# Patient Record
Sex: FEMALE | Race: BLACK OR AFRICAN AMERICAN | Employment: OTHER | URBAN - METROPOLITAN AREA
[De-identification: names, ages, dates, MRNs, and addresses within clinical notes are randomized per-mention and may not be internally consistent; named-entity substitution may affect disease eponyms.]

---

## 2017-01-01 ENCOUNTER — HOSPITAL ENCOUNTER (OUTPATIENT)
Dept: OTHER | Age: 67
Discharge: HOME | End: 2017-01-31
Attending: INTERNAL MEDICINE | Admitting: INTERNAL MEDICINE

## 2017-01-04 ENCOUNTER — TELEPHONE (OUTPATIENT)
Dept: CARDIOLOGY CLINIC | Age: 67
End: 2017-01-04

## 2017-01-04 ENCOUNTER — ANTI-COAG VISIT (OUTPATIENT)
Dept: PHARMACY | Age: 67
End: 2017-01-04

## 2017-01-04 DIAGNOSIS — I48.91 ATRIAL FIBRILLATION, UNSPECIFIED TYPE (HCC): ICD-10-CM

## 2017-01-04 DIAGNOSIS — Z79.01 LONG TERM CURRENT USE OF ANTICOAGULANT THERAPY: ICD-10-CM

## 2017-01-04 PROBLEM — I63.9 CVA (CEREBRAL VASCULAR ACCIDENT) (HCC): Status: ACTIVE | Noted: 2017-01-04

## 2017-01-04 LAB — INR BLD: 1.2

## 2017-01-11 ENCOUNTER — ANTI-COAG VISIT (OUTPATIENT)
Dept: PHARMACY | Age: 67
End: 2017-01-11

## 2017-01-11 DIAGNOSIS — Z79.01 LONG TERM CURRENT USE OF ANTICOAGULANT THERAPY: ICD-10-CM

## 2017-01-11 DIAGNOSIS — I48.91 ATRIAL FIBRILLATION, UNSPECIFIED TYPE (HCC): ICD-10-CM

## 2017-01-11 LAB
INR BLD: 1.5
PROTIME: 18 SECONDS

## 2017-01-19 ENCOUNTER — ANTI-COAG VISIT (OUTPATIENT)
Dept: PHARMACY | Age: 67
End: 2017-01-19

## 2017-01-19 DIAGNOSIS — Z79.01 LONG TERM CURRENT USE OF ANTICOAGULANT THERAPY: ICD-10-CM

## 2017-01-19 DIAGNOSIS — I48.91 ATRIAL FIBRILLATION, UNSPECIFIED TYPE (HCC): ICD-10-CM

## 2017-01-19 LAB
INR BLD: 2.3
PROTIME: 27.3 SECONDS

## 2017-02-02 ENCOUNTER — ANTI-COAG VISIT (OUTPATIENT)
Dept: PHARMACY | Age: 67
End: 2017-02-02

## 2017-02-02 DIAGNOSIS — Z79.01 LONG TERM CURRENT USE OF ANTICOAGULANT THERAPY: ICD-10-CM

## 2017-02-02 LAB — INR BLD: 2.1

## 2017-02-16 ENCOUNTER — ANTI-COAG VISIT (OUTPATIENT)
Dept: PHARMACY | Age: 67
End: 2017-02-16

## 2017-02-16 DIAGNOSIS — Z79.01 LONG TERM CURRENT USE OF ANTICOAGULANT THERAPY: ICD-10-CM

## 2017-02-16 DIAGNOSIS — I48.91 ATRIAL FIBRILLATION, UNSPECIFIED TYPE (HCC): ICD-10-CM

## 2017-02-16 LAB
INR BLD: 2
PROTIME: 24.1 SECONDS

## 2017-03-13 ENCOUNTER — ANTI-COAG VISIT (OUTPATIENT)
Dept: PHARMACY | Age: 67
End: 2017-03-13

## 2017-03-13 DIAGNOSIS — I48.91 ATRIAL FIBRILLATION, UNSPECIFIED TYPE (HCC): ICD-10-CM

## 2017-03-13 DIAGNOSIS — Z79.01 LONG TERM CURRENT USE OF ANTICOAGULANT THERAPY: ICD-10-CM

## 2017-03-13 LAB — INR BLD: 1.2

## 2017-03-23 ENCOUNTER — ANTI-COAG VISIT (OUTPATIENT)
Dept: PHARMACY | Age: 67
End: 2017-03-23

## 2017-03-23 DIAGNOSIS — Z79.01 LONG TERM CURRENT USE OF ANTICOAGULANT THERAPY: ICD-10-CM

## 2017-03-23 DIAGNOSIS — I48.91 ATRIAL FIBRILLATION, UNSPECIFIED TYPE (HCC): ICD-10-CM

## 2017-03-23 LAB
INR BLD: 1.5
PROTIME: 17.8 SECONDS

## 2017-04-06 ENCOUNTER — ANTI-COAG VISIT (OUTPATIENT)
Dept: PHARMACY | Age: 67
End: 2017-04-06

## 2017-04-06 DIAGNOSIS — I48.91 ATRIAL FIBRILLATION, UNSPECIFIED TYPE (HCC): ICD-10-CM

## 2017-04-06 DIAGNOSIS — Z79.01 LONG TERM CURRENT USE OF ANTICOAGULANT THERAPY: ICD-10-CM

## 2017-04-06 LAB
INR BLD: 1.7
PROTIME: 20.9 SECONDS

## 2017-04-20 ENCOUNTER — ANTI-COAG VISIT (OUTPATIENT)
Dept: PHARMACY | Age: 67
End: 2017-04-20

## 2017-04-20 DIAGNOSIS — Z79.01 LONG TERM CURRENT USE OF ANTICOAGULANT THERAPY: ICD-10-CM

## 2017-04-20 DIAGNOSIS — I48.91 ATRIAL FIBRILLATION, UNSPECIFIED TYPE (HCC): ICD-10-CM

## 2017-04-20 LAB
INR BLD: 1.5
PROTIME: 17.6 SECONDS

## 2017-05-04 ENCOUNTER — ANTI-COAG VISIT (OUTPATIENT)
Dept: PHARMACY | Age: 67
End: 2017-05-04

## 2017-05-04 DIAGNOSIS — Z79.01 LONG TERM CURRENT USE OF ANTICOAGULANT THERAPY: ICD-10-CM

## 2017-05-04 DIAGNOSIS — I48.91 ATRIAL FIBRILLATION, UNSPECIFIED TYPE (HCC): ICD-10-CM

## 2017-05-04 LAB
INR BLD: 2.6
PROTIME: 31.5 SECONDS

## 2017-05-16 ENCOUNTER — OFFICE VISIT (OUTPATIENT)
Dept: CARDIOLOGY CLINIC | Age: 67
End: 2017-05-16

## 2017-05-16 VITALS
HEART RATE: 64 BPM | DIASTOLIC BLOOD PRESSURE: 108 MMHG | SYSTOLIC BLOOD PRESSURE: 170 MMHG | WEIGHT: 182 LBS | HEIGHT: 67 IN | BODY MASS INDEX: 28.56 KG/M2

## 2017-05-16 DIAGNOSIS — I10 UNCONTROLLED HYPERTENSION: Chronic | ICD-10-CM

## 2017-05-16 DIAGNOSIS — I48.20 CHRONIC ATRIAL FIBRILLATION (HCC): Primary | Chronic | ICD-10-CM

## 2017-05-16 DIAGNOSIS — I25.10 CORONARY ARTERY DISEASE INVOLVING NATIVE CORONARY ARTERY OF NATIVE HEART WITHOUT ANGINA PECTORIS: Chronic | ICD-10-CM

## 2017-05-16 PROCEDURE — 99214 OFFICE O/P EST MOD 30 MIN: CPT | Performed by: INTERNAL MEDICINE

## 2017-05-16 RX ORDER — MINOXIDIL 10 MG/1
10 TABLET ORAL 2 TIMES DAILY
Qty: 180 TABLET | Refills: 3 | Status: ON HOLD | OUTPATIENT
Start: 2017-05-16 | End: 2020-02-01 | Stop reason: HOSPADM

## 2017-05-16 RX ORDER — AMLODIPINE BESYLATE 5 MG/1
5 TABLET ORAL 2 TIMES DAILY
Qty: 180 TABLET | Refills: 3 | Status: SHIPPED | OUTPATIENT
Start: 2017-05-16 | End: 2019-02-14 | Stop reason: SDUPTHER

## 2017-05-18 ENCOUNTER — ANTI-COAG VISIT (OUTPATIENT)
Dept: PHARMACY | Age: 67
End: 2017-05-18

## 2017-05-18 DIAGNOSIS — I48.20 CHRONIC ATRIAL FIBRILLATION (HCC): ICD-10-CM

## 2017-05-18 DIAGNOSIS — Z79.01 LONG TERM CURRENT USE OF ANTICOAGULANT THERAPY: ICD-10-CM

## 2017-05-18 LAB
INR BLD: 2.6
PROTIME: 30.8 SECONDS

## 2017-05-30 ENCOUNTER — HOSPITAL ENCOUNTER (OUTPATIENT)
Dept: ULTRASOUND IMAGING | Age: 67
Discharge: HOME | End: 2017-05-30
Attending: INTERNAL MEDICINE | Admitting: INTERNAL MEDICINE

## 2017-05-30 DIAGNOSIS — I10 ESSENTIAL (PRIMARY) HYPERTENSION: ICD-10-CM

## 2017-06-09 ENCOUNTER — ANTI-COAG VISIT (OUTPATIENT)
Dept: PHARMACY | Age: 67
End: 2017-06-09

## 2017-06-09 DIAGNOSIS — Z79.01 LONG TERM CURRENT USE OF ANTICOAGULANT THERAPY: ICD-10-CM

## 2017-06-09 DIAGNOSIS — I48.20 CHRONIC ATRIAL FIBRILLATION (HCC): ICD-10-CM

## 2017-06-09 LAB
INR BLD: 2.1
PROTIME: 25.3 SECONDS

## 2017-06-15 ENCOUNTER — OFFICE VISIT (OUTPATIENT)
Dept: CARDIOLOGY CLINIC | Age: 67
End: 2017-06-15

## 2017-06-15 ENCOUNTER — HOSPITAL ENCOUNTER (OUTPATIENT)
Dept: NON INVASIVE DIAGNOSTICS | Age: 67
Discharge: HOME | End: 2017-06-15
Admitting: INTERNAL MEDICINE

## 2017-06-15 VITALS
HEIGHT: 67 IN | SYSTOLIC BLOOD PRESSURE: 122 MMHG | WEIGHT: 183 LBS | BODY MASS INDEX: 28.72 KG/M2 | HEART RATE: 62 BPM | DIASTOLIC BLOOD PRESSURE: 92 MMHG

## 2017-06-15 DIAGNOSIS — I48.20 CHRONIC ATRIAL FIBRILLATION (HCC): ICD-10-CM

## 2017-06-15 DIAGNOSIS — I16.0 HYPERTENSIVE URGENCY: Primary | ICD-10-CM

## 2017-06-15 DIAGNOSIS — I10 ESSENTIAL (PRIMARY) HYPERTENSION: ICD-10-CM

## 2017-06-15 DIAGNOSIS — I10 UNCONTROLLED HYPERTENSION: Primary | Chronic | ICD-10-CM

## 2017-06-15 DIAGNOSIS — I10 HTN (HYPERTENSION), BENIGN: ICD-10-CM

## 2017-06-15 DIAGNOSIS — I25.10 CORONARY ARTERY DISEASE INVOLVING NATIVE CORONARY ARTERY OF NATIVE HEART WITHOUT ANGINA PECTORIS: Chronic | ICD-10-CM

## 2017-06-15 LAB
LEFT VENTRICULAR EJECTION FRACTION HIGH VALUE: 70 %
LEFT VENTRICULAR EJECTION FRACTION MODE: NORMAL
LV EF: 65 %
LV EF: 68 %
LVEF MODALITY: NORMAL

## 2017-06-15 PROCEDURE — 99214 OFFICE O/P EST MOD 30 MIN: CPT | Performed by: INTERNAL MEDICINE

## 2017-06-15 RX ORDER — DOXAZOSIN 2 MG/1
2 TABLET ORAL NIGHTLY
Qty: 90 TABLET | Refills: 3 | Status: ON HOLD | OUTPATIENT
Start: 2017-06-15 | End: 2020-02-01 | Stop reason: HOSPADM

## 2017-07-06 ENCOUNTER — ANTI-COAG VISIT (OUTPATIENT)
Dept: PHARMACY | Age: 67
End: 2017-07-06

## 2017-07-06 DIAGNOSIS — I48.20 CHRONIC ATRIAL FIBRILLATION (HCC): ICD-10-CM

## 2017-07-06 DIAGNOSIS — Z79.01 LONG TERM CURRENT USE OF ANTICOAGULANT THERAPY: ICD-10-CM

## 2017-07-06 LAB
INR BLD: 2.3
PROTIME: 27.2 SECONDS

## 2017-08-10 ENCOUNTER — TELEPHONE (OUTPATIENT)
Dept: PHARMACY | Age: 67
End: 2017-08-10

## 2017-08-11 ENCOUNTER — ANTI-COAG VISIT (OUTPATIENT)
Dept: PHARMACY | Age: 67
End: 2017-08-11

## 2017-08-11 DIAGNOSIS — Z79.01 LONG TERM CURRENT USE OF ANTICOAGULANT THERAPY: ICD-10-CM

## 2017-08-11 DIAGNOSIS — I48.20 CHRONIC ATRIAL FIBRILLATION (HCC): ICD-10-CM

## 2017-08-11 LAB — INR BLD: 6.4

## 2017-08-15 ENCOUNTER — ANTI-COAG VISIT (OUTPATIENT)
Dept: PHARMACY | Age: 67
End: 2017-08-15

## 2017-08-15 ENCOUNTER — OFFICE VISIT (OUTPATIENT)
Dept: CARDIOLOGY CLINIC | Age: 67
End: 2017-08-15

## 2017-08-15 VITALS
HEART RATE: 66 BPM | SYSTOLIC BLOOD PRESSURE: 134 MMHG | WEIGHT: 181 LBS | DIASTOLIC BLOOD PRESSURE: 86 MMHG | BODY MASS INDEX: 28.41 KG/M2 | HEIGHT: 67 IN

## 2017-08-15 DIAGNOSIS — I48.20 CHRONIC ATRIAL FIBRILLATION (HCC): ICD-10-CM

## 2017-08-15 DIAGNOSIS — I25.10 CORONARY ARTERY DISEASE INVOLVING NATIVE CORONARY ARTERY OF NATIVE HEART WITHOUT ANGINA PECTORIS: Chronic | ICD-10-CM

## 2017-08-15 DIAGNOSIS — I10 HTN (HYPERTENSION), BENIGN: Primary | ICD-10-CM

## 2017-08-15 DIAGNOSIS — Z79.01 LONG TERM CURRENT USE OF ANTICOAGULANT THERAPY: ICD-10-CM

## 2017-08-15 LAB
INR BLD: 1.3
PROTIME: 15.5 SECONDS

## 2017-08-15 PROCEDURE — 99214 OFFICE O/P EST MOD 30 MIN: CPT | Performed by: INTERNAL MEDICINE

## 2017-08-28 ENCOUNTER — TELEPHONE (OUTPATIENT)
Dept: PHARMACY | Age: 67
End: 2017-08-28

## 2017-08-28 ENCOUNTER — ANTI-COAG VISIT (OUTPATIENT)
Dept: PHARMACY | Age: 67
End: 2017-08-28

## 2017-08-28 DIAGNOSIS — I48.20 CHRONIC ATRIAL FIBRILLATION (HCC): ICD-10-CM

## 2017-08-28 DIAGNOSIS — Z79.01 LONG TERM CURRENT USE OF ANTICOAGULANT THERAPY: ICD-10-CM

## 2017-08-28 LAB
INR BLD: 2
PROTIME: 24.3 SECONDS

## 2017-09-21 ENCOUNTER — ANTI-COAG VISIT (OUTPATIENT)
Dept: PHARMACY | Age: 67
End: 2017-09-21

## 2017-09-21 DIAGNOSIS — Z79.01 LONG TERM CURRENT USE OF ANTICOAGULANT THERAPY: ICD-10-CM

## 2017-09-21 DIAGNOSIS — I48.20 CHRONIC ATRIAL FIBRILLATION (HCC): ICD-10-CM

## 2017-09-21 LAB
INR BLD: 4.4
PROTIME: 52.8 SECONDS

## 2017-10-05 ENCOUNTER — ANTI-COAG VISIT (OUTPATIENT)
Dept: PHARMACY | Age: 67
End: 2017-10-05

## 2017-10-05 DIAGNOSIS — I48.20 CHRONIC ATRIAL FIBRILLATION (HCC): ICD-10-CM

## 2017-10-05 DIAGNOSIS — Z79.01 LONG TERM CURRENT USE OF ANTICOAGULANT THERAPY: ICD-10-CM

## 2017-10-05 LAB
INR BLD: 2.1
PROTIME: 24.9 SECONDS

## 2017-10-05 NOTE — MR AVS SNAPSHOT
cancers. BMI is not perfect. It may overestimate body fat in athletes and people who are more muscular. If your body fat is high you can improve your BMI by decreasing your calorie intake and becoming more physically active. Learn more at: [employer]             Medications and Orders      Your Current Medications Are              famotidine (PEPCID) 20 MG tablet Take 1 tablet by mouth 2 times daily    ondansetron (ZOFRAN ODT) 4 MG disintegrating tablet Take 1 tablet by mouth every 8 hours as needed for Nausea    dicyclomine (BENTYL) 10 MG capsule Take 1 capsule by mouth every 6 hours as needed (cramps)    doxazosin (CARDURA) 2 MG tablet Take 1 tablet by mouth nightly    minoxidil (LONITEN) 10 MG tablet Take 1 tablet by mouth 2 times daily    amLODIPine (NORVASC) 5 MG tablet Take 1 tablet by mouth 2 times daily    HYDROcodone-acetaminophen (NORCO) 5-325 MG per tablet Take 0.5-1 tablets by mouth every 6 hours as needed for Pain . buprenorphine (BUTRANS) 5 MCG/HR PTWK Place 1 patch onto the skin once a week    atenolol (TENORMIN) 50 MG tablet Take 0.5 tablets by mouth daily    spironolactone (ALDACTONE) 50 MG tablet Take 1 tablet by mouth 2 times daily    atorvastatin (LIPITOR) 80 MG tablet Take 1 tablet by mouth daily    furosemide (LASIX) 20 MG tablet Take 1 tablet by mouth 2 times daily    potassium chloride SA (K-DUR;KLOR-CON M) 20 MEQ tablet Take 1 tab daily. acetaminophen (TYLENOL) 500 MG tablet Take 2 tablets by mouth every 6 hours as needed    Calcium Carb-Cholecalciferol (CALCIUM + D3) 600-200 MG-UNIT TABS tablet Take 1 tablet by mouth 2 times daily    Magnesium 400 MG TABS Take 1 tablet by mouth daily      Allergies              Clonidine Derivatives Hives    Codeine Hives, Itching    Lisinopril Hives, Itching      We Ordered/Performed the following           Protime-INR     Comments: This external order was created through the results console. Result Summary for Protime-INR      Result Information     Status          Final result (Resulted: 10/5/2017  8:45 AM)           10/5/2017  8:45 AM      Component Results     Component Value Ref Range & Units Status    INR 2.1  Final    Protime 24.9 seconds Final               Additional Information        Basic Information     Date Of Birth Sex Race Ethnicity Preferred Language    1950 Female Black Non-/Non  English      Problem List as of 10/5/2017  Date Reviewed: 8/15/2017                Bleeding on Coumadin    Nontraumatic subcortical hemorrhage of cerebral hemisphere [facility])    Hypertensive emergency    TIA involving right internal carotid artery    Left leg weakness    Chronic atrial fibrillation ([facility]    Remote history of stroke    HTN (hypertension), benign    CVA (cerebral vascular accident) ([facility]    LVH (left ventricular hypertrophy)    Constipation    Coronary artery disease involving native coronary artery of native heart without angina pectoris (Chronic)    Chest pain    Uncontrolled hypertension (Chronic)    Long term current use of anticoagulant therapy    Atrial fibrillation (HCC) (Chronic)      Preventive Care        Date Due    Hepatitis C screening is recommended for all adults regardless of risk factors born between [facility] at least once (lifetime) who have never been tested. 1950    HIV screening is recommended for all people regardless of risk factors  aged 15-65 years at least once (lifetime) who have never been HIV tested. 12/27/1966    Tetanus Combination Vaccine (1 - Tdap) 12/27/1970    Pap Smear 12/27/1972    Diabetes Screening 12/27/1991    Mammograms are recommended every 2 years for low/average risk patients aged 48 - 69, and every year for high risk patients per updated national guidelines. However these guidelines can be individualized by your provider.  12/27/2001    Colonoscopy 12/27/2001    Zoster Vaccine 12/27/2011

## 2017-10-20 ENCOUNTER — TELEPHONE (OUTPATIENT)
Dept: PHARMACY | Age: 67
End: 2017-10-20

## 2017-10-23 ENCOUNTER — ANTI-COAG VISIT (OUTPATIENT)
Dept: PHARMACY | Age: 67
End: 2017-10-23

## 2017-10-23 DIAGNOSIS — Z79.01 LONG TERM CURRENT USE OF ANTICOAGULANT THERAPY: ICD-10-CM

## 2017-10-23 DIAGNOSIS — I48.20 CHRONIC ATRIAL FIBRILLATION (HCC): ICD-10-CM

## 2017-10-23 LAB
INR BLD: 2
PROTIME: 24.5 SECONDS

## 2017-11-01 ENCOUNTER — HOSPITAL ENCOUNTER (OUTPATIENT)
Dept: OTHER | Age: 67
Discharge: HOME | End: 2017-11-30
Attending: INTERNAL MEDICINE | Admitting: INTERNAL MEDICINE

## 2017-12-01 ENCOUNTER — ANTI-COAG VISIT (OUTPATIENT)
Dept: PHARMACY | Age: 67
End: 2017-12-01

## 2017-12-01 ENCOUNTER — HOSPITAL ENCOUNTER (OUTPATIENT)
Dept: OTHER | Age: 67
Discharge: HOME | End: 2017-12-31
Attending: INTERNAL MEDICINE | Admitting: INTERNAL MEDICINE

## 2017-12-01 DIAGNOSIS — I48.20 CHRONIC ATRIAL FIBRILLATION (HCC): ICD-10-CM

## 2017-12-01 DIAGNOSIS — Z79.01 LONG TERM CURRENT USE OF ANTICOAGULANT THERAPY: ICD-10-CM

## 2017-12-01 LAB
INR BLD: 1.1
PROTIME: 13.3 SECONDS

## 2017-12-01 NOTE — PROGRESS NOTES
Ms. Lesley Rascon is a 73 y.o. y/o female with history of A fib   She presents today for anticoagulation monitoring and adjustment. 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)  Patient Reported Findings:  Yes     No  [x]   []       Patient verifies current dosing regimen as listed  []   [x]       S/S bleeding/bruising/swelling/SOB  []   [x]       Blood in urine or stool  []   [x]       Procedures scheduled in the future at this time  [x]   []       Missed Dose on 11/25 only  []   [x]       Extra Dose  []   [x]       Change in medications  []   [x]       Change in health/diet/appetite- Patient alternates spinach, annmarie greens and broccoli on different weeks and fill in with the other lower vitamin K vegetables. However she had collards this past Thu(Thanksgiving), Sun & Tue which was unusually   []   [x]       Change in alcohol use  []   [x]       Change in activity  []   [x]       Hospital admission  []   [x]       Emergency department visit   [x]   []       Other complaints She did not follow the dose instructions from last visit, she has been following an earlier schedule of 2.5mg on Mon & Fri and 5mg all other days. Clinical Outcomes:  Yes     No  []   [x]       Major bleeding event  []   [x]       Thromboembolic event  Duration of warfarin Therapy: indefinitely  INR Range:  1.8-2.5  Patient states that she has been taking 2.5mg on Mon & Fri only and 5mg all other days. This was a previous dose regimen. She has been therapeutic with this dose. INR 1.1 today d/t missed dose and extra greens  Take 7.5mg today and tomorrow only then continue weekly dose to 2.5 mg on Mon & Fri and 5 mg all other days  Encouraged to maintain a consistency of vegetables/salads.   Recheck INR in 2 weeks     Referring cardiologist is Dr Gerardo Wheat  INR (no units)   Date Value   12/01/2017 1.1   10/23/2017 2   10/05/2017 2.1   09/21/2017 4.4

## 2017-12-14 ENCOUNTER — ANTI-COAG VISIT (OUTPATIENT)
Dept: PHARMACY | Age: 67
End: 2017-12-14

## 2017-12-14 DIAGNOSIS — I48.20 CHRONIC ATRIAL FIBRILLATION (HCC): ICD-10-CM

## 2017-12-14 DIAGNOSIS — Z79.01 LONG TERM CURRENT USE OF ANTICOAGULANT THERAPY: ICD-10-CM

## 2017-12-14 LAB
INR BLD: 2.5
PROTIME: 29.7 SECONDS

## 2017-12-14 NOTE — PROGRESS NOTES
Ms. Becky Britt is a 73 y.o. y/o female with history of A fib   She presents today for anticoagulation monitoring and adjustment. 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)  Patient Reported Findings:  Yes     No  [x]   []       Patient verifies current dosing regimen as listed patient states that she has been taking 2.5 mg on Mon and 5 mg all other days of the week  []   [x]       S/S bleeding/bruising/swelling/SOB  []   [x]       Blood in urine or stool  []   [x]       Procedures scheduled in the future at this time  []   [x]       Missed Dose  []   [x]       Extra Dose  []   [x]       Change in medications  [x]   []       Change in health/diet/appetite- Patient alternates spinach, annmarie greens and broccoli on different weeks and fill in with the other lower vitamin K vegetables. Has been sick with the bug, had diarrhea and vomiting this whole week. Has therefore not had an appetite. Likely affecting INR  []   [x]       Change in alcohol use  []   [x]       Change in activity  []   [x]       Hospital admission  []   [x]       Emergency department visit   []   [x]       Other complaints  Clinical Outcomes:  Yes     No  []   [x]       Major bleeding event  []   [x]       Thromboembolic event  Duration of warfarin Therapy: indefinitely  INR Range:  1.8-2.5    INR 2.5 today  Since therapeutic today, continue weekly dose as patient as been taking, 2.5 mg on Mon and 5 mg all other days of the week. Encouraged to maintain a consistency of vegetables/salads.   Recheck INR in 2 weeks, 12/28     Referring cardiologist is Dr Rebeca Ceja  INR (no units)   Date Value   12/14/2017 2.5   12/01/2017 1.1   10/23/2017 2   10/05/2017 2.1

## 2018-01-01 ENCOUNTER — HOSPITAL ENCOUNTER (OUTPATIENT)
Dept: OTHER | Age: 68
Discharge: HOME | End: 2018-01-31
Attending: INTERNAL MEDICINE | Admitting: INTERNAL MEDICINE

## 2018-01-02 ENCOUNTER — TELEPHONE (OUTPATIENT)
Dept: PHARMACY | Age: 68
End: 2018-01-02

## 2018-01-02 ENCOUNTER — ANTI-COAG VISIT (OUTPATIENT)
Dept: PHARMACY | Age: 68
End: 2018-01-02

## 2018-01-02 DIAGNOSIS — Z79.01 LONG TERM CURRENT USE OF ANTICOAGULANT THERAPY: ICD-10-CM

## 2018-01-02 DIAGNOSIS — I48.20 CHRONIC ATRIAL FIBRILLATION (HCC): ICD-10-CM

## 2018-01-02 LAB
INR BLD: 2.2
PROTIME: 26.8 SECONDS

## 2018-01-02 NOTE — PROGRESS NOTES
Ms. Elva High is a 78 y.o. y/o female with history of A fib   She presents today for anticoagulation monitoring and adjustment. 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)  Patient Reported Findings:  Yes     No  [x]   []       Patient verifies current dosing regimen as listed patient states that she has been taking 2.5 mg on Mon and 5 mg all other days of the week  []   [x]       S/S bleeding/bruising/swelling/SOB  []   [x]       Blood in urine or stool  []   [x]       Procedures scheduled in the future at this time  []   [x]       Missed Dose  []   [x]       Extra Dose  []   [x]       Change in medications  [x]   []       Change in health/diet/appetite- Patient continues to alternate spinach, annmarie greens and broccoli on different weeks and fill in with the other lower vitamin K vegetables. []   [x]       Change in alcohol use  []   [x]       Change in activity  []   [x]       Hospital admission  []   [x]       Emergency department visit   []   [x]       Other complaints  Clinical Outcomes:  Yes     No  []   [x]       Major bleeding event  []   [x]       Thromboembolic event  Duration of warfarin Therapy: indefinitely  INR Range:  1.8-2.5    INR 2.2 today  Continue same weekly dose as patient as been taking, 2.5 mg on Mon and 5 mg all other days of the week. Encouraged to maintain a consistency of vegetables/salads.   Recheck INR in 4 weeks     Referring cardiologist is Dr Eddie Willett  INR (no units)   Date Value   01/02/2018 2.2   12/14/2017 2.5   12/01/2017 1.1   10/23/2017 2

## 2018-01-30 ENCOUNTER — OFFICE VISIT (OUTPATIENT)
Dept: CARDIOLOGY CLINIC | Age: 68
End: 2018-01-30

## 2018-01-30 VITALS
BODY MASS INDEX: 28.72 KG/M2 | HEIGHT: 67 IN | WEIGHT: 183 LBS | DIASTOLIC BLOOD PRESSURE: 110 MMHG | SYSTOLIC BLOOD PRESSURE: 166 MMHG | HEART RATE: 66 BPM

## 2018-01-30 DIAGNOSIS — I48.20 CHRONIC ATRIAL FIBRILLATION (HCC): Primary | ICD-10-CM

## 2018-01-30 DIAGNOSIS — I10 HTN (HYPERTENSION), BENIGN: ICD-10-CM

## 2018-01-30 DIAGNOSIS — I25.10 CORONARY ARTERY DISEASE INVOLVING NATIVE CORONARY ARTERY OF NATIVE HEART WITHOUT ANGINA PECTORIS: Chronic | ICD-10-CM

## 2018-01-30 PROCEDURE — G8419 CALC BMI OUT NRM PARAM NOF/U: HCPCS | Performed by: INTERNAL MEDICINE

## 2018-01-30 PROCEDURE — 93000 ELECTROCARDIOGRAM COMPLETE: CPT | Performed by: INTERNAL MEDICINE

## 2018-01-30 PROCEDURE — 4040F PNEUMOC VAC/ADMIN/RCVD: CPT | Performed by: INTERNAL MEDICINE

## 2018-01-30 PROCEDURE — G8427 DOCREV CUR MEDS BY ELIG CLIN: HCPCS | Performed by: INTERNAL MEDICINE

## 2018-01-30 PROCEDURE — G8400 PT W/DXA NO RESULTS DOC: HCPCS | Performed by: INTERNAL MEDICINE

## 2018-01-30 PROCEDURE — 3017F COLORECTAL CA SCREEN DOC REV: CPT | Performed by: INTERNAL MEDICINE

## 2018-01-30 PROCEDURE — 99214 OFFICE O/P EST MOD 30 MIN: CPT | Performed by: INTERNAL MEDICINE

## 2018-01-30 PROCEDURE — G8599 NO ASA/ANTIPLAT THER USE RNG: HCPCS | Performed by: INTERNAL MEDICINE

## 2018-01-30 PROCEDURE — 1123F ACP DISCUSS/DSCN MKR DOCD: CPT | Performed by: INTERNAL MEDICINE

## 2018-01-30 PROCEDURE — G8484 FLU IMMUNIZE NO ADMIN: HCPCS | Performed by: INTERNAL MEDICINE

## 2018-01-30 PROCEDURE — 1090F PRES/ABSN URINE INCON ASSESS: CPT | Performed by: INTERNAL MEDICINE

## 2018-01-30 PROCEDURE — 1036F TOBACCO NON-USER: CPT | Performed by: INTERNAL MEDICINE

## 2018-01-30 PROCEDURE — 3014F SCREEN MAMMO DOC REV: CPT | Performed by: INTERNAL MEDICINE

## 2018-01-30 RX ORDER — ATENOLOL 50 MG/1
50 TABLET ORAL DAILY
Qty: 90 TABLET | Refills: 3 | Status: SHIPPED | OUTPATIENT
Start: 2018-01-30 | End: 2018-05-15 | Stop reason: SDUPTHER

## 2018-01-30 NOTE — PROGRESS NOTES
[address]   Cardiac Evaluation      Patient: Lex Escobar  YOB: 1950  Date: 1/30/18       Chief Complaint   Patient presents with    Atrial Fibrillation    Coronary Artery Disease    Hypertension        Referring provider: Seun Hall NP    History of Present Illness:   Ms Lina Baker is seen today in follow up. She was admitted to [facility] with headache and uncontrolled hypertension 9/21/15. She underwent an echo, MRI of the head, and GXT. GXT was abnormal so cath performed. This revealed severe LVH with EF 70%, enlarged AO root consistent with htn and patent stent in LAD. She has atrial fibrillation and takes coumadin. Emilee's blood pressure has been difficult to control per her account. She is intolerant to Lisinopril and Clonidine. Today, Ms Lina Baker tells me she has not been feeling well. She is fatigued and has palpitations/heart racing. She denies any chest pain, GRAHAM, dizziness, or edema. She continues to monitor her blood pressure at home. Saranya Horner was in the ER recently with headache but diagnosed with a  UTI. Past Medical History:   has a past medical history of Atrial fibrillation ([address] Bell palsy; CAD (coronary artery disease); Cerebral artery occlusion with cerebral infarction ([address] Hypertension; and Intracranial hemorrhage ([address] Surgical History:   has a past surgical history that includes Cardiac surgery; Tubal ligation (Right, Torn Rotator Cuff); Coronary angioplasty with stent; and Colonoscopy.      Current Outpatient Prescriptions   Medication Sig Dispense Refill    famotidine (PEPCID) 20 MG tablet Take 1 tablet by mouth 2 times daily 60 tablet 0    ondansetron (ZOFRAN ODT) 4 MG disintegrating tablet Take 1 tablet by mouth every 8 hours as needed for Nausea 20 tablet 0    dicyclomine (BENTYL) 10 MG capsule Take 1 capsule by mouth every 6 hours as needed (cramps) 20 capsule 0    doxazosin (CARDURA) 2 MG tablet Take 1 tablet by mouth nightly 90 sounds  Neurological/Psychiatric:  · Alert and oriented x3  · Moves all extremities well  · Exhibits normal gait balance and coordination      Assessment/Plan  1. Uncontrolled hypertension -stable, no renal artery stenosis on US   2. Hyperlipidemia -on statin, stable on labs 1/17   3. Chronic atrial fibrillation (HCC) -chronic, but rates are higher today. Coumadin was temporarily d/c'd after an intracranial hemorrhage. It has since been restarted and she is tolerating well. I will increase the atenolol today to 50 mg.    4. Coronary artery disease involving native coronary artery of native heart without angina pectoris -stable. Continue BB and statin. Not on ASA due to being on anticoagulation therapy. Echo 6/15/17: EF 65-70%, No regional wall motion abnormalities are noted. Mild-moderate hypertrophy. Moderately dilated left atrium. Right atrial size is severely dilated . Moderate mitral regurgitation is present. moderate tricuspid regurgitation with RVSP estimated at 42 mmHg. Mild pulmonic regurgitation present. cath 9/23/15: LVEDP - 12. LVgram - severe LVH with EF 70%, enlarged AO root consistent with htn  Cors: LM - nl, LAD - 20% prox, 30%mid, patent stent, distal irreg, LCX - 20% mid, RCA - dominant and normal  GXT 9/22/15: small sized anterior completely reversible defect consistent with ischemia in territory of mid and distal LAD. Normal LV function  Echo 9/22/15: EF 55-60%. No regional wall motion abnormalities are seen. Mild to moderate concentric left ventricular hypertrophy is present. Aortic valve appears sclerotic but opens adequately. There is mild mitral and moderate tricuspid regurgitation with RVSP 35 mmHg. Bilateral atria enlargement. 24hr holter monitor. Double Atenolol to 50mg daily. She will see Katherine Brice in 2 weeks. Thank you for allowing to me to participate in the care of Anders Cook.

## 2018-02-01 ENCOUNTER — HOSPITAL ENCOUNTER (OUTPATIENT)
Dept: OTHER | Age: 68
Discharge: HOME | End: 2018-02-28
Attending: INTERNAL MEDICINE | Admitting: INTERNAL MEDICINE

## 2018-02-01 PROCEDURE — 93224 XTRNL ECG REC UP TO 48 HRS: CPT | Performed by: INTERNAL MEDICINE

## 2018-02-05 ENCOUNTER — ANTI-COAG VISIT (OUTPATIENT)
Dept: PHARMACY | Age: 68
End: 2018-02-05

## 2018-02-05 DIAGNOSIS — Z79.01 LONG TERM CURRENT USE OF ANTICOAGULANT THERAPY: ICD-10-CM

## 2018-02-05 DIAGNOSIS — I48.20 CHRONIC ATRIAL FIBRILLATION (HCC): ICD-10-CM

## 2018-02-05 LAB
INR BLD: 1.6
PROTIME: 19.2 SECONDS

## 2018-02-06 ENCOUNTER — TELEPHONE (OUTPATIENT)
Dept: CARDIOLOGY CLINIC | Age: 68
End: 2018-02-06

## 2018-02-07 PROBLEM — G45.3 AF (AMAUROSIS FUGAX): Status: ACTIVE | Noted: 2018-02-07

## 2018-02-08 ENCOUNTER — TELEPHONE (OUTPATIENT)
Dept: ENT CLINIC | Age: 68
End: 2018-02-08

## 2018-02-08 PROBLEM — H54.7 VISION LOSS: Status: ACTIVE | Noted: 2018-02-07

## 2018-02-08 PROBLEM — G51.0 RIGHT-SIDED BELL'S PALSY: Status: ACTIVE | Noted: 2018-02-08

## 2018-02-08 PROBLEM — E04.1 THYROID NODULE: Status: ACTIVE | Noted: 2018-02-08

## 2018-02-08 NOTE — TELEPHONE ENCOUNTER
Kristie Giron called from [facility] with consult for Dr. Johnathon Centeno for thyroid nodule. She is in Room 359. Fernandez Ward is the nurse and the phone number is [phone].

## 2018-02-12 NOTE — TELEPHONE ENCOUNTER
I spoke with pt and relayed message per CRNP. Pt had a hospital . She stated that she had 2 strokes. She states that she is having some weakness in her shoulder, but otherwise ok.

## 2018-02-13 ENCOUNTER — OFFICE VISIT (OUTPATIENT)
Dept: CARDIOLOGY CLINIC | Age: 68
End: 2018-02-13

## 2018-02-13 VITALS
HEIGHT: 67 IN | HEART RATE: 58 BPM | DIASTOLIC BLOOD PRESSURE: 92 MMHG | BODY MASS INDEX: 29.66 KG/M2 | WEIGHT: 189 LBS | SYSTOLIC BLOOD PRESSURE: 140 MMHG

## 2018-02-13 DIAGNOSIS — E87.6 HYPOKALEMIA: Primary | ICD-10-CM

## 2018-02-13 DIAGNOSIS — I48.20 CHRONIC ATRIAL FIBRILLATION (HCC): Chronic | ICD-10-CM

## 2018-02-13 DIAGNOSIS — I10 UNCONTROLLED HYPERTENSION: Chronic | ICD-10-CM

## 2018-02-13 DIAGNOSIS — I25.10 CORONARY ARTERY DISEASE INVOLVING NATIVE CORONARY ARTERY OF NATIVE HEART WITHOUT ANGINA PECTORIS: Chronic | ICD-10-CM

## 2018-02-13 PROCEDURE — 1111F DSCHRG MED/CURRENT MED MERGE: CPT | Performed by: NURSE PRACTITIONER

## 2018-02-13 PROCEDURE — G8598 ASA/ANTIPLAT THER USED: HCPCS | Performed by: NURSE PRACTITIONER

## 2018-02-13 PROCEDURE — G8484 FLU IMMUNIZE NO ADMIN: HCPCS | Performed by: NURSE PRACTITIONER

## 2018-02-13 PROCEDURE — G8419 CALC BMI OUT NRM PARAM NOF/U: HCPCS | Performed by: NURSE PRACTITIONER

## 2018-02-13 PROCEDURE — 99214 OFFICE O/P EST MOD 30 MIN: CPT | Performed by: NURSE PRACTITIONER

## 2018-02-13 PROCEDURE — 1123F ACP DISCUSS/DSCN MKR DOCD: CPT | Performed by: NURSE PRACTITIONER

## 2018-02-13 PROCEDURE — G8427 DOCREV CUR MEDS BY ELIG CLIN: HCPCS | Performed by: NURSE PRACTITIONER

## 2018-02-13 PROCEDURE — 3014F SCREEN MAMMO DOC REV: CPT | Performed by: NURSE PRACTITIONER

## 2018-02-13 PROCEDURE — 1036F TOBACCO NON-USER: CPT | Performed by: NURSE PRACTITIONER

## 2018-02-13 PROCEDURE — 3017F COLORECTAL CA SCREEN DOC REV: CPT | Performed by: NURSE PRACTITIONER

## 2018-02-13 PROCEDURE — G8400 PT W/DXA NO RESULTS DOC: HCPCS | Performed by: NURSE PRACTITIONER

## 2018-02-13 PROCEDURE — 1090F PRES/ABSN URINE INCON ASSESS: CPT | Performed by: NURSE PRACTITIONER

## 2018-02-13 PROCEDURE — 4040F PNEUMOC VAC/ADMIN/RCVD: CPT | Performed by: NURSE PRACTITIONER

## 2018-02-13 RX ORDER — POTASSIUM CHLORIDE 20 MEQ/1
TABLET, EXTENDED RELEASE ORAL
Qty: 30 TABLET | Refills: 3 | Status: ON HOLD | OUTPATIENT
Start: 2018-02-13 | End: 2018-02-27 | Stop reason: HOSPADM

## 2018-02-13 NOTE — LETTER
has a past surgical history that includes Cardiac surgery; Tubal ligation (Right, Torn Rotator Cuff); Coronary angioplasty with stent; and Colonoscopy. Current Outpatient Prescriptions   Medication Sig Dispense Refill    potassium chloride (KLOR-CON M) 20 MEQ extended release tablet Take 1 tab daily. 30 tablet 3    aspirin 81 MG EC tablet Take 1 tablet by mouth daily 30 tablet 3    warfarin (COUMADIN) 5 MG tablet Take 5 mg by mouth See Admin Instructions 2.5mg Monday 5mg all other days      atenolol (TENORMIN) 50 MG tablet Take 1 tablet by mouth daily (Patient taking differently: Take 50 mg by mouth 2 times daily ) 90 tablet 3    doxazosin (CARDURA) 2 MG tablet Take 1 tablet by mouth nightly 90 tablet 3    minoxidil (LONITEN) 10 MG tablet Take 1 tablet by mouth 2 times daily 180 tablet 3    amLODIPine (NORVASC) 5 MG tablet Take 1 tablet by mouth 2 times daily 180 tablet 3    atorvastatin (LIPITOR) 80 MG tablet Take 1 tablet by mouth daily 90 tablet 3    furosemide (LASIX) 20 MG tablet Take 1 tablet by mouth 2 times daily 180 tablet 3    acetaminophen (TYLENOL) 500 MG tablet Take 2 tablets by mouth every 6 hours as needed 120 tablet 3    Magnesium 400 MG TABS Take 1 tablet by mouth daily 30 tablet 3    famotidine (PEPCID) 20 MG tablet Take 1 tablet by mouth 2 times daily 60 tablet 0    Calcium Carb-Cholecalciferol (CALCIUM + D3) 600-200 MG-UNIT TABS tablet Take 1 tablet by mouth 2 times daily 120 tablet 3     No current facility-administered medications for this visit. Social History:  Social History     Social History    Marital status:      Spouse name: Dmitry Núñez Number of children: 3    Years of education: 15     Occupational History    Not on file.      Social History Main Topics    Smoking status: Never Smoker    Smokeless tobacco: Never Used    Alcohol use No    Drug use: No    Sexual activity: Yes     Partners: Male     Other Topics Concern    Not on file · Heart is irregular rate and rhythm with normal S1, S2  · The carotid upstroke is normal, no bruit noted   · JVP is not elevated  · Peripheral pulses are symmetrical  · There is no clubbing, cyanosis of the extremities  · No edema  · Femoral Arteries: 2+ and equal  · Pedal Pulses: 2+ and equal   Abdomen:  · No masses or tenderness  · Normal bowel sounds  Neurological/Psychiatric:  · Alert and oriented x3  · Moves all extremities well  · Exhibits normal gait balance and coordination      Assessment/Plan  1. Uncontrolled hypertension -stable, no renal artery stenosis on US   2. Hyperlipidemia -on statin, stable on labs 1/17   3. Chronic atrial fibrillation (HCC) -chronic, but rates are higher today. Coumadin was temporarily d/c'd after an intracranial hemorrhage. It has since been restarted and she is tolerating well. continue atenolol  50 mg.    4. Coronary artery disease involving native coronary artery of native heart without angina pectoris -stable. Continue BB and statin. Not on ASA due to being on anticoagulation therapy. Echo 6/15/17: EF 65-70%, No regional wall motion abnormalities are noted. Mild-moderate hypertrophy. Moderately dilated left atrium. Right atrial size is severely dilated . Moderate mitral regurgitation is present. moderate tricuspid regurgitation with RVSP estimated at 42 mmHg. Mild pulmonic regurgitation present. cath 9/23/15: LVEDP - 12. LVgram - severe LVH with EF 70%, enlarged AO root consistent with htn  Cors: LM - nl, LAD - 20% prox, 30%mid, patent stent, distal irreg, LCX - 20% mid, RCA - dominant and normal  GXT 9/22/15: small sized anterior completely reversible defect consistent with ischemia in territory of mid and distal LAD. Normal LV function  Echo 9/22/15: EF 55-60%. No regional wall motion abnormalities are seen. Mild to moderate concentric left ventricular hypertrophy is present. Aortic valve appears sclerotic but opens adequately.

## 2018-02-13 NOTE — PROGRESS NOTES
(COUMADIN) 5 MG tablet Take 5 mg by mouth See Admin Instructions 2.5mg Monday 5mg all other days      atenolol (TENORMIN) 50 MG tablet Take 1 tablet by mouth daily (Patient taking differently: Take 50 mg by mouth 2 times daily ) 90 tablet 3    doxazosin (CARDURA) 2 MG tablet Take 1 tablet by mouth nightly 90 tablet 3    minoxidil (LONITEN) 10 MG tablet Take 1 tablet by mouth 2 times daily 180 tablet 3    amLODIPine (NORVASC) 5 MG tablet Take 1 tablet by mouth 2 times daily 180 tablet 3    atorvastatin (LIPITOR) 80 MG tablet Take 1 tablet by mouth daily 90 tablet 3    furosemide (LASIX) 20 MG tablet Take 1 tablet by mouth 2 times daily 180 tablet 3    acetaminophen (TYLENOL) 500 MG tablet Take 2 tablets by mouth every 6 hours as needed 120 tablet 3    Magnesium 400 MG TABS Take 1 tablet by mouth daily 30 tablet 3    famotidine (PEPCID) 20 MG tablet Take 1 tablet by mouth 2 times daily 60 tablet 0    Calcium Carb-Cholecalciferol (CALCIUM + D3) 600-200 MG-UNIT TABS tablet Take 1 tablet by mouth 2 times daily 120 tablet 3     No current facility-administered medications for this visit. Social History:  Social History     Social History    Marital status:      Spouse name: Jatin Cervantes Number of children: 3    Years of education: 15     Occupational History    Not on file. Social History Main Topics    Smoking status: Never Smoker    Smokeless tobacco: Never Used    Alcohol use No    Drug use: No    Sexual activity: Yes     Partners: Male     Other Topics Concern    Not on file     Social History Narrative    No narrative on file       Family History:  family history is not on file. Allergies:  Clonidine derivatives; Codeine; and Lisinopril     Review of Systems:   · Constitutional: there has been no unanticipated weight loss. No change in energy or activity level   · Eyes: No visual changes   · ENT: No Headaches, hearing loss or vertigo.  No mouth sores or sore x3  · Moves all extremities well  · Exhibits normal gait balance and coordination      Assessment/Plan  1. Uncontrolled hypertension -stable, no renal artery stenosis on US   2. Hyperlipidemia -on statin, stable on labs 1/17   3. Chronic atrial fibrillation (HCC) -chronic, but rates are higher today. Coumadin was temporarily d/c'd after an intracranial hemorrhage. It has since been restarted and she is tolerating well. continue atenolol  50 mg.    4. Coronary artery disease involving native coronary artery of native heart without angina pectoris -stable. Continue BB and statin. Not on ASA due to being on anticoagulation therapy. Echo 6/15/17: EF 65-70%, No regional wall motion abnormalities are noted. Mild-moderate hypertrophy. Moderately dilated left atrium. Right atrial size is severely dilated . Moderate mitral regurgitation is present. moderate tricuspid regurgitation with RVSP estimated at 42 mmHg. Mild pulmonic regurgitation present. cath 9/23/15: LVEDP - 12. LVgram - severe LVH with EF 70%, enlarged AO root consistent with htn  Cors: LM - nl, LAD - 20% prox, 30%mid, patent stent, distal irreg, LCX - 20% mid, RCA - dominant and normal  GXT 9/22/15: small sized anterior completely reversible defect consistent with ischemia in territory of mid and distal LAD. Normal LV function  Echo 9/22/15: EF 55-60%. No regional wall motion abnormalities are seen. Mild to moderate concentric left ventricular hypertrophy is present. Aortic valve appears sclerotic but opens adequately. There is mild mitral and moderate tricuspid regurgitation with RVSP 35 mmHg. Bilateral atria enlargement. Drew BALDERAS reviewed with DR. Strange-no changes  NO change in meds  OV 3 months  Thank you for allowing to me to participate in the care of Idris [place].

## 2018-02-14 NOTE — COMMUNICATION BODY
(COUMADIN) 5 MG tablet Take 5 mg by mouth See Admin Instructions 2.5mg Monday 5mg all other days      atenolol (TENORMIN) 50 MG tablet Take 1 tablet by mouth daily (Patient taking differently: Take 50 mg by mouth 2 times daily ) 90 tablet 3    doxazosin (CARDURA) 2 MG tablet Take 1 tablet by mouth nightly 90 tablet 3    minoxidil (LONITEN) 10 MG tablet Take 1 tablet by mouth 2 times daily 180 tablet 3    amLODIPine (NORVASC) 5 MG tablet Take 1 tablet by mouth 2 times daily 180 tablet 3    atorvastatin (LIPITOR) 80 MG tablet Take 1 tablet by mouth daily 90 tablet 3    furosemide (LASIX) 20 MG tablet Take 1 tablet by mouth 2 times daily 180 tablet 3    acetaminophen (TYLENOL) 500 MG tablet Take 2 tablets by mouth every 6 hours as needed 120 tablet 3    Magnesium 400 MG TABS Take 1 tablet by mouth daily 30 tablet 3    famotidine (PEPCID) 20 MG tablet Take 1 tablet by mouth 2 times daily 60 tablet 0    Calcium Carb-Cholecalciferol (CALCIUM + D3) 600-200 MG-UNIT TABS tablet Take 1 tablet by mouth 2 times daily 120 tablet 3     No current facility-administered medications for this visit. Social History:  Social History     Social History    Marital status:      Spouse name: Delfino Noriega Number of children: 3    Years of education: 15     Occupational History    Not on file. Social History Main Topics    Smoking status: Never Smoker    Smokeless tobacco: Never Used    Alcohol use No    Drug use: No    Sexual activity: Yes     Partners: Male     Other Topics Concern    Not on file     Social History Narrative    No narrative on file       Family History:  family history is not on file. Allergies:  Clonidine derivatives; Codeine; and Lisinopril     Review of Systems:   · Constitutional: there has been no unanticipated weight loss. No change in energy or activity level   · Eyes: No visual changes   · ENT: No Headaches, hearing loss or vertigo.  No mouth sores or sore x3  · Moves all extremities well  · Exhibits normal gait balance and coordination      Assessment/Plan  1. Uncontrolled hypertension -stable, no renal artery stenosis on US   2. Hyperlipidemia -on statin, stable on labs 1/17   3. Chronic atrial fibrillation (HCC) -chronic, but rates are higher today. Coumadin was temporarily d/c'd after an intracranial hemorrhage. It has since been restarted and she is tolerating well. continue atenolol  50 mg.    4. Coronary artery disease involving native coronary artery of native heart without angina pectoris -stable. Continue BB and statin. Not on ASA due to being on anticoagulation therapy. Echo 6/15/17: EF 65-70%, No regional wall motion abnormalities are noted. Mild-moderate hypertrophy. Moderately dilated left atrium. Right atrial size is severely dilated . Moderate mitral regurgitation is present. moderate tricuspid regurgitation with RVSP estimated at 42 mmHg. Mild pulmonic regurgitation present. cath 9/23/15: LVEDP - 12. LVgram - severe LVH with EF 70%, enlarged AO root consistent with htn  Cors: LM - nl, LAD - 20% prox, 30%mid, patent stent, distal irreg, LCX - 20% mid, RCA - dominant and normal  GXT 9/22/15: small sized anterior completely reversible defect consistent with ischemia in territory of mid and distal LAD. Normal LV function  Echo 9/22/15: EF 55-60%. No regional wall motion abnormalities are seen. Mild to moderate concentric left ventricular hypertrophy is present. Aortic valve appears sclerotic but opens adequately. There is mild mitral and moderate tricuspid regurgitation with RVSP 35 mmHg. Bilateral atria enlargement. [facility]  NO change in meds  OV 3 months  Thank you for allowing to me to participate in the care of Lucho Arriaza.

## 2018-02-20 ENCOUNTER — HOSPITAL ENCOUNTER (OUTPATIENT)
Dept: OTHER | Age: 68
Discharge: HOME | End: 2018-02-20
Attending: NURSE PRACTITIONER | Admitting: NURSE PRACTITIONER

## 2018-02-20 DIAGNOSIS — I10 HTN (HYPERTENSION), BENIGN: ICD-10-CM

## 2018-02-20 DIAGNOSIS — E87.6 HYPOKALEMIA: ICD-10-CM

## 2018-02-20 LAB
ANION GAP SERPL CALCULATED.3IONS-SCNC: 12 MMOL/L (ref 3–16)
BUN BLDV-MCNC: 6 MG/DL (ref 7–20)
CALCIUM SERPL-MCNC: 9.4 MG/DL (ref 8.3–10.6)
CHLORIDE BLD-SCNC: 102 MMOL/L (ref 99–110)
CO2: 26 MMOL/L (ref 21–32)
CREAT SERPL-MCNC: <0.5 MG/DL (ref 0.6–1.2)
GFR AFRICAN AMERICAN: >60
GFR NON-AFRICAN AMERICAN: >60
GLUCOSE BLD-MCNC: 98 MG/DL (ref 70–99)
POTASSIUM SERPL-SCNC: 3.6 MMOL/L (ref 3.5–5.1)
SODIUM BLD-SCNC: 140 MMOL/L (ref 136–145)

## 2018-02-25 PROBLEM — R29.90 STROKE-LIKE SYMPTOMS: Status: ACTIVE | Noted: 2018-02-25

## 2018-02-27 PROBLEM — H53.8 BLURRED VISION, LEFT EYE: Status: ACTIVE | Noted: 2018-02-27

## 2018-03-01 ENCOUNTER — HOSPITAL ENCOUNTER (OUTPATIENT)
Dept: OTHER | Age: 68
Discharge: HOME | End: 2018-03-31
Attending: INTERNAL MEDICINE | Admitting: INTERNAL MEDICINE

## 2018-03-06 ENCOUNTER — TELEPHONE (OUTPATIENT)
Dept: PHARMACY | Age: 68
End: 2018-03-06

## 2018-03-08 ENCOUNTER — ANTI-COAG VISIT (OUTPATIENT)
Dept: PHARMACY | Age: 68
End: 2018-03-08

## 2018-03-08 DIAGNOSIS — Z79.01 LONG TERM CURRENT USE OF ANTICOAGULANT THERAPY: ICD-10-CM

## 2018-03-08 DIAGNOSIS — I48.20 CHRONIC ATRIAL FIBRILLATION (HCC): ICD-10-CM

## 2018-03-08 LAB
INR BLD: 5.7
PROTIME: 68.3 SECONDS

## 2018-03-20 RX ORDER — POTASSIUM CHLORIDE 20 MEQ/1
20 TABLET, EXTENDED RELEASE ORAL DAILY
Qty: 90 TABLET | Refills: 1 | Status: SHIPPED | OUTPATIENT
Start: 2018-03-20 | End: 2020-02-03 | Stop reason: SDUPTHER

## 2018-03-20 RX ORDER — FUROSEMIDE 20 MG/1
20 TABLET ORAL 2 TIMES DAILY
Qty: 180 TABLET | Refills: 1 | Status: SHIPPED | OUTPATIENT
Start: 2018-03-20 | End: 2020-02-03 | Stop reason: SDUPTHER

## 2018-03-20 RX ORDER — ATORVASTATIN CALCIUM 80 MG/1
80 TABLET, FILM COATED ORAL DAILY
Qty: 90 TABLET | Refills: 1 | Status: SHIPPED | OUTPATIENT
Start: 2018-03-20 | End: 2020-02-03 | Stop reason: SDUPTHER

## 2018-03-20 RX ORDER — SPIRONOLACTONE 50 MG/1
50 TABLET, FILM COATED ORAL DAILY
Qty: 90 TABLET | Refills: 1 | Status: SHIPPED | OUTPATIENT
Start: 2018-03-20 | End: 2020-02-03 | Stop reason: SDUPTHER

## 2018-04-01 ENCOUNTER — HOSPITAL ENCOUNTER (OUTPATIENT)
Dept: OTHER | Age: 68
Discharge: HOME | End: 2018-04-30
Attending: INTERNAL MEDICINE | Admitting: INTERNAL MEDICINE

## 2018-04-03 ENCOUNTER — TELEPHONE (OUTPATIENT)
Dept: PHARMACY | Age: 68
End: 2018-04-03

## 2018-04-05 ENCOUNTER — ANTI-COAG VISIT (OUTPATIENT)
Dept: PHARMACY | Age: 68
End: 2018-04-05

## 2018-04-05 DIAGNOSIS — Z79.01 LONG TERM CURRENT USE OF ANTICOAGULANT THERAPY: ICD-10-CM

## 2018-04-05 DIAGNOSIS — I48.20 CHRONIC ATRIAL FIBRILLATION (HCC): ICD-10-CM

## 2018-04-05 LAB
INR BLD: 1.4
PROTIME: 16.3 SECONDS

## 2018-04-19 ENCOUNTER — ANTI-COAG VISIT (OUTPATIENT)
Dept: PHARMACY | Age: 68
End: 2018-04-19

## 2018-04-19 DIAGNOSIS — I48.20 CHRONIC ATRIAL FIBRILLATION (HCC): ICD-10-CM

## 2018-04-19 DIAGNOSIS — Z79.01 LONG TERM CURRENT USE OF ANTICOAGULANT THERAPY: ICD-10-CM

## 2018-04-19 LAB
INR BLD: 1.6
PROTIME: 19 SECONDS

## 2018-04-24 ENCOUNTER — OFFICE VISIT (OUTPATIENT)
Dept: ORTHOPEDIC SURGERY | Age: 68
End: 2018-04-24

## 2018-04-24 VITALS
SYSTOLIC BLOOD PRESSURE: 195 MMHG | DIASTOLIC BLOOD PRESSURE: 128 MMHG | WEIGHT: 185 LBS | HEIGHT: 67 IN | BODY MASS INDEX: 29.03 KG/M2 | RESPIRATION RATE: 16 BRPM | HEART RATE: 97 BPM

## 2018-04-24 DIAGNOSIS — I16.0 HYPERTENSIVE URGENCY, MALIGNANT: ICD-10-CM

## 2018-04-24 DIAGNOSIS — M17.11 PRIMARY OSTEOARTHRITIS OF RIGHT KNEE: Primary | ICD-10-CM

## 2018-04-24 PROCEDURE — 99204 OFFICE O/P NEW MOD 45 MIN: CPT | Performed by: ORTHOPAEDIC SURGERY

## 2018-04-24 PROCEDURE — 4040F PNEUMOC VAC/ADMIN/RCVD: CPT | Performed by: ORTHOPAEDIC SURGERY

## 2018-04-24 PROCEDURE — G8400 PT W/DXA NO RESULTS DOC: HCPCS | Performed by: ORTHOPAEDIC SURGERY

## 2018-04-24 PROCEDURE — G8598 ASA/ANTIPLAT THER USED: HCPCS | Performed by: ORTHOPAEDIC SURGERY

## 2018-04-24 PROCEDURE — G8428 CUR MEDS NOT DOCUMENT: HCPCS | Performed by: ORTHOPAEDIC SURGERY

## 2018-04-24 PROCEDURE — 1090F PRES/ABSN URINE INCON ASSESS: CPT | Performed by: ORTHOPAEDIC SURGERY

## 2018-04-24 PROCEDURE — 1036F TOBACCO NON-USER: CPT | Performed by: ORTHOPAEDIC SURGERY

## 2018-04-24 PROCEDURE — 1123F ACP DISCUSS/DSCN MKR DOCD: CPT | Performed by: ORTHOPAEDIC SURGERY

## 2018-04-24 PROCEDURE — G8417 CALC BMI ABV UP PARAM F/U: HCPCS | Performed by: ORTHOPAEDIC SURGERY

## 2018-04-24 PROCEDURE — 3017F COLORECTAL CA SCREEN DOC REV: CPT | Performed by: ORTHOPAEDIC SURGERY

## 2018-05-01 ENCOUNTER — HOSPITAL ENCOUNTER (OUTPATIENT)
Dept: OTHER | Age: 68
Discharge: HOME | End: 2018-05-31
Attending: INTERNAL MEDICINE | Admitting: INTERNAL MEDICINE

## 2018-05-02 ENCOUNTER — ANTI-COAG VISIT (OUTPATIENT)
Dept: PHARMACY | Age: 68
End: 2018-05-02

## 2018-05-02 DIAGNOSIS — I48.20 CHRONIC ATRIAL FIBRILLATION (HCC): ICD-10-CM

## 2018-05-02 DIAGNOSIS — Z79.01 LONG TERM CURRENT USE OF ANTICOAGULANT THERAPY: ICD-10-CM

## 2018-05-02 LAB
INR BLD: 1.5
INR BLD: 1.5
PROTIME: 18.2 SECONDS

## 2018-05-15 ENCOUNTER — HOSPITAL ENCOUNTER (OUTPATIENT)
Dept: ONCOLOGY | Age: 68
Discharge: HOME | End: 2018-05-15
Attending: NURSE PRACTITIONER | Admitting: NURSE PRACTITIONER

## 2018-05-15 ENCOUNTER — TELEPHONE (OUTPATIENT)
Dept: CARDIOLOGY CLINIC | Age: 68
End: 2018-05-15

## 2018-05-15 ENCOUNTER — ANTI-COAG VISIT (OUTPATIENT)
Dept: PHARMACY | Age: 68
End: 2018-05-15

## 2018-05-15 ENCOUNTER — OFFICE VISIT (OUTPATIENT)
Dept: CARDIOLOGY CLINIC | Age: 68
End: 2018-05-15

## 2018-05-15 ENCOUNTER — HOSPITAL ENCOUNTER (OUTPATIENT)
Dept: ONCOLOGY | Age: 68
Discharge: HOME | End: 2018-05-31
Admitting: NURSE PRACTITIONER

## 2018-05-15 ENCOUNTER — TELEPHONE (OUTPATIENT)
Dept: PHARMACY | Age: 68
End: 2018-05-15

## 2018-05-15 VITALS
SYSTOLIC BLOOD PRESSURE: 122 MMHG | HEART RATE: 85 BPM | RESPIRATION RATE: 16 BRPM | DIASTOLIC BLOOD PRESSURE: 81 MMHG | TEMPERATURE: 98.5 F

## 2018-05-15 VITALS
WEIGHT: 185.9 LBS | DIASTOLIC BLOOD PRESSURE: 70 MMHG | HEART RATE: 66 BPM | HEIGHT: 67 IN | SYSTOLIC BLOOD PRESSURE: 114 MMHG | BODY MASS INDEX: 29.18 KG/M2

## 2018-05-15 DIAGNOSIS — Z79.01 LONG TERM CURRENT USE OF ANTICOAGULANT THERAPY: ICD-10-CM

## 2018-05-15 DIAGNOSIS — I10 UNCONTROLLED HYPERTENSION: Primary | Chronic | ICD-10-CM

## 2018-05-15 DIAGNOSIS — I48.20 CHRONIC ATRIAL FIBRILLATION (HCC): ICD-10-CM

## 2018-05-15 DIAGNOSIS — Z79.01 ANTICOAGULATED ON COUMADIN: ICD-10-CM

## 2018-05-15 LAB
INR BLD: 11.71 (ref 0.85–1.15)
PROTHROMBIN TIME: 132.3 SEC (ref 9.6–13)

## 2018-05-15 PROCEDURE — G8598 ASA/ANTIPLAT THER USED: HCPCS | Performed by: NURSE PRACTITIONER

## 2018-05-15 PROCEDURE — 1036F TOBACCO NON-USER: CPT | Performed by: NURSE PRACTITIONER

## 2018-05-15 PROCEDURE — G8417 CALC BMI ABV UP PARAM F/U: HCPCS | Performed by: NURSE PRACTITIONER

## 2018-05-15 PROCEDURE — 3017F COLORECTAL CA SCREEN DOC REV: CPT | Performed by: NURSE PRACTITIONER

## 2018-05-15 PROCEDURE — G8400 PT W/DXA NO RESULTS DOC: HCPCS | Performed by: NURSE PRACTITIONER

## 2018-05-15 PROCEDURE — 99214 OFFICE O/P EST MOD 30 MIN: CPT | Performed by: NURSE PRACTITIONER

## 2018-05-15 PROCEDURE — 1090F PRES/ABSN URINE INCON ASSESS: CPT | Performed by: NURSE PRACTITIONER

## 2018-05-15 PROCEDURE — G8427 DOCREV CUR MEDS BY ELIG CLIN: HCPCS | Performed by: NURSE PRACTITIONER

## 2018-05-15 PROCEDURE — 1123F ACP DISCUSS/DSCN MKR DOCD: CPT | Performed by: NURSE PRACTITIONER

## 2018-05-15 PROCEDURE — 4040F PNEUMOC VAC/ADMIN/RCVD: CPT | Performed by: NURSE PRACTITIONER

## 2018-05-15 RX ORDER — PHYTONADIONE 10 MG/ML
5 INJECTION, EMULSION INTRAMUSCULAR; INTRAVENOUS; SUBCUTANEOUS ONCE
Qty: 0.5 ML | Refills: 0 | Status: ON HOLD
Start: 2018-05-15 | End: 2020-02-01 | Stop reason: HOSPADM

## 2018-05-15 RX ORDER — PHYTONADIONE 1 MG/.5ML
5 INJECTION, EMULSION INTRAMUSCULAR; INTRAVENOUS; SUBCUTANEOUS ONCE
Status: DISCONTINUED | OUTPATIENT
Start: 2018-05-15 | End: 2018-05-15

## 2018-05-15 RX ORDER — ATENOLOL 50 MG/1
25 TABLET ORAL 2 TIMES DAILY
Qty: 90 TABLET | Refills: 3 | Status: SHIPPED | OUTPATIENT
Start: 2018-05-15 | End: 2019-02-14 | Stop reason: SDUPTHER

## 2018-05-15 RX ORDER — PHYTONADIONE 10 MG/ML
5 INJECTION, EMULSION INTRAMUSCULAR; INTRAVENOUS; SUBCUTANEOUS ONCE
Status: COMPLETED | OUTPATIENT
Start: 2018-05-15 | End: 2018-05-15

## 2018-05-15 RX ADMIN — PHYTONADIONE 5 MG: 10 INJECTION, EMULSION INTRAMUSCULAR; INTRAVENOUS; SUBCUTANEOUS at 12:27

## 2018-05-16 ENCOUNTER — TELEPHONE (OUTPATIENT)
Dept: CARDIOLOGY CLINIC | Age: 68
End: 2018-05-16

## 2018-05-16 ENCOUNTER — ANTI-COAG VISIT (OUTPATIENT)
Dept: PHARMACY | Age: 68
End: 2018-05-16

## 2018-05-16 DIAGNOSIS — Z79.01 LONG TERM CURRENT USE OF ANTICOAGULANT THERAPY: ICD-10-CM

## 2018-05-16 DIAGNOSIS — I48.20 CHRONIC ATRIAL FIBRILLATION (HCC): ICD-10-CM

## 2018-05-16 LAB
INR BLD: 4.2
PROTIME: 50.9 SECONDS

## 2018-05-21 ENCOUNTER — ANTI-COAG VISIT (OUTPATIENT)
Dept: PHARMACY | Age: 68
End: 2018-05-21

## 2018-05-21 DIAGNOSIS — I48.20 CHRONIC ATRIAL FIBRILLATION (HCC): ICD-10-CM

## 2018-05-21 DIAGNOSIS — Z79.01 LONG TERM CURRENT USE OF ANTICOAGULANT THERAPY: ICD-10-CM

## 2018-05-21 LAB
INR BLD: 1.2
PROTIME: 14.1 SECONDS

## 2018-06-01 ENCOUNTER — TELEPHONE (OUTPATIENT)
Dept: PHARMACY | Age: 68
End: 2018-06-01

## 2018-06-01 ENCOUNTER — HOSPITAL ENCOUNTER (OUTPATIENT)
Dept: OTHER | Age: 68
Discharge: HOME | End: 2018-06-30
Attending: INTERNAL MEDICINE | Admitting: INTERNAL MEDICINE

## 2018-06-01 ENCOUNTER — HOSPITAL ENCOUNTER (OUTPATIENT)
Dept: ONCOLOGY | Age: 68
Discharge: HOME | End: 2018-06-30
Attending: NURSE PRACTITIONER | Admitting: NURSE PRACTITIONER

## 2018-06-05 ENCOUNTER — ANTI-COAG VISIT (OUTPATIENT)
Dept: PHARMACY | Age: 68
End: 2018-06-05

## 2018-06-05 DIAGNOSIS — Z79.01 LONG TERM CURRENT USE OF ANTICOAGULANT THERAPY: ICD-10-CM

## 2018-06-05 DIAGNOSIS — I48.20 CHRONIC ATRIAL FIBRILLATION (HCC): ICD-10-CM

## 2018-06-05 LAB
INR BLD: 1.3
PROTIME: 16.1 SECONDS

## 2018-06-19 ENCOUNTER — ANTI-COAG VISIT (OUTPATIENT)
Dept: PHARMACY | Age: 68
End: 2018-06-19

## 2018-06-19 DIAGNOSIS — Z79.01 LONG TERM CURRENT USE OF ANTICOAGULANT THERAPY: ICD-10-CM

## 2018-06-19 DIAGNOSIS — I48.20 CHRONIC ATRIAL FIBRILLATION (HCC): ICD-10-CM

## 2018-06-19 LAB
INR BLD: 1.8
PROTIME: 21.2 SECONDS

## 2018-07-01 ENCOUNTER — HOSPITAL ENCOUNTER (OUTPATIENT)
Dept: OTHER | Age: 68
Discharge: HOME | End: 2018-07-31
Attending: INTERNAL MEDICINE | Admitting: INTERNAL MEDICINE

## 2018-07-05 ENCOUNTER — ANTI-COAG VISIT (OUTPATIENT)
Dept: PHARMACY | Age: 68
End: 2018-07-05

## 2018-07-05 DIAGNOSIS — Z79.01 LONG TERM CURRENT USE OF ANTICOAGULANT THERAPY: ICD-10-CM

## 2018-07-05 DIAGNOSIS — I48.20 CHRONIC ATRIAL FIBRILLATION (HCC): ICD-10-CM

## 2018-07-05 LAB
INR BLD: 1.9
PROTIME: 22.7 SECONDS

## 2018-07-05 NOTE — PROGRESS NOTES
Ms. Ila Hall is a 78 y.o. y/o female with history of A fib   She presents today for anticoagulation monitoring and adjustment. 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)  Patient Reported Findings:  Yes     No  []   [x]       Patient verifies current dosing regimen as listed    []   [x]       S/S bleeding/bruising/swelling/SOB   []   [x]       Blood in urine or stool  []   [x]       Procedures scheduled in the future at this time   []   [x]       Missed Dose   []   [x]       Extra Dose  [x]   []       Change in medications---she had a SC injection of Vitamin K 10mg on Tue 5/15 dt supra therapeutic INR. She continues with Tylenol 1000mg twice a day to manage back and knee pain, but this is no different from the past. --> states that she has not had any in the past few days but likely will return   []   [x]       Change in health/diet/appetite--  She continues with high vitamin K diet of canned spinach, collards or broccoli, 3 times a week which she feels will be the norm. From previous discussions and again reiterated today, she understands the importance of consistency with her vegetables. []   [x]       Change in alcohol use  []   [x]       Change in activity  []   [x]       Hospital admission   []   [x]       Emergency department visit     []   [x]       Other complaints--  Clinical Outcomes:  Yes     No  []   [x]       Major bleeding event  []   [x]       Thromboembolic event  Duration of warfarin Therapy: indefinitely  INR Range:  1.8-2.5    She may be slower to reach steady state with warfarin dosing so consider checking INR about 10 days after dose adjustment. INR 1.9 today   Takes warfarin in morning, denies any missed doses/changes in meds/diet   Take 7.5mg tomorrow then continue weekly dose of 2.5mg on Mon and 5mg all other days  She agrees to maintain a consistency of vegetables/salads.   Will need to continue to monitor closely  Recheck INR in 2 weeks     Referring cardiologist is

## 2018-07-19 ENCOUNTER — ANTI-COAG VISIT (OUTPATIENT)
Dept: PHARMACY | Age: 68
End: 2018-07-19

## 2018-07-19 DIAGNOSIS — I48.20 CHRONIC ATRIAL FIBRILLATION (HCC): ICD-10-CM

## 2018-07-19 DIAGNOSIS — Z79.01 LONG TERM CURRENT USE OF ANTICOAGULANT THERAPY: ICD-10-CM

## 2018-07-19 LAB
INR BLD: 1.5
PROTIME: 18.4 SECONDS

## 2018-08-01 ENCOUNTER — HOSPITAL ENCOUNTER (OUTPATIENT)
Dept: OTHER | Age: 68
Discharge: HOME | End: 2018-08-31
Attending: INTERNAL MEDICINE | Admitting: INTERNAL MEDICINE

## 2018-08-03 ENCOUNTER — TELEPHONE (OUTPATIENT)
Dept: PHARMACY | Age: 68
End: 2018-08-03

## 2018-08-07 ENCOUNTER — ANTI-COAG VISIT (OUTPATIENT)
Dept: PHARMACY | Age: 68
End: 2018-08-07

## 2018-08-07 DIAGNOSIS — I48.20 CHRONIC ATRIAL FIBRILLATION (HCC): ICD-10-CM

## 2018-08-07 DIAGNOSIS — Z79.01 LONG TERM CURRENT USE OF ANTICOAGULANT THERAPY: ICD-10-CM

## 2018-08-07 LAB
INR BLD: 3
PROTIME: 36.1 SECONDS

## 2018-08-23 ENCOUNTER — TELEPHONE (OUTPATIENT)
Dept: PHARMACY | Age: 68
End: 2018-08-23

## 2018-08-24 ENCOUNTER — ANTI-COAG VISIT (OUTPATIENT)
Dept: PHARMACY | Age: 68
End: 2018-08-24

## 2018-08-24 DIAGNOSIS — Z79.01 LONG TERM CURRENT USE OF ANTICOAGULANT THERAPY: ICD-10-CM

## 2018-08-24 DIAGNOSIS — I48.20 CHRONIC ATRIAL FIBRILLATION (HCC): ICD-10-CM

## 2018-08-24 LAB
INR BLD: 3.3
PROTIME: 40.1 SECONDS

## 2018-08-24 NOTE — TELEPHONE ENCOUNTER
Patient called to schedule. Obtained an updated phone number for her and she scheduled and was seen in clinic today.

## 2018-08-24 NOTE — PROGRESS NOTES
Ms. Jessica Paulino is a 78 y.o. y/o female with history of A fib   She presents today for anticoagulation monitoring and adjustment. 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)  Patient Reported Findings:  Yes     No  []   [x]       Patient verifies current dosing regimen as listed    []   [x]       S/S bleeding/bruising/swelling/SOB   []   [x]       Blood in urine or stool  []   [x]       Procedures scheduled in the future at this time   []   [x]       Missed Dose   []   [x]       Extra Dose  []   [x]       Change in medications  She continues with Tylenol 1000mg twice a day to manage back and knee pain, but this is no different from the past.   []   [x]       Change in health/diet/appetite--  She continues with high vitamin K diet of canned spinach, collards or broccoli, 2 times a week and a lower vitamin K vegetable such as green beans one other day of the week. []   [x]       Change in alcohol use  []   [x]       Change in activity  []   [x]       Hospital admission   []   [x]       Emergency department visit     []   [x]       Other complaints-      Clinical Outcomes:  Yes     No  []   [x]       Major bleeding event  []   [x]       Thromboembolic event  Duration of warfarin Therapy: indefinitely  INR Range:  1.8-2.5    She may be slower to reach steady state with warfarin dosing so consider checking INR about 10 days after dose adjustment. INR 3.3 today   Take 2.5mg tomorrow then decrease weekly dose to 2.5mg on Mon & Fri and 5mg all other days  She agrees to maintain a consistency of vegetables/salads.   Will need to continue to monitor closely  Recheck INR in 3 weeks on 9/13     Referring cardiologist is Dr Ros Woodard  INR (no units)   Date Value   08/24/2018 3.3   08/07/2018 3   07/19/2018 1.5   07/05/2018 1.9

## 2018-09-01 ENCOUNTER — HOSPITAL ENCOUNTER (OUTPATIENT)
Dept: OTHER | Age: 68
Discharge: HOME | End: 2018-09-01
Attending: INTERNAL MEDICINE | Admitting: INTERNAL MEDICINE

## 2018-09-14 ENCOUNTER — ANTI-COAG VISIT (OUTPATIENT)
Dept: PHARMACY | Age: 68
End: 2018-09-14

## 2018-09-14 DIAGNOSIS — Z79.01 LONG TERM CURRENT USE OF ANTICOAGULANT THERAPY: ICD-10-CM

## 2018-09-14 DIAGNOSIS — I48.20 CHRONIC ATRIAL FIBRILLATION (HCC): ICD-10-CM

## 2018-09-14 LAB
INR BLD: 1.4
PROTIME: 16.3 SECONDS

## 2018-09-14 NOTE — PROGRESS NOTES
Ms. Leon Staff is a 78 y.o. y/o female with history of A fib   She presents today for anticoagulation monitoring and adjustment. 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 Has been taking 2.5 mg on Mon and 5 mg all other days of the week   []   [x]       S/S bleeding/bruising/swelling/SOB   []   [x]       Blood in urine or stool  []   [x]       Procedures scheduled in the future at this time   []   [x]       Missed Dose   []   [x]       Extra Dose  [x]   []       Change in medications  She continues with Tylenol 1000mg twice a day to manage back and knee pain,   States that she is not taking every day   [x]   []       Change in health/diet/appetite--  She continues with high vitamin K diet of canned spinach, collards or broccoli, 2 times a week and a lower vitamin K vegetable such as green beans one other day of the week. States that she had a salad yesterday but other than that she has not had any vit k, large change from documented history   []   [x]       Change in alcohol use  []   [x]       Change in activity  []   [x]       Hospital admission   []   [x]       Emergency department visit     [x]   []       Other complaints- states that she is using the pillbox, and she likes it  Concerned for patient's ability to correctly remember recent history. Clinical Outcomes:  Yes     No  []   [x]       Major bleeding event  []   [x]       Thromboembolic event  Duration of warfarin Therapy: indefinitely  INR Range:  1.8-2.5    She may be slower to reach steady state with warfarin dosing so consider checking INR about 10 days after dose adjustment.     INR 1.4 today despite denying missing doses, having less vit k and not lowering dose as instructed    Since INR has been fluctuating greatly despite stating that no changes, continue weekly dose to 2.5mg on Mon & Fri and 5mg all other days  She agrees to maintain a consistency of vegetables/salads.   Will need to continue to monitor closely  Recheck INR in 2 weeks on 9/28     Referring cardiologist is Dr Licha Friend  INR (no units)   Date Value   09/14/2018 1.4   08/24/2018 3.3   08/07/2018 3   07/19/2018 1.5

## 2018-10-02 ENCOUNTER — ANTI-COAG VISIT (OUTPATIENT)
Dept: PHARMACY | Age: 68
End: 2018-10-02
Payer: MEDICARE

## 2018-10-02 DIAGNOSIS — Z79.01 LONG TERM CURRENT USE OF ANTICOAGULANT THERAPY: ICD-10-CM

## 2018-10-02 DIAGNOSIS — I48.20 CHRONIC ATRIAL FIBRILLATION (HCC): ICD-10-CM

## 2018-10-02 LAB — INTERNATIONAL NORMALIZATION RATIO, POC: 3.5

## 2018-10-02 PROCEDURE — 99211 OFF/OP EST MAY X REQ PHY/QHP: CPT

## 2018-10-02 PROCEDURE — 85610 PROTHROMBIN TIME: CPT

## 2018-10-18 ENCOUNTER — TELEPHONE (OUTPATIENT)
Dept: PHARMACY | Age: 68
End: 2018-10-18

## 2018-10-22 ENCOUNTER — ANTI-COAG VISIT (OUTPATIENT)
Dept: PHARMACY | Age: 68
End: 2018-10-22
Payer: MEDICARE

## 2018-10-22 DIAGNOSIS — Z79.01 LONG TERM CURRENT USE OF ANTICOAGULANT THERAPY: ICD-10-CM

## 2018-10-22 DIAGNOSIS — I48.20 CHRONIC ATRIAL FIBRILLATION (HCC): ICD-10-CM

## 2018-10-22 LAB — INTERNATIONAL NORMALIZATION RATIO, POC: 1.7

## 2018-10-22 PROCEDURE — 99211 OFF/OP EST MAY X REQ PHY/QHP: CPT

## 2018-10-22 PROCEDURE — 85610 PROTHROMBIN TIME: CPT

## 2018-11-05 ENCOUNTER — ANTI-COAG VISIT (OUTPATIENT)
Dept: PHARMACY | Age: 68
End: 2018-11-05
Payer: MEDICARE

## 2018-11-05 DIAGNOSIS — Z79.01 LONG TERM CURRENT USE OF ANTICOAGULANT THERAPY: ICD-10-CM

## 2018-11-05 DIAGNOSIS — I48.20 CHRONIC ATRIAL FIBRILLATION (HCC): ICD-10-CM

## 2018-11-05 LAB — INTERNATIONAL NORMALIZATION RATIO, POC: 5.3

## 2018-11-05 PROCEDURE — 99212 OFFICE O/P EST SF 10 MIN: CPT

## 2018-11-05 PROCEDURE — 85610 PROTHROMBIN TIME: CPT

## 2018-11-20 ENCOUNTER — TELEPHONE (OUTPATIENT)
Dept: PHARMACY | Age: 68
End: 2018-11-20

## 2018-11-28 ENCOUNTER — ANTI-COAG VISIT (OUTPATIENT)
Dept: PHARMACY | Age: 68
End: 2018-11-28
Payer: MEDICARE

## 2018-11-28 DIAGNOSIS — Z79.01 LONG TERM CURRENT USE OF ANTICOAGULANT THERAPY: ICD-10-CM

## 2018-11-28 DIAGNOSIS — I48.20 CHRONIC ATRIAL FIBRILLATION (HCC): ICD-10-CM

## 2018-11-28 LAB — INTERNATIONAL NORMALIZATION RATIO, POC: 1.9

## 2018-11-28 PROCEDURE — 85610 PROTHROMBIN TIME: CPT

## 2018-11-28 PROCEDURE — 99211 OFF/OP EST MAY X REQ PHY/QHP: CPT

## 2018-12-02 ENCOUNTER — APPOINTMENT (OUTPATIENT)
Dept: GENERAL RADIOLOGY | Age: 68
End: 2018-12-02
Payer: MEDICARE

## 2018-12-02 ENCOUNTER — HOSPITAL ENCOUNTER (EMERGENCY)
Age: 68
Discharge: LEFT BEFORE DISCHARGE | End: 2018-12-02
Payer: MEDICARE

## 2018-12-02 VITALS
TEMPERATURE: 97 F | SYSTOLIC BLOOD PRESSURE: 189 MMHG | WEIGHT: 160 LBS | BODY MASS INDEX: 25.06 KG/M2 | HEART RATE: 94 BPM | DIASTOLIC BLOOD PRESSURE: 138 MMHG | RESPIRATION RATE: 18 BRPM | OXYGEN SATURATION: 99 %

## 2018-12-02 LAB
ANION GAP SERPL CALCULATED.3IONS-SCNC: 13 MMOL/L (ref 3–16)
BASOPHILS ABSOLUTE: 0.1 K/UL (ref 0–0.2)
BASOPHILS RELATIVE PERCENT: 1.2 %
BILIRUBIN URINE: NEGATIVE
BLOOD, URINE: NEGATIVE
BUN BLDV-MCNC: 8 MG/DL (ref 7–20)
CALCIUM SERPL-MCNC: 9.7 MG/DL (ref 8.3–10.6)
CHLORIDE BLD-SCNC: 102 MMOL/L (ref 99–110)
CLARITY: CLEAR
CO2: 27 MMOL/L (ref 21–32)
COLOR: YELLOW
CREAT SERPL-MCNC: 0.5 MG/DL (ref 0.6–1.2)
EOSINOPHILS ABSOLUTE: 0.1 K/UL (ref 0–0.6)
EOSINOPHILS RELATIVE PERCENT: 1.3 %
EPITHELIAL CELLS, UA: 0 /HPF (ref 0–5)
GFR AFRICAN AMERICAN: >60
GFR NON-AFRICAN AMERICAN: >60
GLUCOSE BLD-MCNC: 95 MG/DL (ref 70–99)
GLUCOSE URINE: NEGATIVE MG/DL
HCT VFR BLD CALC: 39.8 % (ref 36–48)
HEMOGLOBIN: 13.4 G/DL (ref 12–16)
HYALINE CASTS: 0 /LPF (ref 0–8)
INR BLD: 1.65 (ref 0.86–1.14)
KETONES, URINE: NEGATIVE MG/DL
LEUKOCYTE ESTERASE, URINE: NEGATIVE
LYMPHOCYTES ABSOLUTE: 1.8 K/UL (ref 1–5.1)
LYMPHOCYTES RELATIVE PERCENT: 37.3 %
MCH RBC QN AUTO: 30.2 PG (ref 26–34)
MCHC RBC AUTO-ENTMCNC: 33.7 G/DL (ref 31–36)
MCV RBC AUTO: 89.6 FL (ref 80–100)
MICROSCOPIC EXAMINATION: YES
MONOCYTES ABSOLUTE: 0.3 K/UL (ref 0–1.3)
MONOCYTES RELATIVE PERCENT: 6.7 %
NEUTROPHILS ABSOLUTE: 2.6 K/UL (ref 1.7–7.7)
NEUTROPHILS RELATIVE PERCENT: 53.5 %
NITRITE, URINE: NEGATIVE
PDW BLD-RTO: 13.3 % (ref 12.4–15.4)
PH UA: 7.5
PLATELET # BLD: 153 K/UL (ref 135–450)
PMV BLD AUTO: 10.6 FL (ref 5–10.5)
POTASSIUM SERPL-SCNC: 3.4 MMOL/L (ref 3.5–5.1)
PRO-BNP: 592 PG/ML (ref 0–124)
PROTEIN UA: 100 MG/DL
PROTHROMBIN TIME: 18.8 SEC (ref 9.8–13)
RBC # BLD: 4.44 M/UL (ref 4–5.2)
RBC UA: NORMAL /HPF (ref 0–2)
SODIUM BLD-SCNC: 142 MMOL/L (ref 136–145)
SPECIFIC GRAVITY UA: 1.01
TROPONIN: <0.01 NG/ML
URINE REFLEX TO CULTURE: ABNORMAL
URINE TYPE: ABNORMAL
UROBILINOGEN, URINE: 1 E.U./DL
WBC # BLD: 4.9 K/UL (ref 4–11)
WBC UA: 1 /HPF (ref 0–5)

## 2018-12-02 PROCEDURE — 99285 EMERGENCY DEPT VISIT HI MDM: CPT

## 2018-12-02 PROCEDURE — 93005 ELECTROCARDIOGRAM TRACING: CPT | Performed by: EMERGENCY MEDICINE

## 2018-12-02 PROCEDURE — 83880 ASSAY OF NATRIURETIC PEPTIDE: CPT

## 2018-12-02 PROCEDURE — 85025 COMPLETE CBC W/AUTO DIFF WBC: CPT

## 2018-12-02 PROCEDURE — 81001 URINALYSIS AUTO W/SCOPE: CPT

## 2018-12-02 PROCEDURE — 80048 BASIC METABOLIC PNL TOTAL CA: CPT

## 2018-12-02 PROCEDURE — 71046 X-RAY EXAM CHEST 2 VIEWS: CPT

## 2018-12-02 PROCEDURE — 85610 PROTHROMBIN TIME: CPT

## 2018-12-02 PROCEDURE — 84484 ASSAY OF TROPONIN QUANT: CPT

## 2018-12-03 LAB
EKG ATRIAL RATE: 100 BPM
EKG DIAGNOSIS: NORMAL
EKG Q-T INTERVAL: 372 MS
EKG QRS DURATION: 94 MS
EKG QTC CALCULATION (BAZETT): 482 MS
EKG R AXIS: 17 DEGREES
EKG T AXIS: 41 DEGREES
EKG VENTRICULAR RATE: 101 BPM

## 2018-12-03 PROCEDURE — 93010 ELECTROCARDIOGRAM REPORT: CPT | Performed by: INTERNAL MEDICINE

## 2018-12-18 ENCOUNTER — HOSPITAL ENCOUNTER (OUTPATIENT)
Age: 68
Discharge: HOME | End: 2018-12-18
Payer: MEDICARE

## 2018-12-18 ENCOUNTER — ANTI-COAG VISIT (OUTPATIENT)
Dept: PHARMACY | Age: 68
End: 2018-12-18
Payer: MEDICARE

## 2018-12-18 ENCOUNTER — HOSPITAL ENCOUNTER (OUTPATIENT)
Dept: GENERAL RADIOLOGY | Age: 68
Discharge: HOME | End: 2018-12-18
Payer: MEDICARE

## 2018-12-18 ENCOUNTER — OFFICE VISIT (OUTPATIENT)
Dept: CARDIOLOGY CLINIC | Age: 68
End: 2018-12-18
Payer: MEDICARE

## 2018-12-18 ENCOUNTER — TELEPHONE (OUTPATIENT)
Dept: CARDIOLOGY CLINIC | Age: 68
End: 2018-12-18

## 2018-12-18 VITALS
SYSTOLIC BLOOD PRESSURE: 168 MMHG | HEIGHT: 67 IN | DIASTOLIC BLOOD PRESSURE: 92 MMHG | HEART RATE: 121 BPM | RESPIRATION RATE: 16 BRPM | BODY MASS INDEX: 29.19 KG/M2 | WEIGHT: 186 LBS | OXYGEN SATURATION: 96 %

## 2018-12-18 DIAGNOSIS — R06.02 SOB (SHORTNESS OF BREATH): Primary | ICD-10-CM

## 2018-12-18 DIAGNOSIS — R06.02 SOB (SHORTNESS OF BREATH): ICD-10-CM

## 2018-12-18 DIAGNOSIS — Z79.01 ANTICOAGULATED ON COUMADIN: ICD-10-CM

## 2018-12-18 DIAGNOSIS — I48.20 CHRONIC ATRIAL FIBRILLATION (HCC): ICD-10-CM

## 2018-12-18 DIAGNOSIS — Z79.01 LONG TERM CURRENT USE OF ANTICOAGULANT THERAPY: ICD-10-CM

## 2018-12-18 DIAGNOSIS — I10 UNCONTROLLED HYPERTENSION: Chronic | ICD-10-CM

## 2018-12-18 LAB — INTERNATIONAL NORMALIZATION RATIO, POC: 1.8

## 2018-12-18 PROCEDURE — 4040F PNEUMOC VAC/ADMIN/RCVD: CPT | Performed by: NURSE PRACTITIONER

## 2018-12-18 PROCEDURE — 1090F PRES/ABSN URINE INCON ASSESS: CPT | Performed by: NURSE PRACTITIONER

## 2018-12-18 PROCEDURE — G8417 CALC BMI ABV UP PARAM F/U: HCPCS | Performed by: NURSE PRACTITIONER

## 2018-12-18 PROCEDURE — 71046 X-RAY EXAM CHEST 2 VIEWS: CPT

## 2018-12-18 PROCEDURE — 3017F COLORECTAL CA SCREEN DOC REV: CPT | Performed by: NURSE PRACTITIONER

## 2018-12-18 PROCEDURE — 85610 PROTHROMBIN TIME: CPT

## 2018-12-18 PROCEDURE — 1036F TOBACCO NON-USER: CPT | Performed by: NURSE PRACTITIONER

## 2018-12-18 PROCEDURE — G8400 PT W/DXA NO RESULTS DOC: HCPCS | Performed by: NURSE PRACTITIONER

## 2018-12-18 PROCEDURE — 99211 OFF/OP EST MAY X REQ PHY/QHP: CPT

## 2018-12-18 PROCEDURE — G8598 ASA/ANTIPLAT THER USED: HCPCS | Performed by: NURSE PRACTITIONER

## 2018-12-18 PROCEDURE — 1123F ACP DISCUSS/DSCN MKR DOCD: CPT | Performed by: NURSE PRACTITIONER

## 2018-12-18 PROCEDURE — G8484 FLU IMMUNIZE NO ADMIN: HCPCS | Performed by: NURSE PRACTITIONER

## 2018-12-18 PROCEDURE — 99214 OFFICE O/P EST MOD 30 MIN: CPT | Performed by: NURSE PRACTITIONER

## 2018-12-18 PROCEDURE — G8427 DOCREV CUR MEDS BY ELIG CLIN: HCPCS | Performed by: NURSE PRACTITIONER

## 2018-12-18 PROCEDURE — 1101F PT FALLS ASSESS-DOCD LE1/YR: CPT | Performed by: NURSE PRACTITIONER

## 2018-12-18 NOTE — TELEPHONE ENCOUNTER
XR CHEST STANDARD (2 VW)   Order: [id number]   Status:  Final result   Visible to patient:  No (Not Released)   Dx:  SOB (shortness of breath)   Notes recorded by ADAN Nelson CNP on 12/18/2018 at 2:12 PM EST  Tell her she does not have pneumonia but could go see urgent care if continues to cough

## 2018-12-18 NOTE — PROGRESS NOTES
Alie  INR (no units)   Date Value   12/18/2018 1.8   12/02/2018 1.65 (H)   11/28/2018 1.9   11/05/2018 5.3   10/22/2018 1.7   09/14/2018 1.4   08/24/2018 3.3   08/07/2018 3

## 2018-12-18 NOTE — TELEPHONE ENCOUNTER
Notified patient of NP's message below. Patient verbalized understanding and has no further questions or concerns at this time.

## 2019-01-08 ENCOUNTER — ANTI-COAG VISIT (OUTPATIENT)
Dept: PHARMACY | Age: 69
End: 2019-01-08
Payer: MEDICARE

## 2019-01-08 DIAGNOSIS — Z79.01 LONG TERM CURRENT USE OF ANTICOAGULANT THERAPY: ICD-10-CM

## 2019-01-08 DIAGNOSIS — I48.20 CHRONIC ATRIAL FIBRILLATION (HCC): ICD-10-CM

## 2019-01-08 LAB — INTERNATIONAL NORMALIZATION RATIO, POC: 1.6

## 2019-01-08 PROCEDURE — 99211 OFF/OP EST MAY X REQ PHY/QHP: CPT

## 2019-01-08 PROCEDURE — 85610 PROTHROMBIN TIME: CPT

## 2019-01-29 ENCOUNTER — ANTI-COAG VISIT (OUTPATIENT)
Dept: PHARMACY | Age: 69
End: 2019-01-29
Payer: MEDICARE

## 2019-01-29 DIAGNOSIS — Z79.01 LONG TERM CURRENT USE OF ANTICOAGULANT THERAPY: ICD-10-CM

## 2019-01-29 DIAGNOSIS — I48.20 CHRONIC ATRIAL FIBRILLATION (HCC): ICD-10-CM

## 2019-01-29 LAB — INTERNATIONAL NORMALIZATION RATIO, POC: 3.1

## 2019-01-29 PROCEDURE — 85610 PROTHROMBIN TIME: CPT

## 2019-01-29 PROCEDURE — 99211 OFF/OP EST MAY X REQ PHY/QHP: CPT

## 2019-02-14 ENCOUNTER — ANTI-COAG VISIT (OUTPATIENT)
Dept: PHARMACY | Age: 69
End: 2019-02-14
Payer: MEDICARE

## 2019-02-14 ENCOUNTER — OFFICE VISIT (OUTPATIENT)
Dept: CARDIOLOGY CLINIC | Age: 69
End: 2019-02-14
Payer: MEDICARE

## 2019-02-14 VITALS
DIASTOLIC BLOOD PRESSURE: 138 MMHG | BODY MASS INDEX: 27.91 KG/M2 | HEART RATE: 84 BPM | HEIGHT: 67 IN | SYSTOLIC BLOOD PRESSURE: 192 MMHG | WEIGHT: 177.8 LBS

## 2019-02-14 DIAGNOSIS — I25.10 CORONARY ARTERY DISEASE INVOLVING NATIVE CORONARY ARTERY OF NATIVE HEART WITHOUT ANGINA PECTORIS: Chronic | ICD-10-CM

## 2019-02-14 DIAGNOSIS — I61.9 NONTRAUMATIC INTRACEREBRAL HEMORRHAGE, UNSPECIFIED CEREBRAL LOCATION, UNSPECIFIED LATERALITY (HCC): ICD-10-CM

## 2019-02-14 DIAGNOSIS — I10 BENIGN ESSENTIAL HTN: ICD-10-CM

## 2019-02-14 DIAGNOSIS — I48.20 CHRONIC ATRIAL FIBRILLATION (HCC): Primary | ICD-10-CM

## 2019-02-14 DIAGNOSIS — Z79.01 LONG TERM CURRENT USE OF ANTICOAGULANT THERAPY: ICD-10-CM

## 2019-02-14 DIAGNOSIS — I48.20 CHRONIC ATRIAL FIBRILLATION (HCC): ICD-10-CM

## 2019-02-14 DIAGNOSIS — G45.1 TIA INVOLVING RIGHT INTERNAL CAROTID ARTERY: ICD-10-CM

## 2019-02-14 LAB — INTERNATIONAL NORMALIZATION RATIO, POC: 3

## 2019-02-14 PROCEDURE — G8598 ASA/ANTIPLAT THER USED: HCPCS | Performed by: INTERNAL MEDICINE

## 2019-02-14 PROCEDURE — 99211 OFF/OP EST MAY X REQ PHY/QHP: CPT

## 2019-02-14 PROCEDURE — 3017F COLORECTAL CA SCREEN DOC REV: CPT | Performed by: INTERNAL MEDICINE

## 2019-02-14 PROCEDURE — 1090F PRES/ABSN URINE INCON ASSESS: CPT | Performed by: INTERNAL MEDICINE

## 2019-02-14 PROCEDURE — G8484 FLU IMMUNIZE NO ADMIN: HCPCS | Performed by: INTERNAL MEDICINE

## 2019-02-14 PROCEDURE — 1101F PT FALLS ASSESS-DOCD LE1/YR: CPT | Performed by: INTERNAL MEDICINE

## 2019-02-14 PROCEDURE — G8417 CALC BMI ABV UP PARAM F/U: HCPCS | Performed by: INTERNAL MEDICINE

## 2019-02-14 PROCEDURE — G8427 DOCREV CUR MEDS BY ELIG CLIN: HCPCS | Performed by: INTERNAL MEDICINE

## 2019-02-14 PROCEDURE — 1036F TOBACCO NON-USER: CPT | Performed by: INTERNAL MEDICINE

## 2019-02-14 PROCEDURE — 4040F PNEUMOC VAC/ADMIN/RCVD: CPT | Performed by: INTERNAL MEDICINE

## 2019-02-14 PROCEDURE — 85610 PROTHROMBIN TIME: CPT

## 2019-02-14 PROCEDURE — 1123F ACP DISCUSS/DSCN MKR DOCD: CPT | Performed by: INTERNAL MEDICINE

## 2019-02-14 PROCEDURE — 99204 OFFICE O/P NEW MOD 45 MIN: CPT | Performed by: INTERNAL MEDICINE

## 2019-02-14 PROCEDURE — G8400 PT W/DXA NO RESULTS DOC: HCPCS | Performed by: INTERNAL MEDICINE

## 2019-02-14 PROCEDURE — 93000 ELECTROCARDIOGRAM COMPLETE: CPT | Performed by: INTERNAL MEDICINE

## 2019-02-14 RX ORDER — AMLODIPINE BESYLATE 10 MG/1
10 TABLET ORAL 2 TIMES DAILY
Qty: 60 TABLET | Refills: 5 | Status: ON HOLD | OUTPATIENT
Start: 2019-02-14 | End: 2020-02-01 | Stop reason: HOSPADM

## 2019-02-14 RX ORDER — ATENOLOL 50 MG/1
50 TABLET ORAL 2 TIMES DAILY
Qty: 60 TABLET | Refills: 3 | Status: SHIPPED | OUTPATIENT
Start: 2019-02-14 | End: 2019-02-26 | Stop reason: ALTCHOICE

## 2019-02-20 ENCOUNTER — TELEPHONE (OUTPATIENT)
Dept: CARDIOLOGY CLINIC | Age: 69
End: 2019-02-20

## 2019-02-26 ENCOUNTER — ANTI-COAG VISIT (OUTPATIENT)
Dept: PHARMACY | Age: 69
End: 2019-02-26
Payer: MEDICARE

## 2019-02-26 ENCOUNTER — OFFICE VISIT (OUTPATIENT)
Dept: CARDIOLOGY CLINIC | Age: 69
End: 2019-02-26
Payer: MEDICARE

## 2019-02-26 VITALS
BODY MASS INDEX: 28.41 KG/M2 | WEIGHT: 181 LBS | HEIGHT: 67 IN | HEART RATE: 72 BPM | DIASTOLIC BLOOD PRESSURE: 100 MMHG | SYSTOLIC BLOOD PRESSURE: 142 MMHG | RESPIRATION RATE: 16 BRPM

## 2019-02-26 DIAGNOSIS — I10 RESISTANT HYPERTENSION: ICD-10-CM

## 2019-02-26 DIAGNOSIS — I48.20 CHRONIC ATRIAL FIBRILLATION (HCC): Primary | Chronic | ICD-10-CM

## 2019-02-26 DIAGNOSIS — I48.20 CHRONIC ATRIAL FIBRILLATION (HCC): ICD-10-CM

## 2019-02-26 DIAGNOSIS — Z79.01 LONG TERM CURRENT USE OF ANTICOAGULANT THERAPY: ICD-10-CM

## 2019-02-26 DIAGNOSIS — I25.10 CORONARY ARTERY DISEASE INVOLVING NATIVE CORONARY ARTERY OF NATIVE HEART WITHOUT ANGINA PECTORIS: Chronic | ICD-10-CM

## 2019-02-26 LAB — INTERNATIONAL NORMALIZATION RATIO, POC: 4

## 2019-02-26 PROCEDURE — 1123F ACP DISCUSS/DSCN MKR DOCD: CPT | Performed by: INTERNAL MEDICINE

## 2019-02-26 PROCEDURE — 4040F PNEUMOC VAC/ADMIN/RCVD: CPT | Performed by: INTERNAL MEDICINE

## 2019-02-26 PROCEDURE — G8427 DOCREV CUR MEDS BY ELIG CLIN: HCPCS | Performed by: INTERNAL MEDICINE

## 2019-02-26 PROCEDURE — 85610 PROTHROMBIN TIME: CPT

## 2019-02-26 PROCEDURE — 1036F TOBACCO NON-USER: CPT | Performed by: INTERNAL MEDICINE

## 2019-02-26 PROCEDURE — 93000 ELECTROCARDIOGRAM COMPLETE: CPT | Performed by: INTERNAL MEDICINE

## 2019-02-26 PROCEDURE — 1090F PRES/ABSN URINE INCON ASSESS: CPT | Performed by: INTERNAL MEDICINE

## 2019-02-26 PROCEDURE — G8484 FLU IMMUNIZE NO ADMIN: HCPCS | Performed by: INTERNAL MEDICINE

## 2019-02-26 PROCEDURE — G8400 PT W/DXA NO RESULTS DOC: HCPCS | Performed by: INTERNAL MEDICINE

## 2019-02-26 PROCEDURE — 99215 OFFICE O/P EST HI 40 MIN: CPT | Performed by: INTERNAL MEDICINE

## 2019-02-26 PROCEDURE — 1101F PT FALLS ASSESS-DOCD LE1/YR: CPT | Performed by: INTERNAL MEDICINE

## 2019-02-26 PROCEDURE — G8417 CALC BMI ABV UP PARAM F/U: HCPCS | Performed by: INTERNAL MEDICINE

## 2019-02-26 PROCEDURE — 3017F COLORECTAL CA SCREEN DOC REV: CPT | Performed by: INTERNAL MEDICINE

## 2019-02-26 PROCEDURE — G8598 ASA/ANTIPLAT THER USED: HCPCS | Performed by: INTERNAL MEDICINE

## 2019-02-26 PROCEDURE — 99212 OFFICE O/P EST SF 10 MIN: CPT

## 2019-02-26 RX ORDER — METOPROLOL SUCCINATE 100 MG/1
100 TABLET, EXTENDED RELEASE ORAL DAILY
Qty: 30 TABLET | Refills: 3 | Status: SHIPPED | OUTPATIENT
Start: 2019-02-26 | End: 2020-02-03 | Stop reason: SDUPTHER

## 2019-03-11 ENCOUNTER — TELEPHONE (OUTPATIENT)
Dept: CARDIOLOGY CLINIC | Age: 69
End: 2019-03-11

## 2019-03-11 ENCOUNTER — HOSPITAL ENCOUNTER (OUTPATIENT)
Dept: CARDIAC CATH/INVASIVE PROCEDURES | Age: 69
Discharge: HOME | End: 2019-03-11
Attending: INTERNAL MEDICINE | Admitting: INTERNAL MEDICINE
Payer: MEDICARE

## 2019-03-11 VITALS — WEIGHT: 181 LBS | BODY MASS INDEX: 28.41 KG/M2 | HEIGHT: 67 IN

## 2019-03-11 DIAGNOSIS — Z79.01 LONG TERM CURRENT USE OF ANTICOAGULANT THERAPY: Primary | ICD-10-CM

## 2019-03-11 DIAGNOSIS — I48.20 CHRONIC ATRIAL FIBRILLATION (HCC): ICD-10-CM

## 2019-03-11 LAB
LV EF: 60 %
LVEF MODALITY: NORMAL

## 2019-03-11 PROCEDURE — 93325 DOPPLER ECHO COLOR FLOW MAPG: CPT

## 2019-03-11 PROCEDURE — 7100000010 HC PHASE II RECOVERY - FIRST 15 MIN

## 2019-03-11 PROCEDURE — 85610 PROTHROMBIN TIME: CPT

## 2019-03-11 PROCEDURE — 93320 DOPPLER ECHO COMPLETE: CPT

## 2019-03-11 PROCEDURE — 99152 MOD SED SAME PHYS/QHP 5/>YRS: CPT

## 2019-03-11 PROCEDURE — 93312 ECHO TRANSESOPHAGEAL: CPT

## 2019-03-12 ENCOUNTER — APPOINTMENT (OUTPATIENT)
Dept: PHARMACY | Age: 69
End: 2019-03-12
Payer: MEDICARE

## 2019-03-13 ENCOUNTER — APPOINTMENT (OUTPATIENT)
Dept: GENERAL RADIOLOGY | Age: 69
End: 2019-03-13
Payer: MEDICARE

## 2019-03-13 ENCOUNTER — APPOINTMENT (OUTPATIENT)
Dept: CT IMAGING | Age: 69
End: 2019-03-13
Payer: MEDICARE

## 2019-03-13 ENCOUNTER — HOSPITAL ENCOUNTER (EMERGENCY)
Age: 69
Discharge: HOME | End: 2019-03-13
Attending: EMERGENCY MEDICINE
Payer: MEDICARE

## 2019-03-13 VITALS
HEIGHT: 67 IN | WEIGHT: 183 LBS | TEMPERATURE: 97.8 F | BODY MASS INDEX: 28.72 KG/M2 | DIASTOLIC BLOOD PRESSURE: 79 MMHG | SYSTOLIC BLOOD PRESSURE: 134 MMHG | HEART RATE: 76 BPM | OXYGEN SATURATION: 97 % | RESPIRATION RATE: 22 BRPM

## 2019-03-13 DIAGNOSIS — E07.9 THYROID LESION: ICD-10-CM

## 2019-03-13 DIAGNOSIS — D73.5 SPLENIC INFARCT: Primary | ICD-10-CM

## 2019-03-13 DIAGNOSIS — N28.89 RIGHT RENAL MASS: ICD-10-CM

## 2019-03-13 LAB
A/G RATIO: 1.1 (ref 1.1–2.2)
ALBUMIN SERPL-MCNC: 4.6 G/DL (ref 3.4–5)
ALP BLD-CCNC: 77 U/L (ref 40–129)
ALT SERPL-CCNC: 11 U/L (ref 10–40)
ANION GAP SERPL CALCULATED.3IONS-SCNC: 13 MMOL/L (ref 3–16)
APTT: 39.3 SEC (ref 26–36)
AST SERPL-CCNC: 20 U/L (ref 15–37)
BASOPHILS ABSOLUTE: 0 K/UL (ref 0–0.2)
BASOPHILS RELATIVE PERCENT: 0.6 %
BILIRUB SERPL-MCNC: 0.8 MG/DL (ref 0–1)
BUN BLDV-MCNC: 6 MG/DL (ref 7–20)
CALCIUM SERPL-MCNC: 9.5 MG/DL (ref 8.3–10.6)
CHLORIDE BLD-SCNC: 100 MMOL/L (ref 99–110)
CO2: 27 MMOL/L (ref 21–32)
CREAT SERPL-MCNC: <0.5 MG/DL (ref 0.6–1.2)
EKG ATRIAL RATE: 77 BPM
EKG DIAGNOSIS: NORMAL
EKG Q-T INTERVAL: 378 MS
EKG QRS DURATION: 88 MS
EKG QTC CALCULATION (BAZETT): 441 MS
EKG R AXIS: 28 DEGREES
EKG T AXIS: -88 DEGREES
EKG VENTRICULAR RATE: 82 BPM
EOSINOPHILS ABSOLUTE: 0.1 K/UL (ref 0–0.6)
EOSINOPHILS RELATIVE PERCENT: 1.3 %
GFR AFRICAN AMERICAN: >60
GFR NON-AFRICAN AMERICAN: >60
GLOBULIN: 4.3 G/DL
GLUCOSE BLD-MCNC: 137 MG/DL (ref 70–99)
HCT VFR BLD CALC: 41.6 % (ref 36–48)
HEMOGLOBIN: 13.7 G/DL (ref 12–16)
INR BLD: 1.7 (ref 0.85–1.15)
INR BLD: 2.3 (ref 0.86–1.14)
LIPASE: 25 U/L (ref 13–60)
LYMPHOCYTES ABSOLUTE: 1.4 K/UL (ref 1–5.1)
LYMPHOCYTES RELATIVE PERCENT: 23 %
MCH RBC QN AUTO: 29 PG (ref 26–34)
MCHC RBC AUTO-ENTMCNC: 32.9 G/DL (ref 31–36)
MCV RBC AUTO: 88.1 FL (ref 80–100)
MONOCYTES ABSOLUTE: 0.4 K/UL (ref 0–1.3)
MONOCYTES RELATIVE PERCENT: 7.1 %
NEUTROPHILS ABSOLUTE: 4.3 K/UL (ref 1.7–7.7)
NEUTROPHILS RELATIVE PERCENT: 68 %
PDW BLD-RTO: 13.7 % (ref 12.4–15.4)
PLATELET # BLD: 129 K/UL (ref 135–450)
PMV BLD AUTO: 10.3 FL (ref 5–10.5)
POTASSIUM SERPL-SCNC: 3.8 MMOL/L (ref 3.5–5.1)
PRO-BNP: 718 PG/ML (ref 0–124)
PROTHROMBIN TIME: 26.2 SEC (ref 9.8–13)
RBC # BLD: 4.72 M/UL (ref 4–5.2)
SODIUM BLD-SCNC: 140 MMOL/L (ref 136–145)
TOTAL PROTEIN: 8.9 G/DL (ref 6.4–8.2)
TROPONIN: <0.01 NG/ML
WBC # BLD: 6.3 K/UL (ref 4–11)

## 2019-03-13 PROCEDURE — 96374 THER/PROPH/DIAG INJ IV PUSH: CPT

## 2019-03-13 PROCEDURE — 6360000004 HC RX CONTRAST MEDICATION: Performed by: PHYSICIAN ASSISTANT

## 2019-03-13 PROCEDURE — 80053 COMPREHEN METABOLIC PANEL: CPT

## 2019-03-13 PROCEDURE — 93005 ELECTROCARDIOGRAM TRACING: CPT | Performed by: EMERGENCY MEDICINE

## 2019-03-13 PROCEDURE — 71045 X-RAY EXAM CHEST 1 VIEW: CPT

## 2019-03-13 PROCEDURE — 6360000002 HC RX W HCPCS: Performed by: PHYSICIAN ASSISTANT

## 2019-03-13 PROCEDURE — 96376 TX/PRO/DX INJ SAME DRUG ADON: CPT

## 2019-03-13 PROCEDURE — 93010 ELECTROCARDIOGRAM REPORT: CPT | Performed by: INTERNAL MEDICINE

## 2019-03-13 PROCEDURE — 99285 EMERGENCY DEPT VISIT HI MDM: CPT

## 2019-03-13 PROCEDURE — 74177 CT ABD & PELVIS W/CONTRAST: CPT

## 2019-03-13 PROCEDURE — 85730 THROMBOPLASTIN TIME PARTIAL: CPT

## 2019-03-13 PROCEDURE — 36415 COLL VENOUS BLD VENIPUNCTURE: CPT

## 2019-03-13 PROCEDURE — 83690 ASSAY OF LIPASE: CPT

## 2019-03-13 PROCEDURE — 84484 ASSAY OF TROPONIN QUANT: CPT

## 2019-03-13 PROCEDURE — 6370000000 HC RX 637 (ALT 250 FOR IP): Performed by: EMERGENCY MEDICINE

## 2019-03-13 PROCEDURE — 71260 CT THORAX DX C+: CPT

## 2019-03-13 PROCEDURE — 85610 PROTHROMBIN TIME: CPT

## 2019-03-13 PROCEDURE — 85025 COMPLETE CBC W/AUTO DIFF WBC: CPT

## 2019-03-13 PROCEDURE — 83880 ASSAY OF NATRIURETIC PEPTIDE: CPT

## 2019-03-13 RX ORDER — MORPHINE SULFATE 4 MG/ML
4 INJECTION, SOLUTION INTRAMUSCULAR; INTRAVENOUS EVERY 30 MIN PRN
Status: DISCONTINUED | OUTPATIENT
Start: 2019-03-13 | End: 2019-03-13 | Stop reason: HOSPADM

## 2019-03-13 RX ORDER — OXYCODONE HYDROCHLORIDE AND ACETAMINOPHEN 5; 325 MG/1; MG/1
2 TABLET ORAL ONCE
Status: COMPLETED | OUTPATIENT
Start: 2019-03-13 | End: 2019-03-13

## 2019-03-13 RX ORDER — OXYCODONE HYDROCHLORIDE AND ACETAMINOPHEN 5; 325 MG/1; MG/1
1 TABLET ORAL EVERY 6 HOURS PRN
Qty: 12 TABLET | Refills: 0 | Status: SHIPPED | OUTPATIENT
Start: 2019-03-13 | End: 2019-03-16

## 2019-03-13 RX ADMIN — IOPAMIDOL 75 ML: 755 INJECTION, SOLUTION INTRAVENOUS at 16:29

## 2019-03-13 RX ADMIN — OXYCODONE AND ACETAMINOPHEN 2 TABLET: 5; 325 TABLET ORAL at 20:00

## 2019-03-13 RX ADMIN — MORPHINE SULFATE 4 MG: 4 INJECTION INTRAVENOUS at 17:01

## 2019-03-13 RX ADMIN — MORPHINE SULFATE 4 MG: 4 INJECTION INTRAVENOUS at 18:26

## 2019-03-13 ASSESSMENT — PAIN DESCRIPTION - LOCATION: LOCATION: CHEST

## 2019-03-13 ASSESSMENT — PAIN SCALES - GENERAL
PAINLEVEL_OUTOF10: 9
PAINLEVEL_OUTOF10: 8
PAINLEVEL_OUTOF10: 9
PAINLEVEL_OUTOF10: 8

## 2019-03-13 ASSESSMENT — ENCOUNTER SYMPTOMS
COUGH: 0
BLOOD IN STOOL: 0
VOMITING: 0
NAUSEA: 0
ABDOMINAL PAIN: 1
DIARRHEA: 0

## 2019-03-14 ENCOUNTER — TELEPHONE (OUTPATIENT)
Dept: PHARMACY | Age: 69
End: 2019-03-14

## 2019-03-22 ENCOUNTER — ANTI-COAG VISIT (OUTPATIENT)
Dept: PHARMACY | Age: 69
End: 2019-03-22
Payer: MEDICARE

## 2019-03-22 DIAGNOSIS — Z79.01 LONG TERM CURRENT USE OF ANTICOAGULANT THERAPY: ICD-10-CM

## 2019-03-22 DIAGNOSIS — I48.20 CHRONIC ATRIAL FIBRILLATION (HCC): ICD-10-CM

## 2019-03-22 LAB — INTERNATIONAL NORMALIZATION RATIO, POC: 1.4

## 2019-03-22 PROCEDURE — 99211 OFF/OP EST MAY X REQ PHY/QHP: CPT

## 2019-03-22 PROCEDURE — 85610 PROTHROMBIN TIME: CPT

## 2019-04-16 ENCOUNTER — TELEPHONE (OUTPATIENT)
Dept: PHARMACY | Age: 69
End: 2019-04-16

## 2019-04-17 ENCOUNTER — ANTI-COAG VISIT (OUTPATIENT)
Dept: PHARMACY | Age: 69
End: 2019-04-17
Payer: MEDICARE

## 2019-04-17 DIAGNOSIS — Z79.01 LONG TERM CURRENT USE OF ANTICOAGULANT THERAPY: ICD-10-CM

## 2019-04-17 DIAGNOSIS — I48.20 CHRONIC ATRIAL FIBRILLATION (HCC): ICD-10-CM

## 2019-04-17 LAB — INTERNATIONAL NORMALIZATION RATIO, POC: 1.5

## 2019-04-17 PROCEDURE — 85610 PROTHROMBIN TIME: CPT

## 2019-04-17 PROCEDURE — 99211 OFF/OP EST MAY X REQ PHY/QHP: CPT

## 2019-04-17 NOTE — PROGRESS NOTES
Ms. Herman Tam is a 80 y.o. y/o female with history of A fib   She presents today for anticoagulation monitoring and adjustment. 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)  Patient Reported Findings:  Yes     No  [x]   []       Patient verifies current dosing regimen as listed    []   [x]       S/S bleeding/bruising/swelling/SOB   []   [x]       Blood in urine or stool   []   [x]       Procedures scheduled in the future at this time   []   [x]       Missed Dose   []   [x]       Extra Dose  [x]   []       Change in medications  She continues with Tylenol 1000mg but only as needed --> states she took some yesterday for headache   Inc amlodipine and atenolol   []   [x]       Change in health/diet/appetite-- normally has high vitamin K diet of canned spinach weekly and collards or broccoli about every other week. Will have some lower vitamin K veggies about 2 times a week such as green beans. ---> she had a large helping of collards Sunday and monday  []   [x]       Change in alcohol use denies  []   [x]       Change in activity  []   [x]       Hospital admission   []   [x]       Emergency department visit     []   [x]       Other complaints- states that she is using the pillbox, and she likes it  Concerned for patient's ability to correctly remember recent history. Clinical Outcomes:  Yes     No  []   [x]       Major bleeding event  []   [x]       Thromboembolic event  Duration of warfarin Therapy: indefinitely  INR Range:  1.8-2.5    She may be slower to reach steady state with warfarin dosing so consider checking INR about 10 days after dose adjustment. INR 1.5 today   Continue same weekly dose of 2.5mg on Mon and 5mg all other days. If subtherapeutic at next appt, inc weekly dose. INR has fluctuated greatly with same dose  Encouraged to maintain a consistency of vegetables/salads.   Will need to continue to monitor closely  Recheck INR in 3 weeks on 5/7     Referring cardiologist is Dr Sarmad Sweeney Alie  INR (no units)   Date Value   04/17/2019 1.5   03/22/2019 1.4   03/13/2019 2.30 (H)   03/11/2019 1.70 (H)   02/26/2019 4.0   02/14/2019 3   12/02/2018 1.65 (H)   09/14/2018 1.4

## 2019-05-07 ENCOUNTER — ANTI-COAG VISIT (OUTPATIENT)
Dept: PHARMACY | Age: 69
End: 2019-05-07
Payer: MEDICARE

## 2019-05-07 ENCOUNTER — OFFICE VISIT (OUTPATIENT)
Dept: CARDIOLOGY CLINIC | Age: 69
End: 2019-05-07
Payer: MEDICARE

## 2019-05-07 VITALS
HEIGHT: 67 IN | WEIGHT: 182 LBS | HEART RATE: 86 BPM | BODY MASS INDEX: 28.56 KG/M2 | DIASTOLIC BLOOD PRESSURE: 91 MMHG | SYSTOLIC BLOOD PRESSURE: 131 MMHG | RESPIRATION RATE: 16 BRPM

## 2019-05-07 DIAGNOSIS — I48.19 PERSISTENT ATRIAL FIBRILLATION (HCC): ICD-10-CM

## 2019-05-07 DIAGNOSIS — I10 ESSENTIAL HYPERTENSION: ICD-10-CM

## 2019-05-07 DIAGNOSIS — M25.562 PAIN IN BOTH KNEES, UNSPECIFIED CHRONICITY: ICD-10-CM

## 2019-05-07 DIAGNOSIS — Z79.01 LONG TERM CURRENT USE OF ANTICOAGULANT THERAPY: ICD-10-CM

## 2019-05-07 DIAGNOSIS — M25.561 PAIN IN BOTH KNEES, UNSPECIFIED CHRONICITY: ICD-10-CM

## 2019-05-07 DIAGNOSIS — I48.19 PERSISTENT ATRIAL FIBRILLATION (HCC): Primary | ICD-10-CM

## 2019-05-07 DIAGNOSIS — I25.10 CORONARY ARTERY DISEASE INVOLVING NATIVE CORONARY ARTERY OF NATIVE HEART WITHOUT ANGINA PECTORIS: ICD-10-CM

## 2019-05-07 DIAGNOSIS — I63.9 CEREBROVASCULAR ACCIDENT (CVA), UNSPECIFIED MECHANISM (HCC): ICD-10-CM

## 2019-05-07 LAB — INTERNATIONAL NORMALIZATION RATIO, POC: 1.6

## 2019-05-07 PROCEDURE — 4040F PNEUMOC VAC/ADMIN/RCVD: CPT | Performed by: INTERNAL MEDICINE

## 2019-05-07 PROCEDURE — G8400 PT W/DXA NO RESULTS DOC: HCPCS | Performed by: INTERNAL MEDICINE

## 2019-05-07 PROCEDURE — 85610 PROTHROMBIN TIME: CPT

## 2019-05-07 PROCEDURE — 93000 ELECTROCARDIOGRAM COMPLETE: CPT | Performed by: INTERNAL MEDICINE

## 2019-05-07 PROCEDURE — 1090F PRES/ABSN URINE INCON ASSESS: CPT | Performed by: INTERNAL MEDICINE

## 2019-05-07 PROCEDURE — 99211 OFF/OP EST MAY X REQ PHY/QHP: CPT

## 2019-05-07 PROCEDURE — 1036F TOBACCO NON-USER: CPT | Performed by: INTERNAL MEDICINE

## 2019-05-07 PROCEDURE — 3017F COLORECTAL CA SCREEN DOC REV: CPT | Performed by: INTERNAL MEDICINE

## 2019-05-07 PROCEDURE — 1123F ACP DISCUSS/DSCN MKR DOCD: CPT | Performed by: INTERNAL MEDICINE

## 2019-05-07 PROCEDURE — G8417 CALC BMI ABV UP PARAM F/U: HCPCS | Performed by: INTERNAL MEDICINE

## 2019-05-07 PROCEDURE — G8598 ASA/ANTIPLAT THER USED: HCPCS | Performed by: INTERNAL MEDICINE

## 2019-05-07 PROCEDURE — 99214 OFFICE O/P EST MOD 30 MIN: CPT | Performed by: INTERNAL MEDICINE

## 2019-05-07 PROCEDURE — G8427 DOCREV CUR MEDS BY ELIG CLIN: HCPCS | Performed by: INTERNAL MEDICINE

## 2019-05-07 NOTE — PROGRESS NOTES
Ms. Wendi Sales is a 80 y.o. y/o female with history of A fib   She presents today for anticoagulation monitoring and adjustment. 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)  Patient Reported Findings:  Yes     No  [x]   []       Patient verifies current dosing regimen as listed    []   [x]       S/S bleeding/bruising/swelling/SOB   []   [x]       Blood in urine or stool   []   [x]       Procedures scheduled in the future at this time   []   [x]       Missed Dose   []   [x]       Extra Dose  []   [x]       Change in medications  She continues with Tylenol 1000mg but only as needed   Inc amlodipine and atenolol   []   [x]       Change in health/diet/appetite-- normally has high vitamin K diet of canned spinach weekly and collards or broccoli about every other week. Will have some lower vitamin K veggies about 2 times a week such as green beans. --->she has not had any collards of spinach recently. []   [x]       Change in alcohol use denies  []   [x]       Change in activity  []   [x]       Hospital admission   []   [x]       Emergency department visit     []   [x]       Other complaints- states that she is using the pillbox, and she likes it  Concerned for patient's ability to correctly remember recent history. Clinical Outcomes:  Yes     No  []   [x]       Major bleeding event  []   [x]       Thromboembolic event  Duration of warfarin Therapy: indefinitely  INR Range:  1.8-2.5    She may be slower to reach steady state with warfarin dosing so consider checking INR about 10 days after dose adjustment. INR 1.6 today   Continue same weekly dose of 2.5mg on Mon and 5mg all other days since she has had INR's in the range at this dose and even high INR's as well. Will recheck next time later in the week on Friday to see after having 3 days of 5mg. Encouraged to maintain a consistency of vegetables/salads.   Will need to continue to monitor closely  Recheck INR in 2 1/2 weeks on 5/24     Referring cardiologist is Dr Yola Rodriguez  INR (no units)   Date Value   04/17/2019 1.5   03/22/2019 1.4   03/13/2019 2.30 (H)   03/11/2019 1.70 (H)   02/26/2019 4.0   02/14/2019 3   12/02/2018 1.65 (H)   09/14/2018 1.4

## 2019-05-07 NOTE — PROGRESS NOTES
she does not drink alcohol or use drugs. Family History:  Reviewed. Denies family history of sudden cardiac death, arrhythmia, premature CAD    Review of System:  All other systems reviewed except for that noted above. Pertinent negatives and positives are:      General: negative for fever, chills   Ophthalmic ROS: negative for - eye pain or loss of vision  ENT ROS: negative for - headaches, sore throat   Respiratory: negative for - cough, sputum  Cardiovascular: Reviewed in HPI  Gastrointestinal: negative for - abdominal pain, diarrhea, N/V  Hematology: negative for - bleeding, blood clots, bruising or jaundice  Genito-Urinary:  negative for - Dysuria or incontinence  Musculoskeletal: negative for - Joint swelling, muscle pain  Neurological: negative for - confusion, dizziness, headaches   Psychiatric: No anxiety, no depression. Dermatological: negative for - rash    Physical Examination:  Vitals:    05/07/19 1315   BP: (!) 131/91   Pulse: 86   Resp: 16      Wt Readings from Last 3 Encounters:   05/07/19 182 lb (82.6 kg)   03/13/19 183 lb (83 kg)   03/11/19 181 lb (82.1 kg)       · Constitutional: Oriented. No distress. · Head: Normocephalic and atraumatic. · Mouth/Throat: Oropharynx is clear and moist.   · Eyes: Conjunctivae normal. EOM are normal.   · Neck: Neck supple. No rigidity. No JVD present. · Cardiovascular: Normal rate, Irregular rhythm, S1&S2. Systolic murmur   · Pulmonary/Chest: Bilateral respiratory sounds. No wheezes, No rhonchi. · Abdominal: Soft. Bowel sounds present. No distension, No tenderness. · Musculoskeletal: No tenderness. Trace right sided edema, Right knee swelling    · Lymphadenopathy: Has no cervical adenopathy. · Neurological: Alert and oriented. Cranial nerve appears intact, No Gross deficit   · Skin: Skin is warm and dry. No rash noted. · Psychiatric: Has a normal behavior       Labs, diagnostic and imaging results reviewed. Reviewed.    Lab Results   Component Value Date    TSHREFLEX 3.84 2018    CREATININE <0.5 2019    CREATININE 0.5 2018    AST 20 2019    ALT 11 2019       EC19  Atrial Fibrillation  QTc 432    Echo: 18   Summary   Normal left ventricle size and systolic function with an estimated ejection   fraction of 65-70%.   No regional wall motion abnormalities are noted.   There is mild to moderate concentric left ventricular hypertrophy.   Diastolic filling parameters suggests grade III diastolic dysfunction .   Moderate mitral regurgitation is present.  William Fret is moderate tricuspid regurgitation with RVSP estimated at 43 mmHg.   At least moderate biatrial dilatation.      In comparison to a study dated 6/15/17, there is no significant change.     Cath: [facility] 2015  LVEDP - 12  LVgram - severe LVH with EF 70%, enlarged AO root consistent with htn  Cors:  LM - nl  LAD - 20% prox, 30%mid, patent stent, distal irreg  LCX - 20% mid  RCA - dominant and normal       Echo: Normal left ventricle size and systolic function with an estimated ejection   fraction of 60%. No regional wall motion abnormalities are noted.   There is moderate concentric left ventricular hypertrophy.      The left atrium is dilated. There is no evidence of mass or thrombus in the   left atrium or appendage.      There is no evidence of mass or thrombus in the left atrium or appendage.      The right atrium is dilated.     Medication:  Current Outpatient Medications   Medication Sig Dispense Refill    metoprolol succinate (TOPROL XL) 100 MG extended release tablet Take 1 tablet by mouth daily 30 tablet 3    amLODIPine (NORVASC) 10 MG tablet Take 1 tablet by mouth 2 times daily 60 tablet 5    phytonadione, ADULT, (VITAMIN K) 10 MG/ML injection Inject 0.5 mLs into the skin once for 1 dose 0.5 mL 0    potassium chloride (KLOR-CON M) 20 MEQ extended release tablet Take 1 tablet by mouth daily 90 tablet 1    furosemide (LASIX) 20 MG tablet Take 1 tablet by mouth 2 times daily 180 tablet 1    aspirin 81 MG EC tablet Take 1 tablet by mouth daily 30 tablet 3    warfarin (COUMADIN) 5 MG tablet Take 5 mg by mouth See Admin Instructions 2.5mg Monday 5mg all other days      famotidine (PEPCID) 20 MG tablet Take 1 tablet by mouth 2 times daily 60 tablet 0    minoxidil (LONITEN) 10 MG tablet Take 1 tablet by mouth 2 times daily 180 tablet 3    acetaminophen (TYLENOL) 500 MG tablet Take 2 tablets by mouth every 6 hours as needed 120 tablet 3    spironolactone (ALDACTONE) 50 MG tablet Take 1 tablet by mouth daily 1 tab qd 90 tablet 1    atorvastatin (LIPITOR) 80 MG tablet Take 1 tablet by mouth daily 90 tablet 1    doxazosin (CARDURA) 2 MG tablet Take 1 tablet by mouth nightly 90 tablet 3    Calcium Carb-Cholecalciferol (CALCIUM + D3) 600-200 MG-UNIT TABS tablet Take 1 tablet by mouth 2 times daily 120 tablet 3     No current facility-administered medications for this visit. Assessment and plan:       - Persistent atrial fibrillation    Patient has high [id number] score 5 (Age, female, CVA, CAD)and requires anticoagulation to prevent thromboembolic events. HASBLED score of 4. Patient has had  hemorrhagic stroke and not a good candidate for long term anticoagulation therapy. I have discussed this in detail with patient and family members. She also has a labile INR.     I have discussed left atrial colsure with watchman device. Alternatives including surgical ligation of ODELL also discussed. I explained to the patient that most ischemic stroke in patient with atrial fibrillation are due to a thrombus/clot in the left atrial appendage. However some patients do have a stroke with different reasons including hemorrhage. Watchman device only protects against the stroke associated with left atrial appendage related clots/thrombus. The procedure has been discussed in detail with patient and family members along with the risk and benefits.  Success rate and complications associated with such a procedure have been discussed. Continued treatment with anti-coagulation agents will also be a reasonable strategy and can be pursued. It was also emphasized that the watchman procedure can only be pursued if there is no evidence of thrombus in the left atrial appendage. It was also explained the need for short term coumadin therapy followed by antiplatelet therapy. Patient will need a preoperative MEGHAN. MEGHAN was done. She would like to proceed with it. Will forward to [facility] coordinator for further workup.       - Right knee swelling:    ? Etiology   Will refer to Ortho and needs evaluation prior to proceeding with watchman.               CAD              On BB and statin. Stable. [facility] 9/2015              LVEDP - 12              LVgram - severe LVH with EF 70%, enlarged AO root consistent with htn              Cors:              LM - nl              LAD - 20% prox, 30%mid, patent stent, distal irreg              LCX - 20% mid              RCA - dominant and normal          HTN    Better controlled. Thank you for allowing me to participate in the care of Marivel Madrid. Further evaluation will be based upon the patient's clinical course and testing results. All questions and concerns were addressed to the patient/family. Alternatives to my treatment were discussed. I have discussed the above stated plan and the patient verbalized understanding and agreed with the plan. NOTE: This report was transcribed using voice recognition software. Every effort was made to ensure accuracy, however, inadvertent computerized transcription errors may be present.        Andrea Duverney, MD, MPH  [facility]   Office: [phone]

## 2019-05-29 ENCOUNTER — HOSPITAL ENCOUNTER (EMERGENCY)
Age: 69
Discharge: HOME | End: 2019-05-30
Attending: EMERGENCY MEDICINE
Payer: MEDICARE

## 2019-05-29 ENCOUNTER — APPOINTMENT (OUTPATIENT)
Dept: CT IMAGING | Age: 69
End: 2019-05-29
Payer: MEDICARE

## 2019-05-29 ENCOUNTER — APPOINTMENT (OUTPATIENT)
Dept: GENERAL RADIOLOGY | Age: 69
End: 2019-05-29
Payer: MEDICARE

## 2019-05-29 DIAGNOSIS — G44.209 TENSION HEADACHE: Primary | ICD-10-CM

## 2019-05-29 DIAGNOSIS — S16.1XXA STRAIN OF NECK MUSCLE, INITIAL ENCOUNTER: ICD-10-CM

## 2019-05-29 LAB
A/G RATIO: 1.2 (ref 1.1–2.2)
ALBUMIN SERPL-MCNC: 4.6 G/DL (ref 3.4–5)
ALP BLD-CCNC: 76 U/L (ref 40–129)
ALT SERPL-CCNC: 11 U/L (ref 10–40)
ANION GAP SERPL CALCULATED.3IONS-SCNC: 13 MMOL/L (ref 3–16)
AST SERPL-CCNC: 18 U/L (ref 15–37)
BASOPHILS ABSOLUTE: 0 K/UL (ref 0–0.2)
BASOPHILS RELATIVE PERCENT: 0.7 %
BILIRUB SERPL-MCNC: 0.5 MG/DL (ref 0–1)
BUN BLDV-MCNC: 8 MG/DL (ref 7–20)
CALCIUM SERPL-MCNC: 9.9 MG/DL (ref 8.3–10.6)
CHLORIDE BLD-SCNC: 100 MMOL/L (ref 99–110)
CO2: 25 MMOL/L (ref 21–32)
CREAT SERPL-MCNC: 0.6 MG/DL (ref 0.6–1.2)
EOSINOPHILS ABSOLUTE: 0.1 K/UL (ref 0–0.6)
EOSINOPHILS RELATIVE PERCENT: 1.8 %
GFR AFRICAN AMERICAN: >60
GFR NON-AFRICAN AMERICAN: >60
GLOBULIN: 3.8 G/DL
GLUCOSE BLD-MCNC: 109 MG/DL (ref 70–99)
HCT VFR BLD CALC: 40.8 % (ref 36–48)
HEMOGLOBIN: 13.7 G/DL (ref 12–16)
LYMPHOCYTES ABSOLUTE: 2.1 K/UL (ref 1–5.1)
LYMPHOCYTES RELATIVE PERCENT: 35.1 %
MCH RBC QN AUTO: 30 PG (ref 26–34)
MCHC RBC AUTO-ENTMCNC: 33.5 G/DL (ref 31–36)
MCV RBC AUTO: 89.6 FL (ref 80–100)
MONOCYTES ABSOLUTE: 0.4 K/UL (ref 0–1.3)
MONOCYTES RELATIVE PERCENT: 7.1 %
NEUTROPHILS ABSOLUTE: 3.3 K/UL (ref 1.7–7.7)
NEUTROPHILS RELATIVE PERCENT: 55.3 %
PDW BLD-RTO: 13.4 % (ref 12.4–15.4)
PLATELET # BLD: 146 K/UL (ref 135–450)
PMV BLD AUTO: 10.3 FL (ref 5–10.5)
POTASSIUM SERPL-SCNC: 3.2 MMOL/L (ref 3.5–5.1)
RBC # BLD: 4.56 M/UL (ref 4–5.2)
SODIUM BLD-SCNC: 138 MMOL/L (ref 136–145)
TOTAL PROTEIN: 8.4 G/DL (ref 6.4–8.2)
TROPONIN: <0.01 NG/ML
WBC # BLD: 6 K/UL (ref 4–11)

## 2019-05-29 PROCEDURE — 84484 ASSAY OF TROPONIN QUANT: CPT

## 2019-05-29 PROCEDURE — 71046 X-RAY EXAM CHEST 2 VIEWS: CPT

## 2019-05-29 PROCEDURE — 6360000002 HC RX W HCPCS: Performed by: EMERGENCY MEDICINE

## 2019-05-29 PROCEDURE — 70450 CT HEAD/BRAIN W/O DYE: CPT

## 2019-05-29 PROCEDURE — 96375 TX/PRO/DX INJ NEW DRUG ADDON: CPT

## 2019-05-29 PROCEDURE — 36415 COLL VENOUS BLD VENIPUNCTURE: CPT

## 2019-05-29 PROCEDURE — 2580000003 HC RX 258: Performed by: EMERGENCY MEDICINE

## 2019-05-29 PROCEDURE — 80053 COMPREHEN METABOLIC PANEL: CPT

## 2019-05-29 PROCEDURE — 99284 EMERGENCY DEPT VISIT MOD MDM: CPT

## 2019-05-29 PROCEDURE — 85025 COMPLETE CBC W/AUTO DIFF WBC: CPT

## 2019-05-29 PROCEDURE — 96374 THER/PROPH/DIAG INJ IV PUSH: CPT

## 2019-05-29 RX ORDER — ORPHENADRINE CITRATE 30 MG/ML
60 INJECTION INTRAMUSCULAR; INTRAVENOUS ONCE
Status: COMPLETED | OUTPATIENT
Start: 2019-05-29 | End: 2019-05-29

## 2019-05-29 RX ORDER — KETOROLAC TROMETHAMINE 30 MG/ML
15 INJECTION, SOLUTION INTRAMUSCULAR; INTRAVENOUS ONCE
Status: COMPLETED | OUTPATIENT
Start: 2019-05-29 | End: 2019-05-29

## 2019-05-29 RX ORDER — DIPHENHYDRAMINE HYDROCHLORIDE 50 MG/ML
25 INJECTION INTRAMUSCULAR; INTRAVENOUS ONCE
Status: COMPLETED | OUTPATIENT
Start: 2019-05-29 | End: 2019-05-29

## 2019-05-29 RX ORDER — 0.9 % SODIUM CHLORIDE 0.9 %
1000 INTRAVENOUS SOLUTION INTRAVENOUS ONCE
Status: COMPLETED | OUTPATIENT
Start: 2019-05-29 | End: 2019-05-30

## 2019-05-29 RX ADMIN — KETOROLAC TROMETHAMINE 15 MG: 30 INJECTION, SOLUTION INTRAMUSCULAR; INTRAVENOUS at 23:34

## 2019-05-29 RX ADMIN — ORPHENADRINE CITRATE 60 MG: 30 INJECTION INTRAMUSCULAR; INTRAVENOUS at 23:34

## 2019-05-29 RX ADMIN — DIPHENHYDRAMINE HYDROCHLORIDE 25 MG: 50 INJECTION, SOLUTION INTRAMUSCULAR; INTRAVENOUS at 23:33

## 2019-05-29 RX ADMIN — SODIUM CHLORIDE 1000 ML: 9 INJECTION, SOLUTION INTRAVENOUS at 23:33

## 2019-05-29 ASSESSMENT — PAIN SCALES - GENERAL: PAINLEVEL_OUTOF10: 9

## 2019-05-30 ENCOUNTER — TELEPHONE (OUTPATIENT)
Dept: PHARMACY | Age: 69
End: 2019-05-30

## 2019-05-30 VITALS
HEIGHT: 66 IN | DIASTOLIC BLOOD PRESSURE: 95 MMHG | TEMPERATURE: 97.9 F | RESPIRATION RATE: 18 BRPM | SYSTOLIC BLOOD PRESSURE: 140 MMHG | OXYGEN SATURATION: 97 % | BODY MASS INDEX: 29.57 KG/M2 | HEART RATE: 80 BPM | WEIGHT: 184 LBS

## 2019-05-30 RX ORDER — NAPROXEN 500 MG/1
500 TABLET ORAL 2 TIMES DAILY PRN
Qty: 10 TABLET | Refills: 0 | Status: SHIPPED | OUTPATIENT
Start: 2019-05-30 | End: 2020-02-03 | Stop reason: SDUPTHER

## 2019-05-30 RX ORDER — CYCLOBENZAPRINE HCL 10 MG
10 TABLET ORAL 3 TIMES DAILY PRN
Qty: 15 TABLET | Refills: 0 | Status: SHIPPED | OUTPATIENT
Start: 2019-05-30 | End: 2019-06-04

## 2019-05-30 NOTE — ED PROVIDER NOTES
[address] [facility]  EMERGENCY DEPARTMENTENCOUNTER      Pt Name: Simona Velasco  MRN: [id number]  [place] 1950  Date ofevaluation: 5/29/2019  Provider: Noé Donnelly, [address] [place] [address]       Chief Complaint   Patient presents with    Headache     pt in from home c/o headache on right side down to neck and jaw. pt states sx started about 2 weeks ago          HISTORY OF PRESENT ILLNESS   (Location/Symptom, Timing/Onset,Context/Setting, Quality, Duration, Modifying Factors, Severity)  Note limiting factors. HPI    Simona Velasco is a 80 y.o. female who presents to the emergency department with a chief complaint right-sided headache that has been present for last 2 weeks time, constantly present. she reports the pain to the top of the the right side of the head radiating down to the right side of her neck and her right jaw currently 8 out of 10 in intensity. not associated weakness or numbness. no nausea or vomiting. she does report some light intolerance. Nursing Notes were reviewed. REVIEW OF SYSTEMS    (2-9 systems for level 4, 10 or more for level 5)     Review of Systems  General:Denies fever  ENT: no runny nose, sore throat   Respiratory:  no cough, chest congestion or shortness of breath  Cardiovascular: no chest pain or palpitations  GI: no abdominal pain,  nausea, vomiting, constipation or diarrhea  : no dysuria, urgency, frequency  Musculoskelatal: no Back pain   Neurological:  No weakness, numbness no speech or memory problems. Remainder of systems reviewed and negative. Nursing notes reviewed and I agree, except as otherwise noted. vitals signs reviewed. Allergies, Past Medical and Surgical History reviewed. Social andFamily History reviewed. and I agree, except as otherwise noted       Except as noted above the remainder of the review of systems was reviewed and negative.        PAST MEDICAL HISTORY     Past Medical History:   Diagnosis Date  Atrial fibrillation (HCC)     Bell palsy     diagnosed 15 years ago    CAD (coronary artery disease)     [facility]erebral artery occlusion [facility] [address] [facility]     Hypertension     Intracranial hemorrhage ([facility] [address] 4/2016    Nonintractable headache     Thyroid nodule 2/8/2018         SURGICAL HISTORY       Past Surgical History:   Procedure Laterality Date    CARDIAC SURGERY      COLONOSCOPY      CORONARY ANGIOPLASTY WITH STENT PLACEMENT      TUBAL LIGATION Right Torn Rotator Cuff    2013 @ [facility]         CURRENT MEDICATIONS       Previous Medications    ACETAMINOPHEN (TYLENOL) 500 MG TABLET    Take 2 tablets by mouth every 6 hours as needed    AMLODIPINE (NORVASC) 10 MG TABLET    Take 1 tablet by mouth 2 times daily    ASPIRIN 81 MG EC TABLET    Take 1 tablet by mouth daily    ATORVASTATIN (LIPITOR) 80 MG TABLET    Take 1 tablet by mouth daily    CALCIUM CARB-CHOLECALCIFEROL (CALCIUM + D3) 600-200 MG-UNIT TABS TABLET    Take 1 tablet by mouth 2 times daily    DOXAZOSIN (CARDURA) 2 MG TABLET    Take 1 tablet by mouth nightly    FAMOTIDINE (PEPCID) 20 MG TABLET    Take 1 tablet by mouth 2 times daily    FUROSEMIDE (LASIX) 20 MG TABLET    Take 1 tablet by mouth 2 times daily    METOPROLOL SUCCINATE (TOPROL XL) 100 MG EXTENDED RELEASE TABLET    Take 1 tablet by mouth daily    MINOXIDIL (LONITEN) 10 MG TABLET    Take 1 tablet by mouth 2 times daily    PHYTONADIONE, ADULT, (VITAMIN K) 10 MG/ML INJECTION    Inject 0.5 mLs into the skin once for 1 dose    POTASSIUM CHLORIDE (KLOR-CON M) 20 MEQ EXTENDED RELEASE TABLET    Take 1 tablet by mouth daily    SPIRONOLACTONE (ALDACTONE) 50 MG TABLET    Take 1 tablet by mouth daily 1 tab qd    WARFARIN (COUMADIN) 5 MG TABLET    Take 5 mg by mouth See Admin Instructions 2.5mg Monday 5mg all other days       ALLERGIES     Clonidine; Codeine; Clonidine derivatives; Clonidine hcl; Lisinopril; and Tramadol    FAMILY HISTORY     History reviewed.  No pertinent family history. SOCIAL HISTORY       Social History     Socioeconomic History    Marital status:      Spouse name: Grace Taylor Number of children: 4    Years of education: 15    Highest education level: None   Occupational History    None   Social Needs    Financial resource strain: None    Food insecurity:     Worry: None     Inability: None    Transportation needs:     Medical: None     Non-medical: None   Tobacco Use    Smoking status: Never Smoker    Smokeless tobacco: Never Used   Substance and Sexual Activity    Alcohol use: No    Drug use: No    Sexual activity: Yes     Partners: Male   Lifestyle    Physical activity:     Days per week: None     Minutes per session: None    Stress: None   Relationships    Social connections:     Talks on phone: None     Gets together: None     Attends Gnosticist service: None     Active member of club or organization: None     Attends meetings of clubs or organizations: None     Relationship status: None    Intimate partner violence:     Fear of current or ex partner: None     Emotionally abused: None     Physically abused: None     Forced sexual activity: None   Other Topics Concern    None   Social History Narrative    None       SCREENINGS             PHYSICAL EXAM    (up to 7 for level 4, 8 or more for level 5)     ED Triage Vitals [05/29/19 2207]   BP Temp Temp src Pulse Resp SpO2 Height Weight   (!) 160/102 97.9 °F (36.6 °C) -- 93 18 97 % 5' 6\" (1.676 m) 184 lb (83.5 kg)       Physical Exam  GENERAL: Patient appeared well-developed, well-nourished, vital signs reviewed. MENTAL STATUS: Patient is awake and alert oriented ×3  HEAD AND FACE :Head is normal cephalic. Face normal appearance  EYES: eyes lids and conjunctiva appear normal. Pupils reactive. Extraocular muscles are intact  ENT :ears and nose appear normal externally. TMs intact without erythema. Nose has normal nasal mucosa. Throat has normal pharyngeal mucosa.    NECK: Neck without any masses. Trachea is midline. Increased muscle tissue texture change and tenderness to palpation of the right cervical paravertebral muscles exacerbating her pain. Has tenderness to palpation in the right trapezius  LYMPHATIC: No cervical Lymphadenopathy  CV: Heart regular rate and rhythm without murmur,  LUNGS: Lungs are clear to auscultation  CHEST WALL: normal appearance. normal motion  ABDOMEN: Abdomen is soft to palpation. No tenderness to palpation. Bowel sounds are present in all 4 quadrants No masses palpable. No CV tenderness present. No Bruits present. EXTREMITIES: Extremities moves all 4 Extremities without any weakness  There is no calf tenderness or lower extremity edema  SKIN: Skin shows no  rashes   PSYCHIATRIC:mood and affect normal    NEUROLOGIC: patient is awake, alert, and oriented x 3. Cranial nerves two through 12 are grossly intact. There is no muscle weakness noted. No ulnar drift. Good finger to nose. Good rapid alternating movements good heel-to-shin. No sensory deficits noted       This is a computerized dictation. I have made every effort to proofread this chart, however, transcription errors may exist.     DIAGNOSTIC RESULTS         Interpretation per the Radiologist below, if available at the time of this note:    XR CHEST STANDARD (2 VW)   Preliminary Result   Stable radiographic appearance of the chest without evidence of acute process.          CT HEAD WO CONTRAST    (Results Pending)           LABS:  Labs Reviewed   COMPREHENSIVE METABOLIC PANEL - Abnormal; Notable for the following components:       Result Value    Potassium 3.2 (*)     Glucose 109 (*)     Total Protein 8.4 (*)     All other components within normal limits    Narrative:     Performed at:  [facility] [address], [place], [facility][note 3] [address]   Phone [phone]   CBC WITH AUTO DIFFERENTIAL    Narrative:     Performed at:  [facility]  [place] [address]   [address],  [place] [employer]   Phone [phone]   TROPONIN    Narrative:     Performed at:  [facility]  [address],  [address]   Phone [phone]       All other labs were within normal range or not returned as of this dictation. EMERGENCY DEPARTMENT COURSE and DIFFERENTIAL DIAGNOSIS/MDM:   Vitals:    Vitals:    05/29/19 2207   BP: (!) 160/102   Pulse: 93   Resp: 18   Temp: 97.9 °F (36.6 °C)   SpO2: 97%   Weight: 184 lb (83.5 kg)   Height: 5' 6\" (1.676 m)           MDM    Clinical findings are suspicious for a muscle tension headache. She has improvement with ER treatment. We gave her range of motion exercizes to perform 4 times a day along with warm moist compresses the back of her neck. The patient presents with a benign-appearing headache. The neurologic examination is normal.  My suspicion for serious pathology is low  I see nothing to suggest subarachnoid hemorrhage, meningitis, encephalitis, mass lesion, bleeding or thrombosis. I feel the patient can be safely discharged to home with outpatient follow up. Instructions have been given for the patient to return if there is any significant worsening of the headache or the development of new symptoms    REASSESSMENT        CONSULTS:  None    Procedures:  Unless otherwise noted below, none     Procedures    FINAL IMPRESSION    Cervical strain, muscle tension headache    No diagnosis found. DISPOSITION/PLAN   DISPOSITION        PATIENT REFERRED TO:  No follow-up provider specified.     DISCHARGE MEDICATIONS:  New Prescriptions    No medications on file          (Please note that portions of this note were completed with a voice recognition program.  Efforts were made to edit thedictations but occasionally words are mis-transcribed.)    Ruby Medina DO (electronically signed)Emergency Physician       Ruby Medina DO  05/29/19 7809

## 2019-05-31 ENCOUNTER — ANTI-COAG VISIT (OUTPATIENT)
Dept: PHARMACY | Age: 69
End: 2019-05-31
Payer: MEDICARE

## 2019-05-31 DIAGNOSIS — Z79.01 LONG TERM CURRENT USE OF ANTICOAGULANT THERAPY: ICD-10-CM

## 2019-05-31 DIAGNOSIS — I48.19 PERSISTENT ATRIAL FIBRILLATION (HCC): ICD-10-CM

## 2019-05-31 LAB — INTERNATIONAL NORMALIZATION RATIO, POC: 1.5

## 2019-05-31 PROCEDURE — 85610 PROTHROMBIN TIME: CPT

## 2019-05-31 PROCEDURE — 99211 OFF/OP EST MAY X REQ PHY/QHP: CPT

## 2019-05-31 NOTE — PROGRESS NOTES
Ms. Ya Felix is a 80 y.o. y/o female with history of A fib   She presents today for anticoagulation monitoring and adjustment. 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)  Patient Reported Findings:  Yes     No  [x]   []       Patient verifies current dosing regimen as listed    []   [x]       S/S bleeding/bruising/swelling/SOB   []   [x]       Blood in urine or stool   []   [x]       Procedures scheduled in the future at this time   []   [x]       Missed Dose   []   [x]       Extra Dose  []   [x]       Change in medications Was given Rx for cyclobenzaprine and naproxen for the muscle pain in the neck but has not filled it. She continues with Tylenol 1000mg but only as needed   []   [x]       Change in health/diet/appetite-- normally has high vitamin K diet of canned spinach weekly and collards or broccoli about every other week. Will have some lower vitamin K veggies about 2 times a week such as green beans. []   [x]       Change in alcohol use denies  []   [x]       Change in activity  []   [x]       Hospital admission   [x]   []       Emergency department visit  5/29 For neck pain and headache.   []   [x]       Other complaints- states that she is using the pillbox, and she likes it. Concerned for patient's ability to correctly remember recent history. Clinical Outcomes:  Yes     No  []   [x]       Major bleeding event  []   [x]       Thromboembolic event  Duration of warfarin Therapy: indefinitely  INR Range:  1.8-2.5    She may be slower to reach steady state with warfarin dosing so consider checking INR about 10 days after dose adjustment. INR 1.5 today   Increase weekly dose to 5mg daily (7.7% increase)  Emphasized and encouraged to maintain a consistency of vegetables/salads.   Will need to continue to monitor closely  Recheck INR in 2 1/2 weeks on 6/17     Referring cardiologist is Dr Nelly Perdomo  INR (no units)   Date Value   05/31/2019 1.5   05/07/2019 1.6   04/17/2019 1.5

## 2019-05-31 NOTE — TELEPHONE ENCOUNTER
Called patient this AM to reminder her about her 10am appt here in the [address] [place] [address].

## 2019-06-17 ENCOUNTER — ANTI-COAG VISIT (OUTPATIENT)
Dept: PHARMACY | Age: 69
End: 2019-06-17
Payer: MEDICARE

## 2019-06-17 DIAGNOSIS — I48.91 ATRIAL FIBRILLATION, UNSPECIFIED TYPE (HCC): ICD-10-CM

## 2019-06-17 DIAGNOSIS — Z79.01 LONG TERM CURRENT USE OF ANTICOAGULANT THERAPY: ICD-10-CM

## 2019-06-17 LAB — INTERNATIONAL NORMALIZATION RATIO, POC: 1.8

## 2019-06-17 PROCEDURE — 85610 PROTHROMBIN TIME: CPT

## 2019-06-17 PROCEDURE — 99211 OFF/OP EST MAY X REQ PHY/QHP: CPT

## 2019-06-17 NOTE — PROGRESS NOTES
Ms. Jabari England is a 80 y.o. y/o female with history of A fib   She presents today for anticoagulation monitoring and adjustment. 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)  Patient Reported Findings:  Yes     No  [x]   []       Patient verifies current dosing regimen as listed    []   [x]       S/S bleeding/bruising/swelling/SOB   []   [x]       Blood in urine or stool   []   [x]       Procedures scheduled in the future at this time   []   [x]       Missed Dose   []   [x]       Extra Dose  []   [x]       Change in medications Was given Rx for cyclobenzaprine and naproxen for the muscle pain in the neck but has not filled it. She continues with Tylenol 1000mg but only as needed   []   [x]       Change in health/diet/appetite-- normally has high vitamin K diet of canned spinach weekly and collards or broccoli about every other week. Will have some lower vitamin K veggies about 2 times a week such as green beans. []   [x]       Change in alcohol use denies  []   [x]       Change in activity  []   [x]       Hospital admission   [x]   []       Emergency department visit  5/29 For neck pain and headache.   []   [x]       Other complaints- states that she is using the pillbox, and she likes it. Concerned for patient's ability to correctly remember recent history. Clinical Outcomes:  Yes     No  []   [x]       Major bleeding event  []   [x]       Thromboembolic event  Duration of warfarin Therapy: indefinitely  INR Range:  1.8-2.5    She may be slower to reach steady state with warfarin dosing so consider checking INR about 10 days after dose adjustment. INR 1.8 today   Goal is actually 1.8-2.5 so will continue dose. Continue weekly dose of 5mg daily. Emphasized and encouraged to maintain a consistency of vegetables/salads.   Will need to continue to monitor closely  Recheck INR in 2 weeks 7/1     Referring cardiologist is Dr Niraj Jimenez  INR (no units)   Date Value   06/17/2019 1.8 05/31/2019 1.5   05/07/2019 1.6   04/17/2019 1.5   03/13/2019 2.30 (H)   03/11/2019 1.70 (H)   12/02/2018 1.65 (H)   09/14/2018 1.4

## 2019-06-27 ENCOUNTER — TELEPHONE (OUTPATIENT)
Dept: PHARMACY | Facility: CLINIC | Age: 69
End: 2019-06-27

## 2019-07-01 ENCOUNTER — TELEPHONE (OUTPATIENT)
Dept: PHARMACY | Facility: CLINIC | Age: 69
End: 2019-07-01

## 2019-07-08 ENCOUNTER — TELEPHONE (OUTPATIENT)
Dept: PHARMACY | Age: 69
End: 2019-07-08

## 2019-07-18 NOTE — TELEPHONE ENCOUNTER
Tried contacting pt to r/s appt , someone is answering the phone but not saying anything. Left v/m on pts cousins cell phone ( [employer]) asking is she could have pt call CC.

## 2019-07-25 ENCOUNTER — ANTI-COAG VISIT (OUTPATIENT)
Dept: PHARMACY | Age: 69
End: 2019-07-25
Payer: MEDICARE

## 2019-07-25 DIAGNOSIS — I48.91 ATRIAL FIBRILLATION, UNSPECIFIED TYPE (HCC): ICD-10-CM

## 2019-07-25 DIAGNOSIS — Z79.01 LONG TERM CURRENT USE OF ANTICOAGULANT THERAPY: ICD-10-CM

## 2019-07-25 LAB — INTERNATIONAL NORMALIZATION RATIO, POC: 2.3

## 2019-07-25 PROCEDURE — 99211 OFF/OP EST MAY X REQ PHY/QHP: CPT

## 2019-07-25 PROCEDURE — 85610 PROTHROMBIN TIME: CPT

## 2019-07-25 NOTE — PROGRESS NOTES
Ms. Lyndsey Couch is a 80 y.o. y/o female with history of A fib   She presents today for anticoagulation monitoring and adjustment. 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)  Patient Reported Findings:  Yes     No  [x]   []       Patient verifies current dosing regimen as listed    []   [x]       S/S bleeding/bruising/swelling/SOB   []   [x]       Blood in urine or stool   []   [x]       Procedures scheduled in the future at this time   []   [x]       Missed Dose   []   [x]       Extra Dose  []   [x]       Change in medications She continues with Tylenol 1000mg but only as needed   []   [x]       Change in health/diet/appetite-- normally has high vitamin K diet of canned spinach weekly and collards or broccoli about every other week. Will have some lower vitamin K veggies about 2 times a week such as green beans. []   [x]       Change in alcohol use denies  []   [x]       Change in activity  []   [x]       Hospital admission   []   [x]       Emergency department visit   []   [x]       Other complaints- states that she is using the pillbox, and she likes it. Concerned for patient's ability to correctly remember recent history. She states that she is getting concerned for her memory      Clinical Outcomes:  Yes     No  []   [x]       Major bleeding event  []   [x]       Thromboembolic event  Duration of warfarin Therapy: indefinitely  INR Range:  1.8-2.5    She may be slower to reach steady state with warfarin dosing so consider checking INR about 10 days after dose adjustment. Pt has not been in clinic since middle of June 2018. INR 2.3 today   Continue weekly dose of 5mg daily. Emphasized and encouraged to maintain a consistency of vegetables/salads.   Will need to continue to monitor closely  Recheck INR in 3 weeks 8/15     Referring cardiologist is Dr Emily Ibarra  INR (no units)   Date Value   07/25/2019 2.3   06/17/2019 1.8   05/31/2019 1.5   05/07/2019 1.6   03/13/2019 2.30 (H)

## 2019-08-15 ENCOUNTER — ANTI-COAG VISIT (OUTPATIENT)
Dept: PHARMACY | Age: 69
End: 2019-08-15
Payer: MEDICARE

## 2019-08-15 DIAGNOSIS — I48.91 ATRIAL FIBRILLATION, UNSPECIFIED TYPE (HCC): ICD-10-CM

## 2019-08-15 DIAGNOSIS — Z79.01 LONG TERM CURRENT USE OF ANTICOAGULANT THERAPY: ICD-10-CM

## 2019-08-15 LAB — INTERNATIONAL NORMALIZATION RATIO, POC: 1.4

## 2019-08-15 PROCEDURE — 85610 PROTHROMBIN TIME: CPT

## 2019-08-15 PROCEDURE — 99211 OFF/OP EST MAY X REQ PHY/QHP: CPT

## 2019-08-29 ENCOUNTER — ANTI-COAG VISIT (OUTPATIENT)
Dept: PHARMACY | Age: 69
End: 2019-08-29
Payer: MEDICARE

## 2019-08-29 DIAGNOSIS — Z79.01 LONG TERM CURRENT USE OF ANTICOAGULANT THERAPY: ICD-10-CM

## 2019-08-29 DIAGNOSIS — I48.91 ATRIAL FIBRILLATION, UNSPECIFIED TYPE (HCC): ICD-10-CM

## 2019-08-29 LAB — INTERNATIONAL NORMALIZATION RATIO, POC: 2.7

## 2019-08-29 PROCEDURE — 85610 PROTHROMBIN TIME: CPT

## 2019-08-29 PROCEDURE — 99211 OFF/OP EST MAY X REQ PHY/QHP: CPT

## 2019-09-19 ENCOUNTER — ANTI-COAG VISIT (OUTPATIENT)
Dept: PHARMACY | Age: 69
End: 2019-09-19
Payer: MEDICARE

## 2019-09-19 DIAGNOSIS — I48.91 ATRIAL FIBRILLATION, UNSPECIFIED TYPE (HCC): ICD-10-CM

## 2019-09-19 DIAGNOSIS — Z79.01 LONG TERM CURRENT USE OF ANTICOAGULANT THERAPY: ICD-10-CM

## 2019-09-19 LAB — INTERNATIONAL NORMALIZATION RATIO, POC: 5.7

## 2019-09-19 PROCEDURE — 85610 PROTHROMBIN TIME: CPT

## 2019-09-19 PROCEDURE — 99212 OFFICE O/P EST SF 10 MIN: CPT

## 2019-09-30 ENCOUNTER — ANTI-COAG VISIT (OUTPATIENT)
Dept: PHARMACY | Age: 69
End: 2019-09-30
Payer: MEDICARE

## 2019-09-30 DIAGNOSIS — I48.91 ATRIAL FIBRILLATION, UNSPECIFIED TYPE (HCC): ICD-10-CM

## 2019-09-30 DIAGNOSIS — Z79.01 LONG TERM CURRENT USE OF ANTICOAGULANT THERAPY: ICD-10-CM

## 2019-09-30 LAB — INTERNATIONAL NORMALIZATION RATIO, POC: 3.3

## 2019-09-30 PROCEDURE — 99212 OFFICE O/P EST SF 10 MIN: CPT

## 2019-09-30 PROCEDURE — 85610 PROTHROMBIN TIME: CPT

## 2019-09-30 NOTE — PROGRESS NOTES
INR about 10 days after dose adjustment. INR 3.3 today  Since INR elevated still today and actual goal is 1.8-2.5 we will decrease dose. Patient states her  will remain consistent with what she did in the past week. Decrease dose to 2.5mg on Mon and 5mg all other days (7.1%)  Emphasized and encouraged to maintain a consistency of vegetables/salads. Will need to continue to monitor closely  Patient agrees to go to the ER to get her headache checked out/head.---> she did not go to the ER.    Recheck INR in 10 days, 10/10     Referring cardiologist is Dr Emerita Headley  INR (no units)   Date Value   2019 3.3   2019 5.7   2019 2.7   08/15/2019 1.4   2019 2.30 (H)   2019 1.70 (H)   2018 1.65 (H)   2018 1.4

## 2019-10-10 ENCOUNTER — ANTI-COAG VISIT (OUTPATIENT)
Dept: PHARMACY | Age: 69
End: 2019-10-10
Payer: MEDICARE

## 2019-10-10 DIAGNOSIS — Z79.01 LONG TERM CURRENT USE OF ANTICOAGULANT THERAPY: ICD-10-CM

## 2019-10-10 DIAGNOSIS — I48.91 ATRIAL FIBRILLATION, UNSPECIFIED TYPE (HCC): ICD-10-CM

## 2019-10-10 LAB — INTERNATIONAL NORMALIZATION RATIO, POC: 1.6

## 2019-10-10 PROCEDURE — 85610 PROTHROMBIN TIME: CPT

## 2019-10-10 PROCEDURE — 99211 OFF/OP EST MAY X REQ PHY/QHP: CPT

## 2019-10-24 ENCOUNTER — ANTI-COAG VISIT (OUTPATIENT)
Dept: PHARMACY | Age: 69
End: 2019-10-24
Payer: MEDICARE

## 2019-10-24 DIAGNOSIS — I48.91 ATRIAL FIBRILLATION, UNSPECIFIED TYPE (HCC): ICD-10-CM

## 2019-10-24 DIAGNOSIS — Z79.01 LONG TERM CURRENT USE OF ANTICOAGULANT THERAPY: ICD-10-CM

## 2019-10-24 LAB — INTERNATIONAL NORMALIZATION RATIO, POC: 2.6

## 2019-10-24 PROCEDURE — 99211 OFF/OP EST MAY X REQ PHY/QHP: CPT

## 2019-10-24 PROCEDURE — 85610 PROTHROMBIN TIME: CPT

## 2019-11-11 ENCOUNTER — ANTI-COAG VISIT (OUTPATIENT)
Dept: PHARMACY | Age: 69
End: 2019-11-11
Payer: MEDICARE

## 2019-11-11 DIAGNOSIS — I48.91 ATRIAL FIBRILLATION, UNSPECIFIED TYPE (HCC): ICD-10-CM

## 2019-11-11 DIAGNOSIS — Z79.01 LONG TERM CURRENT USE OF ANTICOAGULANT THERAPY: ICD-10-CM

## 2019-11-11 LAB — INTERNATIONAL NORMALIZATION RATIO, POC: 4

## 2019-11-11 PROCEDURE — 99212 OFFICE O/P EST SF 10 MIN: CPT

## 2019-11-11 PROCEDURE — 85610 PROTHROMBIN TIME: CPT

## 2019-11-21 ENCOUNTER — ANTI-COAG VISIT (OUTPATIENT)
Dept: PHARMACY | Age: 69
End: 2019-11-21
Payer: MEDICARE

## 2019-11-21 DIAGNOSIS — Z79.01 LONG TERM CURRENT USE OF ANTICOAGULANT THERAPY: ICD-10-CM

## 2019-11-21 DIAGNOSIS — I48.91 ATRIAL FIBRILLATION, UNSPECIFIED TYPE (HCC): ICD-10-CM

## 2019-11-21 LAB — INTERNATIONAL NORMALIZATION RATIO, POC: 2.5

## 2019-11-21 PROCEDURE — 85610 PROTHROMBIN TIME: CPT

## 2019-11-21 PROCEDURE — 99211 OFF/OP EST MAY X REQ PHY/QHP: CPT

## 2019-12-05 ENCOUNTER — ANTI-COAG VISIT (OUTPATIENT)
Dept: PHARMACY | Age: 69
End: 2019-12-05
Payer: MEDICARE

## 2019-12-05 DIAGNOSIS — Z79.01 LONG TERM CURRENT USE OF ANTICOAGULANT THERAPY: ICD-10-CM

## 2019-12-05 DIAGNOSIS — I48.91 ATRIAL FIBRILLATION, UNSPECIFIED TYPE (HCC): ICD-10-CM

## 2019-12-05 LAB — INTERNATIONAL NORMALIZATION RATIO, POC: 1.9

## 2019-12-05 PROCEDURE — 85610 PROTHROMBIN TIME: CPT

## 2019-12-05 PROCEDURE — 99211 OFF/OP EST MAY X REQ PHY/QHP: CPT

## 2019-12-26 ENCOUNTER — ANTI-COAG VISIT (OUTPATIENT)
Dept: PHARMACY | Age: 69
End: 2019-12-26
Payer: MEDICARE

## 2019-12-26 DIAGNOSIS — I48.91 ATRIAL FIBRILLATION, UNSPECIFIED TYPE (HCC): ICD-10-CM

## 2019-12-26 DIAGNOSIS — Z79.01 LONG TERM CURRENT USE OF ANTICOAGULANT THERAPY: ICD-10-CM

## 2019-12-26 LAB — INTERNATIONAL NORMALIZATION RATIO, POC: 1.8

## 2019-12-26 PROCEDURE — 99211 OFF/OP EST MAY X REQ PHY/QHP: CPT

## 2019-12-26 PROCEDURE — 85610 PROTHROMBIN TIME: CPT

## 2020-01-24 ENCOUNTER — TELEPHONE (OUTPATIENT)
Dept: PHARMACY | Age: 70
End: 2020-01-24

## 2020-01-27 ENCOUNTER — ANTI-COAG VISIT (OUTPATIENT)
Dept: PHARMACY | Age: 70
DRG: 566 | End: 2020-01-27
Payer: MEDICARE

## 2020-01-27 LAB — INTERNATIONAL NORMALIZATION RATIO, POC: 1.7

## 2020-01-27 PROCEDURE — 99211 OFF/OP EST MAY X REQ PHY/QHP: CPT

## 2020-01-27 PROCEDURE — 85610 PROTHROMBIN TIME: CPT

## 2020-01-28 ENCOUNTER — HOSPITAL ENCOUNTER (INPATIENT)
Age: 70
LOS: 4 days | Discharge: HOME | DRG: 566 | End: 2020-02-01
Attending: EMERGENCY MEDICINE | Admitting: INTERNAL MEDICINE
Payer: MEDICARE

## 2020-01-28 ENCOUNTER — APPOINTMENT (OUTPATIENT)
Dept: CT IMAGING | Age: 70
DRG: 566 | End: 2020-01-28
Payer: MEDICARE

## 2020-01-28 ENCOUNTER — APPOINTMENT (OUTPATIENT)
Dept: GENERAL RADIOLOGY | Age: 70
DRG: 566 | End: 2020-01-28
Payer: MEDICARE

## 2020-01-28 PROBLEM — I63.9 ACUTE CEREBROVASCULAR ACCIDENT (CVA) (HCC): Status: ACTIVE | Noted: 2020-01-28

## 2020-01-28 LAB
A/G RATIO: 1 (ref 1.1–2.2)
ALBUMIN SERPL-MCNC: 4.4 G/DL (ref 3.4–5)
ALP BLD-CCNC: 68 U/L (ref 40–129)
ALT SERPL-CCNC: 11 U/L (ref 10–40)
ANION GAP SERPL CALCULATED.3IONS-SCNC: 12 MMOL/L (ref 3–16)
APTT: 36.3 SEC (ref 24.2–36.2)
AST SERPL-CCNC: 21 U/L (ref 15–37)
BASOPHILS ABSOLUTE: 0 K/UL (ref 0–0.2)
BASOPHILS RELATIVE PERCENT: 0.6 %
BILIRUB SERPL-MCNC: 0.6 MG/DL (ref 0–1)
BUN BLDV-MCNC: 7 MG/DL (ref 7–20)
CALCIUM SERPL-MCNC: 9.3 MG/DL (ref 8.3–10.6)
CHLORIDE BLD-SCNC: 100 MMOL/L (ref 99–110)
CO2: 27 MMOL/L (ref 21–32)
CREAT SERPL-MCNC: <0.5 MG/DL (ref 0.6–1.2)
EOSINOPHILS ABSOLUTE: 0.1 K/UL (ref 0–0.6)
EOSINOPHILS RELATIVE PERCENT: 1 %
GFR AFRICAN AMERICAN: >60
GFR NON-AFRICAN AMERICAN: >60
GLOBULIN: 4.2 G/DL
GLUCOSE BLD-MCNC: 90 MG/DL (ref 70–99)
HCT VFR BLD CALC: 43.3 % (ref 36–48)
HEMOGLOBIN: 14.3 G/DL (ref 12–16)
INR BLD: 1.9 (ref 0.86–1.14)
LYMPHOCYTES ABSOLUTE: 1.8 K/UL (ref 1–5.1)
LYMPHOCYTES RELATIVE PERCENT: 27.4 %
MCH RBC QN AUTO: 29.5 PG (ref 26–34)
MCHC RBC AUTO-ENTMCNC: 33 G/DL (ref 31–36)
MCV RBC AUTO: 89.4 FL (ref 80–100)
MONOCYTES ABSOLUTE: 0.4 K/UL (ref 0–1.3)
MONOCYTES RELATIVE PERCENT: 6.5 %
NEUTROPHILS ABSOLUTE: 4.3 K/UL (ref 1.7–7.7)
NEUTROPHILS RELATIVE PERCENT: 64.5 %
PDW BLD-RTO: 13.6 % (ref 12.4–15.4)
PLATELET # BLD: 139 K/UL (ref 135–450)
PMV BLD AUTO: 10.7 FL (ref 5–10.5)
POTASSIUM SERPL-SCNC: 3.9 MMOL/L (ref 3.5–5.1)
PRO-BNP: 995 PG/ML (ref 0–124)
PROTHROMBIN TIME: 22.2 SEC (ref 10–13.2)
RBC # BLD: 4.85 M/UL (ref 4–5.2)
SODIUM BLD-SCNC: 139 MMOL/L (ref 136–145)
TOTAL PROTEIN: 8.6 G/DL (ref 6.4–8.2)
TROPONIN: <0.01 NG/ML
TROPONIN: <0.01 NG/ML
WBC # BLD: 6.7 K/UL (ref 4–11)

## 2020-01-28 PROCEDURE — 71046 X-RAY EXAM CHEST 2 VIEWS: CPT

## 2020-01-28 PROCEDURE — 96374 THER/PROPH/DIAG INJ IV PUSH: CPT

## 2020-01-28 PROCEDURE — 6360000002 HC RX W HCPCS: Performed by: PHYSICIAN ASSISTANT

## 2020-01-28 PROCEDURE — 84484 ASSAY OF TROPONIN QUANT: CPT

## 2020-01-28 PROCEDURE — 83880 ASSAY OF NATRIURETIC PEPTIDE: CPT

## 2020-01-28 PROCEDURE — 85610 PROTHROMBIN TIME: CPT

## 2020-01-28 PROCEDURE — 6370000000 HC RX 637 (ALT 250 FOR IP): Performed by: INTERNAL MEDICINE

## 2020-01-28 PROCEDURE — 6370000000 HC RX 637 (ALT 250 FOR IP): Performed by: PHYSICIAN ASSISTANT

## 2020-01-28 PROCEDURE — 36415 COLL VENOUS BLD VENIPUNCTURE: CPT

## 2020-01-28 PROCEDURE — 2580000003 HC RX 258: Performed by: INTERNAL MEDICINE

## 2020-01-28 PROCEDURE — 70450 CT HEAD/BRAIN W/O DYE: CPT

## 2020-01-28 PROCEDURE — 2060000000 HC ICU INTERMEDIATE R&B

## 2020-01-28 PROCEDURE — 96375 TX/PRO/DX INJ NEW DRUG ADDON: CPT

## 2020-01-28 PROCEDURE — 80053 COMPREHEN METABOLIC PANEL: CPT

## 2020-01-28 PROCEDURE — 85730 THROMBOPLASTIN TIME PARTIAL: CPT

## 2020-01-28 PROCEDURE — 99285 EMERGENCY DEPT VISIT HI MDM: CPT

## 2020-01-28 PROCEDURE — 73560 X-RAY EXAM OF KNEE 1 OR 2: CPT

## 2020-01-28 PROCEDURE — 93005 ELECTROCARDIOGRAM TRACING: CPT | Performed by: PHYSICIAN ASSISTANT

## 2020-01-28 PROCEDURE — 85025 COMPLETE CBC W/AUTO DIFF WBC: CPT

## 2020-01-28 RX ORDER — ASPIRIN 81 MG/1
81 TABLET ORAL DAILY
Status: DISCONTINUED | OUTPATIENT
Start: 2020-01-29 | End: 2020-02-01 | Stop reason: HOSPADM

## 2020-01-28 RX ORDER — ACETAMINOPHEN 325 MG/1
650 TABLET ORAL EVERY 6 HOURS PRN
Status: DISCONTINUED | OUTPATIENT
Start: 2020-01-28 | End: 2020-01-29

## 2020-01-28 RX ORDER — FUROSEMIDE 20 MG/1
20 TABLET ORAL 2 TIMES DAILY
Status: DISCONTINUED | OUTPATIENT
Start: 2020-01-28 | End: 2020-02-01 | Stop reason: HOSPADM

## 2020-01-28 RX ORDER — SODIUM CHLORIDE 0.9 % (FLUSH) 0.9 %
10 SYRINGE (ML) INJECTION PRN
Status: DISCONTINUED | OUTPATIENT
Start: 2020-01-28 | End: 2020-02-01 | Stop reason: HOSPADM

## 2020-01-28 RX ORDER — METOPROLOL SUCCINATE 50 MG/1
100 TABLET, EXTENDED RELEASE ORAL DAILY
Status: DISCONTINUED | OUTPATIENT
Start: 2020-01-29 | End: 2020-02-01 | Stop reason: HOSPADM

## 2020-01-28 RX ORDER — LABETALOL HYDROCHLORIDE 5 MG/ML
10 INJECTION, SOLUTION INTRAVENOUS EVERY 10 MIN PRN
Status: DISCONTINUED | OUTPATIENT
Start: 2020-01-28 | End: 2020-02-01 | Stop reason: HOSPADM

## 2020-01-28 RX ORDER — WARFARIN SODIUM 5 MG/1
7.5 TABLET ORAL
Status: ACTIVE | OUTPATIENT
Start: 2020-01-28 | End: 2020-01-29

## 2020-01-28 RX ORDER — WARFARIN SODIUM 5 MG/1
5 TABLET ORAL
Status: DISCONTINUED | OUTPATIENT
Start: 2020-01-30 | End: 2020-01-29

## 2020-01-28 RX ORDER — ASPIRIN 81 MG/1
81 TABLET ORAL DAILY
Status: DISCONTINUED | OUTPATIENT
Start: 2020-01-29 | End: 2020-01-28

## 2020-01-28 RX ORDER — SENNA PLUS 8.6 MG/1
1 TABLET ORAL DAILY PRN
Status: DISCONTINUED | OUTPATIENT
Start: 2020-01-28 | End: 2020-02-01 | Stop reason: HOSPADM

## 2020-01-28 RX ORDER — WARFARIN SODIUM 5 MG/1
2.5 TABLET ORAL
Status: DISCONTINUED | OUTPATIENT
Start: 2020-01-29 | End: 2020-01-29

## 2020-01-28 RX ORDER — SODIUM CHLORIDE 0.9 % (FLUSH) 0.9 %
10 SYRINGE (ML) INJECTION EVERY 12 HOURS SCHEDULED
Status: DISCONTINUED | OUTPATIENT
Start: 2020-01-28 | End: 2020-02-01 | Stop reason: HOSPADM

## 2020-01-28 RX ORDER — ASPIRIN 300 MG/1
300 SUPPOSITORY RECTAL DAILY
Status: DISCONTINUED | OUTPATIENT
Start: 2020-01-29 | End: 2020-02-01 | Stop reason: HOSPADM

## 2020-01-28 RX ORDER — OXYCODONE HYDROCHLORIDE AND ACETAMINOPHEN 5; 325 MG/1; MG/1
2 TABLET ORAL ONCE
Status: COMPLETED | OUTPATIENT
Start: 2020-01-28 | End: 2020-01-28

## 2020-01-28 RX ORDER — ATORVASTATIN CALCIUM 80 MG/1
80 TABLET, FILM COATED ORAL NIGHTLY
Status: DISCONTINUED | OUTPATIENT
Start: 2020-01-28 | End: 2020-02-01 | Stop reason: HOSPADM

## 2020-01-28 RX ORDER — DIPHENHYDRAMINE HYDROCHLORIDE 50 MG/ML
12.5 INJECTION INTRAMUSCULAR; INTRAVENOUS ONCE
Status: COMPLETED | OUTPATIENT
Start: 2020-01-28 | End: 2020-01-28

## 2020-01-28 RX ORDER — HYDRALAZINE HYDROCHLORIDE 20 MG/ML
5 INJECTION INTRAMUSCULAR; INTRAVENOUS ONCE
Status: COMPLETED | OUTPATIENT
Start: 2020-01-28 | End: 2020-01-28

## 2020-01-28 RX ORDER — ONDANSETRON 2 MG/ML
4 INJECTION INTRAMUSCULAR; INTRAVENOUS EVERY 6 HOURS PRN
Status: DISCONTINUED | OUTPATIENT
Start: 2020-01-28 | End: 2020-02-01 | Stop reason: HOSPADM

## 2020-01-28 RX ORDER — ONDANSETRON 4 MG/1
4 TABLET, ORALLY DISINTEGRATING ORAL ONCE
Status: COMPLETED | OUTPATIENT
Start: 2020-01-28 | End: 2020-01-28

## 2020-01-28 RX ADMIN — HYDRALAZINE HYDROCHLORIDE 5 MG: 20 INJECTION INTRAMUSCULAR; INTRAVENOUS at 19:06

## 2020-01-28 RX ADMIN — OXYCODONE AND ACETAMINOPHEN 2 TABLET: 5; 325 TABLET ORAL at 16:51

## 2020-01-28 RX ADMIN — DIPHENHYDRAMINE HYDROCHLORIDE 12.5 MG: 50 INJECTION, SOLUTION INTRAMUSCULAR; INTRAVENOUS at 19:05

## 2020-01-28 RX ADMIN — Medication 10 ML: at 22:35

## 2020-01-28 RX ADMIN — ONDANSETRON 4 MG: 4 TABLET, ORALLY DISINTEGRATING ORAL at 16:51

## 2020-01-28 RX ADMIN — ATORVASTATIN CALCIUM 80 MG: 80 TABLET, FILM COATED ORAL at 22:45

## 2020-01-28 ASSESSMENT — PAIN DESCRIPTION - PAIN TYPE
TYPE: ACUTE PAIN
TYPE: ACUTE PAIN

## 2020-01-28 ASSESSMENT — PAIN DESCRIPTION - LOCATION
LOCATION: KNEE
LOCATION: KNEE

## 2020-01-28 ASSESSMENT — ENCOUNTER SYMPTOMS
VOMITING: 0
DIARRHEA: 0
SHORTNESS OF BREATH: 0
RHINORRHEA: 0
NAUSEA: 0
COUGH: 0
ABDOMINAL PAIN: 0

## 2020-01-28 ASSESSMENT — PAIN SCALES - GENERAL
PAINLEVEL_OUTOF10: 9

## 2020-01-28 ASSESSMENT — PAIN DESCRIPTION - ORIENTATION: ORIENTATION: RIGHT

## 2020-01-28 NOTE — ED PROVIDER NOTES
I independently evaluated and obtained a history and physical on Luann Nguyen. All diagnostic, treatment, and disposition assistants were made to myself in conjunction the advanced practice provider. For further details of this patient's emergency department encounter, please see the advanced practice provider's documentation. History: Patient comes to the emergency department for evaluation of right knee pain. History of known arthritis. Has been having worsening pain for the past 3 days. No known new trauma. Rates the pain at 9 out of 10. During initial evaluation, patient was noted to have left-sided facial droop involving her mouth, cheek, and slightly involving her forehead. Patient does have a history of Bell's palsy. Was unable to state whether her current symptoms were similar to her Bell's palsy. Also reports history of hemorrhagic stroke. She does complain of headache of moderate intensity. She does take Coumadin for A. fib. Noted be hypertensive. Reports compliance with her medications. Denies any chest pain. No visual changes. No weakness in her extremities. Physician Exam: Physical Exam  Vitals signs and nursing note reviewed. Constitutional:       Appearance: She is well-developed. HENT:      Head: Normocephalic and atraumatic. Eyes:      Conjunctiva/sclera: Conjunctivae normal.      Pupils: Pupils are equal, round, and reactive to light. Neck:      Musculoskeletal: Normal range of motion and neck supple. Cardiovascular:      Rate and Rhythm: Normal rate. Rhythm irregular. Pulses: Normal pulses. Heart sounds: Normal heart sounds. No murmur. No friction rub. No gallop. Pulmonary:      Effort: Pulmonary effort is normal. No respiratory distress. Abdominal:      General: There is no distension. Palpations: Abdomen is soft. Musculoskeletal: Normal range of motion. General: No deformity. Skin:     General: Skin is warm and dry. Neurological:      Mental Status: She is alert and oriented to person, place, and time. Cranial Nerves: Facial asymmetry present. Motor: Motor function is intact. No weakness, tremor, abnormal muscle tone or seizure activity. Comments: Left-sided facial droop, partial forehead involvement. Psychiatric:         Behavior: Behavior normal.         Thought Content: Thought content normal.         Judgment: Judgment normal.       EKG by my interpretation demonstrates atrial fibrillation at a ventricular rate of 97 bpm.  Normal axis, normal intervals. No acute ST elevations or T wave inversions. RSR' pattern in V1 consistent with right ventricular conduction delay. No acute ST elevations or T wave inversions. Compared with prior EKG from March 13, 2019, inferior T wave abnormalities are no longer present. Diagnostics, Emergency Department course, and medical decision-making were discussed with the advanced practice provider, I have reviewed their documentation, and I agree.      Raegan Farfan MD  01/28/20 [id number]

## 2020-01-28 NOTE — ED NOTES
Pt stated to this nurse that she had no c/o other than her knee hurting. Pt stated that she had no injury that she was aware of but that she does have arthritis.      Farzad Ennis RN  01/28/20 [id number]

## 2020-01-29 ENCOUNTER — APPOINTMENT (OUTPATIENT)
Dept: MRI IMAGING | Age: 70
DRG: 566 | End: 2020-01-29
Payer: MEDICARE

## 2020-01-29 LAB
CHOLESTEROL, TOTAL: 162 MG/DL (ref 0–199)
EKG ATRIAL RATE: 89 BPM
EKG DIAGNOSIS: NORMAL
EKG Q-T INTERVAL: 376 MS
EKG QRS DURATION: 90 MS
EKG QTC CALCULATION (BAZETT): 477 MS
EKG R AXIS: -9 DEGREES
EKG T AXIS: -20 DEGREES
EKG VENTRICULAR RATE: 97 BPM
ESTIMATED AVERAGE GLUCOSE: 111.2 MG/DL
HBA1C MFR BLD: 5.5 %
HCT VFR BLD CALC: 38.3 % (ref 36–48)
HDLC SERPL-MCNC: 50 MG/DL (ref 40–60)
HEMOGLOBIN: 12.8 G/DL (ref 12–16)
LDL CHOLESTEROL CALCULATED: 95 MG/DL
MCH RBC QN AUTO: 29.7 PG (ref 26–34)
MCHC RBC AUTO-ENTMCNC: 33.4 G/DL (ref 31–36)
MCV RBC AUTO: 89.1 FL (ref 80–100)
PDW BLD-RTO: 13.8 % (ref 12.4–15.4)
PLATELET # BLD: 111 K/UL (ref 135–450)
PMV BLD AUTO: 9.8 FL (ref 5–10.5)
RBC # BLD: 4.3 M/UL (ref 4–5.2)
TRIGL SERPL-MCNC: 83 MG/DL (ref 0–150)
TROPONIN: <0.01 NG/ML
VLDLC SERPL CALC-MCNC: 17 MG/DL
WBC # BLD: 5.1 K/UL (ref 4–11)

## 2020-01-29 PROCEDURE — 84484 ASSAY OF TROPONIN QUANT: CPT

## 2020-01-29 PROCEDURE — 97530 THERAPEUTIC ACTIVITIES: CPT

## 2020-01-29 PROCEDURE — 99222 1ST HOSP IP/OBS MODERATE 55: CPT | Performed by: PSYCHIATRY & NEUROLOGY

## 2020-01-29 PROCEDURE — 20610 DRAIN/INJ JOINT/BURSA W/O US: CPT | Performed by: NURSE PRACTITIONER

## 2020-01-29 PROCEDURE — 36415 COLL VENOUS BLD VENIPUNCTURE: CPT

## 2020-01-29 PROCEDURE — 3E0U33Z INTRODUCTION OF ANTI-INFLAMMATORY INTO JOINTS, PERCUTANEOUS APPROACH: ICD-10-PCS | Performed by: ORTHOPAEDIC SURGERY

## 2020-01-29 PROCEDURE — 2500000003 HC RX 250 WO HCPCS: Performed by: NURSE PRACTITIONER

## 2020-01-29 PROCEDURE — 99231 SBSQ HOSP IP/OBS SF/LOW 25: CPT | Performed by: NURSE PRACTITIONER

## 2020-01-29 PROCEDURE — 92610 EVALUATE SWALLOWING FUNCTION: CPT

## 2020-01-29 PROCEDURE — 3E0U3BZ INTRODUCTION OF ANESTHETIC AGENT INTO JOINTS, PERCUTANEOUS APPROACH: ICD-10-PCS | Performed by: ORTHOPAEDIC SURGERY

## 2020-01-29 PROCEDURE — 97165 OT EVAL LOW COMPLEX 30 MIN: CPT

## 2020-01-29 PROCEDURE — 2060000000 HC ICU INTERMEDIATE R&B

## 2020-01-29 PROCEDURE — 6370000000 HC RX 637 (ALT 250 FOR IP): Performed by: INTERNAL MEDICINE

## 2020-01-29 PROCEDURE — 2580000003 HC RX 258: Performed by: INTERNAL MEDICINE

## 2020-01-29 PROCEDURE — 92526 ORAL FUNCTION THERAPY: CPT

## 2020-01-29 PROCEDURE — 83036 HEMOGLOBIN GLYCOSYLATED A1C: CPT

## 2020-01-29 PROCEDURE — 85027 COMPLETE CBC AUTOMATED: CPT

## 2020-01-29 PROCEDURE — 80061 LIPID PANEL: CPT

## 2020-01-29 PROCEDURE — 97116 GAIT TRAINING THERAPY: CPT

## 2020-01-29 PROCEDURE — 6360000002 HC RX W HCPCS: Performed by: NURSE PRACTITIONER

## 2020-01-29 PROCEDURE — 93010 ELECTROCARDIOGRAM REPORT: CPT | Performed by: INTERNAL MEDICINE

## 2020-01-29 PROCEDURE — 93970 EXTREMITY STUDY: CPT

## 2020-01-29 PROCEDURE — 97161 PT EVAL LOW COMPLEX 20 MIN: CPT

## 2020-01-29 PROCEDURE — 70551 MRI BRAIN STEM W/O DYE: CPT

## 2020-01-29 RX ORDER — LIDOCAINE HYDROCHLORIDE AND EPINEPHRINE BITARTRATE 20; .01 MG/ML; MG/ML
5 INJECTION, SOLUTION SUBCUTANEOUS ONCE
Status: COMPLETED | OUTPATIENT
Start: 2020-01-29 | End: 2020-01-29

## 2020-01-29 RX ORDER — LIDOCAINE HYDROCHLORIDE 10 MG/ML
INJECTION, SOLUTION EPIDURAL; INFILTRATION; INTRACAUDAL; PERINEURAL
Status: DISCONTINUED
Start: 2020-01-29 | End: 2020-01-29 | Stop reason: WASHOUT

## 2020-01-29 RX ORDER — TRIAMCINOLONE ACETONIDE 40 MG/ML
40 INJECTION, SUSPENSION INTRA-ARTICULAR; INTRAMUSCULAR ONCE
Status: COMPLETED | OUTPATIENT
Start: 2020-01-29 | End: 2020-01-29

## 2020-01-29 RX ORDER — WARFARIN SODIUM 7.5 MG/1
7.5 TABLET ORAL
Status: COMPLETED | OUTPATIENT
Start: 2020-01-29 | End: 2020-01-29

## 2020-01-29 RX ORDER — WARFARIN SODIUM 5 MG/1
2.5 TABLET ORAL
Status: DISCONTINUED | OUTPATIENT
Start: 2020-02-05 | End: 2020-01-30

## 2020-01-29 RX ORDER — ACETAMINOPHEN 325 MG/1
650 TABLET ORAL EVERY 4 HOURS PRN
Status: DISCONTINUED | OUTPATIENT
Start: 2020-01-29 | End: 2020-02-01 | Stop reason: HOSPADM

## 2020-01-29 RX ORDER — WARFARIN SODIUM 5 MG/1
5 TABLET ORAL
Status: DISCONTINUED | OUTPATIENT
Start: 2020-01-30 | End: 2020-01-30

## 2020-01-29 RX ADMIN — WARFARIN SODIUM 7.5 MG: 7.5 TABLET ORAL at 18:05

## 2020-01-29 RX ADMIN — LIDOCAINE HYDROCHLORIDE AND EPINEPHRINE 5 ML: 20; 10 INJECTION, SOLUTION INFILTRATION; PERINEURAL at 13:15

## 2020-01-29 RX ADMIN — ATORVASTATIN CALCIUM 80 MG: 80 TABLET, FILM COATED ORAL at 21:23

## 2020-01-29 RX ADMIN — FUROSEMIDE 20 MG: 20 TABLET ORAL at 18:05

## 2020-01-29 RX ADMIN — TRIAMCINOLONE ACETONIDE 40 MG: 40 INJECTION, SUSPENSION INTRA-ARTICULAR; INTRAMUSCULAR at 13:15

## 2020-01-29 RX ADMIN — ACETAMINOPHEN 650 MG: 325 TABLET, FILM COATED ORAL at 11:30

## 2020-01-29 RX ADMIN — ASPIRIN 81 MG: 81 TABLET, COATED ORAL at 10:10

## 2020-01-29 RX ADMIN — Medication 10 ML: at 10:10

## 2020-01-29 RX ADMIN — ACETAMINOPHEN 650 MG: 325 TABLET, FILM COATED ORAL at 06:16

## 2020-01-29 RX ADMIN — FUROSEMIDE 20 MG: 20 TABLET ORAL at 10:10

## 2020-01-29 RX ADMIN — Medication 10 ML: at 21:23

## 2020-01-29 RX ADMIN — METOPROLOL SUCCINATE 100 MG: 50 TABLET, EXTENDED RELEASE ORAL at 10:10

## 2020-01-29 ASSESSMENT — PAIN SCALES - GENERAL
PAINLEVEL_OUTOF10: 0
PAINLEVEL_OUTOF10: 7
PAINLEVEL_OUTOF10: 6
PAINLEVEL_OUTOF10: 0
PAINLEVEL_OUTOF10: 5
PAINLEVEL_OUTOF10: 6

## 2020-01-29 ASSESSMENT — PAIN DESCRIPTION - PAIN TYPE
TYPE: ACUTE PAIN

## 2020-01-29 ASSESSMENT — PAIN DESCRIPTION - LOCATION
LOCATION: HEAD;KNEE
LOCATION: LEG
LOCATION: HEAD;KNEE
LOCATION: HEAD

## 2020-01-29 ASSESSMENT — PAIN DESCRIPTION - ORIENTATION
ORIENTATION: RIGHT

## 2020-01-29 NOTE — ED PROVIDER NOTES
still able to ambulate. The patient has no chest pain or shortness of breath. She is on Coumadin due to history of A. fib, she states that she has had brain bleeds in the past.  Patient otherwise has no complaints at this time    Nursing Notes were all reviewed and agreed with or any disagreements were addressed in the HPI. REVIEW OF SYSTEMS    (2-9 systems for level 4, 10 or more for level 5)     Review of Systems   Constitutional: Negative for activity change, appetite change, chills and fever. HENT: Negative for congestion and rhinorrhea. Eyes: Negative for visual disturbance. Respiratory: Negative for cough and shortness of breath. Cardiovascular: Negative for chest pain. Gastrointestinal: Negative for abdominal pain, diarrhea, nausea and vomiting. Genitourinary: Negative for difficulty urinating, dysuria and hematuria. Musculoskeletal: Positive for arthralgias. Neurological: Positive for headaches. Negative for weakness and numbness. Positives and Pertinent negatives as per HPI. Except as noted above in the ROS, all other systems were reviewed and negative.        PAST MEDICAL HISTORY     Past Medical History:   Diagnosis Date    Atrial fibrillation ([address]     Bell palsy     diagnosed 15 years ago   [place] CAD (coronary artery disease)     Cerebral artery occlusion with cerebral infarction ([address]     Hypertension     Intracranial hemorrhage ([address] 4/2016    Nonintractable headache     Thyroid nodule 2/8/2018         SURGICAL HISTORY     Past Surgical History:   Procedure Laterality Date    CARDIAC SURGERY      COLONOSCOPY      CORONARY ANGIOPLASTY WITH STENT PLACEMENT      TUBAL LIGATION Right Torn Rotator Cuff    2013 @ [facility]         CURRENTMEDICATIONS       Previous Medications    ACETAMINOPHEN (TYLENOL) 500 MG TABLET    Take 2 tablets by mouth every 6 hours as needed    AMLODIPINE (NORVASC) 10 MG TABLET    Take 1 tablet by mouth 2 times daily    ASPIRIN 81 MG EC TABLET Take 1 tablet by mouth daily    ATORVASTATIN (LIPITOR) 80 MG TABLET    Take 1 tablet by mouth daily    CALCIUM CARB-CHOLECALCIFEROL (CALCIUM + D3) 600-200 MG-UNIT TABS TABLET    Take 1 tablet by mouth 2 times daily    DOXAZOSIN (CARDURA) 2 MG TABLET    Take 1 tablet by mouth nightly    FAMOTIDINE (PEPCID) 20 MG TABLET    Take 1 tablet by mouth 2 times daily    FUROSEMIDE (LASIX) 20 MG TABLET    Take 1 tablet by mouth 2 times daily    METOPROLOL SUCCINATE (TOPROL XL) 100 MG EXTENDED RELEASE TABLET    Take 1 tablet by mouth daily    MINOXIDIL (LONITEN) 10 MG TABLET    Take 1 tablet by mouth 2 times daily    NAPROXEN (NAPROSYN) 500 MG TABLET    Take 1 tablet by mouth 2 times daily as needed for Pain    PHYTONADIONE, ADULT, (VITAMIN K) 10 MG/ML INJECTION    Inject 0.5 mLs into the skin once for 1 dose    POTASSIUM CHLORIDE (KLOR-CON M) 20 MEQ EXTENDED RELEASE TABLET    Take 1 tablet by mouth daily    SPIRONOLACTONE (ALDACTONE) 50 MG TABLET    Take 1 tablet by mouth daily 1 tab qd    WARFARIN (COUMADIN) 5 MG TABLET    Take 5 mg by mouth See Admin Instructions 2.5mg Monday 5mg all other days         ALLERGIES     Clonidine; Codeine; Clonidine derivatives; Clonidine hcl; Lisinopril; and Tramadol    FAMILYHISTORY     History reviewed. No pertinent family history. SOCIAL HISTORY       Social History     Tobacco Use    Smoking status: Never Smoker    Smokeless tobacco: Never Used   Substance Use Topics    Alcohol use: No    Drug use: No       SCREENINGS             PHYSICAL EXAM    (up to 7 for level 4, 8 or more for level 5)     ED Triage Vitals [01/28/20 1527]   BP Temp Temp Source Pulse Resp SpO2 Height Weight   (!) 216/148 98.3 °F (36.8 °C) Oral 96 20 99 % 5' 7\" (1.702 m) 183 lb (83 kg)       Physical Exam  Vitals signs and nursing note reviewed. Constitutional:       Appearance: She is well-developed. She is not diaphoretic. HENT:      Head: Normocephalic and atraumatic.       Right Ear: External ear normal. not returned as of this dictation. EKG: All EKG's are interpreted by the Emergency Department Physician in the absence of a cardiologist.  Please see their note for interpretation of EKG. RADIOLOGY:   Non-plain film images such as CT, Ultrasound and MRI are read by the radiologist. Plain radiographic images are visualized and preliminarily interpreted by the  ED Provider with the below findings:        Interpretation per the Radiologist below, if available at the time of this note:    XR CHEST STANDARD (2 VW)   Final Result   No acute cardiopulmonary abnormality identified. Enlarged mediastinal   silhouette is redemonstrated. CT HEAD WO CONTRAST   Final Result   No acute intracranial abnormality. XR KNEE RIGHT (1-2 VIEWS)   Final Result   Joint effusion without acute osseous abnormality. Advanced osteoarthritis. Xr Chest Standard (2 Vw)    Result Date: 1/28/2020  EXAMINATION: TWO XRAY VIEWS OF THE CHEST 1/28/2020 5:04 pm COMPARISON: 05/29/2019 HISTORY: ORDERING SYSTEM PROVIDED HISTORY: Chest Discomfort TECHNOLOGIST PROVIDED HISTORY: Reason for exam:->Chest Discomfort Reason for Exam: BILATERAL LOWER EXTREMITY SWELLING. NON SMOKER. HISTORY OF INTRACRANIAL HEMORRHAGE, HTN, CAD, CVA AND A-FIB. Acuity: Acute Type of Exam: Initial FINDINGS: Mediastinal silhouette is enlarged, similar to prior study. No focal intrapulmonary consolidation or overt heart failure. No pleural effusion on lateral view. No acute cardiopulmonary abnormality identified. Enlarged mediastinal silhouette is redemonstrated. Xr Knee Right (1-2 Views)    Result Date: 1/28/2020  EXAMINATION: 2 XRAY VIEWS OF THE RIGHT KNEE 1/28/2020 3:50 pm COMPARISON: April 24, 2018 HISTORY: ORDERING SYSTEM PROVIDED HISTORY: pain and swelling TECHNOLOGIST PROVIDED HISTORY: Reason for exam:->pain and swelling Reason for Exam: Joint Swelling (right knee pain and swelling x3 days.  denies injury, hs hx of arthritis) Acuity:

## 2020-01-29 NOTE — CARE COORDINATION
The Plan for Transition of Care is related to the following treatment goals: Return home with home PT/OT    The Patient and/or patient representative  was provided with a choice of provider and agrees   with the discharge plan. [x] Yes [] No    Freedom of choice list was provided with basic dialogue that supports the patient's individualized plan of care/goals, treatment preferences and shares the quality data associated with the providers.  [x] Yes [] No   Referral to [facility] and [facility] per pt request.    Kingsley Son MSW, LSW  [phone]

## 2020-01-29 NOTE — PROGRESS NOTES
[facility] PROGRESS NOTE    1/29/2020 12:53 PM        Name: Kadeem Marinelli . Admitted: 1/28/2020  Primary Care Provider: No primary care provider on file. (Tel: None)        Subjective:pain in the  Right knee mild headache no speech impairment no other complaint    Reviewed interval ancillary notes    Current Medications  lidocaine PF 1 % injection,   warfarin (COUMADIN) tablet 7.5 mg, Once  [START ON 2/5/2020] warfarin (COUMADIN) tablet 2.5 mg, Once per day on Wed    And  [START ON 1/30/2020] warfarin (COUMADIN) tablet 5 mg, Once per day on Sun Mon Tue Thu Fri Sat  acetaminophen (TYLENOL) tablet 650 mg, Q4H PRN  triamcinolone acetonide (KENALOG-40) injection 40 mg, Once  lidocaine-EPINEPHrine 2 percent-1:[id number] injection 5 mL, Once  atorvastatin (LIPITOR) tablet 80 mg, Nightly  furosemide (LASIX) tablet 20 mg, BID  metoprolol succinate (TOPROL XL) extended release tablet 100 mg, Daily  sodium chloride flush 0.9 % injection 10 mL, 2 times per day  sodium chloride flush 0.9 % injection 10 mL, PRN  ondansetron (ZOFRAN) injection 4 mg, Q6H PRN  aspirin EC tablet 81 mg, Daily    Or  aspirin suppository 300 mg, Daily  senna (SENOKOT) tablet 8.6 mg, Daily PRN  labetalol (NORMODYNE;TRANDATE) injection 10 mg, Q10 Min PRN        Objective:  BP (!) 136/93   Pulse 76   Temp 98.1 °F (36.7 °C) (Oral)   Resp 16   Ht 5' 7\" (1.702 m)   Wt 185 lb 12.8 oz (84.3 kg)   SpO2 97%   BMI 29.10 kg/m²   No intake or output data in the 24 hours ending 01/29/20 1253   Wt Readings from Last 3 Encounters:   01/29/20 185 lb 12.8 oz (84.3 kg)   05/29/19 184 lb (83.5 kg)   05/07/19 182 lb (82.6 kg)       General appearance:  Appears comfortable  Eyes: Sclera clear. Pupils equal.  ENT: Moist oral mucosa. Trachea midline, no adenopathy. Cardiovascular: Regular rhythm, normal S1, S2. No murmur.  No edema in lower extremities  Respiratory: Not using accessory muscles. Good inspiratory effort. Clear to auscultation bilaterally, no wheeze or crackles. GI: Abdomen soft, no tenderness, not distended, normal bowel sounds  Musculoskeletal: No cyanosis in digits, neck supple  Neurology: CN 2-12 grossly intact. No speech or motor deficits  Psych: Normal affect. Alert and oriented in time, place and person  Skin: Warm, dry, normal turgor  Extremity exam shows brisk capillary refill. Peripheral pulses are palpable in lower extremities     Labs and Tests:  CBC:   Recent Labs     01/28/20  1708 01/29/20  0241   WBC 6.7 5.1   HGB 14.3 12.8    111*     BMP:    Recent Labs     01/28/20  1708      K 3.9      CO2 27   BUN 7   CREATININE <0.5*   GLUCOSE 90     Hepatic:   Recent Labs     01/28/20  1708   AST 21   ALT 11   BILITOT 0.6   ALKPHOS 68     XR CHEST STANDARD (2 VW)   Final Result   No acute cardiopulmonary abnormality identified. Enlarged mediastinal   silhouette is redemonstrated. CT HEAD WO CONTRAST   Final Result   No acute intracranial abnormality. XR KNEE RIGHT (1-2 VIEWS)   Final Result   Joint effusion without acute osseous abnormality. Advanced osteoarthritis. MRI brain with and without contrast    (Results Pending)   VL Extremity Venous Bilateral    (Results Pending)         Problem List  Active Problems:    Acute cerebrovascular accident (CVA) ([address]  Resolved Problems:    * No resolved hospital problems.  *       Assessment & Plan:     CVA versus bells palsy: able to raise eyebrows equally on my exam  - Will get MRI brain w /wo contrast, ct head neg for infarct and bleed  - continue asa warfarin and statin  - permissive HTN for now BP goal less then < 180/105 given hx of ICH     R knee swelling: will get Vas US r/o dvt  - ortho consult  - not warm or inflammed doubt gout or infecious etiology with no wbc elevation/fever/chills     HTN: hold home meds prns for now until cva r/o     PAF: home toprol

## 2020-01-29 NOTE — PROGRESS NOTES
protrusion. Mild reduced bolus control, mastication and A-P propulsion noted with all textures. Clinical symptoms of premature bolus loss to pharynx, mild delayed swallow initiation, mild/mod reduced laryngeal excursion and suspected reduced pharyngeal clearing noted with all textures. Intermittent clinical symptoms of penetration/aspiration with delayed throat clear noted with thin liquids via straw and with thin liquids used as a rinse to clear solid texture oral and pharyngeal residue. Education regarding aspiration risk and precautions explained to the Pt who verbalized understanding and agreement with recommendations. Dietary Recommendations: Dysphagia III Soft and Bite-Sized with thin liquids/no straws/meds with puree    Strategies: 90 degree positioning with all p.o. intake; small bites/sips; alternate textures through meal; reduce rate of intake; No straws    Treatment/Goals: Speech therapy for dysphagia tx 3-5 times per week. ST.) Pt will tolerate recommended diet without s/s of aspiration   2.) If clinical symptoms of penetration/aspiration continue to be noted,Pt will tolerate MBS to r/o aspiration and determine appropriate diet/liquid level. 3.) Pt will tolerate diet advance to least restricted diet, as clinically indicated, with no signs of aspiration   4.) Pt will improve oral motor function via bolus control exercises 5/5    Oral motor Exam:  Dentition: adequate  Labial/Facial:Mod R facial weakness  Lingual:Symmetrical; mild reduced ROM  Voice:weak; ?  Nasal resonance    Oral Phase:   Reduced mastication  Reduced A-P propulsion  Apparent premature bolus loss to pharynx    Pharyngeal Phase:  Apparent pharyngeal pooling  Delayed swallow initiation  Decreased laryngeal elevation via palpation   Apparent decreased pharyngeal clearing  Change in vocal quality with thins intermittently  Throat clearing (delayed) with thins via straw and with mixed consistencies    Patient/Family Education:Education, results and recommendations given to the Pt and nurse, who verbalized understanding    Timed Code Treatment: 0 min    Total Treatment Time:30 min    Discharge Recommendations: Speech Therapy for Speech/Dysphagia treatment at discharge.     Nallely [id number]#[id number]

## 2020-01-29 NOTE — PROGRESS NOTES
Occupational Therapy   Occupational Therapy Initial Assessment  Date: 2020   Patient Name: Santino Wheeler  MRN: [id number]     : 1950    Date of Service: 2020    Discharge Recommendations:    Santino Wheeler scored a 18/24 on the AM-PAC ADL Inpatient form. Current research shows that an AM-PAC score of 18 or greater is typically associated with a discharge to the patient's home setting. Based on the patients AM-PAC score and their current ADL deficits, it is recommended that the patient have 2-3 sessions per week of Occupational Therapy at d/c to increase the patients independence. HOME HEALTH CARE: LEVEL 1 STANDARD    - Initial home health evaluation to occur within 24-48 hours, in patient home   - Therapy to evaluate with goal of regaining prior level of functioning   - Therapy to evaluate if patient has [address] needs for personal care         OT Equipment Recommendations  Equipment Needed: No  Other: Continue to further assess     Assessment   Performance deficits / Impairments: Decreased functional mobility ; Decreased balance;Decreased coordination;Decreased ADL status; Decreased vision/visual deficit; Decreased endurance;Decreased high-level IADLs;Decreased strength  Assessment: Pt not at baseline due to above deficits. CGA for ADL transfers and functional mobility. Pt would continue to benefit from skilled OT services in order to return to OF. Treatment Diagnosis: Acute CVA  Prognosis: Good  Decision Making: Low Complexity  History: HTN, A fib, CAD, CVA, [place] Palsy  OT Education: OT Role;Plan of Care;Transfer Training  Patient Education: OT role, POC, transfers, d/c, eval.   REQUIRES OT FOLLOW UP: Yes  Activity Tolerance  Activity Tolerance: Patient Tolerated treatment well;Patient limited by pain  Activity Tolerance: Pain in R leg when ambulating   Safety Devices  Safety Devices in place: Yes  Type of devices:  All fall risk precautions in place;Gait belt;Call light within reach; Left in bed;Nurse notified  Restraints  Initially in place: No           Patient Diagnosis(es): The primary encounter diagnosis was Malignant hypertension. A diagnosis of Right knee pain, unspecified chronicity was also pertinent to this visit. has a past medical history of Atrial fibrillation ([address] Bell palsy, CAD (coronary artery disease), Cerebral artery occlusion with cerebral infarction ([address] Hypertension, Intracranial hemorrhage ([address] Nonintractable headache, and Thyroid nodule. has a past surgical history that includes Cardiac surgery; Tubal ligation (Right, Torn Rotator Cuff); Coronary angioplasty with stent; and Colonoscopy. Treatment Diagnosis: Acute CVA      Restrictions  Restrictions/Precautions  Restrictions/Precautions: (Medium per Tasha Gonzalez)  Required Braces or Orthoses?: No    Subjective   General  Chart Reviewed: Yes  Patient assessed for rehabilitation services?: Yes  Additional Pertinent Hx: HTN, A fib, CVA, [place] Palsy  Family / Caregiver Present: No  Diagnosis: Acute CVA  Subjective  Subjective: Pt supine in bed upon therapist arrival, pleasant and agreeable to eval.   Patient Currently in Pain: Yes  Pain Assessment  Pain Assessment:  (reporting pain but did not rate pain in R knee, ortho consult)  Pain Type: Acute pain  Pain Location: Knee  Pain Orientation: Right  Pre Treatment Pain Screening  Intervention List: Patient able to continue with treatment  Comments / Details: Pt was given ice to put on R knee to alleviate some swelling and pain.    Patient Currently in Pain: Yes  Oxygen Therapy  SpO2: 97 %  Pulse Oximeter Device Mode: Intermittent  Pulse Oximeter Device Location: Right;Finger  O2 Device: None (Room air)  Social/Functional History  Social/Functional History  Lives With: Spouse(and great grandson)  Type of Home: House  Home Layout: One level  Home Access: Stairs to enter without rails  Entrance Stairs - Number of Steps: 1 ADA  Bathroom Shower/Tub: Tub/Shower shoulder     Plan   Plan  Times per week: 3-5  Times per day: Daily  Current Treatment Recommendations: Strengthening, Endurance Training, Equipment Evaluation, Education, & procurement, Self-Care / ADL, Balance Training, Functional Mobility Training      AM-PAC Score        AM-PAC Inpatient Daily Activity Raw Score: 18 (01/29/20 0938)  AM-PAC Inpatient ADL T-Scale Score : 38.66 (01/29/20 9885)  ADL Inpatient CMS 0-100% Score: 46.65 (01/29/20 0938)  ADL Inpatient CMS G-Code Modifier : CK (01/29/20 3518)    Goals  Short term goals  Time Frame for Short term goals: d/c  Short term goal 1: Mod I for functional mobility to complete ADL/IADL tasks  Short term goal 2: Mod I for functional ADL transfers  Short term goal 3: Mod I for UB ADL's  Short term goal 4: Mod I for LB ADL's   Long term goals  Time Frame for Long term goals : STG = LTG  Patient Goals   Patient goals : Pt wants to return home.         Therapy Time   Individual Concurrent Group Co-treatment   Time In 9676         Time Out 0920         Minutes 24              Timed Code Treatment Minutes:   9 minutes    Total Treatment Minutes:  24 minutes    Jenna Burris, [address] [facility], OT

## 2020-01-29 NOTE — CONSULTS
NEUROLOGY CONSULTATION     Patient's Name :   Cece Cavazos        YOB: 1950                    Date of Consultation : 1/29/2020 4:08 PM    Referring Physician :  Bianca Enciso MD    Reason for Consultation :  Facial weakness    HISTORY OF PRESENT ILLNESS      Cece Cavazos is a 77 y.o. female   History was obtained from patient and from dictations in the chart. Patient was admitted with difficulty walking and bilateral knee swelling. She has been seen by orthopedic surgery. Patient also felt that her facial droop may be worse. Patient has had a previous history of Bell's palsy on the left side. She also several years ago had an intracerebral hemorrhage with no major residual deficits. Patient has known atrial fibrillation and is on Coumadin. Denies any focal weakness vertigo or diplopia. Her knee pain has been chronic but recently it has been worse. The facial droop is been present ever since her Bell's palsy from before but there was some concern whether it is slightly worse now than before. REVIEW OF SYSTEMS     Denies any chest pain palpitation breathlessness fever or weight loss. Rest of the 10 system review was reviewed and was negative except for the symptoms noted above    Past Medical History:   Diagnosis Date    Atrial fibrillation (HCC)     Bell palsy     diagnosed 15 years ago    CAD (coronary artery disease)     Cerebral artery occlusion with cerebral infarction ([address]     Hypertension     Intracranial hemorrhage ([address] 4/2016    Nonintractable headache     Thyroid nodule 2/8/2018     History reviewed. No pertinent family history.   Social History     Socioeconomic History    Marital status:      Spouse name: Casey Leone Number of children: 3    Years of education: 15    Highest education level: None   Occupational History    None   Social Needs    Financial resource strain: None 1010    sodium chloride flush 0.9 % injection 10 mL  10 mL Intravenous PRN Sari Fisher MD        ondansetron (ZOFRAN) injection 4 mg  4 mg Intravenous Q6H PRN Sari Fisher MD        aspirin EC tablet 81 mg  81 mg Oral Daily Sari Fisher MD   81 mg at 01/29/20 1010    Or    aspirin suppository 300 mg  300 mg Rectal Daily Evertoninder Luis Roca MD        senna (SENOKOT) tablet 8.6 mg  1 tablet Oral Daily PRN Sari Fisher MD        labetalol (NORMODYNE;TRANDATE) injection 10 mg  10 mg Intravenous Q10 Min PRN Sari Fisher MD         Allergies   Allergen Reactions    Clonidine Rash, Shortness Of Breath and Hives    Codeine Hives, Itching, Nausea Only and Rash     Other reaction(s): Other (See Comments)  Pruritis    Clonidine Derivatives Hives    Clonidine Hcl Hives    Lisinopril Hives and Itching    Tramadol     Percocet [Oxycodone-Acetaminophen] Itching     Patient takes percocet with benadryl to control the itching       PHYSICAL EXAMINATION:  /88   Pulse 64   Temp 97.7 °F (36.5 °C) (Oral)   Resp 16   Ht 5' 7\" (1.702 m)   Wt 185 lb 12.8 oz (84.3 kg)   SpO2 98%   BMI 29.10 kg/m²   Appearance: Well appearing, well nourished and in no distress  Mental Status Exam: Patient is alert, oriented to person, place and time. Recent and remote memory is normal  Fund of Knowledge is normal  Attention/concentration is normal.   Speech : No dysarthria  Language : No aphasia  Cranial Nerves:   II: Visual fields:  Full to confrontation  III: Pupils:  equal, round, reactive to light  III,IV,VI: Extra Ocular Movements are intact.  No nystagmus  V: Facial sensation is intact to pin prick and light touch  VII: Patient has a lower motor neuron type of left-sided facial weakness  VIII: Hearing:  Intact to finger rub bilaterally  IX: Palate  elevation is symmetric  XI: Shoulder shrug is intact  XII: Tongue movements are normal  Motor:  Muscle tone and bulk are normal.   Strength is symmetrical Remote history of stroke    HTN (hypertension), benign    Vision loss    Nonintractable headache    Anticoagulated on Coumadin    Left-sided weakness    Thyroid nodule    Right-sided Bell's palsy    Variants of migraine    Stroke-like symptoms    Sudden visual loss of left eye    Blurred vision, left eye    Acute cerebrovascular accident ([facility]A) [address]     RECOMMENDATIONS ;  Discussed with patient  Explained MRI brain results  No need for any further neurological work-up at this time. I will sign off at this time. Please call if needed. Please note a portion of this chart was generated using dragon dictation software. Although every effort was made to ensure the accuracy of this automated transcription, some errors in transcription may have occurred.      Facial weakness

## 2020-01-29 NOTE — PLAN OF CARE
Problem: Pain:  Goal: Pain level will decrease  Description  Pain level will decrease  Outcome: Ongoing  Note:   Patient with c/o headache and right knee pain 6/10. Too soon to administer PRN Tylenol. Message sent to MD requesting alternative PRN. Awaiting response. Will continue to monitor. Problem: Falls - Risk of:  Goal: Will remain free from falls  Description  Will remain free from falls  Outcome: Ongoing  Note:   Patient remains absent from falls at this time. Remains alert and oriented, in bed with call light and belongings in reach. Non-slip footwear on and 2/4 siderails raised. Bed remains in lowest/locked position at all times. Fall precautions in place. Patient encouraged to use call light to request assistance, v/u.  Will continue to monitor. Problem: HEMODYNAMIC STATUS  Goal: Patient has stable vital signs and fluid balance  Outcome: Ongoing  Note:   Patient BP slightly elevated, other VSS at this time on room air. NIHSS 0. MRI checklist completed and faxed. Will continue to monitor.

## 2020-01-29 NOTE — H&P
HOSPITALISTS HISTORY AND PHYSICAL    1/28/2020 7:36 PM    Patient Information:  Eliu Verde is a 77 y.o. female [id number]  PCP:  No primary care provider on file. (Tel: None )    Chief complaint:    Chief Complaint   Patient presents with    Joint Swelling     right knee pain and swelling x3 days. denies injury, hs hx of arthritis        History of Present Illness:  Agata Lizama is a 77 y.o. female who presented with  A history of increased bilateral knee swelling with right greater than the left. She is also been having calf tenderness on the right side and calf swelling. Patient denies any trauma to the region no redness or skin changes in the area. Denies any recent travel. No previous history of blood clots. Patient is on Coumadin but INR level currently is 1.9 has been non therapeutic in the past.  Denies any fevers or chills. During the visit the ED physician noticed that the patient was having right facial droop and per the family it was new. Per the ED physician patient had mild deficit and raising her right eyebrow compared to the left. Thought secondary to be Bell's palsy which the patient has had on the right previously. Family states she has no focal deficits and no slurred speech. Also found to be hypertensive with a blood pressure of 16/148 patient states she is compliant with all her medications at home    Per chart review patient with hx of ICH in past, and CVA  REVIEW OF SYSTEMS:   Constitutional: Negative for fever,chills or night sweats  ENT: Negative for rhinorrhea, epistaxis, hoarseness, sore throat.   Respiratory: Negative for shortness of breath,wheezing  Cardiovascular: Negative for chest pain, palpitations   Gastrointestinal: Negative for nausea, vomiting, diarrhea  Genitourinary: Negative for polyuria, dysuria   Hematologic/Lymphatic: Negative for bleeding tendency, easy bruising  Musculoskeletal: Negative for myalgias and arthralgias   Neurologic: Negative for confusion,dysarthria. Skin: Negative for itching,rash  Psychiatric: Negative for depression,anxiety, agitation. Endocrine: Negative for polydipsia,polyuria,heat /cold intolerance. Past Medical History:   has a past medical history of Atrial fibrillation ([facility] [address] Bell palsy, CAD (coronary artery disease), Cerebral artery occlusion with cerebral infarction ([facility] [address] Hypertension, Intracranial hemorrhage ([facility] [address] Nonintractable headache, and Thyroid nodule. Past Surgical History:   has a past surgical history that includes Cardiac surgery; Tubal ligation (Right, Torn Rotator Cuff); Coronary angioplasty with stent; and Colonoscopy. Medications:  No current facility-administered medications on file prior to encounter.       Current Outpatient Medications on File Prior to Encounter   Medication Sig Dispense Refill    naproxen (NAPROSYN) 500 MG tablet Take 1 tablet by mouth 2 times daily as needed for Pain 10 tablet 0    metoprolol succinate (TOPROL XL) 100 MG extended release tablet Take 1 tablet by mouth daily 30 tablet 3    amLODIPine (NORVASC) 10 MG tablet Take 1 tablet by mouth 2 times daily 60 tablet 5    phytonadione, ADULT, (VITAMIN K) 10 MG/ML injection Inject 0.5 mLs into the skin once for 1 dose 0.5 mL 0    potassium chloride (KLOR-CON M) 20 MEQ extended release tablet Take 1 tablet by mouth daily 90 tablet 1    furosemide (LASIX) 20 MG tablet Take 1 tablet by mouth 2 times daily 180 tablet 1    spironolactone (ALDACTONE) 50 MG tablet Take 1 tablet by mouth daily 1 tab qd 90 tablet 1    atorvastatin (LIPITOR) 80 MG tablet Take 1 tablet by mouth daily 90 tablet 1    aspirin 81 MG EC tablet Take 1 tablet by mouth daily 30 tablet 3    warfarin (COUMADIN) 5 MG tablet Take 5 mg by mouth See Admin Instructions 2.5mg Monday 5mg all other days      famotidine (PEPCID) 20 MG tablet Take 1 tablet by mouth 2 times daily 60 tablet 0    doxazosin (CARDURA) 2 MG tablet Take 1 tablet by mouth nightly 90 tablet 3    minoxidil (LONITEN) 10 MG tablet Take 1 tablet by mouth 2 times daily 180 tablet 3    acetaminophen (TYLENOL) 500 MG tablet Take 2 tablets by mouth every 6 hours as needed 120 tablet 3    Calcium Carb-Cholecalciferol (CALCIUM + D3) 600-200 MG-UNIT TABS tablet Take 1 tablet by mouth 2 times daily 120 tablet 3       Allergies: Allergies   Allergen Reactions    Clonidine Rash, Shortness Of Breath and Hives    Codeine Hives, Itching, Nausea Only and Rash     Other reaction(s): Other (See Comments)  Pruritis    Clonidine Derivatives Hives    Clonidine Hcl Hives    Lisinopril Hives and Itching    Tramadol         Social History:  Patient Lives at home with family   reports that she has never smoked. She has never used smokeless tobacco. She reports that she does not drink alcohol or use drugs. Family History:  Denies any family hx  Physical Exam:  BP (!) 163/117   Pulse 69   Temp 98.3 °F (36.8 °C) (Oral)   Resp 20   Ht 5' 7\" (1.702 m)   Wt 183 lb (83 kg)   SpO2 99%   BMI 28.66 kg/m²     General appearance:  Appears comfortable. AAOx3  HEENT: atraumatic, Pupils equal, muscous membranes moist, no masses appreciated  Cardiovascular: Regular rate and rhythm no murmurs appreciated  Respiratory: CTAB no wheezing  Gastrointestinal: Abdomen soft, non-tender, BS+  EXT: no edema, Right calf tenderness to palpation R knee swelling>L knee tender to palpation no erythema  Neurology: right facial droop able to raise both eyebrows equally mild slurred speech with R's no dysmetria strenght equal bilateraly  Psychiatry: Appropriate affect.  Not agitated   Skin: Warm, dry, no rashes appreciated    Labs:  CBC:   Lab Results   Component Value Date    WBC 6.7 01/28/2020    RBC 4.85 01/28/2020    HGB 14.3 01/28/2020    HCT 43.3 01/28/2020    MCV 89.4 01/28/2020    MCH 29.5 01/28/2020    MCHC 33.0 01/28/2020 clarification, please do not hesitate to contact me through [facility], [facility].        Shital Escamilla MD    1/28/2020 7:36 PM

## 2020-01-29 NOTE — PROGRESS NOTES
Physical Therapy    Facility/Department: [address]  Initial Assessment    NAME: Wilver Tenorio  : 1950  MRN: [id number]    Date of Service: 2020    Discharge Recommendations:  Wilver Tenorio scored a 15/24 on the AM-PAC short mobility form. Current research shows that an AM-PAC score of 18 or greater is typically associated with a discharge to the patient's home setting. Based on the patients AM-PAC score and their current functional mobility deficits, it is recommended that the patient have 2-3 sessions per week of Physical Therapy at d/c to increase the patients independence. HOME HEALTH CARE: LEVEL 1 STANDARD    - Initial home health evaluation to occur within 24-48 hours, in patient home   - Therapy to evaluate with goal of regaining prior level of functioning   - Therapy to evaluate if patient has [address] needs for personal care          PT Equipment Recommendations  Equipment Needed: Yes  Mobility Devices: Jared Libman: Rolling    Assessment   Body structures, Functions, Activity limitations: Decreased functional mobility ; Decreased ROM; Decreased strength;Decreased balance; Increased pain  Assessment: Pt limited by above deficits and functioning below baseline. Pt would benefit from continued skilled PT to improve functional independence  Treatment Diagnosis: Decreased functional independence  Prognosis: Good  Decision Making: Low Complexity  Clinical Presentation: Evolving  PT Education: Goals;PT Role;Plan of Care;Gait Training  Patient Education: Positioning to lessen wt bearing through R knee, use of ice for pain management  Barriers to Learning: None  REQUIRES PT FOLLOW UP: Yes  Activity Tolerance  Activity Tolerance: Patient Tolerated treatment well;Patient limited by pain       Patient Diagnosis(es): The primary encounter diagnosis was Malignant hypertension. A diagnosis of Right knee pain, unspecified chronicity was also pertinent to this visit.      has a past medical history of Atrial fibrillation ([facility] [address] Bell palsy, CAD (coronary artery disease), Cerebral artery occlusion with cerebral infarction ([facility] [address] Hypertension, Intracranial hemorrhage ([facility] [address] Nonintractable headache, and Thyroid nodule. has a past surgical history that includes Cardiac surgery; Tubal ligation (Right, Torn Rotator Cuff); Coronary angioplasty with stent; and Colonoscopy. Restrictions  Restrictions/Precautions  Restrictions/Precautions: (Medium per Minna Bell)  Required Braces or Orthoses?: No  Vision/Hearing  Vision: Impaired(Blurred vision on R; noticed this yesterday)  Vision Exceptions: Wears glasses for reading  Hearing: Within functional limits     Subjective  General  Chart Reviewed: Yes  Patient assessed for rehabilitation services?: Yes  Family / Caregiver Present: No  Diagnosis: R knee effusion/ rule out stroke  General Comment  Comments: Pt in bed upon arrival  Subjective  Subjective: R knee pain; declined medication. Agreeable to PT/OT.  Pt does not feel any sx from stroke or [place] Palsy (has had both in past)  Pain Screening  Patient Currently in Pain: Yes     Pre Treatment Pain Screening  Intervention List: Patient able to continue with treatment  Comments / Details: ice provided at end of session    Orientation  Orientation  Overall Orientation Status: Within Normal Limits  Social/Functional History  Social/Functional History  Lives With: Spouse(and great grandson)  Type of Home: House  Home Layout: One level  Home Access: Stairs to enter without rails  Entrance Stairs - Number of Steps: 1 ADA  Bathroom Shower/Tub: Tub/Shower unit  Bathroom Toilet: Standard  ADL Assistance: Independent  Homemaking Assistance: Independent  Homemaking Responsibilities: Yes  Active : Yes  Occupation: Retired  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  [address]: Go to [employer]  Additional Comments: PLOF: independent ambulation without AD, No falls in last 3 months  Cognition Goals  Short term goals  Time Frame for Short term goals: Discharge  Short term goal 1: Pt will be independent with bed mobility  Short term goal 2: Pt will transfer bed to chair with mod I  Short term goal 3: Pt will ambulate 150' with mod I  Short term goal 4: Pt will ascend/descend curb step with mod I  Patient Goals   Patient goals :  To get home       Therapy Time   Individual Concurrent Group Co-treatment   Time In       0856   Time Out       0920   Minutes       24        Timed code treatment minutes: 10     Total treatment minutes: [address] PT 0059,  C/NDT  Rupa Walton, PT

## 2020-01-29 NOTE — CARE COORDINATION
Discharge Planning Assessment  Discharge Planning Assessment  SW met with pt to discuss reason for admission, current living situation, and potential needs at the time of discharge    Demographics/Insurance verified Yes/yes    Current type of dwelling: private home, 1 story    Patient from ECF/SW confirmed with: no    Living arrangements: lives with spouse and great grand child in private home    Level of function/Support: independent     PCP: no PCP    Last Visit to PCP:\" cannot remember\" follows up with cardiology     DME: none    Active with any community resources/agencies/skilled home care: no    Medication compliance issues: no    Financial issues that could impact healthcare: no        Tentative discharge plan:  Discussed and provided facilities of choice if transition to a skilled nursing facility is required at the time of discharge      Discussed with patient and/or family that on the day of discharge home tentative time of discharge will be between 10 AM and noon. Transportation at the time of discharge: yes    HHC and rolling walker recommenced, pt agreeable. Also, needs PCP Davidtis Pill with case management to schedule follow up).     Eliud Portillo MSW, [address]

## 2020-01-30 LAB
INR BLD: 1.37 (ref 0.86–1.14)
PROTHROMBIN TIME: 15.9 SEC (ref 10–13.2)

## 2020-01-30 PROCEDURE — 6370000000 HC RX 637 (ALT 250 FOR IP): Performed by: INTERNAL MEDICINE

## 2020-01-30 PROCEDURE — 2000000000 HC ICU R&B

## 2020-01-30 PROCEDURE — 2580000003 HC RX 258: Performed by: INTERNAL MEDICINE

## 2020-01-30 PROCEDURE — 99232 SBSQ HOSP IP/OBS MODERATE 35: CPT | Performed by: NURSE PRACTITIONER

## 2020-01-30 PROCEDURE — 2500000003 HC RX 250 WO HCPCS: Performed by: INTERNAL MEDICINE

## 2020-01-30 PROCEDURE — 85610 PROTHROMBIN TIME: CPT

## 2020-01-30 PROCEDURE — 99223 1ST HOSP IP/OBS HIGH 75: CPT | Performed by: INTERNAL MEDICINE

## 2020-01-30 RX ORDER — FAMOTIDINE 20 MG/1
20 TABLET, FILM COATED ORAL 2 TIMES DAILY
Status: DISCONTINUED | OUTPATIENT
Start: 2020-01-30 | End: 2020-02-01 | Stop reason: HOSPADM

## 2020-01-30 RX ORDER — WARFARIN SODIUM 5 MG/1
2.5 TABLET ORAL
Status: DISCONTINUED | OUTPATIENT
Start: 2020-02-05 | End: 2020-01-31

## 2020-01-30 RX ORDER — DOCUSATE SODIUM 100 MG/1
100 CAPSULE, LIQUID FILLED ORAL ONCE
Status: COMPLETED | OUTPATIENT
Start: 2020-01-30 | End: 2020-01-30

## 2020-01-30 RX ORDER — DOXAZOSIN MESYLATE 1 MG/1
2 TABLET ORAL NIGHTLY
Status: DISCONTINUED | OUTPATIENT
Start: 2020-01-30 | End: 2020-02-01 | Stop reason: HOSPADM

## 2020-01-30 RX ORDER — MINOXIDIL 2.5 MG/1
10 TABLET ORAL 2 TIMES DAILY
Status: DISCONTINUED | OUTPATIENT
Start: 2020-01-30 | End: 2020-02-01 | Stop reason: HOSPADM

## 2020-01-30 RX ORDER — WARFARIN SODIUM 5 MG/1
5 TABLET ORAL
Status: DISCONTINUED | OUTPATIENT
Start: 2020-01-31 | End: 2020-01-31

## 2020-01-30 RX ORDER — POTASSIUM CHLORIDE 20 MEQ/1
20 TABLET, EXTENDED RELEASE ORAL DAILY
Status: DISCONTINUED | OUTPATIENT
Start: 2020-01-30 | End: 2020-02-01 | Stop reason: HOSPADM

## 2020-01-30 RX ORDER — AMLODIPINE BESYLATE 5 MG/1
10 TABLET ORAL DAILY
Status: DISCONTINUED | OUTPATIENT
Start: 2020-01-30 | End: 2020-01-31

## 2020-01-30 RX ORDER — LABETALOL HYDROCHLORIDE 5 MG/ML
20 INJECTION, SOLUTION INTRAVENOUS ONCE
Status: COMPLETED | OUTPATIENT
Start: 2020-01-30 | End: 2020-01-30

## 2020-01-30 RX ORDER — WARFARIN SODIUM 7.5 MG/1
7.5 TABLET ORAL
Status: COMPLETED | OUTPATIENT
Start: 2020-01-30 | End: 2020-01-30

## 2020-01-30 RX ORDER — MINOXIDIL 2.5 MG/1
10 TABLET ORAL 2 TIMES DAILY
Status: DISCONTINUED | OUTPATIENT
Start: 2020-01-30 | End: 2020-01-30 | Stop reason: SDUPTHER

## 2020-01-30 RX ORDER — SPIRONOLACTONE 25 MG/1
50 TABLET ORAL DAILY
Status: DISCONTINUED | OUTPATIENT
Start: 2020-01-30 | End: 2020-02-01 | Stop reason: HOSPADM

## 2020-01-30 RX ADMIN — ACETAMINOPHEN 650 MG: 325 TABLET, FILM COATED ORAL at 18:19

## 2020-01-30 RX ADMIN — POTASSIUM CHLORIDE 20 MEQ: 1500 TABLET, EXTENDED RELEASE ORAL at 12:11

## 2020-01-30 RX ADMIN — FAMOTIDINE 20 MG: 20 TABLET, FILM COATED ORAL at 20:04

## 2020-01-30 RX ADMIN — ACETAMINOPHEN 650 MG: 325 TABLET, FILM COATED ORAL at 08:41

## 2020-01-30 RX ADMIN — DEXTROSE MONOHYDRATE 5 MG/HR: 50 INJECTION, SOLUTION INTRAVENOUS at 13:18

## 2020-01-30 RX ADMIN — ATORVASTATIN CALCIUM 80 MG: 80 TABLET, FILM COATED ORAL at 20:03

## 2020-01-30 RX ADMIN — AMLODIPINE BESYLATE 10 MG: 5 TABLET ORAL at 08:40

## 2020-01-30 RX ADMIN — Medication 10 ML: at 08:41

## 2020-01-30 RX ADMIN — MINOXIDIL 10 MG: 2.5 TABLET ORAL at 12:11

## 2020-01-30 RX ADMIN — MINOXIDIL 10 MG: 2.5 TABLET ORAL at 20:04

## 2020-01-30 RX ADMIN — Medication 10 ML: at 20:03

## 2020-01-30 RX ADMIN — METOPROLOL SUCCINATE 100 MG: 50 TABLET, EXTENDED RELEASE ORAL at 08:41

## 2020-01-30 RX ADMIN — ACETAMINOPHEN 650 MG: 325 TABLET, FILM COATED ORAL at 12:11

## 2020-01-30 RX ADMIN — Medication 10 ML: at 09:21

## 2020-01-30 RX ADMIN — WARFARIN SODIUM 7.5 MG: 7.5 TABLET ORAL at 18:19

## 2020-01-30 RX ADMIN — FUROSEMIDE 20 MG: 20 TABLET ORAL at 08:41

## 2020-01-30 RX ADMIN — FUROSEMIDE 20 MG: 20 TABLET ORAL at 18:19

## 2020-01-30 RX ADMIN — DOCUSATE SODIUM 100 MG: 100 CAPSULE ORAL at 09:19

## 2020-01-30 RX ADMIN — ASPIRIN 81 MG: 81 TABLET, COATED ORAL at 08:41

## 2020-01-30 RX ADMIN — DOXAZOSIN 2 MG: 1 TABLET ORAL at 20:04

## 2020-01-30 RX ADMIN — FAMOTIDINE 20 MG: 20 TABLET, FILM COATED ORAL at 12:11

## 2020-01-30 RX ADMIN — SPIRONOLACTONE 50 MG: 25 TABLET ORAL at 12:11

## 2020-01-30 RX ADMIN — LABETALOL HYDROCHLORIDE 20 MG: 5 INJECTION INTRAVENOUS at 09:19

## 2020-01-30 ASSESSMENT — PAIN SCALES - GENERAL
PAINLEVEL_OUTOF10: 0
PAINLEVEL_OUTOF10: 5
PAINLEVEL_OUTOF10: 0
PAINLEVEL_OUTOF10: 8
PAINLEVEL_OUTOF10: 0
PAINLEVEL_OUTOF10: 6
PAINLEVEL_OUTOF10: 4
PAINLEVEL_OUTOF10: 5

## 2020-01-30 ASSESSMENT — PAIN DESCRIPTION - LOCATION
LOCATION: HEAD

## 2020-01-30 ASSESSMENT — PAIN DESCRIPTION - PAIN TYPE
TYPE: ACUTE PAIN

## 2020-01-30 ASSESSMENT — PAIN DESCRIPTION - DESCRIPTORS
DESCRIPTORS: HEADACHE
DESCRIPTORS: HEADACHE

## 2020-01-30 ASSESSMENT — PAIN DESCRIPTION - ORIENTATION: ORIENTATION: ANTERIOR;LEFT

## 2020-01-30 NOTE — PROGRESS NOTES
Pt transferred to CVU bed 7 from floor for uncontrolled HTN and need for cardene gtt. Vital signs obtained. BP (!) 151/112   Pulse 98   Temp 98.2 °F (36.8 °C) (Temporal)   Resp 21   Ht 5' 7\" (1.702 m)   Wt 188 lb 4.4 oz (85.4 kg)   SpO2 99%   BMI 29.49 kg/m² . Pt oriented to room and call light. Pt in A-fib rate controled on monitor. Dr. Dio Gordillo notified of arrival to CVU for cardene gtt orders. Will continue to monitor.

## 2020-01-30 NOTE — CONSULTS
P Pulmonary and Critical Care   Consult Note      Reason for Consult: Hypertension  Requesting Physician: Dr Mikki Witt    Subjective:   [facility] / HPI:                The patient is a 77 y.o. female with significant past medical history of:      Diagnosis Date    Atrial fibrillation ([address]     Bell palsy     diagnosed 15 years ago   Angel Diaz CAD (coronary artery disease)     Cerebral artery occlusion with cerebral infarction ([address]     Hypertension     Intracranial hemorrhage ([facility] [address] 4/2016    Nonintractable headache     Thyroid nodule 2/8/2018     The patient was originally admitted with concern for possible CVA due to right facial droop. This was ultimately determined to be a lower motor nerve issue, i.e. Bell's palsy. Neurology has been following her. She also had some orthopedic issues during her hospitalization. She was anticipating discharge home today but started having difficulty with hypertension last night. She did receive a dose of IV beta-blocker on the floor with only minimal impact on blood pressure. She had been moved to ICU with the intention of starting Cardene infusion. The patient does tell me she has a history of hypertension requiring multidrug therapy. Normally, she follows with cardiology. She also has a history of atrial fibrillation and is anticoagulated for that.       Past Surgical History:        Procedure Laterality Date    CARDIAC SURGERY      COLONOSCOPY      CORONARY ANGIOPLASTY WITH STENT PLACEMENT      TUBAL LIGATION Right Torn Rotator Cuff    2013 @ [facility]     Current Medications:    Current Facility-Administered Medications: amLODIPine (NORVASC) tablet 10 mg, 10 mg, Oral, Daily  niCARdipine (CARDENE) 25 mg in dextrose 5 % 250 mL infusion, 5 mg/hr, Intravenous, Continuous  warfarin (COUMADIN) tablet 7.5 mg, 7.5 mg, Oral, Once  [START ON 2/5/2020] warfarin (COUMADIN) tablet 2.5 mg, 2.5 mg, Oral, Once per day on Wed **AND** [START ON 1/31/2020] warfarin (COUMADIN) tablet 5 mg, 5 mg, Oral, Once per day on Sun Mon Tue Thu Fri Sat  doxazosin (CARDURA) tablet 2 mg, 2 mg, Oral, Nightly  famotidine (PEPCID) tablet 20 mg, 20 mg, Oral, BID  potassium chloride (KLOR-CON M) extended release tablet 20 mEq, 20 mEq, Oral, Daily  spironolactone (ALDACTONE) tablet 50 mg, 50 mg, Oral, Daily  minoxidil (LONITEN) tablet 10 mg, 10 mg, Oral, BID  acetaminophen (TYLENOL) tablet 650 mg, 650 mg, Oral, Q4H PRN  atorvastatin (LIPITOR) tablet 80 mg, 80 mg, Oral, Nightly  furosemide (LASIX) tablet 20 mg, 20 mg, Oral, BID  metoprolol succinate (TOPROL XL) extended release tablet 100 mg, 100 mg, Oral, Daily  sodium chloride flush 0.9 % injection 10 mL, 10 mL, Intravenous, 2 times per day  sodium chloride flush 0.9 % injection 10 mL, 10 mL, Intravenous, PRN  ondansetron (ZOFRAN) injection 4 mg, 4 mg, Intravenous, Q6H PRN  aspirin EC tablet 81 mg, 81 mg, Oral, Daily **OR** aspirin suppository 300 mg, 300 mg, Rectal, Daily  senna (SENOKOT) tablet 8.6 mg, 1 tablet, Oral, Daily PRN  labetalol (NORMODYNE;TRANDATE) injection 10 mg, 10 mg, Intravenous, Q10 Min PRN    Allergies   Allergen Reactions    Clonidine Rash, Shortness Of Breath and Hives    Codeine Hives, Itching, Nausea Only and Rash     Other reaction(s): Other (See Comments)  Pruritis    Clonidine Derivatives Hives    Clonidine Hcl Hives    Lisinopril Hives and Itching    Tramadol     Percocet [Oxycodone-Acetaminophen] Itching     Patient takes percocet with benadryl to control the itching       Social History:    TOBACCO:   reports that she has never smoked. She has never used smokeless tobacco.  ETOH:   reports no history of alcohol use. Patient currently lives independently  Environmental/chemical exposure: None known    Family History:   History reviewed. No pertinent family history.   REVIEW OF SYSTEMS:    CONSTITUTIONAL:  negative for fevers, chills, diaphoresis, activity change, appetite change, fatigue, night sweats and unexpected weight change. EYES:  negative for blurred vision, eye discharge, visual disturbance and icterus  HEENT:  negative for hearing loss, tinnitus, ear drainage, sinus pressure, nasal congestion, epistaxis and snoring  RESPIRATORY:  See HPI  CARDIOVASCULAR:  negative for chest pain, palpitations, exertional chest pressure/discomfort, edema, syncope  GASTROINTESTINAL:  negative for nausea, vomiting, diarrhea, constipation, blood in stool and abdominal pain  GENITOURINARY:  negative for frequency, dysuria, urinary incontinence, decreased urine volume, and hematuria  HEMATOLOGIC/LYMPHATIC:  negative for easy bruising, bleeding and lymphadenopathy  ALLERGIC/IMMUNOLOGIC:  negative for recurrent infections, angioedema, anaphylaxis and drug reactions  ENDOCRINE:  negative for weight changes and diabetic symptoms including polyuria, polydipsia and polyphagia  MUSCULOSKELETAL:  negative for  pain, joint swelling, decreased range of motion and muscle weakness  NEUROLOGICAL:  negative for headaches, slurred speech, unilateral weakness  PSYCHIATRIC/BEHAVIORAL: negative for hallucinations, behavioral problems, confusion and agitation. Objective:   PHYSICAL EXAM:      VITALS:  BP (!) 171/100   Pulse 95   Temp 98.2 °F (36.8 °C) (Temporal)   Resp 21   Ht 5' 7\" (1.702 m)   Wt 188 lb 4.4 oz (85.4 kg)   SpO2 97%   BMI 29.49 kg/m²      24HR INTAKE/OUTPUT:      Intake/Output Summary (Last 24 hours) at 1/30/2020 1253  Last data filed at 1/30/2020 0841  Gross per 24 hour   Intake 10 ml   Output --   Net 10 ml     CONSTITUTIONAL:  awake, alert, cooperative, no apparent distress, and appears stated age  NECK:  Supple, symmetrical, trachea midline, no adenopathy, thyroid symmetric, not enlarged and no tenderness, skin normal  LUNGS:  no increased work of breathing and clear to auscultation.  No accessory muscle use  CARDIOVASCULAR: S1 and S2, no edema and no JVD  ABDOMEN:  normal bowel sounds, non-distended and no masses palpated, and

## 2020-01-30 NOTE — PROGRESS NOTES
Output --   Net 10 ml      Wt Readings from Last 3 Encounters:   01/30/20 188 lb 4.4 oz (85.4 kg)   05/29/19 184 lb (83.5 kg)   05/07/19 182 lb (82.6 kg)       General appearance:  Appears comfortable  Eyes: Sclera clear. Pupils equal.  ENT: Moist oral mucosa. Trachea midline, no adenopathy. Cardiovascular: Regular rhythm, normal S1, S2. No murmur. No edema in lower extremities  Respiratory: Not using accessory muscles. Good inspiratory effort. Clear to auscultation bilaterally, no wheeze or crackles. GI: Abdomen soft, no tenderness, not distended, normal bowel sounds  Musculoskeletal: No cyanosis in digits, neck supple  Neurology: CN 2-12 grossly intact. No speech or motor deficits  Psych: Normal affect. Alert and oriented in time, place and person  Skin: Warm, dry, normal turgor  Extremity exam shows brisk capillary refill. Peripheral pulses are palpable in lower extremities     Labs and Tests:  CBC:   Recent Labs     01/28/20  1708 01/29/20  0241   WBC 6.7 5.1   HGB 14.3 12.8    111*     BMP:    Recent Labs     01/28/20  1708      K 3.9      CO2 27   BUN 7   CREATININE <0.5*   GLUCOSE 90     Hepatic:   Recent Labs     01/28/20  1708   AST 21   ALT 11   BILITOT 0.6   ALKPHOS 68     VL Extremity Venous Bilateral         MRI BRAIN WO CONTRAST   Final Result   1. No evidence of an acute infarct. 2. Chronic infarcts in the cerebellar hemispheres, thalami, and left   paramidline of the kosta. 3. Moderate chronic microvascular white matter ischemic disease. XR CHEST STANDARD (2 VW)   Final Result   No acute cardiopulmonary abnormality identified. Enlarged mediastinal   silhouette is redemonstrated. CT HEAD WO CONTRAST   Final Result   No acute intracranial abnormality. XR KNEE RIGHT (1-2 VIEWS)   Final Result   Joint effusion without acute osseous abnormality. Advanced osteoarthritis.                Problem List  Active Problems:    Acute cerebrovascular accident (CVA) ([facility] [address]  Resolved Problems:    * No resolved hospital problems. *       Assessment & Plan:      Lower motor neuron type of left facial weakness probably due to old Bell's palsy and previous intracerebral hemorrhage. MRI brain images were reviewed. There is no evidence of acute stroke. There is some old thalamic and cerebellar infarcts Aspirin Lipitor    R knee swelling: will get Vas US r/o dvt  - ortho consult  -Intraarticular  steroids given symptomsHTN: hold home meds prns for now until cva r/o     PAF: home toprol pharmacy to dose coumadin     CAD s/p stents 10 years ago  - bb, asa statin    Neurology Consult appreciated     HyperTensive urgency. IV labetalol resume home BP medications transferred to ICU Cardene drip    Diet: DIET DYSPHAGIA SOFT AND BITE-SIZED;  No Drinking Straw  Code:[id number]-CCA  DVT PPX Coumadin   Disposition pending       Precious Rodriguez MD   1/30/2020 11:45 AM

## 2020-01-31 LAB
A/G RATIO: 1.1 (ref 1.1–2.2)
ALBUMIN SERPL-MCNC: 4.1 G/DL (ref 3.4–5)
ALP BLD-CCNC: 62 U/L (ref 40–129)
ALT SERPL-CCNC: 14 U/L (ref 10–40)
ANION GAP SERPL CALCULATED.3IONS-SCNC: 12 MMOL/L (ref 3–16)
AST SERPL-CCNC: 22 U/L (ref 15–37)
BILIRUB SERPL-MCNC: 0.5 MG/DL (ref 0–1)
BUN BLDV-MCNC: 18 MG/DL (ref 7–20)
CALCIUM SERPL-MCNC: 9.2 MG/DL (ref 8.3–10.6)
CHLORIDE BLD-SCNC: 100 MMOL/L (ref 99–110)
CO2: 27 MMOL/L (ref 21–32)
CREAT SERPL-MCNC: 0.6 MG/DL (ref 0.6–1.2)
GFR AFRICAN AMERICAN: >60
GFR NON-AFRICAN AMERICAN: >60
GLOBULIN: 3.7 G/DL
GLUCOSE BLD-MCNC: 126 MG/DL (ref 70–99)
HCT VFR BLD CALC: 40.4 % (ref 36–48)
HEMOGLOBIN: 13.5 G/DL (ref 12–16)
INR BLD: 2.11 (ref 0.86–1.14)
MCH RBC QN AUTO: 29.4 PG (ref 26–34)
MCHC RBC AUTO-ENTMCNC: 33.4 G/DL (ref 31–36)
MCV RBC AUTO: 88.2 FL (ref 80–100)
PDW BLD-RTO: 13.6 % (ref 12.4–15.4)
PLATELET # BLD: 131 K/UL (ref 135–450)
PMV BLD AUTO: 10.7 FL (ref 5–10.5)
POTASSIUM SERPL-SCNC: 4 MMOL/L (ref 3.5–5.1)
PROTHROMBIN TIME: 24.7 SEC (ref 10–13.2)
RBC # BLD: 4.58 M/UL (ref 4–5.2)
SODIUM BLD-SCNC: 139 MMOL/L (ref 136–145)
TOTAL PROTEIN: 7.8 G/DL (ref 6.4–8.2)
WBC # BLD: 11.3 K/UL (ref 4–11)

## 2020-01-31 PROCEDURE — 6370000000 HC RX 637 (ALT 250 FOR IP): Performed by: INTERNAL MEDICINE

## 2020-01-31 PROCEDURE — 97530 THERAPEUTIC ACTIVITIES: CPT

## 2020-01-31 PROCEDURE — 85610 PROTHROMBIN TIME: CPT

## 2020-01-31 PROCEDURE — 2580000003 HC RX 258: Performed by: INTERNAL MEDICINE

## 2020-01-31 PROCEDURE — 85027 COMPLETE CBC AUTOMATED: CPT

## 2020-01-31 PROCEDURE — 97116 GAIT TRAINING THERAPY: CPT

## 2020-01-31 PROCEDURE — 80053 COMPREHEN METABOLIC PANEL: CPT

## 2020-01-31 PROCEDURE — 2000000000 HC ICU R&B

## 2020-01-31 RX ORDER — NIFEDIPINE 30 MG/1
60 TABLET, EXTENDED RELEASE ORAL DAILY
Status: DISCONTINUED | OUTPATIENT
Start: 2020-01-31 | End: 2020-02-01 | Stop reason: HOSPADM

## 2020-01-31 RX ORDER — WARFARIN SODIUM 2.5 MG/1
2.5 TABLET ORAL
Status: DISCONTINUED | OUTPATIENT
Start: 2020-02-03 | End: 2020-02-01

## 2020-01-31 RX ORDER — WARFARIN SODIUM 5 MG/1
5 TABLET ORAL
Status: DISCONTINUED | OUTPATIENT
Start: 2020-02-01 | End: 2020-02-01

## 2020-01-31 RX ORDER — WARFARIN SODIUM 2.5 MG/1
2.5 TABLET ORAL
Status: COMPLETED | OUTPATIENT
Start: 2020-01-31 | End: 2020-01-31

## 2020-01-31 RX ORDER — NIFEDIPINE 30 MG/1
60 TABLET, EXTENDED RELEASE ORAL DAILY
Status: DISCONTINUED | OUTPATIENT
Start: 2020-01-31 | End: 2020-01-31

## 2020-01-31 RX ADMIN — POTASSIUM CHLORIDE 20 MEQ: 1500 TABLET, EXTENDED RELEASE ORAL at 08:20

## 2020-01-31 RX ADMIN — FAMOTIDINE 20 MG: 20 TABLET, FILM COATED ORAL at 20:41

## 2020-01-31 RX ADMIN — SPIRONOLACTONE 50 MG: 25 TABLET ORAL at 08:21

## 2020-01-31 RX ADMIN — FUROSEMIDE 20 MG: 20 TABLET ORAL at 08:20

## 2020-01-31 RX ADMIN — Medication 10 ML: at 20:41

## 2020-01-31 RX ADMIN — Medication 10 ML: at 08:22

## 2020-01-31 RX ADMIN — FAMOTIDINE 20 MG: 20 TABLET, FILM COATED ORAL at 08:20

## 2020-01-31 RX ADMIN — WARFARIN SODIUM 2.5 MG: 2.5 TABLET ORAL at 17:56

## 2020-01-31 RX ADMIN — METOPROLOL SUCCINATE 100 MG: 50 TABLET, EXTENDED RELEASE ORAL at 08:20

## 2020-01-31 RX ADMIN — ATORVASTATIN CALCIUM 80 MG: 80 TABLET, FILM COATED ORAL at 20:41

## 2020-01-31 RX ADMIN — MINOXIDIL 10 MG: 2.5 TABLET ORAL at 09:06

## 2020-01-31 RX ADMIN — FUROSEMIDE 20 MG: 20 TABLET ORAL at 17:56

## 2020-01-31 RX ADMIN — ASPIRIN 81 MG: 81 TABLET, COATED ORAL at 08:21

## 2020-01-31 ASSESSMENT — PAIN SCALES - GENERAL
PAINLEVEL_OUTOF10: 0

## 2020-01-31 NOTE — PROGRESS NOTES
[facility] home care referral. Spoke with pt  re: home care plan of care/services. Agreeable, request RN assist with BP monitoring and medication management. Demographic's verified. PCP: new: Dr Elena Kumari, appt 2. 3.20 on KRIS, per Cliff Connell CM dept. Discharge planner notified. Will follow for home care.

## 2020-01-31 NOTE — PLAN OF CARE
Problem: Falls - Risk of:  Goal: Absence of physical injury  Description  Absence of physical injury  Outcome: Ongoing     Problem: HEMODYNAMIC STATUS  Goal: Patient has stable vital signs and fluid balance  Outcome: Ongoing     Problem: Daily Care:  Goal: Daily care needs are met  Description  Daily care needs are met  Outcome: Ongoing     Problem: Discharge Planning:  Goal: Patients continuum of care needs are met  Description  Patients continuum of care needs are met  Outcome: Ongoing

## 2020-01-31 NOTE — PROGRESS NOTES
Physical Therapy  Facility/Department: [facility]  Daily Treatment Note/Discharge Summary  NAME: Robin Arce  : 1950  MRN: [id number]    Date of Service: 2020    Discharge Recommendations: Robin Arce scored a 24/24 on the AM-PAC short mobility form. At this time, no further PT is recommended as patient is functionally independent following right knee injection which has completely alleviated patient's pain per patient's subjective reports. I would advise walker for home use for OA flare ups, however. Recommend patient returns to prior setting with prior services. PT Equipment Recommendations  Equipment Needed: Yes  Mobility Devices: [place]: Rolling    Assessment   Assessment: Patient demonstrates no functional deficits following right knee injection. She is functionally independent and safe for d/c home. However, I would recommend patient still get rolling walker for home for longer distance and ambulation and in case of OA future OA flare ups. Prognosis: Good  Decision Making: Low Complexity  Clinical Presentation: Stable. Improved. PT Education: Gait Training  Patient Education: Patient educated on going up stairs w/ good leg, down w/ bad, compression/ice for edema control, benefits of mobility. Barriers to Learning: None  REQUIRES PT FOLLOW UP: No  Activity Tolerance  Activity Tolerance: Patient Tolerated treatment well     Patient Diagnosis(es): The primary encounter diagnosis was Malignant hypertension. A diagnosis of Right knee pain, unspecified chronicity was also pertinent to this visit. has a past medical history of Atrial fibrillation ([address] Bell palsy, CAD (coronary artery disease), Cerebral artery occlusion with cerebral infarction ([address] Hypertension, Intracranial hemorrhage ([address] Nonintractable headache, and Thyroid nodule. has a past surgical history that includes Cardiac surgery; Tubal ligation (Right, Torn Rotator Cuff);  Coronary angioplasty with stent; and Colonoscopy. Restrictions  Restrictions/Precautions  Restrictions/Precautions: Fall Risk(medium fall risk)  Required Braces or Orthoses?: No  Position Activity Restriction  Other position/activity restrictions: Patient admitted w/ right knee pain, found to have significant HTN and admitted. Subjective   General  Chart Reviewed: Yes  Response To Previous Treatment: Patient with no complaints from previous session. Family / Caregiver Present: No  Subjective  Subjective: Patient reports after the right knee injection she received she is feeling much better. General Comment  Comments: Patient sitting in chair upon arrival.  Pain Screening  Patient Currently in Pain: Denies  Vital Signs  Patient Currently in Pain: Denies       Orientation  Orientation  Overall Orientation Status: Within Normal Limits     Objective   Transfers  Sit to Stand: Independent  Stand to sit: Independent  Bed to Chair: Independent  Stand Pivot Transfers: Independent  Ambulation  Ambulation?: Yes  WB Status: WBAT  Ambulation 1  Surface: level tile  Device: No Device  Assistance: Independent  Quality of Gait: Good. No gait antalgia. Gait Deviations: None  Distance: 150'  Stairs/Curb  Stairs?: Yes  Stairs  # Steps : 1  Stairs Height: 6\"  Rails: Left ascending  Device: No Device  Assistance: Modified independent      Balance  Posture: Good  Sitting - Static: Good  Sitting - Dynamic: Good  Standing - Static: Good  Standing - Dynamic: Good      PROM RLE (degrees)  RLE PROM: WNL  AROM RLE (degrees)  RLE AROM: WNL  PROM LLE (degrees)  LLE PROM: WNL  AROM LLE (degrees)  LLE AROM : WNL  Strength RLE  Strength RLE: WFL  Comment: 5/5 per MMT. No difficulty w/ sit-stand. Strength LLE  Strength LLE: WFL  Comment: 5/5 per MMT. No difficulty w/ sit-stand.      AM-PAC Score  AM-PAC Inpatient Mobility Raw Score : 24 (01/31/20 1327)  AM-PAC Inpatient T-Scale Score : 61.14 (01/31/20 1327)  Mobility Inpatient CMS 0-100% Score: 0 (01/31/20

## 2020-01-31 NOTE — PLAN OF CARE
Problem: Pain:  Goal: Pain level will decrease  Description  Pain level will decrease  Outcome: Ongoing   Patient able to verbalize pain on a 0-10 scale, discussed pain management options, both pharmacologic and non-pharmacologic. Patient verbalized understanding. Will continue to monitor. Problem: Falls - Risk of:  Goal: Will remain free from falls  Description  Will remain free from falls  Outcome: Ongoing   Pt remains free from falls. Call light is within reach. Bed is in lowest position with brake on. Pt wearing non-skid footwear while ambulating. Toileting offered Q2H and as needed. Walkways in room remains free from clutter. Belongings within reach. Fall risk  Wristband on, orange blanket on bed, SAFE sign on outside of door. Pt verbalized understanding  to use call light before getting up. Problem: HEMODYNAMIC STATUS  Goal: Patient has stable vital signs and fluid balance  Outcome: Ongoing   Heart rate and rhythm, peripheral pulses, and cap refill assessed with assessment. General color and body temperature monitored throughout shift and with vitals. Assess for edema with head to toe assessment. Administer treatments and medications as ordered. Monitor patient's weight.

## 2020-02-01 VITALS
HEIGHT: 67 IN | OXYGEN SATURATION: 100 % | BODY MASS INDEX: 29.17 KG/M2 | RESPIRATION RATE: 18 BRPM | HEART RATE: 98 BPM | WEIGHT: 185.85 LBS | TEMPERATURE: 98.2 F | SYSTOLIC BLOOD PRESSURE: 132 MMHG | DIASTOLIC BLOOD PRESSURE: 100 MMHG

## 2020-02-01 LAB
BASOPHILS ABSOLUTE: 0 K/UL (ref 0–0.2)
BASOPHILS RELATIVE PERCENT: 0.3 %
EOSINOPHILS ABSOLUTE: 0 K/UL (ref 0–0.6)
EOSINOPHILS RELATIVE PERCENT: 0 %
HCT VFR BLD CALC: 37.4 % (ref 36–48)
HEMOGLOBIN: 12.7 G/DL (ref 12–16)
INR BLD: 3.21 (ref 0.86–1.14)
LYMPHOCYTES ABSOLUTE: 1.7 K/UL (ref 1–5.1)
LYMPHOCYTES RELATIVE PERCENT: 19.4 %
MCH RBC QN AUTO: 30 PG (ref 26–34)
MCHC RBC AUTO-ENTMCNC: 33.9 G/DL (ref 31–36)
MCV RBC AUTO: 88.6 FL (ref 80–100)
MONOCYTES ABSOLUTE: 0.4 K/UL (ref 0–1.3)
MONOCYTES RELATIVE PERCENT: 5.2 %
NEUTROPHILS ABSOLUTE: 6.5 K/UL (ref 1.7–7.7)
NEUTROPHILS RELATIVE PERCENT: 75.1 %
PDW BLD-RTO: 13.6 % (ref 12.4–15.4)
PLATELET # BLD: 137 K/UL (ref 135–450)
PMV BLD AUTO: 10.4 FL (ref 5–10.5)
PROTHROMBIN TIME: 37.7 SEC (ref 10–13.2)
RBC # BLD: 4.22 M/UL (ref 4–5.2)
WBC # BLD: 8.7 K/UL (ref 4–11)

## 2020-02-01 PROCEDURE — 85610 PROTHROMBIN TIME: CPT

## 2020-02-01 PROCEDURE — 6370000000 HC RX 637 (ALT 250 FOR IP): Performed by: INTERNAL MEDICINE

## 2020-02-01 PROCEDURE — 85025 COMPLETE CBC W/AUTO DIFF WBC: CPT

## 2020-02-01 RX ORDER — WARFARIN SODIUM 2.5 MG/1
2.5 TABLET ORAL
Status: DISCONTINUED | OUTPATIENT
Start: 2020-02-03 | End: 2020-02-01 | Stop reason: HOSPADM

## 2020-02-01 RX ORDER — WARFARIN SODIUM 5 MG/1
5 TABLET ORAL
Status: DISCONTINUED | OUTPATIENT
Start: 2020-02-02 | End: 2020-02-01 | Stop reason: HOSPADM

## 2020-02-01 RX ORDER — NIFEDIPINE 60 MG/1
60 TABLET, FILM COATED, EXTENDED RELEASE ORAL DAILY
Qty: 30 TABLET | Refills: 3 | Status: SHIPPED | OUTPATIENT
Start: 2020-02-02 | End: 2020-02-03 | Stop reason: SDUPTHER

## 2020-02-01 RX ADMIN — FAMOTIDINE 20 MG: 20 TABLET, FILM COATED ORAL at 08:22

## 2020-02-01 RX ADMIN — MINOXIDIL 10 MG: 2.5 TABLET ORAL at 08:23

## 2020-02-01 RX ADMIN — METOPROLOL SUCCINATE 100 MG: 50 TABLET, EXTENDED RELEASE ORAL at 08:23

## 2020-02-01 RX ADMIN — SPIRONOLACTONE 50 MG: 25 TABLET ORAL at 08:22

## 2020-02-01 RX ADMIN — ACETAMINOPHEN 650 MG: 325 TABLET, FILM COATED ORAL at 07:54

## 2020-02-01 RX ADMIN — NIFEDIPINE 60 MG: 30 TABLET, EXTENDED RELEASE ORAL at 08:22

## 2020-02-01 RX ADMIN — POTASSIUM CHLORIDE 20 MEQ: 1500 TABLET, EXTENDED RELEASE ORAL at 08:22

## 2020-02-01 RX ADMIN — ASPIRIN 81 MG: 81 TABLET, COATED ORAL at 08:22

## 2020-02-01 RX ADMIN — FUROSEMIDE 20 MG: 20 TABLET ORAL at 08:22

## 2020-02-01 ASSESSMENT — PAIN DESCRIPTION - PAIN TYPE: TYPE: ACUTE PAIN

## 2020-02-01 ASSESSMENT — PAIN SCALES - GENERAL
PAINLEVEL_OUTOF10: 0
PAINLEVEL_OUTOF10: 6
PAINLEVEL_OUTOF10: 0

## 2020-02-01 ASSESSMENT — PAIN DESCRIPTION - FREQUENCY: FREQUENCY: INTERMITTENT

## 2020-02-01 ASSESSMENT — PAIN DESCRIPTION - PROGRESSION
CLINICAL_PROGRESSION: GRADUALLY WORSENING
CLINICAL_PROGRESSION: GRADUALLY WORSENING

## 2020-02-01 ASSESSMENT — PAIN DESCRIPTION - DESCRIPTORS: DESCRIPTORS: HEADACHE

## 2020-02-01 ASSESSMENT — PAIN DESCRIPTION - ONSET: ONSET: GRADUAL

## 2020-02-01 ASSESSMENT — PAIN DESCRIPTION - ORIENTATION: ORIENTATION: ANTERIOR

## 2020-02-01 ASSESSMENT — PAIN DESCRIPTION - LOCATION: LOCATION: HEAD

## 2020-02-01 NOTE — DISCHARGE SUMMARY
Hospital Medicine Discharge Summary    Patient: Charline Escamilla     Gender: female  : 1950   Age: 77 y.o. MRN: [id number]    Admitting Physician: Pedro Pablo Ramírez MD  Discharge Physician: Pedro Pablo Ramírez MD     Code Status: DNR-CCA     Admit Date: 2020   Discharge Date:   19    Disposition:  Home  Time spent arranging discharge: 35 minutes    Discharge Diagnoses: Active Hospital Problems    Diagnosis Date Noted    Acute cerebrovascular accident (CVA) ([facility] [address] [facility] [I63.9] 2020   Hypertensive Urgency  [place] palsy  Osteoarthritis    Condition at Discharge:  Stable    Hospital Course:   Patient was admitted to the hospital with right knee swelling unable to put weight on the leg. Patient had ultrasound performed which is negative for DVT patient was seen by Ortho and had steroid injections and knee swelling greatly improved. Patient did have creased right sided facial droop. Patient does have a history of Bell's palsy so family thought it might be secondary to that. However patient was able to raise both eyebrows equally. Patient had MRI of the brain performed which was negative for infarct. Did develop hypertensive urgency which required a Cardene drip initially. Patient's home meds were titrated up and Norvasc was changed to nicardipine and patient's blood pressure stabilized. Discharge Exam:    BP (!) 132/100   Pulse 98   Temp 98.2 °F (36.8 °C) (Temporal)   Resp 18   Ht 5' 7\" (1.702 m)   Wt 185 lb 13.6 oz (84.3 kg)   SpO2 100%   BMI 29.11 kg/m²   General appearance:  Appears comfortable. AAOx3  HEENT: atraumatic, Pupils equal, muscous membranes moist, no masses appreciated  Cardiovascular: Regular rate and rhythm no murmurs appreciated  Respiratory: CTAB no wheezing  Gastrointestinal: Abdomen soft, non-tender, BS+  EXT: no edema,  Neurology:mild right facial droop , no other deficits  Psychiatry: Appropriate affect.  Not agitated   Skin: Warm, dry, no rashes appreciated       Discharge Medications:   Current Discharge Medication List      START taking these medications    Details   NIFEdipine (ADALAT CC) 60 MG extended release tablet Take 1 tablet by mouth daily  Qty: 30 tablet, Refills: 3           Current Discharge Medication List        Current Discharge Medication List      CONTINUE these medications which have NOT CHANGED    Details   metoprolol succinate (TOPROL XL) 100 MG extended release tablet Take 1 tablet by mouth daily  Qty: 30 tablet, Refills: 3      furosemide (LASIX) 20 MG tablet Take 1 tablet by mouth 2 times daily  Qty: 180 tablet, Refills: 1      spironolactone (ALDACTONE) 50 MG tablet Take 1 tablet by mouth daily 1 tab qd  Qty: 90 tablet, Refills: 1      atorvastatin (LIPITOR) 80 MG tablet Take 1 tablet by mouth daily  Qty: 90 tablet, Refills: 1      aspirin 81 MG EC tablet Take 1 tablet by mouth daily  Qty: 30 tablet, Refills: 3      warfarin (COUMADIN) 5 MG tablet Take 5 mg by mouth See Admin Instructions 2.5mg Monday 5mg all other days      acetaminophen (TYLENOL) 500 MG tablet Take 2 tablets by mouth every 6 hours as needed  Qty: 120 tablet, Refills: 3      naproxen (NAPROSYN) 500 MG tablet Take 1 tablet by mouth 2 times daily as needed for Pain  Qty: 10 tablet, Refills: 0      potassium chloride (KLOR-CON M) 20 MEQ extended release tablet Take 1 tablet by mouth daily  Qty: 90 tablet, Refills: 1      famotidine (PEPCID) 20 MG tablet Take 1 tablet by mouth 2 times daily  Qty: 60 tablet, Refills: 0      Calcium Carb-Cholecalciferol (CALCIUM + D3) 600-200 MG-UNIT TABS tablet Take 1 tablet by mouth 2 times daily  Qty: 120 tablet, Refills: 3           Current Discharge Medication List      STOP taking these medications       amLODIPine (NORVASC) 10 MG tablet Comments:   Reason for Stopping:         phytonadione, ADULT, (VITAMIN K) 10 MG/ML injection Comments:   Reason for Stopping:         doxazosin (CARDURA) 2 MG tablet Comments:   Reason for FINDINGS: A suprapatellar joint effusion is present. Advanced osteoarthritic changes are present within the medial compartment and the patellofemoral joint. No evidence of fracture. Sclerotic change in the distal femoral shaft again noted most consistent with enchondroma or bone infarct. Joint effusion without acute osseous abnormality. Advanced osteoarthritis. Ct Head Wo Contrast    Result Date: 1/28/2020  EXAMINATION: CT OF THE HEAD WITHOUT CONTRAST  1/28/2020 4:51 pm TECHNIQUE: CT of the head was performed without the administration of int 05/29/2019 ravenous contrast. Dose modulation, iterative reconstruction, and/or weight based adjustment of the mA/kV was utilized to reduce the radiation dose to as low as reasonably achievable. COMPARISON: None. HISTORY: ORDERING SYSTEM PROVIDED HISTORY: elina on coumadin TECHNOLOGIST PROVIDED HISTORY: Reason for exam:->elina on coumadin Has a \"code stroke\" or \"stroke alert\" been called? ->No Reason for Exam: elina on coumadin Acuity: Unknown Type of Exam: Unknown FINDINGS: BRAIN/VENTRICLES: There is no acute intracranial hemorrhage, mass effect or midline shift. No abnormal extra-axial fluid collection. The gray-white differentiation is maintained without evidence of an acute infarct. There is no evidence of hydrocephalus. Mild to moderate chronic white matter disease is similar to the prior study. ORBITS: The visualized portion of the orbits demonstrate no acute abnormality. SINUSES: The visualized paranasal sinuses and mastoid air cells demonstrate no acute abnormality. SOFT TISSUES/SKULL:  No acute abnormality of the visualized skull or soft tissues. No acute intracranial abnormality.      Vl Extremity Venous Bilateral    Result Date: 1/30/2020  Lower Extremities DVT Study  Demographics   Patient Name       Walker Heal   Date of Study      01/29/2020         Gender              Female   Patient Number     [id number]         Date of Birth 1950   Visit Number       [id number]          Age                 77 year(s)   Accession Number   [id number]          Room Number         2052   Corporate ID       [id number]            Sonographer         [address],                                                            [address] [place] [address]   Ordering Physician Daylin Grant, Interpreting        [facility]                     MD                 Physician           Anastasiya Hernandez MD,                                                            [facility]  Procedure Type of Study:   Veins:Lower Extremities DVT Study, VASC EXTREMITY VENOUS DUPLEX BILATERAL. Vascular Sonographer Report  Additional Indications:R/O DVT Impressions Right Impression No evidence of deep vein or superficial vein thrombosis involving the right lower extremity. Left Impression No evidence of deep vein or superficial vein thrombosis involving the left lower extremity. Conclusions   Summary   No evidence of deep vein or superficial vein thrombosis involving the  bilateral lower extremities   Signature   ------------------------------------------------------------------  Electronically signed by Anastasiya Hernandez MD, [facility] (Interpreting  physician) on 01/30/2020 at 05:24 PM  ------------------------------------------------------------------  Patient Status:Routine. [address] [place] [address] Vascular Lab. Technical Quality:Adequate visualization. Velocities are measured in cm/s ; Diameters are measured in mm Right Lower Extremities DVT Study Measurements Right 2D Measurements +------------------------+----------+---------------+----------+ ! Location                ! Visualized! Compressibility! Thrombosis! +------------------------+----------+---------------+----------+ ! Sapheno Femoral Junction! Yes       ! Yes            ! None      ! +------------------------+----------+---------------+----------+ ! GSV Thigh               ! Yes       ! Yes            ! None      ! !Sapheno Femoral Junction! Phasic!      !            ! +------------------------+------+------+------------+ ! Common Femoral          !Phasic!      !            ! +------------------------+------+------+------------+ ! Prox Femoral            !Phasic!      !            ! +------------------------+------+------+------------+ ! Deep Femoral            !Phasic!      !            ! +------------------------+------+------+------------+ ! Popliteal               !Phasic!      !            ! +------------------------+------+------+------------+ Left Lower Extremities DVT Study Measurements Left 2D Measurements +------------------------+----------+---------------+----------+ ! Location                ! Visualized! Compressibility! Thrombosis! +------------------------+----------+---------------+----------+ ! Sapheno Femoral Junction! Yes       ! Yes            ! None      ! +------------------------+----------+---------------+----------+ ! GSV Thigh               ! Yes       ! Yes            ! None      ! +------------------------+----------+---------------+----------+ ! Common Femoral          !Yes       ! Yes            ! None      ! +------------------------+----------+---------------+----------+ ! Prox Femoral            !Yes       ! Yes            ! None      ! +------------------------+----------+---------------+----------+ ! Mid Femoral             !Yes       ! Yes            ! None      ! +------------------------+----------+---------------+----------+ ! Dist Femoral            !Yes       ! Yes            ! None      ! +------------------------+----------+---------------+----------+ ! Deep Femoral            !Yes       ! Yes            ! None      ! +------------------------+----------+---------------+----------+ ! Popliteal               !Yes       ! Yes            ! None      ! +------------------------+----------+---------------+----------+ ! GSV Below Knee          ! Yes       ! Yes            ! None      ! +------------------------+----------+---------------+----------+ ! Gastroc                 ! Yes       ! Yes            ! None      ! +------------------------+----------+---------------+----------+ ! Soleal                  !Yes       ! Yes            ! None      ! +------------------------+----------+---------------+----------+ ! PTV                     ! Yes       ! Yes            ! None      ! +------------------------+----------+---------------+----------+ ! Peroneal                !Yes       ! Yes            ! None      ! +------------------------+----------+---------------+----------+ ! GSV Calf                ! Yes       ! Yes            ! None      ! +------------------------+----------+---------------+----------+ ! SSV                     ! Yes       ! Yes            ! None      ! +------------------------+----------+---------------+----------+ Left Doppler Measurements +------------------------+------+------+------------+ ! Location                ! Signal!Reflux! Reflux (sec)! +------------------------+------+------+------------+ ! Sapheno Femoral Junction! Phasic!      !            ! +------------------------+------+------+------------+ ! Common Femoral          !Phasic!      !            ! +------------------------+------+------+------------+ ! Prox Femoral            !Phasic!      !            ! +------------------------+------+------+------------+ ! Deep Femoral            !Phasic!      !            ! +------------------------+------+------+------------+ ! Popliteal               !Phasic!      !            ! +------------------------+------+------+------------+    Mri Brain Wo Contrast    Result Date: 1/29/2020  EXAMINATION: MRI OF THE BRAIN WITHOUT CONTRAST  1/29/2020 3:26 pm TECHNIQUE: Multiplanar multisequence MRI of the brain was performed without the administration of intravenous contrast. COMPARISON: 02/26/2018.  HISTORY: ORDERING SYSTEM PROVIDED HISTORY: ?CVA TECHNOLOGIST PROVIDED HISTORY: Reason for exam:->?CVA Reason for Exam: HX OF POSSIBLE CVA Acuity: Acute Type of Exam: Initial Additional signs and symptoms: HX OF POSSIBLE CVA Relevant Medical/Surgical History: HX OF POSSIBLE CVA FINDINGS: INTRACRANIAL STRUCTURES/VENTRICLES: There are no areas of restricted diffusion to suggest an acute infarct. The cerebral and cerebellar parenchyma demonstrate volume loss. Scattered and confluent areas of increased T2 signal are noted supratentorially, compatible with moderate chronic microvascular white matter ischemic disease. Chronic infarcts are noted in the left cerebellar hemisphere. Chronic lacunar infarct is noted along the left paramidline of the kosta anteriorly. Old hemorrhagic infarcts are noted in the thalami, right cerebellar hemisphere, and left occipital lobe. No abnormal extra-axial fluid collections. The ventricles are normal in size. Normal major intracranial flow voids are noted. ORBITS: The visualized portion of the orbits demonstrate no acute abnormality. SINUSES: Moderate mucosal thickening is noted in the left maxillary sinus. The rest of the visualized paranasal sinuses and mastoid air cells are clear. BONES/SOFT TISSUES: The bone marrow signal intensity appears normal. The soft tissues demonstrate no acute abnormality. 1. No evidence of an acute infarct. 2. Chronic infarcts in the cerebellar hemispheres, thalami, and left paramidline of the kosta. 3. Moderate chronic microvascular white matter ischemic disease.          Signed:    Fawn Haywood MD   2/1/2020

## 2020-02-01 NOTE — PROGRESS NOTES
Discharge instructions reviewed with patient and family member. Patient and family verbalized understanding. All home medications have been reviewed, questions answered and patient voiced understanding. All medication side effects reviewed and patient and family verbalized understanding. Patient given prescriptions, discharge instructions, and appointment times. Follow up appointment(s) reviewed with patient and all attempts made to schedule within 7 days of discharge.  brought wheelchair to patient room. Patient waiting for family to show up in order to go home. [facility]  Depression Screen    1. During the past month, have you often been bothered by feeling down, depressed, or hopeless?   no    2. During the past month, have you often been bothered by little interest or pleasure in       doing things?    no

## 2020-02-01 NOTE — PLAN OF CARE
Problem: Pain:  Goal: Pain level will decrease  Description  Pain level will decrease  Outcome: Ongoing   Patient able to verbalize pain on a 0-10 scale, discussed pain management options, both pharmacologic and non-pharmacologic. Patient verbalized understanding. Will continue to monitor. Problem: Falls - Risk of:  Goal: Will remain free from falls  Description  Will remain free from falls  Outcome: Ongoing   Pt remains free from falls. Call light is within reach. Bed is in lowest position with brake on. Pt wearing non-skid footwear while ambulating. Toileting offered Q2H and as needed. Walkways in room remains free from clutter. Belongings within reach. Fall risk  Wristband on, orange blanket on bed, SAFE sign on outside of door. Pt verbalized understanding  to use call light before getting up. Problem: HEMODYNAMIC STATUS  Goal: Patient has stable vital signs and fluid balance  1/31/2020 2055 by Yasmeen Sherman RN  Outcome: Ongoing   Heart rate and rhythm, peripheral pulses, and cap refill assessed with assessment. General color and body temperature monitored throughout shift and with vitals. Assess for edema with head to toe assessment. Administer treatments and medications as ordered. Monitor patient's weight.

## 2020-02-01 NOTE — CONSULTS
Clinical Pharmacy Note: Reji Camacho is a 77 y.o. female  is receiving warfarin for AFib. Hold warfarin today for INR: 3.21    Daily INR is ordered. Will continue to monitor. Per pharmacy consult.   Callum Siddiqui PharmD 2/1/2020

## 2020-02-03 ENCOUNTER — ANTI-COAG VISIT (OUTPATIENT)
Dept: PHARMACY | Age: 70
End: 2020-02-03
Payer: MEDICARE

## 2020-02-03 ENCOUNTER — CARE COORDINATION (OUTPATIENT)
Dept: CASE MANAGEMENT | Age: 70
End: 2020-02-03

## 2020-02-03 ENCOUNTER — OFFICE VISIT (OUTPATIENT)
Dept: INTERNAL MEDICINE CLINIC | Age: 70
End: 2020-02-03
Payer: MEDICARE

## 2020-02-03 VITALS
SYSTOLIC BLOOD PRESSURE: 181 MMHG | WEIGHT: 193.2 LBS | RESPIRATION RATE: 18 BRPM | BODY MASS INDEX: 30.26 KG/M2 | TEMPERATURE: 98 F | HEART RATE: 107 BPM | DIASTOLIC BLOOD PRESSURE: 121 MMHG | OXYGEN SATURATION: 94 %

## 2020-02-03 PROBLEM — I63.9 ACUTE CEREBROVASCULAR ACCIDENT (CVA) (HCC): Status: RESOLVED | Noted: 2020-01-28 | Resolved: 2020-02-03

## 2020-02-03 PROBLEM — I50.32 CHRONIC HEART FAILURE WITH PRESERVED EJECTION FRACTION (HCC): Status: ACTIVE | Noted: 2020-02-03

## 2020-02-03 PROBLEM — H53.8 BLURRED VISION, LEFT EYE: Status: RESOLVED | Noted: 2018-02-27 | Resolved: 2020-02-03

## 2020-02-03 PROBLEM — I63.9 CVA (CEREBRAL VASCULAR ACCIDENT) (HCC): Status: RESOLVED | Noted: 2017-01-04 | Resolved: 2020-02-03

## 2020-02-03 PROBLEM — G51.0 RIGHT-SIDED BELL'S PALSY: Status: RESOLVED | Noted: 2018-02-08 | Resolved: 2020-02-03

## 2020-02-03 LAB — INTERNATIONAL NORMALIZATION RATIO, POC: 1.6

## 2020-02-03 PROCEDURE — 99211 OFF/OP EST MAY X REQ PHY/QHP: CPT

## 2020-02-03 PROCEDURE — 85610 PROTHROMBIN TIME: CPT

## 2020-02-03 PROCEDURE — 99213 OFFICE O/P EST LOW 20 MIN: CPT | Performed by: STUDENT IN AN ORGANIZED HEALTH CARE EDUCATION/TRAINING PROGRAM

## 2020-02-03 RX ORDER — FAMOTIDINE 20 MG/1
20 TABLET, FILM COATED ORAL 2 TIMES DAILY
Qty: 60 TABLET | Refills: 0 | Status: SHIPPED | OUTPATIENT
Start: 2020-02-03 | End: 2020-04-22

## 2020-02-03 RX ORDER — SPIRONOLACTONE 50 MG/1
50 TABLET, FILM COATED ORAL DAILY
Qty: 90 TABLET | Refills: 1 | Status: SHIPPED | OUTPATIENT
Start: 2020-02-03 | End: 2020-07-30

## 2020-02-03 RX ORDER — METOPROLOL SUCCINATE 100 MG/1
100 TABLET, EXTENDED RELEASE ORAL DAILY
Qty: 30 TABLET | Refills: 3 | Status: ON HOLD | OUTPATIENT
Start: 2020-02-03 | End: 2020-05-16 | Stop reason: HOSPADM

## 2020-02-03 RX ORDER — NIFEDIPINE 60 MG/1
60 TABLET, FILM COATED, EXTENDED RELEASE ORAL DAILY
Qty: 30 TABLET | Refills: 3 | Status: SHIPPED | OUTPATIENT
Start: 2020-02-03 | End: 2020-10-15 | Stop reason: SDUPTHER

## 2020-02-03 RX ORDER — POTASSIUM CHLORIDE 20 MEQ/1
20 TABLET, EXTENDED RELEASE ORAL DAILY
Qty: 90 TABLET | Refills: 1 | Status: SHIPPED | OUTPATIENT
Start: 2020-02-03 | End: 2020-09-08 | Stop reason: ALTCHOICE

## 2020-02-03 RX ORDER — ACETAMINOPHEN 500 MG
1000 TABLET ORAL EVERY 6 HOURS PRN
Qty: 120 TABLET | Refills: 3 | Status: SHIPPED | OUTPATIENT
Start: 2020-02-03 | End: 2020-10-15 | Stop reason: SDUPTHER

## 2020-02-03 RX ORDER — FUROSEMIDE 20 MG/1
20 TABLET ORAL 2 TIMES DAILY
Qty: 180 TABLET | Refills: 1 | Status: SHIPPED | OUTPATIENT
Start: 2020-02-03 | End: 2020-07-30

## 2020-02-03 RX ORDER — ASPIRIN 81 MG/1
81 TABLET ORAL DAILY
Qty: 30 TABLET | Refills: 3 | Status: SHIPPED | OUTPATIENT
Start: 2020-02-03 | End: 2020-10-15 | Stop reason: SDUPTHER

## 2020-02-03 RX ORDER — ATORVASTATIN CALCIUM 80 MG/1
80 TABLET, FILM COATED ORAL DAILY
Qty: 90 TABLET | Refills: 1 | Status: SHIPPED | OUTPATIENT
Start: 2020-02-03 | End: 2020-07-20 | Stop reason: SDUPTHER

## 2020-02-03 RX ORDER — NAPROXEN 500 MG/1
500 TABLET ORAL 2 TIMES DAILY PRN
Qty: 10 TABLET | Refills: 0 | Status: SHIPPED | OUTPATIENT
Start: 2020-02-03 | End: 2020-02-21 | Stop reason: SINTOL

## 2020-02-03 ASSESSMENT — ENCOUNTER SYMPTOMS
DIARRHEA: 0
WHEEZING: 0
ABDOMINAL DISTENTION: 0
VOMITING: 0
NAUSEA: 0
ABDOMINAL PAIN: 0
SHORTNESS OF BREATH: 0
COUGH: 0

## 2020-02-03 ASSESSMENT — PATIENT HEALTH QUESTIONNAIRE - PHQ9
SUM OF ALL RESPONSES TO PHQ9 QUESTIONS 1 & 2: 0
1. LITTLE INTEREST OR PLEASURE IN DOING THINGS: 0
SUM OF ALL RESPONSES TO PHQ QUESTIONS 1-9: 0
2. FEELING DOWN, DEPRESSED OR HOPELESS: 0
SUM OF ALL RESPONSES TO PHQ QUESTIONS 1-9: 0

## 2020-02-03 NOTE — PROGRESS NOTES
Ms. Orestes Ballesteros is a 77 y.o. y/o female with history of A fib   She presents today for anticoagulation monitoring and adjustment. 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)    Patient Reported Findings:  Yes     No   [x]   []       Patient verifies current dosing regimen as listed  -admitted 1/28 (INR 1.9)- missed dose that night, 1/29 (no INR)- got 7.5mg, 1/30 (INR 1.37)- got 7.5mg, 1/31 (INR 2.11)- got 2.5mg, 2/1 (INR 3.21)- Held, 2/2- held at home.   []   [x]       S/S bleeding/bruising/swelling/SOB   []   [x]       Blood in urine or stool   []   [x]       Procedures scheduled in the future at this time   []   [x]       Missed Dose- held Sat and [address] elevated INR in hospital    []   [x]       Extra Dose  []   [x]       Change in medications She continues with Tylenol 1000mg but only as needed ---> titrated BP meds, norvasc changed to nicardipine    []   [x]       Change in health/diet/appetite-- normally has high vitamin K diet of canned spinach weekly and collards or broccoli about every other week. Will have some lower vitamin K veggies about 2 times a week such as green beans. --> states no changes  []   [x]       Change in alcohol use denies  []   [x]       Change in activity  []   [x]       Hospital admission- admitted 1/28-2/1 for leg pain- gave cortisone shot, neg for DVT, had a facial droop- thought CVA but it was hx Bell's palsy and MRI confirmed no infarct, had hypertensive urgency so she got cardene drip IP and then home meds titrated up and norvasc changed to nicardipine    []   [x]       Emergency department visit   []   [x]       Other complaints- states that she is using the pillbox, and she likes it. -> has not been using recently, but wants to restart ---> states she tried to use pillbox      Concerned for patient's ability to correctly remember recent history.  She states that she is getting concerned for her memory      Clinical Outcomes:  Yes     No  []   [x]       Major bleeding event  []   [x]       Thromboembolic event  Duration of warfarin Therapy: indefinitely  INR Range:  1.8-2.5 -> lower INR goal dt h/o subcortical hemorrhage (2016)    She may be slower to reach steady state with warfarin dosing so consider checking INR about 10 days after dose adjustment. INR 1.6 today dt missed dose Sat and Sun while in hospital.   Take 7.5mg tonight then continue on 2.5mg on Wed only and 5mg all other days.   Emphasized and encouraged to maintain a consistency of vegetables/salads  Recheck INR in 1 week, 2/11     Referring cardiologist is Dr. Sylvester Ramírez  INR (no units)   Date Value   02/03/2020 1.6   02/01/2020 3.21 (H)   01/31/2020 2.11 (H)   01/30/2020 1.37 (H)   01/28/2020 1.90 (H)

## 2020-02-03 NOTE — PROGRESS NOTES
[facility] unable to accept patient for home carte due to staffing shortage in patient's zipcode. Patient set up with [facility].

## 2020-02-03 NOTE — CARE COORDINATION
Zay 45 Transitions Initial Follow Up Call    Call within 2 business days of discharge: Yes    Patient: Kait Cotton Patient : 1950   MRN: [id number]  Reason for Admission: CVA, right knee swelling  Discharge Date: 20 RARS: Readmission Risk Score: 8      Last Discharge [facility]       Complaint Diagnosis Description Type Department Provider    20 Joint Swelling Malignant hypertension . .. ED to Hosp-Admission (Discharged) (ADMITTED) Ryann Tejeda MD; Rinku Marquez. .. Facility: [facility]    Non-face-to-face services provided:  Communication with home health agencies or other community services the patient is currently using-[id number] Transitions 24 Hour Call    Care Transitions Interventions                                 Follow Up: First attempt at 24 hour discharge call, no answer, unable to leave VM, mailbox has not been set up. Second contact number has been disconnected. CTN contacted [facility] and they confirmed that orders had been received, start of care tomorrow 2020. CTN will continue with outreach call attempts.       Future Appointments   Date Time Provider Kayla Jones   2/3/2020  2:15 PM Sabino Fernandez MD [facility] BEHAVIORAL HEALTH SERVICES [facility]   2020  8:45 AM [facility] ANTICOAGULATION CLINIC [facility] Orin Brown RN

## 2020-02-03 NOTE — PROGRESS NOTES
2/3/2020    Aj Boyle (:  1950) is a 77 y.o. female, here for evaluation of the following medical concerns:    HPI  Patient is here for establishing new care. Patient states she has been out of medications for 2 weeks. Patient reports starting 2 months ago she began to have increased shortness of breath with exertion, PND, and lower extremity edema. She states the sob has been worsening to the point she can only walk a few steps. She denies any chest pain, syncope, or other symptoms. In the office, patient's BP was elevated to 180s, HR was 100. She had mild light-headedness though no other symptoms. Patient was recently seen at the hospital for left sided facial droop. Stroke work up was negative and was diagnosed with Bell's palsy which she has a hx of. Facial droop still present though patient states is improving. Review of Systems   Constitutional: Negative for chills, fatigue and fever. HENT: Negative for congestion. Respiratory: Negative for cough, shortness of breath and wheezing. Cardiovascular: Positive for leg swelling. Negative for chest pain and palpitations. Gastrointestinal: Negative for abdominal distention, abdominal pain, diarrhea, nausea and vomiting. Genitourinary: Negative for dysuria. Neurological: Positive for dizziness and light-headedness. Negative for syncope, weakness and numbness. Prior to Visit Medications    Medication Sig Taking?  Authorizing Provider   NIFEdipine (ADALAT CC) 60 MG extended release tablet Take 1 tablet by mouth daily Yes Elisabeth Vazquez MD   metoprolol succinate (TOPROL XL) 100 MG extended release tablet Take 1 tablet by mouth daily Yes Danita Chicas MD   potassium chloride (KLOR-CON M) 20 MEQ extended release tablet Take 1 tablet by mouth daily Yes ADAN Sims - CNP   furosemide (LASIX) 20 MG tablet Take 1 tablet by mouth 2 times daily Yes ADAN Sims - CNP   spironolactone (ALDACTONE) 50 MG tablet Take 1 tablet by mouth daily 1 tab qd Yes ADAN Villanueva CNP   atorvastatin (LIPITOR) 80 MG tablet Take 1 tablet by mouth daily Yes ADAN Villanueva CNP   aspirin 81 MG EC tablet Take 1 tablet by mouth daily Yes Steph Strong MD   warfarin (COUMADIN) 5 MG tablet Take 5 mg by mouth See Admin Instructions 2.5mg Monday 5mg all other days Yes Historical Provider, MD   famotidine (PEPCID) 20 MG tablet Take 1 tablet by mouth 2 times daily Yes KIM Cervantes   acetaminophen (TYLENOL) 500 MG tablet Take 2 tablets by mouth every 6 hours as needed Yes Olman Kim MD   Calcium Carb-Cholecalciferol (CALCIUM + D3) 600-200 MG-UNIT TABS tablet Take 1 tablet by mouth 2 times daily Yes Olman Kim MD   naproxen (NAPROSYN) 500 MG tablet Take 1 tablet by mouth 2 times daily as needed for Pain  Patient not taking: Reported on 2/3/2020  Hitesh Nicole DO        Allergies   Allergen Reactions    Clonidine Rash, Shortness Of Breath and Hives    Codeine Hives, Itching, Nausea Only and Rash     Other reaction(s):  Other (See Comments)  Pruritis    Clonidine Derivatives Hives    Clonidine Hcl Hives    Lisinopril Hives and Itching    Tramadol     Percocet [Oxycodone-Acetaminophen] Itching     Patient takes percocet with benadryl to control the itching       Past Medical History:   Diagnosis Date    Acute cerebrovascular accident (CVA) ([address] 1/28/2020    Atrial fibrillation ([address]     Bell palsy     diagnosed 15 years ago   Myrtle Kunz CAD (coronary artery disease)     Cerebral artery occlusion with cerebral infarction ([address]     CVA (cerebral vascular accident) ([address] 1/4/2017    Hypertension     Intracranial hemorrhage ([address] 4/2016    Nonintractable headache     Nontraumatic subcortical hemorrhage of cerebral hemisphere ([address] 4/30/2016    Sudden visual loss of left eye     Thyroid nodule 2/8/2018    TIA involving right internal carotid artery        Past Surgical History:   Procedure

## 2020-02-04 ENCOUNTER — CARE COORDINATION (OUTPATIENT)
Dept: CASE MANAGEMENT | Age: 70
End: 2020-02-04

## 2020-02-04 NOTE — CARE COORDINATION
Zay 45 Transitions Initial Follow Up Call    Call within 2 business days of discharge: Yes    Patient: Agata Lizama Patient : 1950   MRN: [id number]  Reason for Admission: CVA, right leg swelling  Discharge Date: 20 RARS: Readmission Risk Score: 8      Last Discharge [facility]       Complaint Diagnosis Description Type Department Provider    20 Joint Swelling Malignant hypertension . .. ED to Hosp-Admission (Discharged) (ADMITTED) Luann Pena MD; Kirstin People. .. Facility: [facility]        Care Transitions 24 Hour Call    Care Transitions Interventions                                 Follow Up: Second attempt at 24 hour discharge call, no answer, CTN left VM with contact information and request for return call. CTN will continue with outreach call attempts.       Future Appointments   Date Time Provider Kayla Jones   2020  8:15 AM [facility] ANTICOAGULATION CLINIC [facility] GAIL PAEZ   2020  2:15 PM Namrata Galindo MD [id number] Jesse Thompson RN

## 2020-02-05 ENCOUNTER — CARE COORDINATION (OUTPATIENT)
Dept: CASE MANAGEMENT | Age: 70
End: 2020-02-05

## 2020-02-05 NOTE — CARE COORDINATION
Zay 45 Transitions Initial Follow Up Call    Call within 2 business days of discharge: Yes    Patient: Agata Lizama Patient : 1950   MRN: [id number]  Reason for Admission:   Discharge Date: 20 RARS: Readmission Risk Score: 8      Last Discharge [address] [id number]       Complaint Diagnosis Description Type Department Provider    20 Joint Swelling Malignant hypertension . .. ED to Hosp-Admission (Discharged) (ADMITTED) Luann Pena MD; [facility]. .. Facility: [facility]    Care Transitions 24 Hour Call    Care Transitions Interventions                                 Follow Up: Third and final attempt for 24 hour discharge call, no answer, CTN left VM with contact information and request for return call. CTN will continue with outreach call attempts.       Future Appointments   Date Time Provider Kayla Jones   2020  8:15 AM [facility] ANTICOAGULATION CLINIC [facility] GAIL PAEZ   2020  2:15 PM Namrata Galindo MD [facility] ABIOLA Thompson RN

## 2020-02-11 ENCOUNTER — ANTI-COAG VISIT (OUTPATIENT)
Dept: PHARMACY | Age: 70
End: 2020-02-11
Payer: MEDICARE

## 2020-02-11 LAB — INTERNATIONAL NORMALIZATION RATIO, POC: 1.2

## 2020-02-11 PROCEDURE — 85610 PROTHROMBIN TIME: CPT

## 2020-02-11 PROCEDURE — 99212 OFFICE O/P EST SF 10 MIN: CPT

## 2020-02-20 ENCOUNTER — APPOINTMENT (OUTPATIENT)
Dept: PHARMACY | Age: 70
End: 2020-02-20
Payer: MEDICARE

## 2020-02-20 ENCOUNTER — ANTI-COAG VISIT (OUTPATIENT)
Dept: PHARMACY | Age: 70
End: 2020-02-20
Payer: MEDICARE

## 2020-02-20 LAB — INTERNATIONAL NORMALIZATION RATIO, POC: 4.3

## 2020-02-20 PROCEDURE — 99211 OFF/OP EST MAY X REQ PHY/QHP: CPT

## 2020-02-20 PROCEDURE — 85610 PROTHROMBIN TIME: CPT

## 2020-02-20 NOTE — PROGRESS NOTES
Ms. Orestes Ballesteros is a 77 y.o. y/o female with history of A fib   She presents today for anticoagulation monitoring and adjustment. 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)    Patient Reported Findings:  Yes     No   [x]   []       Patient verifies current dosing regimen as listed  Presents with paperwork today to verify that took medications every day as instructed   []   [x]       S/S bleeding/bruising/swelling/SOB -dizzy and SOB, talked about s/sx and risks of INR being subtherapeutic---> she is going to the doctor today to discuss this --> states that she is feeling a lot better today    []   [x]       Blood in urine or stool   []   [x]       Procedures scheduled in the future at this time   []   [x]       Missed Dose-    []   [x]       Extra Dose  []   [x]       Change in medications She continues with Tylenol 1000mg but only as needed ---> no changes     []   [x]       Change in health/diet/appetite-- normally has high vitamin K diet of canned spinach weekly and collards or broccoli about every other week. Will have some lower vitamin K veggies about 2 times a week such as green beans. --> states no changes, no NVD  []   [x]       Change in alcohol use denies  []   [x]       Change in activity  []   [x]       Hospital admission-  []   [x]       Emergency department visit   []   [x]       Other complaints- states that she is using the pillbox, and she likes it. -> has not been using recently, but wants to restart ---> states she tried to use pillbox, asked to use pillbox and AVS and to cross off each day on AVS to prevent her from missing doses. Concerned for patient's ability to correctly remember recent history. She states that she is getting concerned for her memory.       Clinical Outcomes:  Yes     No  []   [x]       Major bleeding event  []   [x]       Thromboembolic event  Duration of warfarin Therapy: indefinitely  INR Range:  1.8-2.5 -> lower INR goal dt h/o subcortical hemorrhage

## 2020-02-21 ENCOUNTER — OFFICE VISIT (OUTPATIENT)
Dept: INTERNAL MEDICINE CLINIC | Age: 70
End: 2020-02-21
Payer: MEDICARE

## 2020-02-21 VITALS
HEART RATE: 64 BPM | DIASTOLIC BLOOD PRESSURE: 92 MMHG | RESPIRATION RATE: 20 BRPM | OXYGEN SATURATION: 99 % | SYSTOLIC BLOOD PRESSURE: 125 MMHG | TEMPERATURE: 97.8 F | BODY MASS INDEX: 29.05 KG/M2 | WEIGHT: 185.1 LBS | HEIGHT: 67 IN

## 2020-02-21 PROCEDURE — 99213 OFFICE O/P EST LOW 20 MIN: CPT | Performed by: STUDENT IN AN ORGANIZED HEALTH CARE EDUCATION/TRAINING PROGRAM

## 2020-02-21 ASSESSMENT — ENCOUNTER SYMPTOMS
SHORTNESS OF BREATH: 0
WHEEZING: 0
ABDOMINAL PAIN: 0
NAUSEA: 0
ABDOMINAL DISTENTION: 0
VOMITING: 0
COUGH: 0
DIARRHEA: 0

## 2020-02-21 NOTE — PROGRESS NOTES
2020     Benedict Soliz (:  1950) is a 77 y.o. female, here for evaluation of the following medical concerns:    HPI  Patient is here for wellness visit and because of dizziness. Patient feels well. She has some dizziness since having stroke earlier in year. She denies any falls, unsteady on feet, visual changes. Review of Systems   Constitutional: Negative for chills, fatigue and fever. HENT: Negative for congestion. Respiratory: Negative for cough, shortness of breath and wheezing. Cardiovascular: Negative for chest pain and palpitations. Gastrointestinal: Negative for abdominal distention, abdominal pain, diarrhea, nausea and vomiting. Genitourinary: Negative for dysuria. Neurological: Positive for dizziness. Negative for syncope, weakness, light-headedness and numbness. Prior to Visit Medications    Medication Sig Taking?  Authorizing Provider   rivaroxaban (XARELTO) 20 MG TABS tablet Take 1 tablet by mouth daily (with breakfast) Yes Cassy Ly MD   aspirin 81 MG EC tablet Take 1 tablet by mouth daily Yes Cassy Ly MD   atorvastatin (LIPITOR) 80 MG tablet Take 1 tablet by mouth daily Yes Cassy Ly MD   Calcium Carb-Cholecalciferol (CALCIUM + D3) 600-200 MG-UNIT TABS tablet Take 1 tablet by mouth 2 times daily Yes Cassy Ly MD   famotidine (PEPCID) 20 MG tablet Take 1 tablet by mouth 2 times daily Yes Cassy Ly MD   furosemide (LASIX) 20 MG tablet Take 1 tablet by mouth 2 times daily Yes Cassy Ly MD   metoprolol succinate (TOPROL XL) 100 MG extended release tablet Take 1 tablet by mouth daily Yes Cassy Ly MD   NIFEdipine (ADALAT CC) 60 MG extended release tablet Take 1 tablet by mouth daily Yes Cassy Ly MD   potassium chloride (KLOR-CON M) 20 MEQ extended release tablet Take 1 tablet by mouth daily Yes Cassy Ly MD   spironolactone (ALDACTONE) 50 MG tablet Take 1 tablet by mouth daily 1 tab qd Yes Lakeshia Bejarano Georgie Yip MD   acetaminophen (TYLENOL) 500 MG tablet Take 2 tablets by mouth every 6 hours as needed for Pain Yes Jefry Graves MD        Social History     Tobacco Use    Smoking status: Never Smoker    Smokeless tobacco: Never Used   Substance Use Topics    Alcohol use: No        Vitals:    02/21/20 1344 02/21/20 1350   BP: 123/87 (!) 125/92   Site: Left Upper Arm Left Upper Arm   Position: Sitting Standing   Cuff Size: Large Adult Large Adult   Pulse: 58 64   Resp: 20    Temp: 97.8 °F (36.6 °C)    TempSrc: Oral    SpO2: 99%    Weight: 185 lb 1.6 oz (84 kg)    Height: 5' 7\" (1.702 m)      Estimated body mass index is 28.99 kg/m² as calculated from the following:    Height as of this encounter: 5' 7\" (1.702 m). Weight as of this encounter: 185 lb 1.6 oz (84 kg). Physical Exam  Constitutional:       General: She is not in acute distress. Appearance: She is well-developed. She is not diaphoretic. HENT:      Head: Normocephalic and atraumatic. Cardiovascular:      Rate and Rhythm: Normal rate. Rhythm irregularly irregular. Heart sounds: Normal heart sounds. No murmur. Pulmonary:      Effort: Pulmonary effort is normal. No respiratory distress. Breath sounds: Normal breath sounds. No wheezing. Abdominal:      General: Bowel sounds are normal. There is no distension. Palpations: Abdomen is soft. Tenderness: There is no abdominal tenderness. Skin:     General: Skin is warm and dry. Capillary Refill: Capillary refill takes less than 2 seconds. Findings: No erythema. Neurological:      Mental Status: She is alert and oriented to person, place, and time. Psychiatric:         Behavior: Behavior normal.         ASSESSMENT/PLAN:  1. Healthcare maintenance  - Pneumococcal polysaccharide vaccine 23-valent greater than or equal to 1yo subcutaneous/IM  - MARLENE - Dusty Christensen MD, Gastroenterology, [place]  - DEXA BONE DENSITY AXIAL SKELETON; Future    2. Encounter for screening mammogram for malignant neoplasm of breast   - RUDY DIGITAL SCREENING AUGMENTED BILATERAL; Future    3. Disorder of bone, unspecified   - DEXA BONE DENSITY AXIAL SKELETON; Future    4. Atrial fibrillation, unspecified type ([facility]  - Controlled, continue Toprol  - Stop taking warfarin, start taking Xarelto  - Please see Cardiology in [address] [place] [address], [facility]  - rivaroxaban (XARELTO) 20 MG TABS tablet; Take 1 tablet by mouth daily (with breakfast)  Dispense: 90 tablet; Refill: 1    5. Uncontrolled hypertension  - Controlled, continue current treatment  - Please see Cardiology in [address] [place] [address], [facility]    6. Coronary artery disease involving native coronary artery of native heart without angina pectoris  - Controlled, continue current treatment  - Please see Cardiology in [address] [place] [address], [facility]    7. Chronic heart failure with preserved ejection fraction ([facility]  - Controlled, continue current treatment  - Please see Cardiology in [address] [place] [address], [facility]      Return in about 4 months (around 6/21/2020). An electronic signature was used to authenticate this note. --Masood Reilly MD on 2/21/2020 at 2:47 PM     Addendum to Resident H& P/Progress note:  I have personally seen,examined and evaluated the patient.  I have reviewed the current history, physical findings, labs and assessment and plan and agree with note as documented by resident MD ( Wayne Leiva)      Nanda Burris MD, [address]

## 2020-02-25 ENCOUNTER — TELEPHONE (OUTPATIENT)
Dept: INTERNAL MEDICINE CLINIC | Age: 70
End: 2020-02-25

## 2020-02-25 NOTE — TELEPHONE ENCOUNTER
Returned patient call. She can not afford Xarelto. Instructed patient to stay on Coumadin for now. she will keep next scheduled appointment at [address] [facility] in [place].  Will forward note to [facility]

## 2020-02-25 NOTE — TELEPHONE ENCOUNTER
Patient would like someone to call her back regarding rivaroxaban (XARELTO) 20 MG TABS tablet   last fill- 02/21/20  Last appt- 02/21/20 w/ Dr. Ubaldo Birch  Next appt- 06/23/20 w/ Dr. Jd Russell

## 2020-02-28 ENCOUNTER — ANTI-COAG VISIT (OUTPATIENT)
Dept: PHARMACY | Age: 70
End: 2020-02-28
Payer: MEDICARE

## 2020-02-28 LAB — INTERNATIONAL NORMALIZATION RATIO, POC: 2

## 2020-02-28 PROCEDURE — 85610 PROTHROMBIN TIME: CPT

## 2020-02-28 PROCEDURE — 99211 OFF/OP EST MAY X REQ PHY/QHP: CPT

## 2020-02-28 NOTE — PROGRESS NOTES
Ms. Robin Arce is a 77 y.o. y/o female with history of A fib   She presents today for anticoagulation monitoring and adjustment. 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)    Patient Reported Findings:  Yes     No   [x]   []       Patient verifies current dosing regimen as listed  Confirms correct dose, held as instructed after supratherapeutic INR last Thursday  []   [x]       S/S bleeding/bruising/swelling/SOB -dizzy and SOB, talked about s/sx and risks of INR being subtherapeutic---> she is going to the doctor today to discuss this --> states that she is feeling a lot better today    []   [x]       Blood in urine or stool   []   [x]       Procedures scheduled in the future at this time   []   [x]       Missed Dose-    []   [x]       Extra Dose  []   [x]       Change in medications She continues with Tylenol 1000mg but only as needed ---> no changes     []   [x]       Change in health/diet/appetite-- normally has high vitamin K diet of canned spinach weekly and collards or broccoli about every other week. Will have some lower vitamin K veggies about 2 times a week such as green beans. --> states no changes, no NVD -> consistent  []   [x]       Change in alcohol use denies  []   [x]       Change in activity  []   [x]       Hospital admission-  []   [x]       Emergency department visit   []   [x]       Other complaints- states that she is using the pillbox, and she likes it. -> has not been using recently, but wants to restart ---> states she tried to use pillbox, asked to use pillbox and AVS and to cross off each day on AVS to prevent her from missing doses. Concerned for patient's ability to correctly remember recent history. She states that she is getting concerned for her memory.       Clinical Outcomes:  Yes     No  []   [x]       Major bleeding event  []   [x]       Thromboembolic event  Duration of warfarin Therapy: indefinitely  INR Range:  1.8-2.5 -> lower INR goal dt h/o subcortical

## 2020-03-12 ENCOUNTER — TELEPHONE (OUTPATIENT)
Dept: CARDIOLOGY CLINIC | Age: 70
End: 2020-03-12

## 2020-03-12 ENCOUNTER — TELEPHONE (OUTPATIENT)
Dept: PHARMACY | Age: 70
End: 2020-03-12

## 2020-03-12 ENCOUNTER — ANTI-COAG VISIT (OUTPATIENT)
Dept: PHARMACY | Age: 70
End: 2020-03-12
Payer: MEDICARE

## 2020-03-12 ENCOUNTER — HOSPITAL ENCOUNTER (EMERGENCY)
Age: 70
Discharge: HOME | End: 2020-03-12
Payer: MEDICARE

## 2020-03-12 VITALS
DIASTOLIC BLOOD PRESSURE: 69 MMHG | WEIGHT: 183 LBS | HEART RATE: 88 BPM | HEIGHT: 67 IN | OXYGEN SATURATION: 98 % | BODY MASS INDEX: 28.72 KG/M2 | RESPIRATION RATE: 17 BRPM | TEMPERATURE: 97.9 F | SYSTOLIC BLOOD PRESSURE: 160 MMHG

## 2020-03-12 LAB
ANION GAP SERPL CALCULATED.3IONS-SCNC: 14 MMOL/L (ref 3–16)
BASOPHILS ABSOLUTE: 0 K/UL (ref 0–0.2)
BASOPHILS RELATIVE PERCENT: 0.6 %
BUN BLDV-MCNC: 7 MG/DL (ref 7–20)
CALCIUM SERPL-MCNC: 9.8 MG/DL (ref 8.3–10.6)
CHLORIDE BLD-SCNC: 101 MMOL/L (ref 99–110)
CO2: 25 MMOL/L (ref 21–32)
CREAT SERPL-MCNC: 0.5 MG/DL (ref 0.6–1.2)
EOSINOPHILS ABSOLUTE: 0.1 K/UL (ref 0–0.6)
EOSINOPHILS RELATIVE PERCENT: 1.8 %
GFR AFRICAN AMERICAN: >60
GFR NON-AFRICAN AMERICAN: >60
GLUCOSE BLD-MCNC: 104 MG/DL (ref 70–99)
HCT VFR BLD CALC: 43.8 % (ref 36–48)
HEMOGLOBIN: 14.4 G/DL (ref 12–16)
INR BLD: 10.94 (ref 0.86–1.14)
INR BLD: 9.41 (ref 0.86–1.14)
LYMPHOCYTES ABSOLUTE: 1.7 K/UL (ref 1–5.1)
LYMPHOCYTES RELATIVE PERCENT: 36.1 %
MCH RBC QN AUTO: 29.4 PG (ref 26–34)
MCHC RBC AUTO-ENTMCNC: 33 G/DL (ref 31–36)
MCV RBC AUTO: 89.2 FL (ref 80–100)
MONOCYTES ABSOLUTE: 0.3 K/UL (ref 0–1.3)
MONOCYTES RELATIVE PERCENT: 6.8 %
NEUTROPHILS ABSOLUTE: 2.5 K/UL (ref 1.7–7.7)
NEUTROPHILS RELATIVE PERCENT: 54.7 %
PDW BLD-RTO: 14 % (ref 12.4–15.4)
PLATELET # BLD: 131 K/UL (ref 135–450)
PMV BLD AUTO: 10.5 FL (ref 5–10.5)
POTASSIUM REFLEX MAGNESIUM: 3.7 MMOL/L (ref 3.5–5.1)
PROTHROMBIN TIME: 111.7 SEC (ref 10–13.2)
PROTHROMBIN TIME: 130 SEC (ref 10–13.2)
RBC # BLD: 4.91 M/UL (ref 4–5.2)
SODIUM BLD-SCNC: 140 MMOL/L (ref 136–145)
WBC # BLD: 4.6 K/UL (ref 4–11)

## 2020-03-12 PROCEDURE — 80048 BASIC METABOLIC PNL TOTAL CA: CPT

## 2020-03-12 PROCEDURE — 99212 OFFICE O/P EST SF 10 MIN: CPT

## 2020-03-12 PROCEDURE — 99281 EMR DPT VST MAYX REQ PHY/QHP: CPT

## 2020-03-12 PROCEDURE — 85610 PROTHROMBIN TIME: CPT

## 2020-03-12 PROCEDURE — 36415 COLL VENOUS BLD VENIPUNCTURE: CPT

## 2020-03-12 PROCEDURE — 85025 COMPLETE CBC W/AUTO DIFF WBC: CPT

## 2020-03-12 RX ORDER — ONDANSETRON 2 MG/ML
INJECTION INTRAMUSCULAR; INTRAVENOUS
Status: DISCONTINUED
Start: 2020-03-12 | End: 2020-03-12 | Stop reason: HOSPADM

## 2020-03-12 RX ORDER — WARFARIN SODIUM 5 MG/1
5 TABLET ORAL
COMMUNITY
End: 2020-10-15 | Stop reason: SDUPTHER

## 2020-03-12 ASSESSMENT — ENCOUNTER SYMPTOMS
CONSTIPATION: 0
SORE THROAT: 0
SHORTNESS OF BREATH: 0
NAUSEA: 0
RHINORRHEA: 0
DIARRHEA: 0
BLOOD IN STOOL: 0
VOMITING: 0
ABDOMINAL PAIN: 0

## 2020-03-12 NOTE — TELEPHONE ENCOUNTER
Please ask her why she did not follow through with Dr. Jorge Luis Britt and the [facility] procedure.      Janan Frankel, APRN-CNP

## 2020-03-12 NOTE — TELEPHONE ENCOUNTER
Coumadin clinic states pt's INR's are fluctuating extremely high or low  Last one was1.2 and the time before that 4.3 with out any adjustments. Pt has a blood draw today. Please call to discuss.

## 2020-03-12 NOTE — ED PROVIDER NOTES
[facility]        Pt Name: Santino Wheeler  MRN: [id number]  [place] 1950  Date of evaluation: 3/12/2020  Provider: ADAN Alvarez CNP  PCP: No primary care provider on file. This patient was not seen and evaluated by the attending physician No att. providers found. CHIEF COMPLAINT       Chief Complaint   Patient presents with    Abnormal Lab     Pt was just seen at coumadin clinic and INR 10.94. denies symptoms       HISTORY OF PRESENT ILLNESS   (Location/Symptom, Timing/Onset,Context/Setting, Quality, Duration, Modifying Factors, Severity)  Note limiting factors. Santino Wheeler is a 77 y.o. female who presents the ER with concern for elevated INR. Patient is anticoagulant on Coumadin for A. fib. Today at the Coumadin clinic her INR was greater than 10. This prompted them to send her to the ER. She denies any bleeding or black tarry stools. She also denies fever, rash, headaches, dizziness, chest pain, shortness of breath, cough, congestion, abdominal pain, nausea, vomiting, diarrhea, constipation, or painful urination. Family at bedside. Nursing Notes triage note reviewed and agreed with or any disagreements were addressed  in the HPI. REVIEW OF SYSTEMS    (2-9 systems for level 4, 10 or more for level 5)     Review of Systems   Constitutional: Negative for chills and fever. HENT: Negative for postnasal drip, rhinorrhea and sore throat. Eyes: Negative for visual disturbance. Respiratory: Negative for shortness of breath. Cardiovascular: Negative for chest pain. Gastrointestinal: Negative for abdominal pain, blood in stool, constipation, diarrhea, nausea and vomiting. Genitourinary: Negative for dysuria, flank pain and hematuria. Skin: Negative for rash. Neurological: Negative for weakness and headaches. All other systems reviewed and are negative.       Positives and Pertinent negatives as per HPI. Except as noted above in the ROS, all other systems were reviewed and negative.        PAST MEDICAL HISTORY     Past Medical History:   Diagnosis Date    Acute cerebrovascular accident (CVA) ([facility] 1/28/2020    Atrial fibrillation ([facility]     Bell palsy     diagnosed 15 years ago   Mel Zuniga CAD (coronary artery disease)     Cerebral artery occlusion with cerebral infarction ([facility]     CVA (cerebral vascular accident) ([facility] 1/4/2017    Hypertension     Intracranial hemorrhage ([facility] 4/2016    Nonintractable headache     Nontraumatic subcortical hemorrhage of cerebral hemisphere ([facility] 4/30/2016    Sudden visual loss of left eye     Thyroid nodule 2/8/2018    TIA involving right internal carotid artery          SURGICAL HISTORY       Past Surgical History:   Procedure Laterality Date    CARDIAC SURGERY      COLONOSCOPY      CORONARY ANGIOPLASTY WITH STENT PLACEMENT      TUBAL LIGATION Right Torn Rotator Cuff    2013 @ [facility]         CURRENT MEDICATIONS       Previous Medications    ACETAMINOPHEN (TYLENOL) 500 MG TABLET    Take 2 tablets by mouth every 6 hours as needed for Pain    ASPIRIN 81 MG EC TABLET    Take 1 tablet by mouth daily    ATORVASTATIN (LIPITOR) 80 MG TABLET    Take 1 tablet by mouth daily    CALCIUM CARB-CHOLECALCIFEROL (CALCIUM + D3) 600-200 MG-UNIT TABS TABLET    Take 1 tablet by mouth 2 times daily    FAMOTIDINE (PEPCID) 20 MG TABLET    Take 1 tablet by mouth 2 times daily    FUROSEMIDE (LASIX) 20 MG TABLET    Take 1 tablet by mouth 2 times daily    METOPROLOL SUCCINATE (TOPROL XL) 100 MG EXTENDED RELEASE TABLET    Take 1 tablet by mouth daily    NIFEDIPINE (ADALAT CC) 60 MG EXTENDED RELEASE TABLET    Take 1 tablet by mouth daily    POTASSIUM CHLORIDE (KLOR-CON M) 20 MEQ EXTENDED RELEASE TABLET    Take 1 tablet by mouth daily    RIVAROXABAN (XARELTO) 20 MG TABS TABLET    Take 1 tablet by mouth daily (with breakfast)    SPIRONOLACTONE (ALDACTONE) 50 MG TABLET Head: Normocephalic and atraumatic. Eyes:      General: No scleral icterus. Right eye: No discharge. Left eye: No discharge. Neck:      Musculoskeletal: Normal range of motion and neck supple. Cardiovascular:      Rate and Rhythm: Normal rate and regular rhythm. Heart sounds: Normal heart sounds. No murmur. No friction rub. No gallop. Pulmonary:      Effort: Pulmonary effort is normal. No respiratory distress. Breath sounds: Normal breath sounds. No stridor. No wheezing or rales. Chest:      Chest wall: No tenderness. Abdominal:      General: Bowel sounds are normal. There is no distension. Palpations: Abdomen is soft. There is no mass. Tenderness: There is no abdominal tenderness. There is no guarding or rebound. Musculoskeletal: Normal range of motion. General: No tenderness. Skin:     General: Skin is warm and dry. Coloration: Skin is not pale. Neurological:      Mental Status: She is alert and oriented to person, place, and time.       Coordination: Coordination normal.   Psychiatric:         Behavior: Behavior normal.         DIAGNOSTIC RESULTS   LABS:    Labs Reviewed   CBC WITH AUTO DIFFERENTIAL - Abnormal; Notable for the following components:       Result Value    Platelets 375 (*)     All other components within normal limits    Narrative:     Performed at:  [facility] [address], [place], [facility] [employer]   Phone [phone]   PROTIME-INR - Abnormal; Notable for the following components:    Protime 111.7 (*)     INR 9.41 (*)     All other components within normal limits    Narrative:     Stevesam Cordial  [facility] tel. [phone],  Coag results called to and read back by RNSylvester, 03/12/2020 12:57,  by [facility]  Performed at:  [facility] [address], [place], [facility] [employer]   Phone [phone]   BASIC METABOLIC PANEL W/ REFLEX TO MG FOR LOW K - Abnormal; Notable for the following components:    Glucose 104 (*)     CREATININE 0.5 (*)     All other components within normal limits    Narrative:     Performed at:  [facility] [address] [facility], [address]   Phone [phone]       All other labs werewithin normal range or not returned as of this dictation. EKG: All EKG's are interpreted by the Emergency Department Physician who either signs or Co-signs this chart in the absence of acardiologist.  Please see their note for interpretation of EKG. RADIOLOGY:   Interpretation per the Radiologist below, if available at the time of this note:    No orders to display     No results found. PROCEDURES   Unless otherwise noted below, none     Procedures    CRITICAL CARE TIME     n/a    CONSULTS:  None      EMERGENCY DEPARTMENT COURSE and DIFFERENTIAL DIAGNOSIS/MDM:   Vitals:    Vitals:    03/12/20 1010 03/12/20 1240   BP: (!) 153/105 (!) 160/69   Pulse: 87 88   Resp: 16 17   Temp: 97.9 °F (36.6 °C)    TempSrc: Infrared    SpO2: (!) 88% 98%   Weight: 183 lb (83 kg)    Height: 5' 7\" (1.702 m)        Mark Keyes was given the following medications:  No meds    Mark Keyes was evaluated in the emergency department with concern for supratherapeutic INR. She is anticoagulated on Coumadin for A. fib. Hemoglobin is 14.4 g. Not requiring transfusion at this time. I spoke with the pharmacist at the Coumadin clinic. At this time they recommend holding the Coumadin and following up in the clinic on Monday. The pharmacist reports that she is going to contact the patient's cardiologist as she would like to change her to a different medication since the INR levels have been very unpredictable. I feel this is reasonable. At this time the patient has no active hemorrhage. She is had no injuries. I do not feel inpatient management is warranted. Mark Keyes is stable in the ER and safe to follow as an outpatient.   The patient is

## 2020-03-12 NOTE — PROGRESS NOTES
Ms. Kadeem Marinelli is a 77 y.o. y/o female with history of A fib   She presents today for anticoagulation monitoring and adjustment. 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)    Patient Reported Findings:  Yes     No   [x]   []       Patient verifies current dosing regimen as listed  Confirms correct dose, held as instructed after supratherapeutic INR last Thursday---> thinks she followed AVS  []   [x]       S/S bleeding/bruising/swelling/SOB -dizzy and SOB, talked about s/sx and risks of INR being subtherapeutic---> she is going to the doctor today to discuss this --> states that she is feeling a lot better today ---> denies    []   [x]       Blood in urine or stool - denies   []   [x]       Procedures scheduled in the future at this time   []   [x]       Missed Dose-    []   [x]       Extra Dose- unsure   []   [x]       Change in medications She continues with Tylenol 1000mg but only as needed ---> no changes--->was sick so took OTC medications- nyquil    []   [x]       Change in health/diet/appetite-- normally has high vitamin K diet of canned spinach weekly and collards or broccoli about every other week. Will have some lower vitamin K veggies about 2 times a week such as green beans. --> states no changes, no NVD -> consistent  []   [x]       Change in alcohol use denies  []   [x]       Change in activity  []   [x]       Hospital admission-  []   [x]       Emergency department visit   []   [x]       Other complaints- states that she is using the pillbox, and she likes it. -> has not been using recently, but wants to restart ---> states she tried to use pillbox, asked to use pillbox and AVS and to cross off each day on AVS to prevent her from missing doses. Concerned for patient's ability to correctly remember recent history. She states that she is getting concerned for her memory.       Clinical Outcomes:  Yes     No  []   [x]       Major bleeding event  []   [x]       Thromboembolic

## 2020-03-13 ENCOUNTER — TELEPHONE (OUTPATIENT)
Dept: PHARMACY | Age: 70
End: 2020-03-13

## 2020-03-13 NOTE — TELEPHONE ENCOUNTER
Called patient about recent ER visit dt INR >10. They did not reverse her with vit k, no bleeding, no hemorrhage. Discharged her yesterday and told her to hold warfarin until Monday. Hold warfarin until Monday, RTC Monday, 3/16 to get INR checked. Explained if patient has any bleeding/bruising or blood in urine or stool to go to the ER. She stated she spoke with cardiology about the watchman procedure and will think about it.     Donis Graham, PharmD, [facility]

## 2020-03-16 ENCOUNTER — TELEPHONE (OUTPATIENT)
Dept: PHARMACY | Age: 70
End: 2020-03-16

## 2020-03-16 ENCOUNTER — ANTI-COAG VISIT (OUTPATIENT)
Dept: PHARMACY | Age: 70
End: 2020-03-16
Payer: MEDICARE

## 2020-03-16 LAB — INTERNATIONAL NORMALIZATION RATIO, POC: 1.6

## 2020-03-16 PROCEDURE — 85610 PROTHROMBIN TIME: CPT

## 2020-03-16 PROCEDURE — 99212 OFFICE O/P EST SF 10 MIN: CPT

## 2020-03-17 NOTE — TELEPHONE ENCOUNTER
Called patient about procedure and apt and completed health screening. Procedure for Wed, 3/18 is still going to happen. Continue to hold today for procedure 3/18. Resume warfarin 3/18 and take 2.5mg daily.  RS patient to RTC on Monday, 3/23.     Krista Santa, NahomyD, [facility]

## 2020-03-23 ENCOUNTER — ANTI-COAG VISIT (OUTPATIENT)
Dept: PHARMACY | Age: 70
End: 2020-03-23
Payer: MEDICARE

## 2020-03-23 LAB — INTERNATIONAL NORMALIZATION RATIO, POC: 1.1

## 2020-03-23 PROCEDURE — 85610 PROTHROMBIN TIME: CPT

## 2020-03-23 PROCEDURE — 99212 OFFICE O/P EST SF 10 MIN: CPT

## 2020-03-23 NOTE — PROGRESS NOTES
recent history. She states that she is getting concerned for her memory. Clinical Outcomes:  Yes     No  []   [x]       Major bleeding event  []   [x]       Thromboembolic event  Duration of warfarin Therapy: indefinitely  INR Range:  1.8-2.5 -> lower INR goal dt h/o subcortical hemorrhage (2016)    She may be slower to reach steady state with warfarin dosing so consider checking INR about 10 days after dose adjustment.     INR was 1.1 today after holding for procedure last week   Take 5 mg Mon and Fri and 2.5 mg all other days of the week   Recheck INR in 1 week, 3/30    Referring cardiologist is Dr. Feliz Cali  INR (no units)   Date Value   03/23/2020 1.1   03/16/2020 1.6   03/12/2020 9.41 (HH)   03/12/2020 10.94 ([facility])   02/28/2020 2.0   02/20/2020 4.3   02/01/2020 3.21 (H)   01/31/2020 2.11 (H)

## 2020-03-26 ENCOUNTER — TELEPHONE (OUTPATIENT)
Dept: PHARMACY | Age: 70
End: 2020-03-26

## 2020-03-30 ENCOUNTER — SCHEDULED TELEPHONE ENCOUNTER (OUTPATIENT)
Dept: PHARMACY | Age: 70
End: 2020-03-30
Payer: MEDICARE

## 2020-03-30 DIAGNOSIS — I48.91 ATRIAL FIBRILLATION, UNSPECIFIED TYPE (HCC): ICD-10-CM

## 2020-03-30 LAB
INR BLD: 1.3 (ref 0.86–1.14)
PROTHROMBIN TIME: 15.1 SEC (ref 10–13.2)

## 2020-03-30 PROCEDURE — 99211 OFF/OP EST MAY X REQ PHY/QHP: CPT

## 2020-03-30 NOTE — TELEPHONE ENCOUNTER
Ms. Inna Barajas is a 77 y.o. y/o female with history of A fib   She presents today for anticoagulation monitoring and adjustment. 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)    Patient Reported Findings:  Yes     No   [x]   []       Patient verifies current dosing regimen as listed     []   [x]       S/S bleeding/bruising/swelling/SOB -dizzy and SOB, talked about s/sx and risks of INR being subtherapeutic---> she is going to the doctor today to discuss this --> states that she is feeling a lot better today ---> denies, some SOB, no fever     []   [x]       Blood in urine or stool - denies   [x]   []       Procedures scheduled in the future at this time -thinks she has a colonoscopy on Wed this week, 3/18- no instructions for holding since they thought she was on Xarelto - called and has to hold 5 days, explained situation from Thursday on VM --> had colonoscopy on wed 3/18. States that she restarted warfarin 2.5 mg daily after procedure   []   [x]       Missed Dose-denies   []   [x]       Extra Dose   []   [x]       Change in medications She continues with Tylenol 1000mg but only as needed ---> no changes     []   [x]       Change in health/diet/appetite-- normally has high vitamin K diet of canned spinach weekly and collards or broccoli about every other week. Will have some lower vitamin K veggies about 2 times a week such as green beans. --> states no changes, no NVD -> states that she has little appetite. No NVD---> continues with no appetite, no NVD  []   [x]       Change in alcohol use denies  []   [x]       Change in activity  []   [x]       Hospital admission-  []   [x]       Emergency department visit   []   [x]       Other complaints- states that she is using the pillbox, and she likes it. -> has not been using recently, but wants to restart ---> states she tried to use pillbox, asked to use pillbox and AVS and to cross off each day on AVS to prevent her from missing doses.        Concerned for patient's ability to correctly remember recent history. She states that she is getting concerned for her memory. Clinical Outcomes:  Yes     No  []   [x]       Major bleeding event  []   [x]       Thromboembolic event  Duration of warfarin Therapy: indefinitely  INR Range:  1.8-2.5 -> lower INR goal dt h/o subcortical hemorrhage (2016)    She may be slower to reach steady state with warfarin dosing so consider checking INR about 10 days after dose adjustment. INR was 1.3 today via lab draw at [facility]. We have slowly been increasing dose (previously on 2.5mg Wed 5mg AOD) dt INR of >10. Increase dose to 2.5mg on Sun, Tues and Thurs and 5mg all other days (22.2% inc).   Recheck INR in 1 week, 4/6 by going back to [facility] for another lab draw     Referring cardiologist is Dr. Cielo Maguire  INR (no units)   Date Value   03/30/2020 1.30 (H)   03/23/2020 1.1   03/16/2020 1.6   03/12/2020 9.41 (HH)   03/12/2020 10.94 (HH)   02/28/2020 2.0   02/20/2020 4.3   02/01/2020 3.21 (H)

## 2020-04-07 ENCOUNTER — ANTI-COAG VISIT (OUTPATIENT)
Dept: PHARMACY | Age: 70
End: 2020-04-07
Payer: MEDICARE

## 2020-04-07 DIAGNOSIS — I48.91 ATRIAL FIBRILLATION, UNSPECIFIED TYPE (HCC): ICD-10-CM

## 2020-04-07 LAB
INR BLD: 2.43 (ref 0.86–1.14)
PROTHROMBIN TIME: 28.5 SEC (ref 10–13.2)

## 2020-04-07 PROCEDURE — 99211 OFF/OP EST MAY X REQ PHY/QHP: CPT

## 2020-04-07 NOTE — PROGRESS NOTES
Ms. Melody Chen is a 77 y.o. y/o female with history of A fib   She presents today for anticoagulation monitoring and adjustment. 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)    Patient Reported Findings:  Yes     No   [x]   []       Patient verifies current dosing regimen as listed     []   [x]       S/S bleeding/bruising/swelling/SOB -dizzy and SOB, talked about s/sx and risks of INR being subtherapeutic---> she is going to the doctor today to discuss this --> states that she is feeling a lot better today ---> denies, some SOB, no fever     []   [x]       Blood in urine or stool - denies   [x]   []       Procedures scheduled in the future at this time -thinks she has a colonoscopy on Wed this week, 3/18- no instructions for holding since they thought she was on Xarelto - called and has to hold 5 days, explained situation from Thursday on VM --> had colonoscopy on wed 3/18. States that she restarted warfarin 2.5 mg daily after procedure   []   [x]       Missed Dose-denies   []   [x]       Extra Dose   []   [x]       Change in medications She continues with Tylenol 1000mg but only as needed ---> no changes     [x]   []       Change in health/diet/appetite-- normally has high vitamin K diet of canned spinach weekly and collards or broccoli about every other week. Will have some lower vitamin K veggies about 2 times a week such as green beans. --> states no changes, no NVD -> states that she has little appetite.  No NVD---> continues with no appetite, has had a small amount of diarrhea   []   [x]       Change in alcohol use denies  []   [x]       Change in activity  []   [x]       Hospital admission-  []   [x]       Emergency department visit   []   [x]       Other complaints- states that she is using the pillbox, and she likes it. -> has not been using recently, but wants to restart ---> states she tried to use pillbox, asked to use pillbox and AVS and to cross off each day on AVS to prevent her from

## 2020-04-11 ENCOUNTER — HOSPITAL ENCOUNTER (EMERGENCY)
Age: 70
Discharge: HOME | End: 2020-04-11
Attending: EMERGENCY MEDICINE
Payer: MEDICARE

## 2020-04-11 ENCOUNTER — APPOINTMENT (OUTPATIENT)
Dept: GENERAL RADIOLOGY | Age: 70
End: 2020-04-11
Payer: MEDICARE

## 2020-04-11 ENCOUNTER — APPOINTMENT (OUTPATIENT)
Dept: CT IMAGING | Age: 70
End: 2020-04-11
Payer: MEDICARE

## 2020-04-11 VITALS
RESPIRATION RATE: 16 BRPM | SYSTOLIC BLOOD PRESSURE: 110 MMHG | TEMPERATURE: 97.4 F | HEART RATE: 64 BPM | DIASTOLIC BLOOD PRESSURE: 75 MMHG | OXYGEN SATURATION: 99 %

## 2020-04-11 LAB
A/G RATIO: 1.1 (ref 1.1–2.2)
ALBUMIN SERPL-MCNC: 4.4 G/DL (ref 3.4–5)
ALP BLD-CCNC: 74 U/L (ref 40–129)
ALT SERPL-CCNC: 23 U/L (ref 10–40)
ANION GAP SERPL CALCULATED.3IONS-SCNC: 13 MMOL/L (ref 3–16)
APTT: 32.5 SEC (ref 24.2–36.2)
AST SERPL-CCNC: 44 U/L (ref 15–37)
BASOPHILS ABSOLUTE: 0 K/UL (ref 0–0.2)
BASOPHILS RELATIVE PERCENT: 0.5 %
BILIRUB SERPL-MCNC: 0.8 MG/DL (ref 0–1)
BILIRUBIN URINE: NEGATIVE
BLOOD, URINE: NEGATIVE
BUN BLDV-MCNC: 9 MG/DL (ref 7–20)
CALCIUM SERPL-MCNC: 9.6 MG/DL (ref 8.3–10.6)
CHLORIDE BLD-SCNC: 100 MMOL/L (ref 99–110)
CLARITY: ABNORMAL
CO2: 23 MMOL/L (ref 21–32)
COLOR: YELLOW
CREAT SERPL-MCNC: 0.8 MG/DL (ref 0.6–1.2)
EOSINOPHILS ABSOLUTE: 0 K/UL (ref 0–0.6)
EOSINOPHILS RELATIVE PERCENT: 0.7 %
EPITHELIAL CELLS, UA: 2 /HPF (ref 0–5)
FINE CASTS, UA: ABNORMAL /LPF (ref 0–2)
GFR AFRICAN AMERICAN: >60
GFR NON-AFRICAN AMERICAN: >60
GLOBULIN: 3.9 G/DL
GLUCOSE BLD-MCNC: 125 MG/DL (ref 70–99)
GLUCOSE URINE: NEGATIVE MG/DL
HCT VFR BLD CALC: 42.5 % (ref 36–48)
HEMOGLOBIN: 14.1 G/DL (ref 12–16)
HYALINE CASTS: ABNORMAL /LPF (ref 0–2)
INR BLD: 3.02 (ref 0.86–1.14)
KETONES, URINE: NEGATIVE MG/DL
LEUKOCYTE ESTERASE, URINE: NEGATIVE
LIPASE: 32 U/L (ref 13–60)
LYMPHOCYTES ABSOLUTE: 1.8 K/UL (ref 1–5.1)
LYMPHOCYTES RELATIVE PERCENT: 27 %
MCH RBC QN AUTO: 29.7 PG (ref 26–34)
MCHC RBC AUTO-ENTMCNC: 33.1 G/DL (ref 31–36)
MCV RBC AUTO: 89.7 FL (ref 80–100)
MICROSCOPIC EXAMINATION: YES
MONOCYTES ABSOLUTE: 0.4 K/UL (ref 0–1.3)
MONOCYTES RELATIVE PERCENT: 6.2 %
NEUTROPHILS ABSOLUTE: 4.5 K/UL (ref 1.7–7.7)
NEUTROPHILS RELATIVE PERCENT: 65.6 %
NITRITE, URINE: NEGATIVE
PDW BLD-RTO: 14.1 % (ref 12.4–15.4)
PH UA: 5 (ref 5–8)
PLATELET # BLD: 152 K/UL (ref 135–450)
PMV BLD AUTO: 10.7 FL (ref 5–10.5)
POTASSIUM SERPL-SCNC: 3.6 MMOL/L (ref 3.5–5.1)
PRO-BNP: 1069 PG/ML (ref 0–124)
PROTEIN UA: ABNORMAL MG/DL
PROTHROMBIN TIME: 35.4 SEC (ref 10–13.2)
RBC # BLD: 4.74 M/UL (ref 4–5.2)
RBC UA: 4 /HPF (ref 0–4)
REASON FOR REJECTION: NORMAL
REJECTED TEST: NORMAL
SODIUM BLD-SCNC: 136 MMOL/L (ref 136–145)
SPECIFIC GRAVITY UA: 1.01 (ref 1–1.03)
TOTAL PROTEIN: 8.3 G/DL (ref 6.4–8.2)
TROPONIN: <0.01 NG/ML
URINE REFLEX TO CULTURE: ABNORMAL
URINE TYPE: ABNORMAL
UROBILINOGEN, URINE: 1 E.U./DL
WBC # BLD: 6.8 K/UL (ref 4–11)
WBC UA: 5 /HPF (ref 0–5)

## 2020-04-11 PROCEDURE — 81001 URINALYSIS AUTO W/SCOPE: CPT

## 2020-04-11 PROCEDURE — 84484 ASSAY OF TROPONIN QUANT: CPT

## 2020-04-11 PROCEDURE — 70450 CT HEAD/BRAIN W/O DYE: CPT

## 2020-04-11 PROCEDURE — 83880 ASSAY OF NATRIURETIC PEPTIDE: CPT

## 2020-04-11 PROCEDURE — 71045 X-RAY EXAM CHEST 1 VIEW: CPT

## 2020-04-11 PROCEDURE — 85610 PROTHROMBIN TIME: CPT

## 2020-04-11 PROCEDURE — 99285 EMERGENCY DEPT VISIT HI MDM: CPT

## 2020-04-11 PROCEDURE — 80053 COMPREHEN METABOLIC PANEL: CPT

## 2020-04-11 PROCEDURE — 36415 COLL VENOUS BLD VENIPUNCTURE: CPT

## 2020-04-11 PROCEDURE — 83690 ASSAY OF LIPASE: CPT

## 2020-04-11 PROCEDURE — 85025 COMPLETE CBC W/AUTO DIFF WBC: CPT

## 2020-04-11 PROCEDURE — 93005 ELECTROCARDIOGRAM TRACING: CPT | Performed by: EMERGENCY MEDICINE

## 2020-04-11 PROCEDURE — 85730 THROMBOPLASTIN TIME PARTIAL: CPT

## 2020-04-11 RX ORDER — MECLIZINE HCL 12.5 MG/1
12.5 TABLET ORAL 3 TIMES DAILY PRN
Qty: 15 TABLET | Refills: 0 | Status: SHIPPED | OUTPATIENT
Start: 2020-04-11 | End: 2020-04-21

## 2020-04-11 ASSESSMENT — ENCOUNTER SYMPTOMS
DIARRHEA: 0
SHORTNESS OF BREATH: 0
NAUSEA: 0
RHINORRHEA: 0
VOMITING: 0
COUGH: 0
ABDOMINAL PAIN: 0

## 2020-04-11 ASSESSMENT — PAIN DESCRIPTION - LOCATION
LOCATION_2: HEAD
LOCATION: ABDOMEN
LOCATION: HIP

## 2020-04-11 ASSESSMENT — PAIN DESCRIPTION - ORIENTATION
ORIENTATION: ANTERIOR;LOWER
ORIENTATION: RIGHT;LEFT
ORIENTATION_2: POSTERIOR

## 2020-04-11 ASSESSMENT — PAIN DESCRIPTION - PAIN TYPE: TYPE: ACUTE PAIN

## 2020-04-11 ASSESSMENT — PAIN SCALES - GENERAL
PAINLEVEL_OUTOF10: 7
PAINLEVEL_OUTOF10: 7

## 2020-04-11 ASSESSMENT — PAIN DESCRIPTION - INTENSITY: RATING_2: 4

## 2020-04-11 NOTE — ED PROVIDER NOTES
As physician-in-triage, I performed a medical screening history and physical exam on Erickson Nuñez. I also cared for and evaluated the patient in conjunction with the ED Advanced Practice Provider. All diagnostic, treatment, and disposition decisions were made by myself in conjunction with the advanced practice provider. For all further details of the patient's emergency department visit, please see the advanced practice provider's documentation. Presents the ER for evaluation positive mild dizziness, she has a component of vertigo, no true diplopia. No speech deficit. No dysmetria no truncal ataxia. She is on antiplatelets and and oral factor X a inhibitor. No active chest pain. No acute shortness of breath. No headache. Afebrile. No ataxia. No gait disorder. No sensory or motor deficit. No appreciable nystagmus. No evidence of stroke, supratherapeutic INR hold coagulation x1 day. Meclizine as needed for vertigo. Return if worse or new symptoms. Impression: Dizziness, vertigo.      Rodolfo Kat MD  23/88/55 2017       Rodolfo Kat MD  54/11/48 [facility]
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
## 2020-04-12 LAB
EKG ATRIAL RATE: 35 BPM
EKG DIAGNOSIS: NORMAL
EKG Q-T INTERVAL: 432 MS
EKG QRS DURATION: 92 MS
EKG QTC CALCULATION (BAZETT): 416 MS
EKG R AXIS: -16 DEGREES
EKG T AXIS: -37 DEGREES
EKG VENTRICULAR RATE: 56 BPM

## 2020-04-12 PROCEDURE — 93010 ELECTROCARDIOGRAM REPORT: CPT | Performed by: INTERNAL MEDICINE

## 2020-04-13 ENCOUNTER — CARE COORDINATION (OUTPATIENT)
Dept: CARE COORDINATION | Age: 70
End: 2020-04-13

## 2020-04-14 ENCOUNTER — ANTI-COAG VISIT (OUTPATIENT)
Dept: PHARMACY | Age: 70
End: 2020-04-14
Payer: MEDICARE

## 2020-04-14 DIAGNOSIS — I48.91 ATRIAL FIBRILLATION, UNSPECIFIED TYPE (HCC): ICD-10-CM

## 2020-04-14 LAB
INR BLD: 1.55 (ref 0.86–1.14)
PROTHROMBIN TIME: 18.1 SEC (ref 10–13.2)

## 2020-04-14 PROCEDURE — 99211 OFF/OP EST MAY X REQ PHY/QHP: CPT

## 2020-04-21 ENCOUNTER — ANTI-COAG VISIT (OUTPATIENT)
Dept: PHARMACY | Age: 70
End: 2020-04-21
Payer: MEDICARE

## 2020-04-21 DIAGNOSIS — I48.91 ATRIAL FIBRILLATION, UNSPECIFIED TYPE (HCC): ICD-10-CM

## 2020-04-21 LAB
INR BLD: 1.41 (ref 0.86–1.14)
PROTHROMBIN TIME: 16.4 SEC (ref 10–13.2)

## 2020-04-21 PROCEDURE — 99211 OFF/OP EST MAY X REQ PHY/QHP: CPT

## 2020-04-21 NOTE — PROGRESS NOTES
states that she is using the pillbox, and she likes it. -> has not been using recently, but wants to restart ---> states she tried to use pillbox, asked to use pillbox and AVS and to cross off each day on AVS to prevent her from missing doses. Concerned for patient's ability to correctly remember recent history. She states that she is getting concerned for her memory. Clinical Outcomes:  Yes     No  []   [x]       Major bleeding event  []   [x]       Thromboembolic event  Duration of warfarin Therapy: indefinitely  INR Range:  1.8-2.5 -> lower INR goal dt h/o subcortical hemorrhage (2016)    She may be slower to reach steady state with warfarin dosing so consider checking INR about 10 days after dose adjustment. INR was 1.41 today via lab draw at [address] has been eating liver about twice a week for the past few weeks, is going to decrease to once a week which will raise INR  We have slowly been increasing dose (previously on 2.5mg Wed 5mg AOD) dt INR of >10.    Increase dose to 5mg Mon Wed Fri, and Sat and 2.5 mg all other days (10% inc from what patient took in last week)  Might need to extend appt a few weeks if slow metabolizer   Recheck INR in 2 weeks, 5/5 by going back to [facility] for another lab draw     Referring cardiologist is Dr. Duvall Signs  INR (no units)   Date Value   04/21/2020 1.41 (H)   04/14/2020 1.55 (H)   04/11/2020 3.02 (H)   04/07/2020 2.43 (H)

## 2020-04-22 RX ORDER — FAMOTIDINE 20 MG/1
TABLET, FILM COATED ORAL
Qty: 60 TABLET | Refills: 0 | Status: SHIPPED | OUTPATIENT
Start: 2020-04-22 | End: 2020-06-15 | Stop reason: SDUPTHER

## 2020-04-22 RX ORDER — FAMOTIDINE 20 MG/1
TABLET, FILM COATED ORAL
Qty: 180 TABLET | OUTPATIENT
Start: 2020-04-22

## 2020-05-04 RX ORDER — NAPROXEN 500 MG/1
TABLET ORAL
Qty: 10 TABLET | OUTPATIENT
Start: 2020-05-04

## 2020-05-04 NOTE — TELEPHONE ENCOUNTER
Last visit 02-21-20 Dr Radha Boss   Next visit 06-23-20 Dr Radha Boss   patient is asking for a medication that is not on the current medication list.

## 2020-05-05 ENCOUNTER — ANTI-COAG VISIT (OUTPATIENT)
Dept: PHARMACY | Age: 70
End: 2020-05-05
Payer: MEDICARE

## 2020-05-05 DIAGNOSIS — I48.91 ATRIAL FIBRILLATION, UNSPECIFIED TYPE (HCC): ICD-10-CM

## 2020-05-05 LAB
INR BLD: 1.43 (ref 0.86–1.14)
PROTHROMBIN TIME: 16.7 SEC (ref 10–13.2)

## 2020-05-05 PROCEDURE — 99212 OFFICE O/P EST SF 10 MIN: CPT

## 2020-05-05 NOTE — PROGRESS NOTES
use denies  []   [x]       Change in activity  []   [x]       Hospital admission-  []   [x]       Emergency department visit   []   [x]       Other complaints- states that she is using the pillbox, and she likes it. -> has not been using recently, but wants to restart ---> states she tried to use pillbox, asked to use pillbox and AVS and to cross off each day on AVS to prevent her from missing doses. Concerned for patient's ability to correctly remember recent history. She states that she is getting concerned for her memory. Clinical Outcomes:  Yes     No  []   [x]       Major bleeding event  []   [x]       Thromboembolic event  Duration of warfarin Therapy: indefinitely  INR Range:  1.8-2.5 -> lower INR goal dt h/o subcortical hemorrhage (2016)    She may be slower to reach steady state with warfarin dosing so consider checking INR about 10 days after dose adjustment. INR was 1.43 today via lab draw at [facility] after missing multiple doses last week, taking lower dose than instructed, and eating no liver/ less appetite   We have slowly been increasing dose (previously on 2.5mg Wed 5mg AOD) dt INR of >10. Take 5 mg tonight then continue dose of 5mg Mon Wed Fri and 2.5 mg all other days. Concerned that more subtherapeutic d/t missing doses and would cause large swing in INR if increased dose again.  Encouraged pt to pay attention to dosing in order to not miss doses   Might need to extend appt a few weeks if slow metabolizer   Recheck INR in 1 week, 5/12 by going back to [facility] for another lab draw     Referring cardiologist is Dr. Azalia Ramirez  INR (no units)   Date Value   05/05/2020 1.43 (H)   04/21/2020 1.41 (H)   04/14/2020 1.55 (H)   04/11/2020 3.02 (H)     CLINICAL PHARMACY CONSULT: MED RECONCILIATION/REVIEW ADDENDUM    For Pharmacy Admin Tracking Only    PHSO: No  Total # of Interventions Recommended: 1  - Increased Dose #: 1  - Maintenance Safety Lab Monitoring #: 1  Total Interventions Accepted:

## 2020-05-12 ENCOUNTER — ANTI-COAG VISIT (OUTPATIENT)
Dept: PHARMACY | Age: 70
End: 2020-05-12

## 2020-05-12 PROCEDURE — 99212 OFFICE O/P EST SF 10 MIN: CPT

## 2020-05-12 NOTE — PROGRESS NOTES
Ms. Denia Zambrano is a 77 y.o. y/o female with history of A fib   She presents today for anticoagulation monitoring and adjustment. 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)    Patient Reported Findings:  Yes     No   [x]   []       Patient verifies current dosing regimen as listed      []   [x]       S/S bleeding/bruising/swelling/SOB -dizzy and SOB, talked about s/sx and risks of INR being subtherapeutic---> she is going to the doctor today to discuss this --> states that she is feeling a lot better today ---> denies, some SOB, no fever     []   [x]       Blood in urine or stool - denies   [x]   []       Procedures scheduled in the future at this time -thinks she has a colonoscopy on Wed this week, 3/18- no instructions for holding since they thought she was on Xarelto - called and has to hold 5 days, explained situation from Thursday on VM --> had colonoscopy on wed 3/18. States that she restarted warfarin 2.5 mg daily after procedure   []   [x]       Missed Dose-   []   [x]       Extra Dose    []   [x]       Change in medications She continues with Tylenol 1000mg but only as needed ---> no changes     []   [x]       Change in health/diet/appetite-- normally has high vitamin K diet of canned spinach weekly and collards or broccoli about every other week. Will have some lower vitamin K veggies about 2 times a week such as green beans. --> states no changes, no NVD -> states that she has little appetite. No NVD---> continues with no appetite, has had a small amount of diarrhea --> states that she has been eating liver twice a week, likely why INR has dropped. Asked patient to decrease to once a week --> states that she has not been eating as much.  Stopped eating liver   []   [x]       Change in alcohol use denies  []   [x]       Change in activity  []   [x]       Hospital admission-  []   [x]       Emergency department visit   []   [x]       Other complaints- states that she is using the pillbox, and

## 2020-05-13 ENCOUNTER — APPOINTMENT (OUTPATIENT)
Dept: CT IMAGING | Age: 70
End: 2020-05-13
Payer: MEDICARE

## 2020-05-13 ENCOUNTER — HOSPITAL ENCOUNTER (EMERGENCY)
Age: 70
Discharge: HOME | End: 2020-05-14
Payer: MEDICARE

## 2020-05-13 PROCEDURE — 72125 CT NECK SPINE W/O DYE: CPT

## 2020-05-13 PROCEDURE — 99283 EMERGENCY DEPT VISIT LOW MDM: CPT

## 2020-05-13 PROCEDURE — 6370000000 HC RX 637 (ALT 250 FOR IP): Performed by: NURSE PRACTITIONER

## 2020-05-13 RX ORDER — CYCLOBENZAPRINE HCL 10 MG
10 TABLET ORAL ONCE
Status: COMPLETED | OUTPATIENT
Start: 2020-05-13 | End: 2020-05-13

## 2020-05-13 RX ORDER — LIDOCAINE 4 G/G
1 PATCH TOPICAL ONCE
Status: DISCONTINUED | OUTPATIENT
Start: 2020-05-13 | End: 2020-05-14 | Stop reason: HOSPADM

## 2020-05-13 RX ORDER — DIPHENHYDRAMINE HCL 25 MG
25 TABLET ORAL ONCE
Status: COMPLETED | OUTPATIENT
Start: 2020-05-13 | End: 2020-05-13

## 2020-05-13 RX ORDER — HYDROCODONE BITARTRATE AND ACETAMINOPHEN 5; 325 MG/1; MG/1
1 TABLET ORAL ONCE
Status: COMPLETED | OUTPATIENT
Start: 2020-05-13 | End: 2020-05-13

## 2020-05-13 RX ADMIN — CYCLOBENZAPRINE HYDROCHLORIDE 10 MG: 10 TABLET, FILM COATED ORAL at 23:25

## 2020-05-13 RX ADMIN — HYDROCODONE BITARTRATE AND ACETAMINOPHEN 1 TABLET: 5; 325 TABLET ORAL at 23:25

## 2020-05-13 RX ADMIN — DIPHENHYDRAMINE HCL 25 MG: 25 TABLET ORAL at 23:25

## 2020-05-13 ASSESSMENT — ENCOUNTER SYMPTOMS
CHEST TIGHTNESS: 0
VOMITING: 0
ABDOMINAL PAIN: 0
SHORTNESS OF BREATH: 0
DIARRHEA: 0
NAUSEA: 0

## 2020-05-13 ASSESSMENT — PAIN SCALES - GENERAL
PAINLEVEL_OUTOF10: 10
PAINLEVEL_OUTOF10: 9

## 2020-05-14 VITALS
HEIGHT: 67 IN | HEART RATE: 78 BPM | DIASTOLIC BLOOD PRESSURE: 113 MMHG | RESPIRATION RATE: 16 BRPM | BODY MASS INDEX: 28.88 KG/M2 | OXYGEN SATURATION: 98 % | TEMPERATURE: 97.7 F | SYSTOLIC BLOOD PRESSURE: 168 MMHG | WEIGHT: 184 LBS

## 2020-05-14 PROCEDURE — 6360000002 HC RX W HCPCS: Performed by: NURSE PRACTITIONER

## 2020-05-14 PROCEDURE — 96372 THER/PROPH/DIAG INJ SC/IM: CPT

## 2020-05-14 RX ORDER — CYCLOBENZAPRINE HCL 10 MG
10 TABLET ORAL 3 TIMES DAILY PRN
Qty: 21 TABLET | Refills: 0 | Status: SHIPPED | OUTPATIENT
Start: 2020-05-14 | End: 2020-05-24

## 2020-05-14 RX ORDER — MORPHINE SULFATE 10 MG/ML
6 INJECTION, SOLUTION INTRAMUSCULAR; INTRAVENOUS ONCE
Status: COMPLETED | OUTPATIENT
Start: 2020-05-14 | End: 2020-05-14

## 2020-05-14 RX ORDER — LIDOCAINE 4 G/G
1 PATCH TOPICAL DAILY
Qty: 30 PATCH | Refills: 0 | Status: SHIPPED | OUTPATIENT
Start: 2020-05-14 | End: 2020-06-13

## 2020-05-14 RX ORDER — OXYCODONE HYDROCHLORIDE AND ACETAMINOPHEN 5; 325 MG/1; MG/1
1 TABLET ORAL EVERY 6 HOURS PRN
Qty: 10 TABLET | Refills: 0 | Status: SHIPPED | OUTPATIENT
Start: 2020-05-14 | End: 2020-05-17

## 2020-05-14 RX ORDER — METHYLPREDNISOLONE 4 MG/1
TABLET ORAL
Qty: 1 KIT | Refills: 0 | Status: SHIPPED | OUTPATIENT
Start: 2020-05-14 | End: 2020-05-20

## 2020-05-14 RX ADMIN — MORPHINE SULFATE 6 MG: 10 INJECTION INTRAVENOUS at 00:22

## 2020-05-14 ASSESSMENT — PAIN SCALES - GENERAL
PAINLEVEL_OUTOF10: 7
PAINLEVEL_OUTOF10: 9

## 2020-05-14 NOTE — ED NOTES
Pt states she hasn't taken her bp medications and will when she gets home.       Jeane Olsen RN  05/14/20 5759

## 2020-05-14 NOTE — ED PROVIDER NOTES
[facility]        Pt Name: Logan Gallo  MRN: [id number]  [place] 1950  Date of evaluation: 5/13/2020  Provider: ADAN Alexis CNP  PCP: No primary care provider on file. Evaluation by MARIANN. My supervising physician was available for consultation. CHIEF COMPLAINT       Chief Complaint   Patient presents with    Neck Pain     pt,. thought she slept on her neck wrong. c/o right sided head pain, right neck pain, right shoulder pain. pt. rubbing neck during triage. HISTORY OF PRESENT ILLNESS   (Location, Timing/Onset, Context/Setting, Quality, Duration, Modifying Factors, Severity, Associated Signs and Symptoms)  Note limiting factors. Logan Gallo is a 77 y.o. female presents to the emergency department with complaint of severe right-sided neck pain. The patient does have reproducible pain to the right side of her neck that extends to the right shoulder and into the right trapezius. Reports that movement does make the pain worse. Nothing seems to make the pain better. Onset of pain this morning, thought maybe she slept on her neck wrong. She is vigorously rubbing at her neck trying to relieve pain. She has no focal weakness or paresthesia present. Describes the pain as aching/stabbing, rates as 10/10. Denies any headache, fever, lightheadedness, dizziness, visual disturbances. No chest pain or pressure. No shortness of breath, cough, or congestion. No abdominal pain, nausea, vomiting, diarrhea, constipation, or dysuria. No rash. Nursing Notes were all reviewed and agreed with or any disagreements were addressed in the HPI. REVIEW OF SYSTEMS    (2-9 systems for level 4, 10 or more for level 5)     Review of Systems   Constitutional: Negative for activity change, chills and fever. Respiratory: Negative for chest tightness and shortness of breath.     Cardiovascular: Negative for chest pain. Gastrointestinal: Negative for abdominal pain, diarrhea, nausea and vomiting. Genitourinary: Negative for dysuria. Musculoskeletal: Positive for neck pain. All other systems reviewed and are negative. Positives and Pertinent negatives as per HPI. Except as noted above in the ROS, all other systems were reviewed and negative. PAST MEDICAL HISTORY     Past Medical History:   Diagnosis Date    Acute cerebrovascular accident (CVA) ([facility] [address] 1/28/2020    Atrial fibrillation (HCC)     Bell palsy     diagnosed 15 years ago   [place] CAD (coronary artery disease)     Cerebral artery occlusion with cerebral infarction ([address]     CVA (cerebral vascular accident) ([address] 1/4/2017    Hypertension     Intracranial hemorrhage ([address] 4/2016    Nonintractable headache     Nontraumatic subcortical hemorrhage of cerebral hemisphere ([facility] [address] 4/30/2016    Sudden visual loss of left eye     Thyroid nodule 2/8/2018    TIA involving right internal carotid artery          SURGICAL HISTORY     Past Surgical History:   Procedure Laterality Date    CARDIAC SURGERY      COLONOSCOPY      CORONARY ANGIOPLASTY WITH STENT PLACEMENT      TUBAL LIGATION Right Torn Rotator Cuff    2013 @ [address]       Discharge Medication List as of 5/14/2020  1:22 AM      CONTINUE these medications which have NOT CHANGED    Details   famotidine (PEPCID) 20 MG tablet TAKE 1 TABLET BY MOUTH TWICE DAILY, Disp-60 tablet, R-0Normal      warfarin (COUMADIN) 5 MG tablet Take 5 mg by mouth Every day. Wednesday 2. 5. Historical Med      rivaroxaban (XARELTO) 20 MG TABS tablet Take 1 tablet by mouth daily (with breakfast), Disp-90 tablet, R-1Normal      aspirin 81 MG EC tablet Take 1 tablet by mouth daily, Disp-30 tablet, R-3Normal      atorvastatin (LIPITOR) 80 MG tablet Take 1 tablet by mouth daily, Disp-90 tablet, R-1Normal      Calcium Carb-Cholecalciferol (CALCIUM + D3) 600-200 MG-UNIT TABS tablet Take 1 tablet by [facility] - CNP  05/14/20 0236

## 2020-05-15 ENCOUNTER — HOSPITAL ENCOUNTER (OUTPATIENT)
Age: 70
Setting detail: OBSERVATION
Discharge: HOME | End: 2020-05-16
Attending: EMERGENCY MEDICINE | Admitting: INTERNAL MEDICINE
Payer: MEDICARE

## 2020-05-15 ENCOUNTER — APPOINTMENT (OUTPATIENT)
Dept: GENERAL RADIOLOGY | Age: 70
End: 2020-05-15
Payer: MEDICARE

## 2020-05-15 ENCOUNTER — CARE COORDINATION (OUTPATIENT)
Dept: CARE COORDINATION | Age: 70
End: 2020-05-15

## 2020-05-15 PROBLEM — R00.1 BRADYCARDIA: Status: ACTIVE | Noted: 2020-05-15

## 2020-05-15 LAB
A/G RATIO: 1.3 (ref 1.1–2.2)
ALBUMIN SERPL-MCNC: 4.2 G/DL (ref 3.4–5)
ALP BLD-CCNC: 71 U/L (ref 40–129)
ALT SERPL-CCNC: 27 U/L (ref 10–40)
ANION GAP SERPL CALCULATED.3IONS-SCNC: 13 MMOL/L (ref 3–16)
AST SERPL-CCNC: 49 U/L (ref 15–37)
BASOPHILS ABSOLUTE: 0 K/UL (ref 0–0.2)
BASOPHILS RELATIVE PERCENT: 0.5 %
BILIRUB SERPL-MCNC: 0.9 MG/DL (ref 0–1)
BILIRUBIN URINE: NEGATIVE
BLOOD, URINE: NEGATIVE
BUN BLDV-MCNC: 16 MG/DL (ref 7–20)
CALCIUM SERPL-MCNC: 9 MG/DL (ref 8.3–10.6)
CHLORIDE BLD-SCNC: 101 MMOL/L (ref 99–110)
CLARITY: CLEAR
CO2: 23 MMOL/L (ref 21–32)
COLOR: YELLOW
CREAT SERPL-MCNC: 0.9 MG/DL (ref 0.6–1.2)
EKG ATRIAL RATE: 48 BPM
EKG DIAGNOSIS: NORMAL
EKG Q-T INTERVAL: 450 MS
EKG QRS DURATION: 86 MS
EKG QTC CALCULATION (BAZETT): 398 MS
EKG R AXIS: -1 DEGREES
EKG T AXIS: -49 DEGREES
EKG VENTRICULAR RATE: 47 BPM
EOSINOPHILS ABSOLUTE: 0 K/UL (ref 0–0.6)
EOSINOPHILS RELATIVE PERCENT: 0.1 %
EPITHELIAL CELLS, UA: 0 /HPF (ref 0–5)
GFR AFRICAN AMERICAN: >60
GFR NON-AFRICAN AMERICAN: >60
GLOBULIN: 3.2 G/DL
GLUCOSE BLD-MCNC: 153 MG/DL (ref 70–99)
GLUCOSE URINE: NEGATIVE MG/DL
HCT VFR BLD CALC: 40.4 % (ref 36–48)
HEMOGLOBIN: 13.2 G/DL (ref 12–16)
HYALINE CASTS: 1 /LPF (ref 0–8)
INR BLD: 2.21 (ref 0.86–1.14)
KETONES, URINE: NEGATIVE MG/DL
LACTIC ACID, SEPSIS: 1.2 MMOL/L (ref 0.4–1.9)
LACTIC ACID, SEPSIS: 2.5 MMOL/L (ref 0.4–1.9)
LEUKOCYTE ESTERASE, URINE: ABNORMAL
LIPASE: 20 U/L (ref 13–60)
LYMPHOCYTES ABSOLUTE: 1.7 K/UL (ref 1–5.1)
LYMPHOCYTES RELATIVE PERCENT: 21 %
MCH RBC QN AUTO: 30.5 PG (ref 26–34)
MCHC RBC AUTO-ENTMCNC: 32.7 G/DL (ref 31–36)
MCV RBC AUTO: 93.2 FL (ref 80–100)
MICROSCOPIC EXAMINATION: YES
MONOCYTES ABSOLUTE: 0.3 K/UL (ref 0–1.3)
MONOCYTES RELATIVE PERCENT: 3.5 %
NEUTROPHILS ABSOLUTE: 6.1 K/UL (ref 1.7–7.7)
NEUTROPHILS RELATIVE PERCENT: 74.9 %
NITRITE, URINE: NEGATIVE
PDW BLD-RTO: 13.9 % (ref 12.4–15.4)
PH UA: 6.5 (ref 5–8)
PLATELET # BLD: 172 K/UL (ref 135–450)
PMV BLD AUTO: 10.4 FL (ref 5–10.5)
POTASSIUM REFLEX MAGNESIUM: 3.9 MMOL/L (ref 3.5–5.1)
PRO-BNP: 2450 PG/ML (ref 0–124)
PROTEIN UA: NEGATIVE MG/DL
PROTHROMBIN TIME: 25.8 SEC (ref 10–13.2)
RBC # BLD: 4.33 M/UL (ref 4–5.2)
RBC UA: 0 /HPF (ref 0–4)
SODIUM BLD-SCNC: 137 MMOL/L (ref 136–145)
SPECIFIC GRAVITY UA: 1.01 (ref 1–1.03)
TOTAL PROTEIN: 7.4 G/DL (ref 6.4–8.2)
TROPONIN: <0.01 NG/ML
URINE REFLEX TO CULTURE: ABNORMAL
URINE TYPE: ABNORMAL
UROBILINOGEN, URINE: 1 E.U./DL
WBC # BLD: 8.2 K/UL (ref 4–11)
WBC UA: 1 /HPF (ref 0–5)

## 2020-05-15 PROCEDURE — 83880 ASSAY OF NATRIURETIC PEPTIDE: CPT

## 2020-05-15 PROCEDURE — G0378 HOSPITAL OBSERVATION PER HR: HCPCS

## 2020-05-15 PROCEDURE — 83690 ASSAY OF LIPASE: CPT

## 2020-05-15 PROCEDURE — 99285 EMERGENCY DEPT VISIT HI MDM: CPT

## 2020-05-15 PROCEDURE — 36415 COLL VENOUS BLD VENIPUNCTURE: CPT

## 2020-05-15 PROCEDURE — 83605 ASSAY OF LACTIC ACID: CPT

## 2020-05-15 PROCEDURE — 85610 PROTHROMBIN TIME: CPT

## 2020-05-15 PROCEDURE — 2580000003 HC RX 258: Performed by: INTERNAL MEDICINE

## 2020-05-15 PROCEDURE — 80053 COMPREHEN METABOLIC PANEL: CPT

## 2020-05-15 PROCEDURE — 93010 ELECTROCARDIOGRAM REPORT: CPT | Performed by: INTERNAL MEDICINE

## 2020-05-15 PROCEDURE — 73502 X-RAY EXAM HIP UNI 2-3 VIEWS: CPT

## 2020-05-15 PROCEDURE — 81001 URINALYSIS AUTO W/SCOPE: CPT

## 2020-05-15 PROCEDURE — 71045 X-RAY EXAM CHEST 1 VIEW: CPT

## 2020-05-15 PROCEDURE — 6370000000 HC RX 637 (ALT 250 FOR IP): Performed by: INTERNAL MEDICINE

## 2020-05-15 PROCEDURE — 85025 COMPLETE CBC W/AUTO DIFF WBC: CPT

## 2020-05-15 PROCEDURE — 84484 ASSAY OF TROPONIN QUANT: CPT

## 2020-05-15 PROCEDURE — 93005 ELECTROCARDIOGRAM TRACING: CPT | Performed by: EMERGENCY MEDICINE

## 2020-05-15 PROCEDURE — 87040 BLOOD CULTURE FOR BACTERIA: CPT

## 2020-05-15 PROCEDURE — 2580000003 HC RX 258: Performed by: PHYSICIAN ASSISTANT

## 2020-05-15 RX ORDER — ASPIRIN 81 MG/1
81 TABLET ORAL DAILY
Status: DISCONTINUED | OUTPATIENT
Start: 2020-05-15 | End: 2020-05-16 | Stop reason: HOSPADM

## 2020-05-15 RX ORDER — ACETAMINOPHEN 500 MG
1000 TABLET ORAL EVERY 6 HOURS PRN
Status: DISCONTINUED | OUTPATIENT
Start: 2020-05-15 | End: 2020-05-15 | Stop reason: SDUPTHER

## 2020-05-15 RX ORDER — ACETAMINOPHEN 650 MG/1
650 SUPPOSITORY RECTAL EVERY 6 HOURS PRN
Status: DISCONTINUED | OUTPATIENT
Start: 2020-05-15 | End: 2020-05-16 | Stop reason: HOSPADM

## 2020-05-15 RX ORDER — ONDANSETRON 2 MG/ML
4 INJECTION INTRAMUSCULAR; INTRAVENOUS EVERY 6 HOURS PRN
Status: DISCONTINUED | OUTPATIENT
Start: 2020-05-15 | End: 2020-05-16 | Stop reason: HOSPADM

## 2020-05-15 RX ORDER — OXYCODONE HYDROCHLORIDE AND ACETAMINOPHEN 5; 325 MG/1; MG/1
1 TABLET ORAL EVERY 6 HOURS PRN
Status: DISCONTINUED | OUTPATIENT
Start: 2020-05-15 | End: 2020-05-16 | Stop reason: HOSPADM

## 2020-05-15 RX ORDER — ACETAMINOPHEN 325 MG/1
650 TABLET ORAL EVERY 6 HOURS PRN
Status: DISCONTINUED | OUTPATIENT
Start: 2020-05-15 | End: 2020-05-16 | Stop reason: HOSPADM

## 2020-05-15 RX ORDER — FAMOTIDINE 20 MG/1
20 TABLET, FILM COATED ORAL 2 TIMES DAILY
Status: DISCONTINUED | OUTPATIENT
Start: 2020-05-15 | End: 2020-05-16 | Stop reason: HOSPADM

## 2020-05-15 RX ORDER — WARFARIN SODIUM 5 MG/1
5 TABLET ORAL
Status: DISCONTINUED | OUTPATIENT
Start: 2020-05-15 | End: 2020-05-16 | Stop reason: HOSPADM

## 2020-05-15 RX ORDER — PROMETHAZINE HYDROCHLORIDE 25 MG/1
12.5 TABLET ORAL EVERY 6 HOURS PRN
Status: DISCONTINUED | OUTPATIENT
Start: 2020-05-15 | End: 2020-05-16 | Stop reason: HOSPADM

## 2020-05-15 RX ORDER — FUROSEMIDE 20 MG/1
20 TABLET ORAL 2 TIMES DAILY
Status: DISCONTINUED | OUTPATIENT
Start: 2020-05-15 | End: 2020-05-16 | Stop reason: HOSPADM

## 2020-05-15 RX ORDER — ATORVASTATIN CALCIUM 80 MG/1
80 TABLET, FILM COATED ORAL NIGHTLY
Status: DISCONTINUED | OUTPATIENT
Start: 2020-05-15 | End: 2020-05-16 | Stop reason: HOSPADM

## 2020-05-15 RX ORDER — SODIUM CHLORIDE 0.9 % (FLUSH) 0.9 %
10 SYRINGE (ML) INJECTION PRN
Status: DISCONTINUED | OUTPATIENT
Start: 2020-05-15 | End: 2020-05-16 | Stop reason: HOSPADM

## 2020-05-15 RX ORDER — NIFEDIPINE 30 MG/1
60 TABLET, EXTENDED RELEASE ORAL DAILY
Status: DISCONTINUED | OUTPATIENT
Start: 2020-05-15 | End: 2020-05-16 | Stop reason: HOSPADM

## 2020-05-15 RX ORDER — WARFARIN SODIUM 2.5 MG/1
2.5 TABLET ORAL
Status: DISCONTINUED | OUTPATIENT
Start: 2020-05-17 | End: 2020-05-16 | Stop reason: HOSPADM

## 2020-05-15 RX ORDER — OYSTER SHELL CALCIUM WITH VITAMIN D 500; 200 MG/1; [IU]/1
1 TABLET, FILM COATED ORAL 2 TIMES DAILY
Status: DISCONTINUED | OUTPATIENT
Start: 2020-05-15 | End: 2020-05-16 | Stop reason: HOSPADM

## 2020-05-15 RX ORDER — POLYETHYLENE GLYCOL 3350 17 G/17G
17 POWDER, FOR SOLUTION ORAL DAILY PRN
Status: DISCONTINUED | OUTPATIENT
Start: 2020-05-15 | End: 2020-05-16 | Stop reason: HOSPADM

## 2020-05-15 RX ORDER — SODIUM CHLORIDE 0.9 % (FLUSH) 0.9 %
10 SYRINGE (ML) INJECTION EVERY 12 HOURS SCHEDULED
Status: DISCONTINUED | OUTPATIENT
Start: 2020-05-15 | End: 2020-05-16 | Stop reason: HOSPADM

## 2020-05-15 RX ORDER — 0.9 % SODIUM CHLORIDE 0.9 %
500 INTRAVENOUS SOLUTION INTRAVENOUS ONCE
Status: COMPLETED | OUTPATIENT
Start: 2020-05-15 | End: 2020-05-15

## 2020-05-15 RX ADMIN — FAMOTIDINE 20 MG: 20 TABLET ORAL at 20:42

## 2020-05-15 RX ADMIN — Medication 10 ML: at 20:44

## 2020-05-15 RX ADMIN — WARFARIN SODIUM 5 MG: 5 TABLET ORAL at 18:33

## 2020-05-15 RX ADMIN — ACETAMINOPHEN 650 MG: 325 TABLET, FILM COATED ORAL at 20:42

## 2020-05-15 RX ADMIN — ASPIRIN 81 MG: 81 TABLET, COATED ORAL at 18:33

## 2020-05-15 RX ADMIN — SODIUM CHLORIDE 500 ML: 9 INJECTION, SOLUTION INTRAVENOUS at 13:56

## 2020-05-15 RX ADMIN — CALCIUM CARBONATE-VITAMIN D TAB 500 MG-200 UNIT 1 TABLET: 500-200 TAB at 20:42

## 2020-05-15 RX ADMIN — ATORVASTATIN CALCIUM 80 MG: 80 TABLET, FILM COATED ORAL at 20:42

## 2020-05-15 ASSESSMENT — PAIN DESCRIPTION - ORIENTATION
ORIENTATION: RIGHT
ORIENTATION: MID

## 2020-05-15 ASSESSMENT — PAIN SCALES - GENERAL
PAINLEVEL_OUTOF10: 6
PAINLEVEL_OUTOF10: 0
PAINLEVEL_OUTOF10: 7
PAINLEVEL_OUTOF10: 0

## 2020-05-15 ASSESSMENT — ENCOUNTER SYMPTOMS
ABDOMINAL PAIN: 0
NAUSEA: 0
CHEST TIGHTNESS: 0
SHORTNESS OF BREATH: 1
DIARRHEA: 0
COUGH: 0
VOMITING: 0

## 2020-05-15 ASSESSMENT — PAIN DESCRIPTION - LOCATION
LOCATION: HIP
LOCATION: HEAD

## 2020-05-15 ASSESSMENT — PAIN DESCRIPTION - PAIN TYPE
TYPE: ACUTE PAIN
TYPE: ACUTE PAIN

## 2020-05-15 ASSESSMENT — PAIN DESCRIPTION - FREQUENCY: FREQUENCY: INTERMITTENT

## 2020-05-15 ASSESSMENT — PAIN DESCRIPTION - DESCRIPTORS: DESCRIPTORS: HEADACHE

## 2020-05-15 NOTE — PROGRESS NOTES
Advanced Care Planning Note.     Purpose of Encounter: Advanced care planning in light of  hospitalization  Parties In Attendance: Patient,    Decisional Capacity: Yes  Subjective: Patient understand that this conversation is to address long term care goal  Objective: lactic 2.5  Goals of Care Determination: Patient does not want CPR, intubation, peg tube, or dialysis  Code Status: DNR CCA  Time spent on Advanced care Plannin minutes  Advanced Care Planning Documents: Completed advanced directives on chart, daughter Angel Arnett is decision maker    Jillian Joaquin MD  5/15/2020 5:24 PM

## 2020-05-15 NOTE — ED PROVIDER NOTES
[facility]        Pt Name: Raine Salazar  MRN: [id number]  [place] 1950  Date of evaluation: 5/15/2020  Provider: Marlys Gray PA-C  PCP: No primary care provider on file. I have seen and evaluated this patient with my supervising physician Shaneka Norris MD.    [facility]       Chief Complaint   Patient presents with    Shortness of Breath     Pt states she is SOB and states her tongue feels like its getting numb. She states that the lidocaine caused the SOB. been going on for 2 hours. HISTORY OF PRESENT ILLNESS   (Location, Timing/Onset, Context/Setting, Quality, Duration, Modifying Factors, Severity, Associated Signs and Symptoms)  Note limiting factors. Raine Salazar is a 77 y.o. female presents to the emergency department with reports that she was laying in bed earlier today because she was having pain and difficulty as a pertains to her neck when she noted she was having shortness of breath and feeling somewhat light in the head. Patient states that she had just applied a Lidoderm patch to the right side of her neck and thought that it temporarily made her tongue go numb. She states she had no other areas of numbness and she thought that this caused her shortness of breath therefore she took the lidocaine patch off. Patient goes on to report she has had shortness of breath at rest.  She states is not dyspnea on exertion. She does not have associated symptoms of substernal chest pain with this. She states that she does fear like she is going to pass out stating that it is lightheadedness and near syncopal not vertiginous in nature. She denies headache pain. She has mild nausea but denies vomiting or diarrhea. Patient reports that she is anticoagulated with warfarin has a history of atrial fibrillation and has not had significant difficulties with this as of late.   She has not had any recent  Nontraumatic subcortical hemorrhage of cerebral hemisphere ([facility] [address] 4/30/2016    Sudden visual loss of left eye     Thyroid nodule 2/8/2018    TIA involving right internal carotid artery          SURGICAL HISTORY     Past Surgical History:   Procedure Laterality Date    CARDIAC SURGERY      COLONOSCOPY      CORONARY ANGIOPLASTY WITH STENT PLACEMENT      TUBAL LIGATION Right Torn Rotator Cuff    2013 @ [facility]         CURRENTMEDICATIONS       Previous Medications    ACETAMINOPHEN (TYLENOL) 500 MG TABLET    Take 2 tablets by mouth every 6 hours as needed for Pain    ASPIRIN 81 MG EC TABLET    Take 1 tablet by mouth daily    ATORVASTATIN (LIPITOR) 80 MG TABLET    Take 1 tablet by mouth daily    CALCIUM CARB-CHOLECALCIFEROL (CALCIUM + D3) 600-200 MG-UNIT TABS TABLET    Take 1 tablet by mouth 2 times daily    CYCLOBENZAPRINE (FLEXERIL) 10 MG TABLET    Take 1 tablet by mouth 3 times daily as needed for Muscle spasms    FAMOTIDINE (PEPCID) 20 MG TABLET    TAKE 1 TABLET BY MOUTH TWICE DAILY    FUROSEMIDE (LASIX) 20 MG TABLET    Take 1 tablet by mouth 2 times daily    LIDOCAINE 4 % EXTERNAL PATCH    Place 1 patch onto the skin daily    METHYLPREDNISOLONE (MEDROL, MAYURI,) 4 MG TABLET    Take by mouth. METOPROLOL SUCCINATE (TOPROL XL) 100 MG EXTENDED RELEASE TABLET    Take 1 tablet by mouth daily    NIFEDIPINE (ADALAT CC) 60 MG EXTENDED RELEASE TABLET    Take 1 tablet by mouth daily    OXYCODONE-ACETAMINOPHEN (PERCOCET) 5-325 MG PER TABLET    Take 1 tablet by mouth every 6 hours as needed for Pain for up to 3 days. WARNING:  May cause drowsiness. May impair ability to operate vehicles or machinery. Do not use in combination with alcohol.     POTASSIUM CHLORIDE (KLOR-CON M) 20 MEQ EXTENDED RELEASE TABLET    Take 1 tablet by mouth daily    RIVAROXABAN (XARELTO) 20 MG TABS TABLET    Take 1 tablet by mouth daily (with breakfast)    SPIRONOLACTONE (ALDACTONE) 50 MG TABLET    Take 1 tablet by mouth daily 1 tab qd WARFARIN (COUMADIN) 5 MG TABLET    Take 5 mg by mouth Every day. Wednesday 2.5. ALLERGIES     Clonidine; Codeine; Clonidine derivatives; Clonidine hcl; Lisinopril; Tramadol; and Percocet [oxycodone-acetaminophen]    FAMILYHISTORY     History reviewed. No pertinent family history. SOCIAL HISTORY       Social History     Tobacco Use    Smoking status: Never Smoker    Smokeless tobacco: Never Used   Substance Use Topics    Alcohol use: No    Drug use: No       SCREENINGS             PHYSICAL EXAM    (up to 7 for level 4, 8 or more for level 5)     ED Triage Vitals [05/15/20 1306]   BP Temp Temp Source Pulse Resp SpO2 Height Weight   (!) 82/54 97.5 °F (36.4 °C) Infrared 59 20 94 % 5' 7\" (1.702 m) 183 lb (83 kg)       Physical Exam  Vitals signs and nursing note reviewed. Constitutional:       General: She is awake. She is not in acute distress. Appearance: Normal appearance. She is well-developed. She is ill-appearing. She is not diaphoretic. HENT:      Head: Normocephalic and atraumatic. No raccoon eyes or Velasquez's sign. Right Ear: External ear normal.      Left Ear: External ear normal.   Eyes:      General: No scleral icterus. Right eye: No discharge. Left eye: No discharge. Conjunctiva/sclera: Conjunctivae normal.   Neck:      Musculoskeletal: Normal range of motion. Vascular: No JVD. Cardiovascular:      Rate and Rhythm: Regular rhythm. Bradycardia present. Heart sounds: No murmur. No friction rub. No gallop. Pulmonary:      Effort: Pulmonary effort is normal. No accessory muscle usage or respiratory distress. Breath sounds: Normal breath sounds. No wheezing, rhonchi or rales. Abdominal:      General: There is no distension. Palpations: Abdomen is soft. Abdomen is not rigid. There is no mass. Tenderness: There is no abdominal tenderness. There is no guarding or rebound. Skin:     General: Skin is warm and dry.    Neurological: Mental Status: She is alert and oriented to person, place, and time. GCS: GCS eye subscore is 4. GCS verbal subscore is 5. GCS motor subscore is 6. Cranial Nerves: No cranial nerve deficit. Sensory: No sensory deficit. Coordination: Coordination normal.   Psychiatric:         Behavior: Behavior normal. Behavior is cooperative.          DIAGNOSTIC RESULTS   LABS:    Labs Reviewed   COMPREHENSIVE METABOLIC PANEL W/ REFLEX TO MG FOR LOW K - Abnormal; Notable for the following components:       Result Value    Glucose 153 (*)     AST 49 (*)     All other components within normal limits    Narrative:     Performed at:  [facility] [address], [facility], [address]   Phone [phone]   BRAIN NATRIURETIC PEPTIDE - Abnormal; Notable for the following components:    Pro-BNP 2,450 (*)     All other components within normal limits    Narrative:     Performed at:  [facility] [address], [facility], [address]   Phone [phone]   PROTIME-INR - Abnormal; Notable for the following components:    Protime 25.8 (*)     INR 2.21 (*)     All other components within normal limits    Narrative:     Performed at:  [facility] [address], [facility], [address]   Phone [phone]   LACTATE, SEPSIS - Abnormal; Notable for the following components:    Lactic Acid, Sepsis 2.5 (*)     All other components within normal limits    Narrative:     Performed at:  [facility] [address], [facility], [address]   Phone [phone]   CULTURE, BLOOD 1   CULTURE, BLOOD 2   CBC WITH AUTO DIFFERENTIAL    Narrative:     Performed at:  [facility] [address], [facility] [address]   Phone [phone]   TROPONIN    Narrative:     Performed at:  [facility] [address], [facility] [address]   Phone [phone]   LIPASE    Narrative:     Performed at:  [facility]  [address], [address]   Phone [phone]   URINE RT REFLEX TO CULTURE   LACTATE, SEPSIS       All other labs were within normal range or not returned as of this dictation. EKG: All EKG's are interpreted by the Emergency Department Physician in the absence of a cardiologist.  Please see their note for interpretation of EKG. RADIOLOGY:   Non-plain film images such as CT, Ultrasound and MRI are read by the radiologist. Plain radiographic images are visualized and preliminarily interpreted by the ED Provider with the below findings:        Interpretation per the Radiologist below, if available at the time of this note:    XR HIP 2-3 VW W PELVIS RIGHT   Final Result   Mild degenerative changes. No fracture or dislocation. XR CHEST PORTABLE   Final Result   No acute cardiopulmonary disease. Stable cardiomegaly. Ct Cervical Spine Wo Contrast    Result Date: 5/13/2020  EXAMINATION: CT OF THE CERVICAL SPINE WITHOUT CONTRAST 5/13/2020 10:24 pm TECHNIQUE: CT of the cervical spine was performed without the administration of intravenous contrast. Multiplanar reformatted images are provided for review. Dose modulation, iterative reconstruction, and/or weight based adjustment of the mA/kV was utilized to reduce the radiation dose to as low as reasonably achievable. COMPARISON: None. HISTORY: ORDERING SYSTEM PROVIDED HISTORY: neck pain TECHNOLOGIST PROVIDED HISTORY: Reason for exam:->neck pain Reason for Exam: Neck Pain (pt,. thought she slept on her neck wrong. c/o right sided head pain, right neck pain, right shoulder pain. pt. rubbing neck during triage. ) Acuity: Acute Type of Exam: Initial FINDINGS: BONES/ALIGNMENT: There is no acute fracture or traumatic malalignment. Diffuse osteopenia limits evaluation for nondisplaced fractures.  DEGENERATIVE CHANGES: Moderate multilevel findings are as above. IV access was obtained. Laboratory testing and work-up was initiated. Patient had initial blood pressure reading that was in the range of 82/54. She was feeling lightheaded upon my examination and did have multiple bouts in the examination room of bradycardia into the 30s and 40s. Initial EKG completed demonstrates atrial fibrillation with a slow ventricular response with a rate of 47. She has an RSR prime in V1. No evidence of acute ST elevation. Otherwise normal axis and variable intervals. Please see attending physician details for further EKG interpretation. When this was noted I did asked nursing staff to place pacing pads on this patient as she was having symptoms related to symptomatic bradycardia. Ultima times after arrival as well as in the emergency department the patient would have rates that went into the range of the 30s and 40s but would quickly returned to normal.  After administration of 500 cc of normal saline the patient had no additional bouts of hypotension in the emergency department setting. CBC demonstrates no evidence of leukocytosis anemia and/or thrombocytopenia. BUN 16 creatinine 1.9 nonfasting glucose 153 electrolytes and LFTs without significant aberration. Troponin is less than 0.01.  proBNP is 2450. For anticoagulation today documented at 2.21. Lipase 20. Lactic acid is elevated at 2.5. There is no potential source for infection and this does not qualify for sepsis. Chest x-ray demonstrates no evidence of acute cardiopulmonary process and stable cardiomegaly. Hip radiographs of the right demonstrate mild degenerative changes. Lengthy discussion took place with the patient regards the above-mentioned. The metoprolol could be causative to some of her transient symptoms. Unfortunate because she is symptomatic this will require ongoing care management on an inpatient basis with cardiology consult.   Case was discussed directly with the

## 2020-05-15 NOTE — CARE COORDINATION
Patient admitted as Observation with an anticipated short hospitalization length of stay. Chart reviewed and it appears that patient has minimal needs for discharge at this time. Discussed with patients nurse and requested that case management be notified if discharge needs are identified. *Case management will continue to follow progress and update discharge plan as needed.     Electronically signed by DAVIDSON Campos, MIRTHA on 5/15/2020 at 5:17 PM

## 2020-05-16 VITALS
DIASTOLIC BLOOD PRESSURE: 100 MMHG | RESPIRATION RATE: 14 BRPM | TEMPERATURE: 97.6 F | HEIGHT: 67 IN | HEART RATE: 85 BPM | SYSTOLIC BLOOD PRESSURE: 145 MMHG | OXYGEN SATURATION: 100 % | WEIGHT: 186.95 LBS | BODY MASS INDEX: 29.34 KG/M2

## 2020-05-16 PROBLEM — I50.32 CHRONIC DIASTOLIC CHF (CONGESTIVE HEART FAILURE) (HCC): Status: ACTIVE | Noted: 2020-05-16

## 2020-05-16 LAB
ANION GAP SERPL CALCULATED.3IONS-SCNC: 9 MMOL/L (ref 3–16)
BASOPHILS ABSOLUTE: 0 K/UL (ref 0–0.2)
BASOPHILS RELATIVE PERCENT: 0.5 %
BUN BLDV-MCNC: 10 MG/DL (ref 7–20)
CALCIUM SERPL-MCNC: 8.9 MG/DL (ref 8.3–10.6)
CHLORIDE BLD-SCNC: 103 MMOL/L (ref 99–110)
CO2: 26 MMOL/L (ref 21–32)
CREAT SERPL-MCNC: 0.5 MG/DL (ref 0.6–1.2)
EOSINOPHILS ABSOLUTE: 0 K/UL (ref 0–0.6)
EOSINOPHILS RELATIVE PERCENT: 0.4 %
GFR AFRICAN AMERICAN: >60
GFR NON-AFRICAN AMERICAN: >60
GLUCOSE BLD-MCNC: 101 MG/DL (ref 70–99)
HCT VFR BLD CALC: 38.6 % (ref 36–48)
HEMOGLOBIN: 12.9 G/DL (ref 12–16)
INR BLD: 2.05 (ref 0.86–1.14)
LACTIC ACID: 1.2 MMOL/L (ref 0.4–2)
LYMPHOCYTES ABSOLUTE: 2.5 K/UL (ref 1–5.1)
LYMPHOCYTES RELATIVE PERCENT: 29.5 %
MAGNESIUM: 1.7 MG/DL (ref 1.8–2.4)
MCH RBC QN AUTO: 30.4 PG (ref 26–34)
MCHC RBC AUTO-ENTMCNC: 33.4 G/DL (ref 31–36)
MCV RBC AUTO: 90.9 FL (ref 80–100)
MONOCYTES ABSOLUTE: 0.4 K/UL (ref 0–1.3)
MONOCYTES RELATIVE PERCENT: 4.9 %
NEUTROPHILS ABSOLUTE: 5.5 K/UL (ref 1.7–7.7)
NEUTROPHILS RELATIVE PERCENT: 64.7 %
PDW BLD-RTO: 14.2 % (ref 12.4–15.4)
PLATELET # BLD: 144 K/UL (ref 135–450)
PMV BLD AUTO: 9.8 FL (ref 5–10.5)
POTASSIUM REFLEX MAGNESIUM: 3.4 MMOL/L (ref 3.5–5.1)
PROTHROMBIN TIME: 24 SEC (ref 10–13.2)
RBC # BLD: 4.24 M/UL (ref 4–5.2)
SODIUM BLD-SCNC: 138 MMOL/L (ref 136–145)
WBC # BLD: 8.6 K/UL (ref 4–11)

## 2020-05-16 PROCEDURE — 85610 PROTHROMBIN TIME: CPT

## 2020-05-16 PROCEDURE — 2580000003 HC RX 258: Performed by: INTERNAL MEDICINE

## 2020-05-16 PROCEDURE — 6370000000 HC RX 637 (ALT 250 FOR IP): Performed by: INTERNAL MEDICINE

## 2020-05-16 PROCEDURE — 85025 COMPLETE CBC W/AUTO DIFF WBC: CPT

## 2020-05-16 PROCEDURE — 83735 ASSAY OF MAGNESIUM: CPT

## 2020-05-16 PROCEDURE — 83605 ASSAY OF LACTIC ACID: CPT

## 2020-05-16 PROCEDURE — G0378 HOSPITAL OBSERVATION PER HR: HCPCS

## 2020-05-16 PROCEDURE — 6360000002 HC RX W HCPCS: Performed by: INTERNAL MEDICINE

## 2020-05-16 PROCEDURE — 96365 THER/PROPH/DIAG IV INF INIT: CPT

## 2020-05-16 PROCEDURE — 80048 BASIC METABOLIC PNL TOTAL CA: CPT

## 2020-05-16 PROCEDURE — 2580000003 HC RX 258

## 2020-05-16 PROCEDURE — 96366 THER/PROPH/DIAG IV INF ADDON: CPT

## 2020-05-16 PROCEDURE — 99215 OFFICE O/P EST HI 40 MIN: CPT | Performed by: INTERNAL MEDICINE

## 2020-05-16 RX ORDER — POTASSIUM CHLORIDE 20 MEQ/1
40 TABLET, EXTENDED RELEASE ORAL PRN
Status: DISCONTINUED | OUTPATIENT
Start: 2020-05-16 | End: 2020-05-16 | Stop reason: HOSPADM

## 2020-05-16 RX ORDER — POTASSIUM CHLORIDE 7.45 MG/ML
10 INJECTION INTRAVENOUS PRN
Status: DISCONTINUED | OUTPATIENT
Start: 2020-05-16 | End: 2020-05-16 | Stop reason: HOSPADM

## 2020-05-16 RX ORDER — SODIUM CHLORIDE 9 MG/ML
INJECTION, SOLUTION INTRAVENOUS
Status: COMPLETED
Start: 2020-05-16 | End: 2020-05-16

## 2020-05-16 RX ORDER — MAGNESIUM SULFATE 1 G/100ML
1 INJECTION INTRAVENOUS PRN
Status: DISCONTINUED | OUTPATIENT
Start: 2020-05-16 | End: 2020-05-16 | Stop reason: HOSPADM

## 2020-05-16 RX ORDER — SPIRONOLACTONE 25 MG/1
25 TABLET ORAL DAILY
Status: DISCONTINUED | OUTPATIENT
Start: 2020-05-16 | End: 2020-05-16 | Stop reason: HOSPADM

## 2020-05-16 RX ORDER — MAGNESIUM SULFATE IN WATER 40 MG/ML
2 INJECTION, SOLUTION INTRAVENOUS ONCE
Status: COMPLETED | OUTPATIENT
Start: 2020-05-16 | End: 2020-05-16

## 2020-05-16 RX ADMIN — SODIUM CHLORIDE 250 ML: 9 INJECTION, SOLUTION INTRAVENOUS at 06:30

## 2020-05-16 RX ADMIN — SPIRONOLACTONE 25 MG: 25 TABLET ORAL at 09:13

## 2020-05-16 RX ADMIN — FUROSEMIDE 20 MG: 20 TABLET ORAL at 09:13

## 2020-05-16 RX ADMIN — NIFEDIPINE 60 MG: 30 TABLET, EXTENDED RELEASE ORAL at 09:13

## 2020-05-16 RX ADMIN — ASPIRIN 81 MG: 81 TABLET, COATED ORAL at 09:13

## 2020-05-16 RX ADMIN — POTASSIUM BICARBONATE 40 MEQ: 782 TABLET, EFFERVESCENT ORAL at 06:58

## 2020-05-16 RX ADMIN — MAGNESIUM SULFATE HEPTAHYDRATE 2 G: 40 INJECTION, SOLUTION INTRAVENOUS at 06:59

## 2020-05-16 RX ADMIN — CALCIUM CARBONATE-VITAMIN D TAB 500 MG-200 UNIT 1 TABLET: 500-200 TAB at 09:12

## 2020-05-16 RX ADMIN — FAMOTIDINE 20 MG: 20 TABLET ORAL at 09:12

## 2020-05-16 RX ADMIN — Medication 10 ML: at 09:13

## 2020-05-16 ASSESSMENT — PAIN SCALES - GENERAL
PAINLEVEL_OUTOF10: 0

## 2020-05-16 NOTE — PROGRESS NOTES
Advanced Care Planning Note. Purpose of Encounter: Advanced care planning in light of CAD  Parties In Attendance: Patient  Decisional Capacity: Yes  Subjective: Patient is coughing  Objective: Cr 0.5  Goals of Care Determination: Patient wants limited support (NO CPR,  No vent, ok for surgery, no HD, no trach, no PEG)  Plan:  Cardiology consult  Code Status: DNR CCA/DNI   Time spent on Advanced care Plannin minutes  Advanced Care Planning Documents: Completed advanced directives on chart,  is the POA.     Deisi Gracia MD  2020 11:23 AM

## 2020-05-16 NOTE — PLAN OF CARE
Problem: Falls - Risk of:  Goal: Will remain free from falls  Description: Will remain free from falls  Outcome: Ongoing  Note: Pt remains free of falls. Fall risk protocol in place. Bed locked in lowest position. Call light in reach. Non skid socks in place, bed alarm engaged. Tolerates ambulation well. Pt instructed to call for assistance, verbalizes understanding. Will continue to monitor. Problem: HEMODYNAMIC STATUS  Goal: Patient has stable vital signs and fluid balance  Outcome: Ongoing  Note: Pulse rate and rhythm, peripheral pulses, and capillary refill assessed every shift with assessment. General color and body temperature monitored throughout shift and with vitals. Assess for edema with head to toe assessment. Administer treatments and medications as ordered. Monitor patient's weight.

## 2020-05-16 NOTE — CONSULTS
[facility]   Cardiology Consultation   Date: 5/16/2020  Reason for Consultation: Bradycardia  Consult Requesting Physician: Rupal Dye MD     Chief Complaint   Patient presents with    Shortness of Breath     Pt states she is SOB and states her tongue feels like its getting numb. She states that the lidocaine caused the SOB. been going on for 2 hours. HPI: Carlin Albert is a 77 y.o. past medical history significant for chronic permanent atrial fibrillation, coronary artery disease, hypertension, CVA with history of ICH, on coumadin who was presented to hospital with shortness of breath, lightheadedness and dizziness. She denies having any syncope. She denies having any chest pain. On admission her heart rate was 47 and cardiology has been consulted. She also had low magnesium which is being replaced. She was taking 100 mg Toprol-XL as outpatient. Past Medical History:   Diagnosis Date    Acute cerebrovascular accident (CVA) ([address] 1/28/2020    Atrial fibrillation (HCC)     Bell palsy     diagnosed 15 years ago   [facility] CAD (coronary artery disease)     Cerebral artery occlusion with cerebral infarction ([address]     CVA (cerebral vascular accident) ([address] 1/4/2017    Hypertension     Intracranial hemorrhage ([address] 4/2016    Nonintractable headache     Nontraumatic subcortical hemorrhage of cerebral hemisphere ([address] 4/30/2016    Sudden visual loss of left eye     Thyroid nodule 2/8/2018    TIA involving right internal carotid artery         Past Surgical History:   Procedure Laterality Date    CARDIAC SURGERY      COLONOSCOPY      CORONARY ANGIOPLASTY WITH STENT PLACEMENT      TUBAL LIGATION Right Torn Rotator Cuff    2013 @ [facility]       Allergies   Allergen Reactions    Clonidine Rash, Shortness Of Breath and Hives    Codeine Hives, Itching, Nausea Only and Rash     Other reaction(s):  Other (See Comments)  Pruritis    Clonidine Derivatives Hives    Clonidine Hcl Hives   

## 2020-05-18 ENCOUNTER — TELEPHONE (OUTPATIENT)
Dept: INTERNAL MEDICINE CLINIC | Age: 70
End: 2020-05-18

## 2020-05-18 ENCOUNTER — CARE COORDINATION (OUTPATIENT)
Dept: CARE COORDINATION | Age: 70
End: 2020-05-18

## 2020-05-18 NOTE — CARE COORDINATION
Wanda Liang 1626 IP Discharge Follow up Call    Date of discharge: 5.16.20  Facility: [facility]  Non-face-to-face services provided:  Obtained and reviewed discharge summary and/or continuity of care documents  Education of patient/family/caregiver/guardian to support self-management-HF  Assistance in accessing community resources-offered by nurse, however pt declines, denies need for assistance stating she has knowledge of available community-based support but does not require at this time    Reason for Hospital Visit:  bradycardia  Discharged with Home Health?:  No    Date of first visit after discharge:  N/A  Status:     improved    Did you receive a discharge summary with list of medication from the hospital? Yes  Review of Instructions:     Understands what to report/when to return?:  Yes   Understands dscharge instructions?:  Yes   Following discharge instructions?:  Yes   If not why? Is there any lingering symptoms? No  Are you eating and drinking OK? Yes  Any other problems i.e. Constipation, other symptoms? No  Are there any new complaints of pain? No  If you have a wound is the dressing clean, dry, and intact? N/A  Understands wound care regimen? N/A  Are there any other complaints/concerns that you wish to tell your provider? No  Denies need/want for nurse assistance in contacting provider to schedule hosp f/u. States preference to self-contact provider today to review provider recommendation for scheduling hosp f/u appt/virtual visit. FU appts/Provider:    Future Appointments   Date Time Provider Kayla Jones   6/23/2020  1:15 PM Chapis Weclh MD [facility]       New Medications at discharge?:     No                      Medication Reconciliation by phone -     Each medication was reviewed (both pre and post hospitalization)  Yes  Pt reports she has discarded DC'd medications the moment she returned home.   Were there discrepancies in community.  Clean and disinfect frequently touched surfaces.  Avoid all cruise travel and non-essential air travel.  Call your healthcare professional if you have concerns about COVID-19 and your underlying condition or if you are sick. For more information on steps you can take to protect yourself, see CDC's How to Protect Yourself    Pt verbalized understanding of DC instructions, denies any worsening or new onset symptoms and agrees with the following  Plan for follow-up call in 7-14 days based on severity of symptoms and risk factors.

## 2020-05-19 ENCOUNTER — TELEPHONE (OUTPATIENT)
Dept: PHARMACY | Age: 70
End: 2020-05-19

## 2020-05-19 LAB
BLOOD CULTURE, ROUTINE: NORMAL
CULTURE, BLOOD 2: NORMAL

## 2020-05-22 ENCOUNTER — OFFICE VISIT (OUTPATIENT)
Dept: INTERNAL MEDICINE CLINIC | Age: 70
End: 2020-05-22
Payer: MEDICARE

## 2020-05-22 VITALS
BODY MASS INDEX: 29.23 KG/M2 | OXYGEN SATURATION: 98 % | TEMPERATURE: 99.6 F | SYSTOLIC BLOOD PRESSURE: 148 MMHG | WEIGHT: 186.6 LBS | HEART RATE: 89 BPM | DIASTOLIC BLOOD PRESSURE: 106 MMHG | RESPIRATION RATE: 18 BRPM

## 2020-05-22 PROCEDURE — 99213 OFFICE O/P EST LOW 20 MIN: CPT | Performed by: STUDENT IN AN ORGANIZED HEALTH CARE EDUCATION/TRAINING PROGRAM

## 2020-05-22 RX ORDER — VALSARTAN 80 MG/1
80 TABLET ORAL DAILY
Qty: 90 TABLET | Refills: 1 | Status: SHIPPED | OUTPATIENT
Start: 2020-05-22 | End: 2020-06-03

## 2020-05-22 ASSESSMENT — ENCOUNTER SYMPTOMS
ABDOMINAL DISTENTION: 0
TROUBLE SWALLOWING: 0
ABDOMINAL PAIN: 0
COUGH: 1
STRIDOR: 0
CHOKING: 0
SHORTNESS OF BREATH: 1
APNEA: 0
NAUSEA: 0
BACK PAIN: 1
SORE THROAT: 0
VOMITING: 0
CHEST TIGHTNESS: 0
EYES NEGATIVE: 1
WHEEZING: 0

## 2020-05-22 NOTE — PATIENT INSTRUCTIONS
Please follow up with cardiology as soon as possible for atrial fibrillation and choice of anti coagulation     Please complete blood work     Take blood pressure daily to see effect of new blood pressure medication

## 2020-05-22 NOTE — PROGRESS NOTES
tablets by mouth every 6 hours as needed for Pain Yes Lauren Adams MD        Social History     Tobacco Use    Smoking status: Never Smoker    Smokeless tobacco: Never Used   Substance Use Topics    Alcohol use: No        Vitals:    05/22/20 1405 05/22/20 1406   BP: (!) 150/106 (!) 148/106   Site: Right Upper Arm    Position: Sitting    Cuff Size: Medium Adult    Pulse: 89    Resp: 18    Temp: 99.6 °F (37.6 °C)    TempSrc: Oral    SpO2: 98%    Weight: 186 lb 9.6 oz (84.6 kg)      Estimated body mass index is 29.23 kg/m² as calculated from the following:    Height as of 5/15/20: 5' 7\" (1.702 m). Weight as of this encounter: 186 lb 9.6 oz (84.6 kg). Physical Exam  Constitutional:       General: She is not in acute distress. Appearance: She is well-developed. She is not ill-appearing, toxic-appearing or diaphoretic. HENT:      Head: Normocephalic and atraumatic. Eyes:      Pupils: Pupils are equal, round, and reactive to light. Neck:      Musculoskeletal: Normal range of motion. Neck rigidity and muscular tenderness present. Cardiovascular:      Rate and Rhythm: Normal rate. Rhythm irregular. Heart sounds: Murmur present. Pulmonary:      Effort: Pulmonary effort is normal. No respiratory distress. Breath sounds: Normal breath sounds. No stridor. No wheezing. Chest:      Chest wall: No tenderness. Abdominal:      General: Bowel sounds are normal. There is no distension. Palpations: Abdomen is soft. Tenderness: There is no abdominal tenderness. There is no guarding. Musculoskeletal: Normal range of motion. General: No tenderness or deformity. Skin:     General: Skin is warm and dry. Neurological:      General: No focal deficit present. Mental Status: She is alert and oriented to person, place, and time. ASSESSMENT/PLAN:  1.  Atrial fibrillation, unspecified type [facility])  - recent hospital admission with evidence of bradycardia was taken of

## 2020-05-26 ENCOUNTER — CARE COORDINATION (OUTPATIENT)
Dept: CARE COORDINATION | Age: 70
End: 2020-05-26

## 2020-05-27 DIAGNOSIS — Z00.00 HEALTHCARE MAINTENANCE: ICD-10-CM

## 2020-05-27 DIAGNOSIS — I50.32 CHRONIC HEART FAILURE WITH PRESERVED EJECTION FRACTION (HCC): ICD-10-CM

## 2020-05-27 DIAGNOSIS — I48.91 ATRIAL FIBRILLATION, UNSPECIFIED TYPE (HCC): Chronic | ICD-10-CM

## 2020-05-27 LAB
ANION GAP SERPL CALCULATED.3IONS-SCNC: 12 MMOL/L (ref 3–16)
BUN BLDV-MCNC: 10 MG/DL (ref 7–20)
CALCIUM SERPL-MCNC: 9.7 MG/DL (ref 8.3–10.6)
CHLORIDE BLD-SCNC: 100 MMOL/L (ref 99–110)
CO2: 25 MMOL/L (ref 21–32)
CREAT SERPL-MCNC: 0.6 MG/DL (ref 0.6–1.2)
GFR AFRICAN AMERICAN: >60
GFR NON-AFRICAN AMERICAN: >60
GLUCOSE BLD-MCNC: 95 MG/DL (ref 70–99)
HEPATITIS C ANTIBODY INTERPRETATION: NORMAL
INR BLD: 1.75 (ref 0.86–1.14)
MAGNESIUM: 2 MG/DL (ref 1.8–2.4)
POTASSIUM SERPL-SCNC: 4.6 MMOL/L (ref 3.5–5.1)
PROTHROMBIN TIME: 20.4 SEC (ref 10–13.2)
SODIUM BLD-SCNC: 137 MMOL/L (ref 136–145)
TSH REFLEX: 0.68 UIU/ML (ref 0.27–4.2)

## 2020-05-27 NOTE — CARE COORDINATION
Unable to reach pt or leave message d/t vm FULL. In absence of alternate contact approved by HIPAA consent, no further outreach planned. As this was attempt for final Care Transition outreach, and since pt declined support needs on prior Care Transition outreach, and since pt did follow up w/PCP on 5.22.20 -     Plan: this completes planned outreach for current Care Transition episode.

## 2020-05-28 ENCOUNTER — ANTI-COAG VISIT (OUTPATIENT)
Dept: PHARMACY | Age: 70
End: 2020-05-28

## 2020-05-28 PROCEDURE — 99211 OFF/OP EST MAY X REQ PHY/QHP: CPT

## 2020-05-28 NOTE — PROGRESS NOTES
Change in activity  []   [x]       Hospital admission- Pt called because she was recently in hospital and ED. In ED 5/13 for neck pain. Was d/c with cyclobenzaprine percocet, and medrol dose tana. Was in hospital 5/15-5/16 for SOB and bradycardia. INR was 2.2 and then 2. No significant med changes. Was given home dose of warfarin in patient. Pt verified correct weekly dose as adjusted, states that she might have missed dose on fri when went in to hospital. Instructed for patient to continue new weekly dose of 2.5 mg on Sun, Tues and Thurs and 5 mg all other days. Recheck INR in 1 week, 5/27. []   [x]       Emergency department visit   []   [x]       Other complaints- states that she is using the pillbox, and she likes it. -> has not been using recently, but wants to restart ---> states she tried to use pillbox, asked to use pillbox and AVS and to cross off each day on AVS to prevent her from missing doses. Concerned for patient's ability to correctly remember recent history. She states that she is getting concerned for her memory. Clinical Outcomes:  Yes     No  []   [x]       Major bleeding event  []   [x]       Thromboembolic event  Duration of warfarin Therapy: indefinitely  INR Range:  1.8-2.5 -> lower INR goal dt h/o subcortical hemorrhage (2016)    She may be slower to reach steady state with warfarin dosing so consider checking INR about 10 days after dose adjustment. INR was 1.75 yesterday via lab draw at [facility]. Called patient to complete apt via telephone. We have slowly been increasing dose (previously on 2.5mg Wed 5mg AOD) dt INR of >10. Since Goal is 1.8-2.5 and concerned that more subtherapeutic d/t missing doses and would cause large swing in INR if increased dose again, will continue same dose and check back/continue to monitor closely. Continue taking weekly dose of 2.5 mg Sun, Tues and Thurs and 5 mg all other days.   Encouraged pt to pay attention to dosing in order

## 2020-06-02 ENCOUNTER — TELEPHONE (OUTPATIENT)
Dept: INTERNAL MEDICINE CLINIC | Age: 70
End: 2020-06-02

## 2020-06-03 ENCOUNTER — OFFICE VISIT (OUTPATIENT)
Dept: CARDIOLOGY CLINIC | Age: 70
End: 2020-06-03
Payer: MEDICARE

## 2020-06-03 ENCOUNTER — ANTI-COAG VISIT (OUTPATIENT)
Dept: PHARMACY | Age: 70
End: 2020-06-03
Payer: MEDICARE

## 2020-06-03 ENCOUNTER — TELEPHONE (OUTPATIENT)
Dept: PHARMACY | Age: 70
End: 2020-06-03

## 2020-06-03 ENCOUNTER — TELEPHONE (OUTPATIENT)
Dept: INTERNAL MEDICINE CLINIC | Age: 70
End: 2020-06-03

## 2020-06-03 VITALS — TEMPERATURE: 96.7 F

## 2020-06-03 VITALS
HEIGHT: 67 IN | WEIGHT: 186 LBS | BODY MASS INDEX: 29.19 KG/M2 | SYSTOLIC BLOOD PRESSURE: 140 MMHG | DIASTOLIC BLOOD PRESSURE: 116 MMHG | HEART RATE: 97 BPM

## 2020-06-03 LAB — INTERNATIONAL NORMALIZATION RATIO, POC: 3.2

## 2020-06-03 PROCEDURE — 99214 OFFICE O/P EST MOD 30 MIN: CPT | Performed by: NURSE PRACTITIONER

## 2020-06-03 PROCEDURE — 1036F TOBACCO NON-USER: CPT | Performed by: NURSE PRACTITIONER

## 2020-06-03 PROCEDURE — G8427 DOCREV CUR MEDS BY ELIG CLIN: HCPCS | Performed by: NURSE PRACTITIONER

## 2020-06-03 PROCEDURE — 93000 ELECTROCARDIOGRAM COMPLETE: CPT | Performed by: NURSE PRACTITIONER

## 2020-06-03 PROCEDURE — 1090F PRES/ABSN URINE INCON ASSESS: CPT | Performed by: NURSE PRACTITIONER

## 2020-06-03 PROCEDURE — 85610 PROTHROMBIN TIME: CPT

## 2020-06-03 PROCEDURE — G8417 CALC BMI ABV UP PARAM F/U: HCPCS | Performed by: NURSE PRACTITIONER

## 2020-06-03 PROCEDURE — 99211 OFF/OP EST MAY X REQ PHY/QHP: CPT

## 2020-06-03 PROCEDURE — G8400 PT W/DXA NO RESULTS DOC: HCPCS | Performed by: NURSE PRACTITIONER

## 2020-06-03 PROCEDURE — 1123F ACP DISCUSS/DSCN MKR DOCD: CPT | Performed by: NURSE PRACTITIONER

## 2020-06-03 PROCEDURE — 3017F COLORECTAL CA SCREEN DOC REV: CPT | Performed by: NURSE PRACTITIONER

## 2020-06-03 PROCEDURE — 4040F PNEUMOC VAC/ADMIN/RCVD: CPT | Performed by: NURSE PRACTITIONER

## 2020-06-03 RX ORDER — LOSARTAN POTASSIUM 25 MG/1
25 TABLET ORAL DAILY
Qty: 90 TABLET | OUTPATIENT
Start: 2020-06-03

## 2020-06-03 RX ORDER — LOSARTAN POTASSIUM 25 MG/1
25 TABLET ORAL DAILY
Qty: 30 TABLET | Refills: 1 | Status: SHIPPED | OUTPATIENT
Start: 2020-06-03 | End: 2020-09-08

## 2020-06-03 NOTE — PROGRESS NOTES
d/c with cyclobenzaprine percocet, and medrol dose tana. Was in hospital 5/15-5/16 for SOB and bradycardia. INR was 2.2 and then 2. No significant med changes. Was given home dose of warfarin in patient. Pt verified correct weekly dose as adjusted, states that she might have missed dose on fri when went in to hospital. Instructed for patient to continue new weekly dose of 2.5 mg on Sun, Tues and Thurs and 5 mg all other days. Recheck INR in 1 week, 5/27. []   [x]       Emergency department visit   []   [x]       Other complaints- states that she is using the pillbox, and she likes it. -> has not been using recently, but wants to restart ---> states she tried to use pillbox, asked to use pillbox and AVS and to cross off each day on AVS to prevent her from missing doses. Concerned for patient's ability to correctly remember recent history. She states that she is getting concerned for her memory. Clinical Outcomes:  Yes     No  []   [x]       Major bleeding event  []   [x]       Thromboembolic event  Duration of warfarin Therapy: indefinitely  INR Range:  1.8-2.5 -> lower INR goal dt h/o subcortical hemorrhage (2016)    She may be slower to reach steady state with warfarin dosing so consider checking INR about 10 days after dose adjustment. INR is 3.2 today   We have slowly been increasing dose (previously on 2.5mg Wed 5mg AOD) dt INR of >10. Since Goal is 1.8-2.5 and concerned that more subtherapeutic d/t missing doses and would cause large swing in INR if increased dose again, will continue same dose and check back/continue to monitor closely. INR increased ~1.5 in 1 week with patient taking lowered dose than instructed. Unable to determine etiology or prevent INR from fluctuating. Advised to discuss with cardiology today.  Called cardio to notify  Take 2.5 mg tonight then continue taking weekly dose of 2.5 mg Sun, Tues and Thurs and 5 mg all other days  Encouraged pt to pay attention to dosing

## 2020-06-03 NOTE — PATIENT INSTRUCTIONS
- Refer for Watchman  - Continue current medications  - Start your new medication from PCP   - Check your heart rate at home, if it is <50 or >120 please call the office   - Check your blood pressure at home, if your top number blood pressure is >150/90 or <90/50 please call the office

## 2020-06-15 RX ORDER — FAMOTIDINE 20 MG/1
20 TABLET, FILM COATED ORAL 2 TIMES DAILY
Qty: 60 TABLET | Refills: 2 | Status: SHIPPED | OUTPATIENT
Start: 2020-06-15 | End: 2020-10-15 | Stop reason: SDUPTHER

## 2020-06-16 ENCOUNTER — ANTI-COAG VISIT (OUTPATIENT)
Dept: PHARMACY | Age: 70
End: 2020-06-16
Payer: MEDICARE

## 2020-06-16 VITALS — TEMPERATURE: 97.3 F

## 2020-06-16 LAB — INTERNATIONAL NORMALIZATION RATIO, POC: 3.4

## 2020-06-16 PROCEDURE — 85610 PROTHROMBIN TIME: CPT

## 2020-06-16 PROCEDURE — 99211 OFF/OP EST MAY X REQ PHY/QHP: CPT

## 2020-06-16 NOTE — PROGRESS NOTES
admission- Pt called because she was recently in hospital and ED. In ED 5/13 for neck pain. Was d/c with cyclobenzaprine percocet, and medrol dose tana. Was in hospital 5/15-5/16 for SOB and bradycardia. INR was 2.2 and then 2. No significant med changes. Was given home dose of warfarin in patient. Pt verified correct weekly dose as adjusted, states that she might have missed dose on fri when went in to hospital. Instructed for patient to continue new weekly dose of 2.5 mg on Sun, Tues and Thurs and 5 mg all other days. Recheck INR in 1 week, 5/27. []   [x]       Emergency department visit   []   [x]       Other complaints- states that she is using the pillbox, and she likes it. -> has not been using recently, but wants to restart ---> states she tried to use pillbox, asked to use pillbox and AVS and to cross off each day on AVS to prevent her from missing doses. Concerned for patient's ability to correctly remember recent history. She states that she is getting concerned for her memory. Clinical Outcomes:  Yes     No  []   [x]       Major bleeding event  []   [x]       Thromboembolic event  Duration of warfarin Therapy: indefinitely  INR Range:  1.8-2.5 -> lower INR goal dt h/o subcortical hemorrhage (2016)    She may be slower to reach steady state with warfarin dosing so consider checking INR about 10 days after dose adjustment.     INR is 3.4 today   Take 2.5 mg tomorrow then continue taking weekly dose of 2.5 mg Sun, Tues and Thurs and 5 mg all other days  Encouraged pt to pay attention to dosing in order to not miss doses   Recheck INR in 2 week, 6/29    Referring cardiologist is Dr. Bhumi Cummings  INR (no units)   Date Value   06/16/2020 3.4   06/03/2020 3.2   05/27/2020 1.75 (H)   05/16/2020 2.05 (H)   05/15/2020 2.21 (H)   05/12/2020 1.43 (H)     CLINICAL PHARMACY CONSULT: MED RECONCILIATION/REVIEW ADDENDUM    For Pharmacy Admin Tracking Only    PHSO: No  Total # of Interventions Recommended: 1  -

## 2020-06-30 ENCOUNTER — TELEPHONE (OUTPATIENT)
Dept: PHARMACY | Age: 70
End: 2020-06-30

## 2020-07-02 ENCOUNTER — ANTI-COAG VISIT (OUTPATIENT)
Dept: PHARMACY | Age: 70
End: 2020-07-02
Payer: MEDICARE

## 2020-07-02 VITALS — TEMPERATURE: 97.4 F

## 2020-07-02 LAB — INTERNATIONAL NORMALIZATION RATIO, POC: 2.5

## 2020-07-02 PROCEDURE — 85610 PROTHROMBIN TIME: CPT

## 2020-07-02 PROCEDURE — 99211 OFF/OP EST MAY X REQ PHY/QHP: CPT

## 2020-07-02 NOTE — PROGRESS NOTES
Ms. Celia Borja is a 77 y.o. y/o female with history of A fib   She presents today for anticoagulation monitoring and adjustment. 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)    Patient Reported Findings:  Yes     No   [x]   []       Patient verifies current dosing regimen as listed    States that she took 5 mg on Sun, Mon, Wed and Fri and 2.5 mg all other days of the week   []   [x]       S/S bleeding/bruising/swelling/SOB -dizzy and SOB, talked about s/sx and risks of INR being subtherapeutic---> she is going to the doctor today to discuss this --> states that she is feeling a lot better today ---> denies, some SOB, no fever---> denies      []   [x]       Blood in urine or stool - denies   [x]   []       Procedures scheduled in the future at this time -thinks she has a colonoscopy on Wed this week, 3/18- no instructions for holding since they thought she was on Xarelto - called and has to hold 5 days, explained situation from Thursday on VM --> had colonoscopy on wed 3/18. States that she restarted warfarin 2.5 mg daily after procedure   Is being assessed for watchman, will keep notified   []   [x]       Missed Dose- denies   []   [x]       Extra Dose    []   [x]       Change in medications She continues with Tylenol 1000mg but only as needed --> states that she will take up to q6h  []   [x]       Change in health/diet/appetite-- normally has high vitamin K diet of canned spinach weekly and collards or broccoli about every other week. Will have some lower vitamin K veggies about 2 times a week such as green beans. --> states no changes, no NVD -> states that she has little appetite. No NVD---> continues with no appetite, has had a small amount of diarrhea --> states that she has been eating liver twice a week, likely why INR has dropped. Asked patient to decrease to once a week --> states that she has not been eating as much.  Stopped eating liver ---> no changes   []   [x]       Change in alcohol use denies  []   [x]       Change in activity  []   [x]       Hospital admission- Pt called because she was recently in hospital and ED. In ED 5/13 for neck pain. Was d/c with cyclobenzaprine percocet, and medrol dose tana. Was in hospital 5/15-5/16 for SOB and bradycardia. INR was 2.2 and then 2. No significant med changes. Was given home dose of warfarin in patient. Pt verified correct weekly dose as adjusted, states that she might have missed dose on fri when went in to hospital. Instructed for patient to continue new weekly dose of 2.5 mg on Sun, Tues and Thurs and 5 mg all other days. Recheck INR in 1 week, 5/27. []   [x]       Emergency department visit   []   [x]       Other complaints- states that she is using the pillbox, and she likes it. -> has not been using recently, but wants to restart ---> states she tried to use pillbox, asked to use pillbox and AVS and to cross off each day on AVS to prevent her from missing doses. Concerned for patient's ability to correctly remember recent history. She states that she is getting concerned for her memory. Clinical Outcomes:  Yes     No  []   [x]       Major bleeding event  []   [x]       Thromboembolic event  Duration of warfarin Therapy: indefinitely  INR Range:  1.8-2.5 -> lower INR goal dt h/o subcortical hemorrhage (2016)    She may be slower to reach steady state with warfarin dosing so consider checking INR about 10 days after dose adjustment.     INR is 2.5 today   Continue taking weekly dose of 2.5 mg Sun, Tues and Thurs and 5 mg all other days  Encouraged pt to pay attention to dosing in order to not miss doses   Recheck INR in 2 week, 7/17    Referring cardiologist is Dr. Chris Corcoran  INR (no units)   Date Value   06/16/2020 3.4   06/03/2020 3.2   05/27/2020 1.75 (H)   05/16/2020 2.05 (H)   05/15/2020 2.21 (H)   05/12/2020 1.43 (H)     CLINICAL PHARMACY CONSULT: MED RECONCILIATION/REVIEW ADDENDUM    For Pharmacy Admin Tracking Only    PHSO: No  Total # of Interventions Recommended: 0  - Maintenance Safety Lab Monitoring #: 1  Total Interventions Accepted: 0  Time Spent (min): 15    Nahomy CruzD

## 2020-07-17 ENCOUNTER — ANTI-COAG VISIT (OUTPATIENT)
Dept: PHARMACY | Age: 70
End: 2020-07-17
Payer: MEDICARE

## 2020-07-17 VITALS — TEMPERATURE: 98.1 F

## 2020-07-17 LAB — INTERNATIONAL NORMALIZATION RATIO, POC: 3

## 2020-07-17 PROCEDURE — 85610 PROTHROMBIN TIME: CPT

## 2020-07-17 PROCEDURE — 99211 OFF/OP EST MAY X REQ PHY/QHP: CPT

## 2020-07-17 NOTE — PROGRESS NOTES
Ms. Cynthia Winter is a 77 y.o. y/o female with history of A fib   She presents today for anticoagulation monitoring and adjustment. 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)    Patient Reported Findings:  Yes     No   [x]   []       Patient verifies current dosing regimen as listed    States that she took 5 mg on Sun, Mon, Wed and Fri and 2.5 mg all other days of the week ---> confirms dose   []   [x]       S/S bleeding/bruising/swelling/SOB -dizzy and SOB, talked about s/sx and risks of INR being subtherapeutic---> she is going to the doctor today to discuss this --> states that she is feeling a lot better today ---> denies, some SOB, no fever---> denies but has HA      []   [x]       Blood in urine or stool - denies   [x]   []       Procedures scheduled in the future at this time -thinks she has a colonoscopy on Wed this week, 3/18- no instructions for holding since they thought she was on Xarelto - called and has to hold 5 days, explained situation from Thursday on VM --> had colonoscopy on wed 3/18. States that she restarted warfarin 2.5 mg daily after procedure   Is being assessed for watchman, will keep notified   []   [x]       Missed Dose- may have missed Sat she thinks but not sure since it was chaotic with grandson dying   []   [x]       Extra Dose - denies    []   [x]       Change in medications She continues with Tylenol 1000mg but only as needed --> states that she will take up to q6h---> tylenol recently   []   [x]       Change in health/diet/appetite-- normally has high vitamin K diet of canned spinach weekly and collards or broccoli about every other week. Will have some lower vitamin K veggies about 2 times a week such as green beans. --> states no changes, no NVD -> states that she has little appetite. No NVD---> continues with no appetite, has had a small amount of diarrhea --> states that she has been eating liver twice a week, likely why INR has dropped.  Asked patient to decrease to once a week --> states that she has not been eating as much. Stopped eating liver ---> no changes---> no greens, no NVD   []   [x]       Change in alcohol use denies  []   [x]       Change in activity  []   [x]       Hospital admission- Pt called because she was recently in hospital and ED. In ED 5/13 for neck pain. Was d/c with cyclobenzaprine percocet, and medrol dose tana. Was in hospital 5/15-5/16 for SOB and bradycardia. INR was 2.2 and then 2. No significant med changes. Was given home dose of warfarin in patient. Pt verified correct weekly dose as adjusted, states that she might have missed dose on fri when went in to hospital. Instructed for patient to continue new weekly dose of 2.5 mg on Sun, Tues and Thurs and 5 mg all other days. Recheck INR in 1 week, 5/27. []   [x]       Emergency department visit   []   [x]       Other complaints- states that she is using the pillbox, and she likes it. -> has not been using recently, but wants to restart ---> states she tried to use pillbox, asked to use pillbox and AVS and to cross off each day on AVS to prevent her from missing doses. Concerned for patient's ability to correctly remember recent history. She states that she is getting concerned for her memory. Clinical Outcomes:  Yes     No  []   [x]       Major bleeding event  []   [x]       Thromboembolic event  Duration of warfarin Therapy: indefinitely  INR Range:  1.8-2.5 -> lower INR goal dt h/o subcortical hemorrhage (2016)    She may be slower to reach steady state with warfarin dosing so consider checking INR about 10 days after dose adjustment. INR is 3.0 today   Thinks she may have missed Sat but not sure- grandson was killed in shooting and she has been very stressed/sad. Also had no greens and more tylenol. Since INR goal is now 1.8-2.5, will hold tonight then monitor closely since she is not sure if she missed that dose in the past week.    Hold tonight then continue taking weekly dose of 2.5 mg Sat, Tues and Thurs and 5 mg all other days. Encouraged pt to pay attention to dosing in order to not miss doses.    Recheck INR in 1 week, 7/24    Referring cardiologist is Dr. Tammy Ortega  INR (no units)   Date Value   07/02/2020 2.5   06/16/2020 3.4   06/03/2020 3.2   05/27/2020 1.75 (H)   05/16/2020 2.05 (H)   05/15/2020 2.21 (H)   05/12/2020 1.43 (H)     CLINICAL PHARMACY CONSULT: MED RECONCILIATION/REVIEW ADDENDUM    For Pharmacy Admin Tracking Only    PHSO: No  Total # of Interventions Recommended: 1  - Decreased Dose #: 1  - Maintenance Safety Lab Monitoring #: 1  Total Interventions Accepted: 1  Time Spent (min): Via Manish Jamil PharmD

## 2020-07-21 RX ORDER — ATORVASTATIN CALCIUM 80 MG/1
80 TABLET, FILM COATED ORAL DAILY
Qty: 90 TABLET | Refills: 1 | Status: SHIPPED | OUTPATIENT
Start: 2020-07-21 | End: 2020-07-23

## 2020-07-23 RX ORDER — ATORVASTATIN CALCIUM 80 MG/1
80 TABLET, FILM COATED ORAL DAILY
Qty: 90 TABLET | Refills: 1 | Status: SHIPPED | OUTPATIENT
Start: 2020-07-23 | End: 2020-10-15 | Stop reason: SDUPTHER

## 2020-07-27 ENCOUNTER — TELEPHONE (OUTPATIENT)
Dept: PHARMACY | Age: 70
End: 2020-07-27

## 2020-07-27 ENCOUNTER — APPOINTMENT (OUTPATIENT)
Dept: PHARMACY | Age: 70
End: 2020-07-27
Payer: MEDICARE

## 2020-07-30 ENCOUNTER — ANTI-COAG VISIT (OUTPATIENT)
Dept: PHARMACY | Age: 70
End: 2020-07-30
Payer: MEDICARE

## 2020-07-30 VITALS — TEMPERATURE: 97.2 F

## 2020-07-30 LAB — INTERNATIONAL NORMALIZATION RATIO, POC: 3.4

## 2020-07-30 PROCEDURE — 85610 PROTHROMBIN TIME: CPT

## 2020-07-30 PROCEDURE — 99211 OFF/OP EST MAY X REQ PHY/QHP: CPT

## 2020-07-30 RX ORDER — SPIRONOLACTONE 50 MG/1
TABLET, FILM COATED ORAL
Qty: 90 TABLET | Refills: 1 | Status: SHIPPED | OUTPATIENT
Start: 2020-07-30 | End: 2020-10-15 | Stop reason: SDUPTHER

## 2020-07-30 RX ORDER — FUROSEMIDE 20 MG/1
TABLET ORAL
Qty: 180 TABLET | Refills: 1 | Status: SHIPPED | OUTPATIENT
Start: 2020-07-30 | End: 2020-10-15 | Stop reason: SDUPTHER

## 2020-07-30 NOTE — PROGRESS NOTES
Ms. Layo Horton is a 77 y.o. y/o female with history of A fib   She presents today for anticoagulation monitoring and adjustment. 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)    Patient Reported Findings:  Yes     No   [x]   []       Patient verifies current dosing regimen as listed    States that she took 5 mg on Sun, Mon, Wed and Fri and 2.5 mg all other days of the week ---> confirms dose   []   [x]       S/S bleeding/bruising/swelling/SOB -dizzy and SOB, talked about s/sx and risks of INR being subtherapeutic---> she is going to the doctor today to discuss this --> states that she is feeling a lot better today ---> denies, some SOB, no fever---> denies but has HA      []   [x]       Blood in urine or stool - denies   [x]   []       Procedures scheduled in the future at this time -thinks she has a colonoscopy on Wed this week, 3/18- no instructions for holding since they thought she was on Xarelto - called and has to hold 5 days, explained situation from Thursday on VM --> had colonoscopy on wed 3/18. States that she restarted warfarin 2.5 mg daily after procedure   Is being assessed for watchman, will keep notified   []   [x]       Missed Dose- may have missed Sat she thinks but not sure since it was chaotic with grandson dying   []   [x]       Extra Dose - denies    [x]   []       Change in medications She continues with Tylenol 1000mg but only as needed --> states that she will take up to q6h---> tylenol recently --> continues with 4 tylenol daily    []   [x]       Change in health/diet/appetite-- normally has high vitamin K diet of canned spinach weekly and collards or broccoli about every other week. Will have some lower vitamin K veggies about 2 times a week such as green beans. --> states no changes, no NVD -> states that she has little appetite.  No NVD---> continues with no appetite, has had a small amount of diarrhea --> states that she has been eating liver twice a week, likely why INR has dropped. Asked patient to decrease to once a week --> states that she has not been eating as much. Stopped eating liver ---> no changes---> no greens, no NVD   []   [x]       Change in alcohol use denies  []   [x]       Change in activity  []   [x]       Hospital admission-    []   [x]       Emergency department visit   []   [x]       Other complaints- states that she is using the pillbox, and she likes it. -> has not been using recently, but wants to restart ---> states she tried to use pillbox, asked to use pillbox and AVS and to cross off each day on AVS to prevent her from missing doses. Concerned for patient's ability to correctly remember recent history. She states that she is getting concerned for her memory. Clinical Outcomes:  Yes     No  []   [x]       Major bleeding event  []   [x]       Thromboembolic event  Duration of warfarin Therapy: indefinitely  INR Range:  1.8-2.5 -> lower INR goal dt h/o subcortical hemorrhage (2016)    She may be slower to reach steady state with warfarin dosing so consider checking INR about 10 days after dose adjustment. INR is 3.4 today d/t tylenol intake    Since been high for the last few visits, hold tonight then decrease weekly dose to 2.5 mg on Mon, Wed and Fri and 2.5 mg all other days (9% dec)  Encouraged pt to pay attention to dosing in order to not miss doses.    Recheck INR in 10 days, 8/10    Referring cardiologist is Dr. Geoffrey Harley  INR (no units)   Date Value   07/30/2020 3.4   07/17/2020 3.0   07/02/2020 2.5   06/16/2020 3.4   05/27/2020 1.75 (H)   05/16/2020 2.05 (H)   05/15/2020 2.21 (H)   05/12/2020 1.43 (H)     CLINICAL PHARMACY CONSULT: MED RECONCILIATION/REVIEW ADDENDUM    For Pharmacy Admin Tracking Only    PHSO: No  Total # of Interventions Recommended: 1  - Decreased Dose #: 1  - Maintenance Safety Lab Monitoring #: 1  Total Interventions Accepted: 1  Time Spent (min): 15    Hira Lagos PharmD

## 2020-07-31 ENCOUNTER — TELEPHONE (OUTPATIENT)
Dept: PHARMACY | Age: 70
End: 2020-07-31

## 2020-07-31 NOTE — TELEPHONE ENCOUNTER
Patient requested RF be called into [facility] for her to pu today- forgot to mention she needed one at apt yesterday. RFs remaining at [facility].     Asiya Falcon, PharmD, [facility]

## 2020-08-10 ENCOUNTER — ANTI-COAG VISIT (OUTPATIENT)
Dept: PHARMACY | Age: 70
End: 2020-08-10
Payer: MEDICARE

## 2020-08-10 VITALS — TEMPERATURE: 97.2 F

## 2020-08-10 LAB — INTERNATIONAL NORMALIZATION RATIO, POC: 1.5

## 2020-08-10 PROCEDURE — 85610 PROTHROMBIN TIME: CPT

## 2020-08-10 PROCEDURE — 99211 OFF/OP EST MAY X REQ PHY/QHP: CPT

## 2020-08-20 ENCOUNTER — ANTI-COAG VISIT (OUTPATIENT)
Dept: PHARMACY | Age: 70
End: 2020-08-20
Payer: MEDICARE

## 2020-08-20 VITALS — TEMPERATURE: 97.3 F

## 2020-08-20 LAB — INTERNATIONAL NORMALIZATION RATIO, POC: 2

## 2020-08-20 PROCEDURE — 99211 OFF/OP EST MAY X REQ PHY/QHP: CPT

## 2020-08-20 PROCEDURE — 85610 PROTHROMBIN TIME: CPT

## 2020-08-20 NOTE — PROGRESS NOTES
Ms. Sam Tellez is a 77 y.o. y/o female with history of A fib   She presents today for anticoagulation monitoring and adjustment. 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)    Patient Reported Findings:  Yes     No   [x]   []       Patient verifies current dosing regimen as listed    States that she took 5 mg on Sun, Mon, Wed and Fri and 2.5 mg all other days of the week ---> confirms dose-->confirmed dose   []   [x]       S/S bleeding/bruising/swelling/SOB -dizzy and SOB, talked about s/sx and risks of INR being subtherapeutic---> she is going to the doctor today to discuss this --> states that she is feeling a lot better today ---> denies, some SOB, no fever---> denies but has HA-->denies      []   [x]       Blood in urine or stool - denies   [x]   []       Procedures scheduled in the future at this time -thinks she has a colonoscopy on Wed this week, 3/18- no instructions for holding since they thought she was on Xarelto - called and has to hold 5 days, explained situation from Thursday on VM --> had colonoscopy on wed 3/18. States that she restarted warfarin 2.5 mg daily after procedure   Is being assessed for watchman, will keep notified   []   [x]       Missed Dose- denies   []   [x]       Extra Dose - denies    [x]   []       Change in medications She continues with Tylenol 1000mg but only as needed --> states that she will take up to q6h---> tylenol recently --> continues with 4 tylenol daily--> no change    []   [x]       Change in health/diet/appetite-- normally has high vitamin K diet of canned spinach weekly and collards or broccoli about every other week. Will have some lower vitamin K veggies about 2 times a week such as green beans. --> states no changes, no NVD -> states that she has little appetite. No NVD---> continues with no appetite, has had a small amount of diarrhea --> states that she has been eating liver twice a week, likely why INR has dropped.  Asked patient to decrease to once a week --> states that she has not been eating as much. Stopped eating liver ---> no changes---> no greens, no NVD--> 1/2 cup of greens yesterday, stopped eating liver---> no changes, no NVD   []   [x]       Change in alcohol use denies  []   [x]       Change in activity  []   [x]       Hospital admission-    []   [x]       Emergency department visit   []   [x]       Other complaints- states that she is using the pillbox, and she likes it. -> has not been using recently, but wants to restart ---> states she tried to use pillbox, asked to use pillbox and AVS and to cross off each day on AVS to prevent her from missing doses. Concerned for patient's ability to correctly remember recent history. She states that she is getting concerned for her memory. Clinical Outcomes:  Yes     No  []   [x]       Major bleeding event  []   [x]       Thromboembolic event  Duration of warfarin Therapy: indefinitely  INR Range:  1.8-2.5 -> lower INR goal dt h/o subcortical hemorrhage (2016)      She may be slower to reach steady state with warfarin dosing so consider checking INR about 10 days after dose adjustment. INR is 2.0 today   Continue weekly dose of 5 mg on Mon, Wed and Fri and 2.5 mg all other days. Encouraged pt to pay attention to dosing in order to not miss doses.    Recheck INR in 2 weeks, 9/4 to coordinate with apts that day     Referring cardiologist is Dr. Shagufta Quinones  INR (no units)   Date Value   08/20/2020 2.0   08/10/2020 1.5   07/30/2020 3.4   07/17/2020 3.0   05/27/2020 1.75 (H)   05/16/2020 2.05 (H)   05/15/2020 2.21 (H)   05/12/2020 1.43 (H)         CLINICAL PHARMACY CONSULT: MED RECONCILIATION/REVIEW ADDENDUM    For Pharmacy Admin Tracking Only    PHSO: No  Total # of Interventions Recommended: 0  - Maintenance Safety Lab Monitoring #: 1  Total Interventions Accepted: 0  Time Spent (min): Via Manish Jamil PharmD

## 2020-09-04 ENCOUNTER — HOSPITAL ENCOUNTER (OUTPATIENT)
Dept: GENERAL RADIOLOGY | Age: 70
Discharge: HOME | End: 2020-09-04
Payer: MEDICARE

## 2020-09-04 ENCOUNTER — ANTI-COAG VISIT (OUTPATIENT)
Dept: PHARMACY | Age: 70
End: 2020-09-04
Payer: MEDICARE

## 2020-09-04 ENCOUNTER — HOSPITAL ENCOUNTER (OUTPATIENT)
Dept: WOMENS IMAGING | Age: 70
Discharge: HOME | End: 2020-09-04
Payer: MEDICARE

## 2020-09-04 VITALS — TEMPERATURE: 96.9 F

## 2020-09-04 LAB — INTERNATIONAL NORMALIZATION RATIO, POC: 2.4

## 2020-09-04 PROCEDURE — 77080 DXA BONE DENSITY AXIAL: CPT

## 2020-09-04 PROCEDURE — 99211 OFF/OP EST MAY X REQ PHY/QHP: CPT

## 2020-09-04 PROCEDURE — 77063 BREAST TOMOSYNTHESIS BI: CPT

## 2020-09-04 PROCEDURE — 85610 PROTHROMBIN TIME: CPT

## 2020-09-04 NOTE — PROGRESS NOTES
Ms. Cynthia Winter is a 77 y.o. y/o female with history of A fib   She presents today for anticoagulation monitoring and adjustment. 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 States that she restarted warfarin 2.5 mg daily after procedure   Is being assessed for watchman, will keep notified   []   [x]       Missed Dose- denies   []   [x]       Extra Dose - denies    [x]   []       Change in medications She continues with Tylenol 1000mg but only as needed --> states that she will take up to q6h---> tylenol recently --> continues with 4 tylenol daily--> no tylenol recently   []   [x]       Change in health/diet/appetite-- normally has high vitamin K diet of canned spinach weekly and collards or broccoli about every other week. Will have some lower vitamin K veggies about 2 times a week such as green beans. -->  continues with no appetite, has had a small amount of diarrhea --> states that she has been eating liver twice a week, likely why INR has dropped. Asked patient to decrease to once a week --> states that she has not been eating as much.  Stopped eating liver ---> no greens, no NVD--> 1/2 cup of greens yesterday, stopped eating liver---> no changes, no NVD   []   [x]       Change in alcohol use denies  []   [x]       Change in activity  []   [x]       Hospital admission-    []   [x]       Emergency department visit   []   [x]       Other complaints- states that she is using the pillbox, and she likes it. -> has not been using recently, but wants to restart ---> states she tried to use pillbox, asked to use pillbox and AVS and to cross off each day on AVS to prevent her from missing doses. Concerned for patient's ability to correctly remember recent history. She states that she is getting concerned for her memory. Clinical Outcomes:  Yes     No  []   [x]       Major bleeding event  []   [x]       Thromboembolic event  Duration of warfarin Therapy: indefinitely  INR Range:  1.8-2.5 -> lower INR goal dt h/o subcortical hemorrhage (2016)      She may be slower to reach steady state with warfarin dosing so consider checking INR about 10 days after dose adjustment. INR is 2.4 today   Continue weekly dose of 5 mg on Mon, Wed and Fri and 2.5 mg all other days. Encouraged pt to pay attention to dosing in order to not miss doses.    Recheck INR in 3 weeks, 9/24     Referring cardiologist is Dr. Maria Del Rosario Santos  INR (no units)   Date Value   09/04/2020 2.4   08/20/2020 2.0   08/10/2020 1.5   07/30/2020 3.4   05/27/2020 1.75 (H)   05/16/2020 2.05 (H)   05/15/2020 2.21 (H)   05/12/2020 1.43 (H)         CLINICAL PHARMACY CONSULT: MED RECONCILIATION/REVIEW ADDENDUM    For Pharmacy Admin Tracking Only    PHSO: No  Total # of Interventions Recommended: 0  - Maintenance Safety Lab Monitoring #: 1  Total Interventions Accepted: 0  Time Spent (min): 15    Deneen Concepcion, NahomyD

## 2020-09-08 ENCOUNTER — OFFICE VISIT (OUTPATIENT)
Dept: CARDIOLOGY CLINIC | Age: 70
End: 2020-09-08
Payer: MEDICARE

## 2020-09-08 ENCOUNTER — TELEPHONE (OUTPATIENT)
Dept: CARDIOLOGY CLINIC | Age: 70
End: 2020-09-08

## 2020-09-08 VITALS
HEIGHT: 64 IN | WEIGHT: 184 LBS | SYSTOLIC BLOOD PRESSURE: 140 MMHG | DIASTOLIC BLOOD PRESSURE: 90 MMHG | BODY MASS INDEX: 31.41 KG/M2 | OXYGEN SATURATION: 99 % | HEART RATE: 86 BPM

## 2020-09-08 PROCEDURE — 93000 ELECTROCARDIOGRAM COMPLETE: CPT | Performed by: NURSE PRACTITIONER

## 2020-09-08 PROCEDURE — 3017F COLORECTAL CA SCREEN DOC REV: CPT | Performed by: NURSE PRACTITIONER

## 2020-09-08 PROCEDURE — 4040F PNEUMOC VAC/ADMIN/RCVD: CPT | Performed by: NURSE PRACTITIONER

## 2020-09-08 PROCEDURE — 1090F PRES/ABSN URINE INCON ASSESS: CPT | Performed by: NURSE PRACTITIONER

## 2020-09-08 PROCEDURE — G8399 PT W/DXA RESULTS DOCUMENT: HCPCS | Performed by: NURSE PRACTITIONER

## 2020-09-08 PROCEDURE — G8428 CUR MEDS NOT DOCUMENT: HCPCS | Performed by: NURSE PRACTITIONER

## 2020-09-08 PROCEDURE — 1123F ACP DISCUSS/DSCN MKR DOCD: CPT | Performed by: NURSE PRACTITIONER

## 2020-09-08 PROCEDURE — G8417 CALC BMI ABV UP PARAM F/U: HCPCS | Performed by: NURSE PRACTITIONER

## 2020-09-08 PROCEDURE — 99214 OFFICE O/P EST MOD 30 MIN: CPT | Performed by: NURSE PRACTITIONER

## 2020-09-08 PROCEDURE — 1036F TOBACCO NON-USER: CPT | Performed by: NURSE PRACTITIONER

## 2020-09-08 RX ORDER — POTASSIUM CHLORIDE 20 MEQ/1
10 TABLET, EXTENDED RELEASE ORAL DAILY
Qty: 90 TABLET | Refills: 1
Start: 2020-09-08 | End: 2020-10-15 | Stop reason: SDUPTHER

## 2020-09-08 RX ORDER — LOSARTAN POTASSIUM 50 MG/1
50 TABLET ORAL DAILY
Qty: 90 TABLET | Refills: 1 | Status: SHIPPED | OUTPATIENT
Start: 2020-09-08 | End: 2020-10-09

## 2020-09-08 NOTE — PROGRESS NOTES
[address]   Electrophysiology  Hilda Thakur, ADAN-CNP  Attending EP: Dr. Dolly Chairez   Date: 9/8/2020  I had the privilege of visiting Liliana Winters in the office. Chief Complaint:   Chief Complaint   Patient presents with    Hypertension     no complaints     Follow-up     3 mo      History of Present Illness: History obtained from patient and medical record. Liliana Winters is 77 y.o. female with a past medical history of HTN, ICH, HFpEF, CAD, CVA, and atrial fibrillation on warfarin therapy. She has been seen by Dr. Rolin Merlin and was referred for evaluation of Watchman procedure. Had [address] 2018 and was off AC at that time. She was recently hospitalized for syncopal episode and her Toprol was discontinued for bradycardia. Interval history: Today, Liliana Winters is being seen for routine follow up for afib and HTN. She checks her BP at home and usually similar to today's reading or slightly higher. Admits to SOB on exertion worsening over last 2 months. Denies accompanying swelling. She is having issues with her knees and has been much less active, she feels deconditioning is a factor with SOB. She cannot lay at night which has been ongoing for 3 years. She is \"just not comfortable flat. \" She does not have BAILEY and has not been tested, she does not sleep well, wakes up frequently at night and feels fatigue during that day. Denies having chest pain, palpitations, peripheral edema, or dizziness at the time of this visit. With regard to medication therapy the patient has been compliant with prescribed regimen. She has tolerated therapy to date. 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 Social History:  Reviewed. reports that she has never smoked. She has never used smokeless tobacco. She reports that she does not drink alcohol or use drugs. Family History:  Reviewed. family history is not on file. Denies family history of sudden cardiac death, arrhythmia, premature CAD    Review of System:  · Constitutional: No fevers, chills, weight changes or weakness  · HEENT: No visual changes. No mouth sores or sore throat. · Cardiovascular: denies chest pain, reports dyspnea on exertion, denies palpitations or denies loss of consciousness. No cough, hemoptysis, denies pleuritic pain, or phlebitis. · Respiratory: denies cough or wheezing. No hematemesis. · Gastrointestinal: No abdominal pain, blood in stools. · Genitourinary: No dysuria, urgency or hematuria. · Musculoskeletal: denies gait disturbance, No muscle weakness. · Integumentary: No rash or pruritis. · Neurological: No headache, change in muscle strength, numbness or tingling. · Psychiatric: No confusion, anxiety, or depression. · Endocrine: No temperature intolerance. No excessive thirst, fluid intake, or urination. · Hem/Lymph: No abnormal bruising or bleeding, blood clots or swollen lymph nodes. Physical Examination:  There were no vitals filed for this visit. Wt Readings from Last 3 Encounters:   06/03/20 186 lb (84.4 kg)   05/22/20 186 lb 9.6 oz (84.6 kg)   05/16/20 186 lb 15.2 oz (84.8 kg)     Constitutional: Cooperative and in no apparent distress, and appears well nourished  Skin: Warm and pink; no pallor, cyanosis, bruising, or clubbing  HEENT: Symmetric and normocephalic. PERRL, EOM intact. Conjunctiva pink with clear sclera. Mucus membranes pink and moist.   Respiratory: Respirations symmetric and unlabored. Lungs clear to auscultation bilaterally, no wheezing, rhonchi, or crackles + diastolic murmur  Cardiovascular:  irregular rate and rhythm. S1 & S2 present without rubs, or gallops. Peripheral pulses 2+, capillary refill < 3 seconds. negative elevation of JVP. No peripheral edema  Gastrointestinal: Abdomen soft and round. Bowel sounds normoactive in all quadrants without tenderness or masses. Musculoskeletal: Bilateral upper and lower extremity strength 5/5 with full ROM. Neurological/Psych: Awake and orientated to person, place and time. Calm affect, appropriate mood. Pertinent labs, diagnostic, device, and imaging results reviewed as a part of this visit    LABS    CBC:   Lab Results   Component Value Date    WBC 8.6 2020    HGB 12.9 2020    HCT 38.6 2020    MCV 90.9 2020     2020     BMP:   Lab Results   Component Value Date    CREATININE 0.6 2020    BUN 10 2020     2020    K 4.6 2020     2020    CO2 25 2020     CrCl cannot be calculated (Unknown ideal weight.). No results found for: BNP    Thyroid: No results found for: TSH, P3UDAZO, G9RTUJF, THYROIDAB  Lipid Panel:   Lab Results   Component Value Date    CHOL 162 2020    HDL 50 2020    TRIG 83 2020     LFTs:  Lab Results   Component Value Date    ALT 27 05/15/2020    AST 49 (H) 05/15/2020    ALKPHOS 71 05/15/2020    BILITOT 0.9 05/15/2020     Coags:   Lab Results   Component Value Date    PROTIME 20.4 (H) 2020    INR 2.4 2020    APTT 32.5 2020     EC2020 Atrial fibrillaiton HR 87, , QTc 435     MEGHAN: 3/11/2019   Summary   Normal left ventricle size and systolic function with an estimated ejection   fraction of 60%. No regional wall motion abnormalities are noted.    There is moderate concentric left Metrics  1. Tobacco Cessation Counseling: Nonsmoker, N/A   2. Retake of BP if >140/90: yes  3. Documentation to PCP: Note sent to PCP office visit  4. CAD patient on anti-platelet: yes  5. CAD patient on STATIN therapy: yes  6. Patient with history of CHF and atrial fibrillation on anticoagulation: yes     I have addressed the patient's cardiac risk factors and adjusted pharmacologic treatment as needed. In addition, I have reinforced the need for patient directed risk factor modification. I independently reviewed the ECG    All questions and concerns were addressed with the patient. Alternatives to treatment were discussed. Thank you for allowing to us to participate in the care of Liliana Winters.     Hilda Thakur, ADAN-CNP  [facility]   Office: [phone]

## 2020-09-08 NOTE — PATIENT INSTRUCTIONS
- Increase losartan to 50 mg daily  - Reduce potassium supplement to 10 mEq, or 1/2 tablet daily   - Follow up with sleep medicine  - Watchman team will follow you for further steps  - Echocardiogram   - Follow up as scheduled

## 2020-09-08 NOTE — TELEPHONE ENCOUNTER
patient was seen in the past proceeded with work up then after MEGHAN patient decided to hold off on proceeding with Watchman. 9/8/2020 patient seen by Lisandro Fowler . Patient was given educational material. Will proceed with shared decision.

## 2020-09-28 ENCOUNTER — ANTI-COAG VISIT (OUTPATIENT)
Dept: PHARMACY | Age: 70
End: 2020-09-28
Payer: MEDICARE

## 2020-09-28 VITALS — TEMPERATURE: 98 F

## 2020-09-28 LAB — INTERNATIONAL NORMALIZATION RATIO, POC: 1.4

## 2020-09-28 PROCEDURE — 99211 OFF/OP EST MAY X REQ PHY/QHP: CPT

## 2020-09-28 PROCEDURE — 85610 PROTHROMBIN TIME: CPT

## 2020-09-28 NOTE — PROGRESS NOTES
Ms. Hemanth Ortiz is a 77 y.o. y/o female with history of A fib   She presents today for anticoagulation monitoring and adjustment. 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 States that she restarted warfarin 2.5 mg daily after procedure   Is being assessed for watchman, will keep notified   []   [x]       Missed Dose- Patient knows she has missed some, but not sure which days  []   [x]       Extra Dose - denies    [x]   []       Change in medications She continues with Tylenol 1000mg but only as needed --> states that she will take up to q6h---> tylenol recently --> continues with 4 tylenol daily--> no tylenol recently --> losartan increased by cards  []   [x]       Change in health/diet/appetite-- normally has high vitamin K diet of canned spinach weekly and collards or broccoli about every other week. Will have some lower vitamin K veggies about 2 times a week such as green beans. -->  continues with no appetite, has had a small amount of diarrhea --> states that she has been eating liver twice a week, likely why INR has dropped. Asked patient to decrease to once a week --> states that she has not been eating as much.  Stopped eating liver ---> no greens, no NVD--> 1/2 cup of greens yesterday, stopped eating liver---> no changes, no NVD --> no changes, no NVD   []   [x]       Change in alcohol use denies  []   [x]       Change in activity  []   [x]       Hospital admission-    []   [x]       Emergency department visit   []   [x]       Other complaints- states that she is using the pillbox, and she likes it. -> has not been using recently, but wants to restart ---> states she tried to use pillbox, asked to use pillbox and AVS and to cross off each day on AVS to prevent her from missing doses. Concerned for patient's ability to correctly remember recent history. She states that she is getting concerned for her memory. Clinical Outcomes:  Yes     No  []   [x]       Major bleeding event  []   [x]       Thromboembolic event  Duration of warfarin Therapy: indefinitely  INR Range:  1.8-2.5 -> lower INR goal dt h/o subcortical hemorrhage (2016)      She may be slower to reach steady state with warfarin dosing so consider checking INR about 10 days after dose adjustment. INR is 1.4 today d/t missed doses likely d/t patients spouse passing away unexpectedly between visits. Take 7.5mg tonight, then continue weekly dose of 5 mg on Mon, Wed and Fri and 2.5 mg all other days. Encouraged pt to pay attention to dosing in order to not miss doses.    Recheck INR in 2 weeks, 10/12     Referring cardiologist is Dr. Geoffrey Harley  INR (no units)   Date Value   09/28/2020 1.4   09/04/2020 2.4   08/20/2020 2.0   08/10/2020 1.5   05/27/2020 1.75 (H)   05/16/2020 2.05 (H)   05/15/2020 2.21 (H)   05/12/2020 1.43 (H)       CLINICAL PHARMACY CONSULT: MED RECONCILIATION/REVIEW ADDENDUM    For Pharmacy Admin Tracking Only    PHSO: No  Total # of Interventions Recommended: 1  - Increased Dose #: 1  - Maintenance Safety Lab Monitoring #: 1  Total Interventions Accepted: 1  Time Spent (min): 15    Jessenia Batres PharmD

## 2020-10-01 ENCOUNTER — OFFICE VISIT (OUTPATIENT)
Dept: CARDIOLOGY CLINIC | Age: 70
End: 2020-10-01
Payer: MEDICARE

## 2020-10-01 VITALS
HEIGHT: 65 IN | WEIGHT: 184.2 LBS | HEART RATE: 101 BPM | BODY MASS INDEX: 30.69 KG/M2 | DIASTOLIC BLOOD PRESSURE: 112 MMHG | OXYGEN SATURATION: 98 % | SYSTOLIC BLOOD PRESSURE: 178 MMHG

## 2020-10-01 PROBLEM — I10 ESSENTIAL HYPERTENSION: Status: ACTIVE | Noted: 2020-10-01

## 2020-10-01 PROCEDURE — G8417 CALC BMI ABV UP PARAM F/U: HCPCS | Performed by: INTERNAL MEDICINE

## 2020-10-01 PROCEDURE — 4040F PNEUMOC VAC/ADMIN/RCVD: CPT | Performed by: INTERNAL MEDICINE

## 2020-10-01 PROCEDURE — 3017F COLORECTAL CA SCREEN DOC REV: CPT | Performed by: INTERNAL MEDICINE

## 2020-10-01 PROCEDURE — G8399 PT W/DXA RESULTS DOCUMENT: HCPCS | Performed by: INTERNAL MEDICINE

## 2020-10-01 PROCEDURE — G8484 FLU IMMUNIZE NO ADMIN: HCPCS | Performed by: INTERNAL MEDICINE

## 2020-10-01 PROCEDURE — 99214 OFFICE O/P EST MOD 30 MIN: CPT | Performed by: INTERNAL MEDICINE

## 2020-10-01 PROCEDURE — G8427 DOCREV CUR MEDS BY ELIG CLIN: HCPCS | Performed by: INTERNAL MEDICINE

## 2020-10-01 PROCEDURE — 1036F TOBACCO NON-USER: CPT | Performed by: INTERNAL MEDICINE

## 2020-10-01 PROCEDURE — 1123F ACP DISCUSS/DSCN MKR DOCD: CPT | Performed by: INTERNAL MEDICINE

## 2020-10-01 PROCEDURE — 1090F PRES/ABSN URINE INCON ASSESS: CPT | Performed by: INTERNAL MEDICINE

## 2020-10-01 ASSESSMENT — ENCOUNTER SYMPTOMS
SHORTNESS OF BREATH: 1
BLOOD IN STOOL: 0
COUGH: 0
ABDOMINAL PAIN: 0
CHEST TIGHTNESS: 1
PHOTOPHOBIA: 0

## 2020-10-01 NOTE — PROGRESS NOTES
[address]  10/1/20  Referring: Dr. Livingston ref. provider found    REASON FOR CONSULT/CHIEF COMPLAINT/HPI     Reason for visit/ Chief complaint  New Patient  Reccomendation for Watchman   HPI Lottie العلي is a 77 y.o. here as a referral from Dr. Siri Mendoza for evaluation for watchman device. Darian Lopez has a longstanding history of atrial fibrillation, now on Coumadin. She did have a hemorrhagic stroke in 2018 and was taken off of Coumadin at the time. She also has a history including CAD, CHF, and hypertension. She is here for shared decision making for watchman procedure. Her  just  a few weeks ago. She is appropriately grieving. She feels as though she has a good support system. No hopelessness. She has been put back on warfarin for a fib. She reports that she had some episodes of exertional chest pain 6 months ago that lasted 10-15 minutes, but did not seek medical attention. She said they happened frequently for a few days and then stopped. She has been more short of breath since then. She states that she sleeps on 2 to 3 pillows. She has +1 pitting edema. No syncope, no palpitations. She did not take her medicines yet today. She took the batteries out of her blood pressure cuff to use for the remote control. HISTORY/ALLERGIES/ROS     MedHx:   has a past medical history of Acute cerebrovascular accident (CVA) ([address] Atrial fibrillation ([address] Bell palsy, CAD (coronary artery disease), Cerebral artery occlusion with cerebral infarction ([address] Chronic diastolic CHF (congestive heart failure) ([address] CVA (cerebral vascular accident) ([address] Hypertension, Intracranial hemorrhage ([address] Nonintractable headache, Nontraumatic subcortical hemorrhage of cerebral hemisphere [facility]), Sudden visual loss of left eye, Thyroid nodule, and TIA involving right internal carotid artery. SurgHx:  has a past surgical history that includes Cardiac surgery;  Tubal ligation (Right, Torn Rotator Cuff); Coronary angioplasty with stent; and Colonoscopy. SocHx:   reports that she has never smoked. She has never used smokeless tobacco. She reports that she does not drink alcohol or use drugs. FamHx: Family history of hypertension   Allergies: Clonidine; Codeine; Clonidine derivatives; Clonidine hcl; Lisinopril; Tramadol; and Percocet [oxycodone-acetaminophen]   ROS:  Review of Systems   Constitutional: Positive for fatigue. Negative for activity change, diaphoresis and fever. HENT: Negative for congestion and ear discharge. Eyes: Negative for photophobia and visual disturbance. Respiratory: Positive for chest tightness and shortness of breath. Negative for cough. Cardiovascular: Negative for chest pain and palpitations. Gastrointestinal: Negative for abdominal pain and blood in stool. Endocrine: Negative for cold intolerance and polydipsia. Genitourinary: Negative for difficulty urinating and flank pain. Musculoskeletal: Positive for arthralgias and myalgias. Skin: Negative for rash and wound. Allergic/Immunologic: Negative for environmental allergies and immunocompromised state. Neurological: Negative for headaches. Hematological: Negative for adenopathy. Does not bruise/bleed easily. Psychiatric/Behavioral: Negative for confusion. The patient is not hyperactive. MEDICATIONS      Prior to Admission medications    Medication Sig Start Date End Date Taking?  Authorizing Provider   losartan (COZAAR) 50 MG tablet Take 1 tablet by mouth daily 9/8/20  Yes ADAN Escalante CNP   potassium chloride (KLOR-CON M) 20 MEQ extended release tablet Take 0.5 tablets by mouth daily 9/8/20  Yes ADAN Escalante CNP   furosemide (LASIX) 20 MG tablet TAKE 1 TABLET BY MOUTH TWICE DAILY 7/30/20  Yes Addie Guthrie MD   spironolactone (ALDACTONE) 50 MG tablet TAKE 1 TABLET BY MOUTH DAILY 7/30/20  Yes Addie Guthrie MD   atorvastatin (LIPITOR) 80 MG tablet TAKE 1 TABLET BY MOUTH DAILY significant change. MEGHAN 3/11/19   Summary   Normal left ventricle size and systolic function with an estimated ejection   fraction of 60%. No regional wall motion abnormalities are noted. There is moderate concentric left ventricular hypertrophy. The left atrium is dilated. There is no evidence of mass or thrombus in the   left atrium or appendage. There is no evidence of mass or thrombus in the left atrium or appendage. The right atrium is dilated. SPECT 9/22/15  Summary    -Small sized anterior completely reversible defect consistent with ischemia  in the territory of the mid and distal LAD -Normal LV function. LHC 9/23/15:   LVEDP - 12. LVgram - severe LVH with EF 70%, enlarged AO root consistent with htn  Cors: LM - nl, LAD - 20% prox, 30%mid, patent stent, distal irreg, LCX - 20% mid,     Holter: 1/30/18  Afib, rate not controlled, bradycardia at nighttime     EKG 9/8/2020   Atrial fibrillaiton HR 87    ASSESSMENT AND PLAN     1. Permanent Non-valvular Atrial Fibrillation  - [id number] score: 7, on Warfarin   - HASBLED 6  - Hx of hemorrhagic stroke in 2018  - Recommendation for watchman per Dr. Dorita Patel  - Patient is at high risk of stroke and high risk for bleeding with anticoagulation. She is a suitable candidate for short term warfarin, but is a poor long term candidate for anticoagulation given her bleeding risk, prior history of bleeding (intracranial hemorrhage), and labile INR. We discussed the risks, benefits, and alternatives of the watchman procedure as a non-pharmacologic treatment option. The patient's values and preferences were examined. The patient was given the opportunity ask questions and demonstrated insight. - We compared the risks and benefits of the procedure with anticoagulants   - ACC Afib shared decision making tool was utilized to engage in discussion about the watchman procedure    2.  Chronic diastolic heart failure (NYHA Class II)  - Appears compensated   - EF 60% by 3/2019 MEGHAN, 65-70% by 2/2018 TTE  Plan:  - continue current meds    3. Hypertension  BP (!) 178/112 (Site: Left Upper Arm, Position: Sitting, Cuff Size: Large Adult)   Pulse 101   Ht 5' 5\" (1.651 m)   Wt 184 lb 3.2 oz (83.6 kg)   SpO2 98%   BMI 30.65 kg/m²   -  She reports that she feels badly when it is low  - 178/112 on my recheck, uncontrolled, she did not take her medication this morning  Plan:  - patient instructed to take blood pressure medication when she gets home  - check blood pressure daily and call us    4. CAD with chest pain  - No ischemia by 9/22/15 SPECT MPI  - PCI to LAD in 2000, patent by 9/2015 [facility], nonobstructive disease  - ASA/statin  Plan:  - stress test to evaluate recent chest pain   - Echocardiogram to re-evaluate EF given dyspnea    5. Hyperlipidemia  - 1/2020:  TG 83 HDL 50 LDL 95  - On Atorvastatin 80 mg      Follow Up: 1 month    Dr. Sedrick Ang: This note was scribed in the presence of Dr. May President, DO by Inder Lama RN. Physician Attestation  The scribe for and in the presence of morenita Cerna DO). The scribe Inder Lama may have prepopulated components of this note with my historical  intellectual property under my direct supervision. Any additions to this intellectual property were performed in my presence and at my direction. Furthermore, the content and accuracy of this note have been reviewed by me Terence Cerna DO).   10/1/2020 10:21 PM

## 2020-10-01 NOTE — PATIENT INSTRUCTIONS
Schedule stress test and echocardiogram  Goal for blood pressure of 130/80  Keep a blood pressure log at home and call us with results  Follow up in 1 month

## 2020-10-01 NOTE — LETTER
[address] [address]  Phone: [phone]  Fax: [phone]    Hervey Schlatter,         2020     Makenna Dick, [address] [place] [address]    Patient: Cirilo Multani  MR Number: [id number]  YOB: 1950  Date of Visit: 10/1/2020    Dear Dr. Makenna Dick:    [address] [address]  10/1/20  Referring: Dr. Kisha Rao ref. provider found    REASON FOR CONSULT/CHIEF COMPLAINT/HPI     Reason for visit/ Chief complaint  New Patient  Reccomendation for Watchman   HPI Cirilo Multani is a 77 y.o. here as a referral from Dr. Maryellen Ferris for evaluation for watchman device. Fermin Wilson has a longstanding history of atrial fibrillation, now on Coumadin. She did have a hemorrhagic stroke in 2018 and was taken off of Coumadin at the time. She also has a history including CAD, CHF, and hypertension. She is here for shared decision making for watchman procedure. Her  just  a few weeks ago. She is appropriately grieving. She feels as though she has a good support system. No hopelessness. She has been put back on warfarin for a fib. She reports that she had some episodes of exertional chest pain 6 months ago that lasted 10-15 minutes, but did not seek medical attention. She said they happened frequently for a few days and then stopped. She has been more short of breath since then. She states that she sleeps on 2 to 3 pillows. She has +1 pitting edema. No syncope, no palpitations. She did not take her medicines yet today. She took the batteries out of her blood pressure cuff to use for the remote control.       HISTORY/ALLERGIES/ROS     MedHx:   has a past medical history of Acute cerebrovascular accident (CVA) ([address] Atrial fibrillation ([address] Bell palsy, CAD (coronary artery disease), Cerebral artery occlusion with cerebral infarction ([address] Chronic diastolic CHF (congestive heart failure) [address] CVA (cerebral vascular accident) ([facility] Hypertension, Intracranial hemorrhage ([facility] Nonintractable headache, Nontraumatic subcortical hemorrhage of cerebral hemisphere [facility]), Sudden visual loss of left eye, Thyroid nodule, and TIA involving right internal carotid artery. SurgHx:  has a past surgical history that includes Cardiac surgery; Tubal ligation (Right, Torn Rotator Cuff); Coronary angioplasty with stent; and Colonoscopy. SocHx:   reports that she has never smoked. She has never used smokeless tobacco. She reports that she does not drink alcohol or use drugs. FamHx: Family history of hypertension   Allergies: Clonidine; Codeine; Clonidine derivatives; Clonidine hcl; Lisinopril; Tramadol; and Percocet [oxycodone-acetaminophen]   ROS:  Review of Systems   Constitutional: Positive for fatigue. Negative for activity change, diaphoresis and fever. HENT: Negative for congestion and ear discharge. Eyes: Negative for photophobia and visual disturbance. Respiratory: Positive for chest tightness and shortness of breath. Negative for cough. Cardiovascular: Negative for chest pain and palpitations. Gastrointestinal: Negative for abdominal pain and blood in stool. Endocrine: Negative for cold intolerance and polydipsia. Genitourinary: Negative for difficulty urinating and flank pain. Musculoskeletal: Positive for arthralgias and myalgias. Skin: Negative for rash and wound. Allergic/Immunologic: Negative for environmental allergies and immunocompromised state. Neurological: Negative for headaches. Hematological: Negative for adenopathy. Does not bruise/bleed easily. Psychiatric/Behavioral: Negative for confusion. The patient is not hyperactive. MEDICATIONS      Prior to Admission medications    Medication Sig Start Date End Date Taking?  Authorizing Provider losartan (COZAAR) 50 MG tablet Take 1 tablet by mouth daily 9/8/20  Yes Morrice Bread, APRN - CNP   potassium chloride (KLOR-CON M) 20 MEQ extended release tablet Take 0.5 tablets by mouth daily 9/8/20  Yes Morrice Bread, APRN - CNP   furosemide (LASIX) 20 MG tablet TAKE 1 TABLET BY MOUTH TWICE DAILY 7/30/20  Yes Yeny Dominguez MD   spironolactone (ALDACTONE) 50 MG tablet TAKE 1 TABLET BY MOUTH DAILY 7/30/20  Yes Yeny Dominguez MD   atorvastatin (LIPITOR) 80 MG tablet TAKE 1 TABLET BY MOUTH DAILY 7/23/20  Yes Sharlene Hernandez MD   famotidine (PEPCID) 20 MG tablet Take 1 tablet by mouth 2 times daily 6/15/20  Yes Yeny Dominguez MD   warfarin (COUMADIN) 5 MG tablet Take 5 mg by mouth Every day. Wednesday 2.5.    Yes Historical Provider, MD   aspirin 81 MG EC tablet Take 1 tablet by mouth daily 2/3/20  Yes Yeny Dominguez MD   Calcium Carb-Cholecalciferol (CALCIUM + D3) 600-200 MG-UNIT TABS tablet Take 1 tablet by mouth 2 times daily 2/3/20  Yes Yeny Dominguez MD   NIFEdipine (ADALAT CC) 60 MG extended release tablet Take 1 tablet by mouth daily 2/3/20  Yes Yeny Dominguez MD   acetaminophen (TYLENOL) 500 MG tablet Take 2 tablets by mouth every 6 hours as needed for Pain 2/3/20  Yes Yeny Dominguez MD       PHYSICAL EXAM        Vitals:    10/01/20 2201   BP: (!) 178/112   Pulse:    SpO2:     Weight: 184 lb 3.2 oz (83.6 kg)     Gen Alert, cooperative, no distress Heart  Irregularly irregular, no murmur   Head Normocephalic, atraumatic, no abnormalities Abd  Soft, NT, +BS, no mass, no OM   Eyes PERRLA, conj/corn clear Ext  Ext nl, AT, no C/C, +1 edema   Nose Nares normal, no drain age, Non-tender Pulse 2+ and symmetric   Throat Lips, mucosa, tongue normal Skin Color/text/turg nl, no rash/lesions   Neck S/S, TM, NT, no bruit Psych Nl mood and affect   Lung no CTA-B, unlabored, no DTP     Ch wall NT, no deform       LABS and Imaging     Relevant and available CV data reviewed    TTE 2/8/18   Summary Normal left ventricle size and systolic function with an estimated ejection   fraction of 65-70%. No regional wall motion abnormalities are noted. There is mild to moderate concentric left ventricular hypertrophy. Diastolic filling parameters suggests grade III diastolic dysfunction . Moderate mitral regurgitation is present. There is moderate tricuspid regurgitation with RVSP estimated at 43 mmHg. At least moderate biatrial dilatation. In comparison to a study dated 6/15/17, there is no significant change. MEGHAN 3/11/19   Summary   Normal left ventricle size and systolic function with an estimated ejection   fraction of 60%. No regional wall motion abnormalities are noted. There is moderate concentric left ventricular hypertrophy. The left atrium is dilated. There is no evidence of mass or thrombus in the   left atrium or appendage. There is no evidence of mass or thrombus in the left atrium or appendage. The right atrium is dilated. SPECT 9/22/15  Summary    -Small sized anterior completely reversible defect consistent with ischemia  in the territory of the mid and distal LAD -Normal LV function. LHC 9/23/15:   LVEDP - 12. LVgram - severe LVH with EF 70%, enlarged AO root consistent with htn  Cors: LM - nl, LAD - 20% prox, 30%mid, patent stent, distal irreg, LCX - 20% mid,     Holter: 1/30/18  Afib, rate not controlled, bradycardia at nighttime     EKG 9/8/2020   Atrial fibrillaiton HR 87    ASSESSMENT AND PLAN     1. Permanent Non-valvular Atrial Fibrillation  - [id number] score: 7, on Warfarin   - HASBLED 6  - Hx of hemorrhagic stroke in 2018  - Recommendation for watchman per Dr. Tammy Gutierrez  - Patient is at high risk of stroke and high risk for bleeding with anticoagulation.  She is a suitable candidate for short term warfarin, but is a poor long term candidate for anticoagulation given her bleeding risk, prior history of bleeding (intracranial hemorrhage), and labile INR. We discussed the risks, benefits, and alternatives of the watchman procedure as a non-pharmacologic treatment option. The patient's values and preferences were examined. The patient was given the opportunity ask questions and demonstrated insight. - We compared the risks and benefits of the procedure with anticoagulants   - ACC Afib shared decision making tool was utilized to engage in discussion about the watchman procedure    2. Chronic diastolic heart failure (NYHA Class II)  - Appears compensated   - EF 60% by 3/2019 MEGHAN, 65-70% by 2/2018 TTE  Plan:  - continue current meds    3. Hypertension  BP (!) 178/112 (Site: Left Upper Arm, Position: Sitting, Cuff Size: Large Adult)   Pulse 101   Ht 5' 5\" (1.651 m)   Wt 184 lb 3.2 oz (83.6 kg)   SpO2 98%   BMI 30.65 kg/m²   -  She reports that she feels badly when it is low  - 178/112 on my recheck, uncontrolled, she did not take her medication this morning  Plan:  - patient instructed to take blood pressure medication when she gets home  - check blood pressure daily and call us    4. CAD with chest pain  - No ischemia by 9/22/15 SPECT MPI  - PCI to LAD in 2000, patent by 9/2015 [facility], nonobstructive disease  - ASA/statin  Plan:  - stress test to evaluate recent chest pain   - Echocardiogram to re-evaluate EF given dyspnea    5. Hyperlipidemia  - 1/2020:  TG 83 HDL 50 LDL 95  - On Atorvastatin 80 mg      Follow Up: 1 month    Dr. Alexander Evangelista: This note was scribed in the presence of Dr. Tyrell Kohler DO by Leland Gonzalez RN. Physician Attestation  The scribe for and in the presence of morenita Ying DO). The scribe Leland Gonzalez may have prepopulated components of this note with my historical  intellectual property under my direct supervision.   Any additions to this intellectual property were performed in my presence and at my direction. Furthermore, the content and accuracy of this note have been reviewed by morenita Phipps DO).   10/1/2020 10:21 PM      Sincerely,        Kelsey Lawson DO

## 2020-10-06 ENCOUNTER — TELEPHONE (OUTPATIENT)
Dept: CARDIOLOGY CLINIC | Age: 70
End: 2020-10-06

## 2020-10-06 NOTE — TELEPHONE ENCOUNTER
Called Emilee PASCAL to check in on blood pressures, she was driving and could not remember her average readings, though she does state that her BP yesterday was 138/99. Instructed her to call tomorrow morning with her logs. She verbalized understanding and stated she would.

## 2020-10-07 ENCOUNTER — TELEPHONE (OUTPATIENT)
Dept: CARDIOLOGY CLINIC | Age: 70
End: 2020-10-07

## 2020-10-07 NOTE — TELEPHONE ENCOUNTER
Pt is calling in with her BP readings. 10/3/20 8 am  140/111 pm 126/104    10/4/20 155/125  Pm 142/68    10/5/20  151/86  Pm 133/77    10/6/20 221/150  Pm 134/104    10/7/20 am 158/121    Pt is having sob and states she gets strangled with her nicole. Please call to advise.

## 2020-10-08 NOTE — TELEPHONE ENCOUNTER
Patient called on 10/8/2020 at 213 pm to discuss blood pressures and breathing. Unable to leave voicemail as mailbox was full. Will try again.

## 2020-10-09 RX ORDER — LOSARTAN POTASSIUM 50 MG/1
50 TABLET ORAL 2 TIMES DAILY
Qty: 180 TABLET | Refills: 3 | Status: SHIPPED | OUTPATIENT
Start: 2020-10-09 | End: 2020-10-15 | Stop reason: SDUPTHER

## 2020-10-09 NOTE — TELEPHONE ENCOUNTER
Called patient. She has been compliant with antihypertensives, which she always takes in the morning. She does not need help setting her medications up, she does have a med system already. She states she does not feel any different when her blood pressure is over 200, as she does when it is in the 130's. She has no dizziness,blurred vision or headaches. She will be in on Monday for a stress test. I asked her to bring her cuff in so that we can check it against ours to check for  Accuracy.  Also can evaluate blood pressure during the stress test.     Asked her to come to the ER for high blood pressure associated with headache, or visual changes

## 2020-10-09 NOTE — TELEPHONE ENCOUNTER
Patient called at 6 pm on 10/10/2020. She was instructed to increase losartan to 50 mg bid.  Will check renal panel when she comes in for stress test.

## 2020-10-12 ENCOUNTER — HOSPITAL ENCOUNTER (OUTPATIENT)
Dept: NON INVASIVE DIAGNOSTICS | Age: 70
Discharge: HOME | End: 2020-10-12
Payer: MEDICARE

## 2020-10-12 LAB
LV EF: 65 %
LVEF MODALITY: NORMAL

## 2020-10-12 PROCEDURE — 3430000000 HC RX DIAGNOSTIC RADIOPHARMACEUTICAL: Performed by: INTERNAL MEDICINE

## 2020-10-12 PROCEDURE — A9502 TC99M TETROFOSMIN: HCPCS | Performed by: INTERNAL MEDICINE

## 2020-10-12 PROCEDURE — 78452 HT MUSCLE IMAGE SPECT MULT: CPT | Performed by: INTERNAL MEDICINE

## 2020-10-12 PROCEDURE — 93017 CV STRESS TEST TRACING ONLY: CPT | Performed by: INTERNAL MEDICINE

## 2020-10-12 PROCEDURE — 6360000002 HC RX W HCPCS: Performed by: INTERNAL MEDICINE

## 2020-10-12 RX ORDER — AMINOPHYLLINE DIHYDRATE 25 MG/ML
100 INJECTION, SOLUTION INTRAVENOUS ONCE
Status: COMPLETED | OUTPATIENT
Start: 2020-10-12 | End: 2020-10-12

## 2020-10-12 RX ADMIN — TETROFOSMIN 10 MILLICURIE: 1.38 INJECTION, POWDER, LYOPHILIZED, FOR SOLUTION INTRAVENOUS at 08:25

## 2020-10-12 RX ADMIN — REGADENOSON 0.4 MG: 0.08 INJECTION, SOLUTION INTRAVENOUS at 09:44

## 2020-10-12 RX ADMIN — TETROFOSMIN 30 MILLICURIE: 1.38 INJECTION, POWDER, LYOPHILIZED, FOR SOLUTION INTRAVENOUS at 09:39

## 2020-10-12 RX ADMIN — AMINOPHYLLINE 100 MG: 25 INJECTION, SOLUTION INTRAVENOUS at 09:44

## 2020-10-12 NOTE — PROGRESS NOTES
Instructed on Lexiscan Stress Test Procedure including possible side effects/ adverse reactions. Patient verbalizes  understanding and denies having any questions . See Epic Cardiology       Aminophylline given per lexiscan protocol in stress lab for c/o persistant nausea and  headache.

## 2020-10-13 ENCOUNTER — TELEPHONE (OUTPATIENT)
Dept: PHARMACY | Age: 70
End: 2020-10-13

## 2020-10-13 NOTE — TELEPHONE ENCOUNTER
Spoke with patient about scheduling her Watchman procedure. Patient was not interested for the 10/19/20 date d/t her  had recently passed away and needs sometime. Well look at potentially for Nov. 2 date.

## 2020-10-15 ENCOUNTER — OFFICE VISIT (OUTPATIENT)
Dept: INTERNAL MEDICINE CLINIC | Age: 70
End: 2020-10-15
Payer: MEDICARE

## 2020-10-15 VITALS
WEIGHT: 181.5 LBS | DIASTOLIC BLOOD PRESSURE: 116 MMHG | BODY MASS INDEX: 30.2 KG/M2 | HEART RATE: 88 BPM | TEMPERATURE: 97.5 F | OXYGEN SATURATION: 97 % | RESPIRATION RATE: 18 BRPM | SYSTOLIC BLOOD PRESSURE: 158 MMHG

## 2020-10-15 PROCEDURE — 99213 OFFICE O/P EST LOW 20 MIN: CPT | Performed by: STUDENT IN AN ORGANIZED HEALTH CARE EDUCATION/TRAINING PROGRAM

## 2020-10-15 RX ORDER — FUROSEMIDE 20 MG/1
TABLET ORAL
Qty: 180 TABLET | Refills: 1 | Status: SHIPPED | OUTPATIENT
Start: 2020-10-15 | End: 2020-10-15

## 2020-10-15 RX ORDER — ACETAMINOPHEN 500 MG
1000 TABLET ORAL EVERY 6 HOURS PRN
Qty: 120 TABLET | Refills: 3 | Status: SHIPPED | OUTPATIENT
Start: 2020-10-15 | End: 2020-10-15

## 2020-10-15 RX ORDER — FAMOTIDINE 20 MG/1
20 TABLET, FILM COATED ORAL 2 TIMES DAILY
Qty: 60 TABLET | Refills: 2 | Status: SHIPPED | OUTPATIENT
Start: 2020-10-15 | End: 2020-10-15

## 2020-10-15 RX ORDER — POTASSIUM CHLORIDE 20 MEQ/1
10 TABLET, EXTENDED RELEASE ORAL DAILY
Qty: 90 TABLET | Refills: 1 | Status: SHIPPED | OUTPATIENT
Start: 2020-10-15 | End: 2021-01-28 | Stop reason: SDUPTHER

## 2020-10-15 RX ORDER — NIFEDIPINE 60 MG/1
60 TABLET, FILM COATED, EXTENDED RELEASE ORAL DAILY
Qty: 30 TABLET | Refills: 3 | Status: SHIPPED | OUTPATIENT
Start: 2020-10-15 | End: 2020-10-15

## 2020-10-15 RX ORDER — FAMOTIDINE 20 MG/1
20 TABLET, FILM COATED ORAL 2 TIMES DAILY
Qty: 60 TABLET | Refills: 2 | Status: SHIPPED | OUTPATIENT
Start: 2020-10-15 | End: 2021-01-28 | Stop reason: SDUPTHER

## 2020-10-15 RX ORDER — ASPIRIN 81 MG/1
81 TABLET ORAL DAILY
Qty: 30 TABLET | Refills: 3 | Status: SHIPPED | OUTPATIENT
Start: 2020-10-15 | End: 2021-01-28 | Stop reason: SDUPTHER

## 2020-10-15 RX ORDER — ATORVASTATIN CALCIUM 80 MG/1
80 TABLET, FILM COATED ORAL DAILY
Qty: 90 TABLET | Refills: 1 | Status: SHIPPED | OUTPATIENT
Start: 2020-10-15 | End: 2021-01-28 | Stop reason: SDUPTHER

## 2020-10-15 RX ORDER — WARFARIN SODIUM 5 MG/1
5 TABLET ORAL DAILY
Qty: 30 TABLET | Refills: 1 | Status: SHIPPED | OUTPATIENT
Start: 2020-10-15 | End: 2020-10-15

## 2020-10-15 RX ORDER — SPIRONOLACTONE 50 MG/1
TABLET, FILM COATED ORAL
Qty: 90 TABLET | Refills: 1 | Status: SHIPPED | OUTPATIENT
Start: 2020-10-15 | End: 2021-01-28

## 2020-10-15 RX ORDER — LOSARTAN POTASSIUM 50 MG/1
50 TABLET ORAL 2 TIMES DAILY
Qty: 180 TABLET | Refills: 3 | Status: SHIPPED | OUTPATIENT
Start: 2020-10-15 | End: 2021-01-28 | Stop reason: SDUPTHER

## 2020-10-15 RX ORDER — NIFEDIPINE 60 MG/1
60 TABLET, FILM COATED, EXTENDED RELEASE ORAL DAILY
Qty: 30 TABLET | Refills: 3 | Status: SHIPPED | OUTPATIENT
Start: 2020-10-15 | End: 2021-01-28

## 2020-10-15 RX ORDER — ASPIRIN 81 MG/1
81 TABLET ORAL DAILY
Qty: 30 TABLET | Refills: 3 | Status: SHIPPED | OUTPATIENT
Start: 2020-10-15 | End: 2020-10-15

## 2020-10-15 RX ORDER — WARFARIN SODIUM 5 MG/1
5 TABLET ORAL DAILY
Qty: 30 TABLET | Refills: 1 | Status: SHIPPED | OUTPATIENT
Start: 2020-10-15 | End: 2021-01-28 | Stop reason: SDUPTHER

## 2020-10-15 RX ORDER — POTASSIUM CHLORIDE 20 MEQ/1
10 TABLET, EXTENDED RELEASE ORAL DAILY
Qty: 90 TABLET | Refills: 1 | Status: SHIPPED | OUTPATIENT
Start: 2020-10-15 | End: 2020-10-15

## 2020-10-15 RX ORDER — ACETAMINOPHEN 500 MG
1000 TABLET ORAL EVERY 6 HOURS PRN
Qty: 120 TABLET | Refills: 3 | Status: SHIPPED | OUTPATIENT
Start: 2020-10-15 | End: 2021-01-28 | Stop reason: SDUPTHER

## 2020-10-15 RX ORDER — SPIRONOLACTONE 50 MG/1
TABLET, FILM COATED ORAL
Qty: 90 TABLET | Refills: 1 | Status: SHIPPED | OUTPATIENT
Start: 2020-10-15 | End: 2020-10-15

## 2020-10-15 RX ORDER — LOSARTAN POTASSIUM 50 MG/1
50 TABLET ORAL 2 TIMES DAILY
Qty: 180 TABLET | Refills: 3 | Status: SHIPPED | OUTPATIENT
Start: 2020-10-15 | End: 2020-10-15

## 2020-10-15 RX ORDER — FUROSEMIDE 20 MG/1
TABLET ORAL
Qty: 180 TABLET | Refills: 1 | Status: SHIPPED | OUTPATIENT
Start: 2020-10-15 | End: 2021-01-28 | Stop reason: SDUPTHER

## 2020-10-15 RX ORDER — ATORVASTATIN CALCIUM 80 MG/1
80 TABLET, FILM COATED ORAL DAILY
Qty: 90 TABLET | Refills: 1 | Status: SHIPPED | OUTPATIENT
Start: 2020-10-15 | End: 2020-10-15

## 2020-10-15 ASSESSMENT — ENCOUNTER SYMPTOMS
COUGH: 0
ABDOMINAL DISTENTION: 0
VOMITING: 0
ABDOMINAL PAIN: 0
SHORTNESS OF BREATH: 0
NAUSEA: 0
DIARRHEA: 0
WHEEZING: 0

## 2020-10-15 NOTE — PROGRESS NOTES
10/15/2020     Tessie Seay (:  1950) is a 77 y.o. female, here for evaluation of the following medical concerns:    HPI  Patient is here for general follow-up. Her  passed away recently and patient has been experiencing normal grief since then. No SI/HI, she has a strong family support. She has been having right eye blurred vision for about 2 months that comes and goes, sometimes painful, no complete loss of vision or floaters. She has not had her medications for 2 weeks and BP in office today was 158/116, asymptomatic. Patient states she will get medications today and return in 1 week for recheck. Review of Systems   Constitutional: Negative for chills, fatigue and fever. HENT: Negative for congestion. Eyes: Positive for visual disturbance (blurred vision). Respiratory: Negative for cough, shortness of breath and wheezing. Cardiovascular: Negative for chest pain and palpitations. Gastrointestinal: Negative for abdominal distention, abdominal pain, diarrhea, nausea and vomiting. Genitourinary: Negative for dysuria. Musculoskeletal: Positive for arthralgias (b/l knee arthritis). Neurological: Negative for dizziness, syncope, weakness, light-headedness and numbness. Prior to Visit Medications    Medication Sig Taking?  Authorizing Provider   spironolactone (ALDACTONE) 50 MG tablet TAKE 1 TABLET BY MOUTH DAILY Yes Willie Davidson MD   potassium chloride (KLOR-CON M) 20 MEQ extended release tablet Take 0.5 tablets by mouth daily Yes Willie Davidson MD   NIFEdipine (ADALAT CC) 60 MG extended release tablet Take 1 tablet by mouth daily Yes Willie Davidson MD   losartan (COZAAR) 50 MG tablet Take 1 tablet by mouth 2 times daily Yes Willie Davidson MD   furosemide (LASIX) 20 MG tablet TAKE 1 TABLET BY MOUTH TWICE DAILY Yes Willie Davidson MD   famotidine (PEPCID) 20 MG tablet Take 1 tablet by mouth 2 times daily Yes Willie Davidson MD   Calcium Carb-Cholecalciferol (CALCIUM + D3) 600-200 MG-UNIT TABS tablet Take 1 tablet by mouth 2 times daily Yes Rios Luis MD   atorvastatin (LIPITOR) 80 MG tablet Take 1 tablet by mouth daily Yes Rios Luis MD   aspirin 81 MG EC tablet Take 1 tablet by mouth daily Yes Rios Luis MD   acetaminophen (TYLENOL) 500 MG tablet Take 2 tablets by mouth every 6 hours as needed for Pain Yes Rios Luis MD   warfarin (COUMADIN) 5 MG tablet Take 1 tablet by mouth daily Every day. Wednesday 2.5. Yes Rios Luis MD        Social History     Tobacco Use    Smoking status: Never Smoker    Smokeless tobacco: Never Used   Substance Use Topics    Alcohol use: No        Vitals:    10/15/20 0948 10/15/20 0949   BP: (!) 186/141 (!) 158/116   Site: Right Upper Arm    Position: Sitting    Cuff Size: Medium Adult    Pulse: 88    Resp: 18    Temp: 97.5 °F (36.4 °C)    TempSrc: Oral    SpO2: 97%    Weight: 181 lb 8 oz (82.3 kg)      Estimated body mass index is 30.2 kg/m² as calculated from the following:    Height as of 10/1/20: 5' 5\" (1.651 m). Weight as of this encounter: 181 lb 8 oz (82.3 kg). Physical Exam  Constitutional:       General: She is not in acute distress. Appearance: She is well-developed. She is not diaphoretic. HENT:      Head: Normocephalic and atraumatic. Eyes:      General:         Right eye: No discharge. Extraocular Movements: Extraocular movements intact. Conjunctiva/sclera: Conjunctivae normal.      Pupils: Pupils are equal, round, and reactive to light. Cardiovascular:      Rate and Rhythm: Normal rate and regular rhythm. Heart sounds: Normal heart sounds. No murmur. Pulmonary:      Effort: Pulmonary effort is normal. No respiratory distress. Breath sounds: Normal breath sounds. No wheezing. Abdominal:      General: Bowel sounds are normal. There is no distension. Palpations: Abdomen is soft. Tenderness: There is no abdominal tenderness. FUNCTION PANEL; Future  - CBC WITH AUTO DIFFERENTIAL; Future    5. Coronary artery disease involving native coronary artery of native heart without angina pectoris  - No chest pain, sob. Controlled. Continue current medications  - atorvastatin (LIPITOR) 80 MG tablet; Take 1 tablet by mouth daily  Dispense: 90 tablet; Refill: 1  - aspirin 81 MG EC tablet; Take 1 tablet by mouth daily  Dispense: 30 tablet; Refill: 3  - RENAL FUNCTION PANEL; Future  - CBC WITH AUTO DIFFERENTIAL; Future    6. Vitamin D deficiency  - Calcium Carb-Cholecalciferol (CALCIUM + D3) 600-200 MG-UNIT TABS tablet; Take 1 tablet by mouth 2 times daily  Dispense: 120 tablet; Refill: 3    7. GERD without esophagitis  - famotidine (PEPCID) 20 MG tablet; Take 1 tablet by mouth 2 times daily  Dispense: 60 tablet; Refill: 2      Return in about 1 week (around 10/22/2020) for BP check. An electronic signature was used to authenticate this note.     --Rajan Hamilton MD on 10/15/2020 at 10:28 AM

## 2020-10-16 ENCOUNTER — ANTI-COAG VISIT (OUTPATIENT)
Dept: PHARMACY | Age: 70
End: 2020-10-16
Payer: MEDICARE

## 2020-10-16 VITALS — TEMPERATURE: 96.9 F

## 2020-10-16 LAB — INTERNATIONAL NORMALIZATION RATIO, POC: 1.9

## 2020-10-16 PROCEDURE — 99211 OFF/OP EST MAY X REQ PHY/QHP: CPT

## 2020-10-16 PROCEDURE — 85610 PROTHROMBIN TIME: CPT

## 2020-10-16 NOTE — PROGRESS NOTES
Ms. Yeimy Winston is a 77 y.o. y/o female with history of A fib   She presents today for anticoagulation monitoring and adjustment. 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)    Patient Reported Findings:  Yes     No   [x]   []       Patient verifies current dosing regimen as listed    []   [x]       S/S bleeding/bruising/swelling/SOB      []   [x]       Blood in urine or stool - denies   [x]   []       Procedures scheduled in the future at this time - Is being assessed for watchman, will keep notified   []   [x]       Missed Dose-   []   [x]       Extra Dose - denies    []   [x]       Change in medications She continues with Tylenol 1000mg but only as needed --> states that she will take up to q6h---> tylenol recently --> continues with 4 tylenol daily--> no tylenol recently --> losartan increased by cards --> no changes   []   [x]       Change in health/diet/appetite-- normally has high vitamin K diet of canned spinach weekly and collards or broccoli about every other week. Will have some lower vitamin K veggies about 2 times a week such as green beans. -->  continues with no appetite, has had a small amount of diarrhea --> states that she has been eating liver twice a week, likely why INR has dropped. Asked patient to decrease to once a week --> states that she has not been eating as much. Stopped eating liver ---> no greens, no NVD--> 1/2 cup of greens yesterday, stopped eating liver---> no changes, no NVD   []   [x]       Change in alcohol use denies  []   [x]       Change in activity  []   [x]       Hospital admission-    []   [x]       Emergency department visit   []   [x]       Other complaints- states that she is using the pillbox, and she likes it. -> has not been using recently, but wants to restart ---> states she tried to use pillbox, asked to use pillbox and AVS and to cross off each day on AVS to prevent her from missing doses.        Concerned for patient's ability to correctly remember recent history. She states that she is getting concerned for her memory. Clinical Outcomes:  Yes     No  []   [x]       Major bleeding event  []   [x]       Thromboembolic event  Duration of warfarin Therapy: indefinitely  INR Range:  1.8-2.5 -> lower INR goal dt h/o subcortical hemorrhage (2016)      She may be slower to reach steady state with warfarin dosing so consider checking INR about 10 days after dose adjustment. INR is 1.9 today    Continue weekly dose of 5 mg on Mon, Wed and Fri and 2.5 mg all other days. Encouraged pt to pay attention to dosing in order to not miss doses.    Recheck INR in 3 weeks, 11/5     Referring cardiologist is Dr. Madison Patient  INR (no units)   Date Value   09/28/2020 1.4   09/04/2020 2.4   08/20/2020 2.0   08/10/2020 1.5   05/27/2020 1.75 (H)   05/16/2020 2.05 (H)   05/15/2020 2.21 (H)   05/12/2020 1.43 (H)       CLINICAL PHARMACY CONSULT: MED RECONCILIATION/REVIEW ADDENDUM    For Pharmacy Admin Tracking Only    PHSO: No  Total # of Interventions Recommended: 0  - Maintenance Safety Lab Monitoring #: 1  Total Interventions Accepted: 0  Time Spent (min): 15    Nahomy CervantesD

## 2020-10-21 DIAGNOSIS — I25.10 CORONARY ARTERY DISEASE INVOLVING NATIVE CORONARY ARTERY OF NATIVE HEART WITHOUT ANGINA PECTORIS: Chronic | ICD-10-CM

## 2020-10-21 DIAGNOSIS — I50.32 CHRONIC HEART FAILURE WITH PRESERVED EJECTION FRACTION (HCC): ICD-10-CM

## 2020-10-21 DIAGNOSIS — I10 UNCONTROLLED HYPERTENSION: Chronic | ICD-10-CM

## 2020-10-21 DIAGNOSIS — I48.91 ATRIAL FIBRILLATION, UNSPECIFIED TYPE (HCC): Chronic | ICD-10-CM

## 2020-10-21 LAB
ALBUMIN SERPL-MCNC: 4.5 G/DL (ref 3.4–5)
ANION GAP SERPL CALCULATED.3IONS-SCNC: 15 MMOL/L (ref 3–16)
BASOPHILS ABSOLUTE: 0 K/UL (ref 0–0.2)
BASOPHILS RELATIVE PERCENT: 0.5 %
BUN BLDV-MCNC: 12 MG/DL (ref 7–20)
CALCIUM SERPL-MCNC: 9.7 MG/DL (ref 8.3–10.6)
CHLORIDE BLD-SCNC: 98 MMOL/L (ref 99–110)
CO2: 25 MMOL/L (ref 21–32)
CREAT SERPL-MCNC: 0.7 MG/DL (ref 0.6–1.2)
EOSINOPHILS ABSOLUTE: 0.1 K/UL (ref 0–0.6)
EOSINOPHILS RELATIVE PERCENT: 1.5 %
GFR AFRICAN AMERICAN: >60
GFR NON-AFRICAN AMERICAN: >60
GLUCOSE BLD-MCNC: 102 MG/DL (ref 70–99)
HCT VFR BLD CALC: 41.1 % (ref 36–48)
HEMOGLOBIN: 13.7 G/DL (ref 12–16)
LYMPHOCYTES ABSOLUTE: 1.9 K/UL (ref 1–5.1)
LYMPHOCYTES RELATIVE PERCENT: 34.6 %
MCH RBC QN AUTO: 29.6 PG (ref 26–34)
MCHC RBC AUTO-ENTMCNC: 33.3 G/DL (ref 31–36)
MCV RBC AUTO: 89 FL (ref 80–100)
MONOCYTES ABSOLUTE: 0.5 K/UL (ref 0–1.3)
MONOCYTES RELATIVE PERCENT: 8.3 %
NEUTROPHILS ABSOLUTE: 3 K/UL (ref 1.7–7.7)
NEUTROPHILS RELATIVE PERCENT: 55.1 %
PDW BLD-RTO: 13 % (ref 12.4–15.4)
PHOSPHORUS: 3.1 MG/DL (ref 2.5–4.9)
PLATELET # BLD: 153 K/UL (ref 135–450)
PMV BLD AUTO: 10.9 FL (ref 5–10.5)
POTASSIUM SERPL-SCNC: 3.5 MMOL/L (ref 3.5–5.1)
RBC # BLD: 4.62 M/UL (ref 4–5.2)
SODIUM BLD-SCNC: 138 MMOL/L (ref 136–145)
WBC # BLD: 5.4 K/UL (ref 4–11)

## 2020-10-22 ENCOUNTER — OFFICE VISIT (OUTPATIENT)
Dept: INTERNAL MEDICINE CLINIC | Age: 70
End: 2020-10-22
Payer: MEDICARE

## 2020-10-22 VITALS
SYSTOLIC BLOOD PRESSURE: 125 MMHG | TEMPERATURE: 97.4 F | WEIGHT: 181 LBS | HEIGHT: 65 IN | HEART RATE: 96 BPM | OXYGEN SATURATION: 98 % | DIASTOLIC BLOOD PRESSURE: 88 MMHG | BODY MASS INDEX: 30.16 KG/M2

## 2020-10-22 PROCEDURE — 99213 OFFICE O/P EST LOW 20 MIN: CPT | Performed by: STUDENT IN AN ORGANIZED HEALTH CARE EDUCATION/TRAINING PROGRAM

## 2020-10-22 ASSESSMENT — ENCOUNTER SYMPTOMS
VOMITING: 0
SHORTNESS OF BREATH: 0
ABDOMINAL DISTENTION: 0
NAUSEA: 0
DIARRHEA: 0
COUGH: 0
WHEEZING: 0
ABDOMINAL PAIN: 0

## 2020-10-22 NOTE — PROGRESS NOTES
warfarin (COUMADIN) 5 MG tablet Take 1 tablet by mouth daily Every day. Wednesday 2.5. Deng Richardson MD        Social History     Tobacco Use    Smoking status: Never Smoker    Smokeless tobacco: Never Used   Substance Use Topics    Alcohol use: No        Vitals:    10/22/20 0924   BP: 125/88   Site: Left Upper Arm   Position: Sitting   Cuff Size: Medium Adult   Pulse: 96   Temp: 97.4 °F (36.3 °C)   SpO2: 98%   Weight: 181 lb (82.1 kg)   Height: 5' 5\" (1.651 m)     Estimated body mass index is 30.12 kg/m² as calculated from the following:    Height as of this encounter: 5' 5\" (1.651 m). Weight as of this encounter: 181 lb (82.1 kg). Physical Exam  Constitutional:       General: She is not in acute distress. Appearance: She is well-developed. She is not diaphoretic. HENT:      Head: Normocephalic and atraumatic. Cardiovascular:      Rate and Rhythm: Normal rate and regular rhythm. Heart sounds: Normal heart sounds. No murmur. Pulmonary:      Effort: Pulmonary effort is normal. No respiratory distress. Breath sounds: Normal breath sounds. No wheezing. Abdominal:      General: Bowel sounds are normal. There is no distension. Palpations: Abdomen is soft. Tenderness: There is no abdominal tenderness. Skin:     General: Skin is warm and dry. Capillary Refill: Capillary refill takes less than 2 seconds. Findings: No erythema. Neurological:      Mental Status: She is alert and oriented to person, place, and time. Psychiatric:         Behavior: Behavior normal.         ASSESSMENT/PLAN:  1. Uncontrolled hypertension  - Controlled. Continue cozaar, nifedipine, and aldactone  - BP in clinic 125/88, correlated with home BP cuff perfectly. Return in about 3 months (around 1/22/2021), or if symptoms worsen or fail to improve, for Office follow-up. An electronic signature was used to authenticate this note.     --Deng Richardson MD on 10/22/2020 at 9:51 AM

## 2020-11-05 NOTE — TELEPHONE ENCOUNTER
Spoke with patient today and informed me she rather stay on the blood thinner and is not interested in pursuing the Watchman procedure now. Left message with Dr. Maddie Perez waiting on instructions what to do next.

## 2020-11-09 ENCOUNTER — TELEPHONE (OUTPATIENT)
Dept: PHARMACY | Age: 70
End: 2020-11-09

## 2020-11-10 ENCOUNTER — ANTI-COAG VISIT (OUTPATIENT)
Dept: PHARMACY | Age: 70
End: 2020-11-10
Payer: MEDICARE

## 2020-11-10 VITALS — TEMPERATURE: 97.3 F

## 2020-11-10 LAB — INTERNATIONAL NORMALIZATION RATIO, POC: 2.1

## 2020-11-10 PROCEDURE — 99211 OFF/OP EST MAY X REQ PHY/QHP: CPT

## 2020-11-10 PROCEDURE — 85610 PROTHROMBIN TIME: CPT

## 2020-11-10 NOTE — PROGRESS NOTES
Ms. Charles Zeng is a 77 y.o. y/o female with history of A fib   She presents today for anticoagulation monitoring and adjustment. 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)    Patient Reported Findings:  Yes     No   [x]   []       Patient verifies current dosing regimen as listed    []   [x]       S/S bleeding/bruising/swelling/SOB      []   [x]       Blood in urine or stool - denies   [x]   []       Procedures scheduled in the future at this time - Is being assessed for watchman, will keep notified   []   [x]       Missed Dose-   []   [x]       Extra Dose - denies    []   [x]       Change in medications She continues with Tylenol 1000mg but only as needed --> states that she will take up to q6h---> tylenol recently --> continues with 4 tylenol daily--> no tylenol recently --> losartan increased by cards --> no changes   []   [x]       Change in health/diet/appetite-- normally has high vitamin K diet of canned spinach weekly and collards or broccoli about every other week. Will have some lower vitamin K veggies about 2 times a week such as green beans. -->  continues with no appetite, has had a small amount of diarrhea --> states that she has been eating liver twice a week, likely why INR has dropped. Asked patient to decrease to once a week --> states that she has not been eating as much. Stopped eating liver ---> no greens, no NVD--> 1/2 cup of greens yesterday, stopped eating liver---> no changes, no NVD  []   [x]       Change in alcohol use denies  []   [x]       Change in activity  []   [x]       Hospital admission-    []   [x]       Emergency department visit   []   [x]       Other complaints- states that she is using the pillbox, and she likes it. -> has not been using recently, but wants to restart ---> states she tried to use pillbox, asked to use pillbox and AVS and to cross off each day on AVS to prevent her from missing doses.        Concerned for patient's ability to correctly remember recent history. She states that she is getting concerned for her memory. Clinical Outcomes:  Yes     No  []   [x]       Major bleeding event  []   [x]       Thromboembolic event  Duration of warfarin Therapy: indefinitely  INR Range:  1.8-2.5 -> lower INR goal dt h/o subcortical hemorrhage (2016)      She may be slower to reach steady state with warfarin dosing so consider checking INR about 10 days after dose adjustment. INR is 2.1 today    Continue weekly dose of 5 mg on Mon, Wed and Fri and 2.5 mg all other days. Encouraged pt to pay attention to dosing in order to not miss doses.    Refill called in to [facility]   Recheck INR in 4 weeks, 12/8    Referring cardiologist is Dr. Nidia Quijano  INR (no units)   Date Value   11/10/2020 2.1   10/16/2020 1.9   09/28/2020 1.4   09/04/2020 2.4   05/27/2020 1.75 (H)   05/16/2020 2.05 (H)   05/15/2020 2.21 (H)   05/12/2020 1.43 (H)     CLINICAL PHARMACY CONSULT: [facility] Tracking Only    PHSO: No  Total # of Interventions Recommended: 1  - Refills Provided #: 1  - Maintenance Safety Lab Monitoring #: 1  Total Interventions Accepted: 1  Time Spent (min): 15    Nahomy SchraderD

## 2020-12-03 ENCOUNTER — OFFICE VISIT (OUTPATIENT)
Dept: CARDIOLOGY CLINIC | Age: 70
End: 2020-12-03
Payer: MEDICARE

## 2020-12-03 VITALS
HEART RATE: 90 BPM | SYSTOLIC BLOOD PRESSURE: 130 MMHG | WEIGHT: 179.8 LBS | BODY MASS INDEX: 29.96 KG/M2 | DIASTOLIC BLOOD PRESSURE: 88 MMHG | OXYGEN SATURATION: 99 % | HEIGHT: 65 IN

## 2020-12-03 PROCEDURE — G8484 FLU IMMUNIZE NO ADMIN: HCPCS | Performed by: NURSE PRACTITIONER

## 2020-12-03 PROCEDURE — 1036F TOBACCO NON-USER: CPT | Performed by: NURSE PRACTITIONER

## 2020-12-03 PROCEDURE — 99214 OFFICE O/P EST MOD 30 MIN: CPT | Performed by: NURSE PRACTITIONER

## 2020-12-03 PROCEDURE — 93000 ELECTROCARDIOGRAM COMPLETE: CPT | Performed by: NURSE PRACTITIONER

## 2020-12-03 PROCEDURE — G8417 CALC BMI ABV UP PARAM F/U: HCPCS | Performed by: NURSE PRACTITIONER

## 2020-12-03 PROCEDURE — 1090F PRES/ABSN URINE INCON ASSESS: CPT | Performed by: NURSE PRACTITIONER

## 2020-12-03 PROCEDURE — 4040F PNEUMOC VAC/ADMIN/RCVD: CPT | Performed by: NURSE PRACTITIONER

## 2020-12-03 PROCEDURE — G8427 DOCREV CUR MEDS BY ELIG CLIN: HCPCS | Performed by: NURSE PRACTITIONER

## 2020-12-03 PROCEDURE — 3017F COLORECTAL CA SCREEN DOC REV: CPT | Performed by: NURSE PRACTITIONER

## 2020-12-03 PROCEDURE — 1123F ACP DISCUSS/DSCN MKR DOCD: CPT | Performed by: NURSE PRACTITIONER

## 2020-12-03 PROCEDURE — G8399 PT W/DXA RESULTS DOCUMENT: HCPCS | Performed by: NURSE PRACTITIONER

## 2020-12-03 NOTE — PATIENT INSTRUCTIONS
- No medicaiton changes  - Call if interested in Watchman procedure  - Call if symptoms develop or worsen  - Check your blood pressure at home, if your top number blood pressure is >150/90 or <90/50 please call the office  - Follow up with EP in 1 year and Dr. Priscila Garcia in between

## 2020-12-08 ENCOUNTER — ANTI-COAG VISIT (OUTPATIENT)
Dept: PHARMACY | Age: 70
End: 2020-12-08
Payer: MEDICARE

## 2020-12-08 VITALS — TEMPERATURE: 97.5 F

## 2020-12-08 LAB — INTERNATIONAL NORMALIZATION RATIO, POC: 2.7

## 2020-12-08 PROCEDURE — 85610 PROTHROMBIN TIME: CPT

## 2020-12-08 PROCEDURE — 99211 OFF/OP EST MAY X REQ PHY/QHP: CPT

## 2020-12-08 NOTE — PROGRESS NOTES
Ms. Annie Ford is a 77 y.o. y/o female with history of A fib   She presents today for anticoagulation monitoring and adjustment. 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)    Patient Reported Findings:  Yes     No   [x]   []       Patient verifies current dosing regimen as listed    []   [x]       S/S bleeding/bruising/swelling/SOB      []   [x]       Blood in urine or stool - denies   [x]   []       Procedures scheduled in the future at this time - Is being assessed for watchman, will keep notified   []   [x]       Missed Dose- denies   []   [x]       Extra Dose - denies    []   [x]       Change in medications She continues with Tylenol 1000mg but only as needed --> states that she will take up to q6h---> tylenol recently --> continues with 4 tylenol daily--> no tylenol recently --> losartan increased by cards --> no changes   []   [x]       Change in health/diet/appetite-- normally has high vitamin K diet of canned spinach weekly and collards or broccoli about every other week. Will have some lower vitamin K veggies about 2 times a week such as green beans. -->  continues with no appetite, has had a small amount of diarrhea --> states that she has been eating liver twice a week, likely why INR has dropped. Asked patient to decrease to once a week --> states that she has not been eating as much. Stopped eating liver ---> no greens, no NVD--> 1/2 cup of greens yesterday, stopped eating liver---> no changes, no NVD  []   [x]       Change in alcohol use denies  []   [x]       Change in activity  []   [x]       Hospital admission-    []   [x]       Emergency department visit   []   [x]       Other complaints- states that she is using the pillbox, and she likes it. -> has not been using recently, but wants to restart ---> states she tried to use pillbox, asked to use pillbox and AVS and to cross off each day on AVS to prevent her from missing doses.        Concerned for patient's ability to correctly remember recent history. She states that she is getting concerned for her memory. Clinical Outcomes:  Yes     No  []   [x]       Major bleeding event  []   [x]       Thromboembolic event  Duration of warfarin Therapy: indefinitely  INR Range:  1.8-2.5 -> lower INR goal dt h/o subcortical hemorrhage (2016)      She may be slower to reach steady state with warfarin dosing so consider checking INR about 10 days after dose adjustment. INR is 2.7 today     As patient goal is 1.8-2.5 will have patient hold tonight then continue normal dosing. Hold tonight, then continue weekly dose of 5 mg on Mon, Wed and Fri and 2.5 mg all other days. Encouraged pt to pay attention to dosing in order to not miss doses.    Recheck INR in 4 weeks, 1/5    Referring cardiologist is Dr. Meng Morrow  INR (no units)   Date Value   12/08/2020 2.7   11/10/2020 2.1   10/16/2020 1.9   09/28/2020 1.4   05/27/2020 1.75 (H)   05/16/2020 2.05 (H)   05/15/2020 2.21 (H)   05/12/2020 1.43 (H)     CLINICAL PHARMACY CONSULT: ELVIS Galvan Tracking Only    PHSO: No  Total # of Interventions Recommended: 1  - Decreased Dose #: 1  - Maintenance Safety Lab Monitoring #: 1  Total Interventions Accepted: 1  Time Spent (min): 15    Diana Reza, NahomyD

## 2020-12-18 ENCOUNTER — TELEPHONE (OUTPATIENT)
Dept: CARDIOLOGY CLINIC | Age: 70
End: 2020-12-18

## 2020-12-18 NOTE — TELEPHONE ENCOUNTER
Left message on patients phone to discuss the Watchman procedure again. Patient has been on and off about proceeding with the device. Instructed patient to call me to go over information about Watchman. Heart failure

## 2021-01-07 ENCOUNTER — TELEPHONE (OUTPATIENT)
Dept: PHARMACY | Age: 71
End: 2021-01-07

## 2021-01-07 NOTE — TELEPHONE ENCOUNTER
Tried to call patient to reschedule her missed appt. Her mailbox was full. SMS message with clinic phone# sent.

## 2021-01-13 ENCOUNTER — ANTI-COAG VISIT (OUTPATIENT)
Dept: PHARMACY | Age: 71
End: 2021-01-13
Payer: MEDICARE

## 2021-01-13 VITALS — TEMPERATURE: 97.1 F

## 2021-01-13 DIAGNOSIS — I48.21 PERMANENT ATRIAL FIBRILLATION (HCC): ICD-10-CM

## 2021-01-13 LAB — INTERNATIONAL NORMALIZATION RATIO, POC: 1.3

## 2021-01-13 PROCEDURE — 85610 PROTHROMBIN TIME: CPT

## 2021-01-13 PROCEDURE — 99212 OFFICE O/P EST SF 10 MIN: CPT

## 2021-01-13 NOTE — PROGRESS NOTES
Ms. Elina Velazco is a 72 y.o. y/o female with history of A fib   She presents today for anticoagulation monitoring and adjustment. 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)    Patient Reported Findings:  Yes     No   [x]   []       Patient verifies current dosing regimen as listed    []   [x]       S/S bleeding/bruising/swelling/SOB      []   [x]       Blood in urine or stool - denies   []   [x]       Procedures scheduled in the future at this time - Is being assessed for watchman, will keep notified    []   [x]       Missed Dose- denies   []   [x]       Extra Dose - denies    []   [x]       Change in medications She continues with Tylenol 1000mg but only as needed --> states that she will take up to q6h---> continues with 4 tylenol daily--> no changes   [x]   []       Change in health/diet/appetite-- normally has high vitamin K diet of canned spinach weekly and collards or broccoli about every other week. Will have some lower vitamin K veggies about 2 times a week such as green beans. -->  continues with no appetite, has had a small amount of diarrhea --> states that she has been eating liver twice a week, likely why INR has dropped. Asked patient to decrease to once a week --> states that she has not been eating as much. Stopped eating liver ---> no greens, no NVD--> 1/2 cup of greens yesterday, stopped eating liver---> no vit k in last 2 weeks  []   [x]       Change in alcohol use denies  []   [x]       Change in activity  []   [x]       Hospital admission-    []   [x]       Emergency department visit   []   [x]       Other complaints- states that she is using the pillbox, and she likes it. -> has not been using recently, but wants to restart ---> states she tried to use pillbox, asked to use pillbox and AVS and to cross off each day on AVS to prevent her from missing doses. Concerned for patient's ability to correctly remember recent history.  She states that she is getting concerned for her memory. Clinical Outcomes:  Yes     No  []   [x]       Major bleeding event  []   [x]       Thromboembolic event  Duration of warfarin Therapy: indefinitely  INR Range:  1.8-2.5 -> lower INR goal dt h/o subcortical hemorrhage (2016)    She may be slower to reach steady state with warfarin dosing so consider checking INR about 10 days after dose adjustment. INR is 1.3 today likely missed dose and does not remember     Take 5 mg tomorrow then continue weekly dose of 5 mg on Mon, Wed and Fri and 2.5 mg all other days. Encouraged pt to pay attention to dosing in order to not miss doses.    Recheck INR in 2 weeks, 1/25    Referring cardiologist is Dr. Krishan Otto  INR (no units)   Date Value   12/08/2020 2.7   11/10/2020 2.1   10/16/2020 1.9   09/28/2020 1.4   05/27/2020 1.75 (H)   05/16/2020 2.05 (H)   05/15/2020 2.21 (H)   05/12/2020 1.43 (H)     CLINICAL PHARMACY CONSULT: MED RECONCILIATION/REVIEW ADDENDUM    For Pharmacy Admin Tracking Only    PHSO: No  Total # of Interventions Recommended: 1  - Increased Dose #: 1  - Maintenance Safety Lab Monitoring #: 1  Total Interventions Accepted: 1  Time Spent (min): 15    Mireya Dallas, NahomyD

## 2021-01-22 NOTE — TELEPHONE ENCOUNTER
Reached out to patient again regarding the Watchman left message to call me to see if she would like to pursue the Watchman device.

## 2021-01-25 ENCOUNTER — APPOINTMENT (OUTPATIENT)
Dept: PHARMACY | Age: 71
End: 2021-01-25
Payer: MEDICARE

## 2021-01-26 ENCOUNTER — TELEPHONE (OUTPATIENT)
Dept: PHARMACY | Age: 71
End: 2021-01-26

## 2021-01-27 ENCOUNTER — ANTI-COAG VISIT (OUTPATIENT)
Dept: PHARMACY | Age: 71
End: 2021-01-27
Payer: MEDICARE

## 2021-01-27 VITALS — TEMPERATURE: 97.1 F

## 2021-01-27 DIAGNOSIS — I48.21 PERMANENT ATRIAL FIBRILLATION (HCC): ICD-10-CM

## 2021-01-27 LAB — INTERNATIONAL NORMALIZATION RATIO, POC: 2.2

## 2021-01-27 PROCEDURE — 85610 PROTHROMBIN TIME: CPT

## 2021-01-27 PROCEDURE — 99211 OFF/OP EST MAY X REQ PHY/QHP: CPT

## 2021-01-27 NOTE — TELEPHONE ENCOUNTER
Spoke with patient's daughter to see where we were about the Watchman procedure. Patient is having eye surgery today. Daughter will see what her Mom wants to do to pursue the Watchman or not. They will call me back per daughter.

## 2021-01-27 NOTE — PROGRESS NOTES
Ms. Trevor Chaney is a 72 y.o. y/o female with history of A fib   She presents today for anticoagulation monitoring and adjustment. 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)    Patient Reported Findings:  Yes     No   [x]   []       Patient verifies current dosing regimen as listed    []   [x]       S/S bleeding/bruising/swelling/SOB      []   [x]       Blood in urine or stool - denies   []   [x]       Procedures scheduled in the future at this time - Is being assessed for watchman, will keep notified    []   [x]       Missed Dose- denies   []   [x]       Extra Dose - denies    []   [x]       Change in medications She continues with Tylenol 1000mg but only as needed --> states that she will take up to q6h---> continues with 4 tylenol daily--> no changes   []   [x]       Change in health/diet/appetite-- normally has high vitamin K diet of canned spinach weekly and collards or broccoli about every other week. Will have some lower vitamin K veggies about 2 times a week such as green beans. -->  continues with no appetite, has had a small amount of diarrhea --> states that she has been eating liver twice a week, likely why INR has dropped. Asked patient to decrease to once a week --> states that she has not been eating as much. Stopped eating liver ---> no greens, no NVD--> 1/2 cup of greens yesterday, stopped eating liver---> no vit k in last 2 weeks  []   [x]       Change in alcohol use denies  []   [x]       Change in activity  []   [x]       Hospital admission-    []   [x]       Emergency department visit   []   [x]       Other complaints- states that she is using the pillbox, and she likes it. -> has not been using recently, but wants to restart ---> states she tried to use pillbox, asked to use pillbox and AVS and to cross off each day on AVS to prevent her from missing doses. Concerned for patient's ability to correctly remember recent history.  She states that she is getting concerned for her memory. Clinical Outcomes:  Yes     No  []   [x]       Major bleeding event  []   [x]       Thromboembolic event  Duration of warfarin Therapy: indefinitely  INR Range:  1.8-2.5 -> lower INR goal dt h/o subcortical hemorrhage (2016)    She may be slower to reach steady state with warfarin dosing so consider checking INR about 10 days after dose adjustment. INR is 2.2 today   Continue weekly dose of 5 mg on Mon, Wed and Fri and 2.5 mg all other days. Encouraged pt to pay attention to dosing in order to not miss doses.    Refilled warfarin at [address] INR in 3 weeks, 2/17    Referring cardiologist is Dr. Sameera Bernard  INR (no units)   Date Value   01/13/2021 1.3   12/08/2020 2.7   11/10/2020 2.1   10/16/2020 1.9   05/27/2020 1.75 (H)   05/16/2020 2.05 (H)   05/15/2020 2.21 (H)   05/12/2020 1.43 (H)     CLINICAL PHARMACY CONSULT: [facility] Gal[facility] Tracking Only    PHSO: No  Total # of Interventions Recommended: 1  - Refills Provided #: 1  - Maintenance Safety Lab Monitoring #: 1  Total Interventions Accepted: 1  Time Spent (min): 15    Jonathan Esparza PharmD

## 2021-01-27 NOTE — TELEPHONE ENCOUNTER
Warfarin prescription phoned into [facility] [address] to [address] [facility] under Dr. Aminta Soriano  Warfarin 5 mg tabs  Take 5 mg on Mon, Wed and Fri and 2.5 mg all other days of the week  90 days   2 refills

## 2021-01-28 ENCOUNTER — OFFICE VISIT (OUTPATIENT)
Dept: INTERNAL MEDICINE CLINIC | Age: 71
End: 2021-01-28
Payer: MEDICARE

## 2021-01-28 VITALS
HEART RATE: 87 BPM | SYSTOLIC BLOOD PRESSURE: 174 MMHG | OXYGEN SATURATION: 99 % | DIASTOLIC BLOOD PRESSURE: 119 MMHG | WEIGHT: 179.4 LBS | BODY MASS INDEX: 28.83 KG/M2 | TEMPERATURE: 97.1 F | HEIGHT: 66 IN

## 2021-01-28 DIAGNOSIS — I25.10 CORONARY ARTERY DISEASE INVOLVING NATIVE CORONARY ARTERY OF NATIVE HEART WITHOUT ANGINA PECTORIS: Chronic | ICD-10-CM

## 2021-01-28 DIAGNOSIS — I50.32 CHRONIC HEART FAILURE WITH PRESERVED EJECTION FRACTION (HCC): ICD-10-CM

## 2021-01-28 DIAGNOSIS — K21.9 GERD WITHOUT ESOPHAGITIS: ICD-10-CM

## 2021-01-28 DIAGNOSIS — I48.91 ATRIAL FIBRILLATION, UNSPECIFIED TYPE (HCC): Chronic | ICD-10-CM

## 2021-01-28 DIAGNOSIS — I10 UNCONTROLLED HYPERTENSION: Chronic | ICD-10-CM

## 2021-01-28 PROCEDURE — 99213 OFFICE O/P EST LOW 20 MIN: CPT | Performed by: STUDENT IN AN ORGANIZED HEALTH CARE EDUCATION/TRAINING PROGRAM

## 2021-01-28 RX ORDER — FAMOTIDINE 20 MG/1
20 TABLET, FILM COATED ORAL 2 TIMES DAILY
Qty: 60 TABLET | Refills: 2 | Status: ON HOLD | OUTPATIENT
Start: 2021-01-28 | End: 2022-10-22 | Stop reason: HOSPADM

## 2021-01-28 RX ORDER — NIFEDIPINE 90 MG/1
90 TABLET, EXTENDED RELEASE ORAL DAILY
Qty: 90 TABLET | Refills: 1 | Status: SHIPPED | OUTPATIENT
Start: 2021-01-28 | End: 2022-06-08 | Stop reason: SDUPTHER

## 2021-01-28 RX ORDER — FUROSEMIDE 20 MG/1
TABLET ORAL
Qty: 180 TABLET | Refills: 1 | Status: SHIPPED | OUTPATIENT
Start: 2021-01-28 | End: 2022-06-08 | Stop reason: SDUPTHER

## 2021-01-28 RX ORDER — ACETAMINOPHEN 500 MG
1000 TABLET ORAL EVERY 6 HOURS PRN
Qty: 120 TABLET | Refills: 3 | Status: SHIPPED | OUTPATIENT
Start: 2021-01-28

## 2021-01-28 RX ORDER — LOSARTAN POTASSIUM 50 MG/1
50 TABLET ORAL 2 TIMES DAILY
Qty: 180 TABLET | Refills: 3 | Status: SHIPPED | OUTPATIENT
Start: 2021-01-28 | End: 2021-02-04 | Stop reason: ALTCHOICE

## 2021-01-28 RX ORDER — POTASSIUM CHLORIDE 20 MEQ/1
10 TABLET, EXTENDED RELEASE ORAL DAILY
Qty: 90 TABLET | Refills: 1 | Status: SHIPPED | OUTPATIENT
Start: 2021-01-28 | End: 2022-06-08 | Stop reason: SDUPTHER

## 2021-01-28 RX ORDER — WARFARIN SODIUM 5 MG/1
5 TABLET ORAL DAILY
Qty: 30 TABLET | Refills: 1 | Status: ON HOLD | OUTPATIENT
Start: 2021-01-28 | End: 2021-08-12 | Stop reason: HOSPADM

## 2021-01-28 RX ORDER — SPIRONOLACTONE 50 MG/1
50 TABLET, FILM COATED ORAL 2 TIMES DAILY
Qty: 90 TABLET | Refills: 1 | Status: SHIPPED | OUTPATIENT
Start: 2021-01-28 | End: 2022-06-08 | Stop reason: SDUPTHER

## 2021-01-28 RX ORDER — ATORVASTATIN CALCIUM 80 MG/1
80 TABLET, FILM COATED ORAL DAILY
Qty: 90 TABLET | Refills: 1 | Status: SHIPPED | OUTPATIENT
Start: 2021-01-28

## 2021-01-28 RX ORDER — ASPIRIN 81 MG/1
81 TABLET ORAL DAILY
Qty: 30 TABLET | Refills: 3 | Status: ON HOLD | OUTPATIENT
Start: 2021-01-28 | End: 2021-08-12 | Stop reason: HOSPADM

## 2021-01-28 ASSESSMENT — ENCOUNTER SYMPTOMS
SHORTNESS OF BREATH: 0
ABDOMINAL DISTENTION: 0
WHEEZING: 0
NAUSEA: 0
VOMITING: 0
ABDOMINAL PAIN: 0
COUGH: 0
DIARRHEA: 0

## 2021-01-28 ASSESSMENT — PATIENT HEALTH QUESTIONNAIRE - PHQ9
SUM OF ALL RESPONSES TO PHQ QUESTIONS 1-9: 0
2. FEELING DOWN, DEPRESSED OR HOPELESS: 0

## 2021-01-28 NOTE — PATIENT INSTRUCTIONS
Please start taking nifedipine 90mg daily, stop taking the 60mg pills  Please start taking Spironolactone 50mg twice a day  Please come back next week for a blood pressure check

## 2021-01-28 NOTE — PROGRESS NOTES
2021    Keo Stockton (:  1950) is a 72 y.o. female, here for a preventive medicine evaluation. Patient is here for pre-op for cataract surgery and for general check up. She gets occasional floaters in her eyes that resolve on their own. Her BP in the office continues to be elevated at 174/119. She does not check at home. She takes all of her medications. Denies any complaints. Patient Active Problem List   Diagnosis    Permanent atrial fibrillation ([address]    Chest pain    Uncontrolled hypertension    Coronary artery disease involving native coronary artery of native heart without angina pectoris    LVH (left ventricular hypertrophy)    Thyroid nodule    Chronic heart failure with preserved ejection fraction (HCC)    Bradycardia    Chronic diastolic CHF (congestive heart failure) ([address]    Essential hypertension       Review of Systems   Constitutional: Negative for chills, fatigue and fever. HENT: Negative for congestion. Eyes: Positive for visual disturbance. Respiratory: Negative for cough, shortness of breath and wheezing. Cardiovascular: Negative for chest pain and palpitations. Gastrointestinal: Negative for abdominal distention, abdominal pain, diarrhea, nausea and vomiting. Genitourinary: Negative for dysuria. Neurological: Negative for dizziness, syncope, weakness, light-headedness and numbness. Prior to Visit Medications    Medication Sig Taking? Authorizing Provider   spironolactone (ALDACTONE) 50 MG tablet Take 1 tablet by mouth 2 times daily TAKE 1 TABLET BY MOUTH DAILY Yes Georgia Licona MD   warfarin (COUMADIN) 5 MG tablet Take 1 tablet by mouth daily Every day. Wednesday 2.5.  Yes Georgia Licona MD   potassium chloride (KLOR-CON M) 20 MEQ extended release tablet Take 0.5 tablets by mouth daily Yes Georgia Licona MD   NIFEdipine (PROCARDIA XL) 90 MG extended release tablet Take 1 tablet by mouth daily Yes Georgia Licona MD   losartan (COZAAR) 50 MG tablet Take 1 tablet by mouth 2 times daily Yes Deangelo Ballesteros MD   furosemide (LASIX) 20 MG tablet TAKE 1 TABLET BY MOUTH TWICE DAILY Yes Deangelo Ballesteros MD   famotidine (PEPCID) 20 MG tablet Take 1 tablet by mouth 2 times daily Yes Deangelo Ballesteros MD   atorvastatin (LIPITOR) 80 MG tablet Take 1 tablet by mouth daily Yes Deangelo Ballesteros MD   aspirin 81 MG EC tablet Take 1 tablet by mouth daily Yes Deangelo Ballesteros MD   acetaminophen (TYLENOL) 500 MG tablet Take 2 tablets by mouth every 6 hours as needed for Pain Yes Deangelo Ballesteros MD   Calcium Carb-Cholecalciferol (CALCIUM + D3) 600-200 MG-UNIT TABS tablet Take 1 tablet by mouth 2 times daily Yes Deangelo Ballesteros MD        Allergies   Allergen Reactions    Clonidine Rash, Shortness Of Breath and Hives    Codeine Hives, Itching, Nausea Only and Rash     Other reaction(s):  Other (See Comments)  Pruritis    Lisinopril Hives and Itching    Tramadol     Percocet [Oxycodone-Acetaminophen] Itching     Patient takes percocet with benadryl to control the itching       Past Medical History:   Diagnosis Date    Acute cerebrovascular accident (CVA) ([address] 1/28/2020    Atrial fibrillation ([address]     Bell palsy     diagnosed 15 years ago   Carlos Enrique Perez CAD (coronary artery disease)     Cerebral artery occlusion with cerebral infarction ([address]     Chronic diastolic CHF (congestive heart failure) ([address] 5/16/2020    CVA (cerebral vascular accident) ([address] 1/4/2017    Hypertension     Intracranial hemorrhage ([address] 4/2016    Nonintractable headache     Nontraumatic subcortical hemorrhage of cerebral hemisphere ([address] 4/30/2016    Sudden visual loss of left eye     Thyroid nodule 2/8/2018    TIA involving right internal carotid artery        Past Surgical History:   Procedure Laterality Date    CARDIAC SURGERY      COLONOSCOPY      CORONARY ANGIOPLASTY WITH STENT PLACEMENT      TUBAL LIGATION Right Torn Rotator Cuff    2013 @ [facility]       Social History Socioeconomic History    Marital status:      Spouse name: Cornel Lloyd Number of children: 3    Years of education: 15    Highest education level: Not on file   Occupational History    Not on file   Social Needs    Financial resource strain: Not on file    Food insecurity     Worry: Not on file     Inability: Not on file   [employer] needs     Medical: Not on file     Non-medical: Not on file   Tobacco Use    Smoking status: Never Smoker    Smokeless tobacco: Never Used   Substance and Sexual Activity    Alcohol use: No    Drug use: No    Sexual activity: Yes     Partners: Male   Lifestyle    Physical activity     Days per week: Not on file     Minutes per session: Not on file    Stress: Not on file   Relationships    Social connections     Talks on phone: Not on file     Gets together: Not on file     Attends Christian service: Not on file     Active member of club or organization: Not on file     Attends meetings of clubs or organizations: Not on file     Relationship status: Not on file    Intimate partner violence     Fear of current or ex partner: Not on file     Emotionally abused: Not on file     Physically abused: Not on file     Forced sexual activity: Not on file   Other Topics Concern    Not on file   Social History Narrative    Not on file        No family history on file. ADVANCE DIRECTIVE: N, <no information>    Vitals:    01/28/21 1024 01/28/21 1025   BP: (!) 177/130 (!) 174/119   Site: Left Upper Arm Right Upper Arm   Position: Sitting Sitting   Cuff Size: Medium Adult Medium Adult   Pulse: 89 87   Temp: 97.1 °F (36.2 °C) 97.1 °F (36.2 °C)   TempSrc: Temporal Temporal   SpO2: 99% 99%   Weight: 179 lb 6.4 oz (81.4 kg) 179 lb 6.4 oz (81.4 kg)   Height: 5' 6\" (1.676 m) 5' 6\" (1.676 m)     Estimated body mass index is 28.96 kg/m² as calculated from the following:    Height as of this encounter: 5' 6\" (1.676 m).     Weight as of this encounter: 179 lb 6.4 oz (81.4 kg).    Physical Exam  Constitutional:       General: She is not in acute distress. Appearance: She is well-developed. She is not diaphoretic. HENT:      Head: Normocephalic and atraumatic. Cardiovascular:      Rate and Rhythm: Normal rate and regular rhythm. Heart sounds: Normal heart sounds. No murmur. Pulmonary:      Effort: Pulmonary effort is normal. No respiratory distress. Breath sounds: Normal breath sounds. No wheezing. Abdominal:      General: Bowel sounds are normal. There is no distension. Palpations: Abdomen is soft. Tenderness: There is no abdominal tenderness. Skin:     General: Skin is warm and dry. Capillary Refill: Capillary refill takes less than 2 seconds. Findings: No erythema. Neurological:      Mental Status: She is alert and oriented to person, place, and time. Psychiatric:         Behavior: Behavior normal.         No flowsheet data found.     Lab Results   Component Value Date    CHOL 162 01/29/2020    CHOL 158 02/26/2018    CHOL 163 02/08/2018    TRIG 83 01/29/2020    TRIG 105 02/26/2018    TRIG 116 02/08/2018    HDL 50 01/29/2020    HDL 51 02/26/2018    HDL 50 02/08/2018    LDLCALC 95 01/29/2020    LDLCALC 86 02/26/2018    LDLCALC 90 02/08/2018    GLUCOSE 102 10/21/2020    LABA1C 5.5 01/29/2020       The 10-year ASCVD risk score (Thurman Boeck., et al., 2013) is: 16.9%    Values used to calculate the score:      Age: 72 years      Sex: Female      Is Non- : Yes      Diabetic: No      Tobacco smoker: No      Systolic Blood Pressure: 243 mmHg      Is BP treated: Yes      HDL Cholesterol: 50 mg/dL      Total Cholesterol: 162 mg/dL    Immunization History   Administered Date(s) Administered    Influenza Vaccine, unspecified formulation 02/10/2007, 12/20/2007, 01/30/2008    Pneumococcal Polysaccharide ([id number]) 10/01/2007    Tdap (Boostrix, Adacel) 12/20/2007       Health Maintenance   Topic Date Due    Colon cancer screen colonoscopy  12/27/2001    DTaP/Tdap/Td vaccine (2 - Td) 12/20/2017    Annual Wellness Visit (AWV)  06/20/2019    Flu vaccine (1) 09/01/2020    Lipid screen  01/29/2021    Pneumococcal 65+ years Vaccine (1 of 1 - PPSV23) 02/20/2021 (Originally 12/27/2016)    Shingles Vaccine (1 of 2) 02/21/2021 (Originally 12/27/2001)    Potassium monitoring  10/21/2021    Creatinine monitoring  10/21/2021    Breast cancer screen  09/04/2022    Diabetes screen  01/29/2023    DEXA (modify frequency per FRAX score)  Completed    Hepatitis C screen  Completed    Hepatitis A vaccine  Aged Out    Hepatitis B vaccine  Aged Out    Hib vaccine  Aged Out    Meningococcal (ACWY) vaccine  Aged Out       ASSESSMENT/PLAN:  1. Uncontrolled hypertension  -     NIFEdipine (PROCARDIA XL) 90 MG extended release tablet; Take 1 tablet by mouth daily, Disp-90 tablet, R-1Normal  -     losartan (COZAAR) 50 MG tablet; Take 1 tablet by mouth 2 times daily, Disp-180 tablet, R-3Normal  - Increased Nifedipine to 90mg and Spironolactone to 50mg bid  - Will have patient come back in 1 week for a BP check  2. Atrial fibrillation, unspecified type (HCC)  -     spironolactone (ALDACTONE) 50 MG tablet; Take 1 tablet by mouth 2 times daily TAKE 1 TABLET BY MOUTH DAILY, Disp-90 tablet, R-1Normal  -     warfarin (COUMADIN) 5 MG tablet; Take 1 tablet by mouth daily Every day. Wednesday 2.5., Disp-30 tablet, R-1Normal  3. Chronic heart failure with preserved ejection fraction (HCC)  -     spironolactone (ALDACTONE) 50 MG tablet; Take 1 tablet by mouth 2 times daily TAKE 1 TABLET BY MOUTH DAILY, Disp-90 tablet, R-1Normal  -     potassium chloride (KLOR-CON M) 20 MEQ extended release tablet; Take 0.5 tablets by mouth daily, Disp-90 tablet, R-1Normal  -     furosemide (LASIX) 20 MG tablet; TAKE 1 TABLET BY MOUTH TWICE DAILY, Disp-180 tablet, R-1Normal  4. GERD without esophagitis  -     famotidine (PEPCID) 20 MG tablet;  Take 1 tablet by mouth 2 times daily, Disp-60 tablet, R-2Normal  5. Coronary artery disease involving native coronary artery of native heart without angina pectoris  -     atorvastatin (LIPITOR) 80 MG tablet; Take 1 tablet by mouth daily, Disp-90 tablet, R-1Normal  -     aspirin 81 MG EC tablet; Take 1 tablet by mouth daily, Disp-30 tablet, R-3Normal    Return in about 1 week (around 2/4/2021) for BP check. An electronic signature was used to authenticate this note.     --Latina Kocher, MD on 1/28/2021 at 11:11 AM

## 2021-02-04 ENCOUNTER — OFFICE VISIT (OUTPATIENT)
Dept: INTERNAL MEDICINE CLINIC | Age: 71
End: 2021-02-04
Payer: MEDICARE

## 2021-02-04 VITALS
OXYGEN SATURATION: 100 % | HEART RATE: 87 BPM | HEIGHT: 67 IN | TEMPERATURE: 97.3 F | SYSTOLIC BLOOD PRESSURE: 208 MMHG | BODY MASS INDEX: 28.27 KG/M2 | DIASTOLIC BLOOD PRESSURE: 148 MMHG | WEIGHT: 180.1 LBS

## 2021-02-04 DIAGNOSIS — I10 UNCONTROLLED HYPERTENSION: Primary | Chronic | ICD-10-CM

## 2021-02-04 PROCEDURE — 99213 OFFICE O/P EST LOW 20 MIN: CPT | Performed by: STUDENT IN AN ORGANIZED HEALTH CARE EDUCATION/TRAINING PROGRAM

## 2021-02-04 RX ORDER — LOSARTAN POTASSIUM AND HYDROCHLOROTHIAZIDE 25; 100 MG/1; MG/1
1 TABLET ORAL DAILY
Qty: 90 TABLET | Refills: 1 | Status: SHIPPED | OUTPATIENT
Start: 2021-02-04 | End: 2022-06-08 | Stop reason: SDUPTHER

## 2021-02-04 ASSESSMENT — ENCOUNTER SYMPTOMS
WHEEZING: 0
ABDOMINAL PAIN: 0
ABDOMINAL DISTENTION: 0
COUGH: 0
SHORTNESS OF BREATH: 0
DIARRHEA: 0
VOMITING: 0
NAUSEA: 0

## 2021-02-04 NOTE — PROGRESS NOTES
2021    Jaylin Shaw (:  1950) is a 72 y.o. female, here for a preventive medicine evaluation. Patient here for BP check. /148 upon arrival. After sitting for 15 minutes, BP decreased to 182/103. Patient denies any complaints. No headache, chest pain, sob, focal weakness. Patient Active Problem List   Diagnosis    Permanent atrial fibrillation ([facility]    Chest pain    Uncontrolled hypertension    Coronary artery disease involving native coronary artery of native heart without angina pectoris    LVH (left ventricular hypertrophy)    Thyroid nodule    Chronic heart failure with preserved ejection fraction (HCC)    Bradycardia    Chronic diastolic CHF (congestive heart failure) ([facility]    Essential hypertension       Review of Systems   Constitutional: Negative for chills, fatigue and fever. HENT: Negative for congestion. Respiratory: Negative for cough, shortness of breath and wheezing. Cardiovascular: Negative for chest pain and palpitations. Gastrointestinal: Negative for abdominal distention, abdominal pain, diarrhea, nausea and vomiting. Genitourinary: Negative for dysuria. Neurological: Negative for dizziness, syncope, weakness, light-headedness and numbness. Prior to Visit Medications    Medication Sig Taking? Authorizing Provider   losartan-hydroCHLOROthiazide (HYZAAR) 100-25 MG per tablet Take 1 tablet by mouth daily Yes Ce Carrera MD   spironolactone (ALDACTONE) 50 MG tablet Take 1 tablet by mouth 2 times daily TAKE 1 TABLET BY MOUTH DAILY Yes Ce Carrera MD   warfarin (COUMADIN) 5 MG tablet Take 1 tablet by mouth daily Every day. Wednesday 2.5.  Yes Ce Carrera MD   potassium chloride (KLOR-CON M) 20 MEQ extended release tablet Take 0.5 tablets by mouth daily Yes Ce Carrera MD   NIFEdipine (PROCARDIA XL) 90 MG extended release tablet Take 1 tablet by mouth daily Yes Ce Carrera MD   furosemide (LASIX) 20 MG tablet TAKE 1 15    Highest education level: Not on file   Occupational History    Not on file   Social Needs    Financial resource strain: Not on file    Food insecurity     Worry: Not on file     Inability: Not on file    Transportation needs     Medical: Not on file     Non-medical: Not on file   Tobacco Use    Smoking status: Never Smoker    Smokeless tobacco: Never Used   Substance and Sexual Activity    Alcohol use: No    Drug use: No    Sexual activity: Yes     Partners: Male   Lifestyle    Physical activity     Days per week: Not on file     Minutes per session: Not on file    Stress: Not on file   Relationships    Social connections     Talks on phone: Not on file     Gets together: Not on file     Attends Anabaptist service: Not on file     Active member of club or organization: Not on file     Attends meetings of clubs or organizations: Not on file     Relationship status: Not on file    Intimate partner violence     Fear of current or ex partner: Not on file     Emotionally abused: Not on file     Physically abused: Not on file     Forced sexual activity: Not on file   Other Topics Concern    Not on file   Social History Narrative    Not on file        No family history on file. ADVANCE DIRECTIVE: N, <no information>    Vitals:    02/04/21 1403   BP: (!) 208/148   Site: Left Upper Arm   Position: Sitting   Cuff Size: Medium Adult   Pulse: 87   Temp: 97.3 °F (36.3 °C)   TempSrc: Temporal   SpO2: 100%   Weight: 180 lb 1.6 oz (81.7 kg)   Height: 5' 7\" (1.702 m)     Estimated body mass index is 28.21 kg/m² as calculated from the following:    Height as of this encounter: 5' 7\" (1.702 m). Weight as of this encounter: 180 lb 1.6 oz (81.7 kg). Physical Exam  Constitutional:       General: She is not in acute distress. Appearance: She is well-developed. She is not diaphoretic. HENT:      Head: Normocephalic and atraumatic. Cardiovascular:      Rate and Rhythm: Normal rate and regular rhythm. Heart sounds: Normal heart sounds. No murmur. Pulmonary:      Effort: Pulmonary effort is normal. No respiratory distress. Breath sounds: Normal breath sounds. No wheezing. Abdominal:      General: Bowel sounds are normal. There is no distension. Palpations: Abdomen is soft. Tenderness: There is no abdominal tenderness. Skin:     General: Skin is warm and dry. Capillary Refill: Capillary refill takes less than 2 seconds. Findings: No erythema. Neurological:      Mental Status: She is alert and oriented to person, place, and time. Psychiatric:         Behavior: Behavior normal.         No flowsheet data found. Lab Results   Component Value Date    CHOL 162 01/29/2020    CHOL 158 02/26/2018    CHOL 163 02/08/2018    TRIG 83 01/29/2020    TRIG 105 02/26/2018    TRIG 116 02/08/2018    HDL 50 01/29/2020    HDL 51 02/26/2018    HDL 50 02/08/2018    LDLCALC 95 01/29/2020    LDLCALC 86 02/26/2018    LDLCALC 90 02/08/2018    GLUCOSE 102 10/21/2020    LABA1C 5.5 01/29/2020       The ASCVD Risk score (Lorna Wakefield, et al., 2013) failed to calculate for the following reasons:     The valid systolic blood pressure range is 90 to 200 mmHg    Immunization History   Administered Date(s) Administered    Influenza Vaccine, unspecified formulation 02/10/2007, 12/20/2007, 01/30/2008    Pneumococcal Polysaccharide ([id number]) 10/01/2007    Tdap (Boostrix, Adacel) 12/20/2007       Health Maintenance   Topic Date Due    Colon cancer screen colonoscopy  12/27/2001    DTaP/Tdap/Td vaccine (2 - Td) 12/20/2017    Annual Wellness Visit (AWV)  06/20/2019    Flu vaccine (1) 09/01/2020    Lipid screen  01/29/2021    Pneumococcal 65+ years Vaccine (1 of 1 - PPSV23) 02/20/2021 (Originally 12/27/2016)    Shingles Vaccine (1 of 2) 02/21/2021 (Originally 12/27/2001)    Potassium monitoring  10/21/2021    Creatinine monitoring  10/21/2021    Breast cancer screen  09/04/2022    Diabetes screen 01/29/2023    DEXA (modify frequency per FRAX score)  Completed    Hepatitis C screen  Completed    Hepatitis A vaccine  Aged Out    Hepatitis B vaccine  Aged Out    Hib vaccine  Aged Out    Meningococcal (ACWY) vaccine  Aged Out       ASSESSMENT/PLAN:  1. Uncontrolled hypertension  -     losartan-hydroCHLOROthiazide (HYZAAR) 100-25 MG per tablet; Take 1 tablet by mouth daily, Disp-90 tablet, R-1Normal  - BP continues to be uncontrolled. Will start on Hyzaar 100-25 daily.  - Patient to come back in 1 week for BP check      Return in about 1 week (around 2/11/2021) for Blood pressure check. An electronic signature was used to authenticate this note.     --Jennifer Whiteside MD on 2/4/2021 at 2:30 PM

## 2021-02-10 NOTE — TELEPHONE ENCOUNTER
Spoke with patients daughter since patient had eye surgery today. Was informed patient is interested in pursuing the Watchman. I offered to pencil her in for the 3/1/2021 date but this maybe to early and will get back with me on 2/11/21 to let me know if that date will work. As of right now that is the only date I have in March.

## 2021-02-17 NOTE — TELEPHONE ENCOUNTER
Spoke with patient and daughter and they have decided not to pursue the Watchman procedure. No reason was given and had no questions. Dr. Bianka Gregory has been made aware.

## 2021-02-19 ENCOUNTER — APPOINTMENT (OUTPATIENT)
Dept: CT IMAGING | Age: 71
End: 2021-02-19
Payer: MEDICARE

## 2021-02-19 ENCOUNTER — HOSPITAL ENCOUNTER (EMERGENCY)
Age: 71
Discharge: HOME | End: 2021-02-19
Payer: MEDICARE

## 2021-02-19 ENCOUNTER — APPOINTMENT (OUTPATIENT)
Dept: GENERAL RADIOLOGY | Age: 71
End: 2021-02-19
Payer: MEDICARE

## 2021-02-19 VITALS
SYSTOLIC BLOOD PRESSURE: 131 MMHG | TEMPERATURE: 97.8 F | RESPIRATION RATE: 20 BRPM | OXYGEN SATURATION: 98 % | HEART RATE: 72 BPM | DIASTOLIC BLOOD PRESSURE: 95 MMHG
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
## 2021-02-19 DIAGNOSIS — M16.10 HIP ARTHRITIS: ICD-10-CM

## 2021-02-19 DIAGNOSIS — S20.211A CONTUSION OF RIB ON RIGHT SIDE, INITIAL ENCOUNTER: ICD-10-CM

## 2021-02-19 LAB
A/G RATIO: 1.2 (ref 1.1–2.2)
ALBUMIN SERPL-MCNC: 4.5 G/DL (ref 3.4–5)
ALP BLD-CCNC: 81 U/L (ref 40–129)
ALT SERPL-CCNC: 8 U/L (ref 10–40)
ANION GAP SERPL CALCULATED.3IONS-SCNC: 12 MMOL/L (ref 3–16)
APTT: 31.4 SEC (ref 24.2–36.2)
AST SERPL-CCNC: 16 U/L (ref 15–37)
BASOPHILS ABSOLUTE: 0 K/UL (ref 0–0.2)
BASOPHILS RELATIVE PERCENT: 0.5 %
BILIRUB SERPL-MCNC: 0.5 MG/DL (ref 0–1)
BILIRUBIN URINE: NEGATIVE
BLOOD, URINE: NEGATIVE
BUN BLDV-MCNC: 10 MG/DL (ref 7–20)
CALCIUM SERPL-MCNC: 9.8 MG/DL (ref 8.3–10.6)
CHLORIDE BLD-SCNC: 103 MMOL/L (ref 99–110)
CLARITY: CLEAR
CO2: 24 MMOL/L (ref 21–32)
COLOR: YELLOW
CREAT SERPL-MCNC: 0.5 MG/DL (ref 0.6–1.2)
EOSINOPHILS ABSOLUTE: 0.1 K/UL (ref 0–0.6)
EOSINOPHILS RELATIVE PERCENT: 1.1 %
EPITHELIAL CELLS, UA: 2 /HPF (ref 0–5)
GFR AFRICAN AMERICAN: >60
GFR NON-AFRICAN AMERICAN: >60
GLOBULIN: 3.8 G/DL
GLUCOSE BLD-MCNC: 102 MG/DL (ref 70–99)
GLUCOSE URINE: NEGATIVE MG/DL
HCT VFR BLD CALC: 40.7 % (ref 36–48)
HEMOGLOBIN: 13.4 G/DL (ref 12–16)
HYALINE CASTS: 2 /LPF (ref 0–8)
INR BLD: 1.5 (ref 0.86–1.14)
KETONES, URINE: NEGATIVE MG/DL
LEUKOCYTE ESTERASE, URINE: ABNORMAL
LYMPHOCYTES ABSOLUTE: 1.8 K/UL (ref 1–5.1)
LYMPHOCYTES RELATIVE PERCENT: 25.3 %
MAGNESIUM: 1.5 MG/DL (ref 1.8–2.4)
MCH RBC QN AUTO: 29.9 PG (ref 26–34)
MCHC RBC AUTO-ENTMCNC: 32.9 G/DL (ref 31–36)
MCV RBC AUTO: 91 FL (ref 80–100)
MICROSCOPIC EXAMINATION: YES
MONOCYTES ABSOLUTE: 0.6 K/UL (ref 0–1.3)
MONOCYTES RELATIVE PERCENT: 7.9 %
NEUTROPHILS ABSOLUTE: 4.6 K/UL (ref 1.7–7.7)
NEUTROPHILS RELATIVE PERCENT: 65.2 %
NITRITE, URINE: NEGATIVE
PDW BLD-RTO: 13.1 % (ref 12.4–15.4)
PH UA: 5.5 (ref 5–8)
PLATELET # BLD: 145 K/UL (ref 135–450)
PMV BLD AUTO: 10.5 FL (ref 5–10.5)
POTASSIUM REFLEX MAGNESIUM: 3.4 MMOL/L (ref 3.5–5.1)
PROTEIN UA: NEGATIVE MG/DL
PROTHROMBIN TIME: 17.5 SEC (ref 10–13.2)
RBC # BLD: 4.48 M/UL (ref 4–5.2)
RBC UA: 1 /HPF (ref 0–4)
SODIUM BLD-SCNC: 139 MMOL/L (ref 136–145)
SPECIFIC GRAVITY UA: 1.01 (ref 1–1.03)
TOTAL PROTEIN: 8.3 G/DL (ref 6.4–8.2)
TROPONIN: <0.01 NG/ML
URINE REFLEX TO CULTURE: ABNORMAL
URINE TYPE: ABNORMAL
UROBILINOGEN, URINE: 1 E.U./DL
WBC # BLD: 7.1 K/UL (ref 4–11)
WBC UA: 2 /HPF (ref 0–5)

## 2021-02-19 PROCEDURE — 72170 X-RAY EXAM OF PELVIS: CPT

## 2021-02-19 PROCEDURE — 85730 THROMBOPLASTIN TIME PARTIAL: CPT

## 2021-02-19 PROCEDURE — 6370000000 HC RX 637 (ALT 250 FOR IP): Performed by: GENERAL ACUTE CARE HOSPITAL

## 2021-02-19 PROCEDURE — 99284 EMERGENCY DEPT VISIT MOD MDM: CPT

## 2021-02-19 PROCEDURE — 70450 CT HEAD/BRAIN W/O DYE: CPT

## 2021-02-19 PROCEDURE — 72125 CT NECK SPINE W/O DYE: CPT

## 2021-02-19 PROCEDURE — 81001 URINALYSIS AUTO W/SCOPE: CPT

## 2021-02-19 PROCEDURE — 36415 COLL VENOUS BLD VENIPUNCTURE: CPT

## 2021-02-19 PROCEDURE — 85610 PROTHROMBIN TIME: CPT

## 2021-02-19 PROCEDURE — 80053 COMPREHEN METABOLIC PANEL: CPT

## 2021-02-19 PROCEDURE — 93005 ELECTROCARDIOGRAM TRACING: CPT | Performed by: GENERAL ACUTE CARE HOSPITAL

## 2021-02-19 PROCEDURE — 83735 ASSAY OF MAGNESIUM: CPT

## 2021-02-19 PROCEDURE — 85025 COMPLETE CBC W/AUTO DIFF WBC: CPT

## 2021-02-19 PROCEDURE — 84484 ASSAY OF TROPONIN QUANT: CPT

## 2021-02-19 PROCEDURE — 71101 X-RAY EXAM UNILAT RIBS/CHEST: CPT

## 2021-02-19 RX ORDER — HYDROCODONE BITARTRATE AND ACETAMINOPHEN 5; 325 MG/1; MG/1
1 TABLET ORAL ONCE
Status: COMPLETED | OUTPATIENT
Start: 2021-02-19 | End: 2021-02-19

## 2021-02-19 RX ORDER — LANOLIN ALCOHOL/MO/W.PET/CERES
400 CREAM (GRAM) TOPICAL ONCE
Status: COMPLETED | OUTPATIENT
Start: 2021-02-19 | End: 2021-02-19

## 2021-02-19 RX ADMIN — HYDROCODONE BITARTRATE AND ACETAMINOPHEN 1 TABLET: 5; 325 TABLET ORAL at 19:10

## 2021-02-19 RX ADMIN — Medication 400 MG: at 21:40

## 2021-02-19 RX ADMIN — HYDROCODONE BITARTRATE AND ACETAMINOPHEN 1 TABLET: 5; 325 TABLET ORAL at 21:40

## 2021-02-19 ASSESSMENT — PAIN SCALES - GENERAL
PAINLEVEL_OUTOF10: 8
PAINLEVEL_OUTOF10: 8

## 2021-02-19 NOTE — ED NOTES
Bed: 11  Expected date:   Expected time:   Means of arrival:   Comments:  [id number] Rosario Clark RN  02/19/21 [id number]

## 2021-02-20 LAB
EKG ATRIAL RATE: 227 BPM
EKG DIAGNOSIS: NORMAL
EKG Q-T INTERVAL: 380 MS
EKG QRS DURATION: 92 MS
EKG QTC CALCULATION (BAZETT): 462 MS
EKG R AXIS: -15 DEGREES
EKG T AXIS: -36 DEGREES
EKG VENTRICULAR RATE: 89 BPM

## 2021-02-20 PROCEDURE — 93010 ELECTROCARDIOGRAM REPORT: CPT | Performed by: INTERNAL MEDICINE

## 2021-02-20 NOTE — ED PROVIDER NOTES
[facility]        Pt Name: Raul Bray  MRN: [id number]  [place] 1950  Date of evaluation: 2/19/2021  Provider: ADAN Villalobos CNP  PCP: Anjana Ortiz MD    MARIANN. I have evaluated this patient. My supervising physician was available for consultation. CHIEF COMPLAINT       Chief Complaint   Patient presents with    Fall     Pt states multiple falls, legs keep giving out but patient unsure why. States pain on right side but fell on left side. Pt denies dizziness CP or SOB       HISTORY OF PRESENT ILLNESS   (Location, Timing/Onset, Context/Setting, Quality, Duration, Modifying Factors, Severity, Associated Signs and Symptoms)  Note limiting factors. Raul Bray is a 72 y.o. female who presents to the emergency department today reporting left hip pain and right side pain after a fall which occurred yesterday. Patient states that sometimes her legs just Lizandro Kylee out on her\". She denies lightheadedness, dizziness, or syncope. He states that she has dealt with bilateral hip pain for the last several months. She states she believes she has arthritis but has not followed up with orthopedic in regards to this. Patient is unsure if she hit her head. She is on a blood thinner. She reports mild headache. She denies having any dizziness, blurred vision, or extremity numbness or tingling. She reports mild neck pain. She denies chest pain or trouble breathing. She states that she feels sore in her right lower rib area. She denies recent travel or known sick contacts. She denies fever, chills, or other symptoms. She currently reports a pain level of 6 out of 10. She has not taken anything for the symptoms. She denies recent fever, chills, or other symptoms. Nursing Notes were all reviewed and agreed with or any disagreements were addressed in the HPI.     REVIEW OF SYSTEMS    (2-9 systems for level 4, 10 or more for level 5)     Review of Systems   Constitutional: Negative for activity change, chills, fatigue and fever. HENT: Negative for congestion and sore throat. Eyes: Negative for visual disturbance. Respiratory: Negative for chest tightness and shortness of breath. Cardiovascular: Negative for chest pain and palpitations. Gastrointestinal: Negative for abdominal pain, nausea and vomiting. Endocrine: Negative for polydipsia and polyuria. Genitourinary: Negative for difficulty urinating and dysuria. Musculoskeletal: Positive for arthralgias, gait problem and neck pain. Negative for back pain, joint swelling, myalgias and neck stiffness. Skin: Negative for rash and wound. Allergic/Immunologic: Negative for immunocompromised state. Neurological: Positive for headaches. Negative for dizziness, weakness and light-headedness. Hematological: Does not bruise/bleed easily. Psychiatric/Behavioral: Negative for suicidal ideas. Positives and Pertinent negatives as per HPI. Except as noted above in the ROS, all other systems were reviewed and negative.        PAST MEDICAL HISTORY     Past Medical History:   Diagnosis Date    Acute cerebrovascular accident (CVA) ([address] 1/28/2020    Atrial fibrillation (HCC)     Bell palsy     diagnosed 15 years ago   [facility] CAD (coronary artery disease)     Cerebral artery occlusion with cerebral infarction ([address]     Chronic diastolic CHF (congestive heart failure) ([address] 5/16/2020    CVA (cerebral vascular accident) ([address] 1/4/2017    Hypertension     Intracranial hemorrhage ([address] 4/2016    Nonintractable headache     Nontraumatic subcortical hemorrhage of cerebral hemisphere ([address] 4/30/2016    Sudden visual loss of left eye     Thyroid nodule 2/8/2018    TIA involving right internal carotid artery          SURGICAL HISTORY     Past Surgical History:   Procedure Laterality Date    CARDIAC SURGERY      COLONOSCOPY      CORONARY ANGIOPLASTY WITH STENT PLACEMENT      TUBAL LIGATION Right Torn Rotator Cuff    2013 @ [address]       Discharge Medication List as of 2/19/2021  9:44 PM      CONTINUE these medications which have NOT CHANGED    Details   losartan-hydroCHLOROthiazide (HYZAAR) 100-25 MG per tablet Take 1 tablet by mouth daily, Disp-90 tablet, R-1Normal      spironolactone (ALDACTONE) 50 MG tablet Take 1 tablet by mouth 2 times daily TAKE 1 TABLET BY MOUTH DAILY, Disp-90 tablet, R-1Normal      warfarin (COUMADIN) 5 MG tablet Take 1 tablet by mouth daily Every day. Wednesday 2.5., Disp-30 tablet, R-1Normal      potassium chloride (KLOR-CON M) 20 MEQ extended release tablet Take 0.5 tablets by mouth daily, Disp-90 tablet, R-1Normal      NIFEdipine (PROCARDIA XL) 90 MG extended release tablet Take 1 tablet by mouth daily, Disp-90 tablet, R-1Normal      furosemide (LASIX) 20 MG tablet TAKE 1 TABLET BY MOUTH TWICE DAILY, Disp-180 tablet, R-1Normal      famotidine (PEPCID) 20 MG tablet Take 1 tablet by mouth 2 times daily, Disp-60 tablet, R-2Normal      atorvastatin (LIPITOR) 80 MG tablet Take 1 tablet by mouth daily, Disp-90 tablet, R-1Normal      aspirin 81 MG EC tablet Take 1 tablet by mouth daily, Disp-30 tablet, R-3Normal      acetaminophen (TYLENOL) 500 MG tablet Take 2 tablets by mouth every 6 hours as needed for Pain, Disp-120 tablet, R-3Normal      Calcium Carb-Cholecalciferol (CALCIUM + D3) 600-200 MG-UNIT TABS tablet Take 1 tablet by mouth 2 times daily, Disp-120 tablet,R-3Normal               ALLERGIES     Clonidine, Codeine, Lisinopril, Tramadol, and Percocet [oxycodone-acetaminophen]    FAMILYHISTORY     History reviewed. No pertinent family history.        SOCIAL HISTORY       Social History     Tobacco Use    Smoking status: Never Smoker    Smokeless tobacco: Never Used   Substance Use Topics    Alcohol use: No    Drug use: No       SCREENINGS    Bj Coma Scale  Eye Opening: Spontaneous  Best Verbal Response: Oriented  Best Motor Response: Obeys commands  Marathon Coma Scale Score: 15        PHYSICAL EXAM    (up to 7 for level 4, 8 or more for level 5)     ED Triage Vitals [02/19/21 1646]   BP Temp Temp Source Pulse Resp SpO2 Height Weight   (!) 145/95 97.8 °F (36.6 °C) Temporal 100 16 99 % -- --       Physical Exam  Vitals signs and nursing note reviewed. Constitutional:       General: She is not in acute distress. Appearance: Normal appearance. She is not ill-appearing, toxic-appearing or diaphoretic. HENT:      Head: Normocephalic and atraumatic. Right Ear: External ear normal.      Left Ear: External ear normal.      Nose: Nose normal.      Mouth/Throat:      Mouth: Mucous membranes are moist.   Eyes:      General:         Right eye: No discharge. Left eye: No discharge. Extraocular Movements: Extraocular movements intact. Neck:      Musculoskeletal: Normal range of motion and neck supple. Cardiovascular:      Rate and Rhythm: Normal rate and regular rhythm. Pulses: Normal pulses. Heart sounds: Normal heart sounds. Pulmonary:      Effort: Pulmonary effort is normal. No respiratory distress. Chest:      Chest wall: Tenderness present. No mass, lacerations, deformity, swelling, crepitus or edema. There is no dullness to percussion. Comments: Chest wall is without erythema, edema, or ecchymosis. Skin is intact. There is no crepitus. There has exquisite tenderness to palpation over the right lower ribs. Abdominal:      Palpations: Abdomen is soft. Tenderness: There is no abdominal tenderness. Musculoskeletal:      Right hip: She exhibits normal range of motion, normal strength, no tenderness, no bony tenderness, no swelling, no crepitus, no deformity and no laceration. Left hip: She exhibits decreased range of motion, decreased strength, tenderness and bony tenderness. She exhibits no swelling, no crepitus, no deformity and no laceration.       Comments: Bilateral lower extremity neurovascular status intact. Skin:     General: Skin is warm and dry. Capillary Refill: Capillary refill takes less than 2 seconds. Neurological:      General: No focal deficit present. Mental Status: She is alert and oriented to person, place, and time.    Psychiatric:         Mood and Affect: Mood normal.         Behavior: Behavior normal.         DIAGNOSTIC RESULTS   LABS:    Labs Reviewed   COMPREHENSIVE METABOLIC PANEL W/ REFLEX TO MG FOR LOW K - Abnormal; Notable for the following components:       Result Value    Potassium reflex Magnesium 3.4 (*)     Glucose 102 (*)     CREATININE 0.5 (*)     Total Protein 8.3 (*)     ALT 8 (*)     All other components within normal limits    Narrative:     Performed at:  [facility] [address]   Phone [phone]   PROTIME-INR - Abnormal; Notable for the following components:    Protime 17.5 (*)     INR 1.50 (*)     All other components within normal limits    Narrative:     Performed at:  [facility] [address]  [place]   Phone [phone]   URINE RT REFLEX TO CULTURE - Abnormal; Notable for the following components:    Leukocyte Esterase, Urine TRACE (*)     All other components within normal limits    Narrative:     Performed at:  [facility] [address]  [place]   Phone [phone]   MAGNESIUM - Abnormal; Notable for the following components:    Magnesium 1.50 (*)     All other components within normal limits    Narrative:     Performed at:  [facility] [address]  [place]   Phone [phone]   CBC WITH AUTO DIFFERENTIAL    Narrative:     Performed at:  [facility] [address]  [place]   Phone [phone]   TROPONIN    Narrative:     Performed at:  [facility] displaced rib fracture or pneumothorax. Xr Pelvis (1-2 Views)    Result Date: 2/19/2021  EXAMINATION: ONE XRAY VIEW OF THE PELVIS 2/19/2021 5:47 pm COMPARISON: 05/15/2020 HISTORY: ORDERING SYSTEM PROVIDED HISTORY: fall TECHNOLOGIST PROVIDED HISTORY: Reason for exam:->fall Reason for Exam: fell 2 days ago on her right side, pain in the lower abd area Acuity: Unknown Type of Exam: Unknown FINDINGS: There is moderate symmetric joint space narrowing in both hips which is unchanged. No fracture or dislocation is seen. The bones are osteopenic. The pelvis is intact. Moderate joint space narrowing symmetrically in both hips and diffuse osteopenia which is unchanged with no acute abnormality seen. Ct Head Wo Contrast    Result Date: 2/19/2021  EXAMINATION: CT OF THE HEAD WITHOUT CONTRAST  2/19/2021 5:32 pm TECHNIQUE: CT of the head was performed without the administration of intravenous contrast. Dose modulation, iterative reconstruction, and/or weight based adjustment of the mA/kV was utilized to reduce the radiation dose to as low as reasonably achievable. COMPARISON: None. HISTORY: ORDERING SYSTEM PROVIDED HISTORY: fall/blood thinners TECHNOLOGIST PROVIDED HISTORY: Reason for exam:->fall/blood thinners Has a \"code stroke\" or \"stroke alert\" been called? ->No Decision Support Exception->Emergency Medical Condition (MA) Reason for Exam: Fall (Pt states multiple falls, legs keep giving out but patient unsure why. States pain on right side but fell on left side. Pt denies dizziness CP or SOB) FINDINGS: BRAIN/VENTRICLES: There is no acute intracranial hemorrhage, mass effect or midline shift. No abnormal extra-axial fluid collection. The gray-white differentiation is maintained without evidence of an acute infarct. There is no evidence of hydrocephalus. ORBITS: The visualized portion of the orbits demonstrate no acute abnormality.  SINUSES: The visualized paranasal sinuses and mastoid air cells demonstrate no acute abnormality. SOFT TISSUES/SKULL:  No acute abnormality of the visualized skull or soft tissues. No acute intracranial abnormality. Ct Cervical Spine Wo Contrast    Result Date: 2/19/2021  EXAMINATION: CT OF THE CERVICAL SPINE WITHOUT CONTRAST 2/19/2021 5:32 pm TECHNIQUE: CT of the cervical spine was performed without the administration of intravenous contrast. Multiplanar reformatted images are provided for review. Dose modulation, iterative reconstruction, and/or weight based adjustment of the mA/kV was utilized to reduce the radiation dose to as low as reasonably achievable. COMPARISON: 05/13/2020 HISTORY: ORDERING SYSTEM PROVIDED HISTORY: fall TECHNOLOGIST PROVIDED HISTORY: Reason for exam:->fall Decision Support Exception->Emergency Medical Condition (MA) Acute cervicalgia FINDINGS: BONES/ALIGNMENT: There is straightening of the normal cervical lordosis. There is no acute fracture or subluxation. The vertebral bodies are normal in height and alignment. The posterior elements are intact and aligned. No destructive osseous lesion is seen. DEGENERATIVE CHANGES: There is minimal anterior endplate spondylosis at C3 through C6. There is no acute disc herniation or canal stenosis. No additional significant degenerative changes are identified. SOFT TISSUES: Cervical soft tissues are unremarkable. Lung apices are clear. No acute abnormality of the cervical spine.            PROCEDURES   Unless otherwise noted below, none     Procedures    CRITICAL CARE TIME   N/A    CONSULTS:  None      EMERGENCY DEPARTMENT COURSE and DIFFERENTIAL DIAGNOSIS/MDM:   Vitals:    Vitals:    02/19/21 1930 02/19/21 2000 02/19/21 2030 02/19/21 2130   BP: 134/87 (!) 125/95 (!) 139/101 (!) 131/95   Pulse: 76 81 81 72   Resp: 21 21 22 20   Temp:       TempSrc:       SpO2: 98% 98% 98% 98%       Patient was given the following medications:  Medications   HYDROcodone-acetaminophen (NORCO) 5-325 MG per tablet 1 tablet (1 tablet Oral Given 2/19/21 1910)   magnesium oxide (MAG-OX) tablet 400 mg (400 mg Oral Given 2/19/21 2140)   HYDROcodone-acetaminophen (NORCO) 5-325 MG per tablet 1 tablet (1 tablet Oral Given 2/19/21 2140)       Nursing notes reviewed. This is a 67-year-old female who presents to the emergency department today for evaluation of frequent falls. Patient states that her \"legs just give out\". Physical exam complete. Patient is nontoxic, afebrile, mildly hypertensive. She does appear uncomfortable. Laboratory studies notable for a magnesium level of 1.5. Patient given oral Mag-Ox 400 mg here in ED. She is also medicated for her discomfort. Imaging studies interpreted by radiologist have been negative for acute findings. Patient reassessed. She has remained hemodynamically stable throughout ED visit. She does report significant relief of symptoms after intervention. At this time there is no evidence of any life-threatening or emergent conditions requiring immediate intervention. Patient encouraged to follow-up with her PCP and listed orthopedic physician within the next 2 to 3 days. She is encouraged to apply ice to any sore area for 20 minutes every 3-4 hours. Incentive spirometry provided and instructed upon use. She is encouraged to return to nearest ED for high fever, incessant vomiting, severe pain, any other worsening symptoms. She is discharged home in stable condition. FINAL IMPRESSION      1. Fall, initial encounter    2. Hip arthritis    3.  Contusion of rib on right side, initial encounter          DISPOSITION/PLAN   DISPOSITION Decision To Discharge 02/19/2021 09:35:47 PM      PATIENT REFERREDTO:  Johnny Key MD  [facility]  [phone]    In 3 days      Daren Allison MD  [facility] [address]  [phone]      orthopedic physician regarding arthritis of hips      DISCHARGE MEDICATIONS:  Discharge Medication List as of 2/19/2021 9:44 PM          DISCONTINUED MEDICATIONS:  Discharge Medication List as of 2/19/2021  9:44 PM                 (Please note that portions of this note were completed with a voice recognition program.  Efforts were made to edit the dictations but occasionally words are mis-transcribed.)    ADAN Sahni CNP (electronically signed)           ADAN Sahni CNP  02/22/21 [address], APRN - CNP  02/23/21 1028

## 2021-02-20 NOTE — ED NOTES
Pt presented to the ED reporting a fall 2 days ago on the carpet as well as multiple other falls this week. Pt denies LOC during any of the falls. Denies N/V or confusion. Pupils equal and reactive. Pt c/o pain to entire right side and states it hurts to take a deep breath to right ribs. Imaging negative for fxs at this time.      Lilly Barry RN  02/19/21 1943

## 2021-02-20 NOTE — ED NOTES
Pt to restroom via wheelchair with aide. Denies further needs. Urine to be sent to lab.       Coleen Berman RN  02/19/21 2018

## 2021-02-20 NOTE — ED NOTES
Writer discussed discharge instructions with patient as directed by ED provider. Pt verbalized understanding of follow-up and pain management. Pt denies further questions and left ED in stable condition.  Ambulated out of department with standy-by assist.       David Rowland RN  02/19/21 5771

## 2021-02-22 ASSESSMENT — ENCOUNTER SYMPTOMS
SHORTNESS OF BREATH: 0
BACK PAIN: 0
NAUSEA: 0
SORE THROAT: 0
VOMITING: 0
CHEST TIGHTNESS: 0
ABDOMINAL PAIN: 0

## 2021-02-24 ENCOUNTER — TELEPHONE (OUTPATIENT)
Dept: PHARMACY | Age: 71
End: 2021-02-24

## 2021-02-25 ENCOUNTER — OFFICE VISIT (OUTPATIENT)
Dept: INTERNAL MEDICINE CLINIC | Age: 71
End: 2021-02-25
Payer: MEDICARE

## 2021-02-25 ENCOUNTER — ANTI-COAG VISIT (OUTPATIENT)
Dept: PHARMACY | Age: 71
End: 2021-02-25
Payer: MEDICARE

## 2021-02-25 VITALS
OXYGEN SATURATION: 98 % | HEIGHT: 67 IN | BODY MASS INDEX: 27.15 KG/M2 | SYSTOLIC BLOOD PRESSURE: 128 MMHG | TEMPERATURE: 97 F | RESPIRATION RATE: 16 BRPM | DIASTOLIC BLOOD PRESSURE: 90 MMHG | HEART RATE: 94 BPM | WEIGHT: 173 LBS

## 2021-02-25 DIAGNOSIS — I48.21 PERMANENT ATRIAL FIBRILLATION (HCC): ICD-10-CM

## 2021-02-25 DIAGNOSIS — E87.6 HYPOKALEMIA: ICD-10-CM

## 2021-02-25 DIAGNOSIS — I10 ESSENTIAL HYPERTENSION: Primary | ICD-10-CM

## 2021-02-25 DIAGNOSIS — E83.42 HYPOMAGNESEMIA: ICD-10-CM

## 2021-02-25 DIAGNOSIS — Z01.818 PREOP EXAMINATION: ICD-10-CM

## 2021-02-25 LAB — INTERNATIONAL NORMALIZATION RATIO, POC: 2.7

## 2021-02-25 PROCEDURE — 85610 PROTHROMBIN TIME: CPT

## 2021-02-25 PROCEDURE — 99211 OFF/OP EST MAY X REQ PHY/QHP: CPT

## 2021-02-25 PROCEDURE — 99213 OFFICE O/P EST LOW 20 MIN: CPT | Performed by: SURGERY

## 2021-02-25 RX ORDER — FOLIC ACID 0.8 MG
400 TABLET ORAL DAILY
Qty: 90 CAPSULE | Refills: 3 | Status: SHIPPED | OUTPATIENT
Start: 2021-02-25 | End: 2022-06-08

## 2021-02-25 NOTE — PROGRESS NOTES
2021    Corona Chaudhry (:  1950) is a 72 y.o. female, here for HTN f/u. She was prescribed Hyzaar 100-25 on her last visit. Since her last visit she has visited the ED after mechanical fall where her \"legs just gave out. \" Patient on blood thinner for afib, but no significant injuries sustained. She is having cataract surgery soon and would like preop clearance as well. Patient Active Problem List   Diagnosis    [facility] atrial fibrillation [facility]    Chest pain    Uncontrolled hypertension    Coronary artery disease involving native coronary artery of native heart without angina pectoris    LVH (left ventricular hypertrophy)    Thyroid nodule    Chronic heart failure with preserved ejection fraction (HCC)    Bradycardia    Essential hypertension    Preop examination    Hypomagnesemia    Hypokalemia       Review of Systems  13 point ROS negative except as stated above. Prior to Visit Medications    Medication Sig Taking? Authorizing Provider   losartan-hydroCHLOROthiazide (HYZAAR) 100-25 MG per tablet Take 1 tablet by mouth daily Yes Dayana Kumari MD   spironolactone (ALDACTONE) 50 MG tablet Take 1 tablet by mouth 2 times daily TAKE 1 TABLET BY MOUTH DAILY Yes Dayana Kumari MD   warfarin (COUMADIN) 5 MG tablet Take 1 tablet by mouth daily Every day. Wednesday 2.5.  Yes Dayana Kumari MD   potassium chloride (KLOR-CON M) 20 MEQ extended release tablet Take 0.5 tablets by mouth daily Yes Dayana Kumari MD   NIFEdipine (PROCARDIA XL) 90 MG extended release tablet Take 1 tablet by mouth daily Yes Dayana Kumari MD   furosemide (LASIX) 20 MG tablet TAKE 1 TABLET BY MOUTH TWICE DAILY Yes Dayana Kumari MD   famotidine (PEPCID) 20 MG tablet Take 1 tablet by mouth 2 times daily Yes Dayana Kumari MD   atorvastatin (LIPITOR) 80 MG tablet Take 1 tablet by mouth daily Yes Dayana Kumari MD   aspirin 81 MG EC tablet Take 1 tablet by mouth daily Yes Dayana Kumari MD acetaminophen (TYLENOL) 500 MG tablet Take 2 tablets by mouth every 6 hours as needed for Pain Yes Jorge Hedrick MD   Calcium Carb-Cholecalciferol (CALCIUM + D3) 600-200 MG-UNIT TABS tablet Take 1 tablet by mouth 2 times daily Yes Jorge Hedrick MD        Allergies   Allergen Reactions    Clonidine Rash, Shortness Of Breath and Hives    Codeine Hives, Itching, Nausea Only and Rash     Other reaction(s): Other (See Comments)  Pruritis    Lisinopril Hives and Itching    Tramadol     Percocet [Oxycodone-Acetaminophen] Itching     Patient takes percocet with benadryl to control the itching       Past Medical History:   Diagnosis Date    Acute cerebrovascular accident (CVA) ([address] 1/28/2020    Atrial fibrillation ([address]     Bell palsy     diagnosed 15 years ago   [facility] CAD (coronary artery disease)     Cerebral artery occlusion with cerebral infarction ([address]     Chronic diastolic CHF (congestive heart failure) ([address] 5/16/2020    CVA (cerebral vascular accident) ([address] 1/4/2017    Hypertension     Intracranial hemorrhage ([address] 4/2016    Nonintractable headache     Nontraumatic subcortical hemorrhage of cerebral hemisphere ([address] 4/30/2016    Sudden visual loss of left eye     Thyroid nodule 2/8/2018    TIA involving right internal carotid artery        Past Surgical History:   Procedure Laterality Date    CARDIAC SURGERY      COLONOSCOPY      CORONARY ANGIOPLASTY WITH STENT PLACEMENT      TUBAL LIGATION Right Torn Rotator Cuff    2013 @ [facility]       Social History     Socioeconomic History    Marital status:       Spouse name: Cj Nam Number of children: 4    Years of education: 15    Highest education level: Not on file   Occupational History    Not on file   Social Needs    Financial resource strain: Not on file    Food insecurity     Worry: Not on file     Inability: Not on file   [employer] needs     Medical: Not on file     Non-medical: Not on file   Tobacco Use    Smoking status: Never Smoker    Smokeless tobacco: Never Used   Substance and Sexual Activity    Alcohol use: No    Drug use: No    Sexual activity: Yes     Partners: Male   Lifestyle    Physical activity     Days per week: Not on file     Minutes per session: Not on file    Stress: Not on file   Relationships    Social connections     Talks on phone: Not on file     Gets together: Not on file     Attends Mosque service: Not on file     Active member of club or organization: Not on file     Attends meetings of clubs or organizations: Not on file     Relationship status: Not on file    Intimate partner violence     Fear of current or ex partner: Not on file     Emotionally abused: Not on file     Physically abused: Not on file     Forced sexual activity: Not on file   Other Topics Concern    Not on file   Social History Narrative    Not on file        No family history on file. ADVANCE DIRECTIVE: N, <no information>    Vitals:    02/25/21 0956   BP: (!) 128/90   Site: Right Upper Arm   Position: Sitting   Cuff Size: Medium Adult   Pulse: 94   Resp: 16   Temp: 97 °F (36.1 °C)   TempSrc: Temporal   SpO2: 98%   Weight: 173 lb (78.5 kg)   Height: 5' 7\" (1.702 m)     Estimated body mass index is 27.1 kg/m² as calculated from the following:    Height as of this encounter: 5' 7\" (1.702 m). Weight as of this encounter: 173 lb (78.5 kg). Physical Exam  Constitutional:       Appearance: Normal appearance. She is normal weight. HENT:      Head: Normocephalic and atraumatic. Nose: Nose normal.      Mouth/Throat:      Mouth: Mucous membranes are moist.      Pharynx: Oropharynx is clear. Eyes:      Extraocular Movements: Extraocular movements intact. Conjunctiva/sclera: Conjunctivae normal.      Pupils: Pupils are equal, round, and reactive to light. Neck:      Musculoskeletal: Normal range of motion and neck supple. Cardiovascular:      Rate and Rhythm: Normal rate. Rhythm irregular.       Pulses: 12/27/2001    Colon cancer screen colonoscopy  12/27/2001    Pneumococcal 65+ years Vaccine (1 of 1 - PPSV23) 12/27/2016    DTaP/Tdap/Td vaccine (2 - Td) 12/20/2017    Annual Wellness Visit (AWV)  06/20/2019    Flu vaccine (1) 09/01/2020    Lipid screen  01/29/2021    Potassium monitoring  02/19/2022    Creatinine monitoring  02/19/2022    Breast cancer screen  09/04/2022    Diabetes screen  01/29/2023    DEXA (modify frequency per FRAX score)  Completed    Hepatitis C screen  Completed    Hepatitis A vaccine  Aged Out    Hepatitis B vaccine  Aged Out    Hib vaccine  Aged Out    Meningococcal (ACWY) vaccine  Aged Out       ASSESSMENT/PLAN:  1. Essential hypertension  - normotensive and compliant with Hyzaar    2. Preop examination for cataract surgery  - patient tolerated recent cataract surgery well with not complications  - ECG within last 1 month with no significant changes compared to prior  - general anesthesia/conscious sedation will not be used, low risk procedure overall, patient cleared for surgery    3. Hypomagnesemia  - magnesium oxide 400mg daily    4. Hypokalemia   - monitor for now as patient is on k-sparing diuretic (Aldactone)  - anticipate correction with correction of magnesium  - f/u RFP prior to next visit      Return in about 3 months (around 5/25/2021). An electronic signature was used to authenticate this note.     --Crystal Oseguera MD on 2/25/2021 at 10:05 AM

## 2021-02-25 NOTE — PROGRESS NOTES
Ms. Iraj Duarte is a 72 y.o. y/o female with history of A fib   She presents today for anticoagulation monitoring and adjustment. 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)    Patient Reported Findings:  Yes     No   [x]   []       Patient verifies current dosing regimen as listed    []   [x]       S/S bleeding/bruising/swelling/SOB      []   [x]       Blood in urine or stool - denies   []   [x]       Procedures scheduled in the future at this time - Is being assessed for watchman, will keep notified    []   [x]       Missed Dose- denies   []   [x]       Extra Dose - denies    []   [x]       Change in medications She continues with Tylenol 1000mg but only as needed --> states that she will take up to q6h---> continues with 4 tylenol daily--> nifedipine increased to 90, spironolactone 50mg bid. hyzaar 100-25mg daily. []   [x]       Change in health/diet/appetite-- normally has high vitamin K diet of canned spinach weekly and collards or broccoli about every other week. Will have some lower vitamin K veggies about 2 times a week such as green beans. -->  continues with no appetite, has had a small amount of diarrhea --> states that she has been eating liver twice a week, likely why INR has dropped. Asked patient to decrease to once a week --> states that she has not been eating as much. Stopped eating liver ---> no greens, no NVD--> 1/2 cup of greens yesterday, stopped eating liver---> no vit k in last 2 weeks --> no changes, no NVD  []   [x]       Change in alcohol use denies  []   [x]       Change in activity  []   [x]       Hospital admission-    []   [x]       Emergency department visit   []   [x]       Other complaints- states that she is using the pillbox, and she likes it. -> has not been using recently, but wants to restart ---> states she tried to use pillbox, asked to use pillbox and AVS and to cross off each day on AVS to prevent her from missing doses.        Concerned for patient's ability to correctly remember recent history. She states that she is getting concerned for her memory. Clinical Outcomes:  Yes     No  []   [x]       Major bleeding event  []   [x]       Thromboembolic event  Duration of warfarin Therapy: indefinitely  INR Range:  1.8-2.5 -> lower INR goal dt h/o subcortical hemorrhage (2016)    She may be slower to reach steady state with warfarin dosing so consider checking INR about 10 days after dose adjustment. INR is 2.7 today   Continue weekly dose of 5 mg on Mon, Wed and Fri and 2.5 mg all other days. Encouraged pt to pay attention to dosing in order to not miss doses.    Recheck INR in 4 weeks, 3/25    Referring cardiologist is Dr. Raejean Aschoff  INR (no units)   Date Value   02/25/2021 2.7   02/19/2021 1.50 (H)   01/27/2021 2.2   01/13/2021 1.3   12/08/2020 2.7   05/27/2020 1.75 (H)   05/16/2020 2.05 (H)   05/15/2020 2.21 (H)     CLINICAL PHARMACY CONSULT: MED RECONCILIATION/REVIEW ADDENDUM    For Pharmacy Admin Tracking Only    PHSO: No  Total # of Interventions Recommended: 0  - Maintenance Safety Lab Monitoring #: 1  Total Interventions Accepted: 0  Time Spent (min): 15    Shani Christie PharmD

## 2021-03-08 ENCOUNTER — TELEPHONE (OUTPATIENT)
Dept: INTERNAL MEDICINE CLINIC | Age: 71
End: 2021-03-08

## 2021-03-08 ENCOUNTER — TELEPHONE (OUTPATIENT)
Dept: PHARMACY | Age: 71
End: 2021-03-08

## 2021-03-19 ENCOUNTER — APPOINTMENT (OUTPATIENT)
Dept: GENERAL RADIOLOGY | Age: 71
End: 2021-03-19
Payer: MEDICARE

## 2021-03-19 ENCOUNTER — HOSPITAL ENCOUNTER (EMERGENCY)
Age: 71
Discharge: HOME | End: 2021-03-19
Attending: EMERGENCY MEDICINE
Payer: MEDICARE

## 2021-03-19 VITALS
SYSTOLIC BLOOD PRESSURE: 118 MMHG | OXYGEN SATURATION: 97 % | HEIGHT: 67 IN | DIASTOLIC BLOOD PRESSURE: 76 MMHG | WEIGHT: 174 LBS | RESPIRATION RATE: 16 BRPM | BODY MASS INDEX: 27.31 KG/M2 | TEMPERATURE: 98.7 F | HEART RATE: 74 BPM

## 2021-03-19 DIAGNOSIS — M79.604 ACUTE LEG PAIN, RIGHT: ICD-10-CM

## 2021-03-19 DIAGNOSIS — M25.561 ACUTE PAIN OF RIGHT KNEE: Primary | ICD-10-CM

## 2021-03-19 DIAGNOSIS — I10 ELEVATED BLOOD PRESSURE READING WITH DIAGNOSIS OF HYPERTENSION: ICD-10-CM

## 2021-03-19 LAB
INR BLD: 1.41 (ref 0.86–1.14)
PROTHROMBIN TIME: 16.4 SEC (ref 10–13.2)

## 2021-03-19 PROCEDURE — 73564 X-RAY EXAM KNEE 4 OR MORE: CPT

## 2021-03-19 PROCEDURE — 6370000000 HC RX 637 (ALT 250 FOR IP): Performed by: EMERGENCY MEDICINE

## 2021-03-19 PROCEDURE — 36415 COLL VENOUS BLD VENIPUNCTURE: CPT

## 2021-03-19 PROCEDURE — 6370000000 HC RX 637 (ALT 250 FOR IP): Performed by: PHYSICIAN ASSISTANT

## 2021-03-19 PROCEDURE — 85610 PROTHROMBIN TIME: CPT

## 2021-03-19 PROCEDURE — 73502 X-RAY EXAM HIP UNI 2-3 VIEWS: CPT

## 2021-03-19 PROCEDURE — 99283 EMERGENCY DEPT VISIT LOW MDM: CPT

## 2021-03-19 PROCEDURE — 93971 EXTREMITY STUDY: CPT

## 2021-03-19 RX ORDER — PREDNISONE 20 MG/1
40 TABLET ORAL DAILY
Qty: 6 TABLET | Refills: 0 | Status: SHIPPED | OUTPATIENT
Start: 2021-03-19 | End: 2021-03-22

## 2021-03-19 RX ORDER — HYDROCODONE BITARTRATE AND ACETAMINOPHEN 5; 325 MG/1; MG/1
1 TABLET ORAL EVERY 8 HOURS PRN
Qty: 5 TABLET | Refills: 0 | Status: SHIPPED | OUTPATIENT
Start: 2021-03-19 | End: 2021-03-22

## 2021-03-19 RX ORDER — LOSARTAN POTASSIUM 25 MG/1
100 TABLET ORAL ONCE
Status: COMPLETED | OUTPATIENT
Start: 2021-03-19 | End: 2021-03-19

## 2021-03-19 RX ORDER — HYDROCHLOROTHIAZIDE 25 MG/1
25 TABLET ORAL ONCE
Status: COMPLETED | OUTPATIENT
Start: 2021-03-19 | End: 2021-03-19

## 2021-03-19 RX ORDER — PREDNISONE 20 MG/1
60 TABLET ORAL ONCE
Status: COMPLETED | OUTPATIENT
Start: 2021-03-19 | End: 2021-03-19

## 2021-03-19 RX ORDER — DIPHENHYDRAMINE HCL 25 MG
25 TABLET ORAL ONCE
Status: COMPLETED | OUTPATIENT
Start: 2021-03-19 | End: 2021-03-19

## 2021-03-19 RX ORDER — NIFEDIPINE 30 MG/1
90 TABLET, EXTENDED RELEASE ORAL ONCE
Status: COMPLETED | OUTPATIENT
Start: 2021-03-19 | End: 2021-03-19

## 2021-03-19 RX ORDER — DIPHENHYDRAMINE HCL 25 MG
25 CAPSULE ORAL EVERY 8 HOURS PRN
Qty: 1 CAPSULE | Refills: 0 | Status: SHIPPED | OUTPATIENT
Start: 2021-03-19 | End: 2021-03-29

## 2021-03-19 RX ORDER — HYDROCODONE BITARTRATE AND ACETAMINOPHEN 5; 325 MG/1; MG/1
1 TABLET ORAL ONCE
Status: COMPLETED | OUTPATIENT
Start: 2021-03-19 | End: 2021-03-19

## 2021-03-19 RX ORDER — NIFEDIPINE 10 MG/1
10 CAPSULE ORAL ONCE
Status: DISCONTINUED | OUTPATIENT
Start: 2021-03-19 | End: 2021-03-19

## 2021-03-19 RX ADMIN — HYDROCHLOROTHIAZIDE 25 MG: 25 TABLET ORAL at 09:18

## 2021-03-19 RX ADMIN — HYDROCODONE BITARTRATE AND ACETAMINOPHEN 1 TABLET: 5; 325 TABLET ORAL at 09:18

## 2021-03-19 RX ADMIN — DIPHENHYDRAMINE HCL 25 MG: 25 TABLET ORAL at 09:22

## 2021-03-19 RX ADMIN — PREDNISONE 60 MG: 20 TABLET ORAL at 12:20

## 2021-03-19 RX ADMIN — LOSARTAN POTASSIUM 100 MG: 25 TABLET ORAL at 09:21

## 2021-03-19 RX ADMIN — NIFEDIPINE 90 MG: 30 TABLET, FILM COATED, EXTENDED RELEASE ORAL at 11:47

## 2021-03-19 ASSESSMENT — ENCOUNTER SYMPTOMS
NAUSEA: 0
STRIDOR: 0
VOMITING: 0
ABDOMINAL DISTENTION: 0
COLOR CHANGE: 0
COUGH: 0
BACK PAIN: 0
SHORTNESS OF BREATH: 0
ABDOMINAL PAIN: 0
WHEEZING: 0

## 2021-03-19 ASSESSMENT — PAIN SCALES - GENERAL: PAINLEVEL_OUTOF10: 10

## 2021-03-19 NOTE — ED PROVIDER NOTES
[facility] Emergency Department      Pt Name: Laya Lucas  MRN: [id number]  Linettegfzeny 1950  Date of evaluation: 3/19/2021  Provider: Onel Baum MD  I independently performed a history and physical on Laya Lucas. All diagnostic, treatment, and disposition decisions were made by myself in conjunction with the advanced practice provider. HPI: Laya Lucas presented with   Chief Complaint   Patient presents with    Leg Pain     Laya Lucas has a past medical history of Acute cerebrovascular accident (CVA) ([facility] [address] (1/28/2020), Atrial fibrillation ([facility] [address] Bell palsy, CAD (coronary artery disease), Cerebral artery occlusion with cerebral infarction [facility]), Chronic diastolic CHF (congestive heart failure) ([facility] [address] (5/16/2020), CVA (cerebral vascular accident) ([facility] [address] (1/4/2017), Hypertension, Intracranial hemorrhage ([address] (4/2016), Nonintractable headache, Nontraumatic subcortical hemorrhage of cerebral hemisphere ([facility] [address] (4/30/2016), Sudden visual loss of left eye, Thyroid nodule (2/8/2018), and TIA involving right internal carotid artery. She has a past surgical history that includes Cardiac surgery; Tubal ligation (Right, Torn Rotator Cuff); Coronary angioplasty with stent; and Colonoscopy. No current facility-administered medications on file prior to encounter. Current Outpatient Medications on File Prior to Encounter   Medication Sig Dispense Refill    Magnesium 500 MG CAPS Take 400 mg by mouth daily 90 capsule 3    losartan-hydroCHLOROthiazide (HYZAAR) 100-25 MG per tablet Take 1 tablet by mouth daily 90 tablet 1    spironolactone (ALDACTONE) 50 MG tablet Take 1 tablet by mouth 2 times daily TAKE 1 TABLET BY MOUTH DAILY 90 tablet 1    warfarin (COUMADIN) 5 MG tablet Take 1 tablet by mouth daily Every day.  Wednesday 2.5. 30 tablet 1    potassium chloride (KLOR-CON M) 20 MEQ extended release tablet Take 0.5 tablets by mouth daily 90 tablet 1    NIFEdipine (PROCARDIA XL) 90 MG extended release tablet Take 1 tablet by mouth daily 90 tablet 1    furosemide (LASIX) 20 MG tablet TAKE 1 TABLET BY MOUTH TWICE DAILY 180 tablet 1    famotidine (PEPCID) 20 MG tablet Take 1 tablet by mouth 2 times daily 60 tablet 2    atorvastatin (LIPITOR) 80 MG tablet Take 1 tablet by mouth daily 90 tablet 1    aspirin 81 MG EC tablet Take 1 tablet by mouth daily 30 tablet 3    acetaminophen (TYLENOL) 500 MG tablet Take 2 tablets by mouth every 6 hours as needed for Pain 120 tablet 3    Calcium Carb-Cholecalciferol (CALCIUM + D3) 600-200 MG-UNIT TABS tablet Take 1 tablet by mouth 2 times daily 120 tablet 3     PHYSICAL EXAM  Vitals: /84   Pulse 74   Temp 98.7 °F (37.1 °C) (Oral)   Resp 16   Ht 5' 7\" (1.702 m)   Wt 174 lb (78.9 kg)   SpO2 97%   BMI 27.25 kg/m²   Constitutional:  72 y.o. female alert  HENT:  Atraumatic, oral mucosa moist  Neck:  No visible JVD, supple  Chest/Lungs:  Respiratory effort normal  Abdomen:  Non-distended  Back:  No gross deformity  Extremities:  Normal tone and perfusion, mild swelling right knee, tenderness with rom, NVI    Medical Decision Making and Plan: Briefly, this is an 72 y. o.female who presented with right leg/knee pain increased from her normal pain. Blood pressure was markedly elevated on arrival but did improve after she was given her normal medications. X rays show degenerative changes. There are no clinical findings to suggest of DVT, vascular occlusion or dissection, compartment syndrome, infectious process, fracture, etc.  Other differentials include but not limited to peripheral neuropathic pain, strain, sprain, arthritis, bursitis, tendonitis, contusion, spasm, radiculopathy. Lora Simmonds was given appropriate discharge instructions. Referral to follow up provider. For further details of [facility] Emergency Department encounter, please see documentation by advanced practice provider Dalbert Riedel, PA.      Labs Reviewed PROTIME-INR - Abnormal; Notable for the following components:       Result Value    Protime 16.4 (*)     INR 1.41 (*)     All other components within normal limits    Narrative:     Performed at:  [facility] [address], [place], [address]   Phone [phone]     RADIOLOGY:   Plain x-rays were viewed by me:   Xr Knee Right (min 4 Views)    Result Date: 3/19/2021  EXAMINATION: FOUR XRAY VIEWS OF THE RIGHT KNEE 3/19/2021 10:58 am COMPARISON: None. HISTORY: ORDERING SYSTEM PROVIDED HISTORY: pain TECHNOLOGIST PROVIDED HISTORY: Reason for exam:->pain Reason for Exam: pain Acuity: Acute Type of Exam: Initial FINDINGS: No fracture. Prominent tricompartment osteoarthritis most severe at the medial tibiofemoral compartment. Small amount of fluid in the suprapatellar recess. Chondroid type calcification in the distal femoral metaphysis compatible with benign enchondroma     Tricompartment osteoarthritis with small joint effusion     Vl Extremity Venous Right    Result Date: 3/19/2021  Vascular Lower Extremities DVT Study Procedure -- PRELIMINARY SONOGRAPHER REPORT --   Demographics   Patient Name       Michelle Monarch   Date of Study      03/19/2021         Gender              Female   Patient Number     [id number]         Date of Birth       1950   Visit Number       [id number]          Age                 72 year(s)   Accession Number   [id number]         Room Number         JHOAN   Corporate ID       [id number]            Sonographer         Dylan Bowman,                                                            [address]   Ordering Physician [facility]       Physician  Procedure Type of Study:   Veins:Lower Extremities DVT Study, VL EXTREMITY VENOUS DUPLEX RIGHT.   Tech Comments Right No evidence of deep vein or superficial vein thrombosis involving the right lower extremity and the left common femoral vein. Xr Hip 2-3 Vw W Pelvis Right    Result Date: 3/19/2021  EXAMINATION: ONE XRAY VIEW OF THE PELVIS AND TWO XRAY VIEWS RIGHT HIP 3/19/2021 9:00 am COMPARISON: February 19, 2021 HISTORY: ORDERING SYSTEM PROVIDED HISTORY: pain TECHNOLOGIST PROVIDED HISTORY: Reason for exam:->pain Reason for Exam: Edema (pt states he is in from home per Dr. Wilver Chan to \"get fluid off\" pelvic area) Acuity: Unknown Type of Exam: Unknown FINDINGS: Pelvis: Symphysis pubis interval is normal.  Sacroiliac joints are unremarkable. Sacral arcuate lines are uninterrupted. Femoral heads align normally with the acetabula. Osseous pelvis is intact. Mild left hip degenerative changes. Right hip: Mild degenerative changes. Femoral head aligns normally with the acetabulum. No fracture, dislocation, or periosteal change. No radiographic evidence of avascular necrosis. 1. No acute radiographic finding to account for patient's right hip pain. 2. Mild right hip degenerative changes. 3. Mild left hip degenerative changes. RECOMMENDATION: Please note that radiography may not reveal non-displaced fractures and stress changes. If that is the clinical suspicion, MRI is the study of choice.      Medications administered:  Medications   predniSONE (DELTASONE) tablet 60 mg (has no administration in time range)   HYDROcodone-acetaminophen (NORCO) 5-325 MG per tablet 1 tablet (1 tablet Oral Given 3/19/21 0918)   diphenhydrAMINE (BENADRYL) tablet 25 mg (25 mg Oral Given 3/19/21 0922)   losartan (COZAAR) tablet 100 mg (100 mg Oral Given 3/19/21 0921)   hydroCHLOROthiazide (HYDRODIURIL) tablet 25 mg (25 mg Oral Given 3/19/21 0918)   NIFEdipine (PROCARDIA XL) extended release tablet 90 mg (90 mg Oral Given 3/19/21 1147)     Vitals:    03/19/21 0838 03/19/21 1200   BP: (!) 183/112 118/84   Pulse: 82 74   Resp: 16    Temp: 98.7 °F (37.1 °C)    TempSrc: Oral    SpO2: 97%    Weight: 174 lb (78.9 kg)    Height: 5' 7\" (1.702 m)      New Prescriptions DIPHENHYDRAMINE (BENADRYL) 25 MG CAPSULE    Take 1 capsule by mouth every 8 hours as needed for Itching (with your Norco)    HYDROCODONE-ACETAMINOPHEN (NORCO) 5-325 MG PER TABLET    Take 1 tablet by mouth every 8 hours as needed for Pain for up to 3 days. PREDNISONE (DELTASONE) 20 MG TABLET    Take 2 tablets by mouth daily for 3 days     FOLLOW UP:    Ce Carrera MD  [facility] [address] [facility]  [phone]      for follow up in 1-3 days    Stephanie Cabrera MD  [address] [facility] [address] [facility]  [phone]      for follow up with orthopedist in 1-3 days    [facility] Emergency Department  [address] [facility]  [phone]    If symptoms worsen    FINAL IMPRESSION:    1. Acute pain of right knee    2.  Acute leg pain, right    3. Elevated blood pressure reading with diagnosis of hypertension       DISPOSITION Decision To Discharge 03/19/2021 10:37:30 AM       Erica Troy MD  03/19/21 2823

## 2021-03-19 NOTE — ED PROVIDER NOTES
pallor, rash and wound. Neurological: Negative. Psychiatric/Behavioral: Negative for confusion. All other systems reviewed and are negative. Positives and Pertinent negatives as per HPI. Except as noted above in the ROS, all other systems were reviewed and negative.        PAST MEDICAL HISTORY     Past Medical History:   Diagnosis Date    Acute cerebrovascular accident (CVA) ([facility] [address] 1/28/2020    Atrial fibrillation ([facility] [address]     Bell palsy     diagnosed 15 years ago   [facility] CAD (coronary artery disease)     Cerebral artery occlusion with cerebral infarction ([address]     Chronic diastolic CHF (congestive heart failure) ([address] 5/16/2020    CVA (cerebral vascular accident) ([facility] [address] 1/4/2017    Hypertension     Intracranial hemorrhage ([address] 4/2016    Nonintractable headache     Nontraumatic subcortical hemorrhage of cerebral hemisphere ([facility] [address] 4/30/2016    Sudden visual loss of left eye     Thyroid nodule 2/8/2018    TIA involving right internal carotid artery          SURGICAL HISTORY     Past Surgical History:   Procedure Laterality Date    CARDIAC SURGERY      COLONOSCOPY      CORONARY ANGIOPLASTY WITH STENT PLACEMENT      TUBAL LIGATION Right Torn Rotator Cuff    2013 @ [facility]         CURRENTMEDICATIONS       Previous Medications    ACETAMINOPHEN (TYLENOL) 500 MG TABLET    Take 2 tablets by mouth every 6 hours as needed for Pain    ASPIRIN 81 MG EC TABLET    Take 1 tablet by mouth daily    ATORVASTATIN (LIPITOR) 80 MG TABLET    Take 1 tablet by mouth daily    CALCIUM CARB-CHOLECALCIFEROL (CALCIUM + D3) 600-200 MG-UNIT TABS TABLET    Take 1 tablet by mouth 2 times daily    FAMOTIDINE (PEPCID) 20 MG TABLET    Take 1 tablet by mouth 2 times daily    FUROSEMIDE (LASIX) 20 MG TABLET    TAKE 1 TABLET BY MOUTH TWICE DAILY    LOSARTAN-HYDROCHLOROTHIAZIDE (HYZAAR) 100-25 MG PER TABLET    Take 1 tablet by mouth daily    MAGNESIUM 500 MG CAPS    Take 400 mg by mouth daily    NIFEDIPINE (PROCARDIA XL) 90 MG EXTENDED RELEASE TABLET    Take 1 tablet by mouth daily    POTASSIUM CHLORIDE (KLOR-CON M) 20 MEQ EXTENDED RELEASE TABLET    Take 0.5 tablets by mouth daily    SPIRONOLACTONE (ALDACTONE) 50 MG TABLET    Take 1 tablet by mouth 2 times daily TAKE 1 TABLET BY MOUTH DAILY    WARFARIN (COUMADIN) 5 MG TABLET    Take 1 tablet by mouth daily Every day. Wednesday 2.5. ALLERGIES     Clonidine, Codeine, Lisinopril, Tramadol, and Percocet [oxycodone-acetaminophen]    FAMILYHISTORY     History reviewed. No pertinent family history. SOCIAL HISTORY       Social History     Tobacco Use    Smoking status: Never Smoker    Smokeless tobacco: Never Used   Substance Use Topics    Alcohol use: No    Drug use: No       SCREENINGS             PHYSICAL EXAM    (up to 7 for level 4, 8 or more for level 5)     ED Triage Vitals [03/19/21 0838]   BP Temp Temp Source Pulse Resp SpO2 Height Weight   (!) 183/112 98.7 °F (37.1 °C) Oral 82 16 97 % 5' 7\" (1.702 m) 174 lb (78.9 kg)       Physical Exam  Vitals signs and nursing note reviewed. Constitutional:       Appearance: Normal appearance. She is well-developed. She is not toxic-appearing or diaphoretic. HENT:      Head: Normocephalic and atraumatic. Right Ear: External ear normal.      Left Ear: External ear normal.      Nose: Nose normal.      Mouth/Throat:      Mouth: Mucous membranes are moist.      Pharynx: Oropharynx is clear. Eyes:      General: No scleral icterus. Right eye: No discharge. Left eye: No discharge. Extraocular Movements: Extraocular movements intact. Conjunctiva/sclera: Conjunctivae normal.      Pupils: Pupils are equal, round, and reactive to light. Neck:      Musculoskeletal: Normal range of motion. Cardiovascular:      Rate and Rhythm: Normal rate. Pulses:           Femoral pulses are 1+ on the right side and 1+ on the left side. Dorsalis pedis pulses are 1+ on the right side and 1+ on the left side. Posterior tibial pulses are 1+ on the right side and 1+ on the left side. Pulmonary:      Effort: Pulmonary effort is normal.      Breath sounds: Normal breath sounds. Abdominal:      General: Bowel sounds are normal. There is no distension. Palpations: Abdomen is soft. Tenderness: There is no abdominal tenderness. There is no right CVA tenderness or left CVA tenderness. Musculoskeletal:      Right hip: Normal.      Right knee: She exhibits decreased range of motion and swelling. She exhibits no effusion, no ecchymosis, no deformity, no laceration, no erythema, normal alignment, no LCL laxity, normal patellar mobility, no bony tenderness, normal meniscus and no MCL laxity. Tenderness found. Medial joint line and lateral joint line tenderness noted. Right ankle: Normal. Achilles tendon normal.      Right upper leg: Normal.      Right lower leg: Normal.      Right foot: Normal.      Comments: No acute extremity edema, posterior calf or thigh tenderness, palpable cord, discoloration, poikilothermia, pallor, paresthesia, pain with passive range of motion, clicking or laxity. Negative Homans. Skin:     General: Skin is warm and dry. Capillary Refill: Capillary refill takes less than 2 seconds. Coloration: Skin is not jaundiced or pale. Findings: No bruising, erythema, lesion or rash. Neurological:      Mental Status: She is alert and oriented to person, place, and time. Mental status is at baseline. Psychiatric:         Attention and Perception: Attention and perception normal.         Mood and Affect: Mood is anxious. Speech: Speech normal.         Behavior: Behavior normal. Behavior is cooperative. Thought Content:  Thought content normal.         Cognition and Memory: Cognition and memory normal.         Judgment: Judgment normal.         DIAGNOSTIC RESULTS   LABS:    Labs Reviewed   PROTIME-INR - Abnormal; Notable for the following components:       Result Value    Protime 16.4 (*)     INR 1.41 (*)     All other components within normal limits    Narrative:     Performed at:  [facility]  [address], [address]   Phone [phone]       All other labs were within normal range or not returned as of this dictation. EKG: All EKG's are interpreted by the Emergency Department Physician in the absence of a cardiologist.  Please see their note for interpretation of EKG. RADIOLOGY:   Non-plain film images such as CT, Ultrasound and MRI are read by the radiologist. Plain radiographic images are visualized and preliminarily interpreted by the ED Provider with the below findings:        Interpretation per the Radiologist below, if available at the time of this note:    XR KNEE RIGHT (MIN 4 VIEWS)   Final Result   Tricompartment osteoarthritis with small joint effusion         VL Extremity Venous Right         XR HIP 2-3 VW W PELVIS RIGHT   Final Result   1. No acute radiographic finding to account for patient's right hip pain. 2. Mild right hip degenerative changes. 3. Mild left hip degenerative changes. RECOMMENDATION:   Please note that radiography may not reveal non-displaced fractures and   stress changes. If that is the clinical suspicion, MRI is the study of choice. Tech Comments   Right   No evidence of deep vein or superficial vein thrombosis involving the right   lower extremity and the left common femoral vein.     PROCEDURES   Unless otherwise noted below, none     Procedures    CRITICAL CARE TIME   N/A    CONSULTS:  None      EMERGENCY DEPARTMENT COURSE and DIFFERENTIAL DIAGNOSIS/MDM:   Vitals:    Vitals:    03/19/21 0838 03/19/21 1200   BP: (!) 183/112 118/84   Pulse: 82 74   Resp: 16    Temp: 98.7 °F (37.1 °C)    TempSrc: Oral    SpO2: 97%    Weight: 174 lb (78.9 kg)    Height: 5' 7\" (1.702 m)        Patient was given the following medications:  Medications   predniSONE (DELTASONE) tablet 60 mg (has no administration in time range)   HYDROcodone-acetaminophen (NORCO) 5-325 MG per tablet 1 tablet (1 tablet Oral Given 3/19/21 0918)   diphenhydrAMINE (BENADRYL) tablet 25 mg (25 mg Oral Given 3/19/21 0922)   losartan (COZAAR) tablet 100 mg (100 mg Oral Given 3/19/21 0921)   hydroCHLOROthiazide (HYDRODIURIL) tablet 25 mg (25 mg Oral Given 3/19/21 0918)   NIFEdipine (PROCARDIA XL) extended release tablet 90 mg (90 mg Oral Given 3/19/21 1147)         This patient presents to the emergency department complaining of right knee pain and right leg pain. She has had right knee pain for years. She has history of arthritis in her right knee. However the pain got worse over the past 3 days without any preceding injury. X-rays are unremarkable for any acute injury. She does have evidence of degenerative findings and a small right knee joint effusion. No clinical evidence to suggest infection at this time. Venous Doppler negative for DVT. She is neurovascularly intact. Ace wrap applied for comfort. Will be referred to orthopedist.  Madi Brady home with pain medicine as needed. She tolerates Norco without allergic reaction but because she does have a Percocet itching allergy, I will add Benadryl to the regimen and she is instructed to take Benadryl with the Norco.  She forgot to take all of her blood pressure medications today, therefore we gave her her normal doses and blood pressure does improve. My suspicion is low for subungual hematoma, paronychia, eponychia, felon, flexor tenosynovitis, foreign body, tendon rupture, compartment syndrome, acute fracture, dislocation, DVT, arterial compromise or occlusion, limb ischemia, gout, septic joint, abscess, cellulitis, osteomyelitis, carotid dissection, sinus abscess, acute fracture, acute CVA, ICH, SAH, TIA, meningitis, encephalitis, pseudotumor cerebri, temporal arteritis, sentinel bleed from ruptured aneurysm, hypertensive urgency or emergency, subdural hematoma, epidural hematoma, avascular necrosis, SCFE, Osgood-Schlatter syndrome, or other concerning pathology. FINAL IMPRESSION      1. Acute pain of right knee    2. Acute leg pain, right          DISPOSITION/PLAN   DISPOSITION        PATIENT REFERREDTO:  Yahaira Mars MD  [facility]  [phone]      for follow up in 1-3 days    Crystal Cifuentes MD  [address] [facility] [address]  [phone]      for follow up with orthopedist in 1-3 days    [facility] Emergency Department  [address] [place] [address]  [phone]    If symptoms worsen      DISCHARGE MEDICATIONS:  New Prescriptions    DIPHENHYDRAMINE (BENADRYL) 25 MG CAPSULE    Take 1 capsule by mouth every 8 hours as needed for Itching (with your Norco)    HYDROCODONE-ACETAMINOPHEN (NORCO) 5-325 MG PER TABLET    Take 1 tablet by mouth every 8 hours as needed for Pain for up to 3 days.     PREDNISONE (DELTASONE) 20 MG TABLET    Take 2 tablets by mouth daily for 3 days       DISCONTINUED MEDICATIONS:  Discontinued Medications    No medications on file              (Please note that portions of this note were completed with a voice recognition program.  Efforts were made to edit the dictations but occasionally words are mis-transcribed.)    Bridget Ibarra PA-C (electronically signed)            Bridget Ibarra PA-C  03/19/21 [id number]

## 2021-03-24 ENCOUNTER — OFFICE VISIT (OUTPATIENT)
Dept: INTERNAL MEDICINE CLINIC | Age: 71
End: 2021-03-24
Payer: MEDICARE

## 2021-03-24 VITALS
TEMPERATURE: 96.9 F | HEART RATE: 86 BPM | BODY MASS INDEX: 27.25 KG/M2 | DIASTOLIC BLOOD PRESSURE: 90 MMHG | OXYGEN SATURATION: 98 % | HEIGHT: 67 IN | SYSTOLIC BLOOD PRESSURE: 117 MMHG | WEIGHT: 173.6 LBS | RESPIRATION RATE: 16 BRPM

## 2021-03-24 DIAGNOSIS — Z00.00 HEALTHCARE MAINTENANCE: ICD-10-CM

## 2021-03-24 DIAGNOSIS — G89.29 CHRONIC PAIN OF LEFT KNEE: Primary | ICD-10-CM

## 2021-03-24 DIAGNOSIS — M25.562 CHRONIC PAIN OF LEFT KNEE: Primary | ICD-10-CM

## 2021-03-24 LAB — MAGNESIUM: 1.7 MG/DL (ref 1.8–2.4)

## 2021-03-24 PROCEDURE — 99213 OFFICE O/P EST LOW 20 MIN: CPT | Performed by: STUDENT IN AN ORGANIZED HEALTH CARE EDUCATION/TRAINING PROGRAM

## 2021-03-24 RX ORDER — METHYLPREDNISOLONE 4 MG/1
TABLET ORAL
Qty: 1 KIT | Refills: 0 | Status: SHIPPED | OUTPATIENT
Start: 2021-03-24 | End: 2021-03-30

## 2021-03-24 RX ORDER — HYDROCODONE BITARTRATE AND ACETAMINOPHEN 5; 325 MG/1; MG/1
1 TABLET ORAL EVERY 4 HOURS PRN
Qty: 42 TABLET | Refills: 0 | Status: SHIPPED | OUTPATIENT
Start: 2021-03-24 | End: 2021-03-31

## 2021-03-24 RX ORDER — DIPHENHYDRAMINE HCL 25 MG
25 TABLET ORAL EVERY 6 HOURS PRN
Qty: 25 TABLET | Refills: 0 | Status: SHIPPED | OUTPATIENT
Start: 2021-03-24 | End: 2021-04-23

## 2021-03-24 NOTE — PATIENT INSTRUCTIONS
Take medrol dose pack. Take norco for pain as well as benadryl in case of side effects. Follow up with ortho as soon as possible.

## 2021-03-24 NOTE — PROGRESS NOTES
Department Of Internal Medicine     General Medicine/Primary Care      Established Patient Visit    Patient:  Tuan Harkins                                               : 1950  Age: 72 y.o. MRN: [id number]  Date : 3/24/2021    History Obtained From:  PATIENT    CHIEF COMPLAINT:  R knee pain    HISTORY OF PRESENT ILLNESS:   The patient is a 72 y.o. female who presents as a hospital follow up. Patient was seen at [facility] 3/19 \"omplaining of right knee pain on and off for years but worse over the past 3 days without any known injury. Pain often radiates up and down the right leg. Denies any acute swelling to the right leg. \" Patient was given norco and benadryl (due to multiple allergies to medications) as well as steroids. Patient states this provided relief however since completing the medications her pain has returned. Patient has had 2 falls within the past year secondary to her knee giving out. Patient's chronic conditions are well controlled. Past Medical History:        Diagnosis Date    Acute cerebrovascular accident (CVA) ([address] 2020    Atrial fibrillation (HCC)     Bell palsy     diagnosed 15 years ago   [facility] CAD (coronary artery disease)     Cerebral artery occlusion with cerebral infarction ([address]     Chronic diastolic CHF (congestive heart failure) ([address] 2020    CVA (cerebral vascular accident) ([address] 2017    Hypertension     Intracranial hemorrhage ([address] 2016    Nonintractable headache     Nontraumatic subcortical hemorrhage of cerebral hemisphere ([address] 2016    Sudden visual loss of left eye     Thyroid nodule 2018    TIA involving right internal carotid artery        Past Surgical History:        Procedure Laterality Date    CARDIAC SURGERY      COLONOSCOPY      CORONARY ANGIOPLASTY WITH STENT PLACEMENT      TUBAL LIGATION Right Torn Rotator Cuff     @ [facility]       Family History:   No family history on file.     Social History: TOBACCO:   reports that she has never smoked. She has never used smokeless tobacco.  ETOH:   reports no history of alcohol use. OCCUPATION:      Allergies:  Clonidine, Codeine, Lisinopril, Tramadol, and Percocet [oxycodone-acetaminophen]    Current Medications:    Prior to Admission medications    Medication Sig Start Date End Date Taking? Authorizing Provider   methylPREDNISolone (MEDROL DOSEPACK) 4 MG tablet Take by mouth. 3/24/21 3/30/21 Yes Ashlyn Toure DO   HYDROcodone-acetaminophen (NORCO) 5-325 MG per tablet Take 1 tablet by mouth every 4 hours as needed for Pain for up to 7 days. Intended supply: 7 days. Take lowest dose possible to manage pain 3/24/21 3/31/21 Yes Ashlyn Toure DO   diphenhydrAMINE (BENADRYL) 25 MG tablet Take 1 tablet by mouth every 6 hours as needed for Itching (take 1 tablet by mouth every 8 hours as needed for itching (with your norco)) 3/24/21 4/23/21 Yes Ashlyn Toure DO   Magnesium 500 MG CAPS Take 400 mg by mouth daily 2/25/21 5/26/21 Yes Ngoc Espinoza MD   losartan-hydroCHLOROthiazide [facility]) 100-25 MG per tablet Take 1 tablet by mouth daily 2/4/21  Yes Hilda Tomas MD   spironolactone (ALDACTONE) 50 MG tablet Take 1 tablet by mouth 2 times daily TAKE 1 TABLET BY MOUTH DAILY 1/28/21  Yes Hilda Tomas MD   warfarin (COUMADIN) 5 MG tablet Take 1 tablet by mouth daily Every day.  Wednesday 2.5. 1/28/21  Yes Hilda Tomas MD   potassium chloride (KLOR-CON M) 20 MEQ extended release tablet Take 0.5 tablets by mouth daily 1/28/21  Yes Hilda Tomas MD   NIFEdipine (PROCARDIA XL) 90 MG extended release tablet Take 1 tablet by mouth daily 1/28/21  Yes Hilda Tomas MD   furosemide (LASIX) 20 MG tablet TAKE 1 TABLET BY MOUTH TWICE DAILY 1/28/21  Yes Hilda Tomas MD   famotidine (PEPCID) 20 MG tablet Take 1 tablet by mouth 2 times daily 1/28/21  Yes Hilda Tomas MD   atorvastatin (LIPITOR) 80 MG tablet Take 1 tablet by mouth daily 1/28/21  Yes Chris Riggs other than listed,food,insects,skin rash,trouble breathing,local or general lymph node enlargement or tenderness. Physical Exam:      Vitals: BP (!) 117/90 (Site: Left Upper Arm, Position: Sitting, Cuff Size: Large Adult)   Pulse 86   Temp 96.9 °F (36.1 °C) (Temporal)   Resp 16   Ht 5' 7\" (1.702 m)   Wt 173 lb 9.6 oz (78.7 kg)   SpO2 98%   BMI 27.19 kg/m²     Body mass index is 27.19 kg/m². Wt Readings from Last 3 Encounters:   03/24/21 173 lb 9.6 oz (78.7 kg)   03/19/21 174 lb (78.9 kg)   02/25/21 173 lb (78.5 kg)       · Gen - Alert, no signs of distress, appears stated age and cooperative  · HEENT - NC/AT  · Eye - Normal external eye, conjunctiva, lids cornea, PERLLA, no nystagmus  · Ears - Normal TM's bilaterally. Normal auditory canals and external ears. Non-tender  · Nose - Normal external nose, mucus membranes and septum  · Pharynx - Dental Hygiene adequate. Normal buccal mucosa. Normal pharynx  · Neck / Thyroid - Supple, no masses, nodes, nodules or enlargement. No adenopathy, no carotid bruit, no JVD, supple, symmetrical, trachea midline and thyroid not enlarged, symmetric, no tenderness/mass/nodules  · CVS - Regular rate. Regular rhythm. No mumur or rub. No edema  · Resp - No accessory muscle use. No crackles. No wheezing. No rhonchi  · GI - Non-tender. Non-distended. No masses. No organmegaly. Normal bowel sounds  · Skin - Warm and dry. No nodule on exposed extremities. No rash on exposed extremities  · Lymph - No cervical LAD. No supraclavicular LAD. · MSK - No cyanosis. No joint deformity. No clubbing  · Neuro - Awake. Follows commands. CN II-XII Grossly Intact. Sensation intact. Strength 5/5 UE and LE. Reflexes 1+ UE and LE martin. Downgoing plantar martin. Normal Gait. Finger-to-nose and rapidly alternating movements intact. Normal pain response  · Psych - Oriented to person, place, time. No anxiety or agitation.      LABS:    CBC:   Lab Results   Component Value Date    WBC 7.1 02/19/2021 HGB 13.4 02/19/2021    HCT 40.7 02/19/2021    MCV 91.0 02/19/2021     02/19/2021           Lab Results   Component Value Date    FGYIBOLS47 366 02/08/2018                                                             BMP:    Lab Results   Component Value Date     02/19/2021    K 3.4 (L) 02/19/2021     02/19/2021    CO2 24 02/19/2021       LFT's:   Lab Results   Component Value Date    ALT 8 (L) 02/19/2021    AST 16 02/19/2021    ALKPHOS 81 02/19/2021    BILITOT 0.5 02/19/2021       Lipids:   Lab Results   Component Value Date    CHOL 162 01/29/2020    HDL 50 01/29/2020    [address] 01/29/2020    TRIG 83 01/29/2020       INR:   Lab Results   Component Value Date    INR 1.41 (H) 03/19/2021    INR 2.7 02/25/2021    INR 1.50 (H) 02/19/2021    PROTIME 16.4 (H) 03/19/2021    PROTIME 17.5 (H) 02/19/2021    PROTIME 20.4 (H) 05/27/2020       U/A:  Lab Results   Component Value Date    LABMICR YES 02/19/2021          Lab Results   Component Value Date    LABA1C 5.5 01/29/2020        Lab Results   Component Value Date    CREATININE 0.5 (L) 02/19/2021       -----------------------------------------------------------------       Assessment/Plan:   Patient Active Problem List:     Permanent atrial fibrillation ([facility]     Chest pain     Uncontrolled hypertension     Coronary artery disease involving native coronary artery of native heart without angina pectoris     LVH (left ventricular hypertrophy)     Thyroid nodule     Chronic heart failure with preserved ejection fraction (HCC)     Bradycardia     Essential hypertension     Preop examination     Hypomagnesemia     Hypokalemia      Disposition:   1. R knee pain  - Ortho referral  - Medrol dose pack  - Norco with benadryl    2. HTN  - Continue current medications    3.  Health maintenance  - Vitamin D, mag level        Jim Reeve, DO  Internal Medicine Resident, PGY-1  3/24/2021, 8:49 AM

## 2021-03-26 LAB — VITAMIN D 1,25-DIHYDROXY: 46.9 PG/ML (ref 19.9–79.3)

## 2021-03-27 PROBLEM — Z01.818 PREOP EXAMINATION: Status: RESOLVED | Noted: 2021-02-25 | Resolved: 2021-03-27

## 2021-03-29 ENCOUNTER — ANTI-COAG VISIT (OUTPATIENT)
Dept: PHARMACY | Age: 71
End: 2021-03-29
Payer: MEDICARE

## 2021-03-29 DIAGNOSIS — I48.21 PERMANENT ATRIAL FIBRILLATION (HCC): ICD-10-CM

## 2021-03-29 LAB — INTERNATIONAL NORMALIZATION RATIO, POC: 1.2

## 2021-03-29 PROCEDURE — 99212 OFFICE O/P EST SF 10 MIN: CPT

## 2021-03-29 PROCEDURE — 85610 PROTHROMBIN TIME: CPT

## 2021-03-29 NOTE — PROGRESS NOTES
Ms. Laya Lucas is a 72 y.o. y/o female with history of A fib   She presents today for anticoagulation monitoring and adjustment. 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)    Patient Reported Findings:  Yes     No   [x]   []       Patient verifies current dosing regimen as listed    []   [x]       S/S bleeding/bruising/swelling/SOB      []   [x]       Blood in urine or stool - denies   []   [x]       Procedures scheduled in the future at this time - Is being assessed for watchman, will keep notified    [x]   []       Missed Dose- states that she might have missed doses but does not remember    []   [x]       Extra Dose - denies    []   [x]       Change in medications She continues with Tylenol 1000mg but only as needed --> states that she will take up to q6h---> continues with 4 tylenol daily--> nifedipine increased to 90, spironolactone 50mg bid. hyzaar 100-25mg daily --> no changes   []   [x]       Change in health/diet/appetite-- normally has high vitamin K diet of canned spinach weekly and collards or broccoli about every other week. Will have some lower vitamin K veggies about 2 times a week such as green beans. -->  continues with no appetite, has had a small amount of diarrhea --> states that she has been eating liver twice a week, likely why INR has dropped. Asked patient to decrease to once a week --> states that she has not been eating as much. Stopped eating liver ---> no greens, no NVD--> 1/2 cup of greens yesterday, stopped eating liver---> no vit k in last 2 weeks --> no changes, no NVD  []   [x]       Change in alcohol use denies  []   [x]       Change in activity  []   [x]       Hospital admission-    [x]   []       Emergency department visit in ED 3/19 for leg pain. INR 1.41. had forgotten meds. no acute injuries. referred to orthopedist. was given prednisone 40 mg x 3 days and norco x 3 days.     []   [x]       Other complaints- states that she is using the pillbox, and she likes

## 2021-04-05 ENCOUNTER — ANTI-COAG VISIT (OUTPATIENT)
Dept: PHARMACY | Age: 71
End: 2021-04-05
Payer: MEDICARE

## 2021-04-05 DIAGNOSIS — I48.21 PERMANENT ATRIAL FIBRILLATION (HCC): ICD-10-CM

## 2021-04-05 LAB — INTERNATIONAL NORMALIZATION RATIO, POC: 2

## 2021-04-05 PROCEDURE — 99211 OFF/OP EST MAY X REQ PHY/QHP: CPT

## 2021-04-05 PROCEDURE — 85610 PROTHROMBIN TIME: CPT

## 2021-04-05 NOTE — PROGRESS NOTES
Ms. Josefina Coelho is a 72 y.o. y/o female with history of A fib   She presents today for anticoagulation monitoring and adjustment. 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)    Patient Reported Findings:  Yes     No   [x]   []       Patient verifies current dosing regimen as listed    []   [x]       S/S bleeding/bruising/swelling/SOB      []   [x]       Blood in urine or stool - denies   []   [x]       Procedures scheduled in the future at this time - Is being assessed for watchman, will keep notified    []   [x]       Missed Dose-    []   [x]       Extra Dose - denies    []   [x]       Change in medications She continues with Tylenol 1000mg but only as needed --> states that she will take up to q6h---> continues with 4 tylenol daily--> nifedipine increased to 90, spironolactone 50mg bid. hyzaar 100-25mg daily --> no changes   []   [x]       Change in health/diet/appetite-- normally has high vitamin K diet of canned spinach weekly and collards or broccoli about every other week. Will have some lower vitamin K veggies about 2 times a week such as green beans. -->  continues with no appetite, has had a small amount of diarrhea --> states that she has been eating liver twice a week, likely why INR has dropped. Asked patient to decrease to once a week --> states that she has not been eating as much. Stopped eating liver ---> no greens, no NVD--> 1/2 cup of greens yesterday, stopped eating liver---> no vit k in last 2 weeks --> no changes, no NVD  []   [x]       Change in alcohol use denies  []   [x]       Change in activity  []   [x]       Hospital admission-    [x]   []       Emergency department visit in ED 3/19 for leg pain. INR 1.41. had forgotten meds. no acute injuries. referred to orthopedist. was given prednisone 40 mg x 3 days and norco x 3 days.     []   [x]       Other complaints- states that she is using the pillbox, and she likes it. -> has not been using recently, but wants to restart ---> states she tried to use pillbox, asked to use pillbox and AVS and to cross off each day on AVS to prevent her from missing doses. Concerned for patient's ability to correctly remember recent history. She states that she is getting concerned for her memory. Clinical Outcomes:  Yes     No  []   [x]       Major bleeding event  []   [x]       Thromboembolic event  Duration of warfarin Therapy: indefinitely  INR Range:  1.8-2.5 -> lower INR goal dt h/o subcortical hemorrhage (2016)    She may be slower to reach steady state with warfarin dosing so consider checking INR about 10 days after dose adjustment. INR is 2 today after boost in dose last week   Increase weekly dose to 5 mg on Mon, Wed, Fri, and Sat and 2.5 mg all other days. Asked pt to use paperwork to check off doses and bring back to next appt   Encouraged pt to pay attention to dosing in order to not miss doses.    Recheck INR in 2 weeks, 4/19    Referring cardiologist is Dr. Lalo Carrion  INR (no units)   Date Value   03/29/2021 1.2   03/19/2021 1.41 (H)   02/25/2021 2.7   02/19/2021 1.50 (H)   01/27/2021 2.2   01/13/2021 1.3   05/27/2020 1.75 (H)   05/16/2020 2.05 (H)     CLINICAL PHARMACY CONSULT: MED RECONCILIATION/REVIEW ADDENDUM    For Pharmacy Admin Tracking Only    PHSO: No  Total # of Interventions Recommended: 1  - Increased Dose #: 1  - Maintenance Safety Lab Monitoring #: 1  Total Interventions Accepted: 1  Time Spent (min): 15    Nahomy LozaD

## 2021-04-19 ENCOUNTER — TELEPHONE (OUTPATIENT)
Dept: PHARMACY | Age: 71
End: 2021-04-19

## 2021-04-20 ENCOUNTER — ANTI-COAG VISIT (OUTPATIENT)
Dept: PHARMACY | Age: 71
End: 2021-04-20
Payer: MEDICARE

## 2021-04-20 DIAGNOSIS — I48.21 PERMANENT ATRIAL FIBRILLATION (HCC): ICD-10-CM

## 2021-04-20 LAB — INTERNATIONAL NORMALIZATION RATIO, POC: 2.7

## 2021-04-20 PROCEDURE — 85610 PROTHROMBIN TIME: CPT

## 2021-04-20 PROCEDURE — 99211 OFF/OP EST MAY X REQ PHY/QHP: CPT

## 2021-05-04 ENCOUNTER — ANTI-COAG VISIT (OUTPATIENT)
Dept: PHARMACY | Age: 71
End: 2021-05-04
Payer: MEDICARE

## 2021-05-04 DIAGNOSIS — I48.21 PERMANENT ATRIAL FIBRILLATION (HCC): ICD-10-CM

## 2021-05-04 LAB — INTERNATIONAL NORMALIZATION RATIO, POC: 2.2

## 2021-05-04 PROCEDURE — 99211 OFF/OP EST MAY X REQ PHY/QHP: CPT

## 2021-05-04 PROCEDURE — 85610 PROTHROMBIN TIME: CPT

## 2021-05-04 NOTE — PROGRESS NOTES
Ms. Frederick Hudson is a 72 y.o. y/o female with history of A fib   She presents today for anticoagulation monitoring and adjustment. 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)    Patient Reported Findings:  Yes     No   [x]   []       Patient verifies current dosing regimen as listed    []   [x]       S/S bleeding/bruising/swelling/SOB      []   [x]       Blood in urine or stool - denies   []   [x]       Procedures scheduled in the future at this time - Is being assessed for watchman, will keep notified    []   [x]       Missed Dose-    []   [x]       Extra Dose - denies    []   [x]       Change in medications She continues with Tylenol 1000mg but only as needed --> states that she will take up to q6h---> continues with 4 tylenol daily--> nifedipine increased to 90, spironolactone 50mg bid. hyzaar 100-25mg daily --> no changes   []   [x]       Change in health/diet/appetite-- normally has high vitamin K diet of canned spinach weekly and collards or broccoli about every other week. Will have some lower vitamin K veggies about 2 times a week such as green beans. -->  continues with no appetite, has had a small amount of diarrhea --> states that she has been eating liver twice a week, likely why INR has dropped. Asked patient to decrease to once a week --> states that she has not been eating as much. Stopped eating liver ---> no greens, no NVD--> 1/2 cup of greens yesterday, stopped eating liver---> no vit k in last 2 weeks --> no changes, no NVD---> no greens, wants to add greens in once/week, no NVD---> continues to eat salad once/week, no NVD  []   [x]       Change in alcohol use denies  []   [x]       Change in activity  []   [x]       Hospital admission-    [x]   []       Emergency department visit in ED 3/19 for leg pain. INR 1.41. had forgotten meds. no acute injuries. referred to orthopedist. was given prednisone 40 mg x 3 days and norco x 3 days.     []   [x]       Other complaints- states that she is using the pillbox, and she likes it. -> has not been using recently, but wants to restart ---> states she tried to use pillbox, asked to use pillbox and AVS and to cross off each day on AVS to prevent her from missing doses. Concerned for patient's ability to correctly remember recent history. She states that she is getting concerned for her memory. Clinical Outcomes:  Yes     No  []   [x]       Major bleeding event  []   [x]       Thromboembolic event  Duration of warfarin Therapy: indefinitely  INR Range:  1.8-2.5 -> lower INR goal dt h/o subcortical hemorrhage (2016)    She may be slower to reach steady state with warfarin dosing so consider checking INR about 10 days after dose adjustment. INR is 2.2 today  Continue taking dose of 5 mg on Mon, Wed, Fri, and Sat and 2.5 mg all other days. Asked pt to use paperwork to check off doses and bring back to next appt   Encouraged pt to pay attention to dosing in order to not miss doses.    Recheck INR in 4 weeks, 6/1    Referring cardiologist is Dr. Cira Ramirez  INR (no units)   Date Value   05/04/2021 2.2   04/20/2021 2.7   04/05/2021 2   03/29/2021 1.2   03/19/2021 1.41 (H)   02/19/2021 1.50 (H)   05/27/2020 1.75 (H)   05/16/2020 2.05 (H)     For Pharmacy Admin Tracking Only     Total # of Interventions Recommended: 0   Total # of Interventions Accepted: 0   Time Spent (min): 15

## 2021-06-02 ENCOUNTER — TELEPHONE (OUTPATIENT)
Dept: PHARMACY | Age: 71
End: 2021-06-02

## 2021-06-04 ENCOUNTER — ANTI-COAG VISIT (OUTPATIENT)
Dept: PHARMACY | Age: 71
End: 2021-06-04
Payer: MEDICARE

## 2021-06-04 DIAGNOSIS — I48.21 PERMANENT ATRIAL FIBRILLATION (HCC): Primary | ICD-10-CM

## 2021-06-04 LAB — INTERNATIONAL NORMALIZATION RATIO, POC: 1.9

## 2021-06-04 PROCEDURE — 85610 PROTHROMBIN TIME: CPT

## 2021-06-04 PROCEDURE — 99211 OFF/OP EST MAY X REQ PHY/QHP: CPT

## 2021-07-02 ENCOUNTER — ANTI-COAG VISIT (OUTPATIENT)
Dept: PHARMACY | Age: 71
End: 2021-07-02
Payer: MEDICARE

## 2021-07-02 DIAGNOSIS — I48.21 PERMANENT ATRIAL FIBRILLATION (HCC): Primary | ICD-10-CM

## 2021-07-02 LAB — INTERNATIONAL NORMALIZATION RATIO, POC: 1.9

## 2021-07-02 PROCEDURE — 85610 PROTHROMBIN TIME: CPT

## 2021-07-02 PROCEDURE — 99211 OFF/OP EST MAY X REQ PHY/QHP: CPT

## 2021-07-02 NOTE — PROGRESS NOTES
Ms. Dunia Mcintyre is a 72 y.o. y/o female with history of A fib   She presents today for anticoagulation monitoring and adjustment. 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)    Patient Reported Findings:  Yes     No   [x]   []       Patient verifies current dosing regimen as listed -pt states she takes 5mg on MWF and Sun and 2.5mg Tues Thurs and Sat   []   [x]       S/S bleeding/bruising/swelling/SOB -denies      []   [x]       Blood in urine or stool - denies   []   [x]       Procedures scheduled in the future at this time - Is being assessed for watchman, will keep notified    []   [x]       Missed Dose- denies   []   [x]       Extra Dose - denies    []   [x]       Change in medications She continues with Tylenol 1000mg but only as needed --> states that she will take up to q6h---> continues with 4 tylenol daily--> nifedipine increased to 90, spironolactone 50mg bid. hyzaar 100-25mg daily --> no changes   []   [x]       Change in health/diet/appetite-- normally has high vitamin K diet of canned spinach weekly and collards or broccoli about every other week. Will have some lower vitamin K veggies about 2 times a week such as green beans. -->  continues with no appetite, has had a small amount of diarrhea --> states that she has been eating liver twice a week, likely why INR has dropped. Asked patient to decrease to once a week --> states that she has not been eating as much. Stopped eating liver ---> no greens, no NVD--> 1/2 cup of greens yesterday, stopped eating liver---> no vit k in last 2 weeks --> no changes, no NVD---> no greens, wants to add greens in once/week, no NVD---> continues to eat salad once/week, no NVD---> states had more greens with holiday, no NVD---> no changes, no NVD  []   [x]       Change in alcohol use denies  []   [x]       Change in activity  []   [x]       Hospital admission-    [x]   []       Emergency department visit in ED 3/19 for leg pain.  INR 1.41. had forgotten meds. no acute injuries. referred to orthopedist. was given prednisone 40 mg x 3 days and norco x 3 days. []   [x]       Other complaints- states that she is using the pillbox, and she likes it. -> has not been using recently, but wants to restart ---> states she tried to use pillbox, asked to use pillbox and AVS and to cross off each day on AVS to prevent her from missing doses. Concerned for patient's ability to correctly remember recent history. She states that she is getting concerned for her memory. Clinical Outcomes:  Yes     No  []   [x]       Major bleeding event  []   [x]       Thromboembolic event  Duration of warfarin Therapy: indefinitely  INR Range:  1.8-2.5 -> lower INR goal dt h/o subcortical hemorrhage (2016)    She may be slower to reach steady state with warfarin dosing so consider checking INR about 10 days after dose adjustment. INR is 1.9 today   Take 5mg tomorrow then continue taking dose of 5 mg on Mon, Wed, Fri, and Sun and 2.5 mg all other days. Asked pt to use paperwork to check off doses and bring back to next appt   Encouraged pt to pay attention to dosing in order to not miss doses.    Recheck INR in 2 weeks, 7/16 before going out of town     Referring cardiologist is Dr. Pearl Black  INR (no units)   Date Value   07/02/2021 1.9   06/04/2021 1.9   05/04/2021 2.2   04/20/2021 2.7   03/19/2021 1.41 (H)   02/19/2021 1.50 (H)   05/27/2020 1.75 (H)   05/16/2020 2.05 (H)   For Pharmacy Admin Tracking Only     Intervention Detail: Dose Adjustment: 1, reason: Therapy Optimization   Total # of Interventions Recommended: 1   Total # of Interventions Accepted: 1   Time Spent (min): 15

## 2021-07-19 ENCOUNTER — TELEPHONE (OUTPATIENT)
Dept: PHARMACY | Age: 71
End: 2021-07-19

## 2021-07-20 ENCOUNTER — ANTI-COAG VISIT (OUTPATIENT)
Dept: PHARMACY | Age: 71
End: 2021-07-20
Payer: MEDICARE

## 2021-07-20 DIAGNOSIS — I48.21 PERMANENT ATRIAL FIBRILLATION (HCC): Primary | ICD-10-CM

## 2021-07-20 LAB — INTERNATIONAL NORMALIZATION RATIO, POC: 1.3

## 2021-07-20 PROCEDURE — 85610 PROTHROMBIN TIME: CPT

## 2021-07-20 PROCEDURE — 99211 OFF/OP EST MAY X REQ PHY/QHP: CPT

## 2021-07-20 NOTE — PROGRESS NOTES
pain. INR 1.41. had forgotten meds. no acute injuries. referred to orthopedist. was given prednisone 40 mg x 3 days and norco x 3 days. []   [x]       Other complaints- states that she is using the pillbox, and she likes it. -> has not been using recently, but wants to restart ---> states she tried to use pillbox, asked to use pillbox and AVS and to cross off each day on AVS to prevent her from missing doses. Concerned for patient's ability to correctly remember recent history. She states that she is getting concerned for her memory. Clinical Outcomes:  Yes     No  []   [x]       Major bleeding event  []   [x]       Thromboembolic event  Duration of warfarin Therapy: indefinitely  INR Range:  1.8-2.5 -> lower INR goal dt h/o subcortical hemorrhage (2016)    She may be slower to reach steady state with warfarin dosing so consider checking INR about 10 days after dose adjustment. INR is 1.3 today dt missed dose Sunday  Take 7.5mg tomorrow then continue taking dose of 5 mg on Mon, Wed, Fri, and Sun and 2.5 mg all other days. Asked pt to use paperwork to check off doses and bring back to next appt. Encouraged pt to pay attention to dosing in order to not miss doses.    Recheck INR in 1 week, 7/29- pt going out of town for the next week    Referring cardiologist is Dr. Brianne Morgan  INR (no units)   Date Value   07/20/2021 1.3   07/02/2021 1.9   06/04/2021 1.9   05/04/2021 2.2   03/19/2021 1.41 (H)   02/19/2021 1.50 (H)   05/27/2020 1.75 (H)   05/16/2020 2.05 (H)   For Pharmacy Admin Tracking Only     Intervention Detail: Dose Adjustment: 1, reason: Therapy Optimization   Total # of Interventions Recommended: 1   Total # of Interventions Accepted: 1   Time Spent (min): 15

## 2021-07-29 ENCOUNTER — ANTI-COAG VISIT (OUTPATIENT)
Dept: PHARMACY | Age: 71
End: 2021-07-29
Payer: MEDICARE

## 2021-07-29 DIAGNOSIS — I48.21 PERMANENT ATRIAL FIBRILLATION (HCC): Primary | ICD-10-CM

## 2021-07-29 LAB — INTERNATIONAL NORMALIZATION RATIO, POC: 1.7

## 2021-07-29 PROCEDURE — 85610 PROTHROMBIN TIME: CPT

## 2021-07-29 PROCEDURE — 99211 OFF/OP EST MAY X REQ PHY/QHP: CPT

## 2021-07-29 NOTE — PROGRESS NOTES
Ms. Dunia Mcintyre is a 72 y.o. y/o female with history of A fib   She presents today for anticoagulation monitoring and adjustment. 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)    Patient Reported Findings:  Yes     No   [x]   []       Patient verifies current dosing regimen as listed -pt states she takes 5mg on MWF and Sun and 2.5mg Tues Thurs and Sat   []   [x]       S/S bleeding/bruising/swelling/SOB -denies      []   [x]       Blood in urine or stool - denies   []   [x]       Procedures scheduled in the future at this time - Is being assessed for watchman, will keep notified    []   [x]       Missed Dose-  Denies   []   [x]       Extra Dose - denies    []   [x]       Change in medications She continues with Tylenol 1000mg but only as needed --> states that she will take up to q6h---> continues with 4 tylenol daily--> nifedipine increased to 90, spironolactone 50mg bid. hyzaar 100-25mg daily --> no changes   []   [x]       Change in health/diet/appetite-- normally has high vitamin K diet of canned spinach weekly and collards or broccoli about every other week. Will have some lower vitamin K veggies about 2 times a week such as green beans. -->  continues with no appetite, has had a small amount of diarrhea --> states that she has been eating liver twice a week, likely why INR has dropped. Asked patient to decrease to once a week --> states that she has not been eating as much. Stopped eating liver ---> no greens, no NVD--> 1/2 cup of greens yesterday, stopped eating liver---> no vit k in last 2 weeks --> no changes, no NVD---> no greens, wants to add greens in once/week, no NVD---> continues to eat salad once/week, no NVD---> states had more greens with holiday, no NVD---> no changes, no NVD---> no greens, no NVD  []   [x]       Change in alcohol use denies  []   [x]       Change in activity  []   [x]       Hospital admission-    [x]   []       Emergency department visit in ED 3/19 for leg pain.  INR 1.41. had forgotten meds. no acute injuries. referred to orthopedist. was given prednisone 40 mg x 3 days and norco x 3 days. []   [x]       Other complaints- states that she is using the pillbox, and she likes it. -> has not been using recently, but wants to restart ---> states she tried to use pillbox, asked to use pillbox and AVS and to cross off each day on AVS to prevent her from missing doses. Concerned for patient's ability to correctly remember recent history. She states that she is getting concerned for her memory. Clinical Outcomes:  Yes     No  []   [x]       Major bleeding event  []   [x]       Thromboembolic event  Duration of warfarin Therapy: indefinitely  INR Range:  1.8-2.5 -> lower INR goal dt h/o subcortical hemorrhage (2016)    She may be slower to reach steady state with warfarin dosing so consider checking INR about 10 days after dose adjustment. INR is 1.7 today   INR has been on the low side recently, will increase dose but see back sooner since goal range is 1.8-2.5. (last INR was 1.3 after 1 missed dose, two previous ones were 1.9). Pt already took today's dose so will not have her boost further. Increase dose to 2.5mg on Tues and Sat and 5mg all other days (9.1%). Asked pt to use paperwork to check off doses and bring back to next appt. Encouraged pt to pay attention to dosing in order to not miss doses.    Recheck INR in 10 days, 8/9      Referring cardiologist is Dr. Fernandez Walsh  INR (no units)   Date Value   07/29/2021 1.7   07/20/2021 1.3   07/02/2021 1.9   06/04/2021 1.9   03/19/2021 1.41 (H)   02/19/2021 1.50 (H)   05/27/2020 1.75 (H)   05/16/2020 2.05 (H)   For Pharmacy Admin Tracking Only     Intervention Detail: Dose Adjustment: 1, reason: Therapy Optimization   Total # of Interventions Recommended: 1   Total # of Interventions Accepted: 1   Time Spent (min): 15

## 2021-08-11 ENCOUNTER — APPOINTMENT (OUTPATIENT)
Dept: CT IMAGING | Age: 71
End: 2021-08-11
Payer: COMMERCIAL

## 2021-08-11 ENCOUNTER — HOSPITAL ENCOUNTER (EMERGENCY)
Age: 71
Discharge: ANOTHER HOSPITAL | End: 2021-08-12
Attending: EMERGENCY MEDICINE
Payer: COMMERCIAL

## 2021-08-11 DIAGNOSIS — S09.90XA INJURY OF HEAD, INITIAL ENCOUNTER: ICD-10-CM

## 2021-08-11 DIAGNOSIS — I62.00 SUBDURAL BLEEDING (HCC): Primary | ICD-10-CM

## 2021-08-11 DIAGNOSIS — Z79.01 ANTICOAGULATED: ICD-10-CM

## 2021-08-11 LAB
BASOPHILS ABSOLUTE: 0 K/UL (ref 0–0.2)
BASOPHILS RELATIVE PERCENT: 0.9 %
EOSINOPHILS ABSOLUTE: 0.1 K/UL (ref 0–0.6)
EOSINOPHILS RELATIVE PERCENT: 1.8 %
HCT VFR BLD CALC: 44.1 % (ref 36–48)
HEMOGLOBIN: 14.9 G/DL (ref 12–16)
INR BLD: 2.04 (ref 0.88–1.12)
LYMPHOCYTES ABSOLUTE: 2 K/UL (ref 1–5.1)
LYMPHOCYTES RELATIVE PERCENT: 39.6 %
MCH RBC QN AUTO: 29.9 PG (ref 26–34)
MCHC RBC AUTO-ENTMCNC: 33.7 G/DL (ref 31–36)
MCV RBC AUTO: 88.5 FL (ref 80–100)
MONOCYTES ABSOLUTE: 0.4 K/UL (ref 0–1.3)
MONOCYTES RELATIVE PERCENT: 8.2 %
NEUTROPHILS ABSOLUTE: 2.5 K/UL (ref 1.7–7.7)
NEUTROPHILS RELATIVE PERCENT: 49.5 %
PDW BLD-RTO: 13.2 % (ref 12.4–15.4)
PLATELET # BLD: 127 K/UL (ref 135–450)
PMV BLD AUTO: 10.6 FL (ref 5–10.5)
PROTHROMBIN TIME: 23.7 SEC (ref 9.9–12.7)
RBC # BLD: 4.99 M/UL (ref 4–5.2)
WBC # BLD: 5 K/UL (ref 4–11)

## 2021-08-11 PROCEDURE — 6360000002 HC RX W HCPCS: Performed by: PHYSICIAN ASSISTANT

## 2021-08-11 PROCEDURE — 96365 THER/PROPH/DIAG IV INF INIT: CPT

## 2021-08-11 PROCEDURE — 99285 EMERGENCY DEPT VISIT HI MDM: CPT

## 2021-08-11 PROCEDURE — 2500000003 HC RX 250 WO HCPCS: Performed by: EMERGENCY MEDICINE

## 2021-08-11 PROCEDURE — 85610 PROTHROMBIN TIME: CPT

## 2021-08-11 PROCEDURE — 96375 TX/PRO/DX INJ NEW DRUG ADDON: CPT

## 2021-08-11 PROCEDURE — 6370000000 HC RX 637 (ALT 250 FOR IP): Performed by: PHYSICIAN ASSISTANT

## 2021-08-11 PROCEDURE — 72125 CT NECK SPINE W/O DYE: CPT

## 2021-08-11 PROCEDURE — 96367 TX/PROPH/DG ADDL SEQ IV INF: CPT

## 2021-08-11 PROCEDURE — 85025 COMPLETE CBC W/AUTO DIFF WBC: CPT

## 2021-08-11 PROCEDURE — 70450 CT HEAD/BRAIN W/O DYE: CPT

## 2021-08-11 PROCEDURE — 2580000003 HC RX 258: Performed by: EMERGENCY MEDICINE

## 2021-08-11 RX ORDER — LABETALOL HYDROCHLORIDE 5 MG/ML
10 INJECTION, SOLUTION INTRAVENOUS ONCE
Status: COMPLETED | OUTPATIENT
Start: 2021-08-11 | End: 2021-08-11

## 2021-08-11 RX ORDER — ACETAMINOPHEN 325 MG/1
650 TABLET ORAL ONCE
Status: COMPLETED | OUTPATIENT
Start: 2021-08-11 | End: 2021-08-11

## 2021-08-11 RX ORDER — ONDANSETRON 2 MG/ML
4 INJECTION INTRAMUSCULAR; INTRAVENOUS ONCE
Status: COMPLETED | OUTPATIENT
Start: 2021-08-11 | End: 2021-08-11

## 2021-08-11 RX ORDER — NIFEDIPINE 10 MG/1
10 CAPSULE ORAL ONCE
Status: COMPLETED | OUTPATIENT
Start: 2021-08-11 | End: 2021-08-11

## 2021-08-11 RX ORDER — FENTANYL CITRATE 50 UG/ML
25 INJECTION, SOLUTION INTRAMUSCULAR; INTRAVENOUS ONCE
Status: COMPLETED | OUTPATIENT
Start: 2021-08-11 | End: 2021-08-11

## 2021-08-11 RX ADMIN — ACETAMINOPHEN 650 MG: 325 TABLET ORAL at 21:26

## 2021-08-11 RX ADMIN — NIFEDIPINE 10 MG: 10 CAPSULE ORAL at 22:26

## 2021-08-11 RX ADMIN — LABETALOL HYDROCHLORIDE 10 MG: 5 INJECTION INTRAVENOUS at 23:19

## 2021-08-11 RX ADMIN — FENTANYL CITRATE 25 MCG: 50 INJECTION, SOLUTION INTRAMUSCULAR; INTRAVENOUS at 23:55

## 2021-08-11 RX ADMIN — ONDANSETRON 4 MG: 2 INJECTION INTRAMUSCULAR; INTRAVENOUS at 23:54

## 2021-08-11 RX ADMIN — DEXTROSE MONOHYDRATE 5 MG/HR: 50 INJECTION, SOLUTION INTRAVENOUS at 23:32

## 2021-08-11 ASSESSMENT — PAIN SCALES - GENERAL
PAINLEVEL_OUTOF10: 8
PAINLEVEL_OUTOF10: 8
PAINLEVEL_OUTOF10: 7

## 2021-08-11 ASSESSMENT — PAIN DESCRIPTION - DESCRIPTORS: DESCRIPTORS: THROBBING

## 2021-08-11 ASSESSMENT — PAIN DESCRIPTION - LOCATION: LOCATION: HEAD

## 2021-08-12 ENCOUNTER — APPOINTMENT (OUTPATIENT)
Dept: CT IMAGING | Age: 71
DRG: 086 | End: 2021-08-12
Attending: INTERNAL MEDICINE
Payer: COMMERCIAL

## 2021-08-12 ENCOUNTER — HOSPITAL ENCOUNTER (INPATIENT)
Age: 71
LOS: 1 days | Discharge: HOME | DRG: 086 | End: 2021-08-12
Attending: INTERNAL MEDICINE | Admitting: INTERNAL MEDICINE
Payer: COMMERCIAL

## 2021-08-12 VITALS
RESPIRATION RATE: 15 BRPM | OXYGEN SATURATION: 93 % | HEART RATE: 60 BPM | DIASTOLIC BLOOD PRESSURE: 84 MMHG | WEIGHT: 163 LBS | SYSTOLIC BLOOD PRESSURE: 102 MMHG | TEMPERATURE: 98.3 F | BODY MASS INDEX: 25.58 KG/M2 | HEIGHT: 67 IN

## 2021-08-12 VITALS
SYSTOLIC BLOOD PRESSURE: 135 MMHG | DIASTOLIC BLOOD PRESSURE: 98 MMHG | BODY MASS INDEX: 26.97 KG/M2 | WEIGHT: 182.1 LBS | TEMPERATURE: 97.6 F | HEIGHT: 69 IN | HEART RATE: 69 BPM | RESPIRATION RATE: 18 BRPM | OXYGEN SATURATION: 99 %

## 2021-08-12 DIAGNOSIS — E87.6 HYPOKALEMIA: Primary | ICD-10-CM

## 2021-08-12 PROBLEM — S06.5XAA ACUTE SUBDURAL HEMATOMA (HCC): Status: ACTIVE | Noted: 2021-08-12

## 2021-08-12 LAB
ANION GAP SERPL CALCULATED.3IONS-SCNC: 9 MMOL/L (ref 3–16)
BUN BLDV-MCNC: 13 MG/DL (ref 7–20)
CALCIUM SERPL-MCNC: 8.9 MG/DL (ref 8.3–10.6)
CHLORIDE BLD-SCNC: 103 MMOL/L (ref 99–110)
CHOLESTEROL, TOTAL: 165 MG/DL (ref 0–199)
CO2: 25 MMOL/L (ref 21–32)
CREAT SERPL-MCNC: 1 MG/DL (ref 0.6–1.2)
GFR AFRICAN AMERICAN: >60
GFR NON-AFRICAN AMERICAN: 55
GLUCOSE BLD-MCNC: 101 MG/DL (ref 70–99)
HCT VFR BLD CALC: 36.9 % (ref 36–48)
HDLC SERPL-MCNC: 47 MG/DL (ref 40–60)
HEMOGLOBIN: 12.5 G/DL (ref 12–16)
INR BLD: 1.36 (ref 0.88–1.12)
LDL CHOLESTEROL CALCULATED: 102 MG/DL
MAGNESIUM: 1.8 MG/DL (ref 1.8–2.4)
MCH RBC QN AUTO: 30.5 PG (ref 26–34)
MCHC RBC AUTO-ENTMCNC: 33.7 G/DL (ref 31–36)
MCV RBC AUTO: 90.4 FL (ref 80–100)
PDW BLD-RTO: 13.4 % (ref 12.4–15.4)
PLATELET # BLD: 102 K/UL (ref 135–450)
PMV BLD AUTO: 10.6 FL (ref 5–10.5)
POTASSIUM SERPL-SCNC: 2.9 MMOL/L (ref 3.5–5.1)
PROTHROMBIN TIME: 15.6 SEC (ref 9.9–12.7)
RBC # BLD: 4.08 M/UL (ref 4–5.2)
SODIUM BLD-SCNC: 137 MMOL/L (ref 136–145)
TRIGL SERPL-MCNC: 79 MG/DL (ref 0–150)
VLDLC SERPL CALC-MCNC: 16 MG/DL
WBC # BLD: 4.3 K/UL (ref 4–11)

## 2021-08-12 PROCEDURE — 85027 COMPLETE CBC AUTOMATED: CPT

## 2021-08-12 PROCEDURE — 36415 COLL VENOUS BLD VENIPUNCTURE: CPT

## 2021-08-12 PROCEDURE — 6360000002 HC RX W HCPCS: Performed by: STUDENT IN AN ORGANIZED HEALTH CARE EDUCATION/TRAINING PROGRAM

## 2021-08-12 PROCEDURE — 70450 CT HEAD/BRAIN W/O DYE: CPT

## 2021-08-12 PROCEDURE — 6370000000 HC RX 637 (ALT 250 FOR IP): Performed by: STUDENT IN AN ORGANIZED HEALTH CARE EDUCATION/TRAINING PROGRAM

## 2021-08-12 PROCEDURE — 92610 EVALUATE SWALLOWING FUNCTION: CPT | Performed by: SPEECH-LANGUAGE PATHOLOGIST

## 2021-08-12 PROCEDURE — 2000000000 HC ICU R&B

## 2021-08-12 PROCEDURE — 97530 THERAPEUTIC ACTIVITIES: CPT

## 2021-08-12 PROCEDURE — 97166 OT EVAL MOD COMPLEX 45 MIN: CPT

## 2021-08-12 PROCEDURE — 85610 PROTHROMBIN TIME: CPT

## 2021-08-12 PROCEDURE — 83036 HEMOGLOBIN GLYCOSYLATED A1C: CPT

## 2021-08-12 PROCEDURE — 80048 BASIC METABOLIC PNL TOTAL CA: CPT

## 2021-08-12 PROCEDURE — 2580000003 HC RX 258: Performed by: STUDENT IN AN ORGANIZED HEALTH CARE EDUCATION/TRAINING PROGRAM

## 2021-08-12 PROCEDURE — 2580000003 HC RX 258: Performed by: PHYSICIAN ASSISTANT

## 2021-08-12 PROCEDURE — 83735 ASSAY OF MAGNESIUM: CPT

## 2021-08-12 PROCEDURE — 80061 LIPID PANEL: CPT

## 2021-08-12 PROCEDURE — 97161 PT EVAL LOW COMPLEX 20 MIN: CPT

## 2021-08-12 PROCEDURE — 99223 1ST HOSP IP/OBS HIGH 75: CPT | Performed by: INTERNAL MEDICINE

## 2021-08-12 PROCEDURE — 6360000002 HC RX W HCPCS: Performed by: PHYSICIAN ASSISTANT

## 2021-08-12 PROCEDURE — 97535 SELF CARE MNGMENT TRAINING: CPT

## 2021-08-12 PROCEDURE — 97116 GAIT TRAINING THERAPY: CPT

## 2021-08-12 PROCEDURE — 2500000003 HC RX 250 WO HCPCS: Performed by: STUDENT IN AN ORGANIZED HEALTH CARE EDUCATION/TRAINING PROGRAM

## 2021-08-12 RX ORDER — POTASSIUM CHLORIDE 29.8 MG/ML
20 INJECTION INTRAVENOUS ONCE
Status: DISCONTINUED | OUTPATIENT
Start: 2021-08-12 | End: 2021-08-12

## 2021-08-12 RX ORDER — ATORVASTATIN CALCIUM 80 MG/1
80 TABLET, FILM COATED ORAL DAILY
Status: DISCONTINUED | OUTPATIENT
Start: 2021-08-12 | End: 2021-08-12 | Stop reason: HOSPADM

## 2021-08-12 RX ORDER — ONDANSETRON 2 MG/ML
4 INJECTION INTRAMUSCULAR; INTRAVENOUS EVERY 6 HOURS PRN
Status: DISCONTINUED | OUTPATIENT
Start: 2021-08-12 | End: 2021-08-12 | Stop reason: HOSPADM

## 2021-08-12 RX ORDER — SPIRONOLACTONE 50 MG/1
50 TABLET, FILM COATED ORAL 2 TIMES DAILY
Status: DISCONTINUED | OUTPATIENT
Start: 2021-08-12 | End: 2021-08-12

## 2021-08-12 RX ORDER — POTASSIUM CHLORIDE 20 MEQ/1
20 TABLET, EXTENDED RELEASE ORAL 2 TIMES DAILY WITH MEALS
Status: DISCONTINUED | OUTPATIENT
Start: 2021-08-12 | End: 2021-08-12

## 2021-08-12 RX ORDER — ACETAMINOPHEN 325 MG/1
650 TABLET ORAL EVERY 4 HOURS PRN
Status: DISCONTINUED | OUTPATIENT
Start: 2021-08-12 | End: 2021-08-12 | Stop reason: HOSPADM

## 2021-08-12 RX ORDER — SODIUM CHLORIDE 0.9 % (FLUSH) 0.9 %
5-40 SYRINGE (ML) INJECTION PRN
Status: DISCONTINUED | OUTPATIENT
Start: 2021-08-12 | End: 2021-08-12 | Stop reason: HOSPADM

## 2021-08-12 RX ORDER — POTASSIUM CHLORIDE 7.45 MG/ML
10 INJECTION INTRAVENOUS
Status: DISCONTINUED | OUTPATIENT
Start: 2021-08-12 | End: 2021-08-12 | Stop reason: SDUPTHER

## 2021-08-12 RX ORDER — NIFEDIPINE 30 MG/1
90 TABLET, FILM COATED, EXTENDED RELEASE ORAL DAILY
Status: DISCONTINUED | OUTPATIENT
Start: 2021-08-12 | End: 2021-08-12

## 2021-08-12 RX ORDER — SODIUM CHLORIDE 9 MG/ML
25 INJECTION, SOLUTION INTRAVENOUS PRN
Status: DISCONTINUED | OUTPATIENT
Start: 2021-08-12 | End: 2021-08-12 | Stop reason: HOSPADM

## 2021-08-12 RX ORDER — SODIUM CHLORIDE 9 MG/ML
50 INJECTION, SOLUTION INTRAVENOUS ONCE
Status: DISCONTINUED | OUTPATIENT
Start: 2021-08-12 | End: 2021-08-12 | Stop reason: HOSPADM

## 2021-08-12 RX ORDER — SODIUM CHLORIDE 0.9 % (FLUSH) 0.9 %
5-40 SYRINGE (ML) INJECTION EVERY 12 HOURS SCHEDULED
Status: DISCONTINUED | OUTPATIENT
Start: 2021-08-12 | End: 2021-08-12 | Stop reason: HOSPADM

## 2021-08-12 RX ORDER — ONDANSETRON 4 MG/1
4 TABLET, ORALLY DISINTEGRATING ORAL EVERY 8 HOURS PRN
Status: DISCONTINUED | OUTPATIENT
Start: 2021-08-12 | End: 2021-08-12 | Stop reason: HOSPADM

## 2021-08-12 RX ADMIN — PROTHROMBIN, COAGULATION FACTOR VII HUMAN, COAGULATION FACTOR IX HUMAN, COAGULATION FACTOR X HUMAN, PROTEIN C, PROTEIN S HUMAN, AND WATER 1500 UNITS: KIT at 00:51

## 2021-08-12 RX ADMIN — PHYTONADIONE 5 MG: 10 INJECTION, EMULSION INTRAMUSCULAR; INTRAVENOUS; SUBCUTANEOUS at 00:01

## 2021-08-12 RX ADMIN — FAMOTIDINE 20 MG: 10 INJECTION, SOLUTION INTRAVENOUS at 08:57

## 2021-08-12 RX ADMIN — POTASSIUM CHLORIDE 10 MEQ: 2 INJECTION, SOLUTION, CONCENTRATE INTRAVENOUS at 08:22

## 2021-08-12 RX ADMIN — ATORVASTATIN CALCIUM 80 MG: 80 TABLET, FILM COATED ORAL at 10:27

## 2021-08-12 RX ADMIN — POTASSIUM CHLORIDE 10 MEQ: 2 INJECTION, SOLUTION, CONCENTRATE INTRAVENOUS at 10:26

## 2021-08-12 RX ADMIN — Medication 10 ML: at 09:27

## 2021-08-12 RX ADMIN — POTASSIUM CHLORIDE 10 MEQ: 2 INJECTION, SOLUTION, CONCENTRATE INTRAVENOUS at 09:26

## 2021-08-12 RX ADMIN — POTASSIUM BICARBONATE 40 MEQ: 782 TABLET, EFFERVESCENT ORAL at 07:42

## 2021-08-12 ASSESSMENT — PAIN SCALES - GENERAL
PAINLEVEL_OUTOF10: 8
PAINLEVEL_OUTOF10: 0
PAINLEVEL_OUTOF10: 0

## 2021-08-12 ASSESSMENT — PAIN DESCRIPTION - LOCATION: LOCATION: HEAD

## 2021-08-12 ASSESSMENT — ENCOUNTER SYMPTOMS
CONSTIPATION: 0
SHORTNESS OF BREATH: 0
VOMITING: 0
COUGH: 0
NAUSEA: 0
NAUSEA: 0
ABDOMINAL PAIN: 0
BACK PAIN: 0
DIARRHEA: 0
SHORTNESS OF BREATH: 0
ABDOMINAL DISTENTION: 0
VOMITING: 0
PHOTOPHOBIA: 0
ABDOMINAL PAIN: 0
CHEST TIGHTNESS: 0

## 2021-08-12 ASSESSMENT — PAIN DESCRIPTION - PAIN TYPE: TYPE: ACUTE PAIN

## 2021-08-12 ASSESSMENT — PAIN DESCRIPTION - FREQUENCY: FREQUENCY: CONTINUOUS

## 2021-08-12 ASSESSMENT — PAIN DESCRIPTION - ORIENTATION: ORIENTATION: ANTERIOR

## 2021-08-12 ASSESSMENT — PAIN DESCRIPTION - DESCRIPTORS: DESCRIPTORS: HEADACHE

## 2021-08-12 NOTE — ED NOTES
Bed: 01  Expected date:   Expected time:   Means of arrival:   Comments:  Sarah Jaquez RN  08/11/21 9293

## 2021-08-12 NOTE — CARE COORDINATION
Case Management Assessment            Discharge Note                    Date / Time of Note: 8/12/2021 2:09 PM                  Discharge Note Completed by: Ferdinand Blanca RN     Patient is from home and lives with her Ragamal Nava who will be there for for about another week before returning to school. They live in a single level home with 1 step to enter. She has all DME she needs and denies needs for home care. Her family is able to transport her to home. Patient Name: Harika Painting   YOB: 1950  Diagnosis: Acute subdural hematoma ([facility] [address] [S06.5X9A]   Date / Time: 8/12/2021  3:20 AM    Current PCP: Liz Mireles MD  Clinic patient: No    Hospitalization in the last 30 days: No    Advance Directives:  Code Status: [address] DNR form completed and on chart: Yes    Financial:  Payor: Rosalba Multani / Plan: Etheleen Balloon / Product Type: *No Product type* /      Pharmacy:    [facility] #[id number] - [address] Conor Dumont [address] [place] [id number]  Phone: [phone] Fax: [address] [phone]  f [phone]  [address] [place] [address]  Phone: [phone] Fax: [phone]  [address] [facility] [address] [facility] [phone] - f [phone]  [address] [facility], [address] [place] [address] [address]  [facility] [address]  Phone: [phone] Fax: [address], [address] [address] [phone] [facility] [phone] 1720 [facility] [address]  Phone: [phone] Fax: [phone]      Assistance purchasing medications?:    Assistance provided by Case Management: None at this time    Does patient want to participate in local refill/ meds to beds program?:      Meds To Beds General Rules:  1.  Can ONLY be done Monday- Friday between 8: 30am-5pm  2. Prescription(s) must be in pharmacy by 3pm to be filled same day  3. Copy of patient's insurance/ prescription drug card and patient face sheet must be sent along with the prescription(s)  4. Cost of Rx cannot be added to hospital bill. If financial assistance is needed, please contact unit  or ;  or  CANNOT provide pharmacy voucher for patients co-pays  5. Patients can then  the prescription on their way out of the hospital at discharge, or pharmacy can deliver to the bedside if staff is available. (payment due at time of pick-up or delivery - cash, check, or card accepted)     Able to afford home medications/ co-pay costs: Yes    ADLS:  Current PT AM-PAC Score: 18 /24  Current OT AM-PAC Score: 22 /24      DISCHARGE Disposition: Home- No Services Needed    LOC at discharge: Not Applicable  KRIS Completed: Not Indicated    Notification completed in HENS/PAS?:  Not Applicable    IMM Completed:   Not Indicated    Transportation:  Transportation PLAN for discharge: family   Mode of Transport: Not Applicable  Reason for medical transport: Not Applicable  Name of [address] [place] [address],[facility] [address]: Not Applicable    The Plan for Transition of Care is related to the following treatment goals of Acute subd[facility] hematoma ([facility] [S06.5X9A]    The Patient and/or patient representative Doreen Rico and her family were provided with a choice of provider and agrees with the discharge plan Not Indicated    Freedom of choice list was provided with basic dialogue that supports the patient's individualized plan of care/goals and shares the quality data associated with the providers.  Not Indicated    Care Transitions patient: No    Pipo Murillo RN  [facility] [employer]  Case Management Department  Ph: [phone]  Fax: [phone]

## 2021-08-12 NOTE — PROGRESS NOTES
in to see pt. Plan of care discussed. Will plan for discharge later today. Will continue to monitor.

## 2021-08-12 NOTE — CONSULTS
NEUROSURGERY CONSULT  Leonel Martinez  [id number]   1950 8/12/2021    Requesting physician: Loi Prado DO    Reason for consultation: SDH    History of present illness: Patient is a 73 yo F with a PMH of afib on warfarin, HTN, Bell's Palsy (15 years ago), CAD w/ stents (on aspirin) who presented to [facility] ED following head trauma on the evening of Tuesday 8/10/21. Patient states she hit her head Tuesday night when a pull up bar fell off the door and hit her head. She reports she had a scalp laceration that continued to bleed the next day. She had a mild headache but no changes in vision, no weakness, no numbness/tingling or difficulty speaking. She was urged to come to the ED for evaluation by her family members because she is on anticoagulation. Initial head CT revealed tiny 2-3 mm R frontoparietal hyperdensity concerning for acute SDH. INR was 2.04. Patient was given vitamin and transferred She was to [facility] for neurosurgical evaluation. On assessment today, she continues to report a headache, in r occipital region, she has no new focal neurological deficits.     ROS:   GENERAL:  Denies fever or recent illness. Denies weight changes   EYES:  Denies vision change or diplopia  EARS:  Denies hearing loss  CARDIAC:  Denies chest pain  RESPIRATORY:  Denies shortness of breath  SKIN:  Denies rash or lesions   HEM:  Denies excessive bruising  PSYCH:  Denies anxiety or depression  NEURO:  + mild headache, + chronic R facial weakness from [place] Palsy, denies numbness or tingling or lateralizing weakness   :  Denies urinary difficulty  GI: Denies nausea, vomiting, diarrhea or constipation  MUSCULOSKELETAL:  No arthralgias    Allergies   Allergen Reactions    Clonidine Rash, Shortness Of Breath and Hives    Codeine Hives, Itching, Nausea Only and Rash     Other reaction(s):  Other (See Comments)  Pruritis    Lisinopril Hives and Itching    Tramadol     Percocet [Oxycodone-Acetaminophen] Itching Patient takes percocet with benadryl to control the itching       Past Medical History:   Diagnosis Date    Acute cerebrovascular accident (CVA) ([facility] [address] 1/28/2020    Atrial fibrillation ([facility] [address] [facility]     Bell palsy     diagnosed 15 years ago   [facility] CAD (coronary artery disease)     Cerebral artery occlusion with cerebral infarction ([facility] [address]     Chronic diastolic CHF (congestive heart failure) ([facility] [address] 5/16/2020    CVA (cerebral vascular accident) ([facility] [address] 1/4/2017    Hypertension     Intracranial hemorrhage ([address] 4/2016    Nonintractable headache     Nontraumatic subcortical hemorrhage of cerebral hemisphere ([facility] [address] 4/30/2016    Sudden visual loss of left eye     Thyroid nodule 2/8/2018    TIA involving right internal carotid artery         Past Surgical History:   Procedure Laterality Date    CARDIAC SURGERY      COLONOSCOPY      CORONARY ANGIOPLASTY WITH STENT PLACEMENT      TUBAL LIGATION Right Torn Rotator Cuff    2013 @ [address] History     Occupational History    Not on file   Tobacco Use    Smoking status: Never Smoker    Smokeless tobacco: Never Used   Vaping Use    Vaping Use: Never used   Substance and Sexual Activity    Alcohol use: No    Drug use: No    Sexual activity: Yes     Partners: Male        No family history on file. No outpatient medications have been marked as taking for the 8/12/21 encounter [facility] Encounter).       Current Facility-Administered Medications   Medication Dose Route Frequency Provider Last Rate Last Admin    atorvastatin (LIPITOR) tablet 80 mg  80 mg Oral Daily Paola Hernandez MD        NIFEdipine (ADALAT CC) extended release tablet 90 mg  90 mg Oral Daily Paola Hernandez MD        spironolactone (ALDACTONE) tablet 50 mg  50 mg Oral BID Juan Francisco Marte MD        sodium chloride flush 0.9 % injection 5-40 mL  5-40 mL Intravenous 2 times per day Juan Francisco Marte MD        sodium chloride flush 0.9 % injection 5-40 mL  5-40 mL Intravenous PRN Abraham Delatorre MD        0.9 % sodium chloride infusion  25 mL Intravenous PRN Abraham Delatorre MD        acetaminophen (TYLENOL) tablet 650 mg  650 mg Oral Q4H PRN Abraham Delatorre MD        ondansetron (ZOFRAN-ODT) disintegrating tablet 4 mg  4 mg Oral Q8H PRN Abraham Delatorre MD        Or    ondansetron [facility]) injection 4 mg  4 mg Intravenous Q6H PRN Abraham Delatorre MD        levETIRAcetam (KEPPRA) 500 mg in sodium chloride 0.9 % 100 mL IVPB  500 mg Intravenous Q12H Abraham Delatorre MD        famotidine (PEPCID) injection 20 mg  20 mg Intravenous BID Abraham Delatorre MD        potassium bicarb-citric acid (EFFER-K) effervescent tablet 40 mEq  40 mEq Oral Once Radha Brooks MD        potassium chloride 10 mEq/100 mL IVPB (Peripheral Line)  10 mEq Intravenous Q1H Radha Brooks MD          Objective:  BP (!) 103/51   Pulse 64   Temp 98.2 °F (36.8 °C) (Oral)   Resp 14   Ht 5' 9\" (1.753 m)   Wt 182 lb 1.6 oz (82.6 kg)   SpO2 98%   BMI 26.89 kg/m²     Physical Exam:  Patient seen and examined   General: Well developed. Alert and cooperative in no acute distress. HENT: atraumatic, neck supple  Eyes: Optic discs: Not tested  Pulmonary: unlabored respiratory effort  Cardiovascular:  Warm well perfused.  No peripheral edema  Gastrointestinal: abdomen soft, NT, ND    Neurologic Exam:  Neurological:  Mental Status: Awake, alert, oriented x 4, speech clear and appropriate  Attention: Intact  Language: No aphasia or dysarthria noted  Sensation: Intact to all extremities to light touch  Coordination: Intact    Cranial Nerves:  Cranial Nerves:  II: Visual acuity not tested, denies new visual changes / diplopia  III, IV, VI: PERRL, 3 mm bilaterally, EOMI, R eye ptosis   V: chronic right facial weakness   VII: Face symmetric  VIII: Hearing intact bilaterally to spoken voice  IX: Palate movement equal bilaterally  XI: Shoulder shrug equal bilaterally  XII: Tongue midline    Musculoskeletal:   Gait: Not tested   Assist devices: None   Tone: normal   Motor strength:    Right  Left    Right  Left    Deltoid  5 5  Hip Flex  5 5   Biceps  5 5  Knee Extensors  5 5   Triceps  5 5  Knee Flexors  5 5   Wrist Ext  5 5  Ankle Dorsiflex. 5 5   Wrist Flex  5 5  Ankle Plantarflex. 5 5   Handgrip  5 5  Ext Eladio Longus  5 5   Thumb Ext  5 5         Radiological Findings:  CT head wo contrast  8/11/21 2156  Tiny 2-3 mm acute subdural hematoma along the right frontoparietal region which is not causing any significant mass effect. Recommend follow-up to assure stability   Mild atrophy and moderate chronic microischemic disease throughout the deep white matter which is unchanged   Moderate scalp hematoma along the vertex of the skull extending along the right parietal region posteriorly with no fracture seen. CT head without contrast   Final Result     Previously identified 2 mm linear hyperdensity over right    frontoparietal lobe is poorly visualized on current study, can be a dural    vein rather than subdural hemorrhage.               Labs  Recent Labs     08/12/21  0626         CO2 25   BUN 13   CREATININE 1.0   GLUCOSE 101*     Recent Labs     08/11/21  2310 08/12/21  0626   WBC 5.0 4.3   RBC 4.99 4.08   INR 2.04* 1.36*       Patient Active Problem List    Diagnosis Date Noted    Acute subdural hematoma (HCC) 08/12/2021    Hypomagnesemia 02/25/2021    Hypokalemia 02/25/2021    Essential hypertension 10/01/2020    Bradycardia 05/15/2020    Chronic heart failure with preserved ejection fraction ([facility] [address] 02/03/2020    Thyroid nodule 02/08/2018    LVH (left ventricular hypertrophy) 11/18/2016    Coronary artery disease involving native coronary artery of native heart without angina pectoris 10/26/2015    Chest pain 09/21/2015    Uncontrolled hypertension 09/21/2015    Permanent atrial fibrillation ([address] 02/13/2015       Assessment:  73 yo

## 2021-08-12 NOTE — PROGRESS NOTES
Clinical Pharmacy Consult Note    72 y.o. female admitted with a subdural hematoma 2/2 head trauma. Pharmacy has been asked by Dr. Fernando Forte to adjust all drips to normal saline as appropriate based on compatibility to avoid fluid shifts since D5 is osmotically active. The following intermittent IV drips/infusions have been adjusted to saline:  Keppra 500 mg q12h    The following medications must remain in D5W due to incompatibility with normal saline:  Amphotericin  Mycophenolate  Nitroprusside  Penicillin G Potassium    Please be aware that patient has D5W ordered as part of hypoglycemia orderset. Total IV fluid delivered to patient over last 24h: NA    [facility] will follow daily to ensure all new IVPBs + drips are in NS. Thanks for consulting pharmacy! Please call with questions [address]   Pharmacy Resident   8/12/2021 6:36 AM

## 2021-08-12 NOTE — PROGRESS NOTES
Speech Language Pathology    Orders received, chart reviewed, d/w RN Ella Rivera. Pt has not been cleared by NS yet; will see later this date as pt available and schedule allows.     Thank you,    Lexii Davis) [facility], [place]; [phone]  Speech-Language Pathologist  Pager #: [phone]

## 2021-08-12 NOTE — PLAN OF CARE
Bedside swallow evaluation completed. Please refer to EMR.     Thank you,    Flaca Enamorado) [place], [place], [address] [facility]; [id number]  Speech-Language Pathologist

## 2021-08-12 NOTE — DISCHARGE SUMMARY
Hospital Discharge Summary    Patient's PCP: Addie Guthrie MD  Admit Date: 8/12/2021   Discharge Date: 8/12/2021    Admitting Physician: Dr. Cody Guzman DO  Discharge Physician: Dr. Kait Murphy MD   Consults: cardiology and Neurosurgery    HPI: 72 yoF with a PMHx of afib on warfarin, HTN, ICH in 2018, Bell's Palsy (15 years ago), CAD who presented to [place] ED following head trauma a day prior to presentation. Brief hospital course:  Given the concern of the patients presentation and the concern of the possible multi-factorial etiology contributing to patients symptomatology. Patient was admitted and evaluated and found to have:    Discharge Diagnoses:         Head trauma  Initial CT: CT head w/o contrast showed Tiny 2-3 mm acute subdural hematoma along the right frontoparietal region which is not causing any significant mass effect. CT cervical spine with no acute abnormalities. Dr. Terrence Moran was notified. No neuro sx intervention at this time. - F/u CT/ Stability scan 6 hours later:   Previously identified 2 mm linear hyperdensity over right    frontoparietal lobe is poorly visualized on current study, can be a dural    vein rather than subdural hemorrhage.      - - Resume if OK with neurosurgery: Hold all anticoagulation & antiplatelet for 2 weeks.  Follow up with PCP in 2 weeks prior to resuming warfarin and aspirin   - Curently rate controlled  - Resume diet  - PT/OT eval  - Per neurosurgery, ok to DC        Hypokalemia  - Replenish and monitor  - Replenish Mg if low  - O/p Repeat in next few days with PCP           Afib on warfarin at home  [id number] Score for Atrial Fibrillation Stroke Risk    Risk   Factors   Component Value   C CHF Yes 1   H HTN Yes 1   A2 Age >= 75 No,  (76 y.o.) 0   D DM No 0   S2 Prior Stroke/TIA No 0   V Vascular Disease No 0   A Age 74-69 Yes,  (76 y.o.) 1   Sc Sex female 1     [id number]  Score   4   Score last updated 8/12/21 3:91 AM EDT     Click here for a link to the UpToDate guideline \"Atrial Fibrillation: Anticoagulation therapy to prevent embolization     Disclaimer: Risk Score calculation is dependent on accuracy of patient problem list and past encounter diagnosis. - Warfarin was held d/t concern for bleed   - Resume if OK with neurosurgery: Hold all anticoagulation & antiplatelet for 2 weeks. Follow up with PCP in 2 weeks prior to resuming warfarin and aspirin   - Curently rate controlled  - Replenish electrolytes.        HTN  - Continue home nifedipine and spironolactone,  losartan-HCTZ  - Satisfactory Bps        CHF with preserved ejection fraction  Last echo was 3/2019. EF of 60%  - Continue spironolactone, lasix  - No exacerbation        CAD  PCI to LAD in 2000   - Continue home atorvastatin; ASA     HLD  - Continue home atorvastatin     GERD  - Continue home pepcid                Physical Exam: BP (!) 135/98   Pulse 69   Temp 97.6 °F (36.4 °C) (Oral)   Resp 14   Ht 5' 9\" (1.753 m)   Wt 182 lb 1.6 oz (82.6 kg)   SpO2 100%   BMI 26.89 kg/m²     No results for input(s): POCGLU in the last 72 hours.     General appearance: alert, appears stated age and cooperative  Head: Normocephalic, without obvious abnormality, atraumatic  Neck: no adenopathy, no carotid bruit, no JVD, supple, symmetrical, trachea midline and thyroid not enlarged, symmetric, no tenderness/mass/nodules  Heart: regular rate and rhythm, S1, S2 normal, no murmur, click, rub or gallop  Abdomen: soft, non-tender; bowel sounds normal; no masses,  no organomegaly  Extremities: extremities normal, atraumatic, no cyanosis or edema  Pulses: 2+ and symmetric  Neurologic: Grossly normal      LABS:  Recent Labs     08/12/21  0626   WBC 4.3   HGB 12.5   *      Recent Labs     08/12/21  0626      K 2.9*      CO2 25   BUN 13   CREATININE 1.0   GLUCOSE 101*     Recent Labs     08/11/21  2310 08/12/21  0626   INR 2.04* 1.36*           Discharge Medications:   Oriana Vijaya, [address] Medication Instructions AIA:[id number]    Printed on:08/12/21 8480   Medication Information                      acetaminophen (TYLENOL) 500 MG tablet  Take 2 tablets by mouth every 6 hours as needed for Pain             aspirin 81 MG EC tablet  Take 1 tablet by mouth daily             atorvastatin (LIPITOR) 80 MG tablet  Take 1 tablet by mouth daily             Calcium Carb-Cholecalciferol (CALCIUM + D3) 600-200 MG-UNIT TABS tablet  Take 1 tablet by mouth 2 times daily             famotidine (PEPCID) 20 MG tablet  Take 1 tablet by mouth 2 times daily             furosemide (LASIX) 20 MG tablet  TAKE 1 TABLET BY MOUTH TWICE DAILY             losartan-hydroCHLOROthiazide (HYZAAR) 100-25 MG per tablet  Take 1 tablet by mouth daily             Magnesium 500 MG CAPS  Take 400 mg by mouth daily             NIFEdipine (PROCARDIA XL) 90 MG extended release tablet  Take 1 tablet by mouth daily             potassium chloride (KLOR-CON M) 20 MEQ extended release tablet  Take 0.5 tablets by mouth daily             spironolactone (ALDACTONE) 50 MG tablet  Take 1 tablet by mouth 2 times daily TAKE 1 TABLET BY MOUTH DAILY             warfarin (COUMADIN) 5 MG tablet  Take 1 tablet by mouth daily Every day. Wednesday 2.5. Activity: activity as tolerated  Diet: cardiac diet  Wound Care: none needed    Disposition: home  Discharged Condition: Stable  Follow Up: Primary Care Physician in 1-2 weeks    Total time spent on discharge, finalizing medications, referrals and arranging outpatient follow up was more than 45 minutes    Thank you Dr. Manuel Acharya MD for the opportunity to be involved in this patients care. If you have any questions or concerns please feel free to contact me at [phone].

## 2021-08-12 NOTE — PROGRESS NOTES
[facility]  Cardiology Inpatient Consult Service                                                                                          Pt Name: Dottie Banda  Age: 72 y.o. Sex: female  : 1950  Location: [address] [place] [address]    Referring Physician: Umu Rangel MD      Reason for Consult:       Reason for Consultation/Chief Complaint: Afib, small subdural hematoma       HPI:      Dottie Banda is a 72 y.o. female with a past medical history of CAD, HTN, subcortical hemorrage (), afib on warfarin who was transfered from [place] [facility]  to the hospital due to incidental head trauma and related headache which progresivelly increased in intensity. Following the trauma, she reports no loss of consciousness, confusion, blurry vision, weakness or numbness. Patient was admitted for management of small subdural hematoma (2-3mm)  in the right frontoparietal region with no significant mass efect. Patient GCS at admission was 15 with no sensorimotor deficit. INR 2.04. Neurosx was consulted with no intervetion indicated at this time. Patient was hypertensive on admission (180/100) and received labetalol and nicardipine infusion with subsequent drop in BP and stop of nicardipine. Patient also received vitamin K for reversal on anticoagulation. Today the patient is stable hemodynamically and afebrile. Patient denies any exertional chest pain, dyspnea, palpitations, syncope, orthopnea, edema or paroxysmal nocturnal dyspnea. The patient denies any symptoms of neurological impairment or TIA's; no amaurosis, diplopia, dysphasia, or unilateral disturbance of motor or sensory function. No loss of balance or vertigo. INR 1.36. [id number] Score = 4. HASBLED score (4 with high risk of major bleeding).     [id number] Score for Atrial Fibrillation Stroke Risk   Risk   Factors  Component Value   C CHF Yes 1   H HTN Yes 1   A2 Age >= 75 No,  (76 y.o.) 0   D DM No 0   S2 Prior Stroke/TIA No 0 V Vascular Disease No 0   A Age 74-69 Yes,  (76 y.o.) 1   Sc Sex female 1    [id number]  Score  4   Score last updated 8/12/21 2:04 AM EDT    Click here for a link to the UpToDate guideline \"Atrial Fibrillation: Anticoagulation therapy to prevent embolization    Disclaimer: Risk Score calculation is dependent on accuracy of patient problem list and past encounter diagnosis. HAS-BLED Score                            Risk Factors      Points   Hypertension  Uncontrolled, >674 mmHg systolic   No=0   Renal disease  Dialysis, transplant, Cr>2.26 mg/dL or >200 µmol/L   No=0   Liver disease   Cirrhosis or bilirubin >2x normal with AST/ALT/AP >3x normal   No=0   Stroke history   No=0   Prior major bleeding or predisposition to bleeding     Yes=1   Labile  INR  Unstable/high INRs, time in therapeutic range <60%   No=0   Age >71   Yes=1   Medication usage predisposing to bleeding   Aspirin, clopidogrel, NSAIDs   Yes=1   Alcohol use   >7 drinks/week   No=0       HAS-BLED Score:    3:  Risk was 5.8% in one validation study and 3.72 bleeds per 100 patient-years in another validation study. Histories     Past Medical History:   has a past medical history of Acute cerebrovascular accident (CVA) ([address] Atrial fibrillation ([address] Bell palsy, CAD (coronary artery disease), Cerebral artery occlusion with cerebral infarction ([address] Chronic diastolic CHF (congestive heart failure) ([address] CVA (cerebral vascular accident) ([address] Hypertension, Intracranial hemorrhage ([address] Nonintractable headache, Nontraumatic subcortical hemorrhage of cerebral hemisphere ([address] Sudden visual loss of left eye, Thyroid nodule, and TIA involving right internal carotid artery. Surgical History:   has a past surgical history that includes Cardiac surgery; Tubal ligation (Right, Torn Rotator Cuff); Coronary angioplasty with stent; and Colonoscopy. Social History:   reports that she has never smoked.  She has never used smokeless tobacco. She reports that she does not drink alcohol and does not use drugs. Family History:  No evidence for sudden cardiac death or premature CAD      Medications:       Home Medications  Were reviewed and are listed in nursing record. and/or listed below  Prior to Admission medications    Medication Sig Start Date End Date Taking? Authorizing Provider   Magnesium 500 MG CAPS Take 400 mg by mouth daily 2/25/21 8/11/21  Inocencia Rosenbaum MD   losartan-hydroCHLOROthiazide [facility]) 100-25 MG per tablet Take 1 tablet by mouth daily 2/4/21   Zully Sharp MD   spironolactone (ALDACTONE) 50 MG tablet Take 1 tablet by mouth 2 times daily TAKE 1 TABLET BY MOUTH DAILY 1/28/21   Zully Sharp MD   warfarin (COUMADIN) 5 MG tablet Take 1 tablet by mouth daily Every day.  Wednesday 2.5. 1/28/21   Zully Sharp MD   potassium chloride (KLOR-CON M) 20 MEQ extended release tablet Take 0.5 tablets by mouth daily 1/28/21   Zully Sharp MD   NIFEdipine (PROCARDIA XL) 90 MG extended release tablet Take 1 tablet by mouth daily 1/28/21   Zully Sharp MD   furosemide (LASIX) 20 MG tablet TAKE 1 TABLET BY MOUTH TWICE DAILY 1/28/21   Zully Sharp MD   famotidine (PEPCID) 20 MG tablet Take 1 tablet by mouth 2 times daily 1/28/21   Zully Sharp MD   atorvastatin (LIPITOR) 80 MG tablet Take 1 tablet by mouth daily 1/28/21   Zully Sharp MD   aspirin 81 MG EC tablet Take 1 tablet by mouth daily 1/28/21   Zully Sharp MD   acetaminophen (TYLENOL) 500 MG tablet Take 2 tablets by mouth every 6 hours as needed for Pain 1/28/21   Zully Sharp MD   Calcium Carb-Cholecalciferol (CALCIUM + D3) 600-200 MG-UNIT TABS tablet Take 1 tablet by mouth 2 times daily 10/15/20   Zully Sharp MD          Inpatient Medications:   atorvastatin  80 mg Oral Daily    NIFEdipine  90 mg Oral Daily    spironolactone  50 mg Oral BID    sodium chloride flush  5-40 mL Intravenous 2 times per day    levetiracetam  500 mg Intravenous Q12H    famotidine (PEPCID) injection  20 mg Intravenous BID    IV infusion builder  10 mEq Intravenous Q1H       IV drips:   sodium chloride         PRN:  sodium chloride flush, sodium chloride, acetaminophen, ondansetron **OR** ondansetron    Allergy:     Clonidine, Codeine, Lisinopril, Tramadol, and Percocet [oxycodone-acetaminophen]       Review of Systems:     All 12 point review of symptoms completed. Pertinent positives identified in the HPI, all other review of symptoms negative as below. CONSTITUTIONAL: Nounanticipated weight loss. No change in energy level, sleep pattern, or activity level. SKIN: No rash or pruritis. EYES: No visual changes or diplopia. No scleral icterus. ENT: No Headaches, hearing loss or vertigo. No mouth sores or sore throat. CARDIOVASCULAR: No chest pain/chest pressure/chest discomfort. No palpitations. RESPIRATORY: No cough or wheezing, no sputum production. No hematemesis. GASTROINTESTINAL: No N/V/D. No abdominal pain, appetite loss, blood in stools. GENITOURINARY: No dysuria, trouble voiding, or hematuria. MUSCULOSKELETAL:  No gait disturbance, weakness or joint complaints. NEUROLOGICAL: No headache, diplopia, change in muscle strength, numbness or tingling. No change in gait, balance, coordination, mood, affect, memory, mentation, behavior. PSHYCH: No anxiety, loss of interest, change in sexual behavior, feelings of self-harm, or confusion. ENDOCRINE: No malaise, fatigue or temperature intolerance. No excessive thirst, fluid intake, or urination. No tremor. HEMATOLOGIC: No abnormal bruising or bleeding. ALLERGY: No nasal congestion or hives.       Physical Examination:     Vitals:    08/12/21 0600 08/12/21 0700 08/12/21 0724 08/12/21 0800   BP: (!) 103/51 (!) 97/50  (!) 126/97   Pulse: 64 56  75   Resp: 14 17  20   Temp:   98.6 °F (37 °C)    TempSrc:   Oral    SpO2:  97%  98%   Weight:       Height:           Wt Readings from Last 3 Encounters: 08/12/21 182 lb 1.6 oz (82.6 kg)   08/11/21 163 lb (73.9 kg)   03/24/21 173 lb 9.6 oz (78.7 kg)       Objective      General Appearance:  Alert, cooperative, no distress, appears stated age Appropriate weight   Head:  Normocephalic, without obvious abnormality, atraumatic   Eyes:  PERRL, conjunctiva/corneas clear EOM intact  Ears normal   Throat no lesions       Nose: Nares normal, no drainage or sinus tenderness   Throat: Lips, mucosa, and tongue normal   Neck: Supple, symmetrical, trachea midline, no adenopathy, thyroid: not enlarged, symmetric, no tenderness/mass/nodules, no carotid bruit or JVD       Lungs:   Clear to auscultation bilaterally, respirations unlabored   Chest Wall:  No tenderness or deformity   Heart:  Regular rate and rhythm, S1, S2 normal, no murmur, rub or gallop PMI intact   Abdomen:   Soft, non-tender, bowel sounds active all four quadrants,  no masses, no organomegaly       Extremities: Extremities normal, atraumatic, no cyanosis or edema   Pulses: 2+ and symmetric   Skin: Skin color, texture, turgor normal, no rashes or lesions   Pysch: Normal mood and affect   Neurologic: Mild ptosis of R eye (chronic). Normal gross motor and sensory exam.  Cranial nerves intact               Labs:     Recent Labs     08/12/21  0626      K 2.9*   BUN 13   CREATININE 1.0      CO2 25   GLUCOSE 101*   CALCIUM 8.9     Recent Labs     08/11/21 2310 08/11/21 2310 08/12/21  0626   WBC 5.0  --  4.3   HGB 14.9  --  12.5   HCT 44.1  --  36.9   *  --  102*   MCV 88.5   < > 90.4    < > = values in this interval not displayed. No results for input(s): CHOLTOT, TRIG, HDL in the last 72 hours. Invalid input(s): LIPIDCOMM, CHOLHDL, VLDCHOL, LDL  Recent Labs     08/11/21 2310 08/12/21  0626   INR 2.04* 1.36*     No results for input(s): CKTOTAL, CKMB, CKMBINDEX, TROPONINI in the last 72 hours. No results for input(s): BNP in the last 72 hours.   No results for input(s): TSH in the last 72 hours. No results for input(s): CHOL, HDL, LDLCALC, TRIG in the last 72 hours.]    Lab Results   Component Value Date    TROPONINI <0.01 02/19/2021         Imaging:     I personally reviewed imaging studies including CXR, Stress test, TTE/MEGHAN. Last ECG (if available) - EKG:  I have reviewed EKG with the following interpretation  Atrial fibrillation with incomplete right bundle branch block, no ST-T changes. Telemetry:  Afib, rate controled    Last Stress (if available):    Last TTE/MEGHAN(if available):    Last Cath (if available):    Last CMR  (if available):      Assessment / Plan:   Christina Campbell is a 72 y.o. female with a PMH of  CAD, HTN, afib on warfaron. She  was transfered from [place] due to incidental head trauma. She was admitted for work-up and management of frotoparietal small subdural hematoma. She is currently hemodynamically stable and afebrile. Long term persistent Afib   Rate controlled HR 50-70s . [id number] Score=4. HASBLED score =3. MEGHAN (2019) EF 60% No regional wall motion abnormalities, LA dialted. No evidence of mass or thrombus in the left atrium or appendage.   -Continue holding warfarin follow up neurosx recs to restart   -Outpatient referral to [facility]     HTN   Stable 100-120s  - Continue holding home meds  -Monitor VS    CAD with PCI to LAD (2000)  -Continue atorvastatin   -Continue hold ASA follow up neurosx recs to restart     Tobacco use was discussed with the patient and educated on the negative effects. I have asked the patient to not utilize these agents. Thank you for allowing to us to participate in the care or Christina Campbell. Further evaluation will be based upon the patient's clinical course and testing results. I have spent 40 minutes of face to face time with the patient with more than 50% spent counseling and coordinating care. All questions and concerns were addressed to the patient/family.  Alternatives to my treatment were discussed. The note was completed using EMR. Every effort was made to ensure accuracy; however, inadvertent computerized transcription errors may be present. I have personally reviewed the reports and images of labs, radiological studies, cardiac studies including ECG's and telemetry, current and old medical records. Maurizio Greene MD, PGY-1  08/12/21  9:39 AM    This patient has been staffed and discussed with Michelle Pa.  Alejandro Braswell MD, [address] [facility]

## 2021-08-12 NOTE — PROGRESS NOTES
Patient with hypokalemia during admission. Potassium was replaced. We will ask patient to go to lab and have blood labs drawn tomorrow. BMP is ordered. We will ask patient to follow-up with her PCP.

## 2021-08-12 NOTE — PROGRESS NOTES
Pt given discharge instructions with verbalization of instructions. Pts PIV removed. No prescriptions for patient. Robbin Hinton

## 2021-08-12 NOTE — PROGRESS NOTES
Progress Note  Admit Date: 8/12/2021      72 yoF with a PMHx of afib on warfarin, HTN, ICH in 2018, Bell's Palsy (15 years ago), CAD who presented to [place] ED following head trauma a day prior to presentation. Overnight Events: No complaints today  No headache  No weakness      Review of Systems - Negative except as in HPI. .     Scheduled Medications:    atorvastatin  80 mg Oral Daily    sodium chloride flush  5-40 mL Intravenous 2 times per day    levetiracetam  500 mg Intravenous Q12H    famotidine (PEPCID) injection  20 mg Intravenous BID    IV infusion builder  10 mEq Intravenous Q1H      PRN Medications: sodium chloride flush, sodium chloride, acetaminophen, ondansetron **OR** ondansetron  Diet: Diet NPO    Continuous Infusions:   sodium chloride         PHYSICAL EXAM:  BP (!) 126/97   Pulse 75   Temp 98.6 °F (37 °C) (Oral)   Resp 20   Ht 5' 9\" (1.753 m)   Wt 182 lb 1.6 oz (82.6 kg)   SpO2 98%   BMI 26.89 kg/m²   No results for input(s): POCGLU in the last 72 hours.     Intake/Output Summary (Last 24 hours) at 8/12/2021 1014  Last data filed at 8/12/2021 0926  Gross per 24 hour   Intake 100 ml   Output    Net 100 ml       General appearance: alert, appears stated age and cooperative  Head: Normocephalic, without obvious abnormality, atraumatic  Neck: no adenopathy, no carotid bruit, no JVD, supple, symmetrical, trachea midline and thyroid not enlarged, symmetric, no tenderness/mass/nodules  Heart: regular rate and rhythm, S1, S2 normal, no murmur, click, rub or gallop  Abdomen: soft, non-tender; bowel sounds normal; no masses,  no organomegaly  Extremities: extremities normal, atraumatic, no cyanosis or edema  Pulses: 2+ and symmetric  Neurologic: Grossly normal    LABS:  Recent Labs     08/11/21  2310 08/12/21  0626   WBC 5.0 4.3   HGB 14.9 12.5   HCT 44.1 36.9   * 102*                                                                    Recent Labs     08/12/21  0626      K 2.9*      CO2 25   BUN 13   CREATININE 1.0   GLUCOSE 101*     No results for input(s): AST, ALT, ALB, BILITOT, ALKPHOS in the last 72 hours. No results for input(s): TROPONINI in the last 72 hours. No results for input(s): BNP in the last 72 hours. No results for input(s): CHOL, HDL in the last 72 hours. Invalid input(s): LDLCALCU  Recent Labs     08/11/21  2310 08/12/21  0626   INR 2.04* 1.36*       Assessment & Plan:    72 yoF who presented to [place] ED following head trauma  With [address]      Head trauma  Initial CT: CT head w/o contrast showed Tiny 2-3 mm acute subdural hematoma along the right frontoparietal region which is not causing any significant mass effect. CT cervical spine with no acute abnormalities. Dr. Wanda Allen was notified. No neuro sx intervention at this time. - F/u CT/ Stability scan 6 hours later:   Previously identified 2 mm linear hyperdensity over right    frontoparietal lobe is poorly visualized on current study, can be a dural    vein rather than subdural hemorrhage.     - DC Keppra 500 BID  - Resume diet  - PT/OT eval  - Per neurosurgery, ok to DC      Hypokalemia  - Replenish and monitor  - Replenish Mg  - O/p Repeat in next few days with PCP         Afib on warfarin at home  [id number] Score for Atrial Fibrillation Stroke Risk    Risk   Factors   Component Value   C CHF Yes 1   H HTN Yes 1   A2 Age >= 76 No,  (76 y.o.) 0   D DM No 0   S2 Prior Stroke/TIA No 0   V Vascular Disease No 0   A Age 74-69 Yes,  (76 y.o.) 1   Sc Sex female 1     [id number]  Score   4   Score last updated 8/12/21 9:80 AM EDT     Click here for a link to the UpToDate guideline \"Atrial Fibrillation: Anticoagulation therapy to prevent embolization     Disclaimer: Risk Score calculation is dependent on accuracy of patient problem list and past encounter diagnosis. - Warfarin was held d/t concern for bleed   - Resume if OK with neurosurgery as apparently,  no bleed.   - Curently rate controlled  - Replenish electrolytes.       HTN  - Continue home nifedipine and spironolactone,  losartan-HCTZ  - Satisfactory Bps       CHF with preserved ejection fraction  Last echo was 3/2019. EF of 60%  - Continue spironolactone, lasix  - No exacerbation       CAD  PCI to LAD in 2000   - Continue home atorvastatin; ASA     HLD  - Continue home atorvastatin     GERD  - Continue home pepcid             The patient and / or the family were informed of the results of any tests, a time was given to answer questions, a plan was proposed and they agreed with plan.     Limited       Disposition: PT/OT eval  Likely   DC within 24 hrs      Yann Roman MD

## 2021-08-12 NOTE — PLAN OF CARE
Problem: Pain:  Goal: Pain level will decrease  Description: Pain level will decrease  Note: Patient complaining of headache pain upon arrival to the ICU. ICU residents made aware. Problem: Falls - Risk of:  Goal: Will remain free from falls  Description: Will remain free from falls  Note: Pt remains free of falls at this time. Fall precautions in place, non skid socks on, 3/4 side rails up, bed alarm on, call light and side table within reach. Bed is in lowest locked position. Fall signs posted. Pt instructed to use call button if needing assistance. Will cont to monitor. Problem: HEMODYNAMIC STATUS  Goal: Patient has stable vital signs and fluid balance  Note: Vitals are stable at this time. Will con to monitor.

## 2021-08-12 NOTE — H&P
ICU HISTORY AND PHYSICAL       Hospital Day: 0  ICU Day: 0                                                     Code:Limited  Admit Date: 8/12/2021  PCP: Kassidy Smiley MD                                  CC: Head trauma    HISTORY OF PRESENT ILLNESS:     Roddy Ricketts is a 72 yoF with a PMHx of afib on warfarin, HTN, ICH in 2018, Bell's Palsy (15 years ago), CAD who presented to [facility] ED following head trauma a day ago. Patient states she hit her head Tuesday night when a pull bar fell off the door and hit her head. She reports head bleeding that continued onto the next morning. She reports a headache since the trauma that has been getting worse. Following the trauma, she reports no loss of consciousness, confusion, blurry vision, weakness, numbness. In [facility] ED, patient was hemodynamically stable other than a SBP in the 190s. She was alert, oriented. Physical exam was significant for an irregular heart rate. Neuro exam showed no FND. She was given her home dose of nifedipine. CT head was ordered and showed a small 2-3 mm subdural hematoma. Neurosurgery was consulted. INR was 2.04. Patient was given vitamin K, labetalol and started on nicardipine drip which was stopped shortly after d/t low BP. She was transferred to [facility] for monitoring. On evaluation today, she continues to report a headache, worse in the occipital area, 8/10 and constant. She denies any weakness, numbness, blurry vision, CP, SOB, abdominal pain, N/V, fevers, chills, c/d, sick contacts, urinary urgency/frequency or burning. Patient reports compliance with her home medications. She takes warfarin at home.  She has had episodes of falls in the past.      PAST HISTORY:     Past Medical History:   Diagnosis Date    Acute cerebrovascular accident (CVA) ([address] 1/28/2020    Atrial fibrillation ([address]     Bell palsy     diagnosed 15 years ago   Lakia Yap CAD (coronary artery disease)     Cerebral artery occlusion with cerebral infarction ([address]  Chronic diastolic CHF (congestive heart failure) ([facility] 5/16/2020    CVA (cerebral vascular accident) ([facility] 1/4/2017    Hypertension     [facility] 4/2016    Nonintractable headache     Nontraumatic subcortical hemorrhage of cerebral hemisphere [facility] 4/30/2016    Sudden visual loss of left eye     Thyroid nodule 2/8/2018    TIA involving right internal carotid artery        Past Surgical History:   Procedure Laterality Date    CARDIAC SURGERY      COLONOSCOPY      CORONARY ANGIOPLASTY WITH STENT PLACEMENT      TUBAL LIGATION Right Torn Rotator Cuff    2013 @ [facility]       SocialHistory:   The patient lives at home    Alcohol: none  Illicit drugs: no use  Tobacco:  none    Family History:  No family history on file. MEDICATIONS:     No current facility-administered medications on file prior to encounter. Current Outpatient Medications on File Prior to Encounter   Medication Sig Dispense Refill    Magnesium 500 MG CAPS Take 400 mg by mouth daily 90 capsule 3    losartan-hydroCHLOROthiazide (HYZAAR) 100-25 MG per tablet Take 1 tablet by mouth daily 90 tablet 1    spironolactone (ALDACTONE) 50 MG tablet Take 1 tablet by mouth 2 times daily TAKE 1 TABLET BY MOUTH DAILY 90 tablet 1    warfarin (COUMADIN) 5 MG tablet Take 1 tablet by mouth daily Every day.  Wednesday 2.5. 30 tablet 1    potassium chloride (KLOR-CON M) 20 MEQ extended release tablet Take 0.5 tablets by mouth daily 90 tablet 1    NIFEdipine (PROCARDIA XL) 90 MG extended release tablet Take 1 tablet by mouth daily 90 tablet 1    furosemide (LASIX) 20 MG tablet TAKE 1 TABLET BY MOUTH TWICE DAILY 180 tablet 1    famotidine (PEPCID) 20 MG tablet Take 1 tablet by mouth 2 times daily 60 tablet 2    atorvastatin (LIPITOR) 80 MG tablet Take 1 tablet by mouth daily 90 tablet 1    aspirin 81 MG EC tablet Take 1 tablet by mouth daily 30 tablet 3    acetaminophen (TYLENOL) 500 MG tablet Take 2 tablets by mouth every 6 hours as needed for Pain 120 tablet 3    Calcium Carb-Cholecalciferol (CALCIUM + D3) 600-200 MG-UNIT TABS tablet Take 1 tablet by mouth 2 times daily 120 tablet 3         Scheduled Meds:   atorvastatin  80 mg Oral Daily    NIFEdipine  90 mg Oral Daily    spironolactone  50 mg Oral BID    sodium chloride flush  5-40 mL Intravenous 2 times per day    levetiracetam  500 mg Intravenous Q12H    famotidine (PEPCID) injection  20 mg Intravenous BID      Continuous Infusions:   sodium chloride       PRN Meds:sodium chloride flush, sodium chloride, acetaminophen, ondansetron **OR** ondansetron    Allergies: Allergies   Allergen Reactions    Clonidine Rash, Shortness Of Breath and Hives    Codeine Hives, Itching, Nausea Only and Rash     Other reaction(s): Other (See Comments)  Pruritis    Lisinopril Hives and Itching    Tramadol     Percocet [Oxycodone-Acetaminophen] Itching     Patient takes percocet with benadryl to control the itching       REVIEW OF SYSTEMS:       History obtained from chart review and the patient    Review of Systems   Constitutional: Negative for chills and fever. Eyes: Negative for photophobia and visual disturbance. Respiratory: Negative for cough, chest tightness and shortness of breath. Cardiovascular: Negative for chest pain, palpitations and leg swelling. Gastrointestinal: Negative for abdominal distention, abdominal pain, constipation, diarrhea, nausea and vomiting. Genitourinary: Negative for dysuria, frequency and urgency. Musculoskeletal: Negative for back pain. Neurological: Positive for headaches (8/10). Negative for dizziness, seizures, weakness and light-headedness. Psychiatric/Behavioral: Negative for confusion.        PHYSICAL EXAM:       Vitals: BP (!) 132/92   Pulse 70   Temp 98.2 °F (36.8 °C) (Oral)   Resp 16   Ht 5' 9\" (1.753 m)   Wt 182 lb 1.6 oz (82.6 kg)   SpO2 98%   BMI 26.89 kg/m²     I/O:  No intake or output data in the 24 hours ending 08/12/21 0503  No intake/output data recorded. No intake/output data recorded. Physical Examination:     Physical Exam  Constitutional:       General: She is not in acute distress. Appearance: She is not ill-appearing. HENT:      Head: Normocephalic. Comments: Small occipital hematoma, tenderness to palpation of occipital area     Mouth/Throat:      Mouth: Mucous membranes are moist.      Pharynx: No oropharyngeal exudate or posterior oropharyngeal erythema. Eyes:      General: No scleral icterus. Extraocular Movements: Extraocular movements intact. Conjunctiva/sclera: Conjunctivae normal.      Pupils: Pupils are equal, round, and reactive to light. Cardiovascular:      Rate and Rhythm: Rhythm irregular. Pulses: Normal pulses. Heart sounds: No murmur heard. No gallop. Comments: Irregular rate and rhythm  Pulmonary:      Effort: No respiratory distress. Breath sounds: No wheezing or rales. Abdominal:      General: There is no distension. Palpations: Abdomen is soft. Tenderness: There is no abdominal tenderness. There is no guarding. Musculoskeletal:         General: No tenderness. Cervical back: Neck supple. No tenderness. Right lower leg: No edema. Left lower leg: No edema. Skin:     Capillary Refill: Capillary refill takes less than 2 seconds. Findings: No lesion or rash. Neurological:      General: No focal deficit present. Mental Status: She is alert and oriented to person, place, and time. Mental status is at baseline. Cranial Nerves: No cranial nerve deficit. Sensory: No sensory deficit. Motor: No weakness. Coordination: Coordination normal.      Deep Tendon Reflexes: Reflexes normal.      Comments: AOx4, mild ptosis of R eye (chronic), otherwise no facial asymmetry. Motor 5/5 UE and LE, sensation intact throughout. Finger to nose and heel to shin test normal. DTR 2+.     Psychiatric:         Mood and Affect: Mood normal.         Behavior: Behavior normal.         Thought Content: Thought content normal.         Judgment: Judgment normal.             No results for input(s): PHART, KMW1QJE, PO2ART in the last 72 hours. DATA:       Labs:  CBC:   Recent Labs     08/11/21  2310   WBC 5.0   HGB 14.9   HCT 44.1   *       BMP: No results for input(s): NA, K, CL, CO2, BUN, CREATININE, GLUCOSE, PHOS in the last 72 hours. Invalid input(s):  CA  LFT's: No results for input(s): AST, ALT, ALB, BILITOT, ALKPHOS in the last 72 hours. Troponin: No results for input(s): TROPONINI in the last 72 hours. BNP:No results for input(s): BNP in the last 72 hours. ABGs: No results for input(s): PHART, MIZ3XOS, PO2ART in the last 72 hours. INR:   Recent Labs     08/11/21  2310   INR 2.04*       U/A:No results for input(s): NITRITE, COLORU, PHUR, LABCAST, WBCUA, RBCUA, MUCUS, TRICHOMONAS, YEAST, BACTERIA, CLARITYU, SPECGRAV, LEUKOCYTESUR, UROBILINOGEN, BILIRUBINUR, BLOODU, GLUCOSEU, AMORPHOUS in the last 72 hours. Invalid input(s): Derrill Nissen    CT head without contrast    (Results Pending)         ASSESSMENT AND PLAN:   Dottie Banda is a 72 y.o. female with a PMHx of afib on warfarin, HTN, ICH in 2018, Bell's Palsy (15 years ago), CAD,  who presented to the ED following head trauma a day ago. Patient is a transfer from [facility] ED for SDH monitoring and possible need for intervention. Subdural hematoma 2/2 head trauma  CT head w/o contrast showed Tiny 2-3 mm acute subdural hematoma along the right frontoparietal region which is not causing any significant mass effect. CT cervical spine with no acute abnormalities. Dr. Jose Avila was notified.  No neuro sx intervention at this time.   - Stability scan 6 hours after (8/12 at 4 am)  - Q1h neurochecks  - Keppra 500 BID  - Pepcid  - Monitor and correct INR and any coagulopathies   - Hold home warfarin and ASA  - No blood thinners for 2 weeks  - Maintain SBP < 160  - Elevate HOB 30 degrees  - Continue home atorvastain 80  - Maintain NPO until nurse/SLP eval  - SLP  - PT/OT  - F/U lipid panel and hb a1c  - Continuous tele  - Consult neurosurgery   - Consult neurology    Afib on warfarin at home  [id number] Score for Atrial Fibrillation Stroke Risk   Risk   Factors  Component Value   C CHF Yes 1   H HTN Yes 1   A2 Age >= 76 No,  (76 y.o.) 0   D DM No 0   S2 Prior Stroke/TIA No 0   V Vascular Disease No 0   A Age 74-69 Yes,  (76 y.o.) 1   Sc Sex female 1    [id number]  Score  4   Score last updated 8/12/21 2:53 AM EDT    Click here for a link to the UpToDate guideline \"Atrial Fibrillation: Anticoagulation therapy to prevent embolization    Disclaimer: Risk Score calculation is dependent on accuracy of patient problem list and past encounter diagnosis. - Warfarin held d/t bleed for 2 weeks  - Curently rate controlled  - Continue to monitor on tele  - Cardiology consulted    HTN  - Continue home nifedipine and spironolactone  - Hold home losartan-HCTZ  - F/U orthostatics    CHF with preserved ejection fraction  Last echo was 3/2019. EF of 60%  - Continue spironolactone  - Hold home lasix for now    CAD  PCI to LAD in 2000   - Continue home atorvastatin  - Hold home ASA    HLD  - Continue home atorvastatin    GERD  - Continue home pepcid      Code Status:Limited  FEN: Diet NPO  PPX:  SCDs  DISPO: ICU     This patient has been staffed and discussed with Carol Cordova DO.   -----------------------------  Carla Lr MD, PGY-1  8/12/2021  5:03 AM    I saw the patient independently from the resident . I discussed the care with the resident. I personally reviewed the HPI, PH, FH, SH, ROS and medications. I repeated pertinent portions of the examination and reviewed the relevant imaging and laboratory data. I agree with the findings, assessment and plan as documented. addition to: Patient is admitted for acute subdural hematoma secondary from head trauma on anticoagulation. Plan as above.  CCT:32 minutes

## 2021-08-12 NOTE — PROGRESS NOTES
Occupational Therapy   Occupational Therapy Initial Assessment/ treatment/ Discharge  Date: 2021   Patient Name: Randolph Gentile  MRN: [id number]     : 1950    Date of Service: 2021    Discharge Recommendations:    Randolph Gentile scored a 22/24 on the -[facility] ADL Inpatient form. At this time, no further OT is recommended upon discharge. Recommend patient returns to prior setting with prior services. OT Equipment Recommendations  Equipment Needed: No    Assessment   Performance deficits / Impairments: Decreased endurance;Decreased high-level IADLs  Assessment: Prior to admission pt was living at home with her grandson, was independent [facility] ADLs and IADLs. Pt now presents s/p acute subdural hematoma, however now nearly back to her baseline. Pt demonstrating spv to SBA for mobility and transfers. Pt completed full ADL routine from seated position with spv/ SBA. Pt plans to dc home with her grandson and has family assist 24 hr available. Pt demonstrates no further acute OT needs at this time. Will sign off on acute OT services. Treatment Diagnosis: decreased ADLs and transfers secondary to acute subdural hematoma  Prognosis: Fair  Decision Making: Medium Complexity  OT Education: OT Role;Plan of Care  Patient Education: verb understanding  REQUIRES OT FOLLOW UP: No  Activity Tolerance  Activity Tolerance: Patient Tolerated treatment well;Patient limited by fatigue  Activity Tolerance: pt reported min fatigue after mobiltiy in hallway however tolerated well  Safety Devices  Safety Devices in place: Yes  Type of devices: Left in chair;Nurse notified;Call light within reach; Chair alarm in place           Patient Diagnosis(es): The encounter diagnosis was Hypokalemia.      has a past medical history of Acute cerebrovascular accident (CVA) ([facility] Atrial fibrillation ([facility] Bell palsy, CAD (coronary artery disease), Cerebral artery occlusion with cerebral infarction [facility]), Chronic diastolic CHF (congestive heart failure) ([address] CVA (cerebral vascular accident) ([address] Hypertension, Intracranial hemorrhage ([address] Nonintractable headache, Nontraumatic subcortical hemorrhage of cerebral hemisphere [facility]), Sudden visual loss of left eye, Thyroid nodule, and TIA involving right internal carotid artery. has a past surgical history that includes Cardiac surgery; Tubal ligation (Right, Torn Rotator Cuff); Coronary angioplasty with stent; and Colonoscopy. Treatment Diagnosis: decreased ADLs and transfers secondary to acute subdural hematoma      Restrictions  Position Activity Restriction  Other position/activity restrictions: up with assist, HOB 30 deg, seizure precautions    Subjective   General  Chart Reviewed: Yes  Patient assessed for rehabilitation services?: Yes  Additional Pertinent Hx: Pt admitted to ED with c/o headache, s/p pull up bar falling on her head. CT showed small hematoma. NSG consulted, non surgical intervention recommended. PMHx includes:  Acute cerebrovascular accident (CVA) ([address] (1/28/2020), Atrial fibrillation ([address] Bell palsy, CAD (coronary artery disease), Cerebral artery occlusion with cerebral infarction [facility]), Chronic diastolic CHF (congestive heart failure) ([address] (5/16/2020), CVA (cerebral vascular accident) ([address] (1/4/2017), Hypertension, Intracranial hemorrhage ([address] (4/2016), Nonintractable headache, Nontraumatic subcortical hemorrhage of cerebral hemisphere ([address] (4/30/2016), Sudden visual loss of left eye, Thyroid nodule (2/8/2018), and TIA involving right internal carotid artery.   Family / Caregiver Present: No  Referring Practitioner: Susan Mckenna MD  Diagnosis: acute subdural hematoma  Subjective  Subjective: Pt seated in chair at sink    Social/Functional History  Social/Functional History  Lives With: Family (great-grandson)  Type of Home: House  Home Layout: One level  Home Access: Stairs to enter without rails  Entrance Stairs - Number of Steps: 1 ADA  Bathroom Shower/Tub: Walk-in shower, Shower chair with back (uses shower chair occasionally)  Bathroom Toilet: Standard  Bathroom Equipment: Shower chair  Home Equipment: Rolling walker, Cane  ADL Assistance: Independent  Homemaking Assistance: Independent  Ambulation Assistance: Independent (without AD)  Transfer Assistance: Independent  Active : Yes  Occupation: Retired  Type of occupation: RN  Leisure & Hobbies: cooking  Additional Comments: Pt's great-grandjero goes back to school in about a week. Pt reports that she has someone that will be able to help out 24 hr supervision/assist with tasks around the home if needed at d/c.       Objective        Orientation  Overall Orientation Status: Within Functional Limits     Balance  Sitting Balance: Independent  Standing Balance: Stand by assistance (to spv with RW)  Standing Balance  Time: 15 mins total  Activity: mobility in hallway, into bathroom  Functional Mobility  Functional - Mobility Device: Rolling Walker  Activity: To/from bathroom; Other (+ in hallway)  Assist Level: Stand by assistance (progressing to spv)  Toilet Transfers  Toilet - Technique: Ambulating  Equipment Used: Standard toilet  Toilet Transfer: Supervision  ADL  Feeding: Beverage management; Independent  Grooming: Supervision (seated at sink for oral care, washing face/ hands/ combing hair)  UE Bathing: Setup (seated at sink)  LE Bathing: Stand by assistance (standing for meatal/ deejay hygiene, washing LEs seated in chair)  UE Dressing: Setup (donning/ doffing gown)  LE Dressing: Stand by assistance (donning brief/ pants)  Toileting: Stand by assistance           Transfers  Sit to stand: Supervision  Stand to sit: Supervision     Cognition  Overall Cognitive Status: WFL        Sensation  Overall Sensation Status: WFL (pt denies numbness/tingling)        LUE AROM (degrees)  LUE AROM : WFL  Left Hand AROM (degrees)  Left Hand AROM: WFL  RUE AROM (degrees)  RUE AROM : WFL  Right Hand AROM (degrees)  Right Hand AROM: WFL  LUE Strength  Gross LUE Strength: WFL  RUE Strength  Gross RUE Strength: WFL           Treatment included functional transfer training, ADLs, and patient education. Plan   Plan  Times per week: eval and dc  AM-PAC Score        AM-[facility] Inpatient Daily Activity Raw Score: 22 (08/12/21 1331)  AM-PAC Inpatient ADL T-Scale Score : 47.1 (08/12/21 1331)  ADL Inpatient CMS 0-100% Score: 25.8 (08/12/21 1331)  ADL Inpatient CMS G-Code Modifier : CJ (08/12/21 1331)    Goals  Patient Goals   Patient goals : eval and dc       Therapy Time   Individual Concurrent Group Co-treatment   Time In 8028         Time Out 1113         Minutes 31         Timed Code Treatment Minutes: 16 Minutes (+ 15 min eval)     Cara BRAY  [address] [facility], OTR/L [id number]

## 2021-08-12 NOTE — PLAN OF CARE
Called Regarding Patient    CT head with small 2 mm right Frontal SDH   Injury now > 24 hours prior to presentation      Recommend  1. Admit -ICU  2. Q1 hour neurochecks  3. Repeat CT in  6 hours   If stable ok to change q1 hour neurochecks to q4 hour  4. Keppra 500 BID  5. Pecid  6. Correct INR and any coagulopathy   NO blood thinners x 2 weeks  7. SBP <160  8. HOB 30 degree with neck midline  9.  Neurosurgery consult to follow tomorrow

## 2021-08-12 NOTE — ED PROVIDER NOTES
As physician-in-triage, I performed a medical screening history and physical exam on Shaniqua Mtz. I also cared for and evaluated the patient in conjunction with the ED Advanced Practice Provider. All diagnostic, treatment, and disposition decisions were made by myself in conjunction with the advanced practice provider. For all further details of the patient's emergency department visit, please see the advanced practice provider's documentation. Patient presents to the ER for evaluation of blunt head trauma, with a history of chronic hypertension, she did take her oral antihypertensives earlier blunt head trauma headache, nausea without vomiting or double vision mild neck pain. No sensorimotor deficit GCS 15, anticoagulation for atrial fibrillation with vitamin K antagonist, warfarin. Positive cephalhematoma GCS of 15. No focal motor or sensory deficit. Positive small subdural hematoma, and cephalhematoma with an INR of 2.04. Markedly elevated initial hypertension 180/100, patient did receive labetalol and nicardipine infusion, she will have reversal of warfarin with vitamin K IV. Fentanyl was administered for analgesia. Blood pressure is improved to 135/79, she will be admitted to hospital for reversal of anticoagulation, serial logic examination control of blood pressure and repeat CAT scan. Total critical care time provided today was 31 minutes. This excludes seperately billable procedures and family discussion time. Critical care time provided for obtaining history, conducting a physical exam, performing and monitoring interventions, ordering, collecting and interpreting tests, and establishing medical decision-making. There was a potential for life/limb threatening pathology requiring close evaluation and intervention with concern for patient decompensation.         Impression: Subdural hemorrhage, chronic anticoagulation, accelerated hypertension blunt head trauma     Marlene Harris Nancy Paiz MD  12/30/83 1201

## 2021-08-12 NOTE — ED NOTES
Report given to [facility] Care transport team. V/u and all questions answered.        Zoran Roberson RN  08/12/21 0989

## 2021-08-12 NOTE — ED NOTES
Report given to [address] [place] [address] at [facility] ICU. V/u and all questions answered.         Herman Booker RN  08/12/21 7026

## 2021-08-12 NOTE — PROGRESS NOTES
RN took patient to CT for 6h repeat head bleed. PT able to get up and transfer to a wheelchair and onto the CT scanner. When returned to the room. Pt tolerated walking to the bathroom. Pt back to bed, with bed alarm on.

## 2021-08-12 NOTE — PROGRESS NOTES
Pt admitted to 4522 from ambulance service. CHG was provided. Sacral heart was put in lace. Patient is a fib on the monitor and states she takes coumadin. BP within normal limits and <160s. Pt satting well on room air. Alert and oriented x4.

## 2021-08-12 NOTE — PROGRESS NOTES
Physical Therapy    Facility/Department: [facility] ICU  Initial Assessment/Treatment Note    NAME: Lit Worthy  : 1950  MRN: [id number]    Date of Service: 2021    Discharge Recommendations:  Lit Worthy scored a 18/24 on the AM-PAC short mobility form. At this time, no further PT is recommended upon discharge. Recommend patient returns to prior setting with prior services. PT Equipment Recommendations  Equipment Needed: No  Other: pt owns RW and SPC    Assessment   Body structures, Functions, Activity limitations: Decreased functional mobility ; Decreased posture;Decreased endurance  Assessment: Pt is a 72 y.o. female with diagnosis of acute subdural hematoma presenting with decreased functional mobility. Pt is currently requiring SBA for all functional mobility, which is a decline from reported baseline. Pt reporting that she is functioning near her baseline, but is concerned that she may lose strength while in the hospital.  Pt will benefit from skilled therapy while inpatient to maintain endurance and maximize safety and independence. Pt will require initial 24 hr supervision at d/c with use of RW for increased safety. Treatment Diagnosis: decreased functional mobility due to acute SDH  Prognosis: Good  Decision Making: Low Complexity  Patient Education: Role of PT, goals, d/c recommendations, pt verbalized understanding  REQUIRES PT FOLLOW UP: Yes  Activity Tolerance  Activity Tolerance: Patient Tolerated treatment well;Patient limited by endurance       Patient Diagnosis(es): There were no encounter diagnoses.      has a past medical history of Acute cerebrovascular accident (CVA) ([address] Atrial fibrillation ([address] Bell palsy, CAD (coronary artery disease), Cerebral artery occlusion with cerebral infarction ([address] Chronic diastolic CHF (congestive heart failure) ([address] CVA (cerebral vascular accident) ([address] Hypertension, Intracranial hemorrhage ([address] Nonintractable headache, Nontraumatic subcortical hemorrhage of cerebral hemisphere [facility]), Sudden visual loss of left eye, Thyroid nodule, and TIA involving right internal carotid artery. has a past surgical history that includes Cardiac surgery; Tubal ligation (Right, Torn Rotator Cuff); Coronary angioplasty with stent; and Colonoscopy. Restrictions  Position Activity Restriction  Other position/activity restrictions: up with assist, HOB 30 deg, seizure precautions  Vision/Hearing  Vision: Impaired  Vision Exceptions: Wears glasses for reading (cataract surgery in the past)  Hearing: Within functional limits     Subjective  General  Chart Reviewed: Yes  Patient assessed for rehabilitation services?: Yes  Additional Pertinent Hx: Pt is a 72 y.o. female admitted to [facility] on 8/12/21 from [facility]. Pt arriving to [facility] ED after shutting a room door and a pull up bar fell onto her head. An area of swelling formed, pt reporting a headache. CT head: 2-3mm acute SDH in R frontoparietal region. CT cervical spine: no acute abnormality. Transfer to [facility]. PMH: acute CVA, CAD, cerebral artery occlusion, chronic diastolic CHF, HTN, thyroid nodule. Family / Caregiver Present: No  Referring Practitioner: Arianne Buitrago MD  Diagnosis: acute SDH  Follows Commands: Within Functional Limits  General Comment  Comments: Pt found seated on toilet upon PT arrival.  Pt agreeable to therapy session. Subjective  Subjective: \"I usually do this all by myself. \"  Pain Screening  Patient Currently in Pain: Denies  Vital Signs  Patient Currently in Pain: Denies       Orientation  Orientation  Overall Orientation Status: Within Functional Limits  Social/Functional History  Social/Functional History  Lives With: Family (great-grandson)  Type of Home: House  Home Layout: One level  Home Access: Stairs to enter without rails  Entrance Stairs - Number of Steps: 1 ADA  Bathroom Shower/Tub: Walk-in shower, Shower chair with back (uses shower obstacle negotiation. Stairs/Curb  Stairs?: No     Balance  Comments: Supervision for sitting balance on toilet for urination and pericare. SBA for standing balance at sink for hand hygiene. Pt completing grooming tasks seated in chair at sink, see OT note for comments. Plan   Plan  Times per week: 5-7  Times per day: Daily  Current Treatment Recommendations: Strengthening, Neuromuscular Re-education, Home Exercise Program, ROM, Safety Education & Training, Balance Training, Endurance Training, Patient/Caregiver Education & Training, Functional Mobility Training, Equipment Evaluation, Education, & procurement, Transfer Training, Gait Training, Stair training  Safety Devices  Type of devices: All fall risk precautions in place, Call light within reach, Chair alarm in place, Gait belt, Left in chair, Nurse notified    G-Code       OutComes Score                                                  AM-PAC Score  AM-PAC Inpatient Mobility Raw Score : 18 (08/12/21 1255)  AM-PAC Inpatient T-Scale Score : 43.63 (08/12/21 1255)  Mobility Inpatient CMS 0-100% Score: 46.58 (08/12/21 1255)  Mobility Inpatient CMS G-Code Modifier : CK (08/12/21 1255)          Goals  Short term goals  Time Frame for Short term goals: Discharge  Short term goal 1: Pt will perform all bed mobility with Vika  Short term goal 2: Pt will perform all transfers with or without RW and Vika  Short term goal 3: Pt will ambulate 150' with or without RW and supervision  Short term goal 4: Pt will ascend/descend 1 curb step with or without RW and CGA  Patient Goals   Patient goals : \"To do everything that I used to be able to do. \"       Therapy Time   Individual Concurrent Group Co-treatment   Time In 5699         Time Out 1113         Minutes 31              Timed Code Treatment Minutes:   16    Total Treatment Minutes:  [address]    This note to serve as discharge summary if patient discharged before next session.

## 2021-08-12 NOTE — PROGRESS NOTES
Speech Language Pathology  Facility/Department: [address] [facility]   CLINICAL BEDSIDE SWALLOW EVALUATION- Discharge    NAME: Dunia Mcintyre  : 1950  MRN: [id number]    ADMISSION DATE: 2021  ADMITTING DIAGNOSIS: has Permanent atrial fibrillation ([address] Chest pain; Uncontrolled hypertension; Coronary artery disease involving native coronary artery of native heart without angina pectoris; LVH (left ventricular hypertrophy); Thyroid nodule; Chronic heart failure with preserved ejection fraction ([address] Bradycardia; Essential hypertension; Hypomagnesemia; Hypokalemia; and Acute subdural hematoma (HCC) on their problem list.  ONSET DATE: 21    Recent CT Head: 21  Impression     Previously identified 2 mm linear hyperdensity over right    frontoparietal lobe is poorly visualized on current study, can be a dural    vein rather than subdural hemorrhage. Date of Eval: 2021  Evaluating Therapist: EBONIE Morillo    Current Diet level:  Current Diet : Regular  Current Liquid Diet : Thin      Primary Complaint  Patient Complaint: None reported    Pain:  Pain Assessment  Pain Assessment: 0-10  Pain Level: 0  Pain Type: Acute pain  Pain Location: Head  Pain Orientation: Anterior  Pain Descriptors: Headache  Pain Frequency: Continuous    Reason for Referral  Dunia Mcintyre was referred for a bedside swallow evaluation to assess the efficiency of her swallow function, identify signs and symptoms of aspiration and make recommendations regarding safe dietary consistencies, effective compensatory strategies, and safe eating environment. Impression  Dysphagia Diagnosis: Swallow function appears grossly intact  Dysphagia Impression : Oropharyngeal swallow appears grossly WFLs; timely mastication, clearance of oral cavity, and no overt s/s of aspiration w/ any trials. Completed sequential swallow and 3oz water test w/o difficulties. Pt denied sensation of food sticking or aspiration. Recommend ongoing regular + thin diet w/ pills PO. Educated to overt s/s of aspiration and to alert PCP should any changes occur; expressed understanding. No acute needs at this time. Dysphagia Outcome Severity Scale: Level 6: Within functional limits/Modified independence     Treatment Plan  Requires SLP Intervention: No  Duration/Frequency of Treatment: No acute ST needs  D/C Recommendations: Home independently       Recommended Diet and Intervention  Diet Solids Recommendation: Regular  Liquid Consistency Recommendation: Thin  Recommended Form of Meds: PO          Compensatory Swallowing Strategies  Compensatory Swallowing Strategies: Upright as possible for all oral intake;Remain upright for 30-45 minutes after meals      General  Chart Reviewed: Yes  Comments: Pt is a 72 y.o. female admitted to [facility] on 8/12/21 from [facility]. Pt arriving to [facility] ED after shutting a room door and a pull up bar fell onto her head. An area of swelling formed, pt reporting a headache. CT head: 2-3mm acute SDH in R frontoparietal region. CT cervical spine: no acute abnormality. Transfer to [facility]. PMH: acute CVA, CAD, cerebral artery occlusion, chronic diastolic CHF, HTN, thyroid nodule. Subjective  Subjective: Pt upright in chair upon arrival; pleasant and agreeable to eval. AAOx4. Denied changes in speech or swallow. Behavior/Cognition: Alert; Cooperative;Pleasant mood  Temperature Spikes Noted: No  Respiratory Status: Room air  Breath Sounds: Clear  O2 Device: None (Room air)  Communication Observation: Functional  Follows Directions: Complex  Dentition: Dentures top;Edentulous  Patient Positioning: Upright in chair  Baseline Vocal Quality: Normal  Volitional Cough: Strong  Prior Dysphagia History: None per chart review  Consistencies Administered: Reg solid; Dysphagia Pureed (Dysphagia I); Thin - teaspoon; Thin - cup; Thin - straw; Ice Chips      Vision/Hearing  Vision  Vision: Impaired  Vision Exceptions: Wears

## 2021-08-12 NOTE — ED PROVIDER NOTES
[address]        Pt Name: Annie Ford  MRN: [id number]  [place] 1950  Date of evaluation: 8/11/2021  Provider: Dominga Kawasaki, PA-C  PCP: Radha Brooks MD  Note Started: 1:33 AM EDT        I have seen and evaluated this patient with my supervising physician Johanne Espinoza MD.    [facility]       Chief Complaint   Patient presents with    Head Injury     Pt. states that she shut a room door and a pull-up bar fell on her head an area of swelling noted to the mid right of head. Pt. states HA       HISTORY OF PRESENT ILLNESS   (Location, Timing/Onset, Context/Setting, Quality, Duration, Modifying Factors, Severity, Associated Signs and Symptoms)  Note limiting factors. Chief Complaint: JENNIFER Ford is a 72 y.o. female who presents to the emergency department for evaluation of headache and possible head injury. Patient states last night around 10:30 PM a pull up bar fell off of a door that she had closed in her house. It hit her on the top of her head and she has a small goose egg to the right parietal scalp. Patient states she did not lose consciousness. Patient is on Coumadin but did not want to come in last night. Patient states she has felt generally well today other than a headache. Patient denies any blurred vision, chest pain, shortness of breath, abdominal pain, nausea vomiting or diarrhea. Patient states she took some Tylenol for her headache which did not help her headache at all. Patient has poorly controlled blood pressure and is on multiple medications already. She states she did take her blood pressure medications today as prescribed. Nursing Notes were all reviewed and agreed with or any disagreements were addressed in the HPI. REVIEW OF SYSTEMS    (2-9 systems for level 4, 10 or more for level 5)     Review of Systems   Constitutional: Negative for fatigue and fever. HENT: Negative. Eyes: Negative for visual disturbance. Respiratory: Negative for shortness of breath. Cardiovascular: Negative for chest pain. Gastrointestinal: Negative for abdominal pain, nausea and vomiting. Genitourinary: Negative. Musculoskeletal: Negative. Skin: Negative. Neurological: Positive for headaches. Positives and Pertinent negatives as per HPI. Except as noted above in the ROS, all other systems were reviewed and negative.        PAST MEDICAL HISTORY     Past Medical History:   Diagnosis Date    Acute cerebrovascular accident (CVA) ([address] 1/28/2020    Atrial fibrillation ([address]     Bell palsy     [facility] CAD (coronary artery disease)     Cerebral artery occlusion with cerebral infarction ([address]     Chronic diastolic CHF (congestive heart failure) ([address] 5/16/2020    CVA (cerebral vascular accident) ([address] 1/4/2017    Hypertension     Intracranial hemorrhage ([address] 4/2016    Nonintractable headache     Nontraumatic subcortical hemorrhage of cerebral hemisphere ([address] 4/30/2016    Sudden visual loss of left eye     Thyroid nodule 2/8/2018    TIA involving right internal carotid artery          SURGICAL HISTORY     Past Surgical History:   Procedure Laterality Date    CARDIAC SURGERY      COLONOSCOPY      CORONARY ANGIOPLASTY WITH STENT PLACEMENT      TUBAL LIGATION Right Torn Rotator Cuff    2013 @ [facility]         CURRENTMEDICATIONS       Previous Medications    ACETAMINOPHEN (TYLENOL) 500 MG TABLET    Take 2 tablets by mouth every 6 hours as needed for Pain    ASPIRIN 81 MG EC TABLET    Take 1 tablet by mouth daily    ATORVASTATIN (LIPITOR) 80 MG TABLET    Take 1 tablet by mouth daily    CALCIUM CARB-CHOLECALCIFEROL (CALCIUM + D3) 600-200 MG-UNIT TABS TABLET    Take 1 tablet by mouth 2 times daily    FAMOTIDINE (PEPCID) 20 MG TABLET    Take 1 tablet by mouth 2 times daily    FUROSEMIDE (LASIX) 20 MG TABLET    TAKE 1 TABLET BY MOUTH TWICE DAILY LOSARTAN-HYDROCHLOROTHIAZIDE (HYZAAR) 100-25 MG PER TABLET    Take 1 tablet by mouth daily    MAGNESIUM 500 MG CAPS    Take 400 mg by mouth daily    NIFEDIPINE (PROCARDIA XL) 90 MG EXTENDED RELEASE TABLET    Take 1 tablet by mouth daily    POTASSIUM CHLORIDE (KLOR-CON M) 20 MEQ EXTENDED RELEASE TABLET    Take 0.5 tablets by mouth daily    SPIRONOLACTONE (ALDACTONE) 50 MG TABLET    Take 1 tablet by mouth 2 times daily TAKE 1 TABLET BY MOUTH DAILY    WARFARIN (COUMADIN) 5 MG TABLET    Take 1 tablet by mouth daily Every day. Wednesday 2.5. ALLERGIES     Clonidine, Codeine, Lisinopril, Tramadol, and Percocet [oxycodone-acetaminophen]    FAMILYHISTORY     History reviewed. No pertinent family history. SOCIAL HISTORY       Social History     Tobacco Use    Smoking status: Never Smoker    Smokeless tobacco: Never Used   Vaping Use    Vaping Use: Never used   Substance Use Topics    Alcohol use: No    Drug use: No       SCREENINGS   NIH Stroke Scale  Interval: Baseline  Level of Consciousness (1a. ): Alert  LOC Questions (1b. ):  Answers both correctly  LOC Commands (1c. ): Performs both tasks correctly  Best Gaze (2. ): Normal  Visual (3. ): No visual loss  Facial Palsy (4. ): Normal symmetrical movement  Motor Arm, Left (5a. ): No drift  Motor Arm, Right (5b. ): No drift  Motor Leg, Left (6a. ): No drift  Motor Leg, Right (6b. ): No drift  Limb Ataxia (7. ): Absent  Sensory (8. ): Normal  Best Language (9. ): No aphasia  Dysarthria (10. ): Normal  Extinction and Inattention (11): No abnormality  Total: 0Glasgow Coma Scale  Eye Opening: Spontaneous  Best Verbal Response: Oriented  Best Motor Response: Obeys commands  Bj Coma Scale Score: 15        PHYSICAL EXAM    (up to 7 for level 4, 8 or more for level 5)     ED Triage Vitals   BP Temp Temp Source Pulse Resp SpO2 Height Weight   08/11/21 2057 08/11/21 2050 08/11/21 2050 08/11/21 2050 08/11/21 2051 08/11/21 2051 08/11/21 2050 08/11/21 2050   (!) 195/135 98.3 °F (36.8 °C) Oral 102 18 98 % 5' 7\" (1.702 m) 163 lb (73.9 kg)       Physical Exam  Vitals and nursing note reviewed. Constitutional:       General: She is not in acute distress. Appearance: Normal appearance. She is well-developed. She is not ill-appearing, toxic-appearing or diaphoretic. HENT:      Head: Normocephalic. Right Ear: Tympanic membrane, ear canal and external ear normal.      Left Ear: Tympanic membrane, ear canal and external ear normal.      Nose: Nose normal.      Mouth/Throat:      Mouth: Mucous membranes are moist.      Pharynx: Oropharynx is clear. Eyes:      General:         Right eye: No discharge. Left eye: No discharge. Extraocular Movements: Extraocular movements intact. Conjunctiva/sclera: Conjunctivae normal.      Pupils: Pupils are equal, round, and reactive to light. Cardiovascular:      Rate and Rhythm: Normal rate. Rhythm irregular. Heart sounds: Normal heart sounds. No murmur heard. No gallop. Pulmonary:      Effort: Pulmonary effort is normal. No respiratory distress. Breath sounds: Normal breath sounds. No wheezing, rhonchi or rales. Musculoskeletal:         General: Normal range of motion. Cervical back: Normal range of motion and neck supple. Skin:     General: Skin is warm and dry. Capillary Refill: Capillary refill takes less than 2 seconds. Coloration: Skin is not pale. Neurological:      General: No focal deficit present. Mental Status: She is alert and oriented to person, place, and time. GCS: GCS eye subscore is 4. GCS verbal subscore is 5. GCS motor subscore is 6. Cranial Nerves: Cranial nerves are intact. Sensory: Sensation is intact. Motor: Motor function is intact. Coordination: Coordination is intact.    Psychiatric:         Behavior: Behavior normal.         DIAGNOSTIC RESULTS   LABS:    Labs Reviewed   CBC WITH AUTO DIFFERENTIAL - Abnormal; Notable for the following components:       Result Value    Platelets 294 (*)     MPV 10.6 (*)     All other components within normal limits    Narrative:     Performed at:  [facility] [address] [facility], [address] [place] [address]   Phone [phone]   PROTIME-INR - Abnormal; Notable for the following components:    Protime 23.7 (*)     INR 2.04 (*)     All other components within normal limits    Narrative:     Performed at:  [facility]  [address] [facility], [address] [place] [address]   Phone [phone]       When ordered only abnormal lab results are displayed. All other labs were within normal range or not returned as of this dictation. EKG: When ordered, EKG's are interpreted by the Emergency Department Physician in the absence of a cardiologist.  Please see their note for interpretation of EKG. RADIOLOGY:   Non-plain film images such as CT, Ultrasound and MRI are read by the radiologist. Plain radiographic images are visualized and preliminarily interpreted by the ED Provider with the below findings:        Interpretation per the Radiologist below, if available at the time of this note:    CT Cervical Spine WO Contrast   Final Result   Mild degenerative disc changes throughout the lower cervical spine which is   unchanged with no acute abnormality seen      Prominent thyroid goiter on the left which is unchanged. Suggest ultrasound   correlation. CT Head WO Contrast   Final Result   Tiny 2-3 mm acute subdural hematoma along the right frontoparietal region   which is not causing any significant mass effect. Recommend follow-up to   assure stability      Mild atrophy and moderate chronic microischemic disease throughout the deep   white matter which is unchanged      Moderate scalp hematoma along the vertex of the skull extending along the   right parietal region posteriorly with no fracture seen.       The findings were called to Dr. Betsy Fernandez at 10:45 p.m.           CT Head WO Contrast    Result Date: 8/11/2021  EXAMINATION: CT OF THE HEAD WITHOUT CONTRAST  8/11/2021 9:44 pm TECHNIQUE: CT of the head was performed without the administration of intravenous contrast. Dose modulation, iterative reconstruction, and/or weight based adjustment of the mA/kV was utilized to reduce the radiation dose to as low as reasonably achievable. COMPARISON: 02/19/2021 HISTORY: ORDERING SYSTEM PROVIDED HISTORY: hit in the head with pull up bar, HA TECHNOLOGIST PROVIDED HISTORY: Reason for exam:->hit in the head with pull up bar, RODRIGUEZ Has a \"code stroke\" or \"stroke alert\" been called? ->No Decision Support Exception - unselect if not a suspected or confirmed emergency medical condition->Emergency Medical Condition (MA) Reason for Exam: hit in the head with pull up bar, HA Acuity: Acute Type of Exam: Initial Relevant Medical/Surgical History: Head Injury (Pt. states that she shut a room door and a pull-up bar fell on her head an area of swelling noted to the mid right of head. Pt. states HA) FINDINGS: BRAIN/VENTRICLES: The ventricles are top normal and there is moderate periventricular low density bilaterally which is unchanged. There is a tiny 2-3 mm linear area of subdural hematoma along the right frontoparietal region which is more apparent there is no significant mass effect. There is no midline shift. There is moderate scalp swelling along the right parietal region extending along the vertex and posterior and extends along the vertex extending posteriorly with no fracture seen in the area. ORBITS: The visualized portion of the orbits demonstrate no acute abnormality. SINUSES: The visualized paranasal sinuses and mastoid air cells demonstrate no acute abnormality. SOFT TISSUES/SKULL:  No acute abnormality of the visualized skull or soft tissues. Tiny 2-3 mm acute subdural hematoma along the right frontoparietal region which is not causing any significant mass effect. Recommend follow-up to assure stability Mild atrophy and moderate chronic microischemic disease throughout the deep white matter which is unchanged Moderate scalp hematoma along the vertex of the skull extending along the right parietal region posteriorly with no fracture seen. The findings were called to Dr. Maximilian Weiss at 10:45 p.m.     CT Cervical Spine WO Contrast    Result Date: 8/11/2021  EXAMINATION: CT OF THE CERVICAL SPINE WITHOUT CONTRAST 8/11/2021 9:44 pm TECHNIQUE: CT of the cervical spine was performed without the administration of intravenous contrast. Multiplanar reformatted images are provided for review. Dose modulation, iterative reconstruction, and/or weight based adjustment of the mA/kV was utilized to reduce the radiation dose to as low as reasonably achievable. COMPARISON: 02/19/2021. HISTORY: ORDERING SYSTEM PROVIDED HISTORY: hit in head with bar, HA TECHNOLOGIST PROVIDED HISTORY: Reason for exam:->hit in head with bar, HA Decision Support Exception - unselect if not a suspected or confirmed emergency medical condition->Emergency Medical Condition (MA) Reason for Exam: hit in head with bar, HA Acuity: Acute Type of Exam: Initial Relevant Medical/Surgical History: Head Injury (Pt. states that she shut a room door and a pull-up bar fell on her head an area of swelling noted to the mid right of head. Pt. states HA) FINDINGS: BONES/ALIGNMENT: The cervical vertebra are well aligned. There is mild disc space narrowing throughout the lower cervical spine with minimal osteophytes which is most prominent at C4-5 and C5-6 which is unchanged. There is no fracture or subluxation is seen. The atlantoaxial joint is intact. DEGENERATIVE CHANGES: The posterior elements are intact. No obvious large disc herniation is seen. SOFT TISSUES: There is no prevertebral soft tissue swelling. The lung apices are clear. There is a thyroid goiter on the left extending into the superior mediastinum.      Mild degenerative disc changes throughout the lower cervical spine which is unchanged with no acute abnormality seen Prominent thyroid goiter on the left which is unchanged. Suggest ultrasound correlation. PROCEDURES   Unless otherwise noted below, none     Procedures    CRITICAL CARE TIME   There was a high probability of life-threatening clinical deterioration in the patient's condition requiring my urgent intervention. The total critical care time spent while evaluating and treating this patient was at least 40 minutes. This excludes time spent doing separately billable procedures. This includes time at the bedside, data interpretation, medication management, obtaining critical history from collateral sources if the patient is unable to provide it directly, and physician consultation. Specifics of interventions taken and potentially life-threatening diagnostic considerations are listed in the medical decision making.    CONSULTS:  IP CONSULT TO NEUROSURGERY      EMERGENCY DEPARTMENT COURSE and DIFFERENTIAL DIAGNOSIS/MDM:   Vitals:    Vitals:    08/11/21 2330 08/12/21 0000 08/12/21 0030 08/12/21 0100   BP: 135/79 111/85 103/77 100/83   Pulse: 68 63 55 52   Resp: 15 12 16 16   Temp:       TempSrc:       SpO2: 96% 96% 97% 93%   Weight:       Height:           Patient was given the following medications:  Medications   niCARdipine (CARDENE) 25 mg in dextrose 5 % 250 mL infusion (0 mg/hr Intravenous Paused 8/12/21 0016)   0.9 % sodium chloride infusion (has no administration in time range)   acetaminophen (TYLENOL) tablet 650 mg (650 mg Oral Given 8/11/21 2126)   NIFEdipine (PROCARDIA) capsule 10 mg (10 mg Oral Given 8/11/21 2226)   labetalol (NORMODYNE;TRANDATE) injection 10 mg (10 mg Intravenous Given 8/11/21 2319)   fentaNYL (SUBLIMAZE) injection 25 mcg (25 mcg Intravenous Given 8/11/21 2355)   ondansetron (ZOFRAN) injection 4 mg (4 mg Intravenous Given 8/11/21 2354)   phytonadione (ADULT) (VITAMIN K) 5 mg in dextrose 5 % 100 mL IVPB (0 mg Intravenous Stopped 8/12/21 0106)   prothrombin complex concentrate (human) (KCENTRA) infusion 1,500 Units (0 Units Intravenous Stopped 8/12/21 0103)         Patient presents emergency department for evaluation of head injury. Patient is alert oriented no acute distress. Vitals stable and she is afebrile. Heart rate is slightly irregular without murmurs rubs or gallops. Lungs clear auscultation bilaterally. Patient has some slight swelling to right parietal scalp. No laceration that needs to be repaired. Patient is on Coumadin for history of atrial fibrillation. INR is 2.04. No focal neurologic deficits on examination. Moves all 4 extremities with normal strength and coordination. Pupils equal and reactive to light. Patient blood pressure is acutely elevated at 195/134. Patient is given an additional 10 mg of oral nifedipine which she takes at home. Patient's blood pressure does come down slightly with this medication. On CT she has a small 2 to 3 mm subdural hematoma. I consulted Dr. Gavin Pope with neurosurgery who states that the patient is able to stay at [facility] due to this being 25 hours old with reversal of her anticoagulation. Patient was given vitamin K, labetalol and started on nicardipine drip. Patient's blood pressure comes down to 100/83. Nicardipine drip is stopped. Patient continues to [facility] appropriately. Hospitalist Morgan prefers patient to be transferred to [facility]. Hospitalist Susan at [facility] accepts the patient for transfer. He requests [facility]. This is administered here at [facility]. Patient in stable condition at time of transfer. FINAL IMPRESSION      1. Subdural bleeding ([address]    2. Injury of head, initial encounter    3. Anticoagulated          DISPOSITION/PLAN   DISPOSITION Decision To Admit 08/11/2021 11:33:29 PM      PATIENT REFERRED TO:  No follow-up provider specified.     DISCHARGE MEDICATIONS:  New Prescriptions    No medications on file       DISCONTINUED MEDICATIONS:  Discontinued Medications    No medications on file              (Please note that portions of this note were completed with a voice recognition program.  Efforts were made to edit the dictations but occasionally words are mis-transcribed.)    Keri Harris PA-C (electronically signed)            Keri Harris PA-C  08/12/21 0074

## 2021-08-13 ENCOUNTER — HOSPITAL ENCOUNTER (OUTPATIENT)
Age: 71
Discharge: HOME | End: 2021-08-13
Payer: MEDICARE

## 2021-08-13 DIAGNOSIS — E87.6 HYPOKALEMIA: ICD-10-CM

## 2021-08-13 LAB
ANION GAP SERPL CALCULATED.3IONS-SCNC: 8 MMOL/L (ref 3–16)
BUN BLDV-MCNC: 12 MG/DL (ref 7–20)
CALCIUM SERPL-MCNC: 9.7 MG/DL (ref 8.3–10.6)
CHLORIDE BLD-SCNC: 100 MMOL/L (ref 99–110)
CO2: 28 MMOL/L (ref 21–32)
CREAT SERPL-MCNC: 0.7 MG/DL (ref 0.6–1.2)
ESTIMATED AVERAGE GLUCOSE: 102.5 MG/DL
GFR AFRICAN AMERICAN: >60
GFR NON-AFRICAN AMERICAN: >60
GLUCOSE BLD-MCNC: 97 MG/DL (ref 70–99)
HBA1C MFR BLD: 5.2 %
POTASSIUM SERPL-SCNC: 4.2 MMOL/L (ref 3.5–5.1)
SODIUM BLD-SCNC: 136 MMOL/L (ref 136–145)

## 2021-08-13 PROCEDURE — 80048 BASIC METABOLIC PNL TOTAL CA: CPT

## 2021-08-13 PROCEDURE — 36415 COLL VENOUS BLD VENIPUNCTURE: CPT

## 2021-08-13 NOTE — CONSULTS
NEUROSURGERY CONSULT  Yamilka Ashley  [id number]   1950 8/12/2021    Requesting physician: Adri Hedrick DO    Reason for consultation: SDH    History of present illness: Patient is a 71 yo F with a PMH of afib on warfarin, HTN, Bell's Palsy (15 years ago), CAD w/ stents (on aspirin) who presented to [facility] ED following head trauma on the evening of Tuesday 8/10/21. Patient states she hit her head Tuesday night when a pull up bar fell off the door and hit her head. She reports she had a scalp laceration that continued to bleed the next day. She had a mild headache but no changes in vision, no weakness, no numbness/tingling or difficulty speaking. She was urged to come to the ED for evaluation by her family members because she is on anticoagulation. Initial head CT revealed tiny 2-3 mm R frontoparietal hyperdensity concerning for acute SDH. INR was 2.04. Patient was given vitamin and transferred She was to [facility] for neurosurgical evaluation. On assessment today, she continues to report a headache, in r occipital region, she has no new focal neurological deficits.     ROS:   GENERAL:  Denies fever or recent illness. Denies weight changes   EYES:  Denies vision change or diplopia  EARS:  Denies hearing loss  CARDIAC:  Denies chest pain  RESPIRATORY:  Denies shortness of breath  SKIN:  Denies rash or lesions   HEM:  Denies excessive bruising  PSYCH:  Denies anxiety or depression  NEURO:  + mild headache, + chronic R facial weakness from [place] Palsy, denies numbness or tingling or lateralizing weakness   :  Denies urinary difficulty  GI: Denies nausea, vomiting, diarrhea or constipation  MUSCULOSKELETAL:  No arthralgias    Allergies   Allergen Reactions    Clonidine Rash, Shortness Of Breath and Hives    Codeine Hives, Itching, Nausea Only and Rash     Other reaction(s):  Other (See Comments)  Pruritis    Lisinopril Hives and Itching    Tramadol     Percocet [Oxycodone-Acetaminophen] Itching Patient takes percocet with benadryl to control the itching       Past Medical History:   Diagnosis Date    Acute cerebrovascular accident (CVA) ([facility] 1/28/2020    Atrial fibrillation ([facility]     Bell palsy     diagnosed 15 years ago   Zhen Wright CAD (coronary artery disease)     Cerebral artery occlusion with cerebral infarction ([facility]     Chronic diastolic CHF (congestive heart failure) ([facility] 5/16/2020    CVA (cerebral vascular accident) ([facility] 1/4/2017    Hypertension     Intracranial hemorrhage ([address] 4/2016    Nonintractable headache     Nontraumatic subcortical hemorrhage of cerebral hemisphere ([facility] 4/30/2016    Sudden visual loss of left eye     Thyroid nodule 2/8/2018    TIA involving right internal carotid artery         Past Surgical History:   Procedure Laterality Date    CARDIAC SURGERY      COLONOSCOPY      CORONARY ANGIOPLASTY WITH STENT PLACEMENT      TUBAL LIGATION Right Torn Rotator Cuff    2013 @ [address] History     Occupational History    Not on file   Tobacco Use    Smoking status: Never Smoker    Smokeless tobacco: Never Used   Vaping Use    Vaping Use: Never used   Substance and Sexual Activity    Alcohol use: No    Drug use: No    Sexual activity: Yes     Partners: Male        No family history on file. No outpatient medications have been marked as taking for the 8/12/21 encounter [facility] Encounter). No current facility-administered medications for this encounter.      Current Outpatient Medications   Medication Sig Dispense Refill    Magnesium 500 MG CAPS Take 400 mg by mouth daily 90 capsule 3    losartan-hydroCHLOROthiazide (HYZAAR) 100-25 MG per tablet Take 1 tablet by mouth daily 90 tablet 1    spironolactone (ALDACTONE) 50 MG tablet Take 1 tablet by mouth 2 times daily TAKE 1 TABLET BY MOUTH DAILY 90 tablet 1    potassium chloride (KLOR-CON M) 20 MEQ extended release tablet Take 0.5 tablets by mouth daily 90 tablet 1    NIFEdipine (PROCARDIA XL) 90 MG extended release tablet Take 1 tablet by mouth daily 90 tablet 1    furosemide (LASIX) 20 MG tablet TAKE 1 TABLET BY MOUTH TWICE DAILY 180 tablet 1    famotidine (PEPCID) 20 MG tablet Take 1 tablet by mouth 2 times daily 60 tablet 2    atorvastatin (LIPITOR) 80 MG tablet Take 1 tablet by mouth daily 90 tablet 1    acetaminophen (TYLENOL) 500 MG tablet Take 2 tablets by mouth every 6 hours as needed for Pain 120 tablet 3    Calcium Carb-Cholecalciferol (CALCIUM + D3) 600-200 MG-UNIT TABS tablet Take 1 tablet by mouth 2 times daily 120 tablet 3      Objective:  BP (!) 135/98   Pulse 69   Temp 97.6 °F (36.4 °C) (Oral)   Resp 18   Ht 5' 9\" (1.753 m)   Wt 182 lb 1.6 oz (82.6 kg)   SpO2 99%   BMI 26.89 kg/m²     Physical Exam:  Patient seen and examined   General: Well developed. Alert and cooperative in no acute distress. HENT: atraumatic, neck supple  Eyes: Optic discs: Not tested  Pulmonary: unlabored respiratory effort  Cardiovascular:  Warm well perfused. No peripheral edema  Gastrointestinal: abdomen soft, NT, ND    Neurologic Exam:  Neurological:  Mental Status: Awake, alert, oriented x 4, speech clear and appropriate  Attention: Intact  Language: No aphasia or dysarthria noted  Sensation: Intact to all extremities to light touch  Coordination: Intact    Cranial Nerves:  Cranial Nerves:  II: Visual acuity not tested, denies new visual changes / diplopia  III, IV, VI: PERRL, 3 mm bilaterally, EOMI, R eye ptosis   V: chronic right facial weakness   VII: Face symmetric  VIII: Hearing intact bilaterally to spoken voice  IX: Palate movement equal bilaterally  XI: Shoulder shrug equal bilaterally  XII: Tongue midline    Musculoskeletal:   Gait: Not tested   Assist devices: None   Tone: normal   Motor strength:    Right  Left    Right  Left    Deltoid  5 5  Hip Flex  5 5   Biceps  5 5  Knee Extensors  5 5   Triceps  5 5  Knee Flexors  5 5   Wrist Ext  5 5  Ankle Dorsiflex.   5 5   Wrist Flex  5 5 Ankle Plantarflex. 5 5   Handgrip  5 5  Ext Eladio Longus  5 5   Thumb Ext  5 5         Radiological Findings: I Personally reviewed the patient's imaging which consists of CTs of the head dated 8/11/2021. These demonstrate questionable tiny extra-axial hyperdensities consistent with possible tiny subdural hematoma in the right frontoparietal region. This is absent on repeat imaging. Labs  Recent Labs     08/12/21  0626         CO2 25   BUN 13   CREATININE 1.0   GLUCOSE 101*   MG 1.80     Recent Labs     08/11/21  2310 08/12/21  0626   WBC 5.0 4.3   RBC 4.99 4.08   INR 2.04* 1.36*       Patient Active Problem List    Diagnosis Date Noted    Acute subdural hematoma (HCC) 08/12/2021    Hypomagnesemia 02/25/2021    Hypokalemia 02/25/2021    Essential hypertension 10/01/2020    Bradycardia 05/15/2020    Chronic heart failure with preserved ejection fraction ([address] 02/03/2020    Thyroid nodule 02/08/2018    LVH (left ventricular hypertrophy) 11/18/2016    Coronary artery disease involving native coronary artery of native heart without angina pectoris 10/26/2015    Chest pain 09/21/2015    Uncontrolled hypertension 09/21/2015    Permanent atrial fibrillation ([address] 02/13/2015       Assessment:  71 yo female who is on warfarin and aspirin for atrial fibrillation/CAD, admitted with complaints of head trauma (pull up bar fell on head). Initial head CT with tiny acute R frontoparietal SDH, hyperdensity in question not evident on repeat head CT. Plan:        1. No neurosurgical intervention indicated        2. Neurologic exams frequency: q 4 hours   3. Follow up head CT stable. No further imaging unless there is a decline in neurologic assessment  4. Maintain SBP <160  5. Keep Plt >100k & INR <1.4  6. Hold all anticoagulation & antiplatelet for 2 weeks  7. SCDs for DVT prophylaxis, ok for SQ heparin 24 hours after stable head CT   8.  Follow up with PCP in 2 weeks prior to resuming warfarin and aspirin   10. Thanks for consult. We will sign off, no neurosurgical follow up necessary.  Please call w/ questions or decline in mental status     Azael Baig MD, PhD  [address], [address], [address] [phone] (c), [phone] (o)

## 2021-08-16 ENCOUNTER — OFFICE VISIT (OUTPATIENT)
Dept: INTERNAL MEDICINE CLINIC | Age: 71
End: 2021-08-16
Payer: COMMERCIAL

## 2021-08-16 VITALS
TEMPERATURE: 97.7 F | WEIGHT: 184 LBS | HEIGHT: 67 IN | HEART RATE: 75 BPM | OXYGEN SATURATION: 97 % | BODY MASS INDEX: 28.88 KG/M2 | SYSTOLIC BLOOD PRESSURE: 170 MMHG | DIASTOLIC BLOOD PRESSURE: 121 MMHG

## 2021-08-16 DIAGNOSIS — G44.309 POST-TRAUMATIC HEADACHE, NOT INTRACTABLE, UNSPECIFIED CHRONICITY PATTERN: ICD-10-CM

## 2021-08-16 DIAGNOSIS — S06.5XAA ACUTE SUBDURAL HEMATOMA: Primary | ICD-10-CM

## 2021-08-16 DIAGNOSIS — I48.21 PERMANENT ATRIAL FIBRILLATION (HCC): ICD-10-CM

## 2021-08-16 DIAGNOSIS — Z12.11 ENCOUNTER FOR SCREENING COLONOSCOPY: ICD-10-CM

## 2021-08-16 PROCEDURE — 99213 OFFICE O/P EST LOW 20 MIN: CPT | Performed by: STUDENT IN AN ORGANIZED HEALTH CARE EDUCATION/TRAINING PROGRAM

## 2021-08-16 RX ORDER — HYDROCODONE BITARTRATE AND ACETAMINOPHEN 5; 325 MG/1; MG/1
1 TABLET ORAL EVERY 4 HOURS PRN
Qty: 30 TABLET | Refills: 0 | Status: SHIPPED | OUTPATIENT
Start: 2021-08-16 | End: 2021-08-21

## 2021-08-16 ASSESSMENT — ENCOUNTER SYMPTOMS
RESPIRATORY NEGATIVE: 1
NAUSEA: 0
APNEA: 0
CHEST TIGHTNESS: 0
ABDOMINAL DISTENTION: 0
EYES NEGATIVE: 1
WHEEZING: 0
EYE PAIN: 0
CHOKING: 0
ABDOMINAL PAIN: 0
STRIDOR: 0
GASTROINTESTINAL NEGATIVE: 1
PHOTOPHOBIA: 0
DIARRHEA: 0
COUGH: 0
SHORTNESS OF BREATH: 0
VOMITING: 0

## 2021-08-16 NOTE — PROGRESS NOTES
Outpatient Clinic Visit Note    Patient: Carlos Flanagan  : 1950 (72 y.o.)  Date: 2021    CC: F/U from hospital visit     HPI:    The patient is here for a f/U visit from a hospitalization in which she sustained a subdural hematoma from getting hit in the head with a dumbbell. The patient still endorses a headache that does not get relief with tylenol. She still endorses neck pain on the left side, with tenderness over the area of trauma. She denies vision change, and complains of some balance issues but denies room spinning or falling. No change in vision. Has been off aspiirn and warfarin fo 2 weeks for A fib. ASA for CAD per neurosurgery. ROS:  Review of Systems   Constitutional: Negative. Negative for chills, diaphoresis and fever. HENT: Negative. Eyes: Negative. Negative for photophobia, pain and visual disturbance. Respiratory: Negative. Negative for apnea, cough, choking, chest tightness, shortness of breath, wheezing and stridor. Cardiovascular: Negative. Negative for chest pain, palpitations and leg swelling. Gastrointestinal: Negative. Negative for abdominal distention, abdominal pain, diarrhea, nausea and vomiting. Genitourinary: Negative. Negative for dysuria. Musculoskeletal: Positive for neck pain and neck stiffness. Negative for myalgias. Neurological: Positive for headaches. Negative for dizziness, tremors, seizures, syncope, facial asymmetry, speech difficulty, weakness, light-headedness and numbness. Psychiatric/Behavioral: Negative.         Past Medical History:    Past Medical History:   Diagnosis Date    Acute cerebrovascular accident (CVA) ([address] 2020    Atrial fibrillation ([address]     Bell palsy     diagnosed 15 years ago   Jaqui Collins CAD (coronary artery disease)     Cerebral artery occlusion with cerebral infarction ([address]     Chronic diastolic CHF (congestive heart failure) ([address] 2020    CVA (cerebral vascular accident) ([address] 2017    Lisinopril, Tramadol, and Percocet [oxycodone-acetaminophen]    Family History:   No family history on file. Social History:  Social History     Tobacco Use    Smoking status: Never Smoker    Smokeless tobacco: Never Used   Vaping Use    Vaping Use: Never used   Substance Use Topics    Alcohol use: No    Drug use: No       Data: Old records have been reviewed electronically. PHYSICAL EXAM:  BP (!) 170/121 (Site: Right Upper Arm, Position: Sitting, Cuff Size: Medium Adult)   Pulse 75   Temp 97.7 °F (36.5 °C) (Temporal)   Ht 5' 7\" (1.702 m)   Wt 184 lb (83.5 kg)   SpO2 97%   BMI 28.82 kg/m²   Physical Exam  Constitutional:       General: She is not in acute distress. Appearance: She is not ill-appearing, toxic-appearing or diaphoretic. HENT:      Head: Normocephalic and atraumatic. Eyes:      General: No scleral icterus. Right eye: No discharge. Left eye: No discharge. Extraocular Movements: Extraocular movements intact. Conjunctiva/sclera: Conjunctivae normal.      Pupils: Pupils are equal, round, and reactive to light. Cardiovascular:      Rate and Rhythm: Normal rate. Rhythm irregular. Pulses: Normal pulses. Heart sounds: Normal heart sounds. No murmur heard. No friction rub. No gallop. Pulmonary:      Effort: Pulmonary effort is normal. No respiratory distress. Breath sounds: Normal breath sounds. No wheezing. Abdominal:      General: Abdomen is flat. Bowel sounds are normal. There is no distension. Palpations: Abdomen is soft. Musculoskeletal:         General: No swelling. Cervical back: Normal range of motion and neck supple. No rigidity. Right lower leg: Edema present. Left lower leg: Edema present. Neurological:      General: No focal deficit present. Mental Status: She is alert and oriented to person, place, and time. Mental status is at baseline. Cranial Nerves: No cranial nerve deficit.       Sensory: No sensory deficit. Motor: No weakness. Coordination: Coordination normal.      Gait: Gait normal.      Deep Tendon Reflexes: Reflexes normal.   Psychiatric:         Mood and Affect: Mood normal.         Behavior: Behavior normal.         Thought Content: Thought content normal.         Health Maintanence:  Health Maintenance   Topic Date Due    Colon cancer screen colonoscopy  Never done    Shingles Vaccine (1 of 2) Never done    Pneumococcal 65+ years Vaccine (1 of 1 - PPSV23) 12/27/2016    DTaP/Tdap/Td vaccine (2 - Td or Tdap) 12/20/2017    Annual Wellness Visit (AWV)  Never done    Flu vaccine (1) 09/01/2021    Lipid screen  08/12/2022    Potassium monitoring  08/13/2022    Creatinine monitoring  08/13/2022    Breast cancer screen  09/04/2022    DEXA (modify frequency per FRAX score)  Completed    COVID-19 Vaccine  Completed    Hepatitis C screen  Completed    Hepatitis A vaccine  Aged Out    Hepatitis B vaccine  Aged Out    Hib vaccine  Aged Out    Meningococcal (ACWY) vaccine  Aged Out       Assessment & Plan:      Theopolis Peabody was seen today for follow-up from hospital.    Diagnoses and all orders for this visit:    Acute subdural hematoma [facility]): Has 3mm subdural hematoma after getting hit in head with dumbbell. Was on warfarin for A fib. Saw neurosurgery, that recommended 2 weeks before starting back her AC for A fib. Still endorse mild headache.   -     HYDROcodone-acetaminophen (NORCO) 5-325 MG per tablet; Take 1 tablet by mouth every 4 hours as needed for Pain for up to 5 days. Intended supply: 5 days.  Take lowest dose possible to manage pain  -Will see her again in 2 weeks to reassess   - Still needs to wait another 8 days before restarting AC     Permanent atrial fibrillation (HCC):CHads Vasc 4.   -Can resume warfarin in 1 week according to neurosurgery     Encounter for screening colonoscopy  -     AFL - Will Werner MD, Gastroenterology, [facility]    Post-traumatic headache, not intractable, unspecified chronicity pattern  -     HYDROcodone-acetaminophen (NORCO) 5-325 MG per tablet; Take 1 tablet by mouth every 4 hours as needed for Pain for up to 5 days. Intended supply: 5 days.  Take lowest dose possible to manage pain      Dispo: Pt has been staffed with Dr. Jefry Miller   _______________  Lelon Olszewski, MD  Internal Medicine, PGY-3  8/16/2021 9:05 AM

## 2021-08-23 ENCOUNTER — TELEPHONE (OUTPATIENT)
Dept: PHARMACY | Age: 71
End: 2021-08-23

## 2021-08-23 NOTE — TELEPHONE ENCOUNTER
Pt was in the hospital 8/12 for brain bleed. Was told to hold antiplatelet and anticoagulants for 2 weeks and f/u with PCP. Pt did f/u with PCP who OK pt to resume warfarin after additional 8 days. Returned call to patient. Verified she was authorized to resume warfarin 8/26. Advised for her to resume warfarin taking 2.5 mg on Tues, Thurs and Sat and 5 mg all other days of the week (hx weekly dose prior to bleed).  R/s pt to [facility] 9/1

## 2021-08-23 NOTE — TELEPHONE ENCOUNTER
----- Message from Nura Arroyo sent at 8/23/2021 11:36 AM EDT -----  Pt called stating she was admitted to [facility] d/t brain bleed. Has been holding warfarin, was instructed to restart on 8/26. Explained I would have a pharmacist call her to discuss admission and r/s CC appt.   [phone]

## 2021-09-01 ENCOUNTER — ANTI-COAG VISIT (OUTPATIENT)
Dept: PHARMACY | Age: 71
End: 2021-09-01
Payer: COMMERCIAL

## 2021-09-01 DIAGNOSIS — I48.21 PERMANENT ATRIAL FIBRILLATION (HCC): Primary | ICD-10-CM

## 2021-09-01 LAB — INTERNATIONAL NORMALIZATION RATIO, POC: 1.6

## 2021-09-01 PROCEDURE — 85610 PROTHROMBIN TIME: CPT

## 2021-09-01 PROCEDURE — 99212 OFFICE O/P EST SF 10 MIN: CPT

## 2021-09-01 NOTE — PROGRESS NOTES
Ms. Edna Delatorre is a 72 y.o. y/o female with history of A fib   She presents today for anticoagulation monitoring and adjustment. 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)    Patient Reported Findings:  Yes     No   [x]   []       Patient verifies current dosing regimen as listed -pt states she takes 5mg on MWF and Sun and 2.5mg Tues Thurs and Sat ---> states took 2.5mg Tues and Thurs only   []   [x]       S/S bleeding/bruising/swelling/SOB -denies--->none, HA improving       []   [x]       Blood in urine or stool - denies   []   [x]       Procedures scheduled in the future at this time - Is being assessed for watchman, will keep notified    []   [x]       Missed Dose-  Was off for 2 weeks, resumed 8/26   []   [x]       Extra Dose - denies    []   [x]       Change in medications She continues with Tylenol 1000mg but only as needed --> states that she will take up to q6h---> continues with 4 tylenol daily--> nifedipine increased to 90, spironolactone 50mg bid. hyzaar 100-25mg daily --> no changes---> was taking pain meds, states no more, taking tylenol for HAs   []   [x]       Change in health/diet/appetite-- normally has high vitamin K diet of canned spinach weekly and collards or broccoli about every other week. Will have some lower vitamin K veggies about 2 times a week such as green beans. -->  continues with no appetite, has had a small amount of diarrhea --> states that she has been eating liver twice a week, likely why INR has dropped. Asked patient to decrease to once a week --> states that she has not been eating as much.  Stopped eating liver ---> no greens, no NVD--> 1/2 cup of greens yesterday, stopped eating liver---> no vit k in last 2 weeks --> no changes, no NVD---> no greens, wants to add greens in once/week, no NVD---> continues to eat salad once/week, no NVD---> states had more greens with holiday, no NVD---> no changes, no NVD---> no greens, no NVD---> no changes, no NVD  []   [x] Change in alcohol use denies  []   [x]       Change in activity  []   [x]       Hospital admission- Pt was in the hospital 8/12 for brain bleed after getting hit in head with dumbbell (INR 2.04). Was told to hold antiplatelet and anticoagulants for 2 weeks and f/u with PCP. Pt did f/u with PCP who OK pt to resume warfarin after additional 8 days.      Returned call to patient. Verified she was authorized to resume warfarin 8/26. Advised for her to resume warfarin taking 2.5 mg on Tues, Thurs and Sat and 5 mg all other days of the week (hx weekly dose prior to bleed). R/s pt to [facility] 9/1   [x]   []       Emergency department visit in ED 3/19 for leg pain. INR 1.41. had forgotten meds. no acute injuries. referred to orthopedist. was given prednisone 40 mg x 3 days and norco x 3 days. []   [x]       Other complaints- states that she is using the pillbox, and she likes it. -> has not been using recently, but wants to restart ---> states she tried to use pillbox, asked to use pillbox and AVS and to cross off each day on AVS to prevent her from missing doses. Concerned for patient's ability to correctly remember recent history. She states that she is getting concerned for her memory. Clinical Outcomes:  Yes     No  []   [x]       Major bleeding event  []   [x]       Thromboembolic event  Duration of warfarin Therapy: indefinitely  INR Range:  1.8-2.5 -> lower INR goal dt h/o subcortical hemorrhage (2016)    She may be slower to reach steady state with warfarin dosing so consider checking INR about 10 days after dose adjustment. INR is 1.6 today after resuming warfarin 8/26- took 2.5mg Tues and Thurs and 5mg all other days instead of instructed 2.5mg Tues/Thurs/Sat. Since recent brain bleed, will go back to previous dose before hospitalization and monitor closely. Take 2.5mg on Tues, Thurs and Sat and 5mg all other days  Asked pt to use paperwork to check off doses and bring back to next appt. Encouraged pt to pay attention to dosing in order to not miss doses.    Recheck INR in 1 week, 9/8 per pt request for time    Referring cardiologist is Dr. Kaye Hernandez  INR (no units)   Date Value   09/01/2021 1.6   08/12/2021 1.36 (H)   08/11/2021 2.04 (H)   07/29/2021 1.7   07/20/2021 1.3   07/02/2021 1.9   03/19/2021 1.41 (H)   02/19/2021 1.50 (H)   For Pharmacy Admin Tracking Only     Intervention Detail: Dose Adjustment: 1, reason: Therapy Optimization   Total # of Interventions Recommended: 1   Total # of Interventions Accepted: 1   Time Spent (min): 15

## 2021-09-02 ENCOUNTER — OFFICE VISIT (OUTPATIENT)
Dept: INTERNAL MEDICINE CLINIC | Age: 71
End: 2021-09-02
Payer: COMMERCIAL

## 2021-09-02 VITALS
SYSTOLIC BLOOD PRESSURE: 132 MMHG | TEMPERATURE: 98.1 F | HEART RATE: 91 BPM | OXYGEN SATURATION: 99 % | BODY MASS INDEX: 28.68 KG/M2 | WEIGHT: 183.1 LBS | RESPIRATION RATE: 18 BRPM | DIASTOLIC BLOOD PRESSURE: 86 MMHG

## 2021-09-02 DIAGNOSIS — S06.5XAA SUBDURAL HEMATOMA: ICD-10-CM

## 2021-09-02 DIAGNOSIS — R51.9 INTRACTABLE HEADACHE, UNSPECIFIED CHRONICITY PATTERN, UNSPECIFIED HEADACHE TYPE: ICD-10-CM

## 2021-09-02 DIAGNOSIS — R05.9 COUGH: Primary | ICD-10-CM

## 2021-09-02 PROCEDURE — 99213 OFFICE O/P EST LOW 20 MIN: CPT | Performed by: STUDENT IN AN ORGANIZED HEALTH CARE EDUCATION/TRAINING PROGRAM

## 2021-09-02 RX ORDER — GUAIFENESIN 100 MG/5ML
10 SYRUP ORAL EVERY 4 HOURS PRN
Qty: 1 EACH | Refills: 0 | Status: ON HOLD | OUTPATIENT
Start: 2021-09-02 | End: 2022-06-20

## 2021-09-02 ASSESSMENT — ENCOUNTER SYMPTOMS
COUGH: 1
DIARRHEA: 0
SHORTNESS OF BREATH: 1
ABDOMINAL PAIN: 0
RHINORRHEA: 1

## 2021-09-02 NOTE — PROGRESS NOTES
Outpatient Clinic Established Patient Note    Patient: Sin Dwyer  : 1950 (72 y.o.)  Date: 2021    CC: 2 week f/u     HPI:      71 y/o F with PMH of HTN, traumatic SDH (2021), ICH (2018), afib (on warfarin), CAD s/p PCI,  GERD, HLD, HFpEF who presents for 2 week f/u     She current is complaining of a productive cough and runny nose with associated sinus/tension type headache of 3-4 days duration. She reports fever with one temp of 101 2 days ago. She has been having some myalgias. Cough was clear and is now having some yellow mucus. Denies diarrhea, loss of taste or smell. She does report some dyspnea on exertion that is chronic but slightly worse since symptom onset. She does not smoke nor has any respiratory conditions. She is retired and lives with 15 y/o great grandson who goes to school and has not been sick. She denies any sick contacts. Denies history of pneumonia. She reports being vaccinated for covid with moderna in April/may     SDH  As for her SDH she restarted her warfarin on . Has not noticed worsening headache, neuro symptoms or blood in stool since restarted AC  Her headache completely resolved soon after her previous visit and her balance issues resolved. SBP have been <140      Home Meds:  Prior to Visit Medications    Medication Sig Taking?  Authorizing Provider   guaiFENesin (ALTARUSSIN) 100 MG/5ML syrup Take 10 mLs by mouth every 4 hours as needed for Cough Yes Power Salazar MD   losartan-hydroCHLOROthiazide (HYZAAR) 100-25 MG per tablet Take 1 tablet by mouth daily Yes Ernestine Og MD   spironolactone (ALDACTONE) 50 MG tablet Take 1 tablet by mouth 2 times daily TAKE 1 TABLET BY MOUTH DAILY Yes Ernestine Og MD   potassium chloride (KLOR-CON M) 20 MEQ extended release tablet Take 0.5 tablets by mouth daily Yes Ernestine Og MD   NIFEdipine (PROCARDIA XL) 90 MG extended release tablet Take 1 tablet by mouth daily Yes Ernestine Og MD   furosemide (LASIX) 20 MG tablet TAKE 1 TABLET BY MOUTH TWICE DAILY Yes Makenna Dick MD   famotidine (PEPCID) 20 MG tablet Take 1 tablet by mouth 2 times daily Yes Makenna Dick MD   atorvastatin (LIPITOR) 80 MG tablet Take 1 tablet by mouth daily Yes Makenna Dick MD   acetaminophen (TYLENOL) 500 MG tablet Take 2 tablets by mouth every 6 hours as needed for Pain Yes Makenna Dick MD   Calcium Carb-Cholecalciferol (CALCIUM + D3) 600-200 MG-UNIT TABS tablet Take 1 tablet by mouth 2 times daily Yes Makenna Dick MD   Magnesium 500 MG CAPS Take 400 mg by mouth daily  Jeb Pan MD       Allergies:    Clonidine, Codeine, Lisinopril, Tramadol, and Percocet [oxycodone-acetaminophen]    Health Maintenance Due   Topic Date Due    Shingles Vaccine (1 of 2) Never done    Pneumococcal 65+ years Vaccine (1 of 1 - PPSV23) 12/27/2016    DTaP/Tdap/Td vaccine (2 - Td or Tdap) 12/20/2017    Annual Wellness Visit (AWV)  Never done    Flu vaccine (1) 09/01/2021       Immunization History   Administered Date(s) Administered    COVID-19, Moderna, PF, 100mcg/0.5mL 04/27/2021, 05/25/2021    Influenza Vaccine, unspecified formulation 02/10/2007, 12/20/2007, 01/30/2008    Pneumococcal Polysaccharide (Nkivzbily89) 10/01/2007    Tdap (Boostrix, Adacel) 12/20/2007       Review of Systems   Constitutional: Positive for chills and fever. HENT: Positive for congestion and rhinorrhea. Respiratory: Positive for cough and shortness of breath. Cardiovascular: Negative for chest pain, palpitations and leg swelling. Gastrointestinal: Negative for abdominal pain and diarrhea. Neurological: Positive for headaches. A 10-organ Review Of Systems was obtained and otherwise unremarkable except as per HPI. Data: Old records have been reviewed electronically.     PHYSICAL EXAM:  /86   Pulse 91   Temp 98.1 °F (36.7 °C) (Temporal)   Resp 18   Wt 183 lb 1.6 oz (83.1 kg)   SpO2 99%   BMI 28.68 kg/m²   Physical Exam  HENT:      Nose: No rhinorrhea. Mouth/Throat:      Mouth: Mucous membranes are moist.      Pharynx: Oropharynx is clear. No oropharyngeal exudate or posterior oropharyngeal erythema. Cardiovascular:      Rate and Rhythm: Normal rate. Rhythm irregular. Pulses: Normal pulses. Pulmonary:      Effort: Pulmonary effort is normal. No respiratory distress. Breath sounds: Normal breath sounds. No wheezing. Comments: No crackles or wheezing   Does appear to have a congested cough   Neurological:      Mental Status: She is alert and oriented to person, place, and time. Assessment & Plan:      1. Cough  Clear productive cough with associated fever, myalgias, rhinorrhea. Denies sore throat. No sick contacts, is vaccinated for covid. Current sats are 98% and lungs are clear to auscultation. She likely has a viral infection which may be covid-19.   - cough syrup to help with cough and congestion  - tylenol or ibuprofen prn for fever  - recommended supportive care including facial steam to further help with congestion   - get covid tested Covid-19 Ambulatory; Future  - counseled on need to quarantine until covid test results become available   - if symptoms do not improve and breathing gets worse she should call  the office or go to the emergency department     2. Intractable headache, unspecified chronicity pattern, unspecified headache type  New headache started 3 days ago, appears to be tension type and intermittent likely related with congestion and exacerbated by cough   - cough syrup to help with cough and congestion   - tylenol or ibuprofen as needed    3. Subdural hematoma (HCC)  Stable, headache has resolved. Denies any vision changes, weakness, neck pain or balance issues. - continue with [facility] for afib     Return if symptoms worsen or fail to improve call office or go to ED.     Dispo: Pt has been staffed with Dr. Clara Oden  _______________  Aurora Jacinto MD, 9/2/2021 10:29 AM   PGY-2

## 2021-09-02 NOTE — PATIENT INSTRUCTIONS
You likely have a viral infection. It may be covid. - take cough syrup as needed for cough and congestion  - take ibuprofen or tylenol as needed for ever   - get covid test done as soon as possible at nearest [facility] or [facility] also does them, or [facility].    - quarantine until covid test results become available   - if symptoms do not improve and breathing gets worse please call the office or go to the emergency department   Call for follow up in about 2-3-weeks

## 2021-09-10 ENCOUNTER — FOLLOWUP TELEPHONE ENCOUNTER (OUTPATIENT)
Dept: ICU | Age: 71
End: 2021-09-10

## 2021-09-15 ENCOUNTER — TELEPHONE (OUTPATIENT)
Dept: PHARMACY | Age: 71
End: 2021-09-15

## 2021-09-16 ENCOUNTER — ANTI-COAG VISIT (OUTPATIENT)
Dept: PHARMACY | Age: 71
End: 2021-09-16
Payer: COMMERCIAL

## 2021-09-16 DIAGNOSIS — I48.21 PERMANENT ATRIAL FIBRILLATION (HCC): Primary | ICD-10-CM

## 2021-09-16 LAB — INTERNATIONAL NORMALIZATION RATIO, POC: 1.9

## 2021-09-16 PROCEDURE — 85610 PROTHROMBIN TIME: CPT

## 2021-09-16 PROCEDURE — 99211 OFF/OP EST MAY X REQ PHY/QHP: CPT
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
## 2021-09-23 ENCOUNTER — ANTI-COAG VISIT (OUTPATIENT)
Dept: PHARMACY | Age: 71
End: 2021-09-23
Payer: COMMERCIAL

## 2021-09-23 DIAGNOSIS — I48.21 PERMANENT ATRIAL FIBRILLATION (HCC): Primary | ICD-10-CM

## 2021-09-23 LAB — INTERNATIONAL NORMALIZATION RATIO, POC: 2.8

## 2021-09-23 PROCEDURE — 85610 PROTHROMBIN TIME: CPT

## 2021-09-23 PROCEDURE — 99211 OFF/OP EST MAY X REQ PHY/QHP: CPT
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
## 2021-10-01 ENCOUNTER — TELEPHONE (OUTPATIENT)
Dept: PHARMACY | Age: 71
End: 2021-10-01

## 2021-10-05 ENCOUNTER — ANTI-COAG VISIT (OUTPATIENT)
Dept: PHARMACY | Age: 71
End: 2021-10-05
Payer: COMMERCIAL

## 2021-10-05 DIAGNOSIS — I48.21 PERMANENT ATRIAL FIBRILLATION (HCC): Primary | ICD-10-CM

## 2021-10-05 LAB — INTERNATIONAL NORMALIZATION RATIO, POC: 1.8

## 2021-10-05 PROCEDURE — 99211 OFF/OP EST MAY X REQ PHY/QHP: CPT

## 2021-10-05 PROCEDURE — 85610 PROTHROMBIN TIME: CPT
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
## 2021-10-19 ENCOUNTER — ANTI-COAG VISIT (OUTPATIENT)
Dept: PHARMACY | Age: 71
End: 2021-10-19
Payer: COMMERCIAL

## 2021-10-19 DIAGNOSIS — I48.21 PERMANENT ATRIAL FIBRILLATION (HCC): Primary | ICD-10-CM

## 2021-10-19 LAB — INTERNATIONAL NORMALIZATION RATIO, POC: 2.7

## 2021-10-19 PROCEDURE — 85610 PROTHROMBIN TIME: CPT

## 2021-10-19 PROCEDURE — 99211 OFF/OP EST MAY X REQ PHY/QHP: CPT
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
## 2021-11-02 ENCOUNTER — ANTI-COAG VISIT (OUTPATIENT)
Dept: PHARMACY | Age: 71
End: 2021-11-02
Payer: COMMERCIAL

## 2021-11-02 DIAGNOSIS — I48.21 PERMANENT ATRIAL FIBRILLATION (HCC): Primary | ICD-10-CM

## 2021-11-02 LAB — INTERNATIONAL NORMALIZATION RATIO, POC: 2.6

## 2021-11-02 PROCEDURE — 99211 OFF/OP EST MAY X REQ PHY/QHP: CPT

## 2021-11-02 PROCEDURE — 85610 PROTHROMBIN TIME: CPT
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
## 2021-11-23 ENCOUNTER — ANTI-COAG VISIT (OUTPATIENT)
Dept: PHARMACY | Age: 71
End: 2021-11-23
Payer: COMMERCIAL

## 2021-11-23 DIAGNOSIS — I48.21 PERMANENT ATRIAL FIBRILLATION (HCC): Primary | ICD-10-CM

## 2021-11-23 LAB — INTERNATIONAL NORMALIZATION RATIO, POC: 4.8

## 2021-11-23 PROCEDURE — 85610 PROTHROMBIN TIME: CPT

## 2021-11-23 PROCEDURE — 99211 OFF/OP EST MAY X REQ PHY/QHP: CPT
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
## 2021-12-01 ENCOUNTER — ANTI-COAG VISIT (OUTPATIENT)
Dept: PHARMACY | Age: 71
End: 2021-12-01
Payer: COMMERCIAL

## 2021-12-01 DIAGNOSIS — I48.21 PERMANENT ATRIAL FIBRILLATION (HCC): Primary | ICD-10-CM

## 2021-12-01 LAB — INTERNATIONAL NORMALIZATION RATIO, POC: 2.5

## 2021-12-01 PROCEDURE — 85610 PROTHROMBIN TIME: CPT

## 2021-12-01 PROCEDURE — 99211 OFF/OP EST MAY X REQ PHY/QHP: CPT

## 2021-12-01 NOTE — TELEPHONE ENCOUNTER
Warfarin prescription phoned into [facility] to [address] under [address]. Magda Andrade  Warfarin 5 mg tabs  Take 2.5 mg on Tues and Thur and take 5 mg all other days  90 days   2 refills    Elton Burgess, PharmD    PGY1 Pharmacy Resident   [id number]
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
## 2021-12-15 ENCOUNTER — ANTI-COAG VISIT (OUTPATIENT)
Dept: PHARMACY | Age: 71
End: 2021-12-15
Payer: COMMERCIAL

## 2021-12-15 DIAGNOSIS — I48.21 PERMANENT ATRIAL FIBRILLATION (HCC): Primary | ICD-10-CM

## 2021-12-15 LAB — INTERNATIONAL NORMALIZATION RATIO, POC: 2.7

## 2021-12-15 PROCEDURE — 85610 PROTHROMBIN TIME: CPT

## 2021-12-15 PROCEDURE — 99211 OFF/OP EST MAY X REQ PHY/QHP: CPT
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
## 2021-12-29 ENCOUNTER — ANTI-COAG VISIT (OUTPATIENT)
Dept: PHARMACY | Age: 71
End: 2021-12-29
Payer: COMMERCIAL

## 2021-12-29 DIAGNOSIS — I48.21 PERMANENT ATRIAL FIBRILLATION (HCC): Primary | ICD-10-CM

## 2021-12-29 LAB — INTERNATIONAL NORMALIZATION RATIO, POC: 3.6

## 2021-12-29 PROCEDURE — 99211 OFF/OP EST MAY X REQ PHY/QHP: CPT

## 2021-12-29 PROCEDURE — 85610 PROTHROMBIN TIME: CPT
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
## 2022-01-12 ENCOUNTER — ANTI-COAG VISIT (OUTPATIENT)
Dept: PHARMACY | Age: 72
End: 2022-01-12
Payer: MEDICARE

## 2022-01-12 DIAGNOSIS — I48.21 PERMANENT ATRIAL FIBRILLATION (HCC): Primary | ICD-10-CM

## 2022-01-12 LAB — INTERNATIONAL NORMALIZATION RATIO, POC: 4.5

## 2022-01-12 PROCEDURE — 85610 PROTHROMBIN TIME: CPT

## 2022-01-12 PROCEDURE — 99212 OFFICE O/P EST SF 10 MIN: CPT
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
## 2022-01-26 ENCOUNTER — ANTI-COAG VISIT (OUTPATIENT)
Dept: PHARMACY | Age: 72
End: 2022-01-26
Payer: MEDICARE

## 2022-01-26 DIAGNOSIS — I48.21 PERMANENT ATRIAL FIBRILLATION (HCC): Primary | ICD-10-CM

## 2022-01-26 LAB — INTERNATIONAL NORMALIZATION RATIO, POC: 2

## 2022-01-26 PROCEDURE — 99211 OFF/OP EST MAY X REQ PHY/QHP: CPT

## 2022-01-26 PROCEDURE — 85610 PROTHROMBIN TIME: CPT
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
## 2022-02-16 ENCOUNTER — TELEPHONE (OUTPATIENT)
Dept: PHARMACY | Age: 72
End: 2022-02-16

## 2022-02-16 NOTE — TELEPHONE ENCOUNTER
Pt missed appt this morning, r/s for later this morning at 1045, did not show for that appt. Called pt to r/s , she apologized said she forgot about the 1045 appt .  R/s on 2/17

## 2022-02-18 NOTE — TELEPHONE ENCOUNTER
Patient did not show for her 2/17. Tried x2 to call her to reschedule. Phone is not working. Tried to call patient's daughter. Mailbox is full and I could not leave a message.

## 2022-02-23 ENCOUNTER — ANTI-COAG VISIT (OUTPATIENT)
Dept: PHARMACY | Age: 72
End: 2022-02-23
Payer: MEDICARE

## 2022-02-23 DIAGNOSIS — I48.21 PERMANENT ATRIAL FIBRILLATION (HCC): Primary | ICD-10-CM

## 2022-02-23 LAB — INTERNATIONAL NORMALIZATION RATIO, POC: 2.6

## 2022-02-23 PROCEDURE — 99211 OFF/OP EST MAY X REQ PHY/QHP: CPT

## 2022-02-23 PROCEDURE — 85610 PROTHROMBIN TIME: CPT
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
## 2022-03-10 ENCOUNTER — APPOINTMENT (OUTPATIENT)
Dept: GENERAL RADIOLOGY | Age: 72
End: 2022-03-10
Payer: MEDICARE

## 2022-03-10 ENCOUNTER — HOSPITAL ENCOUNTER (EMERGENCY)
Age: 72
Discharge: HOME | End: 2022-03-10
Attending: EMERGENCY MEDICINE
Payer: MEDICARE

## 2022-03-10 VITALS
SYSTOLIC BLOOD PRESSURE: 174 MMHG | RESPIRATION RATE: 16 BRPM | TEMPERATURE: 97.8 F | BODY MASS INDEX: 28.25 KG/M2 | WEIGHT: 180 LBS | DIASTOLIC BLOOD PRESSURE: 106 MMHG | HEIGHT: 67 IN | HEART RATE: 80 BPM | OXYGEN SATURATION: 100 %

## 2022-03-10 DIAGNOSIS — G89.29 CHRONIC PAIN OF RIGHT KNEE: Primary | ICD-10-CM

## 2022-03-10 DIAGNOSIS — M17.0 OSTEOARTHRITIS OF BOTH KNEES, UNSPECIFIED OSTEOARTHRITIS TYPE: ICD-10-CM

## 2022-03-10 DIAGNOSIS — M25.561 CHRONIC PAIN OF RIGHT KNEE: Primary | ICD-10-CM

## 2022-03-10 PROCEDURE — 6370000000 HC RX 637 (ALT 250 FOR IP): Performed by: EMERGENCY MEDICINE

## 2022-03-10 PROCEDURE — 73560 X-RAY EXAM OF KNEE 1 OR 2: CPT

## 2022-03-10 PROCEDURE — 99282 EMERGENCY DEPT VISIT SF MDM: CPT

## 2022-03-10 RX ORDER — HYDROCODONE BITARTRATE AND ACETAMINOPHEN 7.5; 325 MG/1; MG/1
1 TABLET ORAL ONCE
Status: COMPLETED | OUTPATIENT
Start: 2022-03-10 | End: 2022-03-10

## 2022-03-10 RX ORDER — HYDROCODONE BITARTRATE AND ACETAMINOPHEN 5; 325 MG/1; MG/1
1 TABLET ORAL EVERY 8 HOURS PRN
Qty: 10 TABLET | Refills: 0 | Status: SHIPPED | OUTPATIENT
Start: 2022-03-10 | End: 2022-03-13

## 2022-03-10 RX ORDER — LIDOCAINE 50 MG/G
1 PATCH TOPICAL DAILY
Qty: 20 PATCH | Refills: 0 | Status: SHIPPED | OUTPATIENT
Start: 2022-03-10 | End: 2022-03-30

## 2022-03-10 RX ADMIN — HYDROCODONE BITARTRATE AND ACETAMINOPHEN 1 TABLET: 7.5; 325 TABLET ORAL at 14:24

## 2022-03-10 ASSESSMENT — PAIN DESCRIPTION - FREQUENCY: FREQUENCY: CONTINUOUS

## 2022-03-10 ASSESSMENT — PAIN DESCRIPTION - ONSET: ONSET: GRADUAL

## 2022-03-10 ASSESSMENT — PAIN DESCRIPTION - DESCRIPTORS: DESCRIPTORS: ACHING

## 2022-03-10 ASSESSMENT — PAIN DESCRIPTION - ORIENTATION: ORIENTATION: RIGHT

## 2022-03-10 ASSESSMENT — PAIN SCALES - GENERAL: PAINLEVEL_OUTOF10: 7

## 2022-03-10 ASSESSMENT — PAIN - FUNCTIONAL ASSESSMENT: PAIN_FUNCTIONAL_ASSESSMENT: 0-10

## 2022-03-10 ASSESSMENT — PAIN DESCRIPTION - LOCATION: LOCATION: KNEE

## 2022-03-10 ASSESSMENT — PAIN DESCRIPTION - PAIN TYPE: TYPE: CHRONIC PAIN

## 2022-03-10 NOTE — ED PROVIDER NOTES
EMERGENCY DEPARTMENT PROVIDER NOTE    Patient Identification  Pt Name: Dakota Birmingham  MRN: [id number]  [place] 1950  Date of evaluation: 3/10/2022  Provider: Eda Thomson DO  PCP: Mansoor Arriaza MD    Chief Complaint  Knee Pain (pt states chronic right knee pain, worse for months. )      HPI  (History provided by patient)  This is a 80 y.o. female with pertinent past medical history of osteoarthritis, CAD, HTN, chronic atrial fibrillation anticoagulated on warfarin who was brought in by family for right knee pain. Patient case has had knee pain ongoing for the past several months, acutely worsened over the past 1 to 2 days. She denies any trauma or injury. Reports pain feels similar as before however only more severe today. Patient states she cannot take any medications including Tylenol due to being on anticoagulation. She has been trying lidocaine patches to no relief. She denies any weakness, numbness or hip pain. .     ROS    Const:  No fevers, no chills  Skin:  No rash, no lesions  Card:  No chest pain, no palpitations  Resp:  No shortness of breath, no cough,   Abd:  No abdominal pain, no nausea, no vomiting  MSK:  +joint pain, no myalgia  Neuro:  No focal weakness, no headache, no paresthesia    All other systems reviewed and negative unless otherwise noted in HPI      I have reviewed the following nursing documentation:  Allergies: Clonidine, Codeine, Lisinopril, Tramadol, and Percocet [oxycodone-acetaminophen]    Past medical history:   Past Medical History:   Diagnosis Date    Acute cerebrovascular accident (CVA) ([address] 1/28/2020    Atrial fibrillation ([address]     Bell palsy     diagnosed 15 years ago    CAD (coronary artery disease)     Cerebral artery occlusion with cerebral infarction ([address]     Chronic diastolic CHF (congestive heart failure) ([address] 5/16/2020    CVA (cerebral vascular accident) ([address] 1/4/2017    Hypertension     Intracranial hemorrhage ([address] 4/2016    Nonintractable headache     Nontraumatic subcortical hemorrhage of cerebral hemisphere [facility] 4/30/2016    Sudden visual loss of left eye     Thyroid nodule 2/8/2018    TIA involving right internal carotid artery      Past surgical history:   Past Surgical History:   Procedure Laterality Date    CARDIAC SURGERY      COLONOSCOPY      CORONARY ANGIOPLASTY WITH STENT PLACEMENT      TUBAL LIGATION Right Torn Rotator Cuff    2013 @ [address] medications:   Discharge Medication List as of 3/10/2022  3:15 PM      CONTINUE these medications which have NOT CHANGED    Details   guaiFENesin (ALTARUSSIN) 100 MG/5ML syrup Take 10 mLs by mouth every 4 hours as needed for Cough, Disp-1 each, R-0Normal      Magnesium 500 MG CAPS Take 400 mg by mouth daily, Disp-90 capsule, R-3Print      losartan-hydroCHLOROthiazide (HYZAAR) 100-25 MG per tablet Take 1 tablet by mouth daily, Disp-90 tablet, R-1Normal      spironolactone (ALDACTONE) 50 MG tablet Take 1 tablet by mouth 2 times daily TAKE 1 TABLET BY MOUTH DAILY, Disp-90 tablet, R-1Normal      potassium chloride (KLOR-CON M) 20 MEQ extended release tablet Take 0.5 tablets by mouth daily, Disp-90 tablet, R-1Normal      NIFEdipine (PROCARDIA XL) 90 MG extended release tablet Take 1 tablet by mouth daily, Disp-90 tablet, R-1Normal      furosemide (LASIX) 20 MG tablet TAKE 1 TABLET BY MOUTH TWICE DAILY, Disp-180 tablet, R-1Normal      famotidine (PEPCID) 20 MG tablet Take 1 tablet by mouth 2 times daily, Disp-60 tablet, R-2Normal      atorvastatin (LIPITOR) 80 MG tablet Take 1 tablet by mouth daily, Disp-90 tablet, R-1Normal      acetaminophen (TYLENOL) 500 MG tablet Take 2 tablets by mouth every 6 hours as needed for Pain, Disp-120 tablet, R-3Normal      Calcium Carb-Cholecalciferol (CALCIUM + D3) 600-200 MG-UNIT TABS tablet Take 1 tablet by mouth 2 times daily, Disp-120 tablet,R-3Normal             Social history:  reports that she has never smoked.  She has never used smokeless tobacco. She reports that she does not drink alcohol and does not use drugs. Family history:  History reviewed. No pertinent family history. Exam  ED Triage Vitals [03/10/22 1356]   BP Temp Temp Source Pulse Resp SpO2 Height Weight   (!) 174/106 97.8 °F (36.6 °C) Oral 80 16 100 % 5' 7\" (1.702 m) 180 lb (81.6 kg)     Nursing note and vitals reviewed. Constitutional: Well developed, well nourished. Non-toxic in appearance. HENT:      Head: Normocephalic and atraumatic. Ears: External ears normal.      Nose: Nose normal.     Mouth: Membrane mucosa moist and pink. Neck: Supple. Trachea midline. Cardiovascular: RRR; no murmurs, rubs, or gallops. PT 2+ and symmetric  Pulmonary/Chest: Effort normal. No respiratory distress. CTAB. No stridor. No wheezes. No rales. Musculoskeletal: Moves all extremities. No gross deformity. Tenderness to palpation overlying anterior knee with small effusion, stable in anterior and posterior drawer. No erythema or increased warmth. Full active range of motion. Neurological: Alert and oriented. Face symmetric. Speech is clear. 5 out of 5 motor and sensation grossly intact bilateral lower extremities  Skin: Warm and dry. No rash. Psychiatric: Normal mood and affect. Behavior is normal.        Radiology  XR KNEE RIGHT (1-2 VIEWS)   Final Result   Severe osteopenia with moderate to severe tricompartmental arthrosis in the   right knee. No acute osseous injury. Labs  No results found for this visit on 03/10/22. Screenings           MDM and ED Course    Patient afebrile and nontoxic. No distress. Lower extremities are neurovascularly intact, nothing to suggest limb ischemia or compartment syndrome. No findings to suggest joint or skin/soft tissue infection. Plain films show severe osteoarthritic change. Patient's pain chronic, likely secondary to her known arthritis. Patient felt much improved with ED treatment.   Patient states she feels safe at home, I did discuss potential observation for OT/PT consult, however patient is not interested in this evaluation at this time. Discussed importance of close follow-up with her PCP. We will also provide conservation orthopedics and encouraged patient to follow-up. Patient is agreeable with plan and was eager for discharge to home. Strict return precautions were discussed. Final Impression  1. Chronic pain of right knee    2. Osteoarthritis of both knees, unspecified osteoarthritis type        Blood pressure (!) 174/106, pulse 80, temperature 97.8 °F (36.6 °C), temperature source Oral, resp. rate 16, height 5' 7\" (1.702 m), weight 180 lb (81.6 kg), SpO2 100 %, not currently breastfeeding. Disposition:  DISPOSITION Decision To Discharge 03/10/2022 03:12:43 PM      Patient Referrals:  Nicole Solis MD  [facility]  [phone]    In 2 days      Josiah Franz MD  [facility] [address] [place] [address],[address]  [facility]  [phone]    In 3 days  Orthopedics - for your knee pain      Discharge Medications:  Discharge Medication List as of 3/10/2022  3:15 PM      START taking these medications    Details   HYDROcodone-acetaminophen (NORCO) 5-325 MG per tablet Take 1 tablet by mouth every 8 hours as needed for Pain for up to 3 days. Intended supply: 3 days. Take lowest dose possible to manage pain, Disp-10 tablet, R-0Print             Discontinued Medications:  Discharge Medication List as of 3/10/2022  3:15 PM          This chart was generated using the [address] [place] [address] dictation system. I created this record but it may contain dictation errors given the limitations of this technology.     Destiny Kumari DO (electronically signed)  Attending Emergency Physician       Destiny Kumari DO  03/10/22 8869

## 2022-03-17 ENCOUNTER — OFFICE VISIT (OUTPATIENT)
Dept: ORTHOPEDIC SURGERY | Age: 72
End: 2022-03-17
Payer: MEDICARE

## 2022-03-17 VITALS — HEIGHT: 67 IN | BODY MASS INDEX: 28.19 KG/M2

## 2022-03-17 DIAGNOSIS — M25.562 PAIN IN BOTH KNEES, UNSPECIFIED CHRONICITY: ICD-10-CM

## 2022-03-17 DIAGNOSIS — M25.561 PAIN IN BOTH KNEES, UNSPECIFIED CHRONICITY: ICD-10-CM

## 2022-03-17 DIAGNOSIS — M17.12 PRIMARY OSTEOARTHRITIS OF LEFT KNEE: ICD-10-CM

## 2022-03-17 DIAGNOSIS — M17.11 PRIMARY OSTEOARTHRITIS OF RIGHT KNEE: Primary | ICD-10-CM

## 2022-03-17 PROCEDURE — 99203 OFFICE O/P NEW LOW 30 MIN: CPT | Performed by: ORTHOPAEDIC SURGERY

## 2022-03-17 PROCEDURE — 20610 DRAIN/INJ JOINT/BURSA W/O US: CPT | Performed by: ORTHOPAEDIC SURGERY

## 2022-03-17 RX ORDER — LIDOCAINE HYDROCHLORIDE 10 MG/ML
40 INJECTION, SOLUTION INFILTRATION; PERINEURAL ONCE
Status: COMPLETED | OUTPATIENT
Start: 2022-03-17 | End: 2022-03-17

## 2022-03-17 RX ORDER — TRIAMCINOLONE ACETONIDE 40 MG/ML
40 INJECTION, SUSPENSION INTRA-ARTICULAR; INTRAMUSCULAR ONCE
Status: COMPLETED | OUTPATIENT
Start: 2022-03-17 | End: 2022-03-17

## 2022-03-17 RX ADMIN — LIDOCAINE HYDROCHLORIDE 40 MG: 10 INJECTION, SOLUTION INFILTRATION; PERINEURAL at 15:45

## 2022-03-17 RX ADMIN — TRIAMCINOLONE ACETONIDE 40 MG: 40 INJECTION, SUSPENSION INTRA-ARTICULAR; INTRAMUSCULAR at 15:46

## 2022-03-17 NOTE — PROGRESS NOTES
CHIEF COMPLAINT: Bilateral R>L knee pain/osteoarthritis. HISTORY:  Ms. Avery Daigle 80 y.o. African American female presents today for consultation request from Mansoor Arriaza MD for evaluation of bilateral R>L knee pain which started months ago and worsening. She is complaining of achy pain. Rates pain a 7/10 VAS achy and intermittent and moderate. Pain is increase with standing and walking and decrease with rest. Pain is sharp early in the morning with first few steps, dull achy pain by the end of the day. Alleviating factors: rest. No radiation and no numbness and tingling sensation. No other complaint. No h/o trauma or gout. She has had no treatment for this problem. She initially saw PCP who got Xray of the right knee and referred her here. Denies smoking. Past Medical History:   Diagnosis Date    Acute cerebrovascular accident (CVA) ([address] 1/28/2020    Atrial fibrillation ([facility])     Bell palsy     diagnosed 15 years ago   [place] CAD (coronary artery disease)     Cerebral artery occlusion with cerebral infarction ([address]     Chronic diastolic CHF (congestive heart failure) ([address] 5/16/2020    CVA (cerebral vascular accident) ([address] 1/4/2017    Hypertension     Intracranial hemorrhage ([address] 4/2016    Nonintractable headache     Nontraumatic subcortical hemorrhage of cerebral hemisphere ([address] 4/30/2016    Sudden visual loss of left eye     Thyroid nodule 2/8/2018    TIA involving right internal carotid artery        Past Surgical History:   Procedure Laterality Date    CARDIAC SURGERY      COLONOSCOPY      CORONARY ANGIOPLASTY WITH STENT PLACEMENT      TUBAL LIGATION Right Torn Rotator Cuff    2013 @ [facility]       Social History     Socioeconomic History    Marital status:       Spouse name: Gi Langford Number of children: 3    Years of education: 15    Highest education level: Not on file   Occupational History    Not on file   Tobacco Use    Smoking status: Never Smoker    Smokeless tobacco: MG CAPS Take 400 mg by mouth daily 90 capsule 3    losartan-hydroCHLOROthiazide (HYZAAR) 100-25 MG per tablet Take 1 tablet by mouth daily 90 tablet 1    spironolactone (ALDACTONE) 50 MG tablet Take 1 tablet by mouth 2 times daily TAKE 1 TABLET BY MOUTH DAILY 90 tablet 1    potassium chloride (KLOR-CON M) 20 MEQ extended release tablet Take 0.5 tablets by mouth daily 90 tablet 1    NIFEdipine (PROCARDIA XL) 90 MG extended release tablet Take 1 tablet by mouth daily 90 tablet 1    furosemide (LASIX) 20 MG tablet TAKE 1 TABLET BY MOUTH TWICE DAILY 180 tablet 1    famotidine (PEPCID) 20 MG tablet Take 1 tablet by mouth 2 times daily 60 tablet 2    atorvastatin (LIPITOR) 80 MG tablet Take 1 tablet by mouth daily 90 tablet 1    acetaminophen (TYLENOL) 500 MG tablet Take 2 tablets by mouth every 6 hours as needed for Pain 120 tablet 3    Calcium Carb-Cholecalciferol (CALCIUM + D3) 600-200 MG-UNIT TABS tablet Take 1 tablet by mouth 2 times daily 120 tablet 3     No current facility-administered medications on file prior to visit. Pertinent items are noted in HPI  Review of systems reviewed from Patient History Form and available in the patient's chart under the Media tab. PHYSICAL EXAMINATION:  Ms. Brenna Bacon is a very pleasant 80 y.o.  female who presents today in no acute distress, awake, alert, and oriented. She is well dressed, nourished and  groomed. Patient with normal affect. Height is  5' 7\" (1.702 m), weight is  , Body mass index is 28.19 kg/m². Resting respiratory rate is 16. Examination of the gait, showed that the patient walks heel-toe with a non-antalgic gait and no limp. Examination of both knees showing full ROM, bilateral mild crepitus, tenderness on medial joint line, stable to varus and valgus stress. She has intact sensation and good pedal pulses. She has good strength in 2 planes, and has mild tenderness on deep palpation over the medial joint line.  Knee reflex 1+ bilaterally. IMAGING:  Xray 3 views of the right knee was obtained 3/10/2022 and left knee 3 views taken today in office and reviewed. These demonstrate severe degenerative changes with narrowing of the joint space in the medial joint space compartment, subchondral sclerosis, and marginal osteophytosis. IMPRESSION: Bilateral R>L knee DJD. PLAN: I discussed with the patient the treatment options including both surgical and non-surgical treatment. We recommended Quad exercises and stretching of the calf and hamstrings which was taught to the patient today. She will take NSAIDS as needed. I believe she will benefit from cortisone injection bilateral knee, and she agreed to have, but just in her right knee today. PROCEDURE:    With the patient's permission, and under sterile condition, the right  knee was prepared and draped with alcohol and injected with a mixture of 1 mL Kenalog 40mg and 4 mL of 1% lidocaine through the medial parapatellar port area. The patient tolerated the procedure well. A band-aid was applied and the patient was advised to ice the knee for 15-20 minutes to relieve any injection site related pain. She reported a good improvement immediatly after the injection. She may decide to get an injection in the left knee at a later date. F/u in 3-4 months, PT if needed. She understands that this may need surgery if the pain did not to resolve. Thank you very much for the kind consultation and allowing me to participate in this patient's care. I will continue to keep you apprised of her progress.         Roberto Bang MD

## 2022-03-18 PROBLEM — M17.12 PRIMARY OSTEOARTHRITIS OF LEFT KNEE: Status: ACTIVE | Noted: 2022-03-18

## 2022-03-18 PROBLEM — M17.11 PRIMARY OSTEOARTHRITIS OF RIGHT KNEE: Status: ACTIVE | Noted: 2022-03-18

## 2022-03-23 ENCOUNTER — ANTI-COAG VISIT (OUTPATIENT)
Dept: PHARMACY | Age: 72
End: 2022-03-23
Payer: MEDICARE

## 2022-03-23 DIAGNOSIS — I48.21 PERMANENT ATRIAL FIBRILLATION (HCC): Primary | ICD-10-CM

## 2022-03-23 LAB — INTERNATIONAL NORMALIZATION RATIO, POC: 1.6

## 2022-03-23 PROCEDURE — 85610 PROTHROMBIN TIME: CPT

## 2022-03-23 PROCEDURE — 99212 OFFICE O/P EST SF 10 MIN: CPT
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
## 2022-03-31 ENCOUNTER — OFFICE VISIT (OUTPATIENT)
Dept: ORTHOPEDIC SURGERY | Age: 72
End: 2022-03-31
Payer: MEDICARE

## 2022-03-31 VITALS — BODY MASS INDEX: 28.19 KG/M2 | HEIGHT: 67 IN

## 2022-03-31 DIAGNOSIS — M25.562 PAIN IN BOTH KNEES, UNSPECIFIED CHRONICITY: ICD-10-CM

## 2022-03-31 DIAGNOSIS — M25.561 PAIN IN BOTH KNEES, UNSPECIFIED CHRONICITY: ICD-10-CM

## 2022-03-31 DIAGNOSIS — M17.12 PRIMARY OSTEOARTHRITIS OF LEFT KNEE: Primary | ICD-10-CM

## 2022-03-31 DIAGNOSIS — M17.11 PRIMARY OSTEOARTHRITIS OF RIGHT KNEE: ICD-10-CM

## 2022-03-31 PROCEDURE — 20610 DRAIN/INJ JOINT/BURSA W/O US: CPT | Performed by: ORTHOPAEDIC SURGERY

## 2022-03-31 RX ORDER — LIDOCAINE HYDROCHLORIDE 10 MG/ML
40 INJECTION, SOLUTION INFILTRATION; PERINEURAL ONCE
Status: COMPLETED | OUTPATIENT
Start: 2022-03-31 | End: 2022-03-31

## 2022-03-31 RX ORDER — TRIAMCINOLONE ACETONIDE 40 MG/ML
40 INJECTION, SUSPENSION INTRA-ARTICULAR; INTRAMUSCULAR ONCE
Status: COMPLETED | OUTPATIENT
Start: 2022-03-31 | End: 2022-03-31

## 2022-03-31 RX ADMIN — TRIAMCINOLONE ACETONIDE 40 MG: 40 INJECTION, SUSPENSION INTRA-ARTICULAR; INTRAMUSCULAR at 14:33

## 2022-03-31 RX ADMIN — LIDOCAINE HYDROCHLORIDE 40 MG: 10 INJECTION, SOLUTION INFILTRATION; PERINEURAL at 14:33

## 2022-03-31 NOTE — PROGRESS NOTES
CHIEF COMPLAINT: Bilateral R>L knee pain/osteoarthritis. HISTORY:  Ms. Salinas Slfrancisco 80 y.o.  female presents today for follow-up of bilateral knee pain. She had a right knee cortisone injection on 3/17/2022 and had good improvement with that. She states that now her left knee is worsening and she would like to have a cortisone injection in her left knee at this point. She was initially seen on 3/17/2022 as a consultation request from Duran Maharaj MD for evaluation of bilateral R>L knee pain which started months ago and worsening. She is complaining of achy pain. Rates pain a 7/10 VAS achy and intermittent and moderate. Pain is increase with standing and walking and decrease with rest. Pain is sharp early in the morning with first few steps, dull achy pain by the end of the day. Alleviating factors: rest. No radiation and no numbness and tingling sensation. No other complaint. No h/o trauma or gout. She has had no treatment for this problem. She initially saw PCP who got Xray of the right knee and referred her here. Denies smoking.      Past Medical History:   Diagnosis Date    Acute cerebrovascular accident (CVA) ([address] 1/28/2020    Atrial fibrillation (HCC)     Bell palsy     diagnosed 15 years ago    [facility] CAD (coronary artery disease)     Cerebral artery occlusion with cerebral infarction ([address]     Chronic diastolic CHF (congestive heart failure) ([address] 5/16/2020    CVA (cerebral vascular accident) ([address] 1/4/2017    Hypertension     Intracranial hemorrhage ([address] 4/2016    Nonintractable headache     Nontraumatic subcortical hemorrhage of cerebral hemisphere ([address] 4/30/2016    Primary osteoarthritis of right knee 3/18/2022    Sudden visual loss of left eye     Thyroid nodule 2/8/2018    TIA involving right internal carotid artery        Past Surgical History:   Procedure Laterality Date    CARDIAC SURGERY      COLONOSCOPY      CORONARY ANGIOPLASTY WITH STENT PLACEMENT      TUBAL LIGATION Right Torn Rotator Cuff    2013 @ [address] History     Socioeconomic History    Marital status:      Spouse name: Harrel Cheadle Number of children: 3    Years of education: 15    Highest education level: Not on file   Occupational History    Not on file   Tobacco Use    Smoking status: Never Smoker    Smokeless tobacco: Never Used   Vaping Use    Vaping Use: Never used   Substance and Sexual Activity    Alcohol use: No    Drug use: No    Sexual activity: Yes     Partners: Male   Other Topics Concern    Not on file   Social History Narrative    Not on file     Social Determinants of Health     Financial Resource Strain:     Difficulty of Paying Living Expenses: Not on file   Food Insecurity:     Worried About Running Out of Food in the Last Year: Not on file    Sofie of Food in the Last Year: Not on file   Transportation Needs:     Lack of Transportation (Medical): Not on file    Lack of Transportation (Non-Medical): Not on file   Physical Activity:     Days of Exercise per Week: Not on file    Minutes of Exercise per Session: Not on file   Stress:     Feeling of Stress : Not on file   Social Connections:     Frequency of Communication with Friends and Family: Not on file    Frequency of Social Gatherings with Friends and Family: Not on file    Attends Bahai Services: Not on file    Active Member of [address] [place] [address] [employer] or Organizations: Not on file    Attends Club or Organization Meetings: Not on file    Marital Status: Not on file   Intimate Partner Violence:     Fear of Current or Ex-Partner: Not on file    Emotionally Abused: Not on file    Physically Abused: Not on file    Sexually Abused: Not on file   Housing Stability:     Unable to Pay for Housing in the Last Year: Not on file    Number of [place] in the Last Year: Not on file    Unstable Housing in the Last Year: Not on file       History reviewed. No pertinent family history.     Current Outpatient Medications on File Prior to Visit   Medication Sig Dispense Refill    guaiFENesin (ALTARUSSIN) 100 MG/5ML syrup Take 10 mLs by mouth every 4 hours as needed for Cough 1 each 0    Magnesium 500 MG CAPS Take 400 mg by mouth daily 90 capsule 3    losartan-hydroCHLOROthiazide (HYZAAR) 100-25 MG per tablet Take 1 tablet by mouth daily 90 tablet 1    spironolactone (ALDACTONE) 50 MG tablet Take 1 tablet by mouth 2 times daily TAKE 1 TABLET BY MOUTH DAILY 90 tablet 1    potassium chloride (KLOR-CON M) 20 MEQ extended release tablet Take 0.5 tablets by mouth daily 90 tablet 1    NIFEdipine (PROCARDIA XL) 90 MG extended release tablet Take 1 tablet by mouth daily 90 tablet 1    furosemide (LASIX) 20 MG tablet TAKE 1 TABLET BY MOUTH TWICE DAILY 180 tablet 1    famotidine (PEPCID) 20 MG tablet Take 1 tablet by mouth 2 times daily 60 tablet 2    atorvastatin (LIPITOR) 80 MG tablet Take 1 tablet by mouth daily 90 tablet 1    acetaminophen (TYLENOL) 500 MG tablet Take 2 tablets by mouth every 6 hours as needed for Pain 120 tablet 3    Calcium Carb-Cholecalciferol (CALCIUM + D3) 600-200 MG-UNIT TABS tablet Take 1 tablet by mouth 2 times daily 120 tablet 3     No current facility-administered medications on file prior to visit. Pertinent items are noted in HPI  Review of systems reviewed from Patient History Form and available in the patient's chart under the Media tab. PHYSICAL EXAMINATION:  Ms. Theodore Antonio is a very pleasant 80 y.o.  female who presents today in no acute distress, awake, alert, and oriented. She is well dressed, nourished and  groomed. Patient with normal affect. Height is  5' 7\" (1.702 m), weight is  , Body mass index is 28.19 kg/m². Resting respiratory rate is 16. Examination of the gait, showed that the patient walks heel-toe with a non-antalgic gait and no limp.   Examination of both knees showing full ROM, bilateral mild crepitus, tenderness on medial joint line, stable to varus and valgus stress. She has intact sensation and good pedal pulses. She has good strength in 2 planes, and has mild tenderness on deep palpation over the medial joint line. Knee reflex 1+ bilaterally. IMAGING:  Xray 3 views of the right knee was obtained 3/10/2022 and left knee 3 views taken today in office and reviewed. These demonstrate severe degenerative changes with narrowing of the joint space in the medial joint space compartment, subchondral sclerosis, and marginal osteophytosis. IMPRESSION: Bilateral R>L knee DJD. PLAN: I discussed with the patient the treatment options including both surgical and non-surgical treatment. We recommended Quad exercises and stretching of the calf and hamstrings which was taught to the patient today. She will take NSAIDS as needed. I believe she will benefit from cortisone injection left knee, and she agreed to have. PROCEDURE:    With the patient's permission, and under sterile condition, the left knee was prepared and draped with alcohol and injected with a mixture of 1 mL Kenalog 40mg and 4 mL of 1% lidocaine through the medial parapatellar port area. The patient tolerated the procedure well. A band-aid was applied and the patient was advised to ice the knee for 15-20 minutes to relieve any injection site related pain. She reported a good improvement immediatly after the injection. I discussed with the patient that I think that she would really benefit from a course of physical therapy for further strengthening and stretching. An Rx for physical therapy was given to the patient. F/u in 3-4 months. She understands that this may need surgery if the pain did not to resolve.        Reginaldo Yap MD

## 2022-04-08 ENCOUNTER — ANTI-COAG VISIT (OUTPATIENT)
Dept: PHARMACY | Age: 72
End: 2022-04-08
Payer: MEDICARE

## 2022-04-08 DIAGNOSIS — I48.21 PERMANENT ATRIAL FIBRILLATION (HCC): Primary | ICD-10-CM

## 2022-04-08 LAB — INTERNATIONAL NORMALIZATION RATIO, POC: 1.7

## 2022-04-08 PROCEDURE — 99211 OFF/OP EST MAY X REQ PHY/QHP: CPT

## 2022-04-08 PROCEDURE — 85610 PROTHROMBIN TIME: CPT
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
## 2022-04-13 ENCOUNTER — HOSPITAL ENCOUNTER (OUTPATIENT)
Dept: PHYSICAL THERAPY | Age: 72
Setting detail: THERAPIES SERIES
Discharge: HOME | End: 2022-04-13
Payer: MEDICARE

## 2022-04-13 PROCEDURE — 97110 THERAPEUTIC EXERCISES: CPT

## 2022-04-13 PROCEDURE — 97161 PT EVAL LOW COMPLEX 20 MIN: CPT

## 2022-04-13 PROCEDURE — 97530 THERAPEUTIC ACTIVITIES: CPT

## 2022-04-13 NOTE — PLAN OF CARE
Julio, [facility], [address]  Phone: [phone]   Fax: [phone]                                                       Physical Therapy Certification    Dear Referring Practitioner: Dr. Kit Anderson,    We had the pleasure of evaluating the following patient for physical therapy services at [address] [place] [address]. A summary of our findings can be found in the initial assessment below. This includes our plan of care. If you have any questions or concerns regarding these findings, please do not hesitate to contact me at the office phone number checked above. Thank you for the referral.       Physician Signature:_______________________________Date:__________________  By signing above (or electronic signature), therapists plan is approved by physician      Patient: Annie Ford   : 1950   MRN: [id number]  Referring Physician: Referring Practitioner: Dr. Kit Anderson      Evaluation Date: 2022      Medical Diagnosis Information:  Diagnosis: M17.12 (ICD-10-CM) - Primary osteoarthritis of left kneeM25.561, M25.562 (ICD-10-CM) - Pain in both knees, unspecified ndovtkrhjaP49.11 (ICD-10-CM) - Primary osteoarthritis of right knee   Treatment Diagnosis: Decreased B pain-free Knee ROM with decreased functional strength limiting functional gait/stairs                                         Insurance information: PT Insurance Information: [employer]; $40 copay; no auth     Precautions/ Contra-indications:  Latex Allergy:  [x]NO      []YES  Preferred Language for Healthcare:   [x]English       []Other:    C-SSRS Triggered by Intake questionnaire (Past 2 wk assessment ):   [x] No, Questionnaire did not trigger screening.   [] Yes, Patient intake triggered C-SSRS Screening     [] Completed, no further action required.    [] Completed, PCP notified via Epic    SUBJECTIVE: Patient stated complaint: Patient presents to Pt with severe OA and pain in B knees. She recently had a cortisone injection in her left knee and weeks prior to this had her R knee injected. She states her R knee is the worst pain of the 2 and has been hurting the longest.  She states that she is retired but is raising her 15 yo grandson Hosea Stover is active in sports. She states she has pain with all mobility but worse with stairs, walking. She can do household distances but for short community distances she will use a SPC. Relevant Medical History:  Functional Outcome: FOTO physical FS primary measure score = 40; Risk adjusted = 52    Pain Scale:6/10  Easing factors: rest in a recumbent position  Provocative factors: pain with transitional movements, standing, walking     Type: [x]Constant   []Intermittent  []Radiating []Localized []other:     Numbness/TinglingB feet     Occupation/School: retired    Living Status/Prior Level of Function:Prior to this injury / incident, pt was independent with ADLs and IADLs,amb community distances short no SPC and long with SPC      OBJECTIVE:   Palpation:TTP B knee joint lines, patellar tendons, myofascial tightness in B quads/HS    Functional Mobility/Transfers: slow, needs UE support    Posture:R innominate upslip, E4DOQNV/ERSL, L4FRSL    Bandages/Dressings/Incisions: NA    Gait: (include devices/WB status) Amb.  Without A device with R trunk lean with R stance, shuffling gait pattern    Dermatomes Normal Abnormal Comments   inguinal area (L1)  X     anterior mid-thigh (L2) X     distal ant thigh/med knee (L3) X     medial lower leg and foot (L4) X     lateral lower leg and foot (L5) X     posterior calf (S1) X     medial calcaneus (S2) X         Reflexes Normal Abnormal Comments   S1-2 Seated achilles   NT   S1-2 Prone knee bend      L3-4 Patellar tendon      Clonus      Babinski        Lumbar AROM screen: [] WFL  [] abnormal:     PROM AROM    L R L R Hip Flexion 100 92 PROM=AROM t/o L/R    Hip Abduction NT NT     Hip ER NT NT     Hip IR NT NT     Knee Flexion 111 112     Knee Extension 5 from neutral 0     Dorsiflexion  10 5     Plantarflexion  WFL WFL     Inversion  WFL WFL     Eversion  [facility]         Strength (0-5) / Myotomes Left Right   Hip Flexion - supine At least 3/5 At least 3/5   Hip Flexion - seated (L1-2) 4-/5 3+/5   Hip Abduction 3/5 2/5   Hip Adduction NT NT   Hip ER NT NT   Hip IR NT nt   Quads (L2-4) 4/5 4-/5   Hamstrings 4/5 4-/5   Ankle Dorsiflexion (L4-5) 4/5 4-/5   Ankle Plantarflexion (S1-2) 5/5 5/5   Ankle Inversion 5/5 5/5   Ankle Eversion (S1-2) 5/5 5/5   Great Toe Extension (L5) 5/5 4/5        Flexibility     Hamstrings (90/90) -45 -36   ITB (Ranjana)     Quads (Ely's) 100 90   Hip Flexor (Daryl) -15 from neutral 10 from neutral   gastroc -1 from neutral 0   TUG 13.75\"                                      Joint mobility:    []Normal    [x]Hypo   []Hyper        Orthopaedic Special Tests  Positive  Negative  NT Comments    Hip       BLANE / Guillermo's       FADIR       Scour       Trendelenburg              Knee       Lachman's / Anterior Drawer       Posterior Drawer       Varus Stress       Valgus Stress       Adelaide's        Appley's       Thessaly's       Patellar Tracking X poor B             Ankle       Anterior Drawer       Talar Tilt       Rodriguez       Hi's                   Balance:  L 8\", R 3\" semi-tandem                         [x] Patient history, allergies, meds reviewed. Medical chart reviewed. See intake form. Review Of Systems (ROS):  [x]Performed Review of systems (Integumentary, CardioPulmonary, Neurological) by intake and observation. Intake form has been scanned into medical record. Patient has been instructed to contact their primary care physician regarding ROS issues if not already being addressed at this time.       Co-morbidities/Complexities (which will affect course of rehabilitation):  []None []Hx of COVID   Arthritic conditions   [x]Rheumatoid arthritis (M05.9)  []Osteoarthritis (M19.91)  []Gout   Cardiovascular conditions   [x]Hypertension (I10)  []Hyperlipidemia (E78.5)  []Angina pectoris (I20)  []Atherosclerosis (I70)  []Pacemaker  []Hx of CABG/stent/  cardiac surgeries   Musculoskeletal conditions   []Disc pathology   []Congenital spine pathologies   []Osteoporosis (M81.8)  []Osteopenia (M85.8)  []Scoliosis       Endocrine conditions   []Hypothyroid (E03.9)  []Hyperthyroid Gastrointestinal conditions   []Constipation (U22.28)   Metabolic conditions   []Morbid obesity (E66.01)  []Diabetes type 1(E10.65) or 2 (E11.65)   []Neuropathy (G60.9)     Cardio/Pulmonary conditions   []Asthma (J45)  []Coughing   []COPD (J44.9)  []CHF  [x]A-fib   Psychological Disorders  []Anxiety (F41.9)  []Depression (F32.9)   []Other:   Developmental Disorders  []Autism (F84.0)  []CP (G80)  []Down Syndrome (Q90.9)  []Developmental delay     Neurological conditions  []Prior Stroke (I69.30)  []Parkinson's (G20)  []Encephalopathy (G93.40)  []MS (G35)  []Post-polio (G14)  []SCI  []TBI  []ALS Other conditions  []Fibromyalgia (M79.7)  []Vertigo  []Syncope  []Kidney Failure  []Cancer      []currently undergoing                treatment  []Pregnancy  []Incontinence   Prior surgeries  []involved limb  []previous spinal surgery  [] section birth  []hysterectomy  []bowel / bladder surgery  []other relevant surgeries   [x]Other:   Shortness of breath due to Afib, back pain 6/10         Barriers to/and or personal factors that will affect rehab potential:              []Age  []Sex    []Smoker              []Motivation/Lack of Motivation                        [x]Co-Morbidities              []Cognitive Function, education/learning barriers              []Environmental, home barriers              []profession/work barriers  []past PT/medical experience  []other:  Justification: patient may be limited in rehab potential due to severe arthritis as well as poor posture/back pain present with medical limitations as well    Falls Risk Assessment (30 days):  [x] Falls Risk assessed and no intervention required. [] Falls Risk assessed and Patient requires intervention due to being higher risk   TUG score (>12s at risk):     [] Falls education provided, including        ASSESSMENT:See eval  Functional Impairments:     [x]Noted lumbar/proximal hip/LE joint hypomobility   [x]Decreased LE functional ROM   [x]Decreased core/proximal hip strength and neuromuscular control   [x]Decreased LE functional strength   []Reduced balance/proprioceptive control   []other:      Functional Activity Limitations (from functional questionnaire and intake)   [x]Reduced ability to tolerate prolonged functional positions   [x]Reduced ability or difficulty with changes of positions or transfers between positions   [x]Reduced ability to maintain good posture and demonstrate good body mechanics with sitting, bending, and lifting   [x]Reduced ability to sleep   [x] Reduced ability or tolerance with driving and/or computer work   [x]Reduced ability to perform lifting, carrying tasks   [x]Reduced ability to squat   [x]Reduced ability to forward bend   [x]Reduced ability to ambulate prolonged functional periods/distances/surfaces   [x]Reduced ability to ascend/descend stairs   []Reduced ability to run, hop, cut or jump   []other:    Participation Restrictions   [x]Reduced participation in self care activities   [x]Reduced participation in home management activities   []Reduced participation in work activities   [x]Reduced participation in social activities. []Reduced participation in sport/recreation activities. Classification :    []Signs/symptoms consistent with post-surgical status including decreased ROM, strength and function.    []Signs/symptoms consistent with joint sprain/strain   []Signs/symptoms consistent with patella-femoral syndrome   [x]Signs/symptoms consistent with knee OA/hip OA   []Signs/symptoms consistent with internal derangement of knee/Hip   [x]Signs/symptoms consistent with functional hip weakness/NMR control      []Signs/symptoms consistent with tendinitis/tendinosis    []signs/symptoms consistent with pathology which may benefit from Dry needling      [x]other:  Si/Low back impairments limiting strength in L4/L5 myotomes    Prognosis/Rehab Potential:      []Excellent   [x]Good    [x]Fair   []Poor    Tolerance of evaluation/treatment:    []Excellent   [x]Good    []Fair   []Poor    Physical Therapy Evaluation Complexity Justification  [x] A history of present problem with:  [] no personal factors and/or comorbidities that impact the plan of care;  [x]1-2 personal factors and/or comorbidities that impact the plan of care  []3 personal factors and/or comorbidities that impact the plan of care  [x] An examination of body systems using standardized tests and measures addressing any of the following: body structures and functions (impairments), activity limitations, and/or participation restrictions;:  [] a total of 1-2 or more elements   [] a total of 3 or more elements   [x] a total of 4 or more elements   [x] A clinical presentation with:  [x] stable and/or uncomplicated characteristics   [] evolving clinical presentation with changing characteristics  [] unstable and unpredictable characteristics;   [x] Clinical decision making of [x] Low, [] moderate, [] high complexity using standardized patient assessment instrument and/or measurable assessment of functional outcome.     [x] EVAL (LOW) 77362 (typically 15 minutes face-to-face)  [] EVAL (MOD) 13706 (typically 30 minutes face-to-face)  [] EVAL (HIGH) 57598 (typically 45 minutes face-to-face)  [] RE-EVAL     PLAN:   Frequency/Duration:  1-2 days per week for 4-6 Weeks:  Interventions:  [x]  Therapeutic exercise including: strength training, ROM, for Lower extremity and core   [x]  NMR activation and proprioception for LE, Glutes and Core   [x]  Manual therapy as indicated for LE, Hip and spine to include: Dry Needling/IASTM, STM, PROM, Gr I-IV mobilizations, manipulation. [x] Modalities as needed that may include: thermal agents, E-stim, Biofeedback, US, iontophoresis as indicated  [x] Patient education on joint protection, postural re-education, activity modification, progression of HEP. HEP instruction: Written HEP instructions provided and reviewed. GOALS:  Patient stated goal:to be able to walk better  [] Progressing: [] Met: [] Not Met: [] Adjusted    Therapist goals for Patient:   Short Term Goals: To be achieved in: 2 weeks  1. Independent in HEP and progression per patient tolerance, in order to prevent re-injury. [] Progressing: [] Met: [] Not Met: [] Adjusted  2. Patient will have a decrease in pain to facilitate improvement in movement, function, and ADLs as indicated by Functional Deficits. [] Progressing: [] Met: [] Not Met: [] Adjusted    Long Term Goals: To be achieved in: 4-6 weeks  1. FOTO score of at least 58 to assist with reaching prior level of function. [] Progressing: [] Met: [] Not Met: [] Adjusted  2. Patient will demonstrate increased AROM to 0-116 degrees in B knees to allow for proper joint functioning as indicated by patients ability to perform sit to stand with improved quad activation/knee flexion with UE A,  [] Progressing: [] Met: [] Not Met: [] Adjusted  3. Patient will demonstrate an increase in Strength to at least 4-4.5/5 as well as good proximal hip strength and control to allow for proper functional mobility as indicated by patients ability to ascend 4-6 steps reciprocally with 1 rail by discharge  [] Progressing: [] Met: [] Not Met: [] Adjusted  4. Patient will return to functional activities including standing at [facility] for up to 15-20; without increased symptoms or restriction. [] Progressing: [] Met: [] Not Met: [] Adjusted  5.  Patient to be able to perform TUG in 12 seconds or less for improved safety with community ambulation.     [] Progressing: [] Met: [] Not Met: [] Adjusted     Electronically signed by:  Jennifer Harris PT

## 2022-04-20 ENCOUNTER — ANTI-COAG VISIT (OUTPATIENT)
Dept: PHARMACY | Age: 72
End: 2022-04-20
Payer: MEDICARE

## 2022-04-20 DIAGNOSIS — I48.21 PERMANENT ATRIAL FIBRILLATION (HCC): Primary | ICD-10-CM

## 2022-04-20 LAB — INTERNATIONAL NORMALIZATION RATIO, POC: 1.2

## 2022-04-20 PROCEDURE — 85610 PROTHROMBIN TIME: CPT

## 2022-04-20 PROCEDURE — 99212 OFFICE O/P EST SF 10 MIN: CPT
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
## 2022-04-21 ENCOUNTER — HOSPITAL ENCOUNTER (OUTPATIENT)
Dept: PHYSICAL THERAPY | Age: 72
Setting detail: THERAPIES SERIES
Discharge: HOME | End: 2022-04-21
Payer: MEDICARE

## 2022-04-21 NOTE — FLOWSHEET NOTE
Physical Therapy  Cancellation/No-show Note  Patient Name:  Jayro Chairez  :  1950   Date:  2022  Cancelled visits to date: 0  No-shows to date: 1    Patient status for today's appointment patient:  []  Cancelled  []  Rescheduled appointment  [x]  No-show      Reason given by patient:  []  Patient ill  []  Conflicting appointment  []  No transportation    []  Conflict with work  []  No reason given  []  Other:     Comments:      Phone call information:   []  Phone call made today to patient at _ time at number provided:      []  Patient answered, conversation as follows:    []  Patient did not answer, message left as follows:  [x]  Phone call not made today  []  Phone call not needed - pt contacted us to cancel and provided reason for cancellation.      Electronically signed by:  Bradford Stein PT

## 2022-04-26 ENCOUNTER — HOSPITAL ENCOUNTER (OUTPATIENT)
Dept: PHYSICAL THERAPY | Age: 72
Setting detail: THERAPIES SERIES
Discharge: HOME | End: 2022-04-26
Payer: MEDICARE

## 2022-04-26 NOTE — FLOWSHEET NOTE
Physical Therapy  Cancellation/No-show Note  Patient Name:  Clinton Regan  :  1950   Date:  2022  Cancelled visits to date: 0  No-shows to date: 2    Patient status for today's appointment patient:  []  Cancelled  []  Rescheduled appointment  [x]  No-show ,      Reason given by patient:  []  Patient ill  []  Conflicting appointment  []  No transportation    []  Conflict with work  []  No reason given  []  Other:     Comments:   Aquatics not approved, message left with patient but not return call from patient. Phone call information:   []  Phone call made today to patient at _ time at number provided:      []  Patient answered, conversation as follows:    []  Patient did not answer, message left as follows:  []  Phone call not made today  []  Phone call not needed - pt contacted us to cancel and provided reason for cancellation.      Electronically signed by:  Maura Villegas PT

## 2022-04-27 ENCOUNTER — ANTI-COAG VISIT (OUTPATIENT)
Dept: PHARMACY | Age: 72
End: 2022-04-27
Payer: MEDICARE

## 2022-04-27 DIAGNOSIS — I48.21 PERMANENT ATRIAL FIBRILLATION (HCC): Primary | ICD-10-CM

## 2022-04-27 LAB — INTERNATIONAL NORMALIZATION RATIO, POC: 2.3

## 2022-04-27 PROCEDURE — 85610 PROTHROMBIN TIME: CPT

## 2022-04-27 PROCEDURE — 99211 OFF/OP EST MAY X REQ PHY/QHP: CPT

## 2022-04-28 ENCOUNTER — HOSPITAL ENCOUNTER (OUTPATIENT)
Dept: PHYSICAL THERAPY | Age: 72
Setting detail: THERAPIES SERIES
Discharge: HOME | End: 2022-04-28
Payer: MEDICARE

## 2022-04-28 NOTE — FLOWSHEET NOTE
Physical Therapy  Cancellation/No-show Note  Patient Name:  Christina Campbell  :  1950   Date:  2022  Cancelled visits to date: 0  No-shows to date: 3    Patient status for today's appointment patient:  []  Cancelled  []  Rescheduled appointment  [x]  No-show , ,      Reason given by patient:  []  Patient ill  []  Conflicting appointment  []  No transportation    []  Conflict with work  []  No reason given  []  Other:     Comments:   Aquatics not approved,got new number to call from family member [phone]  Phone call information:   [x]  Phone call made today to patient ) [phone]: time at number provided:      [x]  Patient answered, conversation as follows: explained she has missed her visits and cannot do aquatics. She is willing to do land PT with a free 30 day [facility] pass to try aquatics on her own. Patient stated she would call back and schedule land visits with 2 X/week for 4 weeks. []  Patient did not answer, message left as follows:  []  Phone call not made today; no further visits scheduled and patient not returning call about aquatics not being approved  []  Phone call not needed - pt contacted us to cancel and provided reason for cancellation.      Electronically signed by:  Paz Ramos PT

## 2022-05-04 ENCOUNTER — HOSPITAL ENCOUNTER (OUTPATIENT)
Dept: PHYSICAL THERAPY | Age: 72
Setting detail: THERAPIES SERIES
Discharge: HOME | End: 2022-05-04
Payer: MEDICARE

## 2022-05-04 PROCEDURE — 97140 MANUAL THERAPY 1/> REGIONS: CPT

## 2022-05-04 PROCEDURE — 97530 THERAPEUTIC ACTIVITIES: CPT | Performed by: SPECIALIST/TECHNOLOGIST

## 2022-05-04 PROCEDURE — 97110 THERAPEUTIC EXERCISES: CPT | Performed by: SPECIALIST/TECHNOLOGIST

## 2022-05-04 NOTE — FLOWSHEET NOTE
[facility]ictor[facility]ove[facility] stevenson  Phone: [phone]   Fax: [phone]    Physical Therapy Treatment Note/ Progress Report:     Date:  2022    Patient Name:  Dunia Mcintyre    :  1950  MRN: [id number]  Restrictions/Precautions:    Medical/Treatment Diagnosis Information:  Diagnosis: M17.12 (ICD-10-CM) - Primary osteoarthritis of left kneeM25.561, M25.562 (ICD-10-CM) - Pain in both knees, unspecified [id number] (ICD-10-CM) - Primary osteoarthritis of right knee  Treatment Diagnosis: Decreased B pain-free Knee ROM with decreased functional strength limiting functional gait/stairs  Insurance/Certification information:  PT Insurance Information: Aetna; $40 copay; no auth  Physician Information:  Referring Practitioner: Dr. Pat Salinas of care signed (Y/N): []  Yes [x]  No     Date of Patient follow up with Physician:      Progress Report: [x]  Yes PN  []  No     Date Range for reporting period:  Beginnin2022  Ending:    Progress report due (10 Rx/or 30 days whichever is less): visit #73 or     Recertification due (POC duration/ or 90 days whichever is less): visit #12 or     Visit # Insurance Allowable Auth required? Date Range   2 Aetna Medicare  $40 copay  BMN []  Yes  [x]  No NA  No ionto, aquatics, DN, estim       Units approved Units used Date Range          Latex Allergy:  [x]NO      []YES  Preferred Language for Healthcare:   [x]English       []other:    Functional Scale:           Date assessed:  FOTO physical FS primary measure score = 40; risk adjusted = 52      Pain level:    2/ 10 @ rest 5/10 Rt knee worse than left     SUBJECTIVE: Pt has not come to therapy since initial visit on  and has NS ,,  sessions. Has had some illness with SOB, afib which is causing some difficulty but she would like to walk more but gets very winded with less than a 100 yards. Using a cane to ambulate outside of the home. Taking a pain pill to manage her knee oa symptoms. Is using heat on her Rt knee & left knee for swelling. Dr. Vilma Johns needs to schedule appt. OBJECTIVE:   5/4  Presented to clinic with no AD but cane is in the car.; 15'reassessment by PT for pelvic alignment and flexibility     Rt pubic upslip  Rt piriformis tightness  LOL sacral torsion  Rt SI post inominate      RESTRICTIONS/PRECAUTIONS:   Exercises/Interventions:  25'  Therapeutic Exercise (01523)  Resistance  level Sets/sec Reps Notes / Cues   Qsets into towel  B  10\" 10x 5/4    TP Gastroc stretch  B  Hamstring stretch  30\" 2xea 5/4   SAQ   LAQ 2# 2 10xea 5/4   HL Clams supine Lime 1 15x 5/4    Bridges 2\"  1 15x 5/4    Supine flexion SLR/ ABD  1 15x 5/4                               Therapeutic Activities (77810)       5/4/22 Pt was educated on PT POC, Diagnosis, Prognosis, pathomechanics as well as frequency and duration of scheduling future physical therapy appointments. Time was also taken on this day to answer all patient questions and participation in [facility] [employer]'                   Updated HEP 5/4   Consider advising of purchasing rollator for safety    5/4 advised to use cane for all ambulation due to Cardiac hx/SOB, contacting DR for Afib/ SOB symptoms                 Neuromuscular Re-ed (46644)                            Manual Intervention ([phone])       Knee mobs/PROM       Tib/Fem Mobs       Patella Mobs       Ankle mobs                         Modalities:     Pt. Education:  -pt educated on diagnosis, prognosis and expectations for rehab  -all pt questions were answered    Home Exercise Program:  Access Code: [email or web address]  URL: [email or web address]  Date: 04/13/2022  Prepared by:  [facility]    Exercises  Modified Daryl Stretch - 1 x daily - 7 x weekly - 1 sets - 3 reps - 20\" hold  Seated Hamstring Stretch - 1 x daily - 7 x weekly - 1 sets - 3 reps - 20\" hold  Seated Gastroc Stretch with Strap - 1 x daily - 7 x weekly - 1 sets - 3 reps - 20\" hold  Standing Gastroc Stretch - 1 x daily - 7 x weekly - 3 sets - 10 reps  Bent Knee Fallouts - 1 x daily - 7 x weekly - 2 sets - 10 reps  Supine Gluteal Sets - 1 x daily - 7 x weekly - 3 sets - 10 reps - 5\" hold  Supine Quadricep Sets - 1 x daily - 7 x weekly - 1-2 sets - 10 reps - 5\" hold    Therapeutic Exercise and NMR EXR  [x] (87634) Provided verbal/tactile cueing for activities related to strengthening, flexibility, endurance, ROM for improvements in LE, proximal hip, and core control with self care, mobility, lifting, ambulation.  [] (38768) Provided verbal/tactile cueing for activities related to improving balance, coordination, kinesthetic sense, posture, motor skill, proprioception  to assist with LE, proximal hip, and core control in self care, mobility, lifting, ambulation and eccentric single leg control.   [] (28364) Therapist is in constant attendance of 2 or more patients providing skilled therapy interventions, but not providing any significant amount of measurable one-on-one time to either patient, for improvements in LE, proximal hip, and core control in self care, mobility, lifting, ambulation and eccentric single leg control.      NMR and Therapeutic Activities:    [x] (60526 or 29607) Provided verbal/tactile cueing for activities related to improving balance, coordination, kinesthetic sense, posture, motor skill, proprioception and motor activation to allow for proper function of core, proximal hip and LE with self care and ADLs  [] (82249) Gait Re-education- Provided training and instruction to the patient for proper LE, core and proximal hip recruitment and positioning and eccentric body weight control with ambulation re-education including up and down stairs     Home Exercise Program:    [x] (39577) Reviewed/Progressed HEP activities related to strengthening, flexibility, endurance, ROM of core, proximal hip and LE for functional self-care, mobility, lifting and ambulation/stair navigation   [] (73983)Reviewed/Progressed HEP activities related to improving balance, coordination, kinesthetic sense, posture, motor skill, proprioception of core, proximal hip and LE for self care, mobility, lifting, and ambulation/stair navigation      Manual Treatments:  PROM / STM / Oscillations-Mobs:  G-I, II, III, IV (PA's, Inf., Post.)  [] (75785) Provided manual therapy to mobilize LE, proximal hip and/or LS spine soft tissue/joints for the purpose of modulating pain, promoting relaxation,  increasing ROM, reducing/eliminating soft tissue swelling/inflammation/restriction, improving soft tissue extensibility and allowing for proper ROM for normal function with self care, mobility, lifting and ambulation. Modalities:  [] (02428) Vasopneumatic compression: Utilized vasopneumatic compression to decrease edema / swelling for the purpose of improving mobility and quad tone / recruitment which will allow for increased overall function including but not limited to self-care, transfers, ambulation, and ascending / descending stairs. Charges:  Timed Code Treatment Minutes: 45   Total Treatment Minutes: 45     [] EVAL - LOW (54335)   [] EVAL - MOD (52244)  [] EVAL - HIGH (16738)  [] RE-EVAL (43084)  [x] DU(54745) x 1      [] Ionto  [] NMR (19734) x       [] Vaso  [x] Manual (44235) x   1    [] Ultrasound  [x] TA x   1     [] Mech Traction (42739)  [] Aquatic Therapy x     [] ES (un) (43258):   [] Home Management Training x  [] ES(attended) (97175)   [] Dry Needling 1-2 muscles (24587):  [] Dry Needling 3+ muscles (165200  [] Group:      [] Other:     GOALS:   Patient stated goal:to be able to walk better  [] Progressing: [] Met: [] Not Met: [] Adjusted    Therapist goals for Patient:   Short Term Goals: To be achieved in: 2 weeks  1. Independent in HEP and progression per patient tolerance, in order to prevent re-injury. [] Progressing: [] Met: [x] Not Met: [] Adjusted  2.  Patient will have a decrease in pain to facilitate improvement in movement, function, and ADLs as indicated by Functional Deficits. [] Progressing: [] Met: [x] Not Met: [] Adjusted    Long Term Goals: To be achieved in: 4-6 weeks  1. FOTO score of at least 58 to assist with reaching prior level of function. [] Progressing: [] Met: [] Not Met: [] Adjusted  2. Patient will demonstrate increased AROM to 0-116 degrees in B knees to allow for proper joint functioning as indicated by patients ability to perform sit to stand with improved quad activation/knee flexion with UE A,  [] Progressing: [] Met: [] Not Met: [] Adjusted  3. Patient will demonstrate an increase in Strength to at least 4-4.5/5 as well as good proximal hip strength and control to allow for proper functional mobility as indicated by patients ability to ascend 4-6 steps reciprocally with 1 rail by discharge  [] Progressing: [] Met: [] Not Met: [] Adjusted  4. Patient will return to functional activities including standing at [facility] for up to 15-20; without increased symptoms or restriction. [] Progressing: [] Met: [] Not Met: [] Adjusted  5. Patient to be able to perform TUG in 12 seconds or less for improved safety with community ambulation. [] Progressing: [] Met: [] Not Met: [] Adjusted       Overall Progression Towards Functional goals/ Treatment Progress Update:  [] Patient is progressing as expected towards functional goals listed. [] Progression is slowed due to complexities/Impairments listed. [] Progression has been slowed due to co-morbidities.   [x] Plan just implemented, tunable to assess goals progression due to initial evaluation only   [] Goals require adjustment due to lack of progress  [] Patient is not progressing as expected and requires additional follow up with physician  [] Other    Persisting Functional Limitations/Impairments:  []Sitting []Standing   []Walking []Stairs   []Transfers []ADLs   []Squatting/bending []Kneeling  []Housework []Job related tasks  []Driving []Sports/Recreation   []Sleeping []Other:    ASSESSMENT:  PT returns to therapy after 3+weeks absence(3 NS)  Due to illness with Afib/ SOB. Pt is doing some her HEP daily but still having lots of pain across the RT knee > left knee due to OA. PT challenged today resuming her program with emphasis on LE flexibility and strengthening to the quadriceps etc.  Patient  presents with severe L iliopsoas and quad tightness with R SI position posterior innominate SI. She also has LOL sacral torsion with correeponding tight R piriformis and decreased L SI posterior SI movement stuck in L anterior innominate . No goals met yet and HHEP updated. Slowly progress patient's HEP to strengthen hips/knees and improve pelvic/SI musculature length/flexibility,    Treatment/Activity Tolerance:  [x] Pt able to complete treatment [x] Patient limited by fatique  [x] Patient limited by pain  [] Patient limited by other medical complications  [] Other:     Prognosis:  [] Good [x] Fair  [] Poor    Patient Requires Follow-up: [x] Yes  [] No    Return to Play:    [x]  N/A   []  Stage 1: Intro to Strength   []  Stage 2: Return to Run and Strength   []  Stage 3: Return to Jump and Strength   []  Stage 4: Dynamic Strength and Agility   []  Stage 5: Sport Specific Training     []  Ready to Return to Play, Meets All Above Stages   []  Not Ready for Return to Sports   Comments:            PLAN: PT 1-2x / week for 4-6 weeks.    [x] Continue per plan of care [] Alter current plan (see comments)  [] Plan of care initiated [] Hold pending MD visit [] Discharge    Electronically signed by: Jorge Luis Ramirez PTA, [facility], [address] #[id number] (manual)      Note: If patient does not return for scheduled/ recommended follow up visits, this note will serve as a discharge from care along with most recent update on progress.

## 2022-05-06 ENCOUNTER — HOSPITAL ENCOUNTER (OUTPATIENT)
Dept: PHYSICAL THERAPY | Age: 72
Setting detail: THERAPIES SERIES
Discharge: HOME | End: 2022-05-06
Payer: MEDICARE

## 2022-05-06 PROCEDURE — 97530 THERAPEUTIC ACTIVITIES: CPT

## 2022-05-06 PROCEDURE — 97110 THERAPEUTIC EXERCISES: CPT

## 2022-05-06 NOTE — FLOWSHEET NOTE
[facility]  Phone: [phone]   Fax: [phone]    Physical Therapy Treatment Note/ Progress Report:     Date:  2022    Patient Name:  Shaniqua Mtz    :  1950  MRN: [id number]  Restrictions/Precautions:    Medical/Treatment Diagnosis Information:   Diagnosis: M17.12 (ICD-10-CM) - Primary osteoarthritis of left knee M25.561, M25.562 (ICD-10-CM) - Pain in both knees, unspecified chronicity M17.11 (ICD-10-CM) - Primary osteoarthritis of right knee   Treatment Diagnosis: Decreased B pain-free Knee ROM with decreased functional strength limiting functional gait/stairs  Insurance/Certification information:  PT Insurance Information: Aetna; $40 copay; no auth  Physician Information:  Referring Practitioner: Dr. Tommie Cole of care signed (Y/N): []  Yes [x]  No     Date of Patient follow up with Physician:      Progress Report: [x]  Yes PN  []  No     Date Range for reporting period:  Beginnin2022  Ending:    Progress report due (10 Rx/or 30 days whichever is less): visit #38 or     Recertification due (POC duration/ or 90 days whichever is less): visit #12 or     Visit # Insurance Allowable Auth required? Date Range   3 Aetna Medicare  $40 copay  BMN []  Yes  [x]  No NA  No ionto, aquatics, DN, estim       Units approved Units used Date Range          Latex Allergy:  [x]NO      []YES  Preferred Language for Healthcare:   [x]English       []other:    Functional Scale:           Date assessed:  FOTO physical FS primary measure score = 40; risk adjusted = 52      Pain level:    3/10 @ rest 5/10 Rt knee worse than left     SUBJECTIVE:  Has appt with Dr Tera Rosario on . Was a little sore after last visit, mostly muscle sore. No difficulty reported with HEP. States she needs to get another cane as the one she has is non adjustable and too tall for her.     OBJECTIVE:     Presented to clinic with no AD but cane is in the car.; 15'reassessment by PT for pelvic alignment and flexibility     Rt pubic upslip  Rt piriformis tightness  LOL sacral torsion  Rt SI post inominate      RESTRICTIONS/PRECAUTIONS:   Exercises/Interventions:  25'  Therapeutic Exercise (18205)  Resistance  level Sets/sec Reps Notes / Cues   Recumbent bike  5'     Q sets into towel  B  10\" 10x 5/4   TP Gastroc stretch  B  Hamstring stretch+  30\" 2xea 5/4   SAQ   LAQ 2# 2 10xea 5/4   HL Clams supine Lime 1 15x 5/4   Bridges 2\"  1 15x 5/4   Supine flexion SLR/ ABD  1 15x 5/4                               Therapeutic Activities (08258)       5/6: discussed rollator walker for assisting with improved function and decreased SOB/ fatigue with functional ambulation for prolonged distances. [facility]'                     Updated HEP 5/4   Consider advising of purchasing rollator for safety    5/4 advised to use cane for all ambulation due to Cardiac hx/SOB, contacting DR for Afib/ SOB symptoms                 Neuromuscular Re-ed (00130)                            Manual Intervention ([id number])       Knee mobs/PROM       Tib/Fem Mobs       Patella Mobs       Ankle mobs                         Modalities:     Pt. Education:  -pt educated on diagnosis, prognosis and expectations for rehab  -all pt questions were answered    Home Exercise Program:  Access Code: [email or web address]  URL: [email or web address]  Date: 04/13/2022  Prepared by:  [facility]    Exercises  Modified Daryl Stretch - 1 x daily - 7 x weekly - 1 sets - 3 reps - 20\" hold  Seated Hamstring Stretch - 1 x daily - 7 x weekly - 1 sets - 3 reps - 20\" hold  Seated Gastroc Stretch with Strap - 1 x daily - 7 x weekly - 1 sets - 3 reps - 20\" hold  Standing Gastroc Stretch - 1 x daily - 7 x weekly - 3 sets - 10 reps  Bent Knee Fallouts - 1 x daily - 7 x weekly - 2 sets - 10 reps  Supine Gluteal Sets - 1 x daily - 7 x weekly - 3 sets - 10 reps - 5\" hold  Supine Quadricep Sets - 1 x daily - 7 x weekly - 1-2 sets - 10 reps - 5\" hold    Therapeutic Exercise and NMR EXR  [x] (92945) Provided verbal/tactile cueing for activities related to strengthening, flexibility, endurance, ROM for improvements in LE, proximal hip, and core control with self care, mobility, lifting, ambulation.  [] (89739) Provided verbal/tactile cueing for activities related to improving balance, coordination, kinesthetic sense, posture, motor skill, proprioception  to assist with LE, proximal hip, and core control in self care, mobility, lifting, ambulation and eccentric single leg control.   [] (04907) Therapist is in constant attendance of 2 or more patients providing skilled therapy interventions, but not providing any significant amount of measurable one-on-one time to either patient, for improvements in LE, proximal hip, and core control in self care, mobility, lifting, ambulation and eccentric single leg control.      NMR and Therapeutic Activities:    [x] (65024 or 89598) Provided verbal/tactile cueing for activities related to improving balance, coordination, kinesthetic sense, posture, motor skill, proprioception and motor activation to allow for proper function of core, proximal hip and LE with self care and ADLs  [] (54905) Gait Re-education- Provided training and instruction to the patient for proper LE, core and proximal hip recruitment and positioning and eccentric body weight control with ambulation re-education including up and down stairs     Home Exercise Program:    [x] (61029) Reviewed/Progressed HEP activities related to strengthening, flexibility, endurance, ROM of core, proximal hip and LE for functional self-care, mobility, lifting and ambulation/stair navigation   [] (83901)Reviewed/Progressed HEP activities related to improving balance, coordination, kinesthetic sense, posture, motor skill, proprioception of core, proximal hip and LE for self care, mobility, lifting, and ambulation/stair navigation      Manual Treatments:  PROM / STM / Oscillations-Mobs:  G-I, II, III, IV (PA's, Inf., Post.)  [] (05603) Provided manual therapy to mobilize LE, proximal hip and/or LS spine soft tissue/joints for the purpose of modulating pain, promoting relaxation,  increasing ROM, reducing/eliminating soft tissue swelling/inflammation/restriction, improving soft tissue extensibility and allowing for proper ROM for normal function with self care, mobility, lifting and ambulation. Modalities:  [] (36944) Vasopneumatic compression: Utilized vasopneumatic compression to decrease edema / swelling for the purpose of improving mobility and quad tone / recruitment which will allow for increased overall function including but not limited to self-care, transfers, ambulation, and ascending / descending stairs. Charges:  Timed Code Treatment Minutes: 45   Total Treatment Minutes: 45     [] EVAL - LOW (32109)   [] EVAL - MOD (44427)  [] EVAL - HIGH (71800)  [] RE-EVAL (10800)  [x] TP(40164) x 2      [] Ionto  [] NMR (47998) x       [] Vaso  [x] Manual (88941) x       [] Ultrasound  [x] TA x   1     [] Mech Traction (73492)  [] Aquatic Therapy x      [] ES (un) (62713):   [] Home Management Training x  [] ES(attended) (60355)   [] Dry Needling 1-2 muscles (56946):  [] Dry Needling 3+ muscles (040782  [] Group:      [] Other:     GOALS:   Patient stated goal:to be able to walk better  [] Progressing: [] Met: [] Not Met: [] Adjusted    Therapist goals for Patient:   Short Term Goals: To be achieved in: 2 weeks  1. Independent in HEP and progression per patient tolerance, in order to prevent re-injury. [] Progressing: [] Met: [x] Not Met: [] Adjusted  2. Patient will have a decrease in pain to facilitate improvement in movement, function, and ADLs as indicated by Functional Deficits. [] Progressing: [] Met: [x] Not Met: [] Adjusted    Long Term Goals: To be achieved in: 4-6 weeks  1. FOTO score of at least 58 to assist with reaching prior level of function.   [] Progressing: [] Met: [] Not Met: [] Adjusted  2. Patient will demonstrate increased AROM to 0-116 degrees in B knees to allow for proper joint functioning as indicated by patients ability to perform sit to stand with improved quad activation/knee flexion with UE A,  [] Progressing: [] Met: [] Not Met: [] Adjusted  3. Patient will demonstrate an increase in Strength to at least 4-4.5/5 as well as good proximal hip strength and control to allow for proper functional mobility as indicated by patients ability to ascend 4-6 steps reciprocally with 1 rail by discharge  [] Progressing: [] Met: [] Not Met: [] Adjusted  4. Patient will return to functional activities including standing at [facility] for up to 15-20; without increased symptoms or restriction. [] Progressing: [] Met: [] Not Met: [] Adjusted  5. Patient to be able to perform TUG in 12 seconds or less for improved safety with community ambulation. [] Progressing: [] Met: [] Not Met: [] Adjusted       Overall Progression Towards Functional goals/ Treatment Progress Update:  [] Patient is progressing as expected towards functional goals listed. [] Progression is slowed due to complexities/Impairments listed. [] Progression has been slowed due to co-morbidities. [x] Plan just implemented, tunable to assess goals progression due to initial evaluation only   [] Goals require adjustment due to lack of progress  [] Patient is not progressing as expected and requires additional follow up with physician  [] Other    Persisting Functional Limitations/Impairments:  []Sitting []Standing   []Walking []Stairs   []Transfers []ADLs   []Squatting/bending []Kneeling  []Housework []Job related tasks  []Driving []Sports/Recreation   []Sleeping []Other:    ASSESSMENT:  Time spent this date discussing rollator walker to assist with SOB, endurance and functional mobility for prolonged distances as well as use of cane for support around house, short distances.  Pt demonstrates difficulty with current exercise program due to muscle weakness and limited muscle endurance. Pt moves slowly through exercises and transitional movements due to decreased speed of general mobility. Continue to promote increased strength, endurance and functional mobility as tolerated to promote increased tolerance to perform functional daily tasks without restrictions and pain. Treatment/Activity Tolerance:  [x] Pt able to complete treatment [x] Patient limited by fatique  [x] Patient limited by pain  [] Patient limited by other medical complications  [] Other:     Prognosis:  [] Good [x] Fair  [] Poor    Patient Requires Follow-up: [x] Yes  [] No    Return to Play:    [x]  N/A   []  Stage 1: Intro to Strength   []  Stage 2: Return to Run and Strength   []  Stage 3: Return to Jump and Strength   []  Stage 4: Dynamic Strength and Agility   []  Stage 5: Sport Specific Training   []  Ready to Return to Play, Meets All Above Stages   []  Not Ready for Return to Sports   Comments:            PLAN: PT 1-2x / week for 4-6 weeks. [x] Continue per plan of care [] Alter current plan (see comments)  [] Plan of care initiated [] Hold pending MD visit [] Discharge    Electronically signed by: Dominique Lawrence, PTA [id number]      Note: If patient does not return for scheduled/ recommended follow up visits, this note will serve as a discharge from care along with most recent update on progress.

## 2022-05-11 ENCOUNTER — HOSPITAL ENCOUNTER (OUTPATIENT)
Dept: PHYSICAL THERAPY | Age: 72
Setting detail: THERAPIES SERIES
Discharge: HOME | End: 2022-05-11
Payer: MEDICARE

## 2022-05-11 ENCOUNTER — ANTI-COAG VISIT (OUTPATIENT)
Dept: PHARMACY | Age: 72
End: 2022-05-11
Payer: MEDICARE

## 2022-05-11 DIAGNOSIS — I48.21 PERMANENT ATRIAL FIBRILLATION (HCC): Primary | ICD-10-CM

## 2022-05-11 LAB — INTERNATIONAL NORMALIZATION RATIO, POC: 1.5

## 2022-05-11 PROCEDURE — 99211 OFF/OP EST MAY X REQ PHY/QHP: CPT

## 2022-05-11 PROCEDURE — 85610 PROTHROMBIN TIME: CPT
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
## 2022-05-11 NOTE — FLOWSHEET NOTE
Physical Therapy  Cancellation/No-show Note  Patient Name:  Jennifer Camacho  :  1950   Date:  2022  Cancelled visits to date: 0  No-shows to date: 3    Patient status for today's appointment patient:  []  Cancelled  []  Rescheduled appointment  [x]  No-show , ,      Reason given by patient:  []  Patient ill  []  Conflicting appointment  []  No transportation    []  Conflict with work  []  No reason given  [x]  Other:  Knees are hurting today and she didn't know she had an appointment today   Comments:      Phone call information:   [x]  Phone call made today to patient at  number provided: [phone]     [x]  Patient answered, conversation as follows:  Missed appt today, next scheduled appt 2022 2:30 PM   []  Patient did not answer, message left as follows:  []  Phone call not made today  []  Phone call not needed - pt contacted us to cancel and provided reason for cancellation.      Electronically signed by:  Archana Carmona PTA

## 2022-05-13 ENCOUNTER — HOSPITAL ENCOUNTER (OUTPATIENT)
Dept: PHYSICAL THERAPY | Age: 72
Setting detail: THERAPIES SERIES
Discharge: HOME | End: 2022-05-13
Payer: MEDICARE

## 2022-05-13 PROCEDURE — 97112 NEUROMUSCULAR REEDUCATION: CPT | Performed by: SPECIALIST/TECHNOLOGIST

## 2022-05-13 PROCEDURE — 97140 MANUAL THERAPY 1/> REGIONS: CPT

## 2022-05-13 PROCEDURE — 97110 THERAPEUTIC EXERCISES: CPT

## 2022-05-13 NOTE — FLOWSHEET NOTE
[facility]  Phone: [phone]   Fax: [phone]    Physical Therapy Treatment Note/ Progress Report:     Date:  2022    Patient Name:  Yeimy Winston    :  1950  MRN: [id number]  Restrictions/Precautions:    Medical/Treatment Diagnosis Information:  Diagnosis: M17.12 (ICD-10-CM) - Primary osteoarthritis of left knee M25.561, M25.562 (ICD-10-CM) - Pain in both knees, unspecified chronicity M17.11 (ICD-10-CM) - Primary osteoarthritis of right knee  Treatment Diagnosis: Decreased B pain-free Knee ROM with decreased functional strength limiting functional gait/stairs  Insurance/Certification information:  PT Insurance Information: [employer]; $40 copay; no auth  Physician Information:  Referring Practitioner: Dr. Joaquin Campbell of care signed (Y/N): []  Yes [x]  No     Date of Patient follow up with Physician:      Progress Report: []  Yes PN  []  No     Date Range for reporting period:  Beginnin2022  Ending:    Progress report due (10 Rx/or 30 days whichever is less): visit #89 or     Recertification due (POC duration/ or 90 days whichever is less): visit #12 or     Visit # Insurance Allowable Auth required? Date Range   4 [employer] Medicare  $40 copay  BMN []  Yes  [x]  No NA  No ionto, aquatics, DN, estim       Units approved Units used Date Range          Latex Allergy:  [x]NO      []YES  Preferred Language for Healthcare:   [x]English       []other:    Functional Scale:           Date assessed:  FOTO physical FS primary measure score = 40; risk adjusted = 52      Pain level:    3/10 @ rest 5/10 Rt knee worse than left     SUBJECTIVE:  Has appt with Dr Tree Marquez on . Was a little sore after last visit, mostly muscle sore. No difficulty reported with HEP. States she needs to get another cane as the one she has is non adjustable and too tall for her.     OBJECTIVE:   : L pubic upslip; H4VGOCY/FRSR, L4ERSR  5/ Presented to clinic with no AD but cane is in the car.; 15'reassessment by PT for pelvic alignment and flexibility     Rt pubic upslip  Rt piriformis tightness  LOL sacral torsion  Rt SI post inominate      RESTRICTIONS/PRECAUTIONS:   Exercises/Interventions:  25'  Therapeutic Exercise (35721)  Resistance  level Sets/sec Reps Notes / Cues   Recumbent bike Level 1 5'  5/13   Q sets into towel  B  10\" 10x 5/13: NMR   TP Gastroc stretch  B  Hamstring stretch+  30\" 2xea 5/4   SAQ   LAQ 0#/ Left 2#  )3 R/L 2  2 10xea  10X 5/13     SL Clams B  2 20x 5/13   Bridges 2-5\"  1 15x 5/13   Supine flexion SLR/ ABD  1 15x 5/4   PPT  10\" 10x 5/13NMR   Supine 90/90  Hamstring B  10\" 3x 5/13   LTR  10\" 3x 5/13 NMR   IB stretches B Gastroc 20\" 2 5/13   Standing B heel raises  2 10     supine Marches Alt leg    10x 5/13: NMR   Therapeutic Activities (30628)       5/6: discussed rollator walker for assisting with improved function and decreased SOB/ fatigue with functional ambulation for prolonged distances. Danyelle Martinez'                     Updated HEP 5/4   Consider advising of purchasing rollator for safety    5/4 advised to use cane for all ambulation due to Cardiac hx/SOB, contacting DR for Afib/ SOB symptoms                 Neuromuscular Re-ed (36745)                            Manual Intervention ([phone])       Knee mobs/PROM       Tib/Fem Mobs       Patella Mobs       Ankle mobs       SI/Lumbosacral MET gentle MET to correct [id number]/FRSR, R OL sacral torsion, R SI posterior innominate SI      Hip LA distraction 4 passes          Modalities:     Pt. Education:  -pt educated on diagnosis, prognosis and expectations for rehab  -all pt questions were answered    Home Exercise Program:  Access Code: [email or web address]  URL: [email or web address]  Date: 04/13/2022  [facility]  [facility]    Exercises  Modified Daryl Stretch - 1 x daily - 7 x weekly - 1 sets - 3 reps - 20\" hold  Seated Hamstring Stretch - 1 x daily - 7 x weekly - 1 sets - 3 reps - 20\" hold  Seated Gastroc Stretch with Strap - 1 x daily - 7 x weekly - 1 sets - 3 reps - 20\" hold  Standing Gastroc Stretch - 1 x daily - 7 x weekly - 3 sets - 10 reps  Bent Knee Fallouts - 1 x daily - 7 x weekly - 2 sets - 10 reps  Supine Gluteal Sets - 1 x daily - 7 x weekly - 3 sets - 10 reps - 5\" hold  Supine Quadricep Sets - 1 x daily - 7 x weekly - 1-2 sets - 10 reps - 5\" hold    Therapeutic Exercise and NMR EXR  [x] (88872) Provided verbal/tactile cueing for activities related to strengthening, flexibility, endurance, ROM for improvements in LE, proximal hip, and core control with self care, mobility, lifting, ambulation.  [] (45427) Provided verbal/tactile cueing for activities related to improving balance, coordination, kinesthetic sense, posture, motor skill, proprioception  to assist with LE, proximal hip, and core control in self care, mobility, lifting, ambulation and eccentric single leg control.   [] (62271) Therapist is in constant attendance of 2 or more patients providing skilled therapy interventions, but not providing any significant amount of measurable one-on-one time to either patient, for improvements in LE, proximal hip, and core control in self care, mobility, lifting, ambulation and eccentric single leg control.      NMR and Therapeutic Activities:    [x] (61172 or 27480) Provided verbal/tactile cueing for activities related to improving balance, coordination, kinesthetic sense, posture, motor skill, proprioception and motor activation to allow for proper function of core, proximal hip and LE with self care and ADLs  [] (34787) Gait Re-education- Provided training and instruction to the patient for proper LE, core and proximal hip recruitment and positioning and eccentric body weight control with ambulation re-education including up and down stairs     Home Exercise Program:    [x] (46697) Reviewed/Progressed HEP activities related to strengthening, flexibility, endurance, ROM of core, proximal hip and LE for functional self-care, mobility, lifting and ambulation/stair navigation   [] (56584)Reviewed/Progressed HEP activities related to improving balance, coordination, kinesthetic sense, posture, motor skill, proprioception of core, proximal hip and LE for self care, mobility, lifting, and ambulation/stair navigation      Manual Treatments:  PROM / STM / Oscillations-Mobs:  G-I, II, III, IV (PA's, Inf., Post.)  [] (45474) Provided manual therapy to mobilize LE, proximal hip and/or LS spine soft tissue/joints for the purpose of modulating pain, promoting relaxation,  increasing ROM, reducing/eliminating soft tissue swelling/inflammation/restriction, improving soft tissue extensibility and allowing for proper ROM for normal function with self care, mobility, lifting and ambulation. Modalities:  [] (39838) Vasopneumatic compression: Utilized vasopneumatic compression to decrease edema / swelling for the purpose of improving mobility and quad tone / recruitment which will allow for increased overall function including but not limited to self-care, transfers, ambulation, and ascending / descending stairs. Charges:  Timed Code Treatment Minutes: 45   Total Treatment Minutes: 45     [] EVAL - LOW (78985)   [] EVAL - MOD (05884)  [] EVAL - HIGH (61265)  [] RE-EVAL (09920)  [x] AZ(48940) x 1      [] Ionto  [x] NMR (86625) x  1     JS     [] Vaso  [x] Manual (60533) x 1      [] Ultrasound  [] TA x   1     [] Mech Traction (52139)  [] Aquatic Therapy x      [] ES (un) (35885):   [] Home Management Training x  [] ES(attended) (40074)   [] Dry Needling 1-2 muscles (70501):  [] Dry Needling 3+ muscles (659432  [] Group:      [] Other:     GOALS:   Patient stated goal:to be able to walk better  [] Progressing: [] Met: [] Not Met: [] Adjusted    Therapist goals for Patient:   Short Term Goals: To be achieved in: 2 weeks  1.  Independent in HEP and progression per patient tolerance, in order to prevent re-injury. [] Progressing: [] Met: [x] Not Met: [] Adjusted  2. Patient will have a decrease in pain to facilitate improvement in movement, function, and ADLs as indicated by Functional Deficits. [] Progressing: [] Met: [x] Not Met: [] Adjusted    Long Term Goals: To be achieved in: 4-6 weeks  1. FOTO score of at least 58 to assist with reaching prior level of function. [] Progressing: [] Met: [] Not Met: [] Adjusted  2. Patient will demonstrate increased AROM to 0-116 degrees in B knees to allow for proper joint functioning as indicated by patients ability to perform sit to stand with improved quad activation/knee flexion with UE A,  [] Progressing: [] Met: [] Not Met: [] Adjusted  3. Patient will demonstrate an increase in Strength to at least 4-4.5/5 as well as good proximal hip strength and control to allow for proper functional mobility as indicated by patients ability to ascend 4-6 steps reciprocally with 1 rail by discharge  [] Progressing: [] Met: [] Not Met: [] Adjusted  4. Patient will return to functional activities including standing at [place] for up to 15-20; without increased symptoms or restriction. [] Progressing: [] Met: [] Not Met: [] Adjusted  5. Patient to be able to perform TUG in 12 seconds or less for improved safety with community ambulation. [] Progressing: [] Met: [] Not Met: [] Adjusted       Overall Progression Towards Functional goals/ Treatment Progress Update:  [] Patient is progressing as expected towards functional goals listed. [] Progression is slowed due to complexities/Impairments listed. [] Progression has been slowed due to co-morbidities.   [x] Plan just implemented, tunable to assess goals progression due to initial evaluation only   [] Goals require adjustment due to lack of progress  [] Patient is not progressing as expected and requires additional follow up with physician  [] Other    Persisting Functional Limitations/Impairments:  []Sitting []Standing   []Walking []Stairs   []Transfers []ADLs   []Squatting/bending []Kneeling  []Housework []Job related tasks  []Driving []Sports/Recreation   []Sleeping []Other:    ASSESSMENT:  Time spent this date reassessing and gently performing MET to correct decreased SI and lower lumbar mobility with lumbosacral postural malalignments listed above. Pt demonstrates difficulty with current exercise program due to muscle weakness and limited muscle endurance. Pt moves slowly through exercises and transitional movements due to decreased speed of general mobility. Continue to promote increased strength, endurance and functional mobility as tolerated to promote increased tolerance to perform functional daily tasks without restrictions and pain. Treatment/Activity Tolerance:  [x] Pt able to complete treatment [x] Patient limited by fatique  [x] Patient limited by pain  [] Patient limited by other medical complications  [] Other:     Prognosis:  [] Good [x] Fair  [] Poor    Patient Requires Follow-up: [x] Yes  [] No    Return to Play:    [x]  N/A   []  Stage 1: Intro to Strength   []  Stage 2: Return to Run and Strength   []  Stage 3: Return to Jump and Strength   []  Stage 4: Dynamic Strength and Agility   []  Stage 5: Sport Specific Training   []  Ready to Return to Play, Meets All Above Stages   []  Not Ready for Return to Sports   Comments:            PLAN: PT 1-2x / week for 4-6 weeks. [x] Continue per plan of care [] Alter current plan (see comments)  [] Plan of care initiated [] Hold pending MD visit [] Discharge    Electronically signed by: Darwin Lugo PT, MSPT #[id number]    Note: If patient does not return for scheduled/ recommended follow up visits, this note will serve as a discharge from care along with most recent update on progress.

## 2022-05-18 ENCOUNTER — HOSPITAL ENCOUNTER (OUTPATIENT)
Dept: PHYSICAL THERAPY | Age: 72
Setting detail: THERAPIES SERIES
Discharge: HOME | End: 2022-05-18
Payer: MEDICARE

## 2022-05-18 PROCEDURE — 97140 MANUAL THERAPY 1/> REGIONS: CPT

## 2022-05-18 PROCEDURE — 97110 THERAPEUTIC EXERCISES: CPT

## 2022-05-18 PROCEDURE — 97530 THERAPEUTIC ACTIVITIES: CPT

## 2022-05-18 NOTE — FLOWSHEET NOTE
Jul[facility]  Phone: [phone]   Fax: [phone]    Physical Therapy Treatment Note/ Progress Report:     Date:  2022    Patient Name:  Jayro Chairez    :  1950  MRN: [id number]  Restrictions/Precautions:    Medical/Treatment Diagnosis Information:   Diagnosis: M17.12 (ICD-10-CM) - Primary osteoarthritis of left knee M25.561, M25.562 (ICD-10-CM) - Pain in both knees, unspecified chronicity M17.11 (ICD-10-CM) - Primary osteoarthritis of right knee   Treatment Diagnosis: Decreased B pain-free Knee ROM with decreased functional strength limiting functional gait/stairs  Insurance/Certification information:  PT Insurance Information: [employer]; $40 copay; no auth  Physician Information:  Referring Practitioner: Dr. Glenn Pabon of care signed (Y/N): []  Yes [x]  No     Date of Patient follow up with Physician:      Progress Report: [x]  Yes PN  []  No     Date Range for reporting period:  Beginnin2022  PN: 22  Ending:    Progress report due (10 Rx/or 30 days whichever is less): visit #22 or 6/15    Recertification due (POC duration/ or 90 days whichever is less): visit #12 or     Visit # Insurance Allowable Auth required? Date Range   5 [employer] Medicare  $40 copay  BMN []  Yes  [x]  No NA  No ionto, aquatics, DN, estim       Units approved Units used Date Range          Latex Allergy:  [x]NO      []YES  Preferred Language for Healthcare:   [x]English       []other:    Functional Scale:           Date assessed:  FOTO physical FS primary measure score = 40; risk adjusted = 52    FOTO physical FS primary measure score = 40         Pain level:    6/10 B knee pain (pt points to R lateral jt line)     SUBJECTIVE:  Pt reports she is having a headache today and more knee pain than usual. Since starting she feels like things are not going well. She typically feels tired and worn out after appts.  She is planning on having cortisone injection at the end of June with Dr. Kosta Helton. OBJECTIVE:   5/18:  Gait: (include devices/WB status) Amb. Without A device with R trunk lean with R stance, shuffling gait pattern     PROM AROM     L R L R   Hip Flexion 110 100 PROM=AROM t/o L/R     Hip Abduction NT NT       Hip ER NT NT       Hip IR NT NT       Knee Flexion 110* 112*       Knee Extension 9 from neutral 10 from neutral         Strength (0-5) / Myotomes Left Right   Hip Flexion - supine At least 3/5 At least 3/5   Hip Flexion - seated (L1-2) 4/5 4/5   Hip Abduction 4/5 4-/5   Hip Adduction NT NT   Hip ER NT NT   Hip IR NT nt   Quads (L2-4) 4/5 4-/5**   Hamstrings 4/5 4-/5   Ankle Dorsiflexion (L4-5) 4/5 4/5   Ankle Plantarflexion (S1-2) 5/5 5/5   Ankle Inversion 5/5 5/5   Ankle Eversion (S1-2) 5/5 5/5   Great Toe Extension (L5) 5/5 4/5     TUG 12.86\"            5/13: L pubic upslip; T6TOGYR/FRSR, L4ERSR  5/4  Presented to clinic with no AD but cane is in the car.; 15'reassessment by PT for pelvic alignment and flexibility     Rt pubic upslip  Rt piriformis tightness  LOL sacral torsion  Rt SI post inominate      RESTRICTIONS/PRECAUTIONS:   Exercises/Interventions:  25'  Therapeutic Exercise (10394)  Resistance  level Sets/sec Reps Notes / Cues   Recumbent bike Level 1 5'  5/13   Q sets into towel  B  5/13: NMR   TP Gastroc stretch  B  Hamstring stretch+  5/4   SAQ   LAQ 0#/ Left 2#  )3 R/L 2   10xea   5/13     SL Clams B  2 20x 5/13   Bridges 2-5\"  1 15x 5/13   Supine flexion SLR/ ABD  1  2 10x Flex  10 Abd 5/4   PPT  5/13NMR   Supine 90/90  Hamstring B  5/13   LTR  5/13 NMR   IB stretches B Gastroc 5/13   Standing B heel raises      supine Marches Alt leg     5/13: NMR   Therapeutic Activities (08868)       5/6: discussed rollator walker for assisting with improved function and decreased SOB/ fatigue with functional ambulation for prolonged distances.          Oksana Pendleton'                     Updated HEP 5/4   Consider advising of purchasing rollator for safety    5/4 advised to use cane for all ambulation due to Cardiac hx/SOB, contacting DR for Afib/ SOB symptoms   Reassessment of objective measures, updated prognosis  25 min             Neuromuscular Re-ed (81240)                            Manual Intervention (28576)       Knee mobs/PROM       Tib/Fem Mobs       Patella Mobs       Ankle mobs       Manual hamstring stretching in 90/90  30\" 3 B    SI/Lumbosacral MET gentle       Hip LA distraction 4 passes          Modalities:     Pt. Education:  -pt educated on diagnosis, prognosis and expectations for rehab  -all pt questions were answered    Home Exercise Program:  Access Code: [email or web address]  URL: [email or web address]  Date: 04/13/2022  Prepared by:  [facility]    Exercises  Modified Daryl Stretch - 1 x daily - 7 x weekly - 1 sets - 3 reps - 20\" hold  Seated Hamstring Stretch - 1 x daily - 7 x weekly - 1 sets - 3 reps - 20\" hold  Seated Gastroc Stretch with Strap - 1 x daily - 7 x weekly - 1 sets - 3 reps - 20\" hold  Standing Gastroc Stretch - 1 x daily - 7 x weekly - 3 sets - 10 reps  Bent Knee Fallouts - 1 x daily - 7 x weekly - 2 sets - 10 reps  Supine Gluteal Sets - 1 x daily - 7 x weekly - 3 sets - 10 reps - 5\" hold  Supine Quadricep Sets - 1 x daily - 7 x weekly - 1-2 sets - 10 reps - 5\" hold    Therapeutic Exercise and NMR EXR  [x] (22939) Provided verbal/tactile cueing for activities related to strengthening, flexibility, endurance, ROM for improvements in LE, proximal hip, and core control with self care, mobility, lifting, ambulation.  [] (64660) Provided verbal/tactile cueing for activities related to improving balance, coordination, kinesthetic sense, posture, motor skill, proprioception  to assist with LE, proximal hip, and core control in self care, mobility, lifting, ambulation and eccentric single leg control.   [] (40594) Therapist is in constant attendance of 2 or more patients providing skilled therapy interventions, but not providing any significant amount of measurable one-on-one time to either patient, for improvements in LE, proximal hip, and core control in self care, mobility, lifting, ambulation and eccentric single leg control. NMR and Therapeutic Activities:    [x] (48888 or 38458) Provided verbal/tactile cueing for activities related to improving balance, coordination, kinesthetic sense, posture, motor skill, proprioception and motor activation to allow for proper function of core, proximal hip and LE with self care and ADLs  [] (11885) Gait Re-education- Provided training and instruction to the patient for proper LE, core and proximal hip recruitment and positioning and eccentric body weight control with ambulation re-education including up and down stairs     Home Exercise Program:    [x] (18209) Reviewed/Progressed HEP activities related to strengthening, flexibility, endurance, ROM of core, proximal hip and LE for functional self-care, mobility, lifting and ambulation/stair navigation   [] (38583)Reviewed/Progressed HEP activities related to improving balance, coordination, kinesthetic sense, posture, motor skill, proprioception of core, proximal hip and LE for self care, mobility, lifting, and ambulation/stair navigation      Manual Treatments:  PROM / STM / Oscillations-Mobs:  G-I, II, III, IV (PA's, Inf., Post.)  [] (10436) Provided manual therapy to mobilize LE, proximal hip and/or LS spine soft tissue/joints for the purpose of modulating pain, promoting relaxation,  increasing ROM, reducing/eliminating soft tissue swelling/inflammation/restriction, improving soft tissue extensibility and allowing for proper ROM for normal function with self care, mobility, lifting and ambulation.      Modalities:  [] (06692) Vasopneumatic compression: Utilized vasopneumatic compression to decrease edema / swelling for the purpose of improving mobility and quad tone / recruitment which will allow for increased overall function including but not limited to self-care, transfers, ambulation, and ascending / descending stairs. Charges:  Timed Code Treatment Minutes: 58   Total Treatment Minutes: 58     [] EVAL - LOW (43688)   [] EVAL - MOD (33806)  [] EVAL - HIGH (66061)  [] RE-EVAL (26499)  [x] DA(12708) x 1      [] Ionto  [] NMR (53149) x  1      [] Vaso  [x] Manual (44316) x 1      [] Ultrasound  [x] TA x   2     [] Mech Traction (10142)  [] Aquatic Therapy x      [] ES (un) (53650):   [] Home Management Training x  [] ES(attended) (30822)   [] Dry Needling 1-2 muscles (05873):  [] Dry Needling 3+ muscles (731394  [] Group:      [] Other:     GOALS:   Patient stated goal:to be able to walk better  [] Progressing: [] Met: [] Not Met: [] Adjusted    Therapist goals for Patient:   Short Term Goals: To be achieved in: 2 weeks  1. Independent in HEP and progression per patient tolerance, in order to prevent re-injury. [] Progressing: [] Met: [x] Not Met: [] Adjusted  2. Patient will have a decrease in pain to facilitate improvement in movement, function, and ADLs as indicated by Functional Deficits. [] Progressing: [] Met: [x] Not Met: [] Adjusted    Long Term Goals: To be achieved in: 4-6 weeks  1. FOTO score of at least 58 to assist with reaching prior level of function. [] Progressing: [] Met: [] Not Met: [] Adjusted  2. Patient will demonstrate increased AROM to 0-116 degrees in B knees to allow for proper joint functioning as indicated by patients ability to perform sit to stand with improved quad activation/knee flexion with UE A,  [] Progressing: [] Met: [] Not Met: [] Adjusted  3. Patient will demonstrate an increase in Strength to at least 4-4.5/5 as well as good proximal hip strength and control to allow for proper functional mobility as indicated by patients ability to ascend 4-6 steps reciprocally with 1 rail by discharge  [] Progressing: [] Met: [] Not Met: [] Adjusted  4.  Patient will return to functional activities including standing at [facility] for up to 15-20; without increased symptoms or restriction. [] Progressing: [] Met: [] Not Met: [] Adjusted  5. Patient to be able to perform TUG in 12 seconds or less for improved safety with community ambulation. [] Progressing: [] Met: [] Not Met: [] Adjusted       Overall Progression Towards Functional goals/ Treatment Progress Update:  [] Patient is progressing as expected towards functional goals listed. [] Progression is slowed due to complexities/Impairments listed. [] Progression has been slowed due to co-morbidities. [x] Plan just implemented, tunable to assess goals progression due to initial evaluation only   [] Goals require adjustment due to lack of progress  [] Patient is not progressing as expected and requires additional follow up with physician  [] Other    Persisting Functional Limitations/Impairments:  []Sitting []Standing   []Walking []Stairs   []Transfers []ADLs   []Squatting/bending []Kneeling  []Housework []Job related tasks  []Driving []Sports/Recreation   []Sleeping []Other:    ASSESSMENT:  Pt is a 81 yo female referred to PT for bilat knee OA. She has been seen for 5 visits in PT. She presents with improved hip flexor, quad, and hip abductor strength this date. She continues to present with deficits in posture, gait, B knee ROM, gross B LE strength, and balance. These deficits contribute to pain and decreased functional status. She will benefit from continued skilled therapy to address these deficits, achieve goals, and maximize functional status. Pt plans to perform independent aquatic exercise upon DC with the aquatic arthritis class. Please provide with pass and class schedule when DC comes. Continue to progress flexibility, strength, balance, and gait npv.           Treatment/Activity Tolerance:  [x] Pt able to complete treatment [x] Patient limited by fatique  [x] Patient limited by pain  [] Patient limited by other medical complications  [] Other:     Prognosis:  [] Good [x] Fair  [] Poor    Patient Requires Follow-up: [x] Yes  [] No    Return to Play:    [x]  N/A   []  Stage 1: Intro to Strength   []  Stage 2: Return to Run and Strength   []  Stage 3: Return to Jump and Strength   []  Stage 4: Dynamic Strength and Agility   []  Stage 5: Sport Specific Training   []  Ready to Return to Play, Meets All Above Stages   []  Not Ready for Return to Sports   Comments:            PLAN: PT 1-2x / week for 4-6 weeks. [x] Continue per plan of care [] Alter current plan (see comments)  [] Plan of care initiated [] Hold pending MD visit [] Discharge    Electronically signed by: Carol Cheung, PT, DPT    Note: If patient does not return for scheduled/ recommended follow up visits, this note will serve as a discharge from care along with most recent update on progress.

## 2022-05-20 ENCOUNTER — HOSPITAL ENCOUNTER (OUTPATIENT)
Dept: PHYSICAL THERAPY | Age: 72
Setting detail: THERAPIES SERIES
Discharge: HOME | End: 2022-05-20
Payer: MEDICARE

## 2022-05-20 PROCEDURE — 97110 THERAPEUTIC EXERCISES: CPT

## 2022-05-20 PROCEDURE — 97140 MANUAL THERAPY 1/> REGIONS: CPT

## 2022-05-20 NOTE — FLOWSHEET NOTE
Genoveva Kim  Phone: [phone]   Fax: [phone]    Physical Therapy Treatment Note/ Progress Report:     Date:  2022    Patient Name:  Debra Umana    :  1950  MRN: [id number]  Restrictions/Precautions:    Medical/Treatment Diagnosis Information:   Diagnosis: M17.12 (ICD-10-CM) - Primary osteoarthritis of left knee M25.561, M25.562 (ICD-10-CM) - Pain in both knees, unspecified chronicity M17.11 (ICD-10-CM) - Primary osteoarthritis of right knee   Treatment Diagnosis: Decreased B pain-free Knee ROM with decreased functional strength limiting functional gait/stairs  Insurance/Certification information:  PT Insurance Information: A[employer]; $40 copay; no auth  Physician Information:  Referring Practitioner: Dr. Bender Plane of care signed (Y/N): []  Yes [x]  No     Date of Patient follow up with Physician:      Progress Report: []  Yes   [x]  No     Date Range for reporting period:  Beginnin2022  PN: 22  Ending:    Progress report due (10 Rx/or 30 days whichever is less): visit #78 or     Recertification due (POC duration/ or 90 days whichever is less): visit #12 or     Visit # Insurance Allowable Auth required? Date Range   6 [employer] Medicare  $40 copay  BMN []  Yes  [x]  No NA  No ionto, aquatics, DN, estim       Units approved Units used Date Range          Latex Allergy:  [x]NO      []YES  Preferred Language for Healthcare:   [x]English       []other:    Functional Scale:           Date assessed:  FOTO physical FS primary measure score = 40; risk adjusted = 52    FOTO physical FS primary measure score = 40         Pain level:    6/10 B knee pain (pt points to R lateral jt line)     SUBJECTIVE:  Pt reports she is feeling better today than usual. She does continue to have more pain on the R side than the L. She plans to go bowling over the weekend.          OBJECTIVE:   :  Gait: (include devices/WB status) Amb. Without A device with R trunk lean with R stance, shuffling gait pattern     PROM AROM     L R L R   Hip Flexion 110 100 PROM=AROM t/o L/R     Hip Abduction NT NT       Hip ER NT NT       Hip IR NT NT       Knee Flexion 110* 112*       Knee Extension 9 from neutral 10 from neutral         Strength (0-5) / Myotomes Left Right   Hip Flexion - supine At least 3/5 At least 3/5   Hip Flexion - seated (L1-2) 4/5 4/5   Hip Abduction 4/5 4-/5   Hip Adduction NT NT   Hip ER NT NT   Hip IR NT nt   Quads (L2-4) 4/5 4-/5**   Hamstrings 4/5 4-/5   Ankle Dorsiflexion (L4-5) 4/5 4/5   Ankle Plantarflexion (S1-2) 5/5 5/5   Ankle Inversion 5/5 5/5   Ankle Eversion (S1-2) 5/5 5/5   Great Toe Extension (L5) 5/5 4/5     TUG 12.86\"            5/13: L pubic upslip; M6RJJLL/FRSR, L4ERSR  5/4  Presented to clinic with no AD but cane is in the car.; 15'reassessment by PT for pelvic alignment and flexibility     Rt pubic upslip  Rt piriformis tightness  LOL sacral torsion  Rt SI post inominate      RESTRICTIONS/PRECAUTIONS:   Exercises/Interventions:  25'  Therapeutic Exercise (36999)  Resistance  level Sets/sec Reps Notes / Cues   Recumbent bike Level 1 5'  5/13   Q sets into towel  B  5/13: NMR   TP Gastroc stretch  B  Hamstring stretch+  HR  30\"    2 2xea    10 5/4   SAQ   LAQ 1# B  2#L, 1#R 2   10xea   5/13     SL Clams B  2 20x 5/13   Bridges with BS 2-5\"  1 15x 5/13   Supine flexion SLR/ ABD  1   10x Flex   5/4   PPT  5/13NMR   Supine 90/90  Hamstring B  10\" 3x 5/13   LTR  5/13 NMR   IB stretches B Gastroc 20\" 2 5/13   Standing B heel raises     Supine Marches Alt leg     5/13: NMR   SL hip abd  1 10 B 5/20                 Therapeutic Activities (06691)       5/6: discussed rollator walker for assisting with improved function and decreased SOB/ fatigue with functional ambulation for prolonged distances.                               Updated HEP 5/4   Consider advising of purchasing rollator for safety    5/4 advised to use cane for all ambulation due to Cardiac hx/SOB, contacting DR for Afib/ SOB symptoms   Reassessment of objective measures, updated prognosis        Sit to stand from raised mat table 10   5/20   Neuromuscular Re-ed (86412)                            Manual Intervention (69529)       Knee mobs/PROM       Tib/Fem Mobs distraction - seated   x4min B  5/20   Patella Mobs       Ankle mobs       Manual hamstring stretching in 90/90  Manual IT band stretching   30\"  30\" 3 B  3B    SI/Lumbosacral MET gentle       Hip LA distraction 4 passes x4min B         Modalities:     Pt. Education:  -pt educated on diagnosis, prognosis and expectations for rehab  -all pt questions were answered    Home Exercise Program:  Access Code: [email or web address]  URL: [email or web address]  Date: 04/13/2022  Prepared by: [facility]    Exercises  Modified Daryl Stretch - 1 x daily - 7 x weekly - 1 sets - 3 reps - 20\" hold  Seated Hamstring Stretch - 1 x daily - 7 x weekly - 1 sets - 3 reps - 20\" hold  Seated Gastroc Stretch with Strap - 1 x daily - 7 x weekly - 1 sets - 3 reps - 20\" hold  Standing Gastroc Stretch - 1 x daily - 7 x weekly - 3 sets - 10 reps  Bent Knee Fallouts - 1 x daily - 7 x weekly - 2 sets - 10 reps  Supine Gluteal Sets - 1 x daily - 7 x weekly - 3 sets - 10 reps - 5\" hold  Supine Quadricep Sets - 1 x daily - 7 x weekly - 1-2 sets - 10 reps - 5\" hold    Therapeutic Exercise and NMR EXR  [x] (64127) Provided verbal/tactile cueing for activities related to strengthening, flexibility, endurance, ROM for improvements in LE, proximal hip, and core control with self care, mobility, lifting, ambulation.   [x] (40108) Provided verbal/tactile cueing for activities related to improving balance, coordination, kinesthetic sense, posture, motor skill, proprioception  to assist with LE, proximal hip, and core control in self care, mobility, lifting, ambulation and eccentric single leg control.   [] (54527) Therapist is in constant attendance of 2 or more patients providing skilled therapy interventions, but not providing any significant amount of measurable one-on-one time to either patient, for improvements in LE, proximal hip, and core control in self care, mobility, lifting, ambulation and eccentric single leg control. NMR and Therapeutic Activities:    [] (59618 or 51927) Provided verbal/tactile cueing for activities related to improving balance, coordination, kinesthetic sense, posture, motor skill, proprioception and motor activation to allow for proper function of core, proximal hip and LE with self care and ADLs  [] (46139) Gait Re-education- Provided training and instruction to the patient for proper LE, core and proximal hip recruitment and positioning and eccentric body weight control with ambulation re-education including up and down stairs     Home Exercise Program:    [] (27725) Reviewed/Progressed HEP activities related to strengthening, flexibility, endurance, ROM of core, proximal hip and LE for functional self-care, mobility, lifting and ambulation/stair navigation   [] (67430)Reviewed/Progressed HEP activities related to improving balance, coordination, kinesthetic sense, posture, motor skill, proprioception of core, proximal hip and LE for self care, mobility, lifting, and ambulation/stair navigation      Manual Treatments:  PROM / STM / Oscillations-Mobs:  G-I, II, III, IV (PA's, Inf., Post.)  [x] (75058) Provided manual therapy to mobilize LE, proximal hip and/or LS spine soft tissue/joints for the purpose of modulating pain, promoting relaxation,  increasing ROM, reducing/eliminating soft tissue swelling/inflammation/restriction, improving soft tissue extensibility and allowing for proper ROM for normal function with self care, mobility, lifting and ambulation.      Modalities:  [] (19553) Vasopneumatic compression: Utilized vasopneumatic compression to decrease edema / swelling for the purpose of improving mobility and quad tone / recruitment which will allow for increased overall function including but not limited to self-care, transfers, ambulation, and ascending / descending stairs. Charges:  Timed Code Treatment Minutes: 50   Total Treatment Minutes: 50     [] EVAL - LOW (13875)   [] EVAL - MOD (09436)  [] EVAL - HIGH (90829)  [] RE-EVAL (59411)  [x] EW(96540) x 2      [] Ionto  [] NMR (84318) x        [] Vaso  [x] Manual (69227) x 1      [] Ultrasound  [] TA x        [] Mech Traction (31385)  [] Aquatic Therapy x      [] ES (un) (76264):   [] Home Management Training x  [] ES(attended) (93740)   [] Dry Needling 1-2 muscles (90262):  [] Dry Needling 3+ muscles (333002  [] Group:      [] Other:     GOALS:   Patient stated goal:to be able to walk better  [] Progressing: [] Met: [] Not Met: [] Adjusted    Therapist goals for Patient:   Short Term Goals: To be achieved in: 2 weeks  1. Independent in HEP and progression per patient tolerance, in order to prevent re-injury. [] Progressing: [] Met: [x] Not Met: [] Adjusted  2. Patient will have a decrease in pain to facilitate improvement in movement, function, and ADLs as indicated by Functional Deficits. [] Progressing: [] Met: [x] Not Met: [] Adjusted    Long Term Goals: To be achieved in: 4-6 weeks  1. FOTO score of at least 58 to assist with reaching prior level of function. [] Progressing: [] Met: [] Not Met: [] Adjusted  2. Patient will demonstrate increased AROM to 0-116 degrees in B knees to allow for proper joint functioning as indicated by patients ability to perform sit to stand with improved quad activation/knee flexion with UE A,  [] Progressing: [] Met: [] Not Met: [] Adjusted  3.  Patient will demonstrate an increase in Strength to at least 4-4.5/5 as well as good proximal hip strength and control to allow for proper functional mobility as indicated by patients ability to ascend 4-6 steps reciprocally with 1 rail by discharge  [] Progressing: [] Met: [] Not Met: [] Adjusted  4. Patient will return to functional activities including standing at [employer] for up to 15-20; without increased symptoms or restriction. [] Progressing: [] Met: [] Not Met: [] Adjusted  5. Patient to be able to perform TUG in 12 seconds or less for improved safety with community ambulation. [] Progressing: [] Met: [] Not Met: [] Adjusted       Overall Progression Towards Functional goals/ Treatment Progress Update:  [x] Patient is progressing as expected towards functional goals listed. [] Progression is slowed due to complexities/Impairments listed. [] Progression has been slowed due to co-morbidities. [] Plan just implemented, tunable to assess goals progression due to initial evaluation only   [] Goals require adjustment due to lack of progress  [] Patient is not progressing as expected and requires additional follow up with physician  [] Other    Persisting Functional Limitations/Impairments:  []Sitting []Standing   []Walking []Stairs   []Transfers []ADLs   []Squatting/bending []Kneeling  []Housework []Job related tasks  []Driving []Sports/Recreation   []Sleeping []Other:    ASSESSMENT:  Pt continues to demonstrate deficits in posture, gait, LE ROM and strength, and balance. Pt experienced relief with manual therapeutic techniques to offload affected arthritic joints. Reports improved R knee pain with walking after tibial distraction. Pt tolerated all other activities with no increase in symptoms. Pt still to benefit from skilled therapy in order to address this deficits and return her to her PLOF and functional independence. Continue to progress gross LE strength with focus on posterior lateral hip and quad as well as general mobility. Pt plans to perform independent aquatic exercise upon DC with the aquatic arthritis class. Please provide with pass and class schedule when DC comes.           Treatment/Activity Tolerance:  [x] Pt able to complete treatment [x] Patient limited by fatique  [x] Patient limited by pain  [] Patient limited by other medical complications  [] Other:     Prognosis:  [] Good [x] Fair  [] Poor    Patient Requires Follow-up: [x] Yes  [] No    Return to Play:    [x]  N/A   []  Stage 1: Intro to Strength   []  Stage 2: Return to Run and Strength   []  Stage 3: Return to Jump and Strength   []  Stage 4: Dynamic Strength and Agility   []  Stage 5: Sport Specific Training   []  Ready to Return to Play, Meets All Above Stages   []  Not Ready for Return to Sports   Comments:            PLAN: PT 1-2x / week for 4-6 weeks. [x] Continue per plan of care [] Alter current plan (see comments)  [] Plan of care initiated [] Hold pending MD visit [] Discharge    Electronically signed by: Martha Valdivia, PT, DPT    Note: If patient does not return for scheduled/ recommended follow up visits, this note will serve as a discharge from care along with most recent update on progress.

## 2022-05-25 ENCOUNTER — HOSPITAL ENCOUNTER (OUTPATIENT)
Dept: PHYSICAL THERAPY | Age: 72
Setting detail: THERAPIES SERIES
Discharge: HOME | End: 2022-05-25
Payer: MEDICARE

## 2022-05-25 ENCOUNTER — ANTI-COAG VISIT (OUTPATIENT)
Dept: PHARMACY | Age: 72
End: 2022-05-25
Payer: MEDICARE

## 2022-05-25 DIAGNOSIS — I48.21 PERMANENT ATRIAL FIBRILLATION (HCC): Primary | ICD-10-CM

## 2022-05-25 LAB — INTERNATIONAL NORMALIZATION RATIO, POC: 1.6

## 2022-05-25 PROCEDURE — 97110 THERAPEUTIC EXERCISES: CPT

## 2022-05-25 PROCEDURE — 99211 OFF/OP EST MAY X REQ PHY/QHP: CPT

## 2022-05-25 PROCEDURE — 97140 MANUAL THERAPY 1/> REGIONS: CPT

## 2022-05-25 PROCEDURE — 97530 THERAPEUTIC ACTIVITIES: CPT

## 2022-05-25 PROCEDURE — 85610 PROTHROMBIN TIME: CPT
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
## 2022-05-25 NOTE — FLOWSHEET NOTE
[facility]  Phone: [phone]   Fax: [phone]    Physical Therapy Treatment Note/ Progress Report:     Date:  2022    Patient Name:  Harika Painting    :  1950  MRN: [id number]  Restrictions/Precautions:    Medical/Treatment Diagnosis Information:   Diagnosis: M17.12 (ICD-10-CM) - Primary osteoarthritis of left knee M25.561, M25.562 (ICD-10-CM) - Pain in both knees, unspecified chronicity M17.11 (ICD-10-CM) - Primary osteoarthritis of right knee   Treatment Diagnosis: Decreased B pain-free Knee ROM with decreased functional strength limiting functional gait/stairs  Insurance/Certification information:  PT Insurance Information: Aetna; $40 copay; no auth  Physician Information:  Referring Practitioner: Dr. Saida Sawant of care signed (Y/N): []  Yes [x]  No     Date of Patient follow up with Physician:      Progress Report: []  Yes   [x]  No     Date Range for reporting period:  Beginnin2022  PN: 22  Ending:    Progress report due (10 Rx/or 30 days whichever is less): visit #39 or     Recertification due (POC duration/ or 90 days whichever is less): visit #12 or     Visit # Insurance Allowable Auth required? Date Range   7 A[employer]na Medicare  $40 copay  BMN []  Yes  [x]  No NA  No ionto, aquatics, DN, estim       Units approved Units used Date Range          Latex Allergy:  [x]NO      []YES  Preferred Language for Healthcare:   [x]English       []other:    Functional Scale:           Date assessed:   FOTO physical FS primary measure score = 40; risk adjusted = 52    FOTO physical FS primary measure score = 40         Pain level:    8/10 R knee pain (inferior patella), 4/10 L knee (more hamstring muscle)    SUBJECTIVE: Pt presents to PT this date with significant limp and pain, especially in R knee. States feeling like R knee \"is getting worse\".   Didn't end up going bowling over the weekend due to pain in R knee. Has appt on 6/30 with ortho MD for cortisone injections in bilateral knees. OBJECTIVE:   5/18:  Gait: (include devices/WB status) Amb. Without A device with R trunk lean with R stance, shuffling gait pattern     PROM AROM     L R L R   Hip Flexion 110 100 PROM=AROM t/o L/R     Hip Abduction NT NT       Hip ER NT NT       Hip IR NT NT       Knee Flexion 110* 112*       Knee Extension 9 from neutral 10 from neutral         Strength (0-5) / Myotomes Left Right   Hip Flexion - supine At least 3/5 At least 3/5   Hip Flexion - seated (L1-2) 4/5 4/5   Hip Abduction 4/5 4-/5   Hip Adduction NT NT   Hip ER NT NT   Hip IR NT nt   Quads (L2-4) 4/5 4-/5**   Hamstrings 4/5 4-/5   Ankle Dorsiflexion (L4-5) 4/5 4/5   Ankle Plantarflexion (S1-2) 5/5 5/5   Ankle Inversion 5/5 5/5   Ankle Eversion (S1-2) 5/5 5/5   Great Toe Extension (L5) 5/5 4/5     TUG 12.86\"        5/13: L pubic upslip; F3RMYSK/FRSR, L4ERSR  5/4  Presented to clinic with no AD but cane is in the car.; 15'reassessment by PT for pelvic alignment and flexibility     Rt pubic upslip  Rt piriformis tightness  LOL sacral torsion  Rt SI post inominate      RESTRICTIONS/PRECAUTIONS:   Exercises/Interventions:    Therapeutic Exercise (59655)  Resistance  level Sets/sec Reps Notes / Cues   Recumbent bike Level 1 5'  5/13 5/13: NMR    30\"    2 2xea    10 5/4   1# B  2#L, 1#R 2   10xea   5/13      2 20x 5/13   2-5\"  1 15x 5/13    1   10x Flex   5/4 5/13NMR    10\" 3x 5/13    5/13 NMR   B Gastroc 20\" 2 5/13 5/13: NMR    1 10 B 5/20   Supine IT band stretch w/ strap bilaterally 30\" 3 5/25   Prone quad stretch bilaterally 30\" 3 5/25                 Therapeutic Activities (80573)       5/25 - education this date on self STM at home to decrease muscle tightness as well as purchase of AD for support to decrease patellar load/pain. Discussed calling MD to move appt closer due to increased report of pain recently.          Yessenia Jolley' Updated HEP 5/4   Consider advising of purchasing rollator for safety    5/4 advised to use cane for all ambulation due to Cardiac hx/SOB, contacting DR for Afib/ SOB symptoms   Reassessment of objective measures, updated prognosis         10   5/20   Neuromuscular Re-ed (59080)                            Manual Intervention (05147)       Knee mobs/PROM         x4min B  5/20   Patella Mobs  3'     STM bilateral quad/ IT band  24'     Manual hamstring stretching in 90/90  Manual IT band stretching   30\"  30\" 3 B  3B    SI/Lumbosacral MET gentle       Hip LA distraction 4 passes x4min B         Modalities:     Pt. Education:  -pt educated on diagnosis, prognosis and expectations for rehab  -all pt questions were answered    Home Exercise Program:  Access Code: [email or web address]  URL: [email or web address]  Date: 04/13/2022  Prepared by: [facility]    Exercises  Modified Daryl Stretch - 1 x daily - 7 x weekly - 1 sets - 3 reps - 20\" hold  Seated Hamstring Stretch - 1 x daily - 7 x weekly - 1 sets - 3 reps - 20\" hold  Seated Gastroc Stretch with Strap - 1 x daily - 7 x weekly - 1 sets - 3 reps - 20\" hold  Standing Gastroc Stretch - 1 x daily - 7 x weekly - 3 sets - 10 reps  Bent Knee Fallouts - 1 x daily - 7 x weekly - 2 sets - 10 reps  Supine Gluteal Sets - 1 x daily - 7 x weekly - 3 sets - 10 reps - 5\" hold  Supine Quadricep Sets - 1 x daily - 7 x weekly - 1-2 sets - 10 reps - 5\" hold    Therapeutic Exercise and NMR EXR  [x] (40439) Provided verbal/tactile cueing for activities related to strengthening, flexibility, endurance, ROM for improvements in LE, proximal hip, and core control with self care, mobility, lifting, ambulation.   [x] (50266) Provided verbal/tactile cueing for activities related to improving balance, coordination, kinesthetic sense, posture, motor skill, proprioception  to assist with LE, proximal hip, and core control in self care, mobility, lifting, ambulation and eccentric single leg control.   [] (11317) Therapist is in constant attendance of 2 or more patients providing skilled therapy interventions, but not providing any significant amount of measurable one-on-one time to either patient, for improvements in LE, proximal hip, and core control in self care, mobility, lifting, ambulation and eccentric single leg control. NMR and Therapeutic Activities:    [] (71406 or 15290) Provided verbal/tactile cueing for activities related to improving balance, coordination, kinesthetic sense, posture, motor skill, proprioception and motor activation to allow for proper function of core, proximal hip and LE with self care and ADLs  [] (08705) Gait Re-education- Provided training and instruction to the patient for proper LE, core and proximal hip recruitment and positioning and eccentric body weight control with ambulation re-education including up and down stairs     Home Exercise Program:    [] (40240) Reviewed/Progressed HEP activities related to strengthening, flexibility, endurance, ROM of core, proximal hip and LE for functional self-care, mobility, lifting and ambulation/stair navigation   [] (49562)Reviewed/Progressed HEP activities related to improving balance, coordination, kinesthetic sense, posture, motor skill, proprioception of core, proximal hip and LE for self care, mobility, lifting, and ambulation/stair navigation      Manual Treatments:  PROM / STM / Oscillations-Mobs:  G-I, II, III, IV (PA's, Inf., Post.)  [x] (01764) Provided manual therapy to mobilize LE, proximal hip and/or LS spine soft tissue/joints for the purpose of modulating pain, promoting relaxation,  increasing ROM, reducing/eliminating soft tissue swelling/inflammation/restriction, improving soft tissue extensibility and allowing for proper ROM for normal function with self care, mobility, lifting and ambulation.      Modalities:  [] (81743) Vasopneumatic compression: Utilized vasopneumatic compression to decrease edema / swelling for the purpose of improving mobility and quad tone / recruitment which will allow for increased overall function including but not limited to self-care, transfers, ambulation, and ascending / descending stairs. Charges:  Timed Code Treatment Minutes: 45   Total Treatment Minutes: 45     [] EVAL - LOW (12505)   [] EVAL - MOD (16525)  [] EVAL - HIGH (82612)  [] RE-EVAL (69064)  [x] WG(29201) x 1      [] Ionto  [] NMR (89079) x        [] Vaso  [x] Manual (89208) x 1      [] Ultrasound  [x] TA x 1        [] Mech Traction (91582)  [] Aquatic Therapy x      [] ES (un) (15645):   [] Home Management Training x  [] ES(attended) (62404)   [] Dry Needling 1-2 muscles (09501):  [] Dry Needling 3+ muscles (154512  [] Group:      [] Other:     GOALS:   Patient stated goal:to be able to walk better  [] Progressing: [] Met: [] Not Met: [] Adjusted    Therapist goals for Patient:   Short Term Goals: To be achieved in: 2 weeks  1. Independent in HEP and progression per patient tolerance, in order to prevent re-injury. [] Progressing: [] Met: [x] Not Met: [] Adjusted  2. Patient will have a decrease in pain to facilitate improvement in movement, function, and ADLs as indicated by Functional Deficits. [] Progressing: [] Met: [x] Not Met: [] Adjusted    Long Term Goals: To be achieved in: 4-6 weeks  1. FOTO score of at least 58 to assist with reaching prior level of function. [] Progressing: [] Met: [] Not Met: [] Adjusted  2. Patient will demonstrate increased AROM to 0-116 degrees in B knees to allow for proper joint functioning as indicated by patients ability to perform sit to stand with improved quad activation/knee flexion with UE A,  [] Progressing: [] Met: [] Not Met: [] Adjusted  3.  Patient will demonstrate an increase in Strength to at least 4-4.5/5 as well as good proximal hip strength and control to allow for proper functional mobility as indicated by patients ability to ascend 4-6 steps therapy this date and education. Discussed with pt opportunity to call MD office and move up ortho appt for cortisone injections in attempt to decrease pain and improve tolerance to weight bearing activity. Will monitor pt's reaction to session today at next visit with progressions made per PT recommendation. Treatment/Activity Tolerance:  [x] Pt able to complete treatment [x] Patient limited by fatique  [x] Patient limited by pain  [] Patient limited by other medical complications  [] Other:     Prognosis:  [] Good [x] Fair  [] Poor    Patient Requires Follow-up: [x] Yes  [] No    Return to Play:    [x]  N/A   []  Stage 1: Intro to Strength   []  Stage 2: Return to Run and Strength   []  Stage 3: Return to Jump and Strength   []  Stage 4: Dynamic Strength and Agility   []  Stage 5: Sport Specific Training   []  Ready to Return to Play, Meets All Above Stages   []  Not Ready for Return to Sports   Comments:            PLAN: PT 1-2x / week for 4-6 weeks. [x] Continue per plan of care [] Alter current plan (see comments)  [] Plan of care initiated [] Hold pending MD visit [] Discharge    Electronically signed by: Eladia Caruso, PTA [id number]    Note: If patient does not return for scheduled/ recommended follow up visits, this note will serve as a discharge from care along with most recent update on progress.

## 2022-05-27 ENCOUNTER — HOSPITAL ENCOUNTER (OUTPATIENT)
Dept: PHYSICAL THERAPY | Age: 72
Setting detail: THERAPIES SERIES
Discharge: HOME | End: 2022-05-27
Payer: MEDICARE

## 2022-05-27 PROCEDURE — 97140 MANUAL THERAPY 1/> REGIONS: CPT

## 2022-05-27 PROCEDURE — 97112 NEUROMUSCULAR REEDUCATION: CPT

## 2022-05-27 PROCEDURE — 97110 THERAPEUTIC EXERCISES: CPT

## 2022-05-27 NOTE — FLOWSHEET NOTE
[facility]  Phone: [phone]   Fax: [phone]    Physical Therapy Treatment Note/ Progress Report:     Date:  2022    Patient Name:  Dottie Banda    :  1950  MRN: [id number]  Restrictions/Precautions:    Medical/Treatment Diagnosis Information:   Diagnosis: M17.12 (ICD-10-CM) - Primary osteoarthritis of left knee M25.561, M25.562 (ICD-10-CM) - Pain in both knees, unspecified chronicity M17.11 (ICD-10-CM) - Primary osteoarthritis of right knee   Treatment Diagnosis: Decreased B pain-free Knee ROM with decreased functional strength limiting functional gait/stairs  Insurance/Certification information:  PT Insurance Information: Aetna; $40 copay; no auth  Physician Information:  Referring Practitioner: Dr. Radha Roy of care signed (Y/N): []  Yes [x]  No     Date of Patient follow up with Physician:      Progress Report: []  Yes   [x]  No     Date Range for reporting period:  Beginnin2022  PN: 22  Ending:    Progress report due (10 Rx/or 30 days whichever is less): visit #39 or     Recertification due (POC duration/ or 90 days whichever is less): visit #12 or     Visit # Insurance Allowable Auth required? Date Range   8 A[employer]na Medicare  $40 copay  BMN []  Yes  [x]  No NA  No ionto, aquatics, DN, estim       Units approved Units used Date Range          Latex Allergy:  [x]NO      []YES  Preferred Language for Healthcare:   [x]English       []other:    Functional Scale:           Date assessed:   FOTO physical FS primary measure score = 40; risk adjusted = 52    FOTO physical FS primary measure score = 40         Pain level:    8/10 R knee pain (inferior patella), 4/10 L knee (more hamstring muscle)    SUBJECTIVE: Pt presents to PT this date with overall decreased limp  And reduction especially in L thigh pain per patient since last PT session. significant limp and pain, especially in R knee. She states she made an appointment with the cardiologist on 6/8. Yesenia Quintero Has appt on 6/30 with ortho MD for cortisone injections in bilateral knees. OBJECTIVE:   5/18:  Gait: (include devices/WB status) Amb.  Without A device with R trunk lean with R stance, shuffling gait pattern     PROM AROM     L R L R   Hip Flexion 110 100 PROM=AROM t/o L/R     Hip Abduction NT NT       Hip ER NT NT       Hip IR NT NT       Knee Flexion 110* 112*       Knee Extension 9 from neutral 10 from neutral         Strength (0-5) / Myotomes Left Right   Hip Flexion - supine At least 3/5 At least 3/5   Hip Flexion - seated (L1-2) 4/5 4/5   Hip Abduction 4/5 4-/5   Hip Adduction NT NT   Hip ER NT NT   Hip IR NT nt   Quads (L2-4) 4/5 4-/5**   Hamstrings 4/5 4-/5   Ankle Dorsiflexion (L4-5) 4/5 4/5   Ankle Plantarflexion (S1-2) 5/5 5/5   Ankle Inversion 5/5 5/5   Ankle Eversion (S1-2) 5/5 5/5   Great Toe Extension (L5) 5/5 4/5     TUG 12.86\"        5/13: L pubic upslip; F9CLCMW/FRSR, L4ERSR  5/4  Presented to clinic with no AD but cane is in the car.; 15'reassessment by PT for pelvic alignment and flexibility     Rt pubic upslip  Rt piriformis tightness  LOL sacral torsion  Rt SI post inominate      RESTRICTIONS/PRECAUTIONS:   Exercises/Interventions:    Therapeutic Exercise (33884)  Resistance  level Sets/sec Reps Notes / Cues   Recumbent bike Level 1 5'  5/13 5/13: NMR    30\"    2 2xea    10 5/4   SAQ 2# B  2#L, 1#R 2   10xea   5/13      2 20x 5/13   Bridges with BS2-5\"  1 15x 5/13   Supine flexion SLR 1   10x Flex   5/4 5/13NMR    10\" 3x 5/13    5/13 NMR   IB stretches B Gastroc 20\" 2 5/13   Standing B heel raises  2 10       5/13: NMR    1 10 B 5/20   Supine IT band stretch w/ strap bilaterally 30\" 3 5/25   Prone quad stretch bilaterally 30\" 3 5/25                 Therapeutic Activities (97967)       5/25 - education this date on self STM at home to decrease muscle tightness as well as purchase of AD for support to decrease patellar load/pain. Discussed calling MD to move appt closer due to increased report of pain recently. Updated HEP 5/4   Consider advising of purchasing rollator for safety    5/4 advised to use cane for all ambulation due to Cardiac hx/SOB, contacting DR for Afib/ SOB symptoms   Reassessment of objective measures, updated prognosis         10   5/20   Neuromuscular Re-ed (85650)       Hookline neutral Pelvic tilts   5\" 8 5/27: HOB elevated 30 degrees   Hookline neutral Pelvic tilts with: opp marches  B Bent knee fall out 0#, orange  Unilateral clamshell 0# 1  1  1  1 20  10  10  15   5/27: HOB elevated 30 degrees          Manual Intervention (13860)       Knee mobs/PROM         x4min B  5/20   Patella Mobs  3'     STM bilateral quad/ IT band  8'     Manual hamstring stretching in 90/90  Manual IT band stretching   30\"  30\" 3 B  3B    SI/Lumbosacral MET gentle       Hip LA distraction 4 passes x4min B         Modalities:     Pt. Education:  -pt educated on diagnosis, prognosis and expectations for rehab  -all pt questions were answered    Home Exercise Program:  Access Code: [email or web address]  URL: [email or web address]  Date: 04/13/2022  Prepared by:  [facility]    Exercises  Modified Daryl Stretch - 1 x daily - 7 x weekly - 1 sets - 3 reps - 20\" hold  Seated Hamstring Stretch - 1 x daily - 7 x weekly - 1 sets - 3 reps - 20\" hold  Seated Gastroc Stretch with Strap - 1 x daily - 7 x weekly - 1 sets - 3 reps - 20\" hold  Standing Gastroc Stretch - 1 x daily - 7 x weekly - 3 sets - 10 reps  Bent Knee Fallouts - 1 x daily - 7 x weekly - 2 sets - 10 reps  Supine Gluteal Sets - 1 x daily - 7 x weekly - 3 sets - 10 reps - 5\" hold  Supine Quadricep Sets - 1 x daily - 7 x weekly - 1-2 sets - 10 reps - 5\" hold    Therapeutic Exercise and NMR EXR  [x] (87265) Provided verbal/tactile cueing for activities related to strengthening, flexibility, endurance, ROM for improvements in LE, proximal hip, promoting relaxation,  increasing ROM, reducing/eliminating soft tissue swelling/inflammation/restriction, improving soft tissue extensibility and allowing for proper ROM for normal function with self care, mobility, lifting and ambulation. Modalities:  [] (80482) Vasopneumatic compression: Utilized vasopneumatic compression to decrease edema / swelling for the purpose of improving mobility and quad tone / recruitment which will allow for increased overall function including but not limited to self-care, transfers, ambulation, and ascending / descending stairs. Charges:  Timed Code Treatment Minutes: 54   Total Treatment Minutes: 54     [] EVAL - LOW (37952)   [] EVAL - MOD (89243)  [] EVAL - HIGH (48774)  [] RE-EVAL (50903)  [x] LX(72405) x 2     [] Ionto  [x] NMR (10166) x  1    [] Vaso  [x] Manual (62991) x 1      [] Ultrasound  [] TA x 1        [] Mech Traction (58328)  [] Aquatic Therapy x      [] ES (un) (48400):   [] Home Management Training x  [] ES(attended) (14378)   [] Dry Needling 1-2 muscles (55867):  [] Dry Needling 3+ muscles (128123  [] Group:      [] Other:     GOALS:   Patient stated goal:to be able to walk better  [] Progressing: [] Met: [] Not Met: [] Adjusted    Therapist goals for Patient:   Short Term Goals: To be achieved in: 2 weeks  1. Independent in HEP and progression per patient tolerance, in order to prevent re-injury. [] Progressing: [] Met: [x] Not Met: [] Adjusted  2. Patient will have a decrease in pain to facilitate improvement in movement, function, and ADLs as indicated by Functional Deficits. [] Progressing: [] Met: [x] Not Met: [] Adjusted    Long Term Goals: To be achieved in: 4-6 weeks  1. FOTO score of at least 58 to assist with reaching prior level of function. [] Progressing: [] Met: [] Not Met: [] Adjusted  2.  Patient will demonstrate increased AROM to 0-116 degrees in B knees to allow for proper joint functioning as indicated by patients ability to perform sit to stand with improved quad activation/knee flexion with UE A,  [] Progressing: [] Met: [] Not Met: [] Adjusted  3. Patient will demonstrate an increase in Strength to at least 4-4.5/5 as well as good proximal hip strength and control to allow for proper functional mobility as indicated by patients ability to ascend 4-6 steps reciprocally with 1 rail by discharge  [] Progressing: [] Met: [] Not Met: [] Adjusted  4. Patient will return to functional activities including standing at [place] for up to 15-20; without increased symptoms or restriction. [] Progressing: [] Met: [] Not Met: [] Adjusted  5. Patient to be able to perform TUG in 12 seconds or less for improved safety with community ambulation. [] Progressing: [] Met: [] Not Met: [] Adjusted       Overall Progression Towards Functional goals/ Treatment Progress Update:  [x] Patient is progressing as expected towards functional goals listed. [] Progression is slowed due to complexities/Impairments listed. [] Progression has been slowed due to co-morbidities. [] Plan just implemented, tunable to assess goals progression due to initial evaluation only   [] Goals require adjustment due to lack of progress  [] Patient is not progressing as expected and requires additional follow up with physician  [] Other    Persisting Functional Limitations/Impairments:  []Sitting []Standing   []Walking []Stairs   []Transfers []ADLs   []Squatting/bending []Kneeling  []Housework []Job related tasks  []Driving []Sports/Recreation   []Sleeping []Other:    ASSESSMENT:  Pt presents this date with slowly bilateral quad tightness, laterally placed bilateral patella as well as bilateral IT band tightness. Slightly increased tightness noted on L quad vs R quad especially along VL. Patellar mobs on L LE reveal increased pull along lateral IT band/VLO with medial patellar glides as well as moderate crepitus and pain with inferior/superior glides.  .Patient able to tolerate more exercises this date to Quads and Hip AB. Patient's B hip and core weakness addressed this date for the first time. Updated HEP with bridges and hookline alternating marches,   Patient will benefit from standing NMR static and low level dynamic exercises in next few sessions. .       Treatment/Activity Tolerance:  [x] Pt able to complete treatment [x] Patient limited by fatique  [x] Patient limited by pain  [] Patient limited by other medical complications  [] Other:     Prognosis:  [] Good [x] Fair  [] Poor    Patient Requires Follow-up: [x] Yes  [] No    Return to Play:    [x]  N/A   []  Stage 1: Intro to Strength   []  Stage 2: Return to Run and Strength   []  Stage 3: Return to Jump and Strength   []  Stage 4: Dynamic Strength and Agility   []  Stage 5: Sport Specific Training   []  Ready to Return to Play, Meets All Above Stages   []  Not Ready for Return to Sports   Comments:            PLAN: PT 1-2x / week for 4-6 weeks. [x] Continue per plan of care [] Alter current plan (see comments)  [] Plan of care initiated [] Hold pending MD visit [] Discharge    Electronically signed by: Darwin Lugo PT, PTA [id number]    Note: If patient does not return for scheduled/ recommended follow up visits, this note will serve as a discharge from care along with most recent update on progress.

## 2022-06-08 ENCOUNTER — HOSPITAL ENCOUNTER (OUTPATIENT)
Dept: PHYSICAL THERAPY | Age: 72
Setting detail: THERAPIES SERIES
Discharge: HOME | End: 2022-06-08
Payer: MEDICARE

## 2022-06-08 ENCOUNTER — ANTI-COAG VISIT (OUTPATIENT)
Dept: PHARMACY | Age: 72
End: 2022-06-08
Payer: MEDICARE

## 2022-06-08 ENCOUNTER — OFFICE VISIT (OUTPATIENT)
Dept: CARDIOLOGY CLINIC | Age: 72
End: 2022-06-08
Payer: MEDICARE

## 2022-06-08 VITALS
OXYGEN SATURATION: 99 % | HEIGHT: 67 IN | BODY MASS INDEX: 27.72 KG/M2 | WEIGHT: 176.6 LBS | HEART RATE: 96 BPM | DIASTOLIC BLOOD PRESSURE: 120 MMHG | SYSTOLIC BLOOD PRESSURE: 178 MMHG

## 2022-06-08 DIAGNOSIS — I10 ESSENTIAL HYPERTENSION: ICD-10-CM

## 2022-06-08 DIAGNOSIS — R06.02 SOB (SHORTNESS OF BREATH): ICD-10-CM

## 2022-06-08 DIAGNOSIS — I25.10 CORONARY ARTERY DISEASE INVOLVING NATIVE CORONARY ARTERY OF NATIVE HEART WITHOUT ANGINA PECTORIS: Chronic | ICD-10-CM

## 2022-06-08 DIAGNOSIS — E78.2 MIXED HYPERLIPIDEMIA: ICD-10-CM

## 2022-06-08 DIAGNOSIS — I48.21 PERMANENT ATRIAL FIBRILLATION (HCC): Primary | ICD-10-CM

## 2022-06-08 DIAGNOSIS — I50.32 CHRONIC HEART FAILURE WITH PRESERVED EJECTION FRACTION (HCC): ICD-10-CM

## 2022-06-08 DIAGNOSIS — I48.91 ATRIAL FIBRILLATION, UNSPECIFIED TYPE (HCC): Primary | Chronic | ICD-10-CM

## 2022-06-08 LAB — INTERNATIONAL NORMALIZATION RATIO, POC: 2.1

## 2022-06-08 PROCEDURE — 97110 THERAPEUTIC EXERCISES: CPT

## 2022-06-08 PROCEDURE — 99211 OFF/OP EST MAY X REQ PHY/QHP: CPT

## 2022-06-08 PROCEDURE — 85610 PROTHROMBIN TIME: CPT

## 2022-06-08 PROCEDURE — 99214 OFFICE O/P EST MOD 30 MIN: CPT | Performed by: NURSE PRACTITIONER

## 2022-06-08 PROCEDURE — 97112 NEUROMUSCULAR REEDUCATION: CPT

## 2022-06-08 PROCEDURE — 1123F ACP DISCUSS/DSCN MKR DOCD: CPT | Performed by: NURSE PRACTITIONER

## 2022-06-08 PROCEDURE — 93000 ELECTROCARDIOGRAM COMPLETE: CPT | Performed by: NURSE PRACTITIONER

## 2022-06-08 RX ORDER — WARFARIN SODIUM 5 MG/1
5 TABLET ORAL DAILY
COMMUNITY

## 2022-06-08 RX ORDER — LOSARTAN POTASSIUM AND HYDROCHLOROTHIAZIDE 25; 100 MG/1; MG/1
1 TABLET ORAL DAILY
Qty: 90 TABLET | Refills: 1 | Status: ON HOLD | OUTPATIENT
Start: 2022-06-08 | End: 2022-06-22 | Stop reason: HOSPADM

## 2022-06-08 RX ORDER — FUROSEMIDE 20 MG/1
TABLET ORAL
Qty: 90 TABLET | Refills: 0 | Status: ON HOLD | OUTPATIENT
Start: 2022-06-08 | End: 2022-10-21 | Stop reason: SDUPTHER

## 2022-06-08 RX ORDER — NIFEDIPINE 90 MG/1
90 TABLET, EXTENDED RELEASE ORAL DAILY
Qty: 90 TABLET | Refills: 1 | Status: SHIPPED | OUTPATIENT
Start: 2022-06-08

## 2022-06-08 RX ORDER — SPIRONOLACTONE 50 MG/1
50 TABLET, FILM COATED ORAL 2 TIMES DAILY
Qty: 90 TABLET | Refills: 1 | Status: SHIPPED | OUTPATIENT
Start: 2022-06-08 | End: 2022-06-17 | Stop reason: SDUPTHER

## 2022-06-08 RX ORDER — POTASSIUM CHLORIDE 20 MEQ/1
10 TABLET, EXTENDED RELEASE ORAL DAILY
Qty: 90 TABLET | Refills: 1 | Status: ON HOLD | OUTPATIENT
Start: 2022-06-08 | End: 2022-10-21 | Stop reason: SDUPTHER

## 2022-06-08 NOTE — PROGRESS NOTES
[address]     Outpatient Follow Up Note    CHIEF COMPLAINT / HPI:  Follow Up secondary to hypertension    Subjective:   Tessie Seay is 80 y.o. female who presents today with a history of permanent afib, dHF, HTN, CAD, . Today, Ms Milagros Arthur says she has been out of her medications for at least a week. She now notices swelling in BLE, a headache for the last 2 days, and worsening shortness of breath. Denies any weight gain. Ms Milagros Arthur says she doesn't stay active as she feels like her Naty Camarillo will give out. \" Experiences chest pain every once in a while, but says it is nothing like she experienced in the past before stents. She is raising her 15year old great grandson. With regard to medication therapy the patient has been compliant with prescribed regimen. They have tolerated therapy to date.      Past Medical History:   Diagnosis Date    Acute cerebrovascular accident (CVA) ([address] 1/28/2020    Atrial fibrillation ([address]     Bell palsy     diagnosed 15 years ago   Tree CAD (coronary artery disease)     Cerebral artery occlusion with cerebral infarction ([address]     Chronic diastolic CHF (congestive heart failure) ([address] 5/16/2020    CVA (cerebral vascular accident) ([address] 1/4/2017    Hypertension     Intracranial hemorrhage ([address] 4/2016    Nonintractable headache     Nontraumatic subcortical hemorrhage of cerebral hemisphere ([address] 4/30/2016    Primary osteoarthritis of right knee 3/18/2022    Sudden visual loss of left eye     Thyroid nodule 2/8/2018    TIA involving right internal carotid artery      Social History:    Social History     Tobacco Use   Smoking Status Never Smoker   Smokeless Tobacco Never Used     Current Medications:  Current Outpatient Medications   Medication Sig Dispense Refill    guaiFENesin (ALTARUSSIN) 100 MG/5ML syrup Take 10 mLs by mouth every 4 hours as needed for Cough 1 each 0    Magnesium 500 MG CAPS Take 400 mg by mouth daily 90 capsule 3    losartan-hydroCHLOROthiazide (HYZAAR) 100-25 MG per tablet Take 1 tablet by mouth daily 90 tablet 1    spironolactone (ALDACTONE) 50 MG tablet Take 1 tablet by mouth 2 times daily TAKE 1 TABLET BY MOUTH DAILY 90 tablet 1    potassium chloride (KLOR-CON M) 20 MEQ extended release tablet Take 0.5 tablets by mouth daily 90 tablet 1    NIFEdipine (PROCARDIA XL) 90 MG extended release tablet Take 1 tablet by mouth daily 90 tablet 1    furosemide (LASIX) 20 MG tablet TAKE 1 TABLET BY MOUTH TWICE DAILY 180 tablet 1    famotidine (PEPCID) 20 MG tablet Take 1 tablet by mouth 2 times daily 60 tablet 2    atorvastatin (LIPITOR) 80 MG tablet Take 1 tablet by mouth daily 90 tablet 1    acetaminophen (TYLENOL) 500 MG tablet Take 2 tablets by mouth every 6 hours as needed for Pain 120 tablet 3    Calcium Carb-Cholecalciferol (CALCIUM + D3) 600-200 MG-UNIT TABS tablet Take 1 tablet by mouth 2 times daily 120 tablet 3     No current facility-administered medications for this visit. REVIEW OF SYSTEMS:    CONSTITUTIONAL: No major weight gain or loss, fatigue, weakness, night sweats or fever. HEENT: No new vision difficulties or ringing in the ears. RESPIRATORY: No new PND, orthopnea or cough. + worsening exertional SOB  CARDIOVASCULAR: See HPI  GI: No nausea, vomiting, diarrhea, constipation, abdominal pain or changes in bowel habits. : No urinary frequency, urgency, incontinence hematuria or dysuria. SKIN: No cyanosis or skin lesions. MUSCULOSKELETAL: No new muscle or joint pain. NEUROLOGICAL: No syncope or TIA-like symptoms.  + headache   PSYCHIATRIC: No anxiety, pain, insomnia or depression    Objective:   PHYSICAL EXAM:      Wt Readings from Last 3 Encounters:   03/10/22 180 lb (81.6 kg)   09/02/21 183 lb 1.6 oz (83.1 kg)   08/16/21 184 lb (83.5 kg)          VITALS:  BP (!) 178/120   Pulse 96   Ht 5' 7\" (1.702 m)   Wt 176 lb 9.6 oz (80.1 kg)   SpO2 99%   BMI 27.66 kg/m²   CONSTITUTIONAL: Cooperative, no apparent distress, and appears well nourished / developed +overweight   NEUROLOGIC:  Awake and orientated to person, place and time. PSYCH: Calm affect. SKIN: Warm and dry. HEENT: Sclera non-icteric, normocephalic, neck supple, no elevation of JVP, normal carotid pulses with no bruits and thyroid normal size. LUNGS:  No increased work of breathing and clear to auscultation, no crackles or wheezing  CARDIOVASCULAR:  Regular rate and rhythm with no murmurs, gallops, rubs, or abnormal heart sounds, normal PMI. The apical impulses not displaced  Heart tones are crisp and normal  Cervical veins are not engorged  The carotid upstroke is normal in amplitude and contour without delay or bruit  JVP is not elevated  ABDOMEN:  Normal bowel sounds, non-distended and non-tender to palpation  EXT: + 1 BLE edema , no calf tenderness. Pulses are present bilaterally.     DATA:    Lab Results   Component Value Date    ALT 8 (L) 02/19/2021    AST 16 02/19/2021    ALKPHOS 81 02/19/2021    BILITOT 0.5 02/19/2021     Lab Results   Component Value Date    CREATININE 0.7 08/13/2021    BUN 12 08/13/2021     08/13/2021    K 4.2 08/13/2021     08/13/2021    CO2 28 08/13/2021     No results found for: TSH, X8VAAXA, A0IUFZE, THYROIDAB  Lab Results   Component Value Date    WBC 4.3 08/12/2021    HGB 12.5 08/12/2021    HCT 36.9 08/12/2021    MCV 90.4 08/12/2021     (L) 08/12/2021     No components found for: CHLPL  Lab Results   Component Value Date    TRIG 79 08/12/2021    TRIG 83 01/29/2020    TRIG 105 02/26/2018     Lab Results   Component Value Date    HDL 47 08/12/2021    HDL 50 01/29/2020    HDL 51 02/26/2018     Lab Results   Component Value Date    LDLCALC 102 (H) 08/12/2021    LDLCALC 95 01/29/2020    LDLCALC 86 02/26/2018     Lab Results   Component Value Date    LABVLDL 16 08/12/2021    LABVLDL 17 01/29/2020    LABVLDL 21 02/26/2018      No results found for: BNP  Radiology Review:  Pertinent images / reports were reviewed as a part of this visit and reveals the following:    Last Echo: 2018   Summary   Normal left ventricle size and systolic function with an estimated ejection   fraction of 65-70%. No regional wall motion abnormalities are noted. There is mild to moderate concentric left ventricular hypertrophy. Diastolic filling parameters suggests grade III diastolic dysfunction . Moderate mitral regurgitation is present. There is moderate tricuspid regurgitation with RVSP estimated at 43 mmHg. At least moderate biatrial dilatation. MEGHAN 3/11/2019   Summary   Normal left ventricle size and systolic function with an estimated ejection   fraction of 60%. No regional wall motion abnormalities are noted.   There is moderate concentric left ventricular hypertrophy.      The left atrium is dilated. There is no evidence of mass or thrombus in the   left atrium or appendage     Last Stress Test: 10/12/2020       Summary    *Abnormal baseline and exercise EKG with inferolateral TWI, consider    ischemia    *Preserved LV wall motion and function with EF of 65%    *SPECT Imaging with homogeneous tracer distribution throughout the    myocardium with stress and rest     Last Angiogram: 9/23/15  LVEDP - 12. LVgram - severe LVH with EF 70%, enlarged AO root consistent with htn  Cors: LM - nl, LAD - 20% prox, 30%mid, patent stent, distal irreg, LCX - 20% mid    Last EC22  Atrial fibrillation   -Anteroseptal infarct -age undetermined.      [id number] Score for Atrial Fibrillation Stroke Risk   Risk   Factors  Component Value   C CHF Yes 1   H HTN Yes 1   A2 Age >= 76 No,  (70 y.o.) 0   D DM No 0   S2 Prior Stroke/TIA No 0   V Vascular Disease No 0   A Age 74-69 Yes,  (70 y.o.) 1   Sc Sex female 1    [id number]  Score  4   Score last updated 22 2:41 AM EDT    Click here for a link to the UpToDate guideline \"Atrial Fibrillation: Anticoagulation therapy to prevent embolization    Disclaimer: Risk Score calculation is dependent on accuracy of patient problem list and past encounter diagnosis. Assessment:      Diagnosis Orders   1. Atrial fibrillation, unspecified type (HCC)   ~ stable, denies palpitations   ~ hx of hemorragic stroke 2018> on warfarin (INR managed through coumadin clinic)  ~ follows with EP, refused Watchman in the past     2. Coronary artery disease involving native coronary artery of native heart without angina pectoris   ~ stable, denies anginal equivalent. CP \"here and there\"   ~ GXT 10/12/20 homogeneous tracer distrubution throughout the myocardium with stress and rest    ~ [facility] 9/2015 patent stent LAD, nonobstructive disease  ~ [facility] 2000 PCI to LAD   ~ statin, no ASA d/t warfarin     3. Chronic diastolic congestive heart failure  ~ + swelling, + SOB, has been out of medications for >1 week  ~ Echo 2/8/2018 EF 65-70%, grade III DD  ~ spironolactone, Hyzaar,     4. Essential hypertension   ~ uncontrolled; has been out of medications for over one week    5. Mixed hyperlipidemia   ~ atorvastatin 80  ~ Lipids 8/12/21  HDL 47  Trig 79  LIPID PANEL   6. SOB (shortness of breath)   ~ worsening  ~ + swelling, has been out of medications  ~ denies weight gain   ~ echo 2/8/2018 EF 65-70%, grade III DD  ~ has been out of diuretics       I had the opportunity to review the clinical symptoms and presentation of Randolph Gentile. Plan:     1. All cardiac medications reordered.  and start taking ASAP  2. Get blood work checked today  3. RTO in 2-3 weeks to reassess BP. Follow up with EP in 6 months   4. Recheck echo     Overall the patient is stable from CV standpoint    I have addresed the patient's cardiac risk factors and adjusted pharmacologic treatment as needed. In addition, I have reinforced the need for patient directed risk factor modification. Further evaluation will be based upon the patient's clinical course and testing results.     All questions and concerns were addressed to the patient     The patient is not currently smoking. The risks related to smoking were reviewed with the patient. Recommend maintaining a smoke-free lifestyle. Patient is not on a beta-blocker; no MI  Patient is on an ace-i/ARB  Patient is on a statin    Dual anti-coagulation has not been recommended / prescribed for this patient. Education conducted on adverse reactions including bleeding was discussed. Daily weight, low sodium diet were discussed. Patient instructed to call the office with a weight gain: > 3 # over night or 5# in one week; swelling, SOB/orthopnea/PND    The patient verbalizes understanding not to stop medications without discussing with us. Discussed exercise: 30-60 minutes 7 days/week  Discussed Low saturated fat/FERNANDO diet. Thank you for allowing to us to participate in the care of [facility].     Electronically signed by ADAN Black CNP on 6/8/2022 at 6:10 AM     Documentation of today's visit sent to PCP

## 2022-06-08 NOTE — FLOWSHEET NOTE
Genoveva Kim  Phone: [phone]   Fax: [phone]    Physical Therapy Treatment Note/ Progress Report:     Date:  2022    Patient Name:  Debra Umana    :  1950  MRN: [id number]  Restrictions/Precautions:    Medical/Treatment Diagnosis Information:   Diagnosis: M17.12 (ICD-10-CM) - Primary osteoarthritis of left knee M25.561, M25.562 (ICD-10-CM) - Pain in both knees, unspecified chronicity M17.11 (ICD-10-CM) - Primary osteoarthritis of right knee   Treatment Diagnosis: Decreased B pain-free Knee ROM with decreased functional strength limiting functional gait/stairs  Insurance/Certification information:  PT Insurance Information: Aetna; $40 copay; no auth  Physician Information:  Referring Practitioner: Dr. Bender Plane of care signed (Y/N): []  Yes [x]  No     Date of Patient follow up with Physician:      Progress Report: []  Yes   [x]  No     Date Range for reporting period:  Beginnin2022  PN: 22  Ending:    Progress report due (10 Rx/or 30 days whichever is less): visit #13 or     Recertification due (POC duration/ or 90 days whichever is less): visit #12 or     Visit # Insurance Allowable Auth required? Date Range   9 Aetna Medicare  $40 copay  BMN []  Yes  [x]  No NA  No ionto, aquatics, DN, estim       Units approved Units used Date Range          Latex Allergy:  [x]NO      []YES  Preferred Language for Healthcare:   [x]English       []other:    Functional Scale:           Date assessed:   FOTO physical FS primary measure score = 40; risk adjusted = 52    FOTO physical FS primary measure score = 40         Pain level:    6/10 R knee pain (inferior patella), 3/10 L knee (more hamstring muscle)    SUBJECTIVE: Pt reports she had a migraine at the back of the head on the R yesterday. It is better today, but still there to some degree. She will see her cardiologist today.  The R knee continues to be more painful that the L. She tolerated the last session. Has appt on 6/30 with ortho MD for cortisone injections in bilateral knees. OBJECTIVE:   5/18:  Gait: (include devices/WB status) Amb.  Without A device with R trunk lean with R stance, shuffling gait pattern     PROM AROM     L R L R   Hip Flexion 110 100 PROM=AROM t/o L/R     Hip Abduction NT NT       Hip ER NT NT       Hip IR NT NT       Knee Flexion 110* 112*       Knee Extension 9 from neutral 10 from neutral         Strength (0-5) / Myotomes Left Right   Hip Flexion - supine At least 3/5 At least 3/5   Hip Flexion - seated (L1-2) 4/5 4/5   Hip Abduction 4/5 4-/5   Hip Adduction NT NT   Hip ER NT NT   Hip IR NT nt   Quads (L2-4) 4/5 4-/5**   Hamstrings 4/5 4-/5   Ankle Dorsiflexion (L4-5) 4/5 4/5   Ankle Plantarflexion (S1-2) 5/5 5/5   Ankle Inversion 5/5 5/5   Ankle Eversion (S1-2) 5/5 5/5   Great Toe Extension (L5) 5/5 4/5     TUG 12.86\"        5/13: L pubic upslip; U8TWCEL/FRSR, L4ERSR  5/4  Presented to clinic with no AD but cane is in the car.; 15'reassessment by PT for pelvic alignment and flexibility     Rt pubic upslip  Rt piriformis tightness  LOL sacral torsion  Rt SI post inominate      RESTRICTIONS/PRECAUTIONS:   Exercises/Interventions:    Therapeutic Exercise (47382)  Resistance  level Sets/sec Reps Notes / Cues   Recumbent bike Level 4 5'  5/13 5/13: NMR   Hamstring stretch+ 30\"    2 2xea    10 5/4   SAQ 2# B  2#L, 1#R 5/13      2 20x 5/13   Bridges with BS2-5\"  1 15x 5/13   Supine flexion SLR 1   10x Flex   5/4 5/13NMR    10\" 3x 5/13    5/13 NMR   IB stretches B Gastroc 20\" 2 5/13   Standing B heel raises        5/13: NMR    1 10 B 5/20   Supine IT band stretch w/ strap bilaterally 30\" 3 5/25   Prone quad stretch bilaterally 5/25   Standing hip abd and ext              Airex:  Marching  Mini squat   HR  Standing hip abd and ext   1  1  1  1    10  10  10  10 6/8                               Therapeutic Activities (57292) 5/25 - education this date on self STM at home to decrease muscle tightness as well as purchase of AD for support to decrease patellar load/pain. Discussed calling MD to move appt closer due to increased report of pain recently. Updated HEP 5/4   Consider advising of purchasing rollator for safety    5/4 advised to use cane for all ambulation due to Cardiac hx/SOB, contacting DR for Afib/ SOB symptoms   Reassessment of objective measures, updated prognosis         10   5/20   Neuromuscular Re-ed (67358)       Hookline neutral Pelvic tilts   5\" 8 5/27: HOB elevated 30 degrees   Hookline neutral Pelvic tilts with: opp marches  B Bent knee fall out 0#,   Unilateral clamshell orange 1  1  1  1 20  10  10  10   5/27: HOB elevated 30 degrees          Manual Intervention (50813)       Knee mobs/PROM         5/20   Patella Mobs     STM bilateral quad/ IT band     Manual hamstring stretching in 90/90  Manual IT band stretching      SI/Lumbosacral MET gentle       Hip LA distraction 4 passes x4min B         Modalities:     Pt. Education:  -pt educated on diagnosis, prognosis and expectations for rehab  -all pt questions were answered    Home Exercise Program:  Access Code: [email or web address]  URL: [email or web address]  Date: 04/13/2022  Prepared by:  [facility]    Exercises  Modified Daryl Stretch - 1 x daily - 7 x weekly - 1 sets - 3 reps - 20\" hold  Seated Hamstring Stretch - 1 x daily - 7 x weekly - 1 sets - 3 reps - 20\" hold  Seated Gastroc Stretch with Strap - 1 x daily - 7 x weekly - 1 sets - 3 reps - 20\" hold  Standing Gastroc Stretch - 1 x daily - 7 x weekly - 3 sets - 10 reps  Bent Knee Fallouts - 1 x daily - 7 x weekly - 2 sets - 10 reps  Supine Gluteal Sets - 1 x daily - 7 x weekly - 3 sets - 10 reps - 5\" hold  Supine Quadricep Sets - 1 x daily - 7 x weekly - 1-2 sets - 10 reps - 5\" hold    Therapeutic Exercise and NMR EXR  [x] (98616) Provided verbal/tactile cueing for activities related to strengthening, flexibility, endurance, ROM for improvements in LE, proximal hip, and core control with self care, mobility, lifting, ambulation. [x] (81568) Provided verbal/tactile cueing for activities related to improving balance, coordination, kinesthetic sense, posture, motor skill, proprioception  to assist with LE, proximal hip, and core control in self care, mobility, lifting, ambulation and eccentric single leg control.   [] (94824) Therapist is in constant attendance of 2 or more patients providing skilled therapy interventions, but not providing any significant amount of measurable one-on-one time to either patient, for improvements in LE, proximal hip, and core control in self care, mobility, lifting, ambulation and eccentric single leg control.      NMR and Therapeutic Activities:    [] (25750 or 88265) Provided verbal/tactile cueing for activities related to improving balance, coordination, kinesthetic sense, posture, motor skill, proprioception and motor activation to allow for proper function of core, proximal hip and LE with self care and ADLs  [] (35789) Gait Re-education- Provided training and instruction to the patient for proper LE, core and proximal hip recruitment and positioning and eccentric body weight control with ambulation re-education including up and down stairs     Home Exercise Program:    [] (46519) Reviewed/Progressed HEP activities related to strengthening, flexibility, endurance, ROM of core, proximal hip and LE for functional self-care, mobility, lifting and ambulation/stair navigation   [] (18657)Reviewed/Progressed HEP activities related to improving balance, coordination, kinesthetic sense, posture, motor skill, proprioception of core, proximal hip and LE for self care, mobility, lifting, and ambulation/stair navigation      Manual Treatments:  PROM / STM / Oscillations-Mobs:  G-I, II, III, IV (PA's, Inf., Post.)  [x] (07556) Provided manual therapy to mobilize LE, proximal hip and/or LS spine soft tissue/joints for the purpose of modulating pain, promoting relaxation,  increasing ROM, reducing/eliminating soft tissue swelling/inflammation/restriction, improving soft tissue extensibility and allowing for proper ROM for normal function with self care, mobility, lifting and ambulation. Modalities:  [] (46377) Vasopneumatic compression: Utilized vasopneumatic compression to decrease edema / swelling for the purpose of improving mobility and quad tone / recruitment which will allow for increased overall function including but not limited to self-care, transfers, ambulation, and ascending / descending stairs. Charges:  Timed Code Treatment Minutes: 45   Total Treatment Minutes: 45     [] EVAL - LOW (93900)   [] EVAL - MOD (45380)  [] EVAL - HIGH (46832)  [] RE-EVAL (76666)  [x] DQ(63362) x 2     [] Ionto  [x] NMR (50451) x  1    [] Vaso  [] Manual (19135) x       [] Ultrasound  [] TA x 1        [] Mech Traction (94841)  [] Aquatic Therapy x      [] ES (un) (44860):   [] Home Management Training x  [] ES(attended) (44898)   [] Dry Needling 1-2 muscles (14098):  [] Dry Needling 3+ muscles (136671  [] Group:      [] Other:     GOALS:   Patient stated goal:to be able to walk better  [] Progressing: [] Met: [] Not Met: [] Adjusted    Therapist goals for Patient:   Short Term Goals: To be achieved in: 2 weeks  1. Independent in HEP and progression per patient tolerance, in order to prevent re-injury. [] Progressing: [] Met: [x] Not Met: [] Adjusted  2. Patient will have a decrease in pain to facilitate improvement in movement, function, and ADLs as indicated by Functional Deficits. [] Progressing: [] Met: [x] Not Met: [] Adjusted    Long Term Goals: To be achieved in: 4-6 weeks  1. FOTO score of at least 58 to assist with reaching prior level of function. [] Progressing: [] Met: [] Not Met: [] Adjusted  2.  Patient will demonstrate increased AROM to 0-116 degrees in B knees to allow for proper joint functioning as indicated by patients ability to perform sit to stand with improved quad activation/knee flexion with UE A,  [] Progressing: [] Met: [] Not Met: [] Adjusted  3. Patient will demonstrate an increase in Strength to at least 4-4.5/5 as well as good proximal hip strength and control to allow for proper functional mobility as indicated by patients ability to ascend 4-6 steps reciprocally with 1 rail by discharge  [] Progressing: [] Met: [] Not Met: [] Adjusted  4. Patient will return to functional activities including standing at [facility] for up to 15-20; without increased symptoms or restriction. [] Progressing: [] Met: [] Not Met: [] Adjusted  5. Patient to be able to perform TUG in 12 seconds or less for improved safety with community ambulation. [] Progressing: [] Met: [] Not Met: [] Adjusted       Overall Progression Towards Functional goals/ Treatment Progress Update:  [x] Patient is progressing as expected towards functional goals listed. [] Progression is slowed due to complexities/Impairments listed. [] Progression has been slowed due to co-morbidities. [] Plan just implemented, tunable to assess goals progression due to initial evaluation only   [] Goals require adjustment due to lack of progress  [] Patient is not progressing as expected and requires additional follow up with physician  [] Other    Persisting Functional Limitations/Impairments:  []Sitting []Standing   []Walking []Stairs   []Transfers []ADLs   []Squatting/bending []Kneeling  []Housework []Job related tasks  []Driving []Sports/Recreation   []Sleeping []Other:    ASSESSMENT:  Introduced standing therex this date on Airex. Pt tolerates well without increased symptoms and comments that exercises feel easy. Continues to present with significant quad and posterior lateral hip strength. Continue to progress CKC strengthening and balance as able to tolerate.  Patient will benefit from standing NMR static and low level dynamic exercises in next few sessions. Treatment/Activity Tolerance:  [x] Pt able to complete treatment [x] Patient limited by fatique  [x] Patient limited by pain  [] Patient limited by other medical complications  [] Other:     Prognosis:  [] Good [x] Fair  [] Poor    Patient Requires Follow-up: [x] Yes  [] No    Return to Play:    [x]  N/A   []  Stage 1: Intro to Strength   []  Stage 2: Return to Run and Strength   []  Stage 3: Return to Jump and Strength   []  Stage 4: Dynamic Strength and Agility   []  Stage 5: Sport Specific Training   []  Ready to Return to Play, Meets All Above Stages   []  Not Ready for Return to Sports   Comments:            PLAN: PT 1-2x / week for 4-6 weeks. [x] Continue per plan of care [] Alter current plan (see comments)  [] Plan of care initiated [] Hold pending MD visit [] Discharge    Electronically signed by: Saba Lozada PT, DPT    Note: If patient does not return for scheduled/ recommended follow up visits, this note will serve as a discharge from care along with most recent update on progress.
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
## 2022-06-09 ENCOUNTER — HOSPITAL ENCOUNTER (OUTPATIENT)
Age: 72
Discharge: HOME | End: 2022-06-09
Payer: MEDICARE

## 2022-06-09 DIAGNOSIS — E78.2 MIXED HYPERLIPIDEMIA: ICD-10-CM

## 2022-06-09 DIAGNOSIS — R06.02 SOB (SHORTNESS OF BREATH): ICD-10-CM

## 2022-06-09 DIAGNOSIS — I10 ESSENTIAL HYPERTENSION: ICD-10-CM

## 2022-06-09 LAB
A/G RATIO: 1.1 (ref 1.1–2.2)
ALBUMIN SERPL-MCNC: 4.1 G/DL (ref 3.4–5)
ALP BLD-CCNC: 71 U/L (ref 40–129)
ALT SERPL-CCNC: 10 U/L (ref 10–40)
ANION GAP SERPL CALCULATED.3IONS-SCNC: 11 MMOL/L (ref 3–16)
AST SERPL-CCNC: 21 U/L (ref 15–37)
BILIRUB SERPL-MCNC: 0.9 MG/DL (ref 0–1)
BUN BLDV-MCNC: 10 MG/DL (ref 7–20)
CALCIUM SERPL-MCNC: 10 MG/DL (ref 8.3–10.6)
CHLORIDE BLD-SCNC: 104 MMOL/L (ref 99–110)
CHOLESTEROL, TOTAL: 186 MG/DL (ref 0–199)
CO2: 28 MMOL/L (ref 21–32)
CREAT SERPL-MCNC: 0.6 MG/DL (ref 0.6–1.2)
GFR AFRICAN AMERICAN: >60
GFR NON-AFRICAN AMERICAN: >60
GLUCOSE BLD-MCNC: 92 MG/DL (ref 70–99)
HCT VFR BLD CALC: 40.9 % (ref 36–48)
HDLC SERPL-MCNC: 57 MG/DL (ref 40–60)
HEMOGLOBIN: 13.8 G/DL (ref 12–16)
LDL CHOLESTEROL CALCULATED: 111 MG/DL
MCH RBC QN AUTO: 30.1 PG (ref 26–34)
MCHC RBC AUTO-ENTMCNC: 33.7 G/DL (ref 31–36)
MCV RBC AUTO: 89.2 FL (ref 80–100)
PDW BLD-RTO: 13.5 % (ref 12.4–15.4)
PLATELET # BLD: 147 K/UL (ref 135–450)
PMV BLD AUTO: 11.3 FL (ref 5–10.5)
POTASSIUM SERPL-SCNC: 4 MMOL/L (ref 3.5–5.1)
RBC # BLD: 4.59 M/UL (ref 4–5.2)
SODIUM BLD-SCNC: 143 MMOL/L (ref 136–145)
TOTAL PROTEIN: 7.8 G/DL (ref 6.4–8.2)
TRIGL SERPL-MCNC: 92 MG/DL (ref 0–150)
VLDLC SERPL CALC-MCNC: 18 MG/DL
WBC # BLD: 5.2 K/UL (ref 4–11)

## 2022-06-09 PROCEDURE — 36415 COLL VENOUS BLD VENIPUNCTURE: CPT

## 2022-06-09 PROCEDURE — 80061 LIPID PANEL: CPT

## 2022-06-09 PROCEDURE — 85027 COMPLETE CBC AUTOMATED: CPT

## 2022-06-09 PROCEDURE — 80053 COMPREHEN METABOLIC PANEL: CPT

## 2022-06-10 ENCOUNTER — TELEPHONE (OUTPATIENT)
Dept: CARDIOLOGY CLINIC | Age: 72
End: 2022-06-10

## 2022-06-10 NOTE — LETTER
[facility] [address] [facility] [address]. [address]  Phone: [phone]  Fax: [phone]    ADAN Ambrocio CNP        June 13, 2022     Patient: Susanne Peña   YOB: 1950   Date of Visit: 6/10/2022            Susanne Peña:    Several attempts have been made to contact you about your recent lab results- unsuccessfully. Your   blood work is stable. Cholesterol is high, though. Please ensure you are  taking your statin.      If you have any questions or concerns, please don't hesitate to call.     Sincerely,        ADAN Ambrocio CNP

## 2022-06-10 NOTE — TELEPHONE ENCOUNTER
Called pt twice for message below. No one answer the call and cannot LMOM because the mail box is full. Junito Del Real, APRN - CNP  P [facility] Staff  Blood work is stable. Cholesterol is high, though. Please ensure she is taking her statin.

## 2022-06-10 NOTE — TELEPHONE ENCOUNTER
----- Message from ADAN Cuello CNP sent at 6/10/2022 11:37 AM EDT -----  Blood work is stable. Cholesterol is high, though. Please ensure she is taking her statin.

## 2022-06-13 ENCOUNTER — HOSPITAL ENCOUNTER (OUTPATIENT)
Dept: PHYSICAL THERAPY | Age: 72
Setting detail: THERAPIES SERIES
Discharge: HOME | End: 2022-06-13
Payer: MEDICARE

## 2022-06-13 NOTE — FLOWSHEET NOTE
Physical Therapy  Cancellation/No-show Note  Patient Name:  Clinton Regan  :  1950   Date:  2022  Cancelled visits to date: 0  No-shows to date: 4    Patient status for today's appointment patient:  []  Cancelled  []  Rescheduled appointment  [x]  No-show , , ,      Reason given by patient:  []  Patient ill  []  Conflicting appointment  []  No transportation    []  Conflict with work  []  No reason given  [x]  Other: she didn't know she had an appointment today, thought it was tomorrow or Wednesday. Comments:      Phone call information:   [x]  Phone call made today to patient at  number provided: [phone]     []  Patient answered, conversation as follows:  Missed appt today, next scheduled appt 6/15/2022 3:00 PM    [x]  Patient did not answer, message left as follows:  []  Phone call not made today  []  Phone call not needed - pt contacted us to cancel and provided reason for cancellation.      Electronically signed by:  Jodi Kwan PTA

## 2022-06-13 NOTE — TELEPHONE ENCOUNTER
Called pt, 3rd attempt for the message below. No one answer the call and  LMOM to call us back.     Linda Cam, APRN - CNP  P [facility] Staff  Blood work is stable. Cholesterol is high, though. Please ensure she is taking her statin.        Letter sent.

## 2022-06-15 ENCOUNTER — HOSPITAL ENCOUNTER (OUTPATIENT)
Dept: PHYSICAL THERAPY | Age: 72
Setting detail: THERAPIES SERIES
Discharge: HOME | End: 2022-06-15
Payer: MEDICARE

## 2022-06-15 PROCEDURE — 97110 THERAPEUTIC EXERCISES: CPT

## 2022-06-15 NOTE — FLOWSHEET NOTE
[facility]  Phone: [phone]   Fax: [phone]    Physical Therapy Treatment Note/ Progress Report:     Date:  6/15/2022    Patient Name:  Harika Painting    :  1950  MRN: [id number]  Restrictions/Precautions:    Medical/Treatment Diagnosis Information:   Diagnosis: M17.12 (ICD-10-CM) - Primary osteoarthritis of left knee M25.561, M25.562 (ICD-10-CM) - Pain in both knees, unspecified chronicity M17.11 (ICD-10-CM) - Primary osteoarthritis of right knee   Treatment Diagnosis: Decreased B pain-free Knee ROM with decreased functional strength limiting functional gait/stairs  Insurance/Certification information:  PT Insurance Information: Aetna; $40 copay; no auth  Physician Information:  Referring Practitioner: Dr. Saida Sawant of care signed (Y/N): []  Yes [x]  No     Date of Patient follow up with Physician:      Progress Report: []  Yes   [x]  No     Date Range for reporting period:  Beginnin2022  PN: 22  Ending:    Progress report due (10 Rx/or 30 days whichever is less): visit #15 or  PROGRESS NOTE NEXT VISIT     Recertification due (POC duration/ or 90 days whichever is less): visit #12 or     Visit # Insurance Allowable Auth required? Date Range   10 A[employer]na Medicare  $40 copay  BMN []  Yes  [x]  No NA  No ionto, aquatics, DN, estim       Units approved Units used Date Range          Latex Allergy:  [x]NO      []YES  Preferred Language for Healthcare:   [x]English       []other:    Functional Scale:           Date assessed:   FOTO physical FS primary measure score = 40; risk adjusted = 52    FOTO physical FS primary measure score = 40         Pain level:    8/10 R knee pain, 6/10 L knee     SUBJECTIVE: Pt arrived 15 minutes late for session this date. Reports having increased bilateral knee pain today, also complains of pain along L side neck/upper shoulder for the last 3 days.  Unsure why knee pain is more, states she has been sitting down more lately. Does feel like overall, knees are better since starting therapy. OBJECTIVE:   5/18:  Gait: (include devices/WB status) Amb.  Without A device with R trunk lean with R stance, shuffling gait pattern     PROM AROM     L R L R   Hip Flexion 110 100 PROM=AROM t/o L/R     Hip Abduction NT NT       Hip ER NT NT       Hip IR NT NT       Knee Flexion 110* 112*       Knee Extension 9 from neutral 10 from neutral         Strength (0-5) / Myotomes Left Right   Hip Flexion - supine At least 3/5 At least 3/5   Hip Flexion - seated (L1-2) 4/5 4/5   Hip Abduction 4/5 4-/5   Hip Adduction NT NT   Hip ER NT NT   Hip IR NT nt   Quads (L2-4) 4/5 4-/5**   Hamstrings 4/5 4-/5   Ankle Dorsiflexion (L4-5) 4/5 4/5   Ankle Plantarflexion (S1-2) 5/5 5/5   Ankle Inversion 5/5 5/5   Ankle Eversion (S1-2) 5/5 5/5   Great Toe Extension (L5) 5/5 4/5     TUG 12.86\"        5/13: L pubic upslip; [id number]/FRSR, L4ERSR  5/4  Presented to clinic with no AD but cane is in the car.; 15'reassessment by PT for pelvic alignment and flexibility     Rt pubic upslip  Rt piriformis tightness  LOL sacral torsion  Rt SI post inominate      RESTRICTIONS/PRECAUTIONS:   Exercises/Interventions:    Therapeutic Exercise (99587)  Resistance  level Sets/sec Reps Notes / Cues   Recumbent bike Level 4   3'  5/13 5/13: NMR   Hamstring stretch+ 30\"    2 2xea    10 5/4   SAQ  B  0#  2#L, 1#R 2  10xea5/13      2 20x 5/13   Bridges with BS2-5\"  1 15x 5/13   Supine flexion SLR/  1   10x Flex   5/4 5/13NMR    10\" 3x 5/13    5/13 NMR   IB stretches B Gastroc 20\" 2 5/13   Standing B heel raises  2 10       5/13: NMR    1 10 B 5/20   Supine IT band stretch w/ strap bilaterally 30\" 3 5/25    bilaterally 5/25   Standing hip abd and ext                 1  1  1  1    10  10  10  10 6/8                               Therapeutic Activities (77907)       5/25 - education this date on self STM at home to decrease muscle tightness as well as purchase of AD for support to decrease patellar load/pain. Discussed calling MD to move appt closer due to increased report of pain recently. Updated HEP 5/4   Consider advising of purchasing rollator for safety    5/4 advised to use cane for all ambulation due to Cardiac hx/SOB, contacting DR for Afib/ SOB symptoms   Reassessment of objective measures, updated prognosis         10   5/20   Neuromuscular Re-ed (90328)       Hookline neutral Pelvic tilts   5\" 8 5/27: HOB elevated 30 degrees   Hookline neutral Pelvic tilts with: opp marches  B Bent knee fall out 0#,   Unilateral clamshell orange 1  1  1 20  10  10 5/27: HOB elevated 30 degrees          Manual Intervention (72807)       Knee mobs/PROM         5/20   Patella Mobs     STM bilateral quad/ IT band     Manual hamstring stretching in 90/90  Manual IT band stretching      SI/Lumbosacral MET gentle       Hip LA distraction 4 passes x4min B         Modalities:     Pt. Education:  -pt educated on diagnosis, prognosis and expectations for rehab  -all pt questions were answered    Home Exercise Program:  Access Code: [email or web address]  URL: [email or web address]  Date: 04/13/2022  Prepared by:  [facility]    Exercises  Modified Daryl Stretch - 1 x daily - 7 x weekly - 1 sets - 3 reps - 20\" hold  Seated Hamstring Stretch - 1 x daily - 7 x weekly - 1 sets - 3 reps - 20\" hold  Seated Gastroc Stretch with Strap - 1 x daily - 7 x weekly - 1 sets - 3 reps - 20\" hold  Standing Gastroc Stretch - 1 x daily - 7 x weekly - 3 sets - 10 reps  Bent Knee Fallouts - 1 x daily - 7 x weekly - 2 sets - 10 reps  Supine Gluteal Sets - 1 x daily - 7 x weekly - 3 sets - 10 reps - 5\" hold  Supine Quadricep Sets - 1 x daily - 7 x weekly - 1-2 sets - 10 reps - 5\" hold    Therapeutic Exercise and NMR EXR  [x] (75254) Provided verbal/tactile cueing for activities related to strengthening, flexibility, endurance, ROM for improvements in LE, proximal hip, and core control with self care, mobility, lifting, ambulation. [x] (81934) Provided verbal/tactile cueing for activities related to improving balance, coordination, kinesthetic sense, posture, motor skill, proprioception  to assist with LE, proximal hip, and core control in self care, mobility, lifting, ambulation and eccentric single leg control.   [] (86761) Therapist is in constant attendance of 2 or more patients providing skilled therapy interventions, but not providing any significant amount of measurable one-on-one time to either patient, for improvements in LE, proximal hip, and core control in self care, mobility, lifting, ambulation and eccentric single leg control.      NMR and Therapeutic Activities:    [] (65083 or 85152) Provided verbal/tactile cueing for activities related to improving balance, coordination, kinesthetic sense, posture, motor skill, proprioception and motor activation to allow for proper function of core, proximal hip and LE with self care and ADLs  [] (49156) Gait Re-education- Provided training and instruction to the patient for proper LE, core and proximal hip recruitment and positioning and eccentric body weight control with ambulation re-education including up and down stairs     Home Exercise Program:    [] (13484) Reviewed/Progressed HEP activities related to strengthening, flexibility, endurance, ROM of core, proximal hip and LE for functional self-care, mobility, lifting and ambulation/stair navigation   [] (38711)Reviewed/Progressed HEP activities related to improving balance, coordination, kinesthetic sense, posture, motor skill, proprioception of core, proximal hip and LE for self care, mobility, lifting, and ambulation/stair navigation      Manual Treatments:  PROM / STM / Oscillations-Mobs:  G-I, II, III, IV (PA's, Inf., Post.)  [x] (67509) Provided manual therapy to mobilize LE, proximal hip and/or LS spine soft tissue/joints for the purpose of modulating pain, promoting relaxation,  increasing ROM, reducing/eliminating soft tissue swelling/inflammation/restriction, improving soft tissue extensibility and allowing for proper ROM for normal function with self care, mobility, lifting and ambulation. Modalities:  [] (43825) Vasopneumatic compression: Utilized vasopneumatic compression to decrease edema / swelling for the purpose of improving mobility and quad tone / recruitment which will allow for increased overall function including but not limited to self-care, transfers, ambulation, and ascending / descending stairs. Charges:  Timed Code Treatment Minutes: 45   Total Treatment Minutes: 45     [] EVAL - LOW (85783)   [] EVAL - MOD (62316)  [] EVAL - HIGH (05531)  [] RE-EVAL (17181)  [x] ZB(53937) x 2     [] Ionto  [] NMR (00046) x      [] Vaso  [] Manual (18287) x       [] Ultrasound  [] TA x 1        [] Mech Traction (74676)  [] Aquatic Therapy x      [] ES (un) (92031):   [] Home Management Training x  [] ES(attended) (98245)   [] Dry Needling 1-2 muscles (73197):  [] Dry Needling 3+ muscles (945810  [] Group:      [] Other:     GOALS:   Patient stated goal:to be able to walk better  [] Progressing: [] Met: [] Not Met: [] Adjusted    Therapist goals for Patient:   Short Term Goals: To be achieved in: 2 weeks  1. Independent in HEP and progression per patient tolerance, in order to prevent re-injury. [] Progressing: [] Met: [x] Not Met: [] Adjusted  2. Patient will have a decrease in pain to facilitate improvement in movement, function, and ADLs as indicated by Functional Deficits. [] Progressing: [] Met: [x] Not Met: [] Adjusted    Long Term Goals: To be achieved in: 4-6 weeks  1. FOTO score of at least 58 to assist with reaching prior level of function. [] Progressing: [] Met: [] Not Met: [] Adjusted  2.  Patient will demonstrate increased AROM to 0-116 degrees in B knees to allow for proper joint functioning as indicated by patients ability to perform sit to stand with improved quad activation/knee flexion with UE A,  [] Progressing: [] Met: [] Not Met: [] Adjusted  3. Patient will demonstrate an increase in Strength to at least 4-4.5/5 as well as good proximal hip strength and control to allow for proper functional mobility as indicated by patients ability to ascend 4-6 steps reciprocally with 1 rail by discharge  [] Progressing: [] Met: [] Not Met: [] Adjusted  4. Patient will return to functional activities including standing at [facility] for up to 15-20; without increased symptoms or restriction. [] Progressing: [] Met: [] Not Met: [] Adjusted  5. Patient to be able to perform TUG in 12 seconds or less for improved safety with community ambulation. [] Progressing: [] Met: [] Not Met: [] Adjusted       Overall Progression Towards Functional goals/ Treatment Progress Update:  [x] Patient is progressing as expected towards functional goals listed. [] Progression is slowed due to complexities/Impairments listed. [] Progression has been slowed due to co-morbidities. [] Plan just implemented, tunable to assess goals progression due to initial evaluation only   [] Goals require adjustment due to lack of progress  [] Patient is not progressing as expected and requires additional follow up with physician  [] Other    Persisting Functional Limitations/Impairments:  []Sitting []Standing   []Walking []Stairs   []Transfers []ADLs   []Squatting/bending []Kneeling  []Housework []Job related tasks  []Driving []Sports/Recreation   []Sleeping []Other:    ASSESSMENT:  Limited exercises performed this date due to pt being 15 minutes late for appt as well as increased pain level in knees. Pt demonstrates difficulty with exercises this date due to increased pain in LE's. Pt demonstrates limited ROM with stretches and exercises this date.       Treatment/Activity Tolerance:  [x] Pt able to complete treatment [x] Patient limited by reyna  [x] Patient limited by pain  [] Patient limited by other medical complications  [] Other:     Prognosis:  [] Good [x] Fair  [] Poor    Patient Requires Follow-up: [x] Yes  [] No    Return to Play:    [x]  N/A   []  Stage 1: Intro to Strength   []  Stage 2: Return to Run and Strength   []  Stage 3: Return to Jump and Strength   []  Stage 4: Dynamic Strength and Agility   []  Stage 5: Sport Specific Training   []  Ready to Return to Play, Meets All Above Stages   []  Not Ready for Return to Sports   Comments:            PLAN: PT 1-2x / week for 4-6 weeks. [x] Continue per plan of care [] Alter current plan (see comments)  [] Plan of care initiated [] Hold pending MD visit [] Discharge    Electronically signed by: Ildefonso Portillo, PTA [id number]    Note: If patient does not return for scheduled/ recommended follow up visits, this note will serve as a discharge from care along with most recent update on progress.

## 2022-06-17 DIAGNOSIS — I50.32 CHRONIC HEART FAILURE WITH PRESERVED EJECTION FRACTION (HCC): ICD-10-CM

## 2022-06-17 DIAGNOSIS — I48.91 ATRIAL FIBRILLATION, UNSPECIFIED TYPE (HCC): Chronic | ICD-10-CM

## 2022-06-17 RX ORDER — SPIRONOLACTONE 50 MG/1
50 TABLET, FILM COATED ORAL 2 TIMES DAILY
Qty: 90 TABLET | Refills: 1 | Status: ON HOLD | OUTPATIENT
Start: 2022-06-17 | End: 2022-10-21 | Stop reason: SDUPTHER

## 2022-06-19 ENCOUNTER — APPOINTMENT (OUTPATIENT)
Dept: CT IMAGING | Age: 72
DRG: 103 | End: 2022-06-19
Payer: MEDICARE

## 2022-06-19 ENCOUNTER — HOSPITAL ENCOUNTER (INPATIENT)
Age: 72
LOS: 2 days | Discharge: HOME | DRG: 103 | End: 2022-06-22
Attending: EMERGENCY MEDICINE | Admitting: INTERNAL MEDICINE
Payer: MEDICARE

## 2022-06-19 ENCOUNTER — APPOINTMENT (OUTPATIENT)
Dept: GENERAL RADIOLOGY | Age: 72
DRG: 103 | End: 2022-06-19
Payer: MEDICARE

## 2022-06-19 DIAGNOSIS — I10 HYPERTENSION, UNSPECIFIED TYPE: ICD-10-CM

## 2022-06-19 DIAGNOSIS — M31.6 TEMPORAL ARTERITIS (HCC): Primary | ICD-10-CM

## 2022-06-19 DIAGNOSIS — R51.9 ACUTE NONINTRACTABLE HEADACHE, UNSPECIFIED HEADACHE TYPE: ICD-10-CM

## 2022-06-19 LAB
A/G RATIO: 1.2 (ref 1.1–2.2)
ALBUMIN SERPL-MCNC: 4.3 G/DL (ref 3.4–5)
ALP BLD-CCNC: 75 U/L (ref 40–129)
ALT SERPL-CCNC: 15 U/L (ref 10–40)
ANION GAP SERPL CALCULATED.3IONS-SCNC: 9 MMOL/L (ref 3–16)
AST SERPL-CCNC: 25 U/L (ref 15–37)
BASOPHILS ABSOLUTE: 0 K/UL (ref 0–0.2)
BASOPHILS RELATIVE PERCENT: 0.6 %
BILIRUB SERPL-MCNC: 0.8 MG/DL (ref 0–1)
BUN BLDV-MCNC: 9 MG/DL (ref 7–20)
C-REACTIVE PROTEIN: <3 MG/L (ref 0–5.1)
CALCIUM SERPL-MCNC: 10 MG/DL (ref 8.3–10.6)
CHLORIDE BLD-SCNC: 102 MMOL/L (ref 99–110)
CO2: 29 MMOL/L (ref 21–32)
CREAT SERPL-MCNC: 0.6 MG/DL (ref 0.6–1.2)
EOSINOPHILS ABSOLUTE: 0.1 K/UL (ref 0–0.6)
EOSINOPHILS RELATIVE PERCENT: 1.3 %
GFR AFRICAN AMERICAN: >60
GFR NON-AFRICAN AMERICAN: >60
GLUCOSE BLD-MCNC: 100 MG/DL (ref 70–99)
HCT VFR BLD CALC: 40.1 % (ref 36–48)
HEMOGLOBIN: 13.3 G/DL (ref 12–16)
LYMPHOCYTES ABSOLUTE: 1.7 K/UL (ref 1–5.1)
LYMPHOCYTES RELATIVE PERCENT: 32.3 %
MCH RBC QN AUTO: 29.4 PG (ref 26–34)
MCHC RBC AUTO-ENTMCNC: 33.1 G/DL (ref 31–36)
MCV RBC AUTO: 88.7 FL (ref 80–100)
MONOCYTES ABSOLUTE: 0.4 K/UL (ref 0–1.3)
MONOCYTES RELATIVE PERCENT: 7.1 %
NEUTROPHILS ABSOLUTE: 3.2 K/UL (ref 1.7–7.7)
NEUTROPHILS RELATIVE PERCENT: 58.7 %
PDW BLD-RTO: 13.4 % (ref 12.4–15.4)
PLATELET # BLD: 139 K/UL (ref 135–450)
PMV BLD AUTO: 9.9 FL (ref 5–10.5)
POTASSIUM SERPL-SCNC: 3.4 MMOL/L (ref 3.5–5.1)
RBC # BLD: 4.52 M/UL (ref 4–5.2)
SEDIMENTATION RATE, ERYTHROCYTE: 29 MM/HR (ref 0–30)
SODIUM BLD-SCNC: 140 MMOL/L (ref 136–145)
TOTAL PROTEIN: 7.8 G/DL (ref 6.4–8.2)
TROPONIN: <0.01 NG/ML
WBC # BLD: 5.4 K/UL (ref 4–11)

## 2022-06-19 PROCEDURE — 96375 TX/PRO/DX INJ NEW DRUG ADDON: CPT

## 2022-06-19 PROCEDURE — G0378 HOSPITAL OBSERVATION PER HR: HCPCS

## 2022-06-19 PROCEDURE — 6360000002 HC RX W HCPCS: Performed by: INTERNAL MEDICINE

## 2022-06-19 PROCEDURE — 93005 ELECTROCARDIOGRAM TRACING: CPT | Performed by: PHYSICIAN ASSISTANT

## 2022-06-19 PROCEDURE — 70496 CT ANGIOGRAPHY HEAD: CPT

## 2022-06-19 PROCEDURE — 2580000003 HC RX 258: Performed by: PHYSICIAN ASSISTANT

## 2022-06-19 PROCEDURE — 85652 RBC SED RATE AUTOMATED: CPT

## 2022-06-19 PROCEDURE — 2580000003 HC RX 258: Performed by: INTERNAL MEDICINE

## 2022-06-19 PROCEDURE — 6360000004 HC RX CONTRAST MEDICATION: Performed by: PHYSICIAN ASSISTANT

## 2022-06-19 PROCEDURE — 71045 X-RAY EXAM CHEST 1 VIEW: CPT

## 2022-06-19 PROCEDURE — 96365 THER/PROPH/DIAG IV INF INIT: CPT

## 2022-06-19 PROCEDURE — 84484 ASSAY OF TROPONIN QUANT: CPT

## 2022-06-19 PROCEDURE — 80053 COMPREHEN METABOLIC PANEL: CPT

## 2022-06-19 PROCEDURE — 2500000003 HC RX 250 WO HCPCS: Performed by: PHYSICIAN ASSISTANT

## 2022-06-19 PROCEDURE — 99285 EMERGENCY DEPT VISIT HI MDM: CPT

## 2022-06-19 PROCEDURE — 85025 COMPLETE CBC W/AUTO DIFF WBC: CPT

## 2022-06-19 PROCEDURE — 6370000000 HC RX 637 (ALT 250 FOR IP): Performed by: INTERNAL MEDICINE

## 2022-06-19 PROCEDURE — 86140 C-REACTIVE PROTEIN: CPT

## 2022-06-19 PROCEDURE — 6360000002 HC RX W HCPCS: Performed by: PHYSICIAN ASSISTANT

## 2022-06-19 RX ORDER — ACETAMINOPHEN 650 MG/1
650 SUPPOSITORY RECTAL EVERY 6 HOURS PRN
Status: DISCONTINUED | OUTPATIENT
Start: 2022-06-19 | End: 2022-06-22 | Stop reason: HOSPADM

## 2022-06-19 RX ORDER — FAMOTIDINE 20 MG/1
20 TABLET, FILM COATED ORAL 2 TIMES DAILY
Status: DISCONTINUED | OUTPATIENT
Start: 2022-06-19 | End: 2022-06-22 | Stop reason: HOSPADM

## 2022-06-19 RX ORDER — NIFEDIPINE 90 MG/1
90 TABLET, EXTENDED RELEASE ORAL DAILY
Status: CANCELLED | OUTPATIENT
Start: 2022-06-19

## 2022-06-19 RX ORDER — LOSARTAN POTASSIUM 100 MG/1
100 TABLET ORAL DAILY
Status: DISCONTINUED | OUTPATIENT
Start: 2022-06-20 | End: 2022-06-22 | Stop reason: HOSPADM

## 2022-06-19 RX ORDER — POLYETHYLENE GLYCOL 3350 17 G/17G
17 POWDER, FOR SOLUTION ORAL DAILY PRN
Status: DISCONTINUED | OUTPATIENT
Start: 2022-06-19 | End: 2022-06-22 | Stop reason: HOSPADM

## 2022-06-19 RX ORDER — SODIUM CHLORIDE 9 MG/ML
INJECTION, SOLUTION INTRAVENOUS PRN
Status: DISCONTINUED | OUTPATIENT
Start: 2022-06-19 | End: 2022-06-22 | Stop reason: HOSPADM

## 2022-06-19 RX ORDER — ATORVASTATIN CALCIUM 80 MG/1
80 TABLET, FILM COATED ORAL DAILY
Status: DISCONTINUED | OUTPATIENT
Start: 2022-06-20 | End: 2022-06-22 | Stop reason: HOSPADM

## 2022-06-19 RX ORDER — SPIRONOLACTONE 25 MG/1
50 TABLET ORAL 2 TIMES DAILY
Status: DISCONTINUED | OUTPATIENT
Start: 2022-06-20 | End: 2022-06-22 | Stop reason: HOSPADM

## 2022-06-19 RX ORDER — LOSARTAN POTASSIUM AND HYDROCHLOROTHIAZIDE 25; 100 MG/1; MG/1
1 TABLET ORAL DAILY
Status: DISCONTINUED | OUTPATIENT
Start: 2022-06-20 | End: 2022-06-19 | Stop reason: CLARIF

## 2022-06-19 RX ORDER — SPIRONOLACTONE 25 MG/1
50 TABLET ORAL 2 TIMES DAILY
Status: DISCONTINUED | OUTPATIENT
Start: 2022-06-19 | End: 2022-06-19

## 2022-06-19 RX ORDER — MORPHINE SULFATE 4 MG/ML
4 INJECTION, SOLUTION INTRAMUSCULAR; INTRAVENOUS ONCE
Status: COMPLETED | OUTPATIENT
Start: 2022-06-19 | End: 2022-06-19

## 2022-06-19 RX ORDER — NIFEDIPINE 90 MG/1
90 TABLET, EXTENDED RELEASE ORAL DAILY
Status: DISCONTINUED | OUTPATIENT
Start: 2022-06-20 | End: 2022-06-19

## 2022-06-19 RX ORDER — MORPHINE SULFATE 2 MG/ML
2 INJECTION, SOLUTION INTRAMUSCULAR; INTRAVENOUS
Status: DISCONTINUED | OUTPATIENT
Start: 2022-06-19 | End: 2022-06-21

## 2022-06-19 RX ORDER — HYDRALAZINE HYDROCHLORIDE 20 MG/ML
10 INJECTION INTRAMUSCULAR; INTRAVENOUS EVERY 4 HOURS PRN
Status: DISCONTINUED | OUTPATIENT
Start: 2022-06-19 | End: 2022-06-21

## 2022-06-19 RX ORDER — KETOROLAC TROMETHAMINE 30 MG/ML
15 INJECTION, SOLUTION INTRAMUSCULAR; INTRAVENOUS EVERY 6 HOURS
Status: DISCONTINUED | OUTPATIENT
Start: 2022-06-19 | End: 2022-06-21

## 2022-06-19 RX ORDER — WARFARIN SODIUM 5 MG/1
5 TABLET ORAL DAILY
Status: DISCONTINUED | OUTPATIENT
Start: 2022-06-20 | End: 2022-06-20 | Stop reason: DRUGHIGH

## 2022-06-19 RX ORDER — MORPHINE SULFATE 4 MG/ML
4 INJECTION, SOLUTION INTRAMUSCULAR; INTRAVENOUS
Status: DISCONTINUED | OUTPATIENT
Start: 2022-06-19 | End: 2022-06-21

## 2022-06-19 RX ORDER — ONDANSETRON 4 MG/1
4 TABLET, ORALLY DISINTEGRATING ORAL EVERY 8 HOURS PRN
Status: DISCONTINUED | OUTPATIENT
Start: 2022-06-19 | End: 2022-06-22 | Stop reason: HOSPADM

## 2022-06-19 RX ORDER — LABETALOL HYDROCHLORIDE 5 MG/ML
5 INJECTION, SOLUTION INTRAVENOUS ONCE
Status: COMPLETED | OUTPATIENT
Start: 2022-06-19 | End: 2022-06-19

## 2022-06-19 RX ORDER — ENOXAPARIN SODIUM 100 MG/ML
40 INJECTION SUBCUTANEOUS DAILY
Status: DISCONTINUED | OUTPATIENT
Start: 2022-06-20 | End: 2022-06-20 | Stop reason: ALTCHOICE

## 2022-06-19 RX ORDER — ONDANSETRON 2 MG/ML
4 INJECTION INTRAMUSCULAR; INTRAVENOUS EVERY 6 HOURS PRN
Status: DISCONTINUED | OUTPATIENT
Start: 2022-06-19 | End: 2022-06-22 | Stop reason: HOSPADM

## 2022-06-19 RX ORDER — SODIUM CHLORIDE 0.9 % (FLUSH) 0.9 %
5-40 SYRINGE (ML) INJECTION EVERY 12 HOURS SCHEDULED
Status: DISCONTINUED | OUTPATIENT
Start: 2022-06-19 | End: 2022-06-22 | Stop reason: HOSPADM

## 2022-06-19 RX ORDER — SODIUM CHLORIDE 0.9 % (FLUSH) 0.9 %
5-40 SYRINGE (ML) INJECTION PRN
Status: DISCONTINUED | OUTPATIENT
Start: 2022-06-19 | End: 2022-06-22 | Stop reason: HOSPADM

## 2022-06-19 RX ORDER — POTASSIUM CHLORIDE 20 MEQ/1
10 TABLET, EXTENDED RELEASE ORAL DAILY
Status: DISCONTINUED | OUTPATIENT
Start: 2022-06-20 | End: 2022-06-22 | Stop reason: HOSPADM

## 2022-06-19 RX ORDER — ACETAMINOPHEN 325 MG/1
650 TABLET ORAL EVERY 6 HOURS PRN
Status: DISCONTINUED | OUTPATIENT
Start: 2022-06-19 | End: 2022-06-22 | Stop reason: HOSPADM

## 2022-06-19 RX ORDER — FUROSEMIDE 20 MG/1
20 TABLET ORAL DAILY
Status: DISCONTINUED | OUTPATIENT
Start: 2022-06-20 | End: 2022-06-22 | Stop reason: HOSPADM

## 2022-06-19 RX ORDER — HYDROCHLOROTHIAZIDE 25 MG/1
25 TABLET ORAL DAILY
Status: DISCONTINUED | OUTPATIENT
Start: 2022-06-20 | End: 2022-06-20

## 2022-06-19 RX ORDER — NIFEDIPINE 30 MG/1
90 TABLET, EXTENDED RELEASE ORAL DAILY
Status: DISCONTINUED | OUTPATIENT
Start: 2022-06-19 | End: 2022-06-22 | Stop reason: HOSPADM

## 2022-06-19 RX ADMIN — FAMOTIDINE 20 MG: 20 TABLET, FILM COATED ORAL at 22:38

## 2022-06-19 RX ADMIN — SODIUM CHLORIDE 500 MG: 9 INJECTION, SOLUTION INTRAVENOUS at 20:18

## 2022-06-19 RX ADMIN — IOPAMIDOL 75 ML: 755 INJECTION, SOLUTION INTRAVENOUS at 18:01

## 2022-06-19 RX ADMIN — CALCIUM CARBONATE-VITAMIN D TAB 500 MG-200 UNIT 1 TABLET: 500-200 TAB at 22:38

## 2022-06-19 RX ADMIN — KETOROLAC TROMETHAMINE 15 MG: 30 INJECTION, SOLUTION INTRAMUSCULAR at 22:39

## 2022-06-19 RX ADMIN — Medication 10 ML: at 22:39

## 2022-06-19 RX ADMIN — HYDRALAZINE HYDROCHLORIDE 10 MG: 20 INJECTION INTRAMUSCULAR; INTRAVENOUS at 22:39

## 2022-06-19 RX ADMIN — LABETALOL HYDROCHLORIDE 5 MG: 5 INJECTION INTRAVENOUS at 17:55

## 2022-06-19 RX ADMIN — NIFEDIPINE 90 MG: 30 TABLET, FILM COATED, EXTENDED RELEASE ORAL at 22:38

## 2022-06-19 RX ADMIN — MORPHINE SULFATE 4 MG: 4 INJECTION INTRAVENOUS at 17:54

## 2022-06-19 ASSESSMENT — PAIN DESCRIPTION - LOCATION
LOCATION: HEAD
LOCATION: HEAD;ARM
LOCATION: HEAD

## 2022-06-19 ASSESSMENT — PAIN DESCRIPTION - ORIENTATION
ORIENTATION: LEFT

## 2022-06-19 ASSESSMENT — PAIN DESCRIPTION - FREQUENCY: FREQUENCY: CONTINUOUS

## 2022-06-19 ASSESSMENT — PAIN SCALES - GENERAL
PAINLEVEL_OUTOF10: 10
PAINLEVEL_OUTOF10: 7
PAINLEVEL_OUTOF10: 6

## 2022-06-19 ASSESSMENT — PAIN DESCRIPTION - PAIN TYPE
TYPE: ACUTE PAIN
TYPE: ACUTE PAIN

## 2022-06-19 ASSESSMENT — PAIN - FUNCTIONAL ASSESSMENT
PAIN_FUNCTIONAL_ASSESSMENT: PREVENTS OR INTERFERES WITH ALL ACTIVE AND SOME PASSIVE ACTIVITIES
PAIN_FUNCTIONAL_ASSESSMENT: 0-10

## 2022-06-19 ASSESSMENT — PAIN DESCRIPTION - DESCRIPTORS
DESCRIPTORS: ACHING
DESCRIPTORS: THROBBING;SHARP

## 2022-06-19 ASSESSMENT — LIFESTYLE VARIABLES: HOW OFTEN DO YOU HAVE A DRINK CONTAINING ALCOHOL: NEVER

## 2022-06-19 ASSESSMENT — PAIN DESCRIPTION - ONSET: ONSET: ON-GOING

## 2022-06-19 NOTE — ED PROVIDER NOTES
In addition to the advanced practice provider, I personally saw Haresh Harkins and performed a substantive portion of the visit including all aspects of the medical decision making. Medical Decision Making  71-year-old female chief complaint of persistent left-sided headache beginning a left temporal with some intermittent visual changes for the past couple of days associate with very high blood pressures and pain to the left temple. On examination neurologically intact NIHSS of 0 hypertensive heart is regular regular lungs are clear she is quite tender over the left temple itself. Gestalt for temporal arteritis, but might just be secondary to hypertension. CRP is normal, WBC normal, renal function benign,. Checking ESR, CT, CTA. Reassessed. ESR is benign as well. CT CT without acute process. Still given temporal pain and some visual changes I would lean towards temporal arteritis. Starting high-dose corticosteroids  Will admit. EKG  EKG reviewed myself. Dated today [phone]. Rate 58 A. fib RSR prime no change from February 2021    Screenings  Is this patient to be included in the SEP-1 Core Measure due to severe sepsis or septic shock? No   Exclusion criteria - the patient is NOT to be included for SEP-1 Core Measure due to: Infection is not suspected     Bj Coma Scale  Eye Opening: Spontaneous  Best Verbal Response: Oriented  Best Motor Response: Obeys commands  Bj Coma Scale Score: 15               Patient Referrals:  No follow-up provider specified. Discharge Medications:  New Prescriptions    No medications on file       FINAL IMPRESSION  1. Temporal arteritis ([facility] [address]    2. Acute nonintractable headache, unspecified headache type    3. Hypertension, unspecified type        Blood pressure (!) 147/116, pulse 72, temperature 98.1 °F (36.7 °C), temperature source Oral, resp. rate 20, height 5' 7\" (1.702 m), weight 176 lb (79.8 kg), SpO2 100 %, not currently breastfeeding. For further details of [facility] emergency department encounter, please see documentation by advanced practice provider, Ramses Gallegos.        Rosa Elena Bright MD  06/19/22 [facility], [place]  06/19/22 0841

## 2022-06-19 NOTE — ED PROVIDER NOTES
I have seen and evaluated this patient with my supervising physician Sylvester Vazquez MD.      Chief Complaint:   Chief Complaint   Patient presents with    Headache     Walk in pt c/o left sided headache with radiation toward left side of body that started 3 days ago       ED Course & Medical Decision Making  80 y.o. female presenting with headache x3 days.    -Cbc/chem:Not conccerning  -trop: Negative  -ESR:29  -CRP: <3    -Chest xr: lungs clear    -EKG: Atrial fib.       -500mg Methylprednisolone. MDM: This 28-year-old female presents with left sided headache and neck pain x3 days. Gradual onset. My physical exam of hers is very concerning for giant cell arteritis, as her left artery is very palpable and very tender. My clinical exam is not supported by the laboratory work-up which is negative for ESR elevation, CRP elevation. Her head CT is normal.  I feel compelled to due to the acutely worsening vision changes that she reports in the left side as well as the tender and palpable left temporal artery to start steroids, and admit her to the hospital for further evaluation. Final Clinical Impression & Plan:  Giant Cell arteritis   - Admit      ---------------------------------------------------------------------------------------------------------------  HPI:  PMH significant for HTN, CVA, CVA. Presenting with left-sided temporal headache x3 days. Patient reports gradual onset, with progressive worsening. Today in the ED she reports temple pain left-sided neck pain, left-sided chest pain. She denies fevers, chills, nausea, vomiting, photophobia. Pain is worse with pressure to the left temple. She has a history of left eye cataract surgery, which has decreased her vision. From that baseline she reports that her vision is worse over the last day. Is this patient to be included in the SEP-1 Core Measure due to severe sepsis or septic shock?    No   Exclusion criteria - the patient is NOT to be included for SEP-1 Core Measure due to: Infection is not suspected      Medical Hx: Past medical history reviewed, and pertinent for:     Past Medical History:   Diagnosis Date    Acute cerebrovascular accident (CVA) ([address] 1/28/2020    Atrial fibrillation ([address]     Bell palsy     diagnosed 15 years ago   Rebbecca Hinders CAD (coronary artery disease)     Cerebral artery occlusion with cerebral infarction ([address]     Chronic diastolic CHF (congestive heart failure) ([address] 5/16/2020    CVA (cerebral vascular accident) ([address] 1/4/2017    Hypertension     Intracranial hemorrhage ([address] 4/2016    Nonintractable headache     Nontraumatic subcortical hemorrhage of cerebral hemisphere ([address] 4/30/2016    Primary osteoarthritis of right knee 3/18/2022    Sudden visual loss of left eye     Thyroid nodule 2/8/2018    TIA involving right internal carotid artery        Patient Active Problem List   Diagnosis    Permanent atrial fibrillation ([address]    Chest pain    Uncontrolled hypertension    Coronary artery disease involving native coronary artery of native heart without angina pectoris    LVH (left ventricular hypertrophy)    Thyroid nodule    Chronic heart failure with preserved ejection fraction (HCC)    Bradycardia    Essential hypertension    Hypomagnesemia    Hypokalemia    Acute subdural hematoma (HCC)    Subdural hematoma (HCC)    Primary osteoarthritis of right knee    Primary osteoarthritis of left knee         Surgical Hx:   Past surgical history reviewed, and pertinent for:      Past Surgical History:   Procedure Laterality Date    CARDIAC SURGERY      COLONOSCOPY      CORONARY ANGIOPLASTY WITH STENT PLACEMENT      TUBAL LIGATION Right Torn Rotator Cuff    2013 @ [facility]       Social Hx:       Social History     Socioeconomic History    Marital status:       Spouse name: Jackelin Mckoy Number of children: 4    Years of education: 15    Highest education level: Not on file   Occupational History    Not on file   Tobacco Use    Smoking status: Never Smoker    Smokeless tobacco: Never Used   Vaping Use    Vaping Use: Never used   Substance and Sexual Activity    Alcohol use: No    Drug use: No    Sexual activity: Yes     Partners: Male   Other Topics Concern    Not on file   Social History Narrative    Not on file     Social Determinants of Health     Financial Resource Strain:     Difficulty of Paying Living Expenses: Not on file   Food Insecurity:     Worried About Running Out of Food in the Last Year: Not on file    Sofie of Food in the Last Year: Not on file   Transportation Needs:     Lack of Transportation (Medical): Not on file    Lack of Transportation (Non-Medical):  Not on file   Physical Activity:     Days of Exercise per Week: Not on file    Minutes of Exercise per Session: Not on file   Stress:     Feeling of Stress : Not on file   Social Connections:     Frequency of Communication with Friends and Family: Not on file    Frequency of Social Gatherings with Friends and Family: Not on file    Attends Hinduism Services: Not on file    Active Member of [address] [place] [address] or Organizations: Not on file    Attends Club or Organization Meetings: Not on file    Marital Status: Not on file   Intimate Partner Violence:     Fear of Current or Ex-Partner: Not on file    Emotionally Abused: Not on file    Physically Abused: Not on file    Sexually Abused: Not on file   Housing Stability:     Unable to Pay for Housing in the Last Year: Not on file    Number of [place] in the Last Year: Not on file    Unstable Housing in the Last Year: Not on file         Medications:  Previous Medications    ACETAMINOPHEN (TYLENOL) 500 MG TABLET    Take 2 tablets by mouth every 6 hours as needed for Pain    ATORVASTATIN (LIPITOR) 80 MG TABLET    Take 1 tablet by mouth daily    CALCIUM CARB-CHOLECALCIFEROL (CALCIUM + D3) 600-200 MG-UNIT TABS TABLET    Take 1 tablet by mouth 2 times daily FAMOTIDINE (PEPCID) 20 MG TABLET    Take 1 tablet by mouth 2 times daily    FUROSEMIDE (LASIX) 20 MG TABLET    TAKE 1 TABLET BY MOUTH once DAILY    GUAIFENESIN (ALTARUSSIN) 100 MG/5ML SYRUP    Take 10 mLs by mouth every 4 hours as needed for Cough    LOSARTAN-HYDROCHLOROTHIAZIDE (HYZAAR) 100-25 MG PER TABLET    Take 1 tablet by mouth daily    MAGNESIUM 500 MG CAPS    Take 400 mg by mouth daily    NIFEDIPINE (PROCARDIA XL) 90 MG EXTENDED RELEASE TABLET    Take 1 tablet by mouth daily    POTASSIUM CHLORIDE (KLOR-CON M) 20 MEQ EXTENDED RELEASE TABLET    Take 0.5 tablets by mouth daily    SPIRONOLACTONE (ALDACTONE) 50 MG TABLET    Take 1 tablet by mouth 2 times daily TAKE 1 TABLET BY MOUTH DAILY    WARFARIN (COUMADIN) 5 MG TABLET    Take 5 mg by mouth daily 5 mg everyday but on tuesdays 0.5 tablet       Allergies:  Clonidine, Codeine, Hydrocodone-acetaminophen, Lisinopril, Tramadol, and Percocet [oxycodone-acetaminophen]    ROS:  10pt review of systems was performed and was negative except as noted in HPI     Imaging:  XR CHEST PORTABLE    (Results Pending)       Labs:  No results found for this visit on 06/19/22. Screenings:     Bj Coma Scale  Eye Opening: Spontaneous  Best Verbal Response: Oriented  Best Motor Response: Obeys commands  [place] Coma Scale Score: 15              Physical Exam:  Vitals: BP (!) 194/131   Pulse 88   Temp 98.1 °F (36.7 °C) (Oral)   Resp 16   Ht 5' 7\" (1.702 m)   Wt 176 lb (79.8 kg)   SpO2 98%   BMI 27.57 kg/m²    General: awake, alert  Pupils: equal, reactive  Eyes: EOM intact, conjunctiva clear, no discharge  Head: Non-traumatic. Temporal artery tenderness on palpation. Neck: Supple  Mouth: Moist, no oral lesions, no tonsillar enlargement  Heart: Rate as noted, regular rhythm, no murmur or rubs. Chest/Lungs: CTAB, no wheezes or crackles  Abdomen: soft, nondistended, no tenderness to palpation   Back: No midline tenderness.  No CVA tenderness  Extremities:  2+ distal pulses bilateral UE and LE, no tenderness of calves, LE edema. Neuro: Moving all extremities, no facial droop, no slurred speech, answers questions appropriately. Cranial nerves II to XII intact. Skin: Warm. No visible rash, lesions, or bruising.            KIM Tran        [place], [place]  06/19/22 2001

## 2022-06-20 ENCOUNTER — APPOINTMENT (OUTPATIENT)
Dept: MRI IMAGING | Age: 72
DRG: 103 | End: 2022-06-20
Payer: MEDICARE

## 2022-06-20 PROBLEM — G43.911 HEADACHE, MIGRAINE, INTRACTABLE, WITH STATUS MIGRAINOSUS: Status: ACTIVE | Noted: 2022-06-20

## 2022-06-20 LAB
ANION GAP SERPL CALCULATED.3IONS-SCNC: 13 MMOL/L (ref 3–16)
BASOPHILS ABSOLUTE: 0 K/UL (ref 0–0.2)
BASOPHILS RELATIVE PERCENT: 0.1 %
BUN BLDV-MCNC: 11 MG/DL (ref 7–20)
CALCIUM SERPL-MCNC: 9.7 MG/DL (ref 8.3–10.6)
CHLORIDE BLD-SCNC: 104 MMOL/L (ref 99–110)
CO2: 23 MMOL/L (ref 21–32)
CREAT SERPL-MCNC: 0.7 MG/DL (ref 0.6–1.2)
EKG ATRIAL RATE: 59 BPM
EKG DIAGNOSIS: NORMAL
EKG Q-T INTERVAL: 450 MS
EKG QRS DURATION: 96 MS
EKG QTC CALCULATION (BAZETT): 441 MS
EKG R AXIS: -17 DEGREES
EKG T AXIS: -35 DEGREES
EKG VENTRICULAR RATE: 58 BPM
EOSINOPHILS ABSOLUTE: 0 K/UL (ref 0–0.6)
EOSINOPHILS RELATIVE PERCENT: 0 %
GFR AFRICAN AMERICAN: >60
GFR NON-AFRICAN AMERICAN: >60
GLUCOSE BLD-MCNC: 184 MG/DL (ref 70–99)
HCT VFR BLD CALC: 38.9 % (ref 36–48)
HEMOGLOBIN: 12.6 G/DL (ref 12–16)
INR BLD: 2.27 (ref 0.87–1.14)
LYMPHOCYTES ABSOLUTE: 0.8 K/UL (ref 1–5.1)
LYMPHOCYTES RELATIVE PERCENT: 16.9 %
MAGNESIUM: 1.8 MG/DL (ref 1.8–2.4)
MCH RBC QN AUTO: 29.2 PG (ref 26–34)
MCHC RBC AUTO-ENTMCNC: 32.5 G/DL (ref 31–36)
MCV RBC AUTO: 89.7 FL (ref 80–100)
MONOCYTES ABSOLUTE: 0 K/UL (ref 0–1.3)
MONOCYTES RELATIVE PERCENT: 0.8 %
NEUTROPHILS ABSOLUTE: 3.9 K/UL (ref 1.7–7.7)
NEUTROPHILS RELATIVE PERCENT: 82.2 %
PDW BLD-RTO: 13.6 % (ref 12.4–15.4)
PLATELET # BLD: 120 K/UL (ref 135–450)
PMV BLD AUTO: 10.5 FL (ref 5–10.5)
POTASSIUM REFLEX MAGNESIUM: 3.1 MMOL/L (ref 3.5–5.1)
PROTHROMBIN TIME: 25.1 SEC (ref 11.7–14.5)
RBC # BLD: 4.33 M/UL (ref 4–5.2)
SODIUM BLD-SCNC: 140 MMOL/L (ref 136–145)
WBC # BLD: 4.7 K/UL (ref 4–11)

## 2022-06-20 PROCEDURE — 36415 COLL VENOUS BLD VENIPUNCTURE: CPT

## 2022-06-20 PROCEDURE — 6370000000 HC RX 637 (ALT 250 FOR IP): Performed by: INTERNAL MEDICINE

## 2022-06-20 PROCEDURE — 2580000003 HC RX 258: Performed by: INTERNAL MEDICINE

## 2022-06-20 PROCEDURE — 85025 COMPLETE CBC W/AUTO DIFF WBC: CPT

## 2022-06-20 PROCEDURE — 6360000002 HC RX W HCPCS: Performed by: INTERNAL MEDICINE

## 2022-06-20 PROCEDURE — 83735 ASSAY OF MAGNESIUM: CPT

## 2022-06-20 PROCEDURE — 1200000000 HC SEMI PRIVATE

## 2022-06-20 PROCEDURE — 99222 1ST HOSP IP/OBS MODERATE 55: CPT | Performed by: PSYCHIATRY & NEUROLOGY

## 2022-06-20 PROCEDURE — 85610 PROTHROMBIN TIME: CPT

## 2022-06-20 PROCEDURE — 93010 ELECTROCARDIOGRAM REPORT: CPT | Performed by: INTERNAL MEDICINE

## 2022-06-20 PROCEDURE — 96376 TX/PRO/DX INJ SAME DRUG ADON: CPT

## 2022-06-20 PROCEDURE — 70551 MRI BRAIN STEM W/O DYE: CPT

## 2022-06-20 PROCEDURE — 83036 HEMOGLOBIN GLYCOSYLATED A1C: CPT

## 2022-06-20 PROCEDURE — 80048 BASIC METABOLIC PNL TOTAL CA: CPT

## 2022-06-20 RX ORDER — WARFARIN SODIUM 2.5 MG/1
2.5 TABLET ORAL
Status: DISCONTINUED | OUTPATIENT
Start: 2022-06-21 | End: 2022-06-21

## 2022-06-20 RX ORDER — POTASSIUM CHLORIDE 20 MEQ/1
40 TABLET, EXTENDED RELEASE ORAL PRN
Status: DISCONTINUED | OUTPATIENT
Start: 2022-06-20 | End: 2022-06-22 | Stop reason: HOSPADM

## 2022-06-20 RX ORDER — POTASSIUM CHLORIDE 7.45 MG/ML
10 INJECTION INTRAVENOUS PRN
Status: DISCONTINUED | OUTPATIENT
Start: 2022-06-20 | End: 2022-06-22 | Stop reason: HOSPADM

## 2022-06-20 RX ORDER — CARVEDILOL 3.12 MG/1
3.12 TABLET ORAL 2 TIMES DAILY WITH MEALS
Status: DISCONTINUED | OUTPATIENT
Start: 2022-06-20 | End: 2022-06-22 | Stop reason: HOSPADM

## 2022-06-20 RX ORDER — BUTALBITAL, ACETAMINOPHEN AND CAFFEINE 50; 325; 40 MG/1; MG/1; MG/1
1 TABLET ORAL EVERY 6 HOURS PRN
Status: DISCONTINUED | OUTPATIENT
Start: 2022-06-20 | End: 2022-06-22 | Stop reason: HOSPADM

## 2022-06-20 RX ORDER — HYDRALAZINE HYDROCHLORIDE 25 MG/1
25 TABLET, FILM COATED ORAL EVERY 8 HOURS SCHEDULED
Status: DISCONTINUED | OUTPATIENT
Start: 2022-06-20 | End: 2022-06-20

## 2022-06-20 RX ORDER — WARFARIN SODIUM 5 MG/1
5 TABLET ORAL
Status: DISCONTINUED | OUTPATIENT
Start: 2022-06-20 | End: 2022-06-21

## 2022-06-20 RX ADMIN — KETOROLAC TROMETHAMINE 15 MG: 30 INJECTION, SOLUTION INTRAMUSCULAR at 22:11

## 2022-06-20 RX ADMIN — KETOROLAC TROMETHAMINE 15 MG: 30 INJECTION, SOLUTION INTRAMUSCULAR at 04:18

## 2022-06-20 RX ADMIN — Medication 10 ML: at 22:12

## 2022-06-20 RX ADMIN — CALCIUM CARBONATE-VITAMIN D TAB 500 MG-200 UNIT 1 TABLET: 500-200 TAB at 22:12

## 2022-06-20 RX ADMIN — CALCIUM CARBONATE-VITAMIN D TAB 500 MG-200 UNIT 1 TABLET: 500-200 TAB at 09:48

## 2022-06-20 RX ADMIN — SODIUM CHLORIDE 100 ML/HR: 9 INJECTION, SOLUTION INTRAVENOUS at 11:51

## 2022-06-20 RX ADMIN — NIFEDIPINE 90 MG: 30 TABLET, FILM COATED, EXTENDED RELEASE ORAL at 09:48

## 2022-06-20 RX ADMIN — POTASSIUM CHLORIDE 10 MEQ: 7.46 INJECTION, SOLUTION INTRAVENOUS at 14:55

## 2022-06-20 RX ADMIN — FUROSEMIDE 20 MG: 20 TABLET ORAL at 11:48

## 2022-06-20 RX ADMIN — CARVEDILOL 3.12 MG: 3.12 TABLET, FILM COATED ORAL at 18:37

## 2022-06-20 RX ADMIN — CARVEDILOL 3.12 MG: 3.12 TABLET, FILM COATED ORAL at 11:47

## 2022-06-20 RX ADMIN — POTASSIUM CHLORIDE 10 MEQ: 20 TABLET, EXTENDED RELEASE ORAL at 11:47

## 2022-06-20 RX ADMIN — SPIRONOLACTONE 50 MG: 25 TABLET ORAL at 11:56

## 2022-06-20 RX ADMIN — FAMOTIDINE 20 MG: 20 TABLET, FILM COATED ORAL at 22:12

## 2022-06-20 RX ADMIN — SPIRONOLACTONE 50 MG: 25 TABLET ORAL at 22:11

## 2022-06-20 RX ADMIN — SODIUM CHLORIDE, PRESERVATIVE FREE 20 ML: 5 INJECTION INTRAVENOUS at 11:48

## 2022-06-20 RX ADMIN — POTASSIUM CHLORIDE 10 MEQ: 7.46 INJECTION, SOLUTION INTRAVENOUS at 13:55

## 2022-06-20 RX ADMIN — WARFARIN SODIUM 5 MG: 5 TABLET ORAL at 18:38

## 2022-06-20 RX ADMIN — KETOROLAC TROMETHAMINE 15 MG: 30 INJECTION, SOLUTION INTRAMUSCULAR at 09:49

## 2022-06-20 RX ADMIN — POTASSIUM CHLORIDE 10 MEQ: 7.46 INJECTION, SOLUTION INTRAVENOUS at 15:55

## 2022-06-20 RX ADMIN — POTASSIUM CHLORIDE 10 MEQ: 7.46 INJECTION, SOLUTION INTRAVENOUS at 12:55

## 2022-06-20 RX ADMIN — LOSARTAN POTASSIUM 100 MG: 100 TABLET, FILM COATED ORAL at 09:48

## 2022-06-20 RX ADMIN — POTASSIUM CHLORIDE 10 MEQ: 7.46 INJECTION, SOLUTION INTRAVENOUS at 11:53

## 2022-06-20 RX ADMIN — MORPHINE SULFATE 4 MG: 4 INJECTION INTRAVENOUS at 11:47

## 2022-06-20 RX ADMIN — KETOROLAC TROMETHAMINE 15 MG: 30 INJECTION, SOLUTION INTRAMUSCULAR at 16:29

## 2022-06-20 RX ADMIN — FAMOTIDINE 20 MG: 20 TABLET, FILM COATED ORAL at 09:48

## 2022-06-20 RX ADMIN — Medication 10 ML: at 09:49

## 2022-06-20 RX ADMIN — ATORVASTATIN CALCIUM 80 MG: 80 TABLET, FILM COATED ORAL at 09:50

## 2022-06-20 ASSESSMENT — PAIN SCALES - GENERAL
PAINLEVEL_OUTOF10: 7
PAINLEVEL_OUTOF10: 6
PAINLEVEL_OUTOF10: 7
PAINLEVEL_OUTOF10: 7
PAINLEVEL_OUTOF10: 8

## 2022-06-20 ASSESSMENT — PAIN DESCRIPTION - LOCATION
LOCATION: HEAD;NECK;BACK
LOCATION: FLANK
LOCATION: BACK;HEAD;NECK

## 2022-06-20 ASSESSMENT — PAIN DESCRIPTION - DESCRIPTORS
DESCRIPTORS: ACHING
DESCRIPTORS: ACHING

## 2022-06-20 ASSESSMENT — PAIN DESCRIPTION - PAIN TYPE
TYPE: ACUTE PAIN
TYPE: ACUTE PAIN

## 2022-06-20 ASSESSMENT — PAIN DESCRIPTION - ONSET
ONSET: ON-GOING
ONSET: ON-GOING

## 2022-06-20 ASSESSMENT — PAIN DESCRIPTION - ORIENTATION
ORIENTATION: LEFT
ORIENTATION: LEFT

## 2022-06-20 ASSESSMENT — PAIN - FUNCTIONAL ASSESSMENT: PAIN_FUNCTIONAL_ASSESSMENT: PREVENTS OR INTERFERES SOME ACTIVE ACTIVITIES AND ADLS

## 2022-06-20 ASSESSMENT — PAIN DESCRIPTION - FREQUENCY
FREQUENCY: CONTINUOUS
FREQUENCY: CONTINUOUS

## 2022-06-20 NOTE — PROGRESS NOTES
Shift assessment complete. VSS. Pt c/o pain 7/10, scheduled Toradol given. diuretics held until MRI done. Pt showered this AM with little to no assistance. No further needs will monitor.

## 2022-06-20 NOTE — CONSULTS
In patient Neurology consult        [facility]      MD Susy Addisonilynn Exon  1950    Date of Service: 6/20/2022    Referring Physician: Annie Harris MD      Reason for the consult and CC: New onset intractable headache    HPI:   The patient is a 80y.o.  years old female with history of hypertension, CHF and prior CVA who was admitted to the hospital with new onset headache. Onset was 2 to 4 days ago. Description daily severe headache that can go from the back of the head to the front. Degree was severe. Pain was pulsating and throbbing. No visual changes, chest pain, dysphagia or dysarthria. Some photophobia and phonophobia. No other relieving or aggravating factors. Duration was persistent. She came to the ED over the weekend where she was admitted. Initial imaging showed no acute stroke or large vessel occlusion. ESR was 29. Today she feels better. Headache is moderate. MRI of the brain showed no acute stroke and chronic small vessel disease. She denies any jaw claudication or visual changes.   Other review of system was unremarkable    Family history: No family history of strokes or migraine    Past Surgical History:   Procedure Laterality Date    CARDIAC SURGERY      COLONOSCOPY      CORONARY ANGIOPLASTY WITH STENT PLACEMENT      TUBAL LIGATION Right Torn Rotator Cuff    2013 @ [facility]        Past Medical History:   Diagnosis Date    Acute cerebrovascular accident (CVA) ([address] 1/28/2020    Atrial fibrillation ([address]     Bell palsy     diagnosed 15 years ago   [place] CAD (coronary artery disease)     Cerebral artery occlusion with cerebral infarction ([address]     Chronic diastolic CHF (congestive heart failure) ([address] 5/16/2020    CVA (cerebral vascular accident) ([address] 1/4/2017    Hypertension     Intracranial hemorrhage ([address] 4/2016    Nonintractable headache     Nontraumatic subcortical hemorrhage of cerebral hemisphere ([address] 4/30/2016    Primary osteoarthritis of right knee 3/18/2022    Sudden visual loss of left eye     Thyroid nodule 2/8/2018    TIA involving right internal carotid artery      Social History     Tobacco Use    Smoking status: Never Smoker    Smokeless tobacco: Never Used   Vaping Use    Vaping Use: Never used   Substance Use Topics    Alcohol use: No    Drug use: No     Allergies   Allergen Reactions    Clonidine Rash, Shortness Of Breath and Hives    Codeine Hives, Itching, Nausea Only and Rash     Other reaction(s):  Other (See Comments)  Pruritis    Hydrocodone-Acetaminophen      Itchy      Lisinopril Hives and Itching    Tramadol     Percocet [Oxycodone-Acetaminophen] Itching     Patient takes percocet with benadryl to control the itching     Current Facility-Administered Medications   Medication Dose Route Frequency Provider Last Rate Last Admin    [START ON 6/21/2022] warfarin (COUMADIN) tablet 2.5 mg  2.5 mg Oral Once per day on Tue Ivan Jackson MD        And    warfarin (COUMADIN) tablet 5 mg  5 mg Oral Once per day on Sun Mon Wed Thu Fri Sat Ivan Jackson MD        potassium chloride (KLOR-CON M) extended release tablet 40 mEq  40 mEq Oral PRN Leatha Vann MD        Or    potassium bicarb-citric acid (EFFER-K) effervescent tablet 40 mEq  40 mEq Oral PRN Leatha Vann MD        Or    potassium chloride 10 mEq/100 mL IVPB (Peripheral Line)  10 mEq IntraVENous PRN Leatha Vann  mL/hr at 06/20/22 1355 10 mEq at 06/20/22 1355    carvedilol (COREG) tablet 3.125 mg  3.125 mg Oral BID  Leatha Vann MD   3.125 mg at 06/20/22 1147    atorvastatin (LIPITOR) tablet 80 mg  80 mg Oral Daily Ivan Jackson MD   80 mg at 06/20/22 0950    oyster shell calcium w/D 500-200 MG-UNIT tablet 1 tablet  1 tablet Oral BID Ivan Jackson MD   1 tablet at 06/20/22 0948    famotidine (PEPCID) tablet 20 mg  20 mg Oral BID Ivan Jackson MD   20 mg at 06/20/22 0948    furosemide (LASIX) tablet 20 mg  20 mg Oral Daily Ivan Jackson MD   20 mg at 06/20/22 1148    potassium chloride (KLOR-CON M) extended release tablet 10 mEq  10 mEq Oral Daily Ivan Jackson MD   10 mEq at 06/20/22 1147    sodium chloride flush 0.9 % injection 5-40 mL  5-40 mL IntraVENous 2 times per day Tamara Ryan MD   10 mL at 06/20/22 0949    sodium chloride flush 0.9 % injection 5-40 mL  5-40 mL IntraVENous PRN Ivan Jackson MD   20 mL at 06/20/22 1148    0.9 % sodium chloride infusion   IntraVENous PRN Ivan Jackson  mL/hr at 06/20/22 1151 100 mL/hr at 06/20/22 1151    ondansetron (ZOFRAN-ODT) disintegrating tablet 4 mg  4 mg Oral Q8H PRN Ivan Jackson MD        Or    ondansetron (ZOFRAN) injection 4 mg  4 mg IntraVENous Q6H PRN Ivan Jackson MD        polyethylene glycol (GLYCOLAX) packet 17 g  17 g Oral Daily PRN Ivan Jackson MD        acetaminophen (TYLENOL) tablet 650 mg  650 mg Oral Q6H PRN Ivan Jackson MD        Or    acetaminophen (TYLENOL) suppository 650 mg  650 mg Rectal Q6H PRN Ivan Jackson MD        morphine (PF) injection 2 mg  2 mg IntraVENous Q2H PRN Ivan Jackson MD        Or    morphine injection 4 mg  4 mg IntraVENous Q2H PRN Ivan Jackson MD   4 mg at 06/20/22 1147    ketorolac (TORADOL) injection 15 mg  15 mg IntraVENous Q6H Ivan Jackson MD   15 mg at 06/20/22 0949    spironolactone (ALDACTONE) tablet 50 mg  50 mg Oral BID Ivan Jackson MD   50 mg at 06/20/22 1156    hydrALAZINE (APRESOLINE) injection 10 mg  10 mg IntraVENous Q4H PRN Ivan Jackson MD   10 mg at 06/19/22 2239    NIFEdipine (PROCARDIA XL) extended release tablet 90 mg  90 mg Oral Daily Ivan Jackson MD   90 mg at 06/20/22 0948    losartan (COZAAR) tablet 100 mg  100 mg Oral Daily Ivan Jackson MD   100 mg at 06/20/22 0948       ROS : A 10-14 system review of constitutional, cardiovascular, respiratory, eyes, musculoskeletal, endocrine, GI, ENT, skin, hematological, genitourinary, psychiatric and neurologic systems was obtained and updated today and is unremarkable except as mentioned in my HPI      Exam:     Constitutional:   Vitals:    06/20/22 0414 06/20/22 0945 06/20/22 1113 06/20/22 1408   BP: (!) 151/90 (!) 155/88 (!) 169/105    Pulse: 76 96     Resp: 16 18     Temp: 97.6 °F (36.4 °C) 97.8 °F (36.6 °C)     TempSrc: Oral      SpO2: 97% 97%     Weight:    181 lb 7 oz (82.3 kg)   Height:           General appearance and observation: Normal development and appear in no acute distress. Eye:  Fundus: No blurring of optic disc. Neck: supple  Cardiovascular: No lower leg edema with good pulsation. Mental Status:   Oriented to person, place, problem, and time. Memory: Good immediate recall. Intact remote memory  Normal attention span and concentration. Language: intact naming, repeating and fluency   Good fund of Knowledge. Aware of current events and vocabulary   Cranial Nerves:   II: Visual fields: Full. Pupils: equal, round, reactive to light  III,IV,VI: Extra Ocular Movements are intact. No nystagmus  V: Facial sensation is intact  VII: Facial strength and movements: Chronic right facial asymmetry from old Bell's palsy  VIII: Hearing: Intact  IX: Palate elevation is symmetric  XI: Shoulder shrug is intact  XII: Tongue movements are normal  Musculoskeletal: 5/5 in all 4 extremities. Tone: Normal tone. Reflexes: Symmetric 2+ in the arms and 2+ in the legs   Planters: flexor bilaterally. Coordination: no pronator drift, no dysmetria with FNF in upper extremities. Normal REM. Sensation: normal to all modalities in both arms and legs. Gait/Posture: steady gait and normal posturing and station.      Data:  LABS:   Lab Results   Component Value Date     06/20/2022    K 3.1 06/20/2022     06/20/2022    CO2 23 06/20/2022    BUN 11 06/20/2022    CREATININE 0.7 06/20/2022    GFRAA >60 06/20/2022 GFRAA >60 09/14/2012    LABGLOM >60 06/20/2022    GLUCOSE 184 06/20/2022    PHOS 3.1 10/21/2020    MG 1.80 06/20/2022    CALCIUM 9.7 06/20/2022     Lab Results   Component Value Date    WBC 4.7 06/20/2022    RBC 4.33 06/20/2022    HGB 12.6 06/20/2022    HCT 38.9 06/20/2022    MCV 89.7 06/20/2022    RDW 13.6 06/20/2022     06/20/2022     Lab Results   Component Value Date    INR 2.27 (H) 06/20/2022    PROTIME 25.1 (H) 06/20/2022       Neuroimaging was independently reviewed by myself and discussed results with the patient   Reviewed notes from different physicians  Reviewed lab and blood testing    Impression:  New onset intractable headache. So far no specific etiology for it could be new onset migraine with aura or occipital neuralgia although history is somewhat inconsistent from the patient. Less likely GCA or central.  Chronic anticoagulation  Hypertension, not controlled  Hyperlipidemia  AF    Recommendation:  Supportive care  Pain control with Toradol/Phenergan or Fioricet as needed  Blood pressure control and monitor on current medications  Continue Coumadin and monitor INR. A1c  Lipid panel   Statin  Telemetry  PT and OT  Neurochecks  Stroke education was provided  Can be discharged from neurology once headache is controlled and under 5/10 pain level  Discussed with primary team      Thank you for referring such patient. If you have any questions regarding my consult note, please don't hesitate to call me. Leon Velasquez MD  [phone]    This dictation was generated by voice recognition computer software.  Although all attempts are made to edit the dictation for accuracy, there may be errors in the  transcription that are not intended

## 2022-06-20 NOTE — PROGRESS NOTES
Was on atenolol; but has not filled prescription since January and 2022. Started on Coreg for better BP control  Holding HCTZ due to hypokalemia. Monitor BP closely and will adjust medications if needed    Hx of A. fib: Continue Coreg and Coumadin  Pharmacy consulted for Coumadin dosing. PT/INR daily    Hyperlipidemia: Continue statins    DVT PPX: Lovenox  Code:Full Code  Diet: ADULT DIET;  Regular    Disposition: Home when medically stable    Current Medications  [START ON 6/21/2022] warfarin (COUMADIN) tablet 2.5 mg, Once per day on Tue   And  warfarin (COUMADIN) tablet 5 mg, Once per day on Sun Mon Wed Thu Fri Sat  potassium chloride (KLOR-CON M) extended release tablet 40 mEq, PRN   Or  potassium bicarb-citric acid (EFFER-K) effervescent tablet 40 mEq, PRN   Or  potassium chloride 10 mEq/100 mL IVPB (Peripheral Line), PRN  atorvastatin (LIPITOR) tablet 80 mg, Daily  oyster shell calcium w/D 500-200 MG-UNIT tablet 1 tablet, BID  famotidine (PEPCID) tablet 20 mg, BID  furosemide (LASIX) tablet 20 mg, Daily  potassium chloride (KLOR-CON M) extended release tablet 10 mEq, Daily  sodium chloride flush 0.9 % injection 5-40 mL, 2 times per day  sodium chloride flush 0.9 % injection 5-40 mL, PRN  0.9 % sodium chloride infusion, PRN  ondansetron (ZOFRAN-ODT) disintegrating tablet 4 mg, Q8H PRN   Or  ondansetron (ZOFRAN) injection 4 mg, Q6H PRN  polyethylene glycol (GLYCOLAX) packet 17 g, Daily PRN  acetaminophen (TYLENOL) tablet 650 mg, Q6H PRN   Or  acetaminophen (TYLENOL) suppository 650 mg, Q6H PRN  morphine (PF) injection 2 mg, Q2H PRN   Or  morphine injection 4 mg, Q2H PRN  ketorolac (TORADOL) injection 15 mg, Q6H  spironolactone (ALDACTONE) tablet 50 mg, BID  hydrALAZINE (APRESOLINE) injection 10 mg, Q4H PRN  NIFEdipine (PROCARDIA XL) extended release tablet 90 mg, Daily  losartan (COZAAR) tablet 100 mg, Daily   And  hydroCHLOROthiazide (HYDRODIURIL) tablet 25 mg, Daily        Objective:  BP (!) 155/88   Pulse 96   Temp 97.8 °F (36.6 °C)   Resp 18   Ht 5' 7\" (1.702 m)   Wt 176 lb (79.8 kg)   SpO2 97%   BMI 27.57 kg/m²     Intake/Output Summary (Last 24 hours) at 6/20/2022 1003  Last data filed at 6/20/2022 0000  Gross per 24 hour   Intake 120 ml   Output --   Net 120 ml      Wt Readings from Last 3 Encounters:   06/19/22 176 lb (79.8 kg)   06/08/22 176 lb 9.6 oz (80.1 kg)   03/10/22 180 lb (81.6 kg)       Physical Examination:   General appearance:  No apparent distress, appears stated age and cooperative. Eyes: Sclera clear. Pupils equal.  ENT: Moist oral mucosa. Trachea midline, no adenopathy. Cardiovascular: Regular rhythm, normal S1, S2. No murmur. No edema in lower extremities  Respiratory:Not using accessory muscles. Good inspiratory effort. Clear to auscultation bilaterally, no wheeze or crackles. GI: Abdomen soft, no tenderness, not distended, normal bowel sounds  Musculoskeletal: normal ROM, No cyanosis in digits  Neurology: CN 2-12 grossly intact. No speech or motor deficits  Psych: Normal affect. Alert and oriented in time, place and person  Skin: Warm, dry, normal turgor    Labs and Tests:  CBC:   Recent Labs     06/19/22  1710 06/20/22  0207   WBC 5.4 4.7   HGB 13.3 12.6    120*     BMP:    Recent Labs     06/19/22  1710 06/20/22  0207    140   K 3.4* 3.1*    104   CO2 29 23   BUN 9 11   CREATININE 0.6 0.7   GLUCOSE 100* 184*     Hepatic:   Recent Labs     06/19/22  1710   AST 25   ALT 15   BILITOT 0.8   ALKPHOS 75     CTA HEAD W WO CONTRAST   Final Result   1. No acute intracranial abnormality. 2. Stable mild chronic white matter microischemic changes and small focus of   left cerebellar encephalomalacia in keeping with sequela of prior infarct. 3. Unremarkable CTA of the head. XR CHEST PORTABLE   Final Result   No acute process.       Stable cardiomegaly         MRI BRAIN WO CONTRAST    (Results Pending)       Problem List  Principal Problem:    Acute intractable headache  Active Problems:    Permanent atrial fibrillation (HCC)    Uncontrolled hypertension    Coronary artery disease involving native coronary artery of native heart without angina pectoris  Resolved Problems:    * No resolved hospital problems.  Kaleigh Parker MD   6/20/2022 10:03 AM

## 2022-06-20 NOTE — ED NOTES
Contacted hospitalist regarding Pt BP, per hospitalist give home medications and continue to monitor BP.      Siri Zimmerman RN  06/19/22 2120

## 2022-06-20 NOTE — PROGRESS NOTES
Pt alert and oriented x4, VSS, IV ns locked in right arm. Pt c/o left side headache and left arm pain. Pt has strong  and dorsi/plantar flexion. MRI checklist complete. Bed alarm on, bedside table and call light in reach.

## 2022-06-20 NOTE — CARE COORDINATION
Patient admitted as Observation/OPIB with an anticipated short hospitalization length of stay. Chart reviewed and it appears that patient has minimal needs for discharge at this time. Discussed with patients nurse and requested that case management be notified if discharge needs are identified. *Case management will continue to follow progress and update discharge plan as needed.       Electronically signed by Rell Schumacher RN on 6/20/2022 at 8:34 AM

## 2022-06-20 NOTE — PLAN OF CARE
Problem: Pain  Goal: Verbalizes/displays adequate comfort level or baseline comfort level  Outcome: Progressing   Pt c/o left sided headache and left arm pain, medicated per mar with prn toradol. Pt verbalized relief and is now resting in bed will continue to assess pain level. Problem: Safety - Adult  Goal: Free from fall injury  Outcome: Progressing   Pt has been free from falls this shift, bed alarm on, bed in lowest position, 2/4 side rails up, nonskid socks on, wheels locked, bedside table and call light in reach. Encouraged pt to call out if needed anything.

## 2022-06-20 NOTE — PROGRESS NOTES
Clinical Pharmacy Note: Pharmacy to Dose Warfarin    Pharmacy consulted by Dr. Carlos Enrique Manning to dose warfarin. Haresh Harkins is a 80 y.o. female  is receiving warfarin for indication: AFib. INR Goal Range: 2.0-3.0  Prior to admission warfarin dosing regimen: 2.5 mg every Tue and 5 mg all other days  INR today:   Lab Results   Component Value Date    INR 2.27 06/20/2022       Assessment/Plan:  INR is therapeutic on prior to admission dosing regimen. Based on today's assessment, dose warfarin 2.5 mg every Tue and 5 mg all other days. Daily INR is ordered. Pharmacy will continue to monitor and make adjustments to regimen as necessary.      Thank you for the consult,     Priti CoreaD

## 2022-06-20 NOTE — ED NOTES
Report given to RICKEY RN on 5T, Pt stable for the floor at this time, Pt to travel to 5T via wheelchair with ED [facility] upon receipt of telemetry box.      Daren Mcgraw RN  06/19/22 5544

## 2022-06-20 NOTE — H&P
Hospital Medicine History & Physical      PCP: No primary care provider on file. Date of Admission: 6/19/2022    Date of Service: Pt seen/examined on 6/19/2022  And Placed in Observation. Chief Complaint:    Chief Complaint   Patient presents with    Headache     Walk in pt c/o left sided headache with radiation toward left side of body that started 3 days ago        History Of Present Illness: The patient is a 80 y.o. female with history of CVA, atrial fibrillation, CAD, chronic diastolic heart failure, hypertension, history of left eye visual disturbances who presented with 3 to 4 days of intractable headache starting from the occiput radiating to the frontal area as well as down the neck on the left side. She reports visual blurriness on the left side however she has underlying history of impaired vision on the left as well. She has had previous cataract surgery. Headache is made worse by turning head to the left. There is mild photophobia. No significant tenderness on the temples but more pronounced on the greater occipital exit area with reproducibility. No nausea or vomiting. No fevers or chills.   CT brain and CT angiogram of the head and neck were unrevealing of acute pathology but noted for chronic encephalomalacia from previous CVA  ESR normal at 29  Chest x-ray with clear lung fields  EKG rate controlled A. fib    Past Medical History:        Diagnosis Date    Acute cerebrovascular accident (CVA) ([address] 1/28/2020    Atrial fibrillation ([address]     Bell palsy     diagnosed 15 years ago   Omar Coronado CAD (coronary artery disease)     Cerebral artery occlusion with cerebral infarction ([address]     Chronic diastolic CHF (congestive heart failure) ([address] 5/16/2020    CVA (cerebral vascular accident) ([address] 1/4/2017    Hypertension     Intracranial hemorrhage ([address] 4/2016    Nonintractable headache     Nontraumatic subcortical hemorrhage of cerebral hemisphere ([address] 4/30/2016    Primary osteoarthritis of right knee 3/18/2022    Sudden visual loss of left eye     Thyroid nodule 2/8/2018    TIA involving right internal carotid artery        Past Surgical History:        Procedure Laterality Date    CARDIAC SURGERY      COLONOSCOPY      CORONARY ANGIOPLASTY WITH STENT PLACEMENT      TUBAL LIGATION Right Torn Rotator Cuff    2013 @ [facility]       Medications Prior to Admission:    Prior to Admission medications    Medication Sig Start Date End Date Taking?  Authorizing Provider   spironolactone (ALDACTONE) 50 MG tablet Take 1 tablet by mouth 2 times daily TAKE 1 TABLET BY MOUTH DAILY 6/17/22   ADAN Alfred - CNP   warfarin (COUMADIN) 5 MG tablet Take 5 mg by mouth daily 5 mg everyday but on tuesdays 0.5 tablet    Historical Provider, MD   losartan-hydroCHLOROthiazide (HYZAAR) 100-25 MG per tablet Take 1 tablet by mouth daily 6/8/22   ADAN Alfred - CNP   NIFEdipine (PROCARDIA XL) 90 MG extended release tablet Take 1 tablet by mouth daily 6/8/22   ADAN Alfred - CNP   furosemide (LASIX) 20 MG tablet TAKE 1 TABLET BY MOUTH once DAILY 6/8/22   Louise Dickey APRN - CNP   potassium chloride (KLOR-CON M) 20 MEQ extended release tablet Take 0.5 tablets by mouth daily 6/8/22   Louise Dickey APRN - CNP   guaiFENesin (ALTARUSSIN) 100 MG/5ML syrup Take 10 mLs by mouth every 4 hours as needed for Cough  Patient not taking: Reported on 6/8/2022 9/2/21   Toro Tobin MD   Magnesium 500 MG CAPS Take 400 mg by mouth daily 2/25/21 6/8/22  Brenda Garcia MD   famotidine (PEPCID) 20 MG tablet Take 1 tablet by mouth 2 times daily 1/28/21   Sandra Carty MD   atorvastatin (LIPITOR) 80 MG tablet Take 1 tablet by mouth daily 1/28/21   Sandra Carty MD   acetaminophen (TYLENOL) 500 MG tablet Take 2 tablets by mouth every 6 hours as needed for Pain  Patient not taking: Reported on 6/8/2022 1/28/21   Sandra Carty MD   Calcium Carb-Cholecalciferol (CALCIUM + D3) 600-200 MG-UNIT TABS tablet Take 1 tablet by mouth 2 times daily 10/15/20   Rose Thompson MD       Allergies:  Clonidine, Codeine, Hydrocodone-acetaminophen, Lisinopril, Tramadol, and Percocet [oxycodone-acetaminophen]    Social History:  The patient currently lives with family    TOBACCO:   reports that she has never smoked. She has never used smokeless tobacco.  ETOH:   reports no history of alcohol use. Family History:  Reviewed in detail and negative for DM, Early CAD, Cancer, CVA. Positive as follows:    No family history on file. REVIEW OF SYSTEMS:   Positive for left-sided headache, neck pain, visual blurriness on the left and as noted in the HPI. All other systems reviewed and negative. PHYSICAL EXAM:    BP (!) 147/116   Pulse 72   Temp 98.1 °F (36.7 °C) (Oral)   Resp 20   Ht 5' 7\" (1.702 m)   Wt 176 lb (79.8 kg)   SpO2 100%   BMI 27.57 kg/m²     General appearance: No apparent distress appears stated age and cooperative. HEENT Normal cephalic, atraumatic without obvious deformity. Pupils equal, round, and reactive to light. Extra ocular muscles intact. Conjunctivae/corneas clear. Patient has scalp tenderness on the left side with point tenderness on the posterior occipital area around the great occipital nerve exit. Tenderness radiates up to the frontal area as well as down the neck on the left side. No significant tenderness over the temples. She has prominent temporal vessels bilaterally. Neck: Supple, No jugular venous distention/bruits. Lungs: Clear to auscultation, bilaterally without Rales/Wheezes/Rhonchi with good respiratory effort. Heart: Regular rate and rhythm with Normal S1/S2 without murmurs, rubs or gallops  Abdomen: Soft, non-tender or non-distended without rigidity or guarding and positive bowel sounds   Extremities: No clubbing, cyanosis, or edema bilaterally. Skin: Skin color, texture, turgor normal.  No rashes or lesions.   Neurologic: Alert and oriented X 3, neurovascularly intact with sensory/motor intact upper extremities/lower extremities, bilaterally. Cranial nerves: II-XII intact, grossly non-focal.  Mental status: Alert, oriented, thought content appropriate. CBC   Recent Labs     06/19/22  1710   WBC 5.4   HGB 13.3   HCT 40.1         RENAL  Recent Labs     06/19/22  1710      K 3.4*      CO2 29   BUN 9   CREATININE 0.6     LFT'S  Recent Labs     06/19/22  1710   AST 25   ALT 15   BILITOT 0.8   ALKPHOS 75     COAG  No results for input(s): INR in the last 72 hours. CARDIAC ENZYMES  Recent Labs     06/19/22  1710   [address] <0.01         Active Hospital Problems    Diagnosis Date Noted    Acute intractable headache [R51.9] 06/19/2022     Priority: Medium    Coronary artery disease involving native coronary artery of native heart without angina pectoris [I25.10] 10/26/2015    Uncontrolled hypertension [I10] 09/21/2015    Permanent atrial fibrillation (HCC) [I48.21] 02/13/2015       ASSESSMENT/PLAN:  80 y.o. female with history of CVA, atrial fibrillation, CAD, chronic diastolic heart failure, hypertension, history of left eye visual disturbances who presented with 3 to 4 days of intractable headache starting from the occiput radiating to the frontal area as well as down the neck on the left side with point tenderness of the greater occipital nerve exit area. This is concerning for greater occipital nerve neuralgia. I have low suspicion for GCA. Patient received high-dose steroid in the ER.  ESR is normal    Plan:  - We will get MRI brain  - Neurology consult  - Pain management  - We will hold off steroids. Low suspicion for GCA given point tenderness area in the occipital exacerbating the headache with normal ESR          DVT Prophylaxis: Subcut enoxaparin  Diet: General diet  Code Status: Full code         Jazzy Hinkle MD    Thank you No primary care provider on file.  for the opportunity to be involved in this patient's care. If you have any questions or concerns please feel free to contact me at [phone].

## 2022-06-21 LAB
ANION GAP SERPL CALCULATED.3IONS-SCNC: 9 MMOL/L (ref 3–16)
BASOPHILS ABSOLUTE: 0 K/UL (ref 0–0.2)
BASOPHILS RELATIVE PERCENT: 0.1 %
BUN BLDV-MCNC: 17 MG/DL (ref 7–20)
CALCIUM SERPL-MCNC: 9.7 MG/DL (ref 8.3–10.6)
CHLORIDE BLD-SCNC: 105 MMOL/L (ref 99–110)
CHOLESTEROL, TOTAL: 143 MG/DL (ref 0–199)
CO2: 26 MMOL/L (ref 21–32)
CREAT SERPL-MCNC: 0.9 MG/DL (ref 0.6–1.2)
EOSINOPHILS ABSOLUTE: 0 K/UL (ref 0–0.6)
EOSINOPHILS RELATIVE PERCENT: 0.1 %
ESTIMATED AVERAGE GLUCOSE: 105.4 MG/DL
GFR AFRICAN AMERICAN: >60
GFR NON-AFRICAN AMERICAN: >60
GLUCOSE BLD-MCNC: 118 MG/DL (ref 70–99)
HBA1C MFR BLD: 5.3 %
HCT VFR BLD CALC: 36.5 % (ref 36–48)
HDLC SERPL-MCNC: 51 MG/DL (ref 40–60)
HEMOGLOBIN: 11.8 G/DL (ref 12–16)
INR BLD: 3.1 (ref 0.87–1.14)
LDL CHOLESTEROL CALCULATED: 83 MG/DL
LYMPHOCYTES ABSOLUTE: 1.4 K/UL (ref 1–5.1)
LYMPHOCYTES RELATIVE PERCENT: 13 %
MCH RBC QN AUTO: 29.2 PG (ref 26–34)
MCHC RBC AUTO-ENTMCNC: 32.3 G/DL (ref 31–36)
MCV RBC AUTO: 90.4 FL (ref 80–100)
MONOCYTES ABSOLUTE: 0.6 K/UL (ref 0–1.3)
MONOCYTES RELATIVE PERCENT: 5.5 %
NEUTROPHILS ABSOLUTE: 8.8 K/UL (ref 1.7–7.7)
NEUTROPHILS RELATIVE PERCENT: 81.3 %
PDW BLD-RTO: 13.8 % (ref 12.4–15.4)
PLATELET # BLD: 128 K/UL (ref 135–450)
PMV BLD AUTO: 10.3 FL (ref 5–10.5)
POTASSIUM REFLEX MAGNESIUM: 4.2 MMOL/L (ref 3.5–5.1)
PROTHROMBIN TIME: 32.2 SEC (ref 11.7–14.5)
RBC # BLD: 4.04 M/UL (ref 4–5.2)
SODIUM BLD-SCNC: 140 MMOL/L (ref 136–145)
TRIGL SERPL-MCNC: 45 MG/DL (ref 0–150)
VLDLC SERPL CALC-MCNC: 9 MG/DL
WBC # BLD: 10.8 K/UL (ref 4–11)

## 2022-06-21 PROCEDURE — 97161 PT EVAL LOW COMPLEX 20 MIN: CPT

## 2022-06-21 PROCEDURE — 80061 LIPID PANEL: CPT

## 2022-06-21 PROCEDURE — 80048 BASIC METABOLIC PNL TOTAL CA: CPT

## 2022-06-21 PROCEDURE — 6370000000 HC RX 637 (ALT 250 FOR IP): Performed by: NURSE PRACTITIONER

## 2022-06-21 PROCEDURE — 36415 COLL VENOUS BLD VENIPUNCTURE: CPT

## 2022-06-21 PROCEDURE — 85025 COMPLETE CBC W/AUTO DIFF WBC: CPT

## 2022-06-21 PROCEDURE — 6360000002 HC RX W HCPCS: Performed by: INTERNAL MEDICINE

## 2022-06-21 PROCEDURE — 99232 SBSQ HOSP IP/OBS MODERATE 35: CPT | Performed by: PSYCHIATRY & NEUROLOGY

## 2022-06-21 PROCEDURE — 1200000000 HC SEMI PRIVATE

## 2022-06-21 PROCEDURE — 6370000000 HC RX 637 (ALT 250 FOR IP): Performed by: INTERNAL MEDICINE

## 2022-06-21 PROCEDURE — 85610 PROTHROMBIN TIME: CPT

## 2022-06-21 PROCEDURE — 97165 OT EVAL LOW COMPLEX 30 MIN: CPT

## 2022-06-21 PROCEDURE — 2580000003 HC RX 258: Performed by: INTERNAL MEDICINE

## 2022-06-21 RX ORDER — DIPHENHYDRAMINE HCL 25 MG
25 TABLET ORAL EVERY 6 HOURS PRN
Status: DISCONTINUED | OUTPATIENT
Start: 2022-06-21 | End: 2022-06-22 | Stop reason: HOSPADM

## 2022-06-21 RX ADMIN — MORPHINE SULFATE 2 MG: 2 INJECTION, SOLUTION INTRAMUSCULAR; INTRAVENOUS at 01:52

## 2022-06-21 RX ADMIN — KETOROLAC TROMETHAMINE 15 MG: 30 INJECTION, SOLUTION INTRAMUSCULAR at 09:30

## 2022-06-21 RX ADMIN — SPIRONOLACTONE 50 MG: 25 TABLET ORAL at 09:30

## 2022-06-21 RX ADMIN — CALCIUM CARBONATE-VITAMIN D TAB 500 MG-200 UNIT 1 TABLET: 500-200 TAB at 09:30

## 2022-06-21 RX ADMIN — Medication 10 ML: at 09:29

## 2022-06-21 RX ADMIN — SPIRONOLACTONE 50 MG: 25 TABLET ORAL at 20:40

## 2022-06-21 RX ADMIN — LOSARTAN POTASSIUM 100 MG: 100 TABLET, FILM COATED ORAL at 09:37

## 2022-06-21 RX ADMIN — FUROSEMIDE 20 MG: 20 TABLET ORAL at 09:30

## 2022-06-21 RX ADMIN — CALCIUM CARBONATE-VITAMIN D TAB 500 MG-200 UNIT 1 TABLET: 500-200 TAB at 20:41

## 2022-06-21 RX ADMIN — FAMOTIDINE 20 MG: 20 TABLET, FILM COATED ORAL at 20:41

## 2022-06-21 RX ADMIN — DIPHENHYDRAMINE HCL 25 MG: 25 TABLET ORAL at 02:03

## 2022-06-21 RX ADMIN — Medication 10 ML: at 20:41

## 2022-06-21 RX ADMIN — ATORVASTATIN CALCIUM 80 MG: 80 TABLET, FILM COATED ORAL at 09:29

## 2022-06-21 RX ADMIN — POTASSIUM CHLORIDE 10 MEQ: 20 TABLET, EXTENDED RELEASE ORAL at 09:29

## 2022-06-21 RX ADMIN — CARVEDILOL 3.12 MG: 3.12 TABLET, FILM COATED ORAL at 17:49

## 2022-06-21 RX ADMIN — FAMOTIDINE 20 MG: 20 TABLET, FILM COATED ORAL at 09:30

## 2022-06-21 RX ADMIN — BUTALBITAL, ACETAMINOPHEN, AND CAFFEINE 1 TABLET: 50; 325; 40 TABLET ORAL at 20:45

## 2022-06-21 RX ADMIN — NIFEDIPINE 90 MG: 30 TABLET, FILM COATED, EXTENDED RELEASE ORAL at 09:28

## 2022-06-21 RX ADMIN — CARVEDILOL 3.12 MG: 3.12 TABLET, FILM COATED ORAL at 09:30

## 2022-06-21 ASSESSMENT — PAIN SCALES - GENERAL
PAINLEVEL_OUTOF10: 7
PAINLEVEL_OUTOF10: 5
PAINLEVEL_OUTOF10: 5

## 2022-06-21 ASSESSMENT — PAIN DESCRIPTION - ONSET: ONSET: ON-GOING

## 2022-06-21 ASSESSMENT — PAIN DESCRIPTION - PAIN TYPE: TYPE: ACUTE PAIN

## 2022-06-21 ASSESSMENT — PAIN DESCRIPTION - LOCATION
LOCATION: HEAD

## 2022-06-21 ASSESSMENT — PAIN - FUNCTIONAL ASSESSMENT: PAIN_FUNCTIONAL_ASSESSMENT: ACTIVITIES ARE NOT PREVENTED

## 2022-06-21 ASSESSMENT — PAIN DESCRIPTION - DESCRIPTORS: DESCRIPTORS: ACHING

## 2022-06-21 ASSESSMENT — PAIN DESCRIPTION - FREQUENCY: FREQUENCY: CONTINUOUS

## 2022-06-21 NOTE — PROGRESS NOTES
[facility] Department   Phone: [phone]    Occupational Therapy    [x] Initial Evaluation            [] Daily Treatment Note         [] Discharge Summary      Patient: Nabil Nice   : 1950   MRN: [id number]   Date of Service:  2022    Admitting Diagnosis:  Acute intractable headache  Current Admission Summary: The patient is a 80y.o.  years old female with history of hypertension, CHF and prior CVA who was admitted to the hospital with new onset headache. Onset was 2 to 4 days ago. Description daily severe headache that can go from the back of the head to the front. Degree was severe. Pain was pulsating and throbbing. No visual changes, chest pain, dysphagia or dysarthria. Some photophobia and phonophobia. No other relieving or aggravating factors. Duration was persistent. She came to the ED over the weekend where she was admitted. Initial imaging showed no acute stroke or large vessel occlusion. ESR was 29. Today she feels better. Headache is moderate. MRI of the brain showed no acute stroke and chronic small vessel disease. She denies any jaw claudication or visual changes. Other review of system was unremarkable. Past Medical History:  has a past medical history of Acute cerebrovascular accident (CVA) ([address] Atrial fibrillation ([address] Bell palsy, CAD (coronary artery disease), Cerebral artery occlusion with cerebral infarction ([address] Chronic diastolic CHF (congestive heart failure) ([address] CVA (cerebral vascular accident) ([address] Hypertension, Intracranial hemorrhage ([address] Nonintractable headache, Nontraumatic subcortical hemorrhage of cerebral hemisphere [facility]), Primary osteoarthritis of right knee, Sudden visual loss of left eye, Thyroid nodule, and TIA involving right internal carotid artery. Past Surgical History:  has a past surgical history that includes Cardiac surgery; Tubal ligation (Right, Torn Rotator Cuff);  Coronary angioplasty with stent; and Colonoscopy. Discharge Recommendations: Mone Pichardo scored a 24/24 on the AM-PAC ADL Inpatient form. At this time, no further OT is recommended upon discharge due to patient at baseline level of function. Recommend patient returns to prior setting with prior services. DME Required For Discharge: no DME required at discharge    Precautions/Restrictions: medium fall risk  Weight Bearing Restrictions: no restrictions  [] Right Upper Extremity  [] Left Upper Extremity [] Right Lower Extremity  [] Left Lower Extremity     Required Braces/Orthotics: no braces required   [] Right  [] Left  Positional Restrictions:no positional restrictions      Pre-Admission Information     Lives With: daughter, . Comment: and great grandson (16 y.o)                    Type of Home: house  Home Layout: one level, able to live on main level  Home Access: 1 step to enter without rails   Bathroom Layout: walker accessible, wheelchair accessible, tub/shower unit  Toilet Height: standard height, elevated height  Bathroom Equipment: shower chair  Home Equipment: standard walker, single point cane  Transfer Assistance: Independent without use of device  Ambulation Assistance:Independent without use of device, . Comment: Uses walker out in the community  ADL Assistance: independent with all ADL's  IADL Assistance: independent with homemaking tasks  Active :        [x]? Yes            []? No  Hand Dominance: []? Left            []? Right  Current Employment: part time employment.   Occupation: caregiver to [employer]   Hobbies: Taking care of great grandjero  Recent Falls: None     Examination   Vision:   Vision Gross Assessment: Impaired and Vision Corrective Device: wears glasses for reading  Hearing:   hard of hearing  Perception:   WFL  Posture:   WFL  Sensation:   WFL  Proprioception:    WFL  Tone:   Normotonic  Coordination Testing:   WFL    ROM:   (B) UE AROM WFL  (B) LE AROM WFL  Strength:   (B) UE strength grossly WFL    Decision Making: low complexity  Clinical Presentation: stable      Subjective    General: Pt washing hands at sink upon entry; pleasant and agreeable to eval. Pt reports feeling weaker than usual, but otherwise claims baseline level of function  Pain: 0/10  Pain Interventions: not applicable           Activities of Daily Living    Basic Activities of Daily Living  Grooming: Independent  Grooming Comments: washing hands in stance at sink  General Comments: declined additional ADL participation due to prior completion  Instrumental Activities of Daily Living  No IADL completed on this date. Functional Mobility    Bed Mobility  Bed mobility not completed on this date. Comments: Pt in stance upon entry and seated in recliner upon exit. Transfers  Sit to stand transfer:Independent  Stand to sit transfer: Independent  Comments:  Functional Mobility:  Sitting Balance: Independent. Standing Balance: Independent. Functional Mobility: rolling walker. supervision, stand by assistance. Activity: 50 ft SBA without AD and intermittent use of arenas rail; additional 200 ft with RW and SUP    Other Therapeutic Interventions      Functional Outcomes  AM-PAC Inpatient Daily Activity Raw Score: 24      Cognition  WFL  Orientation:    alert and oriented x 4  Command Following:   [facility]     Education    Barriers To Learning: none  Patient Education: patient educated on OT role and benefits, discharge recommendations  Learning Assessment:  patient verbalizes and demonstrates understanding    Assessment    Activity Tolerance: good  Impairments Requiring Therapeutic Intervention: none - eval with same day discharge  Prognosis: excellent  Clinical Assessment: Pt at baseline level of function, completing functional mobility without assist. No acute OT services recommended at this time.   Safety Interventions: patient left in chair, call light within reach and gait belt; pt medium fall risk with no chair or bed alarm upon entry       Plan    Frequency: Eval with same day discharge. No follow up required. Current Treatment Recommendations: not applicable, evaluation completed with same day discharge. Goals    Patient Goals: return home     Short Term Goals:   Patient eval with same day discharge. No goals set as patient is at baseline status. Therapy Session Time     Individual Group Co-treatment   Time In    5459   Time Out    1409   Minutes    17        Timed Code Treatment Minutes:  Timed Code Treatment Minutes: 0 Minutes    Total Treatment Minutes:  17    Electronically Signed By: JOSE Sauceda/OT    Occupational Therapist was present, directed the patient's care, made skilled judgement, and was responsible for assessment and treatment of the patient.          Jose [address], OTR/L, C/NDT ([id number])

## 2022-06-21 NOTE — PROGRESS NOTES
2207: Shift assessment complete. VSS. Scheduled medications given. Denies further needs. Call light within reach. 0000: Pt sleeping. Call light within reach.

## 2022-06-21 NOTE — PROGRESS NOTES
Pharmacy to Dose Warfarin    Pharmacy consulted to dose warfarin for afib. INR Goal: 2-3    INR today: 3.10    Assessment/Plan:  - INR jumped from 2.27 to 3.1.   - Discussed with Dr. Grace Bowie.   - Will hold warfarin tonight. - Placeholder on MAR.   - If patient is to discharge today, recommend holding x1 dose and then resuming at home dose. Pharmacy will continue to follow.     Ashely Ward, PharmD, [facility]

## 2022-06-21 NOTE — PROGRESS NOTES
Pt called this RN with concerns of BP being too low, headache, and itching all over. Current /71. MD ordered benadryl, given, see orders and MAR. Morphine given for 5/10 headache pain, see MAR. Education performed on BP and medication pt had been given during day shift. Will continue to monitor.

## 2022-06-21 NOTE — PROGRESS NOTES
[facility]   Phone: [phone]    Physical Therapy    [x] Initial Evaluation            [] Daily Treatment Note         [x] Discharge Summary      Patient: Lisandro Irwin   : 1950   MRN: [id number]   Date of Service:  2022  Admitting Diagnosis: Acute intractable headache  Current Admission Summary: The patient is a 80 y.o. female with history of CVA, atrial fibrillation, CAD, chronic diastolic heart failure, hypertension, history of left eye visual disturbances who presented with 3 to 4 days of intractable headache starting from the occiput radiating to the frontal area as well as down the neck on the left side. She reports visual blurriness on the left side however she has underlying history of impaired vision on the left as well. She has had previous cataract surgery. Headache is made worse by turning head to the left. There is mild photophobia. No significant tenderness on the temples but more pronounced on the greater occipital exit area with reproducibility. No nausea or vomiting. No fevers or chills. CT brain and CT angiogram of the head and neck were unrevealing of acute pathology but noted for chronic encephalomalacia from previous CVA  Past Medical History:  has a past medical history of Acute cerebrovascular accident (CVA) ([address] Atrial fibrillation ([address] Bell palsy, CAD (coronary artery disease), Cerebral artery occlusion with cerebral infarction ([address] Chronic diastolic CHF (congestive heart failure) ([address] CVA (cerebral vascular accident) ([address] Hypertension, Intracranial hemorrhage ([address] Nonintractable headache, Nontraumatic subcortical hemorrhage of cerebral hemisphere [facility]), Primary osteoarthritis of right knee, Sudden visual loss of left eye, Thyroid nodule, and TIA involving right internal carotid artery. Past Surgical History:  has a past surgical history that includes Cardiac surgery;  Tubal ligation (Right, Torn Rotator Cuff); Coronary angioplasty with stent; and Colonoscopy. Discharge Recommendations: Darwin Wilkerson scored a 23/24 on the AM-PAC short mobility form. At this time, no further PT is recommended upon discharge due to patient at independent level. Recommend patient returns to prior setting with prior services. DME Required For Discharge: No new DME required  Precautions/Restrictions: medium fall risk    Pre-Admission Information   Lives With: daughter, . Comment: and great grandson (15 y. o)     Type of Home: house  Home Layout: one level, able to live on main level  Home Access:  1 step to enter without rails   Bathroom Layout: walker accessible, wheelchair accessible, tub/shower unit  Toilet Height: standard height, elevated height  Bathroom Equipment: shower chair  Home Equipment: standard walker, single point cane  Transfer Assistance: Independent without use of device  Ambulation Assistance:Independent without use of device, . Comment: Uses walker out in the community  ADL Assistance: independent with all ADL's  IADL Assistance: independent with homemaking tasks  Active :        [x] Yes  [] No  Hand Dominance: [] Left  [] Right  Current Employment: part time employment. Occupation: caregiver  Hobbies: Taking care of great grandjero  Recent Falls: None    Examination   Vision:   Vision Corrective Device: wears glasses for reading  Hearing:   hard of hearing  Posture: Forward head rounded shoulder  Sensation:   WFL  ROM:   (B) LE ROM WFL  Strength:   (B) LE gross strength WFL  Decision Making: low complexity  Clinical Presentation: stable      Subjective  General: Pt walking out of bathroom upon therapist arrival. Pt agreeable to PT/OT evaluation  Pain: 0/10  Pain Interventions: not applicable       Functional Mobility  Bed Mobility  Bed mobility not completed on this date.   Comments:  Transfers  Sit to stand transfer: supervision  Stand to sit transfer: supervision  Comments:  Ambulation  Surface:level surface  Assistive Device: rolling walker  Assistance: stand by assistance  Distance: 250'  Gait Mechanics: Slightly forward flexed posture, forward head rounded/elevated shoulders which improved with verbal cueing, increased lateral sway, trendelenburg martin  Comments:  Pt cued to keep walker close to torso and to maintain good posture. First 48' of ambulation completed without walker (as pt uses walker only for community ambulation), however as pt was using nearby objects as support 2WW was utilized. Stair Mobility  Stair mobility not completed on this date. Comments:  Balance  Static Sitting Balance: good: independent with functional balance in unsupported position  Dynamic Sitting Balance: good: independent with functional balance in unsupported position  Static Standing Balance: fair (+): maintains balance at SBA/supervision without use of UE support  Dynamic Standing Balance: fair (-): maintains balance at SBA with use of UE support  Comments:    Other Therapeutic Interventions    Functional Outcomes  AM-PAC Inpatient Mobility Raw Score : 23              Cognition  WFL  Orientation:    A&O x 4    Education  Barriers To Learning: none  Patient Education: patient educated on PT role and benefits, plan of care, general safety, functional mobility training, proper use of assistive device/equipment, discharge recommendations  Learning Assessment:  Pt verbalized and demonstrates understanding    Assessment  Impairments Requiring Therapeutic Intervention: none - eval with same day discharge  Prognosis: good without need for therapy intervention  Clinical Assessment: Patient presenting at independent level for completion of required mobility tasks for return to home. Eval with d/c at this time. No therapy services indicated.     Safety Interventions: patient left in chair, call light within reach, gait belt and patient at risk for falls    Plan  Frequency: Eval with same day discharge. No follow up required. Current Treatment Recommendations: Not applicable, evaluation completed with same day discharge. Goals  Patient eval with same day discharge. No goals set as patient is at baseline mobility status. Therapy Session Time      Individual Group Co-treatment   Time In     4542   Time Out     1409   Minutes     17     Timed Code Treatment Minutes:  2 Minutes  Total Treatment Minutes:  17     Reny Lamb, SPT  PT providing direct supervision during session and assisting in making skilled judgements throughout session.   Electronically Signed By: Xochilt Solano, PT

## 2022-06-21 NOTE — PROGRESS NOTES
HOSPITALISTS PROGRESS NOTE    6/21/2022 8:50 AM        Name: Abisai Sanchez . Admitted: 6/19/2022  Primary Care Provider: No primary care provider on file. (Tel: None)      Brief Course: This 73-year-old female with PMHx of CVA, A. fib, CAD, Hx of remote Bell's palsy, chronic diastolic heart failure, hypertension, Hx of left eye visual disturbance presented with 3-4 days of intractable headache; starting from occipital region to frontal area as well as down the neck on the left side. Patient also reported mild photophobia and left-sided visual blurriness, however patient does have a Hx of known left-sided visual disturbance. CT head and CT head and neck negative for any acute pathology but noted for chronic encephalomalacia from previous CVA. ESR normal at 29. Interval history:   Pt seen and examined today. Overnight events noted, interval ancillary notes and labs reviewed. Hemodynamically stable. BP controlled  INR elevated to 3.10; will hold Coumadin dose tonight  Endorsed headache but denied cough, SOB, chest pain, nausea, vomiting or abdominal pain  Discontinue IV pain meds; Fioricet as needed for headache  Plan for the patient be discharged home tomorrow if medically stable      Assessment & Plan:     Intractable headache: New onset. So far no clear etiology. DD include new onset migraine headache/occipital neuralgia  CT head and CT head and neck negative for any acute pathology. Report scalp tenderness starting from occipital area radiating all across left side of the head. ESR normal at 29.   CRP<3  MRI head negative for any acute pathology but showed mild to moderate chronic microvascular ischemic changes and small chronic infarct involving the left cerebellum as well as left found  Neurology consulted for further evaluation    Hypertension: Uncontrolled likely 2/2 medication noncompliance:  Continue home dose of losartan, nifedipine, Lasix and spironolactone. Was on atenolol; but has not filled prescription since January and 2022. Started on Coreg for better BP control  Holding HCTZ due to hypokalemia. Monitor BP closely and will adjust medications if needed    Hx of A. fib: Continue Coreg and Coumadin  Pharmacy consulted for Coumadin dosing. PT/INR daily    Hyperlipidemia: Continue statins    DVT PPX: Coumadin  Code:Full Code  Diet: ADULT DIET;  Regular    Disposition: Home when medically stable    Current Medications  diphenhydrAMINE (BENADRYL) tablet 25 mg, Q6H PRN  warfarin (COUMADIN) tablet 2.5 mg, Once per day on Tue   And  warfarin (COUMADIN) tablet 5 mg, Once per day on Sun Mon Wed Thu Fri Sat  potassium chloride (KLOR-CON M) extended release tablet 40 mEq, PRN   Or  potassium bicarb-citric acid (EFFER-K) effervescent tablet 40 mEq, PRN   Or  potassium chloride 10 mEq/100 mL IVPB (Peripheral Line), PRN  carvedilol (COREG) tablet 3.125 mg, BID WC  butalbital-acetaminophen-caffeine (FIORICET, ESGIC) per tablet 1 tablet, Q6H PRN  atorvastatin (LIPITOR) tablet 80 mg, Daily  oyster shell calcium w/D 500-200 MG-UNIT tablet 1 tablet, BID  famotidine (PEPCID) tablet 20 mg, BID  furosemide (LASIX) tablet 20 mg, Daily  potassium chloride (KLOR-CON M) extended release tablet 10 mEq, Daily  sodium chloride flush 0.9 % injection 5-40 mL, 2 times per day  sodium chloride flush 0.9 % injection 5-40 mL, PRN  0.9 % sodium chloride infusion, PRN  ondansetron (ZOFRAN-ODT) disintegrating tablet 4 mg, Q8H PRN   Or  ondansetron (ZOFRAN) injection 4 mg, Q6H PRN  polyethylene glycol (GLYCOLAX) packet 17 g, Daily PRN  acetaminophen (TYLENOL) tablet 650 mg, Q6H PRN   Or  acetaminophen (TYLENOL) suppository 650 mg, Q6H PRN  morphine (PF) injection 2 mg, Q2H PRN   Or  morphine injection 4 mg, Q2H PRN  ketorolac (TORADOL) injection 15 mg, Q6H  spironolactone (ALDACTONE) tablet 50 mg, BID  hydrALAZINE (APRESOLINE) injection 10 mg, Q4H infarcts involving the left cerebellum as well as   the left kosta. CTA HEAD W WO CONTRAST   Final Result   1. No acute intracranial abnormality. 2. Stable mild chronic white matter microischemic changes and small focus of   left cerebellar encephalomalacia in keeping with sequela of prior infarct. 3. Unremarkable CTA of the head. XR CHEST PORTABLE   Final Result   No acute process. Stable cardiomegaly             Problem List  Principal Problem:    Acute intractable headache  Active Problems:    Headache, migraine, intractable, with status migrainosus    Dyslipidemia    PAF (paroxysmal atrial fibrillation) (HCC)    Permanent atrial fibrillation (HCC)    HTN (hypertension), benign    Coronary artery disease involving native coronary artery of native heart without angina pectoris  Resolved Problems:    * No resolved hospital problems.  Johnny Ernst MD   6/21/2022 8:50 AM

## 2022-06-21 NOTE — PROGRESS NOTES
José Bensonish  Neurology Follow-up  [facility]    Date of Service: 6/21/2022    Subjective:   CC: Follow up today regarding: New onset headache and facial droop. Events noted. Chart and lab reviewed. Patient denies any new symptoms today. MRI showed no acute stroke. Headache now is mild to moderate. No chest pain, dysphagia or dysarthria. She quit taking most of her blood pressure medication few months ago. Other review of system was unremarkable. ROS : A 10-12 system review obtained and updated today and is unremarkable except as mentioned  in my interval history.      Family history: Noncontributory, no family history of migraine    Past Medical History:   Diagnosis Date    Acute cerebrovascular accident (CVA) ([address] 1/28/2020    Atrial fibrillation ([address]     Bell palsy     diagnosed 15 years ago   Jason Avila CAD (coronary artery disease)     Cerebral artery occlusion with cerebral infarction ([address]     Chronic diastolic CHF (congestive heart failure) ([address] 5/16/2020    CVA (cerebral vascular accident) ([address] 1/4/2017    Hypertension     Intracranial hemorrhage ([address] 4/2016    Nonintractable headache     Nontraumatic subcortical hemorrhage of cerebral hemisphere ([address] 4/30/2016    Primary osteoarthritis of right knee 3/18/2022    Sudden visual loss of left eye     Thyroid nodule 2/8/2018    TIA involving right internal carotid artery      Current Facility-Administered Medications   Medication Dose Route Frequency Provider Last Rate Last Admin    diphenhydrAMINE (BENADRYL) tablet 25 mg  25 mg Oral Q6H PRN ADAN Bryant CNP   25 mg at 06/21/22 0203    warfarin placeholder: dosing by pharmacy   Other Teodora Broussard MD        potassium chloride (KLOR-CON M) extended release tablet 40 mEq  40 mEq Oral PRN Keila Dong MD        Or    potassium bicarb-citric acid (EFFER-K) effervescent tablet 40 mEq  40 mEq Oral PRN Keila Dong MD        Or   Jason Avila potassium chloride 10 mEq/100 mL IVPB (Peripheral Line)  10 mEq IntraVENous PRN Myrlene Lennox, MD   Stopped at 06/20/22 1905    carvedilol (COREG) tablet 3.125 mg  3.125 mg Oral BID WC Myrlene Lennox, MD   3.125 mg at 06/21/22 0930    butalbital-acetaminophen-caffeine (FIORICET, ESGIC) per tablet 1 tablet  1 tablet Oral Q6H PRN Myrlene Lennox, MD        atorvastatin (LIPITOR) tablet 80 mg  80 mg Oral Daily Ivan Jackson MD   80 mg at 06/21/22 0929    oyster shell calcium w/D 500-200 MG-UNIT tablet 1 tablet  1 tablet Oral BID Ivan Jackson MD   1 tablet at 06/21/22 0930    famotidine (PEPCID) tablet 20 mg  20 mg Oral BID Ivan Jackson MD   20 mg at 06/21/22 0930    furosemide (LASIX) tablet 20 mg  20 mg Oral Daily Ivan Jackson MD   20 mg at 06/21/22 0930    potassium chloride (KLOR-CON M) extended release tablet 10 mEq  10 mEq Oral Daily Ivan Jackson MD   10 mEq at 06/21/22 0929    sodium chloride flush 0.9 % injection 5-40 mL  5-40 mL IntraVENous 2 times per day Alda Culp MD   10 mL at 06/21/22 0929    sodium chloride flush 0.9 % injection 5-40 mL  5-40 mL IntraVENous PRN Ivan Jackson MD   20 mL at 06/20/22 1148    0.9 % sodium chloride infusion   IntraVENous PRN Ivan Jackson MD   Stopped at 06/20/22 1905    ondansetron (ZOFRAN-ODT) disintegrating tablet 4 mg  4 mg Oral Q8H PRN Ivan Jackson MD        Or    ondansetron (ZOFRAN) injection 4 mg  4 mg IntraVENous Q6H PRN Ivan Jackson MD        polyethylene glycol (GLYCOLAX) packet 17 g  17 g Oral Daily PRN Ivan Jackson MD        acetaminophen (TYLENOL) tablet 650 mg  650 mg Oral Q6H PRN Ivan Jackson MD        Or    acetaminophen (TYLENOL) suppository 650 mg  650 mg Rectal Q6H PRN Ivan Jackson MD        spironolactone (ALDACTONE) tablet 50 mg  50 mg Oral BID Ivan Jackson MD   50 mg at 06/21/22 0930    NIFEdipine (PROCARDIA XL) extended release tablet 90 mg 90 mg Oral Daily Ivan Jackson MD   90 mg at 06/21/22 0928    losartan (COZAAR) tablet 100 mg  100 mg Oral Daily Ivan Jackson MD   100 mg at 06/21/22 0171     Allergies   Allergen Reactions    Clonidine Rash, Shortness Of Breath and Hives    Codeine Hives, Itching, Nausea Only and Rash     Other reaction(s): Other (See Comments)  Pruritis    Hydrocodone-Acetaminophen      Itchy      Lisinopril Hives and Itching    Tramadol     Percocet [Oxycodone-Acetaminophen] Itching     Patient takes percocet with benadryl to control the itching      reports that she has never smoked. She has never used smokeless tobacco. She reports that she does not drink alcohol and does not use drugs. Objective:  Exam:   Constitutional:   Vitals:    06/21/22 0222 06/21/22 0457 06/21/22 0845 06/21/22 1154   BP:   (!) 142/105 (!) 148/91   Pulse:   86 61   Resp: 16  16 16   Temp:   97.7 °F (36.5 °C) 98 °F (36.7 °C)   TempSrc:   Oral Axillary   SpO2:   95% 94%   Weight:  185 lb 3.2 oz (84 kg)     Height:         General appearance:  Normal development and appear in no acute distress. Mental Status:   Oriented to person, place, problem, and time. Memory: Good immediate recall. Intact remote memory  Normal attention span and concentration. Language: intact naming, repeating and fluency   Good fund of Knowledge. Cranial Nerves:   II: Visual fields: Full. Pupils: equal, round, reactive to light  III,IV,VI: Extra Ocular Movements are intact. No nystagmus  V: Facial sensation is intact  VII: Facial strength and movements: The same chronic left facial droop  IX: Palate elevation is symmetric  XI: Shoulder shrug is intact  XII: Tongue movements are normal  Musculoskeletal: 5/5 in all 4 extremities. Tone: Normal tone. Reflexes: Symmetric 2+ in both arms and legs. Coordination: no pronator drift, no dysmetria with FNF  Sensation: normal to all modalities in both arms and legs.   Gait/Posture: steady gait        Data:  LABS:   Lab Results   Component Value Date     06/21/2022    K 4.2 06/21/2022     06/21/2022    CO2 26 06/21/2022    BUN 17 06/21/2022    CREATININE 0.9 06/21/2022    GFRAA >60 06/21/2022    GFRAA >60 09/14/2012    LABGLOM >60 06/21/2022    GLUCOSE 118 06/21/2022    PHOS 3.1 10/21/2020    MG 1.80 06/20/2022    CALCIUM 9.7 06/21/2022     Lab Results   Component Value Date    WBC 10.8 06/21/2022    RBC 4.04 06/21/2022    HGB 11.8 06/21/2022    HCT 36.5 06/21/2022    MCV 90.4 06/21/2022    RDW 13.8 06/21/2022     06/21/2022     Lab Results   Component Value Date    INR 3.10 (H) 06/21/2022    PROTIME 32.2 (H) 06/21/2022       Neuroimaging was independently reviewed by me and discussed results with the patient  I reviewed blood testing and other test results and discussed results with the patient      Impression:  New onset intractable headache. So far no specific etiology for it could be new onset migraine with aura or occipital neuralgia   Chronic anticoagulation  Hypertension, not controlled  Hyperlipidemia  AF      Recommendation  Continue Coumadin  Monitor INR  Restart blood pressure medications and blood pressure monitor  PT and OT  Can use Fioricet as needed or more than twice a week  Statin  Telemetry  Hydration  Follow-up with PCP after discharge  No further recommendation  We will sign off  Discussed with primary team           Thea Peña MD   [phone]      This dictation was generated by voice recognition computer software. Although all attempts are made to edit the dictation for accuracy, there may be errors in the transcription that are not intended.

## 2022-06-22 VITALS
HEART RATE: 97 BPM | HEIGHT: 67 IN | OXYGEN SATURATION: 98 % | SYSTOLIC BLOOD PRESSURE: 147 MMHG | WEIGHT: 185.2 LBS | RESPIRATION RATE: 18 BRPM | BODY MASS INDEX: 29.07 KG/M2 | TEMPERATURE: 97.9 F | DIASTOLIC BLOOD PRESSURE: 101 MMHG

## 2022-06-22 LAB
ANION GAP SERPL CALCULATED.3IONS-SCNC: 8 MMOL/L (ref 3–16)
BUN BLDV-MCNC: 16 MG/DL (ref 7–20)
CALCIUM SERPL-MCNC: 9.4 MG/DL (ref 8.3–10.6)
CHLORIDE BLD-SCNC: 102 MMOL/L (ref 99–110)
CO2: 29 MMOL/L (ref 21–32)
CREAT SERPL-MCNC: 0.6 MG/DL (ref 0.6–1.2)
GFR AFRICAN AMERICAN: >60
GFR NON-AFRICAN AMERICAN: >60
GLUCOSE BLD-MCNC: 89 MG/DL (ref 70–99)
INR BLD: 2.85 (ref 0.87–1.14)
POTASSIUM REFLEX MAGNESIUM: 3.7 MMOL/L (ref 3.5–5.1)
PROTHROMBIN TIME: 30.1 SEC (ref 11.7–14.5)
SODIUM BLD-SCNC: 139 MMOL/L (ref 136–145)

## 2022-06-22 PROCEDURE — 6370000000 HC RX 637 (ALT 250 FOR IP): Performed by: NURSE PRACTITIONER

## 2022-06-22 PROCEDURE — 85610 PROTHROMBIN TIME: CPT

## 2022-06-22 PROCEDURE — 6370000000 HC RX 637 (ALT 250 FOR IP): Performed by: INTERNAL MEDICINE

## 2022-06-22 PROCEDURE — 36415 COLL VENOUS BLD VENIPUNCTURE: CPT

## 2022-06-22 PROCEDURE — 99221 1ST HOSP IP/OBS SF/LOW 40: CPT | Performed by: NURSE PRACTITIONER

## 2022-06-22 PROCEDURE — 80048 BASIC METABOLIC PNL TOTAL CA: CPT

## 2022-06-22 PROCEDURE — 2580000003 HC RX 258: Performed by: INTERNAL MEDICINE

## 2022-06-22 RX ORDER — WARFARIN SODIUM 2.5 MG/1
2.5 TABLET ORAL
Status: DISCONTINUED | OUTPATIENT
Start: 2022-06-28 | End: 2022-06-22 | Stop reason: HOSPADM

## 2022-06-22 RX ORDER — CARVEDILOL 3.12 MG/1
3.12 TABLET ORAL 2 TIMES DAILY WITH MEALS
Qty: 60 TABLET | Refills: 2 | Status: SHIPPED | OUTPATIENT
Start: 2022-06-22

## 2022-06-22 RX ORDER — LOSARTAN POTASSIUM 100 MG/1
100 TABLET ORAL DAILY
Qty: 30 TABLET | Refills: 1 | Status: ON HOLD | OUTPATIENT
Start: 2022-06-23 | End: 2022-10-21 | Stop reason: SDUPTHER

## 2022-06-22 RX ORDER — TIZANIDINE 4 MG/1
4 TABLET ORAL ONCE
Status: COMPLETED | OUTPATIENT
Start: 2022-06-22 | End: 2022-06-22

## 2022-06-22 RX ORDER — TIZANIDINE 4 MG/1
4 TABLET ORAL 3 TIMES DAILY PRN
Qty: 20 TABLET | Refills: 0 | Status: SHIPPED | OUTPATIENT
Start: 2022-06-22

## 2022-06-22 RX ORDER — WARFARIN SODIUM 5 MG/1
5 TABLET ORAL
Status: DISCONTINUED | OUTPATIENT
Start: 2022-06-22 | End: 2022-06-22 | Stop reason: HOSPADM

## 2022-06-22 RX ADMIN — TIZANIDINE 4 MG: 4 TABLET ORAL at 11:49

## 2022-06-22 RX ADMIN — CARVEDILOL 3.12 MG: 3.12 TABLET, FILM COATED ORAL at 09:28

## 2022-06-22 RX ADMIN — CALCIUM CARBONATE-VITAMIN D TAB 500 MG-200 UNIT 1 TABLET: 500-200 TAB at 09:18

## 2022-06-22 RX ADMIN — ATORVASTATIN CALCIUM 80 MG: 80 TABLET, FILM COATED ORAL at 09:18

## 2022-06-22 RX ADMIN — POTASSIUM CHLORIDE 10 MEQ: 20 TABLET, EXTENDED RELEASE ORAL at 09:18

## 2022-06-22 RX ADMIN — FUROSEMIDE 20 MG: 20 TABLET ORAL at 09:18

## 2022-06-22 RX ADMIN — NIFEDIPINE 90 MG: 30 TABLET, FILM COATED, EXTENDED RELEASE ORAL at 09:18

## 2022-06-22 RX ADMIN — LOSARTAN POTASSIUM 100 MG: 100 TABLET, FILM COATED ORAL at 09:18

## 2022-06-22 RX ADMIN — Medication 10 ML: at 09:28

## 2022-06-22 RX ADMIN — SPIRONOLACTONE 50 MG: 25 TABLET ORAL at 09:18

## 2022-06-22 RX ADMIN — FAMOTIDINE 20 MG: 20 TABLET, FILM COATED ORAL at 09:18

## 2022-06-22 ASSESSMENT — PAIN DESCRIPTION - ORIENTATION: ORIENTATION: LEFT

## 2022-06-22 ASSESSMENT — PAIN SCALES - GENERAL: PAINLEVEL_OUTOF10: 6

## 2022-06-22 ASSESSMENT — PAIN DESCRIPTION - DESCRIPTORS: DESCRIPTORS: ACHING

## 2022-06-22 ASSESSMENT — PAIN DESCRIPTION - LOCATION: LOCATION: NECK;SHOULDER

## 2022-06-22 NOTE — PROGRESS NOTES
Discharge medications reviewed with the pt and appropriate educational materials and side effects teaching were provided.

## 2022-06-22 NOTE — PROGRESS NOTES
Pharmacy to Dose Warfarin    Pharmacy consulted to dose warfarin for afib. INR Goal: 2-3    INR today: 2.85    Assessment/Plan:  - INR therapeutic after holding dose last night. - Plan to resume home dose tonight. - If patient is discharging, home dose is 2.5 mg on Tues and 5 mg all other days. Pharmacy will continue to follow.     Rajiv Daniel, PharmD, [facility]

## 2022-06-22 NOTE — PROGRESS NOTES
PM assessment complete and documented. Meds given per MAR. Medicated per request with fioricet for headache. Lights dimmed. Call light in reach. Denies other needs or concerns. Will continue to monitor.

## 2022-06-22 NOTE — CONSULTS
[facility] Orthopedic Surgery  Consult Note    Patient: Carlene Weber  Admit Date: 6/19/2022  Requesting Physician: Cristal Duncan MD  Room: [address] [place], [place] [id number]    Chief complaint: \"LEFT shoulder pain\"    HPI: Carlene Weber is a 80 y.o. female who presented to [facility] ER with concern for migraine vs occipital neuralgia vs giant cell arteritis. She reports her headache has improved but not resolved. She has basleine visual disturbance left eye. Hx of Bell's palsy. Denies recent trauma. She is retired and not terribly active. Uses cane occasionally and is RHD. Describes pain in the base of the neck extending into the left shoulder blade of moderate intensity and of aching nature since 4 days ago which is relieved by nothing. Denies new numbness/tingling. Imaging review of the left shoulder via CXR on this admission demonstrated: minimal glenohumeral arthritis, minimal AC joint arthritis, no lesion or malalignment or fracture noted. CT cervical spine from 02/2022 was unremarkable for acute abnormality.        Medical History:  Past Medical History:   Diagnosis Date    Acute cerebrovascular accident (CVA) ([address] 1/28/2020    Atrial fibrillation (HCC)     Bell palsy     diagnosed 15 years ago   Christine Roxy CAD (coronary artery disease)     Cerebral artery occlusion with cerebral infarction ([address]     Chronic diastolic CHF (congestive heart failure) ([address] 5/16/2020    CVA (cerebral vascular accident) ([address] 1/4/2017    Hypertension     Intracranial hemorrhage ([address] 4/2016    Nonintractable headache     Nontraumatic subcortical hemorrhage of cerebral hemisphere ([address] 4/30/2016    Primary osteoarthritis of right knee 3/18/2022    Sudden visual loss of left eye     Thyroid nodule 2/8/2018    TIA involving right internal carotid artery      Past Surgical History:   Procedure Laterality Date    CARDIAC SURGERY      COLONOSCOPY      CORONARY ANGIOPLASTY WITH STENT PLACEMENT      TUBAL LIGATION Right Torn Rotator Cuff    2013 @ [facility]       Social History:    reports that she has never smoked. She has never used smokeless tobacco.    Family History:  No family history on file. Medications:  ALL MEDICATIONS HAVE BEEN REVIEWED:  Scheduled:   warfarin placeholder: dosing by pharmacy   Other RX Placeholder    carvedilol  3.125 mg Oral BID WC    atorvastatin  80 mg Oral Daily    oyster shell calcium w/D  1 tablet Oral BID    famotidine  20 mg Oral BID    furosemide  20 mg Oral Daily    potassium chloride  10 mEq Oral Daily    sodium chloride flush  5-40 mL IntraVENous 2 times per day    spironolactone  50 mg Oral BID    NIFEdipine  90 mg Oral Daily    losartan  100 mg Oral Daily     Continuous:   sodium chloride Stopped (06/20/22 1905)     PRN:diphenhydrAMINE, potassium chloride **OR** potassium alternative oral replacement **OR** potassium chloride, butalbital-acetaminophen-caffeine, sodium chloride flush, sodium chloride, ondansetron **OR** ondansetron, polyethylene glycol, acetaminophen **OR** acetaminophen    Allergies: Allergies   Allergen Reactions    Clonidine Rash, Shortness Of Breath and Hives    Codeine Hives, Itching, Nausea Only and Rash     Other reaction(s): Other (See Comments)  Pruritis    Hydrocodone-Acetaminophen      Itchy      Lisinopril Hives and Itching    Tramadol     Percocet [Oxycodone-Acetaminophen] Itching     Patient takes percocet with benadryl to control the itching       Review of Systems:  Constitutional: Negative for fever, chills, fatigue. Skin:  Negative for pruritis, rash  Eyes: Negative for photophobia and visual disturbance. ENT:  Negative for rhinorrhea, epistaxis, sore throat  Respiratory:  Negative for cough and shortness of breath. Cardiovascular: Negative for chest pain. Gastrointestinal: Negative for nausea, vomiting, diarrhea. Genitourinary: Negative for dysuria and difficulty urinating.    Neurological: Negative for confusion, dysarthria, tremors, seizures. Psychiatric:  Negative for depression or anxiety  Musculoskeletal:  Positive for neck pain. Objective:  Vitals:    06/22/22 0909   BP: (!) 134/92   Pulse: 98   Resp: 16   Temp: 97.9 °F (36.6 °C)   SpO2: 95%      Physical Examination:  GENERAL: No apparent distress, well-nourished  SKIN:  Warm and dry  EYES: Nonicteric. ENT: Mucous membranes moist  HEAD: Normocephalic, atraumatic  RESPIRATORY: Resp easy and unlabored  CARDIOVASCULAR: Regular rate and rhythm  GI: Abdomen soft, nontender  NEURO: Awake and alert. No speech defect  PSYCHIATRIC: Appropriate affect; not agitated  MUSCULOSKELETAL:  NEck/left shoulder  Inspection: On exam there are no ulcerations, rashes or lesions about the neck or let shoulder. There is pain to palpation of the left proximal scapular and trapezius and cervical spine. No swelling or erythema. Diffuse muscular tightness noted. Motor: 5/5 strength with ER/abductors of shoulder, biceps, triceps, writ flexors and extensors and all digits bilaterally. No clonus. (-) Spurlings sign. Sensation: Grossly intact to light touch throughout the bilateral upper extremities. Vascular:  2+ left radial pulse.     Labs reviewed:  Recent Labs     06/19/22  1710 06/20/22  0207 06/21/22  0629   WBC 5.4 4.7 10.8   HGB 13.3 12.6 11.8*   HCT 40.1 38.9 36.5    120* 128*     Recent Labs     06/20/22  0207 06/21/22  0629 06/22/22 0627    140 139   K 3.1* 4.2 3.7    105 102   CO2 23 26 29   BUN 11 17 16   CREATININE 0.7 0.9 0.6   GLUCOSE 184* 118* 89   CALCIUM 9.7 9.7 9.4   MG 1.80  --   --      Recent Labs     06/20/22  0208 06/21/22  0629 06/22/22  0627   INR 2.27* 3.10* 2.85*   PROTIME 25.1* 32.2* 30.1*       Lab Results   Component Value Date    COLORU YELLOW 02/19/2021    CLARITYU Clear 02/19/2021    PHUR 5.5 02/19/2021    GLUCOSEU Negative 02/19/2021    BLOODU Negative 02/19/2021    LEUKOCYTESUR TRACE (A) 02/19/2021    BILIRUBINUR Negative 02/19/2021    UROBILINOGEN 1.0 02/19/2021    RBCUA 1 02/19/2021    WBCUA 2 02/19/2021    BACTERIA Rare (A) 05/14/2016       Imaging:  MRI BRAIN WO CONTRAST   Final Result   1. No acute intracranial abnormality. No acute infarct. 2. Minimal global parenchymal volume loss with mild-to-moderate chronic   microvascular ischemic changes. 3. There are small chronic infarcts involving the left cerebellum as well as   the left kosta. CTA HEAD W WO CONTRAST   Final Result   1. No acute intracranial abnormality. 2. Stable mild chronic white matter microischemic changes and small focus of   left cerebellar encephalomalacia in keeping with sequela of prior infarct. 3. Unremarkable CTA of the head. XR CHEST PORTABLE   Final Result   No acute process. Stable cardiomegaly             IMPRESSION:  Myofascial pain  Principal Problem:    Acute intractable headache  Active Problems:    Headache, migraine, intractable, with status migrainosus    Dyslipidemia    PAF (paroxysmal atrial fibrillation) (HCC)    Permanent atrial fibrillation (HCC)    HTN (hypertension), benign    Coronary artery disease involving native coronary artery of native heart without angina pectoris  Resolved Problems:    * No resolved hospital problems. *      RECOMMENDATIONS:  The patient was reassured. Will trial tizanidine x 1 now with script for d/c. Can consider outpt PT as well. - Trial heat and massage as outpt  - DVT prophylaxis: warfarin per primary team  - Dispo: per primary team    Patient denies tobacco use. I have reviewed imaging and plan with Dr. Shi Oliver.       ADAN Sims - CNP  6/22/2022  9:24 AM

## 2022-06-22 NOTE — PROGRESS NOTES
CLINICAL PHARMACY NOTE: MEDS TO BEDS    Total # of Prescriptions Filled: 2   The following medications were delivered to the patient:  · Carvedilol 3.125 mg  · Losartan 100 mg    Additional Documentation:    Delivered to Patient=Signed  Ok to be delivered per [address]

## 2022-06-25 NOTE — DISCHARGE SUMMARY
Hospital Medicine Discharge Summary    Patient ID: Tash Martinez      Patient's PCP: No primary care provider on file. Admit Date: 6/19/2022     Discharge Date: 6/22/2022     Admitting Physician: Da Caballero MD     Discharge Physician: Philly Mitchell MD        Active Hospital Problems    Diagnosis     Headache, migraine, intractable, with status migrainosus [G43.911]      Priority: Medium    Dyslipidemia [E78.5]      Priority: Medium    PAF (paroxysmal atrial fibrillation) ([facility] [address] [I48.0]      Priority: Medium    Acute intractable headache [R51.9]      Priority: Medium    Coronary artery disease involving native coronary artery of native heart without angina pectoris [I25.10]     HTN (hypertension), benign [I10]     Permanent atrial fibrillation [facility]) [I48.21]          Hospital Course: This 73-year-old female with PMHx of CVA, A. fib, CAD, Hx of remote Bell's palsy, chronic diastolic heart failure, hypertension, Hx of left eye visual disturbance presented with 3-4 days of intractable headache      New onset intractable headache: Likely new onset migraine/occipital neuralgia. Resolved  CT head and CT head and neck negative for any acute pathology. Neurology was consulted and patient underwent MRI head negative for any acute pathology but showed mild to moderate chronic microvascular ischemic changes and small chronic infarct involving the left cerebellum as well as left found. Headache resolved with as needed Tylenol and Fioricet    Hypertension: Continue home dose of losartan, nifedipine, Lasix and spironolactone. Was on atenolol; but has not filled prescription since January and 2022. Started on Coreg for better BP control. HCTZ discontinued due to hypokalemia.      Hx of A. fib: Continue Coreg and Coumadin    Hyperlipidemia: Continue statins      Physical Exam Performed:     BP (!) 147/101   Pulse 97   Temp 97.9 °F (36.6 °C) (Oral)   Resp 18   Ht 5' 7\" (1.702 m)   Wt 185 lb 3.2 Disp-90 tablet, R-1Normal      furosemide (LASIX) 20 MG tablet TAKE 1 TABLET BY MOUTH once DAILY, Disp-90 tablet, R-0Normal      potassium chloride (KLOR-CON M) 20 MEQ extended release tablet Take 0.5 tablets by mouth daily, Disp-90 tablet, R-1Normal      Magnesium 500 MG CAPS Take 400 mg by mouth daily, Disp-90 capsule, R-3Print      famotidine (PEPCID) 20 MG tablet Take 1 tablet by mouth 2 times daily, Disp-60 tablet, R-2Normal      atorvastatin (LIPITOR) 80 MG tablet Take 1 tablet by mouth daily, Disp-90 tablet, R-1Normal      acetaminophen (TYLENOL) 500 MG tablet Take 2 tablets by mouth every 6 hours as needed for Pain, Disp-120 tablet, R-3Normal      Calcium Carb-Cholecalciferol (CALCIUM + D3) 600-200 MG-UNIT TABS tablet Take 1 tablet by mouth 2 times daily, Disp-120 tablet,R-3Normal           The patient was seen and examined on day of discharge and this discharge summary is in conjunction with any daily progress note from day of discharge. Time Spent on discharge is 45 minutes  in the examination, evaluation, counseling and review of medications and discharge plan. Note that greater  than 30 minutes was spent in preparing discharge papers, discussing discharge with patient, medication review, etc.     Signed:    James Patel MD   6/25/2022      Thank you No primary care provider on file. for the opportunity to be involved in this patient's care. If you have any questions or concerns please feel free to contact me at [phone].

## 2022-06-29 ENCOUNTER — HOSPITAL ENCOUNTER (OUTPATIENT)
Dept: PHYSICAL THERAPY | Age: 72
Setting detail: THERAPIES SERIES
Discharge: HOME | End: 2022-06-29
Payer: MEDICARE

## 2022-06-29 ENCOUNTER — TELEPHONE (OUTPATIENT)
Dept: PHARMACY | Age: 72
End: 2022-06-29

## 2022-06-29 NOTE — FLOWSHEET NOTE
Physical Therapy  Cancellation/No-show Note  Patient Name:  Rebecca Degroot  :  1950   Date:  2022  Cancelled visits to date: 0  No-shows to date: 5    Patient status for today's appointment patient:  []  Cancelled  []  Rescheduled appointment  [x]  No-show , , , ,      Reason given by patient:  []  Patient ill  []  Conflicting appointment  []  No transportation    []  Conflict with work  []  No reason given  []  Other:     Comments:      Phone call information:   [x]  Phone call made today to patient at  number provided: [phone]     []  Patient answered, conversation as follows:  Missed appt today, next scheduled appt 6/15/2022 3:00 PM    [x]  Patient did not answer, message left as follows:  [x]  Phone call not made today  []  Phone call not needed - pt contacted us to cancel and provided reason for cancellation.      Electronically signed by:  Michelle Keith PTA

## 2022-06-30 ENCOUNTER — OFFICE VISIT (OUTPATIENT)
Dept: ORTHOPEDIC SURGERY | Age: 72
End: 2022-06-30
Payer: MEDICARE

## 2022-06-30 ENCOUNTER — ANTI-COAG VISIT (OUTPATIENT)
Dept: PHARMACY | Age: 72
End: 2022-06-30
Payer: MEDICARE

## 2022-06-30 VITALS — HEIGHT: 67 IN | WEIGHT: 185 LBS | BODY MASS INDEX: 29.03 KG/M2

## 2022-06-30 DIAGNOSIS — M17.12 PRIMARY OSTEOARTHRITIS OF LEFT KNEE: Primary | ICD-10-CM

## 2022-06-30 DIAGNOSIS — I48.21 PERMANENT ATRIAL FIBRILLATION (HCC): Primary | ICD-10-CM

## 2022-06-30 DIAGNOSIS — M17.11 PRIMARY OSTEOARTHRITIS OF RIGHT KNEE: ICD-10-CM

## 2022-06-30 LAB — INTERNATIONAL NORMALIZATION RATIO, POC: 1.6

## 2022-06-30 PROCEDURE — 85610 PROTHROMBIN TIME: CPT

## 2022-06-30 PROCEDURE — 20610 DRAIN/INJ JOINT/BURSA W/O US: CPT | Performed by: ORTHOPAEDIC SURGERY

## 2022-06-30 PROCEDURE — 99211 OFF/OP EST MAY X REQ PHY/QHP: CPT

## 2022-06-30 RX ORDER — LIDOCAINE HYDROCHLORIDE 10 MG/ML
40 INJECTION, SOLUTION INFILTRATION; PERINEURAL ONCE
Status: COMPLETED | OUTPATIENT
Start: 2022-06-30 | End: 2022-06-30

## 2022-06-30 RX ORDER — TRIAMCINOLONE ACETONIDE 40 MG/ML
40 INJECTION, SUSPENSION INTRA-ARTICULAR; INTRAMUSCULAR ONCE
Status: COMPLETED | OUTPATIENT
Start: 2022-06-30 | End: 2022-06-30

## 2022-06-30 RX ADMIN — LIDOCAINE HYDROCHLORIDE 40 MG: 10 INJECTION, SOLUTION INFILTRATION; PERINEURAL at 14:21

## 2022-06-30 RX ADMIN — LIDOCAINE HYDROCHLORIDE 40 MG: 10 INJECTION, SOLUTION INFILTRATION; PERINEURAL at 14:22

## 2022-06-30 RX ADMIN — TRIAMCINOLONE ACETONIDE 40 MG: 40 INJECTION, SUSPENSION INTRA-ARTICULAR; INTRAMUSCULAR at 14:23
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
## 2022-07-05 NOTE — PROGRESS NOTES
[address]     Outpatient Follow Up Note    CHIEF COMPLAINT / HPI:  Follow Up secondary to hypertension    Subjective:   José Xiong is 80 y.o. female who presents today with a history of permanent afib, dHF, HTN, CAD, . Interim Hx: Admitted 6/19/22-6/22/22 for intractable headache and hypertension. Atenolol was changed to coreg for better BP control. HCTZ was stopped d/t hypokalemia. Today, Ms Nidhi Valencia says she isn't feeling well. Says she didn't take her medications yet today but has been compliant overall. She complains of worsening chest pressure, shortness of breath, headache, and ringing in ears. /158 on recheck today in office. Ms Donna Benitez says she still has these symptoms even when she is taking her medications consistently. Swelling has improve. With regard to medication therapy the patient has been compliant with prescribed regimen. They have tolerated therapy to date.      Past Medical History:   Diagnosis Date    Acute cerebrovascular accident (CVA) ([address] 1/28/2020    Atrial fibrillation ([address]     Bell palsy     diagnosed 15 years ago   [facility] CAD (coronary artery disease)     Cerebral artery occlusion with cerebral infarction ([address]     Chronic diastolic CHF (congestive heart failure) ([address] 5/16/2020    CVA (cerebral vascular accident) ([address] 1/4/2017    Hypertension     Intracranial hemorrhage ([address] 4/2016    Nonintractable headache     Nontraumatic subcortical hemorrhage of cerebral hemisphere ([address] 4/30/2016    Primary osteoarthritis of right knee 3/18/2022    Sudden visual loss of left eye     Thyroid nodule 2/8/2018    TIA involving right internal carotid artery      Social History:    Social History     Tobacco Use   Smoking Status Never Smoker   Smokeless Tobacco Never Used     Current Medications:  Current Outpatient Medications   Medication Sig Dispense Refill    tiZANidine (ZANAFLEX) 4 MG tablet Take 1 tablet by mouth 3 times daily as needed (neck pain) 20 tablet 0    losartan (COZAAR) 100 MG tablet Take 1 tablet by mouth daily 30 tablet 1    carvedilol (COREG) 3.125 MG tablet Take 1 tablet by mouth 2 times daily (with meals) 60 tablet 2    spironolactone (ALDACTONE) 50 MG tablet Take 1 tablet by mouth 2 times daily TAKE 1 TABLET BY MOUTH DAILY 90 tablet 1    warfarin (COUMADIN) 5 MG tablet Take 5 mg by mouth daily 5 mg everyday but on tuesdays 0.5 tablet      NIFEdipine (PROCARDIA XL) 90 MG extended release tablet Take 1 tablet by mouth daily 90 tablet 1    furosemide (LASIX) 20 MG tablet TAKE 1 TABLET BY MOUTH once DAILY 90 tablet 0    potassium chloride (KLOR-CON M) 20 MEQ extended release tablet Take 0.5 tablets by mouth daily 90 tablet 1    Magnesium 500 MG CAPS Take 400 mg by mouth daily 90 capsule 3    famotidine (PEPCID) 20 MG tablet Take 1 tablet by mouth 2 times daily 60 tablet 2    atorvastatin (LIPITOR) 80 MG tablet Take 1 tablet by mouth daily 90 tablet 1    acetaminophen (TYLENOL) 500 MG tablet Take 2 tablets by mouth every 6 hours as needed for Pain (Patient not taking: Reported on 6/8/2022) 120 tablet 3    Calcium Carb-Cholecalciferol (CALCIUM + D3) 600-200 MG-UNIT TABS tablet Take 1 tablet by mouth 2 times daily 120 tablet 3     No current facility-administered medications for this visit. REVIEW OF SYSTEMS:    CONSTITUTIONAL: No major weight gain or loss, fatigue, weakness, night sweats or fever. HEENT: No new vision difficulties or ringing in the ears. RESPIRATORY: No new PND, orthopnea or cough. + worsening exertional SOB  CARDIOVASCULAR: See HPI  GI: No nausea, vomiting, diarrhea, constipation, abdominal pain or changes in bowel habits. : No urinary frequency, urgency, incontinence hematuria or dysuria. SKIN: No cyanosis or skin lesions. MUSCULOSKELETAL: No new muscle or joint pain. NEUROLOGICAL: No syncope or TIA-like symptoms.  + headache   PSYCHIATRIC: No anxiety, pain, insomnia or depression    Objective:   PHYSICAL EXAM: Wt Readings from Last 3 Encounters:   06/30/22 185 lb (83.9 kg)   06/21/22 185 lb 3.2 oz (84 kg)   06/08/22 176 lb 9.6 oz (80.1 kg)          VITALS:  BP (!) 203/163 (Site: Right Upper Arm, Position: Sitting, Cuff Size: Medium Adult)   Pulse 80   Ht 5' 7\" (1.702 m)   Wt 180 lb (81.6 kg)   SpO2 100%   BMI 28.19 kg/m²   CONSTITUTIONAL: Cooperative, no apparent distress, and appears well nourished / developed +overweight   NEUROLOGIC:  Awake and orientated to person, place and time. PSYCH: Calm affect. SKIN: Warm and dry. HEENT: Sclera non-icteric, normocephalic, neck supple, no elevation of JVP, normal carotid pulses with no bruits and thyroid normal size. LUNGS:  No increased work of breathing and clear to auscultation, no crackles or wheezing  CARDIOVASCULAR:  Regular rate and rhythm with no murmurs, gallops, rubs, or abnormal heart sounds, normal PMI. The apical impulses not displaced  Heart tones are crisp and normal  Cervical veins are not engorged  The carotid upstroke is normal in amplitude and contour without delay or bruit  JVP is not elevated  ABDOMEN:  Normal bowel sounds, non-distended and non-tender to palpation  EXT: + 1 BLE edema , no calf tenderness. Pulses are present bilaterally.     DATA:    Lab Results   Component Value Date    ALT 15 06/19/2022    AST 25 06/19/2022    ALKPHOS 75 06/19/2022    BILITOT 0.8 06/19/2022     Lab Results   Component Value Date    CREATININE 0.6 06/22/2022    BUN 16 06/22/2022     06/22/2022    K 3.7 06/22/2022     06/22/2022    CO2 29 06/22/2022     No results found for: TSH, X2ZXSJT, I0UGAQV, THYROIDAB  Lab Results   Component Value Date    WBC 10.8 06/21/2022    HGB 11.8 (L) 06/21/2022    HCT 36.5 06/21/2022    MCV 90.4 06/21/2022     (L) 06/21/2022     No components found for: CHLPL  Lab Results   Component Value Date    TRIG 45 06/21/2022    TRIG 92 06/09/2022    TRIG 79 08/12/2021     Lab Results   Component Value Date    HDL 51 2022    HDL 57 2022    HDL 47 2021     Lab Results   Component Value Date    LDLCALC 83 2022    LDLCALC 111 (H) 2022    LDLCALC 102 (H) 2021     Lab Results   Component Value Date    LABVLDL 9 2022    LABVLDL 18 2022    LABVLDL 16 2021      No results found for: BNP  Radiology Review:  Pertinent images / reports were reviewed as a part of this visit and reveals the following:    Last Echo: 2018   Summary   Normal left ventricle size and systolic function with an estimated ejection   fraction of 65-70%. No regional wall motion abnormalities are noted. There is mild to moderate concentric left ventricular hypertrophy. Diastolic filling parameters suggests grade III diastolic dysfunction . Moderate mitral regurgitation is present. There is moderate tricuspid regurgitation with RVSP estimated at 43 mmHg. At least moderate biatrial dilatation. MEGHAN 3/11/2019   Summary   Normal left ventricle size and systolic function with an estimated ejection   fraction of 60%. No regional wall motion abnormalities are noted.   There is moderate concentric left ventricular hypertrophy.      The left atrium is dilated. There is no evidence of mass or thrombus in the   left atrium or appendage     Last Stress Test: 10/12/2020       Summary    *Abnormal baseline and exercise EKG with inferolateral TWI, consider    ischemia    *Preserved LV wall motion and function with EF of 65%    *SPECT Imaging with homogeneous tracer distribution throughout the    myocardium with stress and rest     Last Angiogram: 9/23/15  LVEDP - 12. LVgram - severe LVH with EF 70%, enlarged AO root consistent with htn  Cors: LM - nl, LAD - 20% prox, 30%mid, patent stent, distal irreg, LCX - 20% mid    Last EC22  Atrial fibrillation   -Anteroseptal infarct -age undetermined.      [id number] Score for Atrial Fibrillation Stroke Risk   Risk   Factors  Component Value   C CHF Yes 1   H HTN Yes 1   A2 Age >= 76 No,  (70 y.o.) 0   D DM No 0   S2 Prior Stroke/TIA No 0   V Vascular Disease No 0   A Age 74-69 Yes,  (70 y.o.) 1   Sc Sex female 1    [id number]  Score  4   Score last updated 6/8/22 9:44 AM EDT    Click here for a link to the UpToDate guideline \"Atrial Fibrillation: Anticoagulation therapy to prevent embolization    Disclaimer: Risk Score calculation is dependent on accuracy of patient problem list and past encounter diagnosis. Assessment:      Diagnosis Orders   1. Atrial fibrillation, unspecified type (HCC)   ~ stable, denies palpitations   ~ hx of hemorragic stroke 2018> on warfarin (INR managed through coumadin clinic)  ~ follows with EP, refused Watchman in the past     2. Coronary artery disease involving native coronary artery of native heart without angina pectoris   Chest pressure   ~ denies anginal equivalent but does complain of worseningchest pain (even when taking medications consistently at home)   ~ GXT 10/12/20 homogeneous tracer distrubution throughout the myocardium with stress and rest    ~ [facility] 9/2015 patent stent LAD, nonobstructive disease  ~ [facility] 2000 PCI to LAD   ~ statin, BB, no ASA d/t warfarin     3. Chronic diastolic congestive heart failure  ~ appears compensated  ~ Echo 2/8/2018 EF 65-70%, grade III DD  ~ spironolactone, losartan     4. Essential hypertension   ~ uncontrolled; didn't take medications today  ~ + headache / chest pressure   ~ 202/158 on recheck today in office     5. Mixed hyperlipidemia   ~ atorvastatin 80  ~ Lipids 8/12/21  HDL 47  Trig 79     6. SOB (shortness of breath)   ~ improved since resuming medications  ~ denies weight gain   ~ echo 2/8/2018 EF 65-70%, grade III DD  ~ has been out of diuretics       I had the opportunity to review the clinical symptoms and presentation of Aaron Martinez. Plan:     /158 on recheck today in office. Patient will be escorted to ED.  Called and spoke to Juan A finney in ER and notified her of patient's history. Overall the patient is stable from CV standpoint    I have addresed the patient's cardiac risk factors and adjusted pharmacologic treatment as needed. In addition, I have reinforced the need for patient directed risk factor modification. Further evaluation will be based upon the patient's clinical course and testing results. All questions and concerns were addressed to the patient     The patient is not currently smoking. The risks related to smoking were reviewed with the patient. Recommend maintaining a smoke-free lifestyle. Patient is on a beta-blocker  Patient is on an ace-i/ARB  Patient is on a statin    Dual anti-coagulation has not been recommended / prescribed for this patient. Education conducted on adverse reactions including bleeding was discussed. Daily weight, low sodium diet were discussed. Patient instructed to call the office with a weight gain: > 3 # over night or 5# in one week; swelling, SOB/orthopnea/PND    The patient verbalizes understanding not to stop medications without discussing with us. Discussed exercise: 30-60 minutes 7 days/week  Discussed Low saturated fat/FERNANDO diet. Thank you for allowing to us to participate in the care of Lawrence Glenroy.     Electronically signed by ADAN Wang CNP on 7/5/2022 at 2:14 PM     Documentation of today's visit sent to PCP

## 2022-07-06 ENCOUNTER — APPOINTMENT (OUTPATIENT)
Dept: CT IMAGING | Age: 72
End: 2022-07-06
Payer: MEDICARE

## 2022-07-06 ENCOUNTER — APPOINTMENT (OUTPATIENT)
Dept: GENERAL RADIOLOGY | Age: 72
End: 2022-07-06
Payer: MEDICARE

## 2022-07-06 ENCOUNTER — HOSPITAL ENCOUNTER (EMERGENCY)
Age: 72
Discharge: HOME | End: 2022-07-06
Attending: EMERGENCY MEDICINE
Payer: MEDICARE

## 2022-07-06 ENCOUNTER — OFFICE VISIT (OUTPATIENT)
Dept: CARDIOLOGY CLINIC | Age: 72
End: 2022-07-06
Payer: MEDICARE

## 2022-07-06 ENCOUNTER — TELEPHONE (OUTPATIENT)
Dept: OTHER | Facility: CLINIC | Age: 72
End: 2022-07-06

## 2022-07-06 ENCOUNTER — HOSPITAL ENCOUNTER (OUTPATIENT)
Dept: NON INVASIVE DIAGNOSTICS | Age: 72
Discharge: HOME | End: 2022-07-06
Payer: MEDICARE

## 2022-07-06 VITALS
BODY MASS INDEX: 28.25 KG/M2 | WEIGHT: 180 LBS | SYSTOLIC BLOOD PRESSURE: 203 MMHG | HEART RATE: 80 BPM | HEIGHT: 67 IN | OXYGEN SATURATION: 100 % | DIASTOLIC BLOOD PRESSURE: 163 MMHG

## 2022-07-06 VITALS
OXYGEN SATURATION: 97 % | HEART RATE: 84 BPM | DIASTOLIC BLOOD PRESSURE: 110 MMHG | SYSTOLIC BLOOD PRESSURE: 149 MMHG | WEIGHT: 180 LBS | TEMPERATURE: 98.2 F | BODY MASS INDEX: 28.19 KG/M2 | RESPIRATION RATE: 21 BRPM

## 2022-07-06 DIAGNOSIS — I48.0 PAF (PAROXYSMAL ATRIAL FIBRILLATION) (HCC): Primary | ICD-10-CM

## 2022-07-06 DIAGNOSIS — R07.9 CHEST PAIN, UNSPECIFIED TYPE: ICD-10-CM

## 2022-07-06 DIAGNOSIS — I10 ESSENTIAL HYPERTENSION: ICD-10-CM

## 2022-07-06 DIAGNOSIS — R06.02 SOB (SHORTNESS OF BREATH): ICD-10-CM

## 2022-07-06 DIAGNOSIS — E78.2 MIXED HYPERLIPIDEMIA: ICD-10-CM

## 2022-07-06 DIAGNOSIS — I25.10 CORONARY ARTERY DISEASE INVOLVING NATIVE CORONARY ARTERY OF NATIVE HEART WITHOUT ANGINA PECTORIS: Chronic | ICD-10-CM

## 2022-07-06 DIAGNOSIS — I16.0 HYPERTENSIVE URGENCY: Primary | ICD-10-CM

## 2022-07-06 LAB
A/G RATIO: 1.2 (ref 1.1–2.2)
ALBUMIN SERPL-MCNC: 4.3 G/DL (ref 3.4–5)
ALP BLD-CCNC: 60 U/L (ref 40–129)
ALT SERPL-CCNC: 16 U/L (ref 10–40)
ANION GAP SERPL CALCULATED.3IONS-SCNC: 8 MMOL/L (ref 3–16)
AST SERPL-CCNC: 20 U/L (ref 15–37)
BACTERIA: ABNORMAL /HPF
BASOPHILS ABSOLUTE: 0.2 K/UL (ref 0–0.2)
BASOPHILS RELATIVE PERCENT: 2 %
BILIRUB SERPL-MCNC: 0.9 MG/DL (ref 0–1)
BILIRUBIN URINE: NEGATIVE
BLOOD, URINE: NEGATIVE
BUN BLDV-MCNC: 10 MG/DL (ref 7–20)
CALCIUM SERPL-MCNC: 9.6 MG/DL (ref 8.3–10.6)
CHLORIDE BLD-SCNC: 102 MMOL/L (ref 99–110)
CLARITY: CLEAR
CO2: 25 MMOL/L (ref 21–32)
COLOR: YELLOW
CREAT SERPL-MCNC: 0.5 MG/DL (ref 0.6–1.2)
EOSINOPHILS ABSOLUTE: 0 K/UL (ref 0–0.6)
EOSINOPHILS RELATIVE PERCENT: 0.6 %
EPITHELIAL CELLS, UA: 3 /HPF (ref 0–5)
GFR AFRICAN AMERICAN: >60
GFR NON-AFRICAN AMERICAN: >60
GLUCOSE BLD-MCNC: 99 MG/DL (ref 70–99)
GLUCOSE URINE: NEGATIVE MG/DL
HCT VFR BLD CALC: 43.5 % (ref 36–48)
HEMOGLOBIN: 14 G/DL (ref 12–16)
HYALINE CASTS: 0 /LPF (ref 0–8)
INR BLD: 2.55 (ref 0.87–1.14)
KETONES, URINE: NEGATIVE MG/DL
LACTIC ACID: 1 MMOL/L (ref 0.4–2)
LEUKOCYTE ESTERASE, URINE: ABNORMAL
LIPASE: 22 U/L (ref 13–60)
LV EF: 63 %
LVEF MODALITY: NORMAL
LYMPHOCYTES ABSOLUTE: 1.6 K/UL (ref 1–5.1)
LYMPHOCYTES RELATIVE PERCENT: 21.1 %
MCH RBC QN AUTO: 29.5 PG (ref 26–34)
MCHC RBC AUTO-ENTMCNC: 32.2 G/DL (ref 31–36)
MCV RBC AUTO: 91.5 FL (ref 80–100)
MICROSCOPIC EXAMINATION: YES
MONOCYTES ABSOLUTE: 0.5 K/UL (ref 0–1.3)
MONOCYTES RELATIVE PERCENT: 6 %
NEUTROPHILS ABSOLUTE: 5.3 K/UL (ref 1.7–7.7)
NEUTROPHILS RELATIVE PERCENT: 70.3 %
NITRITE, URINE: NEGATIVE
PDW BLD-RTO: 13.7 % (ref 12.4–15.4)
PH UA: 6.5 (ref 5–8)
PLATELET # BLD: 145 K/UL (ref 135–450)
PMV BLD AUTO: 9.7 FL (ref 5–10.5)
POTASSIUM REFLEX MAGNESIUM: 3.9 MMOL/L (ref 3.5–5.1)
PRO-BNP: 813 PG/ML (ref 0–124)
PROTEIN UA: NEGATIVE MG/DL
PROTHROMBIN TIME: 27.5 SEC (ref 11.7–14.5)
RBC # BLD: 4.76 M/UL (ref 4–5.2)
RBC UA: 1 /HPF (ref 0–4)
SODIUM BLD-SCNC: 135 MMOL/L (ref 136–145)
SPECIFIC GRAVITY UA: 1.01 (ref 1–1.03)
TOTAL PROTEIN: 7.8 G/DL (ref 6.4–8.2)
TROPONIN: <0.01 NG/ML
URINE REFLEX TO CULTURE: ABNORMAL
URINE TYPE: ABNORMAL
UROBILINOGEN, URINE: 0.2 E.U./DL
WBC # BLD: 7.6 K/UL (ref 4–11)
WBC UA: 6 /HPF (ref 0–5)

## 2022-07-06 PROCEDURE — 93005 ELECTROCARDIOGRAM TRACING: CPT | Performed by: EMERGENCY MEDICINE

## 2022-07-06 PROCEDURE — 81001 URINALYSIS AUTO W/SCOPE: CPT

## 2022-07-06 PROCEDURE — 85610 PROTHROMBIN TIME: CPT

## 2022-07-06 PROCEDURE — 83690 ASSAY OF LIPASE: CPT

## 2022-07-06 PROCEDURE — 80053 COMPREHEN METABOLIC PANEL: CPT

## 2022-07-06 PROCEDURE — 70450 CT HEAD/BRAIN W/O DYE: CPT

## 2022-07-06 PROCEDURE — 84484 ASSAY OF TROPONIN QUANT: CPT

## 2022-07-06 PROCEDURE — 83605 ASSAY OF LACTIC ACID: CPT

## 2022-07-06 PROCEDURE — 93306 TTE W/DOPPLER COMPLETE: CPT

## 2022-07-06 PROCEDURE — 6370000000 HC RX 637 (ALT 250 FOR IP): Performed by: EMERGENCY MEDICINE

## 2022-07-06 PROCEDURE — 83880 ASSAY OF NATRIURETIC PEPTIDE: CPT

## 2022-07-06 PROCEDURE — 85025 COMPLETE CBC W/AUTO DIFF WBC: CPT

## 2022-07-06 PROCEDURE — 99215 OFFICE O/P EST HI 40 MIN: CPT | Performed by: NURSE PRACTITIONER

## 2022-07-06 PROCEDURE — 71046 X-RAY EXAM CHEST 2 VIEWS: CPT

## 2022-07-06 PROCEDURE — 99285 EMERGENCY DEPT VISIT HI MDM: CPT

## 2022-07-06 PROCEDURE — 1123F ACP DISCUSS/DSCN MKR DOCD: CPT | Performed by: NURSE PRACTITIONER

## 2022-07-06 PROCEDURE — 93000 ELECTROCARDIOGRAM COMPLETE: CPT | Performed by: NURSE PRACTITIONER

## 2022-07-06 RX ORDER — FUROSEMIDE 40 MG/1
20 TABLET ORAL ONCE
Status: COMPLETED | OUTPATIENT
Start: 2022-07-06 | End: 2022-07-06

## 2022-07-06 RX ORDER — ACETAMINOPHEN 500 MG
1000 TABLET ORAL ONCE
Status: COMPLETED | OUTPATIENT
Start: 2022-07-06 | End: 2022-07-06

## 2022-07-06 RX ORDER — SPIRONOLACTONE 25 MG/1
50 TABLET ORAL ONCE
Status: COMPLETED | OUTPATIENT
Start: 2022-07-06 | End: 2022-07-06

## 2022-07-06 RX ORDER — LOSARTAN POTASSIUM 25 MG/1
100 TABLET ORAL ONCE
Status: COMPLETED | OUTPATIENT
Start: 2022-07-06 | End: 2022-07-06

## 2022-07-06 RX ORDER — NIFEDIPINE 30 MG/1
90 TABLET, FILM COATED, EXTENDED RELEASE ORAL ONCE
Status: COMPLETED | OUTPATIENT
Start: 2022-07-06 | End: 2022-07-06

## 2022-07-06 RX ORDER — CARVEDILOL 3.12 MG/1
3.12 TABLET ORAL ONCE
Status: COMPLETED | OUTPATIENT
Start: 2022-07-06 | End: 2022-07-06

## 2022-07-06 RX ADMIN — SPIRONOLACTONE 50 MG: 25 TABLET ORAL at 16:47

## 2022-07-06 RX ADMIN — CARVEDILOL 3.12 MG: 3.12 TABLET, FILM COATED ORAL at 16:48

## 2022-07-06 RX ADMIN — LOSARTAN POTASSIUM 100 MG: 25 TABLET, FILM COATED ORAL at 16:47

## 2022-07-06 RX ADMIN — FUROSEMIDE 20 MG: 40 TABLET ORAL at 16:48

## 2022-07-06 RX ADMIN — NIFEDIPINE 90 MG: 30 TABLET, EXTENDED RELEASE ORAL at 17:43

## 2022-07-06 RX ADMIN — ACETAMINOPHEN 1000 MG: 500 TABLET ORAL at 16:46

## 2022-07-06 ASSESSMENT — PAIN SCALES - GENERAL
PAINLEVEL_OUTOF10: 6
PAINLEVEL_OUTOF10: 6

## 2022-07-06 ASSESSMENT — PAIN - FUNCTIONAL ASSESSMENT: PAIN_FUNCTIONAL_ASSESSMENT: 0-10

## 2022-07-06 NOTE — TELEPHONE ENCOUNTER
Writer contacted ED provider Todd DEL RIO  to inform of 30 day readmission risk. ED provider informed writer of readmission.     Call Back: If you need to call back to inform of disposition you can contact me at [phone]

## 2022-07-06 NOTE — ED PROVIDER NOTES
[facility]        Pt Name: Bruce Peralta  MRN: [id number]  Linettegfzeny 1950  Date of evaluation: 7/6/2022  Provider: KIM Vázquez  PCP: No primary care provider on file. Note Started: 3:38 PM EDT     Patient was seen with my attending physician Dr. Stacia Nava MD    [facility]   Patient presents with    Hypertension     Pt was seen at cardiac clinic this am and was sent here for high BP. 217/142 in triage. States she did not have time to take any of her BP meds today. Denies chest pain    Headache    Shortness of Breath       HISTORY OF PRESENT ILLNESS   (Location, Timing/Onset, Context/Setting, Quality, Duration, Modifying Factors, Severity, Associated Signs and Symptoms)  Note limiting factors. Chief Complaint: Elevated blood pressure    Bruce Peralta is a 80 y.o. female who presents to the emergency department due to elevated blood pressure while she was at her cardiologist clinic today. Patient states that she did not take any of her blood pressure medications today. Patient states that she also has a headache today. Patient denies any history of migraine headaches in the past.  Patient states that she has a history of stroke approximately 10 years ago. Patient is also noticed some shortness of breath that is more than her normal.  Patient has a history of A. fib and coronary artery disease with 2 stents placement approximately 15 years ago. Patient states that her headaches and other symptoms started about late afternoon yesterday. Patient states that she had an echocardiogram at her cardiologist office today. Patient states that she is on warfarin for blood thinners. And is on multiple antihypertensive medication but again states that she did not take them today. Nursing Notes were all reviewed and agreed with or any disagreements were addressed in the HPI.     REVIEW OF SYSTEMS    (2-9 systems for level 4, 10 or more for level 5)     Review of Systems    Positives and Pertinent negatives as per HPI. Except as noted above in the ROS, all other systems were reviewed and negative.        PAST MEDICAL HISTORY     Past Medical History:   Diagnosis Date    Acute cerebrovascular accident (CVA) ([facility] 1/28/2020    Atrial fibrillation ([facility]     Bell palsy     diagnosed 15 years ago   Atlee Ethan CAD (coronary artery disease)     Cerebral artery occlusion with cerebral infarction ([facility] [facility]     Chronic diastolic CHF (congestive heart failure) ([address] 5/16/2020    CVA (cerebral vascular accident) ([address] 1/4/2017    Hypertension     Intracranial hemorrhage ([address] 4/2016    Mixed hyperlipidemia 7/6/2022    Nonintractable headache     Nontraumatic subcortical hemorrhage of cerebral hemisphere ([facility] [address] 4/30/2016    Primary osteoarthritis of right knee 3/18/2022    Sudden visual loss of left eye     Thyroid nodule 2/8/2018    TIA involving right internal carotid artery          SURGICAL HISTORY     Past Surgical History:   Procedure Laterality Date    CARDIAC SURGERY      COLONOSCOPY      CORONARY ANGIOPLASTY WITH STENT PLACEMENT      TUBAL LIGATION Right Torn Rotator Cuff    2013 @ [address]       Discharge Medication List as of 7/6/2022  7:15 PM      CONTINUE these medications which have NOT CHANGED    Details   tiZANidine (ZANAFLEX) 4 MG tablet Take 1 tablet by mouth 3 times daily as needed (neck pain), Disp-20 tablet, R-0Print      losartan (COZAAR) 100 MG tablet Take 1 tablet by mouth daily, Disp-30 tablet, R-1Normal      carvedilol (COREG) 3.125 MG tablet Take 1 tablet by mouth 2 times daily (with meals), Disp-60 tablet, R-2Normal      spironolactone (ALDACTONE) 50 MG tablet Take 1 tablet by mouth 2 times daily TAKE 1 TABLET BY MOUTH DAILY, Disp-90 tablet, R-1Normal      warfarin (COUMADIN) 5 MG tablet Take 5 mg by mouth daily 5 mg everyday but on tuesdays 0.5 tabletHistorical Med      NIFEdipine (PROCARDIA XL) 90 MG extended release tablet Take 1 tablet by mouth daily, Disp-90 tablet, R-1Normal      furosemide (LASIX) 20 MG tablet TAKE 1 TABLET BY MOUTH once DAILY, Disp-90 tablet, R-0Normal      potassium chloride (KLOR-CON M) 20 MEQ extended release tablet Take 0.5 tablets by mouth daily, Disp-90 tablet, R-1Normal      Magnesium 500 MG CAPS Take 400 mg by mouth daily, Disp-90 capsule, R-3Print      famotidine (PEPCID) 20 MG tablet Take 1 tablet by mouth 2 times daily, Disp-60 tablet, R-2Normal      atorvastatin (LIPITOR) 80 MG tablet Take 1 tablet by mouth daily, Disp-90 tablet, R-1Normal      acetaminophen (TYLENOL) 500 MG tablet Take 2 tablets by mouth every 6 hours as needed for Pain, Disp-120 tablet, R-3Normal      Calcium Carb-Cholecalciferol (CALCIUM + D3) 600-200 MG-UNIT TABS tablet Take 1 tablet by mouth 2 times daily, Disp-120 tablet,R-3Normal               ALLERGIES     Clonidine, Codeine, Hydrocodone-acetaminophen, Lisinopril, Tramadol, and Percocet [oxycodone-acetaminophen]    FAMILYHISTORY     No family history on file. SOCIAL HISTORY       Social History     Tobacco Use    Smoking status: Never Smoker    Smokeless tobacco: Never Used   Vaping Use    Vaping Use: Never used   Substance Use Topics    Alcohol use: No    Drug use: No       SCREENINGS   NIH Stroke Scale  Interval: Baseline  Level of Consciousness (1a): Alert  LOC Questions (1b): Answers both correctly  LOC Commands (1c): Performs both tasks correctly  Best Gaze (2): Normal  Visual (3): No visual loss  Facial Palsy (4): Normal symmetrical movement  Motor Arm, Left (5a): No drift  Motor Arm, Right (5b): No drift  Motor Leg, Left (6a): No drift  Motor Leg, Right (6b):  No drift  Limb Ataxia (7): Absent  Sensory (8): Normal  Best Language (9): No aphasia  Dysarthria (10): Normal  Extinction and Inattention (11): No abnormality  Total: 0Glasgow Coma Scale  Eye Opening: Spontaneous  Best Verbal Response: Oriented  Best Motor Response: Obeys commands  Bj Coma Scale Score: 15        PHYSICAL EXAM    (up to 7 for level 4, 8 or more for level 5)     ED Triage Vitals [07/06/22 1246]   BP Temp Temp Source Heart Rate Resp SpO2 Height Weight   (!) 217/142 98.2 °F (36.8 °C) Oral 81 18 97 % -- 180 lb (81.6 kg)       Physical Exam  Vitals and nursing note reviewed. Constitutional:       General: She is not in acute distress. Appearance: She is well-developed. She is not ill-appearing. HENT:      Head: Normocephalic. Eyes:      Pupils: Pupils are equal, round, and reactive to light. Neck:      Thyroid: No thyromegaly. Cardiovascular:      Rate and Rhythm: Normal rate and regular rhythm. Pulses: Normal pulses. Heart sounds: Normal heart sounds. No murmur heard. No friction rub. No gallop. Pulmonary:      Effort: Pulmonary effort is normal.      Breath sounds: Normal breath sounds. No decreased breath sounds, wheezing, rhonchi or rales. Chest:      Chest wall: No mass, deformity, tenderness, crepitus or edema. There is no dullness to percussion. Abdominal:      General: Bowel sounds are normal.      Palpations: Abdomen is soft. There is no mass. Tenderness: There is no abdominal tenderness. Musculoskeletal:      Cervical back: Normal range of motion. Right lower leg: No tenderness. No edema. Left lower leg: No tenderness. No edema. Neurological:      General: No focal deficit present. Mental Status: She is alert and oriented to person, place, and time. GCS: GCS eye subscore is 4. GCS verbal subscore is 5. GCS motor subscore is 6. Cranial Nerves: Cranial nerves are intact. Motor: Motor function is intact. Coordination: Coordination is intact. Gait: Gait is intact.    Psychiatric:         Mood and Affect: Mood normal.         Behavior: Behavior normal.         DIAGNOSTIC RESULTS   LABS:    Labs Reviewed   COMPREHENSIVE METABOLIC PANEL W/ REFLEX TO MG FOR LOW K - Abnormal; Notable for the following components:       Result Value    Sodium 135 (*)     CREATININE 0.5 (*)     All other components within normal limits   URINALYSIS WITH REFLEX TO CULTURE - Abnormal; Notable for the following components:    Leukocyte Esterase, Urine SMALL (*)     All other components within normal limits   BRAIN NATRIURETIC PEPTIDE - Abnormal; Notable for the following components:    Pro- (*)     All other components within normal limits   PROTIME-INR - Abnormal; Notable for the following components:    Protime 27.5 (*)     INR 2.55 (*)     All other components within normal limits   MICROSCOPIC URINALYSIS - Abnormal; Notable for the following components:    WBC, UA 6 (*)     All other components within normal limits   CBC WITH AUTO DIFFERENTIAL   LACTIC ACID   LIPASE   TROPONIN       When ordered only abnormal lab results are displayed. All other labs were within normal range or not returned as of this dictation. EKG: When ordered, EKG's are interpreted by the Emergency Department Physician in the absence of a cardiologist.  Please see their note for interpretation of EKG. RADIOLOGY:   Non-plain film images such as CT, Ultrasound and MRI are read by the radiologist. Plain radiographic images are visualized and preliminarily interpreted by the ED Provider with the below findings:        Interpretation per the Radiologist below, if available at the time of this note:    XR CHEST (2 VW)   Final Result   Clear lungs. Cardiomegaly. CT Head WO Contrast   Final Result   No acute intracranial abnormality. Chronic microvascular ischemic changes.            Echo 2D w doppler w color complete    Result Date: 7/6/2022  Transthoracic Echocardiography Report (TTE)  Demographics   Patient Name       Mary Waggoner   Date of Study      07/06/2022         Gender              Female   Patient Number     [id number]         Date of Birth 1950   Visit Number       [id number]          Age                 80 year(s)   Accession Number   [id number]         Room Number         OP   Corporate ID       [id number]            Sonographer         ISMAEL Decker RVT                                                            RUST   Ordering Physician Dario Stafford MD,                     APRN-CNP           Physician           [facility] [place], [address]  Procedure Type of Study   TTE procedure:ECHOCARDIOGRAM COMPLETE 2D W DOPPLER W COLOR. Procedure Date Date: 07/06/2022 Start: 09:38 AM Study Location: [facility] - Echo Lab Technical Quality: Adequate visualization Indications:Dyspnea/SOB. Patient Status: Routine Height: 67 inches Weight: 185 pounds BSA: 1.96 m2 BMI: 28.98 kg/m2 BP: 147/101 mmHg  Conclusions   Summary  Normal left ventricle size, wall thickness, and systolic function with an  estimated ejection fraction of 60-65%. No regional wall motion abnormalities  are seen. Indeterminate diastolic function. The left atrium is severely dilated. The right atrium is moderately dilated. The aortic root is mildly dilated. Moderate mitral regurgitation is present. Mild to moderate tricuspid regurgitation. Mild pulmonic regurgitation present. Signature   ------------------------------------------------------------------  Electronically signed by Scottie Cabrera MD, [facility] [place], [address]  (Interpreting physician) on 07/06/2022 at 02:03 PM  ------------------------------------------------------------------   Findings   Left Ventricle  Normal left ventricle size, wall thickness, and systolic function with an  estimated ejection fraction of 60-65%. No regional wall motion abnormalities  are seen.   Indeterminate diastolic function. Mitral Valve  Mitral valve mildly thickened with no evidence of mitral stenosis. Moderate mitral regurgitation is present. Left Atrium  The left atrium is severely dilated. Aortic Valve  Aortic sclerosis with no significant stenosis. No evidence of aortic valve  regurgitation. Aorta  The aortic root is mildly dilated. The ascending aorta is normal in size. Right Ventricle  The right ventricle is normal in size and function. Tricuspid Valve  Tricuspid valve structurally normal. No evidence of tricuspid stenosis. Mild to moderate tricuspid regurgitation. RVSP estimated to be 42 mmHg. Right Atrium  The right atrium is moderately dilated. Pulmonic Valve  The pulmonic valve is structurally normal.  Mild pulmonic regurgitation present. No evidence of pulmonic valve stenosis. Pericardial Effusion  No pericardial effusion noted. Pleural Effusion  No pleural effusion. Miscellaneous  The inferior vena cava appears normal in size with normal respiratory  variation.   M-Mode/2D Measurements (cm)   LV Diastolic Dimension: 2.64 cm LV Systolic Dimension: 3.1 cm  LV Septum Diastolic: 6.94 cm  LV PW Diastolic: 0.80 cm        AO Root Dimension: 4 cm                                  LA Dimension: 5.2 cm                                  LA Area: 28.65 cm2  LVOT: 2 cm                      LA volume/Index: 104.47 ml /53 ml/m2  Doppler Measurements   AV Peak Velocity: 102 cm/s  AV Peak Gradient: 4.16 mmHg  AV Mean Gradient: 2 mmHg      MV Max P mmHg  LVOT Peak Velocity: 88.3 cm/s MV Vmax:636 cm/s  AV Area (Continuity):2.59 cm2   TR Velocity:314 cm/s  TR Gradient:39.44 mmHg        Area (PISA): 0.05 cm2  Estimated RAP:3 mmHg          MELINDA PISA: 0.05 cm2  Estimated RVSP: 42 mmHg       MR PISA Radius: 0.4 cm   Aortic Valve   Peak Velocity: 102 cm/s     Mean Velocity: 67.5 cm/s  Peak Gradient: 4.16 mmHg    Mean Gradient: 2 mmHg  Area (continuity): 2.59 cm2  AV VTI: 20.4 cm  Aorta   Aortic Root: 4 cm Ascending Aorta: 3.5 cm  LVOT Diameter: 2 cm      XR CHEST (2 VW)    Result Date: 7/6/2022  EXAMINATION: TWO XRAY VIEWS OF THE CHEST 7/6/2022 1:07 pm COMPARISON: June 19, 2022 HISTORY: ORDERING SYSTEM PROVIDED HISTORY: chest pain, SOB TECHNOLOGIST PROVIDED HISTORY: Reason for exam:->chest pain, SOB FINDINGS: No infiltrate or consolidation or effusion is identified. There is moderate to severe cardiomegaly and a tortuous thoracic aorta. Clear lungs. Cardiomegaly. CT Head WO Contrast    Result Date: 7/6/2022  EXAMINATION: CT OF THE HEAD WITHOUT CONTRAST  7/6/2022 1:19 pm TECHNIQUE: CT of the head was performed without the administration of intravenous contrast. Automated exposure control, iterative reconstruction, and/or weight based adjustment of the mA/kV was utilized to reduce the radiation dose to as low as reasonably achievable. COMPARISON: 06/19/2022 HISTORY: ORDERING SYSTEM PROVIDED HISTORY: elevated bp, headache TECHNOLOGIST PROVIDED HISTORY: Reason for exam:->elevated bp, headache Has a \"code stroke\" or \"stroke alert\" been called? ->No Decision Support Exception - unselect if not a suspected or confirmed emergency medical condition->Emergency Medical Condition (MA) Reason for Exam: Hypertension (Pt was seen at cardiac clinic this am and was sent here for high BP. 217/142 in triage. States she did not have time to take any of her BP meds today. Denies chest pain) FINDINGS: BRAIN/VENTRICLES: There is no acute intracranial hemorrhage, mass effect or midline shift. No abnormal extra-axial fluid collection. Lucencies within the periventricular and subcortical white matter likely represent chronic microvascular ischemic changes. The gray-white differentiation is maintained without evidence of an acute infarct. There is no evidence of hydrocephalus. ORBITS: The visualized portion of the orbits demonstrate no acute abnormality.  SINUSES: The visualized paranasal sinuses and mastoid air cells demonstrate no acute abnormality. SOFT TISSUES/SKULL:  No acute abnormality of the visualized skull or soft tissues. No acute intracranial abnormality. Chronic microvascular ischemic changes. PROCEDURES   Unless otherwise noted below, none     Procedures    CRITICAL CARE TIME       CONSULTS:  None      EMERGENCY DEPARTMENT COURSE and DIFFERENTIAL DIAGNOSIS/MDM:   Vitals:    Vitals:    07/06/22 1645 07/06/22 1648 07/06/22 1715 07/06/22 1815   BP: (!) 201/152 (!) 201/152 (!) 180/136 (!) 149/110   Pulse: 81 84 82 84   Resp: 19 16 21   Temp:       TempSrc:       SpO2:       Weight:           Patient was given the following medications:  Medications   acetaminophen (TYLENOL) tablet 1,000 mg (1,000 mg Oral Given 7/6/22 1646)   carvedilol (COREG) tablet 3.125 mg (3.125 mg Oral Given 7/6/22 1648)   furosemide (LASIX) tablet 20 mg (20 mg Oral Given 7/6/22 1648)   losartan (COZAAR) tablet 100 mg (100 mg Oral Given 7/6/22 1647)   NIFEdipine (ADALAT CC) extended release tablet 90 mg (90 mg Oral Given 7/6/22 1743)   spironolactone (ALDACTONE) tablet 50 mg (50 mg Oral Given 7/6/22 1647)         Is this patient to be included in the SEP-1 Core Measure due to severe sepsis or septic shock? No   Exclusion criteria - the patient is NOT to be included for SEP-1 Core Measure due to: Infection is not suspected    28-year-old female presents to the emergency department due to elevated blood pressure and headache. Patient states that she has had a stroke in the past approximate 10 years ago. Patient states that she did not take her blood pressure medication today. Patient states that she has not taken anything for her headache today. Patient denies any acute symptoms at this time other than the headache. Patient's neurological exam is reassuring without any acute focal neurological deficits.   Here in the Emergency Department CBC CMP, EKG, troponin, BNP EKG and chest x-ray were ordered to fully work-up hypertensive emergency versus urgency. CT scan of the head was also ordered given the patient's headache history of stroke and elevated blood pressure today. All test came back unremarkable for any acute abnormalities. Patient was given her hypertensive medications that she forgot to take this morning and her blood pressure improved. Patient appears well on exam.  Patient NIH score was 0. Patient was given Tylenol for headache and states that this did help improve her headache while here in the emergency department. Patient be discharged at this time to follow-up with her primary care doctor in 1 week for recheck. Patient instructed to continue to take her blood pressure medication as prescribed. Patient understands agrees this plan all questions were answered at time of discharge. FINAL IMPRESSION      1. Hypertensive urgency          DISPOSITION/PLAN   DISPOSITION Decision To Discharge 07/06/2022 07:12:34 PM      PATIENT REFERRED TO:  No follow-up provider specified.     DISCHARGE MEDICATIONS:  Discharge Medication List as of 7/6/2022  7:15 PM          DISCONTINUED MEDICATIONS:  Discharge Medication List as of 7/6/2022  7:15 PM                 (Please note that portions of this note were completed with a voice recognition program.  Efforts were made to edit the dictations but occasionally words are mis-transcribed.)    KIM Parks (electronically signed)            KIM Parks  07/06/22 4126

## 2022-07-07 NOTE — ED PROVIDER NOTES
In addition to the advanced practice provider, I personally saw Anu Mike and performed a substantive portion of the visit including all aspects of the medical decision making. Medical Decision Making  Patient was sent to the ED with hypertension from her cardiologist's office after arriving there with significantly elevated BP. She has not taken her blood pressure medications today. Her exam was normal. Her BNP is elevated to 813, but this is around her baseline BNP leak in comparison to previous records. She does not have signs, symptoms, or other findings suggestive of CHF. Her exam and work up reveals no evidence of end organ damage. Specifically, she does not have blurred or changed vision, she has no neuro deficits, her EKG does not show ischemic change or change associated with heart strain, and her kidney function is normal.    EKG  The Ekg interpreted by me in the absence of a cardiologist shows. atrial fibrillation with a rate of 73  Axis is   Left axis deviation  QTc is  normal  Intervals and Durations are unremarkable. No specific ST-T wave changes appreciated. No evidence of acute ischemia. No significant change from prior EKG dated 6/19/2022    Screenings  NIH Stroke Scale  Interval: Baseline  Level of Consciousness (1a): Alert  LOC Questions (1b): Answers both correctly  LOC Commands (1c): Performs both tasks correctly  Best Gaze (2): Normal  Visual (3): No visual loss  Facial Palsy (4): Normal symmetrical movement  Motor Arm, Left (5a): No drift  Motor Arm, Right (5b): No drift  Motor Leg, Left (6a): No drift  Motor Leg, Right (6b): No drift  Limb Ataxia (7): Absent  Sensory (8): Normal  Best Language (9): No aphasia  Dysarthria (10): Normal  Extinction and Inattention (11): No abnormality  Total: 0       FINAL IMPRESSION  1. Hypertensive urgency        Blood pressure (!) 149/110, pulse 84, temperature 98.2 °F (36.8 °C), temperature source Oral, resp.  rate 21, weight 180 lb (81.6 kg), SpO2 97 %, not currently breastfeeding.      For further details of [facility] emergency department encounter, please see documentation by advanced practice provider, Radha Reed, [address] MD  07/11/22 0724

## 2022-07-08 LAB
EKG ATRIAL RATE: 441 BPM
EKG DIAGNOSIS: NORMAL
EKG Q-T INTERVAL: 390 MS
EKG QRS DURATION: 88 MS
EKG QTC CALCULATION (BAZETT): 429 MS
EKG R AXIS: -20 DEGREES
EKG T AXIS: -14 DEGREES
EKG VENTRICULAR RATE: 73 BPM

## 2022-07-08 PROCEDURE — 93010 ELECTROCARDIOGRAM REPORT: CPT | Performed by: INTERNAL MEDICINE

## 2022-07-12 ENCOUNTER — TELEPHONE (OUTPATIENT)
Dept: PHARMACY | Age: 72
End: 2022-07-12

## 2022-07-12 NOTE — PROGRESS NOTES
CHIEF COMPLAINT: Bilateral R>L knee pain/osteoarthritis. HISTORY:  Ms. Arturo Schultz 80 y.o.  female presents today for follow-up of bilateral knee pain. She had a right knee cortisone injection on 3/17/2022 and left knee on 3/31/2022 and had good improvement. She states that now her bilateral knee pain is worsening and she would like to have a cortisone injection in her bilateral knee at this point. She was initially seen on 3/17/2022 as a consultation request from Alyssa Ying MD for evaluation of bilateral R>L knee pain which started months ago and worsening. She is complaining of achy pain. Rates pain a 8/10 VAS achy and intermittent and moderate. Pain is increase with standing and walking and decrease with rest. Pain is sharp early in the morning with first few steps, dull achy pain by the end of the day. Alleviating factors: rest. No radiation and no numbness and tingling sensation. No other complaint. No h/o trauma or gout. She has had no treatment for this problem. She initially saw PCP who got Xray of the right knee and referred her here. Denies smoking.      Past Medical History:   Diagnosis Date    Acute cerebrovascular accident (CVA) ([address] 1/28/2020    Atrial fibrillation (HCC)     Bell palsy     diagnosed 15 years ago   [employer] CAD (coronary artery disease)     Cerebral artery occlusion with cerebral infarction ([address]     Chronic diastolic CHF (congestive heart failure) ([address] 5/16/2020    CVA (cerebral vascular accident) ([address] 1/4/2017    Hypertension     Intracranial hemorrhage ([address] 4/2016    Mixed hyperlipidemia 7/6/2022    Nonintractable headache     Nontraumatic subcortical hemorrhage of cerebral hemisphere ([address] 4/30/2016    Primary osteoarthritis of right knee 3/18/2022    Sudden visual loss of left eye     Thyroid nodule 2/8/2018    TIA involving right internal carotid artery        Past Surgical History:   Procedure Laterality Date    CARDIAC SURGERY      COLONOSCOPY  CORONARY ANGIOPLASTY WITH STENT PLACEMENT      TUBAL LIGATION Right Torn Rotator Cuff    2013 @ [facility]       Social History     Socioeconomic History    Marital status:      Spouse name: Jessica Garcia Number of children: 3    Years of education: 15    Highest education level: Not on file   Occupational History    Not on file   Tobacco Use    Smoking status: Never Smoker    Smokeless tobacco: Never Used   Vaping Use    Vaping Use: Never used   Substance and Sexual Activity    Alcohol use: No    Drug use: No    Sexual activity: Yes     Partners: Male   Other Topics Concern    Not on file   Social History Narrative    Not on file     Social Determinants of Health     Financial Resource Strain:     Difficulty of Paying Living Expenses: Not on file   Food Insecurity:     Worried About Running Out of Food in the Last Year: Not on file    [address] of Food in the Last Year: Not on file   Transportation Needs:     Lack of Transportation (Medical): Not on file    Lack of Transportation (Non-Medical):  Not on file   Physical Activity:     Days of Exercise per Week: Not on file    Minutes of Exercise per Session: Not on file   Stress:     Feeling of Stress : Not on file   Social Connections:     Frequency of Communication with Friends and Family: Not on file    Frequency of Social Gatherings with Friends and Family: Not on file    Attends Restoration Services: Not on file    Active Member of Clubs or Organizations: Not on file    Attends Club or Organization Meetings: Not on file    Marital Status: Not on file   Intimate Partner Violence:     Fear of Current or Ex-Partner: Not on file    Emotionally Abused: Not on file    Physically Abused: Not on file    Sexually Abused: Not on file   Housing Stability:     Unable to Pay for Housing in the Last Year: Not on file    Number of [place] in the Last Year: Not on file    Unstable Housing in the Last Year: Not on file       No family history on file. Current Outpatient Medications on File Prior to Visit   Medication Sig Dispense Refill    tiZANidine (ZANAFLEX) 4 MG tablet Take 1 tablet by mouth 3 times daily as needed (neck pain) 20 tablet 0    losartan (COZAAR) 100 MG tablet Take 1 tablet by mouth daily 30 tablet 1    carvedilol (COREG) 3.125 MG tablet Take 1 tablet by mouth 2 times daily (with meals) 60 tablet 2    spironolactone (ALDACTONE) 50 MG tablet Take 1 tablet by mouth 2 times daily TAKE 1 TABLET BY MOUTH DAILY 90 tablet 1    warfarin (COUMADIN) 5 MG tablet Take 5 mg by mouth daily 5 mg everyday but on tuesdays 0.5 tablet      NIFEdipine (PROCARDIA XL) 90 MG extended release tablet Take 1 tablet by mouth daily 90 tablet 1    furosemide (LASIX) 20 MG tablet TAKE 1 TABLET BY MOUTH once DAILY 90 tablet 0    potassium chloride (KLOR-CON M) 20 MEQ extended release tablet Take 0.5 tablets by mouth daily 90 tablet 1    Magnesium 500 MG CAPS Take 400 mg by mouth daily 90 capsule 3    famotidine (PEPCID) 20 MG tablet Take 1 tablet by mouth 2 times daily 60 tablet 2    atorvastatin (LIPITOR) 80 MG tablet Take 1 tablet by mouth daily 90 tablet 1    acetaminophen (TYLENOL) 500 MG tablet Take 2 tablets by mouth every 6 hours as needed for Pain (Patient not taking: Reported on 6/8/2022) 120 tablet 3    Calcium Carb-Cholecalciferol (CALCIUM + D3) 600-200 MG-UNIT TABS tablet Take 1 tablet by mouth 2 times daily 120 tablet 3     No current facility-administered medications on file prior to visit. Pertinent items are noted in HPI  Review of systems reviewed from Patient History Form and available in the patient's chart under the Media tab. PHYSICAL EXAMINATION:  Ms. Jaimie Cosme is a very pleasant 80 y.o.  female who presents today in no acute distress, awake, alert, and oriented. She is well dressed, nourished and  groomed. Patient with normal affect.   Height is  5' 7\" (1.702 m), weight is 185 lb (83.9 kg), Body mass Carloz Smith MD

## 2022-07-18 ENCOUNTER — ANTI-COAG VISIT (OUTPATIENT)
Dept: PHARMACY | Age: 72
End: 2022-07-18
Payer: MEDICARE

## 2022-07-18 DIAGNOSIS — I48.21 PERMANENT ATRIAL FIBRILLATION (HCC): Primary | ICD-10-CM

## 2022-07-18 LAB — INTERNATIONAL NORMALIZATION RATIO, POC: 2.7

## 2022-07-18 PROCEDURE — 85610 PROTHROMBIN TIME: CPT

## 2022-07-18 PROCEDURE — 99211 OFF/OP EST MAY X REQ PHY/QHP: CPT
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
## 2022-08-09 ENCOUNTER — ANTI-COAG VISIT (OUTPATIENT)
Dept: PHARMACY | Age: 72
End: 2022-08-09
Payer: MEDICARE

## 2022-08-09 DIAGNOSIS — I48.21 PERMANENT ATRIAL FIBRILLATION (HCC): Primary | ICD-10-CM

## 2022-08-09 LAB — INTERNATIONAL NORMALIZATION RATIO, POC: 3.4

## 2022-08-09 PROCEDURE — 99212 OFFICE O/P EST SF 10 MIN: CPT

## 2022-08-09 PROCEDURE — 85610 PROTHROMBIN TIME: CPT

## 2022-08-09 NOTE — PROGRESS NOTES
Ms. Rosey Vergara is a 80 y.o. y/o female with history of A fib   She presents today for anticoagulation monitoring and adjustment. 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)    Patient Reported Findings:  Yes     No   [x]   [x]       Patient verifies current dosing regimen as listed -pt states she takes 5mg on MWF and Sun and 2.5mg Tues Thurs and Sat ---> confirms dose --> pt take 2.5 mg on Tues and Thurs changed AVS to match---> confirmed   []   [x]       S/S bleeding/bruising/swelling/SOB -denies  []   [x]       Blood in urine or stool - denies   []   [x]       Procedures scheduled in the future at this time - Is being assessed for watchman, will keep notified---> had cortisone shot in knee last fri --> no changes   []   [x]       Missed Dose-  Denies     []   [x]       Extra Dose - denies    []   [x]       Change in medications She continues with Tylenol 1000mg but only as needed --> states that she will take up to q6h---> inc tylenol to 3x/day --> less tylenol, once a day --> no changes, no tylenol recently --> She is taking tylenol for knee pain --> no changes   []   [x]       Change in health/diet/appetite-- normally has high vitamin K diet of canned spinach weekly and collards or broccoli about every other week. Will have some lower vitamin K veggies about 2 times a week such as green beans. --> states that she has been eating liver twice a week, likely why INR has dropped. Asked patient to decrease to once a week --> states that she has not been eating as much.  Stopped eating liver ---> 1/2 cup of greens yesterday, stopped eating liver---> no greens, wants to add greens in once/week, no NVD---> continues to eat salad once/week, no NVD---> no greens, but wants to have some tomorrow, no NVD --> eating less greens; ~ 1 servings over the last week --> no changes, no NVD (no vit k this week) --> had spinach yesterday --> per pt loves liver and had it in the last week---> states no liver, had spinach once, no NVD --> same---> no greens, no liver, no NVD --> no changes   []   [x]       Change in alcohol use denies  []   [x]       Change in activity  []   [x]       Hospital admission-  [x]   []       Emergency department visit On 7/6 For Hypertensive urgency/elevated blood pressure. Patient to f/u with PCP. INR 2.55 in ED  []   [x]       Other complaints- states that she is using the pillbox, and she likes it. -> has not been using recently, but wants to restart ---> states she tried to use pillbox, asked to use pillbox and AVS and to cross off each day on AVS to prevent her from missing doses. Concerned for patient's ability to correctly remember recent history. She states that she is getting concerned for her memory. Clinical Outcomes:  Yes     No  []   [x]       Major bleeding event brain bleed 8/2021  []   [x]       Thromboembolic event  Duration of warfarin Therapy: indefinitely  INR Range:  1.8-2.5 -> lower INR goal dt h/o subcortical hemorrhage (2016)    She may be slower to reach steady state with warfarin dosing so consider checking INR about 10 days after dose adjustment. INR is 3.4 today  Hold dose tomorrow then continue 2.5mg on Tuesdays only and 5mg all other days. Asked pt to use paperwork to check off doses and bring back to next appt. --> pt did not bring in AVS but no missed doses.  --> did not bring avs, asked to use and bring back --> did not bring but confirmed dose    Recheck INR in 2 weeks on 8/23    Patient consent signed 11/2/21    Referring cardiologist is Dr. Bettye Saenz  INR (no units)   Date Value   07/06/2022 2.55 (H)   06/22/2022 2.85 (H)   06/21/2022 3.10 (H)   06/20/2022 2.27 (H)     INR,(POC) (no units)   Date Value   07/18/2022 2.7   06/30/2022 1.6       For Pharmacy Admin Tracking Only    Intervention Detail: Dose Adjustment: 1, reason: Therapy Optimization  Total # of Interventions Recommended: 1  Total # of Interventions Accepted: 1  Time Spent (min): 15

## 2022-08-23 ENCOUNTER — ANTI-COAG VISIT (OUTPATIENT)
Dept: PHARMACY | Age: 72
End: 2022-08-23
Payer: MEDICARE

## 2022-08-23 DIAGNOSIS — I48.21 PERMANENT ATRIAL FIBRILLATION (HCC): Primary | ICD-10-CM

## 2022-08-23 LAB — INTERNATIONAL NORMALIZATION RATIO, POC: 1.5

## 2022-08-23 PROCEDURE — 85610 PROTHROMBIN TIME: CPT

## 2022-08-23 PROCEDURE — 99211 OFF/OP EST MAY X REQ PHY/QHP: CPT

## 2022-08-23 NOTE — PROGRESS NOTES
Ms. Jessy Harley is a 80 y.o. y/o female with history of A fib   She presents today for anticoagulation monitoring and adjustment. 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)    Patient Reported Findings:  Yes     No   [x]   []       Patient verifies current dosing regimen as listed -pt states she takes 5mg on MWF and Sun and 2.5mg Tues Thurs and Sat ---> confirms dose --> pt take 2.5 mg on Tues and Thurs changed AVS to match---> confirmed   []   [x]       S/S bleeding/bruising/swelling/SOB -denies  []   [x]       Blood in urine or stool - denies   []   [x]       Procedures scheduled in the future at this time - Is being assessed for watchman, will keep notified---> had cortisone shot in knee last fri --> no changes   []   [x]       Missed Dose-  Denies     []   [x]       Extra Dose - denies    []   [x]       Change in medications She continues with Tylenol 1000mg but only as needed --> states that she will take up to q6h---> inc tylenol to 3x/day --> less tylenol, once a day --> no changes, no tylenol recently --> She is taking tylenol for knee pain --> no changes   []   [x]       Change in health/diet/appetite-- normally has high vitamin K diet of canned spinach weekly and collards or broccoli about every other week. Will have some lower vitamin K veggies about 2 times a week such as green beans. --> states that she has been eating liver twice a week, likely why INR has dropped. Asked patient to decrease to once a week --> states that she has not been eating as much.  Stopped eating liver ---> 1/2 cup of greens yesterday, stopped eating liver---> no greens, wants to add greens in once/week, no NVD---> continues to eat salad once/week, no NVD---> no greens, but wants to have some tomorrow, no NVD --> eating less greens; ~ 1 servings over the last week --> no changes, no NVD (no vit k this week) --> had spinach yesterday --> per pt loves liver and had it in the last week---> states no liver, had spinach once, no NVD --> same---> no greens, no liver, no NVD --> no changes   []   [x]       Change in alcohol use denies  []   [x]       Change in activity  []   [x]       Hospital admission-  [x]   []       Emergency department visit On 7/6 For Hypertensive urgency/elevated blood pressure. Patient to f/u with PCP. INR 2.55 in ED  []   [x]       Other complaints- states that she is using the pillbox, and she likes it. -> has not been using recently, but wants to restart ---> states she tried to use pillbox, asked to use pillbox and AVS and to cross off each day on AVS to prevent her from missing doses. Concerned for patient's ability to correctly remember recent history. She states that she is getting concerned for her memory. Clinical Outcomes:  Yes     No  []   [x]       Major bleeding event brain bleed 8/2021  []   [x]       Thromboembolic event  Duration of warfarin Therapy: indefinitely  INR Range:  1.8-2.5 -> lower INR goal dt h/o subcortical hemorrhage (2016)    She may be slower to reach steady state with warfarin dosing so consider checking INR about 10 days after dose adjustment. INR is  today 1.5  Already had dose today. Take 7.5 mg tomorrow, then take 5mg daily. Asked pt to use paperwork to check off doses and bring back to next appt. --> pt did not bring in AVS but no missed doses.  --> did not bring avs, asked to use and bring back --> did not bring but confirmed dose    Recheck INR in 2 weeks on 9/7    Patient consent signed 11/2/21    Referring cardiologist is Dr. Sharon Wilson  INR (no units)   Date Value   07/06/2022 2.55 (H)   06/22/2022 2.85 (H)   06/21/2022 3.10 (H)   06/20/2022 2.27 (H)     INR,(POC) (no units)   Date Value   08/09/2022 3.4   07/18/2022 2.7   06/30/2022 1.6       For Pharmacy Admin Tracking Only    Intervention Detail: Dose Adjustment: 1, reason: Therapy Optimization  Total # of Interventions Recommended: 1  Total # of Interventions Accepted: 1  Time Spent (min): 15

## 2022-09-07 ENCOUNTER — ANTI-COAG VISIT (OUTPATIENT)
Dept: PHARMACY | Age: 72
End: 2022-09-07
Payer: MEDICARE

## 2022-09-07 DIAGNOSIS — I48.21 PERMANENT ATRIAL FIBRILLATION (HCC): Primary | ICD-10-CM

## 2022-09-07 LAB — INTERNATIONAL NORMALIZATION RATIO, POC: 2.7

## 2022-09-07 PROCEDURE — 99211 OFF/OP EST MAY X REQ PHY/QHP: CPT

## 2022-09-07 PROCEDURE — 85610 PROTHROMBIN TIME: CPT

## 2022-09-07 NOTE — PROGRESS NOTES
once, no NVD --> same---> no greens, no liver, no NVD --> no changes   []   [x]       Change in alcohol use denies  []   [x]       Change in activity  []   [x]       Hospital admission-  [x]   []       Emergency department visit On 7/6 For Hypertensive urgency/elevated blood pressure. Patient to f/u with PCP. INR 2.55 in ED  []   [x]       Other complaints- states that she is using the pillbox, and she likes it. -> has not been using recently, but wants to restart ---> states she tried to use pillbox, asked to use pillbox and AVS and to cross off each day on AVS to prevent her from missing doses. Concerned for patient's ability to correctly remember recent history. She states that she is getting concerned for her memory. Clinical Outcomes:  Yes     No  []   [x]       Major bleeding event brain bleed 8/2021  []   [x]       Thromboembolic event  Duration of warfarin Therapy: indefinitely  INR Range:  1.8-2.5 -> lower INR goal dt h/o subcortical hemorrhage (2016)    She may be slower to reach steady state with warfarin dosing so consider checking INR about 10 days after dose adjustment. INR is 2.7 today  Continue 2.5mg on Tuesdays only and 5mg all other days. Asked pt to use paperwork to check off doses and bring back to next appt. --> pt did not bring in AVS but no missed doses.  --> did not bring avs, asked to use and bring back --> did not bring but confirmed dose  Recheck INR in 3 weeks on 9/28    Patient consent signed 11/2/21    Referring cardiologist is Dr. Kathy Jennings  INR (no units)   Date Value   07/06/2022 2.55 (H)   06/22/2022 2.85 (H)   06/21/2022 3.10 (H)   06/20/2022 2.27 (H)     INR,(POC) (no units)   Date Value   08/23/2022 1.5   08/09/2022 3.4   07/18/2022 2.7   06/30/2022 1.6       For Pharmacy Admin Tracking Only    Total # of Interventions Recommended: 0  Total # of Interventions Accepted: 0  Time Spent (min): 15

## 2022-10-11 ENCOUNTER — TELEPHONE (OUTPATIENT)
Dept: PHARMACY | Age: 72
End: 2022-10-11

## 2022-10-19 ENCOUNTER — APPOINTMENT (OUTPATIENT)
Dept: CT IMAGING | Age: 72
DRG: 872 | End: 2022-10-19
Payer: MEDICARE

## 2022-10-19 ENCOUNTER — APPOINTMENT (OUTPATIENT)
Dept: GENERAL RADIOLOGY | Age: 72
DRG: 872 | End: 2022-10-19
Payer: MEDICARE

## 2022-10-19 ENCOUNTER — HOSPITAL ENCOUNTER (INPATIENT)
Age: 72
LOS: 3 days | Discharge: HOSPICE | DRG: 872 | End: 2022-10-22
Attending: FAMILY MEDICINE | Admitting: FAMILY MEDICINE
Payer: MEDICARE

## 2022-10-19 DIAGNOSIS — R29.6 MULTIPLE FALLS: Primary | ICD-10-CM

## 2022-10-19 DIAGNOSIS — E83.42 HYPOMAGNESEMIA: ICD-10-CM

## 2022-10-19 DIAGNOSIS — I48.91 ATRIAL FIBRILLATION, UNSPECIFIED TYPE (HCC): Chronic | ICD-10-CM

## 2022-10-19 DIAGNOSIS — R26.2 UNABLE TO AMBULATE: ICD-10-CM

## 2022-10-19 DIAGNOSIS — I50.32 CHRONIC HEART FAILURE WITH PRESERVED EJECTION FRACTION (HCC): ICD-10-CM

## 2022-10-19 DIAGNOSIS — E87.6 HYPOKALEMIA: ICD-10-CM

## 2022-10-19 LAB
A/G RATIO: 1.1 (ref 1.1–2.2)
ALBUMIN SERPL-MCNC: 4.2 G/DL (ref 3.4–5)
ALP BLD-CCNC: 59 U/L (ref 40–129)
ALT SERPL-CCNC: 13 U/L (ref 10–40)
ANION GAP SERPL CALCULATED.3IONS-SCNC: 13 MMOL/L (ref 3–16)
APTT: 30.8 SEC (ref 23–34.3)
AST SERPL-CCNC: 22 U/L (ref 15–37)
BACTERIA: NORMAL /HPF
BASOPHILS ABSOLUTE: 0 K/UL (ref 0–0.2)
BASOPHILS RELATIVE PERCENT: 0.7 %
BILIRUB SERPL-MCNC: 1.4 MG/DL (ref 0–1)
BILIRUBIN URINE: NEGATIVE
BLOOD, URINE: NEGATIVE
BUN BLDV-MCNC: 7 MG/DL (ref 7–20)
CALCIUM SERPL-MCNC: 9.2 MG/DL (ref 8.3–10.6)
CHLORIDE BLD-SCNC: 95 MMOL/L (ref 99–110)
CLARITY: CLEAR
CO2: 25 MMOL/L (ref 21–32)
COLOR: YELLOW
CREAT SERPL-MCNC: 0.6 MG/DL (ref 0.6–1.2)
EKG ATRIAL RATE: 93 BPM
EKG DIAGNOSIS: NORMAL
EKG Q-T INTERVAL: 344 MS
EKG QRS DURATION: 92 MS
EKG QTC CALCULATION (BAZETT): 459 MS
EKG R AXIS: 1 DEGREES
EKG T AXIS: -57 DEGREES
EKG VENTRICULAR RATE: 107 BPM
EOSINOPHILS ABSOLUTE: 0 K/UL (ref 0–0.6)
EOSINOPHILS RELATIVE PERCENT: 0.2 %
EPITHELIAL CELLS, UA: 5 /HPF (ref 0–5)
GFR SERPL CREATININE-BSD FRML MDRD: >60 ML/MIN/{1.73_M2}
GLUCOSE BLD-MCNC: 98 MG/DL (ref 70–99)
GLUCOSE URINE: NEGATIVE MG/DL
HCT VFR BLD CALC: 41 % (ref 36–48)
HEMOGLOBIN: 13.7 G/DL (ref 12–16)
HYALINE CASTS: 0 /LPF (ref 0–8)
INR BLD: 1.61 (ref 0.87–1.14)
KETONES, URINE: ABNORMAL MG/DL
LEUKOCYTE ESTERASE, URINE: ABNORMAL
LYMPHOCYTES ABSOLUTE: 0.5 K/UL (ref 1–5.1)
LYMPHOCYTES RELATIVE PERCENT: 10.4 %
MAGNESIUM: 1.6 MG/DL (ref 1.8–2.4)
MCH RBC QN AUTO: 29.8 PG (ref 26–34)
MCHC RBC AUTO-ENTMCNC: 33.5 G/DL (ref 31–36)
MCV RBC AUTO: 89.1 FL (ref 80–100)
MICROSCOPIC EXAMINATION: YES
MONOCYTES ABSOLUTE: 0.5 K/UL (ref 0–1.3)
MONOCYTES RELATIVE PERCENT: 9.6 %
NEUTROPHILS ABSOLUTE: 4 K/UL (ref 1.7–7.7)
NEUTROPHILS RELATIVE PERCENT: 79.1 %
NITRITE, URINE: NEGATIVE
PDW BLD-RTO: 12.3 % (ref 12.4–15.4)
PH UA: 6.5 (ref 5–8)
PLATELET # BLD: 100 K/UL (ref 135–450)
PMV BLD AUTO: 10 FL (ref 5–10.5)
POTASSIUM REFLEX MAGNESIUM: 3.1 MMOL/L (ref 3.5–5.1)
POTASSIUM SERPL-SCNC: 3 MMOL/L (ref 3.5–5.1)
PRO-BNP: 913 PG/ML (ref 0–124)
PROTEIN UA: NEGATIVE MG/DL
PROTHROMBIN TIME: 19.1 SEC (ref 11.7–14.5)
RBC # BLD: 4.61 M/UL (ref 4–5.2)
RBC UA: 1 /HPF (ref 0–4)
SODIUM BLD-SCNC: 133 MMOL/L (ref 136–145)
SPECIFIC GRAVITY UA: >=1.03 (ref 1–1.03)
TOTAL CK: 112 U/L (ref 26–192)
TOTAL PROTEIN: 8.1 G/DL (ref 6.4–8.2)
TROPONIN: <0.01 NG/ML
URINE REFLEX TO CULTURE: ABNORMAL
URINE TYPE: ABNORMAL
UROBILINOGEN, URINE: 2 E.U./DL
WBC # BLD: 5 K/UL (ref 4–11)
WBC UA: 3 /HPF (ref 0–5)

## 2022-10-19 PROCEDURE — 6370000000 HC RX 637 (ALT 250 FOR IP): Performed by: FAMILY MEDICINE

## 2022-10-19 PROCEDURE — 3209999900 CT THORACIC SPINE TRAUMA RECONSTRUCTION

## 2022-10-19 PROCEDURE — 96376 TX/PRO/DX INJ SAME DRUG ADON: CPT

## 2022-10-19 PROCEDURE — 73090 X-RAY EXAM OF FOREARM: CPT

## 2022-10-19 PROCEDURE — 71260 CT THORAX DX C+: CPT

## 2022-10-19 PROCEDURE — 85730 THROMBOPLASTIN TIME PARTIAL: CPT

## 2022-10-19 PROCEDURE — 6370000000 HC RX 637 (ALT 250 FOR IP): Performed by: PHYSICIAN ASSISTANT

## 2022-10-19 PROCEDURE — 3209999900 CT LUMBAR SPINE TRAUMA RECONSTRUCTION

## 2022-10-19 PROCEDURE — 96365 THER/PROPH/DIAG IV INF INIT: CPT

## 2022-10-19 PROCEDURE — 71045 X-RAY EXAM CHEST 1 VIEW: CPT

## 2022-10-19 PROCEDURE — 84132 ASSAY OF SERUM POTASSIUM: CPT

## 2022-10-19 PROCEDURE — 6360000004 HC RX CONTRAST MEDICATION: Performed by: PHYSICIAN ASSISTANT

## 2022-10-19 PROCEDURE — 2580000003 HC RX 258: Performed by: FAMILY MEDICINE

## 2022-10-19 PROCEDURE — 96375 TX/PRO/DX INJ NEW DRUG ADDON: CPT

## 2022-10-19 PROCEDURE — 82550 ASSAY OF CK (CPK): CPT

## 2022-10-19 PROCEDURE — 73562 X-RAY EXAM OF KNEE 3: CPT

## 2022-10-19 PROCEDURE — 83880 ASSAY OF NATRIURETIC PEPTIDE: CPT

## 2022-10-19 PROCEDURE — 81001 URINALYSIS AUTO W/SCOPE: CPT

## 2022-10-19 PROCEDURE — 72125 CT NECK SPINE W/O DYE: CPT

## 2022-10-19 PROCEDURE — 99285 EMERGENCY DEPT VISIT HI MDM: CPT

## 2022-10-19 PROCEDURE — 1200000000 HC SEMI PRIVATE

## 2022-10-19 PROCEDURE — 80053 COMPREHEN METABOLIC PANEL: CPT

## 2022-10-19 PROCEDURE — 93005 ELECTROCARDIOGRAM TRACING: CPT | Performed by: PHYSICIAN ASSISTANT

## 2022-10-19 PROCEDURE — 85025 COMPLETE CBC W/AUTO DIFF WBC: CPT

## 2022-10-19 PROCEDURE — 6360000002 HC RX W HCPCS: Performed by: FAMILY MEDICINE

## 2022-10-19 PROCEDURE — 96366 THER/PROPH/DIAG IV INF ADDON: CPT

## 2022-10-19 PROCEDURE — 6360000002 HC RX W HCPCS: Performed by: PHYSICIAN ASSISTANT

## 2022-10-19 PROCEDURE — 70450 CT HEAD/BRAIN W/O DYE: CPT

## 2022-10-19 PROCEDURE — 85610 PROTHROMBIN TIME: CPT

## 2022-10-19 PROCEDURE — 83735 ASSAY OF MAGNESIUM: CPT

## 2022-10-19 PROCEDURE — 84484 ASSAY OF TROPONIN QUANT: CPT

## 2022-10-19 PROCEDURE — 93010 ELECTROCARDIOGRAM REPORT: CPT | Performed by: INTERNAL MEDICINE

## 2022-10-19 RX ORDER — ATORVASTATIN CALCIUM 80 MG/1
80 TABLET, FILM COATED ORAL DAILY
Status: DISCONTINUED | OUTPATIENT
Start: 2022-10-19 | End: 2022-10-19

## 2022-10-19 RX ORDER — SPIRONOLACTONE 25 MG/1
50 TABLET ORAL 2 TIMES DAILY
Status: DISCONTINUED | OUTPATIENT
Start: 2022-10-19 | End: 2022-10-20

## 2022-10-19 RX ORDER — WARFARIN SODIUM 2.5 MG/1
2.5 TABLET ORAL
Status: DISCONTINUED | OUTPATIENT
Start: 2022-10-25 | End: 2022-10-20

## 2022-10-19 RX ORDER — WARFARIN SODIUM 5 MG/1
5 TABLET ORAL
Status: DISCONTINUED | OUTPATIENT
Start: 2022-10-19 | End: 2022-10-20

## 2022-10-19 RX ORDER — ONDANSETRON 2 MG/ML
4 INJECTION INTRAMUSCULAR; INTRAVENOUS ONCE
Status: COMPLETED | OUTPATIENT
Start: 2022-10-19 | End: 2022-10-19

## 2022-10-19 RX ORDER — MORPHINE SULFATE 4 MG/ML
4 INJECTION, SOLUTION INTRAMUSCULAR; INTRAVENOUS ONCE
Status: COMPLETED | OUTPATIENT
Start: 2022-10-19 | End: 2022-10-19

## 2022-10-19 RX ORDER — SODIUM CHLORIDE 0.9 % (FLUSH) 0.9 %
5-40 SYRINGE (ML) INJECTION PRN
Status: DISCONTINUED | OUTPATIENT
Start: 2022-10-19 | End: 2022-10-22 | Stop reason: HOSPADM

## 2022-10-19 RX ORDER — ONDANSETRON 2 MG/ML
4 INJECTION INTRAMUSCULAR; INTRAVENOUS EVERY 6 HOURS PRN
Status: DISCONTINUED | OUTPATIENT
Start: 2022-10-19 | End: 2022-10-22 | Stop reason: HOSPADM

## 2022-10-19 RX ORDER — POLYETHYLENE GLYCOL 3350 17 G/17G
17 POWDER, FOR SOLUTION ORAL DAILY PRN
Status: DISCONTINUED | OUTPATIENT
Start: 2022-10-19 | End: 2022-10-22 | Stop reason: HOSPADM

## 2022-10-19 RX ORDER — HYDRALAZINE HYDROCHLORIDE 20 MG/ML
10 INJECTION INTRAMUSCULAR; INTRAVENOUS EVERY 4 HOURS PRN
Status: DISCONTINUED | OUTPATIENT
Start: 2022-10-19 | End: 2022-10-22 | Stop reason: HOSPADM

## 2022-10-19 RX ORDER — ONDANSETRON 4 MG/1
4 TABLET, ORALLY DISINTEGRATING ORAL EVERY 8 HOURS PRN
Status: DISCONTINUED | OUTPATIENT
Start: 2022-10-19 | End: 2022-10-22 | Stop reason: HOSPADM

## 2022-10-19 RX ORDER — NIFEDIPINE 30 MG/1
90 TABLET, EXTENDED RELEASE ORAL DAILY
Status: DISCONTINUED | OUTPATIENT
Start: 2022-10-19 | End: 2022-10-22 | Stop reason: HOSPADM

## 2022-10-19 RX ORDER — POTASSIUM CHLORIDE 7.45 MG/ML
10 INJECTION INTRAVENOUS PRN
Status: DISCONTINUED | OUTPATIENT
Start: 2022-10-19 | End: 2022-10-22 | Stop reason: HOSPADM

## 2022-10-19 RX ORDER — POTASSIUM CHLORIDE 20 MEQ/1
40 TABLET, EXTENDED RELEASE ORAL PRN
Status: DISCONTINUED | OUTPATIENT
Start: 2022-10-19 | End: 2022-10-22 | Stop reason: HOSPADM

## 2022-10-19 RX ORDER — ACETAMINOPHEN 650 MG/1
650 SUPPOSITORY RECTAL EVERY 6 HOURS PRN
Status: DISCONTINUED | OUTPATIENT
Start: 2022-10-19 | End: 2022-10-22 | Stop reason: HOSPADM

## 2022-10-19 RX ORDER — MAGNESIUM SULFATE IN WATER 40 MG/ML
2000 INJECTION, SOLUTION INTRAVENOUS ONCE
Status: COMPLETED | OUTPATIENT
Start: 2022-10-19 | End: 2022-10-19

## 2022-10-19 RX ORDER — ENOXAPARIN SODIUM 100 MG/ML
40 INJECTION SUBCUTANEOUS NIGHTLY
Status: DISCONTINUED | OUTPATIENT
Start: 2022-10-19 | End: 2022-10-22 | Stop reason: HOSPADM

## 2022-10-19 RX ORDER — LOSARTAN POTASSIUM 100 MG/1
100 TABLET ORAL DAILY
Status: DISCONTINUED | OUTPATIENT
Start: 2022-10-19 | End: 2022-10-20

## 2022-10-19 RX ORDER — SODIUM CHLORIDE 0.9 % (FLUSH) 0.9 %
5-40 SYRINGE (ML) INJECTION EVERY 12 HOURS SCHEDULED
Status: DISCONTINUED | OUTPATIENT
Start: 2022-10-19 | End: 2022-10-22 | Stop reason: HOSPADM

## 2022-10-19 RX ORDER — ACETAMINOPHEN 325 MG/1
650 TABLET ORAL EVERY 6 HOURS PRN
Status: DISCONTINUED | OUTPATIENT
Start: 2022-10-19 | End: 2022-10-22 | Stop reason: HOSPADM

## 2022-10-19 RX ORDER — KETOROLAC TROMETHAMINE 15 MG/ML
15 INJECTION, SOLUTION INTRAMUSCULAR; INTRAVENOUS EVERY 6 HOURS PRN
Status: DISCONTINUED | OUTPATIENT
Start: 2022-10-19 | End: 2022-10-22 | Stop reason: HOSPADM

## 2022-10-19 RX ORDER — CARVEDILOL 3.12 MG/1
3.12 TABLET ORAL 2 TIMES DAILY WITH MEALS
Status: DISCONTINUED | OUTPATIENT
Start: 2022-10-19 | End: 2022-10-20

## 2022-10-19 RX ORDER — FUROSEMIDE 20 MG/1
20 TABLET ORAL DAILY
Status: DISCONTINUED | OUTPATIENT
Start: 2022-10-19 | End: 2022-10-20

## 2022-10-19 RX ORDER — SODIUM CHLORIDE 9 MG/ML
INJECTION, SOLUTION INTRAVENOUS PRN
Status: DISCONTINUED | OUTPATIENT
Start: 2022-10-19 | End: 2022-10-22 | Stop reason: HOSPADM

## 2022-10-19 RX ADMIN — LOSARTAN POTASSIUM 100 MG: 100 TABLET, FILM COATED ORAL at 20:12

## 2022-10-19 RX ADMIN — FUROSEMIDE 20 MG: 20 TABLET ORAL at 20:12

## 2022-10-19 RX ADMIN — MORPHINE SULFATE 4 MG: 4 INJECTION, SOLUTION INTRAMUSCULAR; INTRAVENOUS at 15:19

## 2022-10-19 RX ADMIN — IOPAMIDOL 75 ML: 755 INJECTION, SOLUTION INTRAVENOUS at 11:57

## 2022-10-19 RX ADMIN — CARVEDILOL 3.12 MG: 3.12 TABLET, FILM COATED ORAL at 20:11

## 2022-10-19 RX ADMIN — POTASSIUM CHLORIDE 10 MEQ: 7.46 INJECTION, SOLUTION INTRAVENOUS at 23:14

## 2022-10-19 RX ADMIN — ONDANSETRON 4 MG: 2 INJECTION INTRAMUSCULAR; INTRAVENOUS at 11:44

## 2022-10-19 RX ADMIN — KETOROLAC TROMETHAMINE 15 MG: 15 INJECTION, SOLUTION INTRAMUSCULAR; INTRAVENOUS at 23:12

## 2022-10-19 RX ADMIN — MAGNESIUM SULFATE HEPTAHYDRATE 2000 MG: 40 INJECTION, SOLUTION INTRAVENOUS at 12:29

## 2022-10-19 RX ADMIN — WARFARIN SODIUM 5 MG: 5 TABLET ORAL at 21:18

## 2022-10-19 RX ADMIN — ONDANSETRON 4 MG: 2 INJECTION INTRAMUSCULAR; INTRAVENOUS at 15:19

## 2022-10-19 RX ADMIN — SPIRONOLACTONE 50 MG: 25 TABLET ORAL at 21:17

## 2022-10-19 RX ADMIN — NIFEDIPINE 90 MG: 30 TABLET, EXTENDED RELEASE ORAL at 20:12

## 2022-10-19 RX ADMIN — Medication 1000 MG: at 20:12

## 2022-10-19 RX ADMIN — ACETAMINOPHEN 650 MG: 325 TABLET ORAL at 18:39

## 2022-10-19 RX ADMIN — Medication 10 ML: at 20:12

## 2022-10-19 RX ADMIN — MORPHINE SULFATE 4 MG: 4 INJECTION, SOLUTION INTRAMUSCULAR; INTRAVENOUS at 11:44

## 2022-10-19 RX ADMIN — ENOXAPARIN SODIUM 40 MG: 100 INJECTION SUBCUTANEOUS at 21:17

## 2022-10-19 RX ADMIN — POTASSIUM BICARBONATE 40 MEQ: 782 TABLET, EFFERVESCENT ORAL at 12:25

## 2022-10-19 RX ADMIN — POTASSIUM CHLORIDE 10 MEQ: 7.46 INJECTION, SOLUTION INTRAVENOUS at 22:15

## 2022-10-19 ASSESSMENT — ENCOUNTER SYMPTOMS
BACK PAIN: 1
COUGH: 0
ABDOMINAL PAIN: 0
CHEST TIGHTNESS: 0
PHOTOPHOBIA: 0
VOMITING: 0
RESPIRATORY NEGATIVE: 1
SHORTNESS OF BREATH: 0
NAUSEA: 0
DIARRHEA: 0
CONSTIPATION: 0
COLOR CHANGE: 0

## 2022-10-19 ASSESSMENT — PAIN SCALES - GENERAL
PAINLEVEL_OUTOF10: 8
PAINLEVEL_OUTOF10: 7

## 2022-10-19 ASSESSMENT — PAIN DESCRIPTION - DESCRIPTORS: DESCRIPTORS: DISCOMFORT

## 2022-10-19 ASSESSMENT — PAIN - FUNCTIONAL ASSESSMENT: PAIN_FUNCTIONAL_ASSESSMENT: 0-10

## 2022-10-19 ASSESSMENT — PAIN DESCRIPTION - LOCATION
LOCATION: GENERALIZED;HEAD
LOCATION: GENERALIZED
LOCATION: GENERALIZED

## 2022-10-19 NOTE — ED NOTES
Report given to Desiree Mccoy RN. No further questions at this time.       Haily Keyes RN  10/19/22 [id number]

## 2022-10-19 NOTE — ED PROVIDER NOTES
I did not perform an evaluation of the pt but was asked to review her EKG. EKG by my preliminary interpretation shows atrial fibrillation with a rate of 73, normal axis, normal intervals, with no ST changes indicative of ischemia at this time.         Iftikhar Castorena MD  10/19/22 [id number]

## 2022-10-19 NOTE — PROGRESS NOTES
.4 Eyes Skin Assessment     NAME:  Geovanna Arellano  YOB: 1950  MEDICAL RECORD NUMBER:  [id number]    The patient is being assess for  Admission    I agree that 2 RN's have performed a thorough Head to Toe Skin Assessment on the patient. ALL assessment sites listed below have been assessed. Areas assessed by both nurses:    Head, Face, Ears, Shoulders, Back, Chest, Arms, Elbows, Hands, Sacrum. Buttock, Coccyx, Ischium, and Legs. Feet and Heels        Does the Patient have a Wound?  No noted wound(s)       Reji Prevention initiated:  Yes   Wound Care Orders initiated:  No    Pressure Injury (Stage 3,4, Unstageable, DTI, NWPT, and Complex wounds) if present place referral/consult order under [de-identified] No    New and Established Ostomies if present place consult order under : No      Nurse 1 eSignature: Electronically signed by Canelo Batista RN on 10/19/22 at 6:36 PM EDT    **SHARE this note so that the co-signing nurse is able to place an eSignature**    Nurse 2 eSignature: Electronically signed by Teddy Ruiz RN on 10/19/22 at 6:39 PM EDT

## 2022-10-19 NOTE — H&P
HOSPITALISTS HISTORY AND PHYSICAL    10/19/2022 7:28 PM    Patient Information:  Theo Vazquez is a 80 y.o. female [id number]  PCP:  Jean Trevizo MD (Tel: [phone] )    Chief complaint:    Chief Complaint   Patient presents with    Fall     Fall for unknown reason into the grass last night; hit head & right ribs. Denies LOC; takes Coumadin. History of Present Illness:  Reynaldo Chao is a 80 y.o. female with h/o CVA, HTN , Intracranial hemorrhage, CHF, AFib , Chronic anticoagulation, OA, presented after sustaining a fall at home . Reports hitting her head. She laid on the ground for several hours. Her grandson then helped to get her up. She had another fall attempting to get out of bed. Denies focal muscle weakness, denies LOC . No chest pain , fever, chills , nausea or vomiting C/o pain in right arm and knee. She is Febrile here with Tmax 102.4. she is also tachycardic   Trauma work up included  Pan CT scan of head, chest and Abdomen did not reveal any acute hemorrhage, fracture or other pathology  Xray of right knee and and arm also neg for acute fractutre  Labs revealed Hypokalemia K 3.0. and hyponmag 1.6  UA showed Pyuria. EKG showed Afib   She will be admitted for further management       REVIEW OF SYSTEMS:   Constitutional: +Ve for fever,chills or night sweats  ENT: Negative for rhinorrhea, epistaxis, hoarseness, sore throat. Respiratory: Negative for shortness of breath,wheezing  Cardiovascular: Negative for chest pain, palpitations   Gastrointestinal: Negative for nausea, vomiting, diarrhea  Genitourinary: Negative for polyuria, dysuria   Hematologic/Lymphatic: Negative for bleeding tendency, easy bruising  Musculoskeletal: +VE multiple falls myalgias and arthralgias  Neurologic: Negative for confusion,dysarthria.   Skin: Negative for itching,rash  Psychiatric: Negative for depression,anxiety, agitation. Endocrine: Negative for polydipsia,polyuria,heat /cold intolerance. Past Medical History:   has a past medical history of Acute cerebrovascular accident (CVA) ([facility] [address] Atrial fibrillation ([facility] [address] Bell palsy, CAD (coronary artery disease), Cerebral artery occlusion with cerebral infarction ([facility] [address] Chronic diastolic CHF (congestive heart failure) ([facility] [address] CVA (cerebral vascular accident) ([facility] [address] Hypertension, Intracranial hemorrhage ([facility] [address] Mixed hyperlipidemia, Nonintractable headache, Nontraumatic subcortical hemorrhage of cerebral hemisphere [facility]), Primary osteoarthritis of right knee, Sudden visual loss of left eye, Thyroid nodule, and TIA involving right internal carotid artery. Past Surgical History:   has a past surgical history that includes Cardiac surgery; Tubal ligation (Right, Torn Rotator Cuff); Coronary angioplasty with stent; and Colonoscopy. Medications:  No current facility-administered medications on file prior to encounter.      Current Outpatient Medications on File Prior to Encounter   Medication Sig Dispense Refill    tiZANidine (ZANAFLEX) 4 MG tablet Take 1 tablet by mouth 3 times daily as needed (neck pain) 20 tablet 0    losartan (COZAAR) 100 MG tablet Take 1 tablet by mouth daily 30 tablet 1    carvedilol (COREG) 3.125 MG tablet Take 1 tablet by mouth 2 times daily (with meals) 60 tablet 2    spironolactone (ALDACTONE) 50 MG tablet Take 1 tablet by mouth 2 times daily TAKE 1 TABLET BY MOUTH DAILY 90 tablet 1    warfarin (COUMADIN) 5 MG tablet Take 5 mg by mouth daily 5 mg everyday but on tuesdays 0.5 tablet      NIFEdipine (PROCARDIA XL) 90 MG extended release tablet Take 1 tablet by mouth daily 90 tablet 1    furosemide (LASIX) 20 MG tablet TAKE 1 TABLET BY MOUTH once DAILY 90 tablet 0    potassium chloride (KLOR-CON M) 20 MEQ extended release tablet Take 0.5 tablets by mouth daily 90 tablet 1    Magnesium 500 MG CAPS Take 400 mg by mouth daily 90 capsule 3    famotidine (PEPCID) 20 MG tablet Take 1 tablet by mouth 2 times daily 60 tablet 2    atorvastatin (LIPITOR) 80 MG tablet Take 1 tablet by mouth daily 90 tablet 1    acetaminophen (TYLENOL) 500 MG tablet Take 2 tablets by mouth every 6 hours as needed for Pain (Patient not taking: Reported on 6/8/2022) 120 tablet 3    Calcium Carb-Cholecalciferol (CALCIUM + D3) 600-200 MG-UNIT TABS tablet Take 1 tablet by mouth 2 times daily 120 tablet 3     Current Facility-Administered Medications   Medication Dose Route Frequency Provider Last Rate Last Admin    carvedilol (COREG) tablet 3.125 mg  3.125 mg Oral BID  Soraida Rocha MD   3.125 mg at 10/19/22 2011    furosemide (LASIX) tablet 20 mg  20 mg Oral Daily Moni Menezes MD   20 mg at 10/19/22 2012    losartan (COZAAR) tablet 100 mg  100 mg Oral Daily Moni Menezes MD   100 mg at 10/19/22 2012    NIFEdipine (PROCARDIA XL) extended release tablet 90 mg  90 mg Oral Daily Moni Menezes MD   90 mg at 10/19/22 2012    spironolactone (ALDACTONE) tablet 50 mg  50 mg Oral BID Moni Menezes MD   50 mg at 10/19/22 2117    sodium chloride flush 0.9 % injection 5-40 mL  5-40 mL IntraVENous 2 times per day Moni Menezes MD   10 mL at 10/19/22 2012    sodium chloride flush 0.9 % injection 5-40 mL  5-40 mL IntraVENous PRN Moni Menezes MD        0.9 % sodium chloride infusion   IntraVENous PRN Moni Menezes MD        enoxaparin (LOVENOX) injection 40 mg  40 mg SubCUTAneous Nightly Moni Menezes MD   40 mg at 10/19/22 2117    ondansetron (ZOFRAN-ODT) disintegrating tablet 4 mg  4 mg Oral Q8H PRN Moni Menezes MD        Or    ondansetron (ZOFRAN) injection 4 mg  4 mg IntraVENous Q6H PRN Soraida Rocha MD        polyethylene glycol (GLYCOLAX) packet 17 g  17 g Oral Daily PRN Moni Menezes MD        acetaminophen (TYLENOL) tablet 650 mg  650 mg Oral Q6H PRN Moni Menezes MD   650 mg at 10/19/22 1839    Or    acetaminophen (TYLENOL) suppository 650 mg  650 mg Rectal Q6H PRN Soraida Arvind Obregon MD        potassium chloride (KLOR-CON M) extended release tablet 40 mEq  40 mEq Oral PRN Maryana Holman MD        Or    potassium bicarb-citric acid (EFFER-K) effervescent tablet 40 mEq  40 mEq Oral PRN Maryana Holman MD        Or    potassium chloride 10 mEq/100 mL IVPB (Peripheral Line)  10 mEq IntraVENous PRN Maryana Holman  mL/hr at 10/20/22 0403 10 mEq at 10/20/22 0403    [START ON 10/25/2022] warfarin (COUMADIN) tablet 2.5 mg  2.5 mg Oral Once per day on Donita Prabhakar MD        And    warfarin (COUMADIN) tablet 5 mg  5 mg Oral Once per day on Sun Mon Wed Thu Fri Sat Maryana Holman MD   5 mg at 10/19/22 2118    cefTRIAXone (ROCEPHIN) 1000 mg in sterile water 10 mL IV syringe  1,000 mg IntraVENous Q24H Maryana Holman MD   1,000 mg at 10/19/22 2012    hydrALAZINE (APRESOLINE) injection 10 mg  10 mg IntraVENous Q4H PRN Maryana Holman MD        ketorolac (TORADOL) injection 15 mg  15 mg IntraVENous Q6H PRN Kaye Perdomo PA-C   15 mg at 10/19/22 2312      Allergies: Allergies   Allergen Reactions    Clonidine Rash, Shortness Of Breath and Hives    Codeine Hives, Itching, Nausea Only and Rash     Other reaction(s): Other (See Comments)  Pruritis    Hydrocodone-Acetaminophen      Itchy      Lisinopril Hives and Itching    Tramadol     Percocet [Oxycodone-Acetaminophen] Itching     Patient takes percocet with benadryl to control the itching        Social History:  Patient Lives    reports that she has never smoked. She has never used smokeless tobacco. She reports that she does not drink alcohol and does not use drugs. Family History:  family history is not on file. , family history reviewed not pertinent to current admission      Physical Exam:  BP (!) 160/88   Pulse 96   Temp (!) 102.4 °F (39.1 °C) (Oral)   Resp 18   Wt 184 lb (83.5 kg)   SpO2 94%   BMI 28.82 kg/m²     General appearance:  Appears comfortable.  Well nourished  Eyes: Sclera clear, pupils equal  ENT: Moist mucus membranes, no thrush. Trachea midline. Cardiovascular: Regular rhythm, normal S1, S2. No murmur, gallop, rub. No edema in lower extremities  Respiratory: Clear to auscultation bilaterally, no wheeze, good inspiratory effort  Gastrointestinal: Abdomen soft, non-tender, not distended, normal bowel sounds  Musculoskeletal: No cyanosis in digits, neck supple  Neurology: Cranial nerves grossly intact. Alert and oriented in time, place and person. No speech or motor deficits  Psychiatry: Appropriate affect. Not agitated  Skin: Warm, dry, normal turgor, no rash  Brisk capillary refill, peripheral pulses palpable   Labs:  CBC:   Lab Results   Component Value Date/Time    WBC 5.0 10/19/2022 10:55 AM    RBC 4.61 10/19/2022 10:55 AM    HGB 13.7 10/19/2022 10:55 AM    HCT 41.0 10/19/2022 10:55 AM    MCV 89.1 10/19/2022 10:55 AM    MCH 29.8 10/19/2022 10:55 AM    MCHC 33.5 10/19/2022 10:55 AM    RDW 12.3 10/19/2022 10:55 AM     10/19/2022 10:55 AM    MPV 10.0 10/19/2022 10:55 AM     BMP:    Lab Results   Component Value Date/Time     10/19/2022 10:55 AM    K 3.1 10/19/2022 10:55 AM    CL 95 10/19/2022 10:55 AM    CO2 25 10/19/2022 10:55 AM    BUN 7 10/19/2022 10:55 AM    CREATININE 0.6 10/19/2022 10:55 AM    CALCIUM 9.2 10/19/2022 10:55 AM    GFRAA >60 07/06/2022 01:04 PM    GFRAA >60 09/14/2012 12:27 AM    LABGLOM >60 10/19/2022 10:55 AM    GLUCOSE 98 10/19/2022 10:55 AM     CT CHEST ABDOMEN PELVIS W CONTRAST Additional Contrast? None   Final Result   Negative for acute thoracic/abdominopelvic abnormality. CT LUMBAR SPINE TRAUMA RECONSTRUCTION   Final Result   No acute osseous injury of the thoracic or lumbar spine. CT cervical spine, chest, abdomen and pelvis are discussed separately. CT THORACIC SPINE TRAUMA RECONSTRUCTION   Final Result   No acute osseous injury of the thoracic or lumbar spine. CT cervical spine, chest, abdomen and pelvis are discussed separately.          CT CERVICAL SPINE WO CONTRAST   Final Result   No acute abnormality of the cervical spine. CT HEAD WO CONTRAST   Preliminary Result   No evidence of acute intracranial abnormality. XR CHEST PORTABLE   Final Result   No acute cardiopulmonary findings         XR KNEE RIGHT (3 VIEWS)   Final Result   No acute osseous injury. Moderate osteoarthritis of the knee, most developed in the medial   compartment. CPPD-related arthropathy alternatively considered. XR RADIUS ULNA RIGHT (2 VIEWS)   Final Result   No acute fracture or dislocation           Chest Xray:   EKG:    I visualized CXR images and EKG strips    Discussed case  with     Problem List  Principal Problem:    Unable to ambulate  Resolved Problems:    * No resolved hospital problems. *        Assessment/Plan:   Febrile illness most likely d/t UTI   UA showed trace LE  Started on IV Ceftriaxone  Cultures pending  Chest CT is neg for pneumonia     Hypokalemia and hypomagnesemia  K 3.0, Mg 1.60  Replace,ment ordered   Cont to monitor levels    Multiple falls , unable to ambulate  Trauma work up is neg for acute fracture   PT/OT as tolerated  SW consult for dc planning     Afib   AC with Coumadin   INR 1.6  Coumadin per pharmacy dosing       DVT prophylaxis   Code status   Diet   IV access   Weinstein Catheter    Admit as inpatient. I anticipate hospitalization spanning more than two midnights for investigation and treatment of the above medically necessary diagnoses. Please note that some part of this chart was generated using Dragon dictation software. Although every effort was made to ensure the accuracy of this automated transcription, some errors in transcription may have occurred inadvertently. If you may need any clarification, please do not hesitate to contact me through [facility].        Db Brian MD    10/19/2022 7:28 PM

## 2022-10-19 NOTE — CONSULTS
Clinical Pharmacy Note: Pharmacy to Dose Warfarin    Pharmacy consulted by Dr. Britany Borjas to dose warfarin. Cristina Flowers is a 80 y.o. female  is receiving warfarin for indication: PAF. INR Goal Range: 1.8-2.5 (followed by [facility], goal listed in body of note)  Prior to admission warfarin dosing regimen: 2.5mg on Tues and 5mg all other days  INR today:   Lab Results   Component Value Date/Time    INR 1.61 10/19/2022 10:55 AM       Assessment/Plan:  INR is subtherapeutic on prior to admission dosing regimen. Possible concomitant drug-drug interactions include: NA   Based on today's assessment, dose warfarin continue home regimen starting tonight. Daily INR is ordered. Pharmacy will continue to monitor and make adjustments to regimen as necessary.      Thank you for the consult,     Sonia Fontanez, PharmD

## 2022-10-19 NOTE — ED NOTES
Pt able to use bedpan with no difficulties. Urine specimen sent to lab.       Dean Green RN  10/19/22 2012

## 2022-10-19 NOTE — ED PROVIDER NOTES
[address] [facility]        Pt Name: Drew Glover  MRN: [id number]  [place] 1950  Date of evaluation: 10/19/2022  Provider: KIM Méndez  PCP: Rafael Gunn MD  Note Started: 11:02 AM EDT       MARIANN. I have evaluated this patient. My supervising physician was available for consultation. CHIEF COMPLAINT       Chief Complaint   Patient presents with    Fall     Fall for unknown reason into the grass last night; hit head & right ribs. Denies LOC; takes Coumadin. HISTORY OF PRESENT ILLNESS   (Location, Timing/Onset, Context/Setting, Quality, Duration, Modifying Factors, Severity, Associated Signs and Symptoms)  Note limiting factors. Chief Complaint: August Calvert is a 80 y.o. female with past medical history of previous CVA, atrial fibrillation on Coumadin, CAD, CHF, hypertension, hyperlipidemia who presents to the ED with complaint of a fall. Patient states she was getting out of her car yesterday when she states she fell. She states she is unsure why she fell. She denies feeling lightheaded or dizzy. She denies any chest pain, shortness of breath or palpitations. She states she does not believe she lost her balance. She did not lose consciousness. Patient states she did brace herself when she fell. Patient states she had another fall when she attempted to get up from the bed last night. States she laid on the ground for several hours. Patient was brought to the ED for further evaluation and treatment. Grandson was eventually able to help patient up and she was able to ambulate after the fall. She did hit her head. She denies any headache or visual changes. Denies speech disturbances or numbness/tingling. Denies lightheadedness or dizziness. Denies any neck pain or back pain. Complaint of pain to the right-sided flank/rib cage. Also has pain to the right forearm and right knee.   She states failure) ([facility] 5/16/2020    CVA (cerebral vascular accident) ([facility] 1/4/2017    Hypertension     Intracranial hemorrhage ([facility] 4/2016    Mixed hyperlipidemia 7/6/2022    Nonintractable headache     Nontraumatic subcortical hemorrhage of cerebral hemisphere ([facility] 4/30/2016    Primary osteoarthritis of right knee 3/18/2022    Sudden visual loss of left eye     Thyroid nodule 2/8/2018    TIA involving right internal carotid artery          SURGICAL HISTORY     Past Surgical History:   Procedure Laterality Date    CARDIAC SURGERY      COLONOSCOPY      CORONARY ANGIOPLASTY WITH STENT PLACEMENT      TUBAL LIGATION Right Torn Rotator Cuff    2013 @ [facility]         CURRENTMEDICATIONS       Previous Medications    ACETAMINOPHEN (TYLENOL) 500 MG TABLET    Take 2 tablets by mouth every 6 hours as needed for Pain    ATORVASTATIN (LIPITOR) 80 MG TABLET    Take 1 tablet by mouth daily    CALCIUM CARB-CHOLECALCIFEROL (CALCIUM + D3) 600-200 MG-UNIT TABS TABLET    Take 1 tablet by mouth 2 times daily    CARVEDILOL (COREG) 3.125 MG TABLET    Take 1 tablet by mouth 2 times daily (with meals)    FAMOTIDINE (PEPCID) 20 MG TABLET    Take 1 tablet by mouth 2 times daily    FUROSEMIDE (LASIX) 20 MG TABLET    TAKE 1 TABLET BY MOUTH once DAILY    LOSARTAN (COZAAR) 100 MG TABLET    Take 1 tablet by mouth daily    MAGNESIUM 500 MG CAPS    Take 400 mg by mouth daily    NIFEDIPINE (PROCARDIA XL) 90 MG EXTENDED RELEASE TABLET    Take 1 tablet by mouth daily    POTASSIUM CHLORIDE (KLOR-CON M) 20 MEQ EXTENDED RELEASE TABLET    Take 0.5 tablets by mouth daily    SPIRONOLACTONE (ALDACTONE) 50 MG TABLET    Take 1 tablet by mouth 2 times daily TAKE 1 TABLET BY MOUTH DAILY    TIZANIDINE (ZANAFLEX) 4 MG TABLET    Take 1 tablet by mouth 3 times daily as needed (neck pain)    WARFARIN (COUMADIN) 5 MG TABLET    Take 5 mg by mouth daily 5 mg everyday but on tuesdays 0.5 tablet         ALLERGIES     Clonidine, Codeine, Hydrocodone-acetaminophen, Lisinopril, Tramadol, and Percocet [oxycodone-acetaminophen]    FAMILYHISTORY     History reviewed. No pertinent family history. SOCIAL HISTORY       Social History     Tobacco Use    Smoking status: Never    Smokeless tobacco: Never   Vaping Use    Vaping Use: Never used   Substance Use Topics    Alcohol use: No    Drug use: No       SCREENINGS             PHYSICAL EXAM    (up to 7 for level 4, 8 or more for level 5)     ED Triage Vitals [10/19/22 1037]   BP Temp Temp src Heart Rate Resp SpO2 Height Weight   (!) 187/107 98.3 °F (36.8 °C) -- (!) 112 18 97 % -- 184 lb (83.5 kg)       Physical Exam  Constitutional:       General: She is not in acute distress. Appearance: Normal appearance. She is well-developed. She is not ill-appearing, toxic-appearing or diaphoretic. HENT:      Head: Normocephalic and atraumatic. Comments: Atraumatic. No raccoon eyes or taylor sign. Right Ear: External ear normal.      Left Ear: External ear normal.      Mouth/Throat:      Mouth: Mucous membranes are moist.      Pharynx: No oropharyngeal exudate or posterior oropharyngeal erythema. Eyes:      General:         Right eye: No discharge. Left eye: No discharge. Extraocular Movements: Extraocular movements intact. Conjunctiva/sclera: Conjunctivae normal.      Pupils: Pupils are equal, round, and reactive to light. Cardiovascular:      Rate and Rhythm: Regular rhythm. Tachycardia present. Pulses: Normal pulses. Heart sounds: Normal heart sounds. No murmur heard. No friction rub. No gallop. Comments: Tachycardia noted. 2+ radial pulses bilaterally. No pedal edema. No calf tenderness. No JVD  Pulmonary:      Effort: Pulmonary effort is normal. No respiratory distress. Breath sounds: Normal breath sounds. No stridor. No wheezing, rhonchi or rales. Chest:      Chest wall: Tenderness present. Abdominal:      General: Abdomen is flat. Bowel sounds are normal. There is no distension. Palpations: Abdomen is soft. There is no mass. Tenderness: There is no abdominal tenderness. There is no right CVA tenderness, left CVA tenderness, guarding or rebound. Hernia: No hernia is present. Musculoskeletal:         General: Normal range of motion. Cervical back: Normal range of motion and neck supple. No rigidity or tenderness. Comments: No TTP to the midline cervical, thoracic or lumbar spine. No anterior chest wall tenderness. Does have tender to palpation over the right lateral posterior rib cage and flank. There is no bruising noted. No pelvis instability. Does have tenderness over the right knee and to the right forearm. There is no tenderness over the wrist or anatomical snuffbox on the right. No tenderness to the hand or specifically over the elbow/shoulder on the right upper extremity. No ligamentous or meniscal instability noted to the right knee. No other tenderness palpation to the upper extremities and lower extremities bilaterally. Full range of motion and strength of the upper and lower extremities throughout. Distal neurovascular intact. Gait deferred   Lymphadenopathy:      Cervical: No cervical adenopathy. Skin:     General: Skin is warm and dry. Coloration: Skin is not pale. Findings: No erythema or rash. Neurological:      General: No focal deficit present. Mental Status: She is alert and oriented to person, place, and time. GCS: GCS eye subscore is 4. GCS verbal subscore is 5. GCS motor subscore is 6. Cranial Nerves: Cranial nerves 2-12 are intact. No cranial nerve deficit, dysarthria or facial asymmetry. Sensory: Sensation is intact. No sensory deficit. Motor: Motor function is intact. No weakness.       Comments: Gait deferred   Psychiatric:         Behavior: Behavior normal.       DIAGNOSTIC RESULTS   LABS:    Labs Reviewed   CBC WITH AUTO DIFFERENTIAL - Abnormal; Notable for the following components: Result Value    RDW 12.3 (*)     Platelets 799 (*)     Lymphocytes Absolute 0.5 (*)     All other components within normal limits   COMPREHENSIVE METABOLIC PANEL W/ REFLEX TO MG FOR LOW K - Abnormal; Notable for the following components:    Sodium 133 (*)     Potassium reflex Magnesium 3.1 (*)     Chloride 95 (*)     Total Bilirubin 1.4 (*)     All other components within normal limits   BRAIN NATRIURETIC PEPTIDE - Abnormal; Notable for the following components:    Pro- (*)     All other components within normal limits   PROTIME-INR - Abnormal; Notable for the following components:    Protime 19.1 (*)     INR 1.61 (*)     All other components within normal limits   URINALYSIS WITH REFLEX TO CULTURE - Abnormal; Notable for the following components:    Ketones, Urine TRACE (*)     Urobilinogen, Urine 2.0 (*)     Leukocyte Esterase, Urine TRACE (*)     All other components within normal limits   MAGNESIUM - Abnormal; Notable for the following components:    Magnesium 1.60 (*)     All other components within normal limits   TROPONIN   APTT   CK   MICROSCOPIC URINALYSIS       When ordered only abnormal lab results are displayed. All other labs were within normal range or not returned as of this dictation. EKG: When ordered, EKG's are interpreted by the Emergency Department Physician in the absence of a cardiologist.  Please see their note for interpretation of EKG. RADIOLOGY:   Non-plain film images such as CT, Ultrasound and MRI are read by the radiologist. Plain radiographic images are visualized and preliminarily interpreted by the ED Provider with the below findings:        Interpretation per the Radiologist below, if available at the time of this note:    CT CHEST ABDOMEN PELVIS W CONTRAST Additional Contrast? None   Final Result   Negative for acute thoracic/abdominopelvic abnormality.          CT LUMBAR SPINE TRAUMA RECONSTRUCTION   Final Result   No acute osseous injury of the thoracic or lumbar spine.      CT cervical spine, chest, abdomen and pelvis are discussed separately. CT THORACIC SPINE TRAUMA RECONSTRUCTION   Final Result   No acute osseous injury of the thoracic or lumbar spine. CT cervical spine, chest, abdomen and pelvis are discussed separately. CT CERVICAL SPINE WO CONTRAST   Final Result   No acute abnormality of the cervical spine. CT HEAD WO CONTRAST   Preliminary Result   No evidence of acute intracranial abnormality. XR CHEST PORTABLE   Final Result   No acute cardiopulmonary findings         XR KNEE RIGHT (3 VIEWS)   Final Result   No acute osseous injury. Moderate osteoarthritis of the knee, most developed in the medial   compartment. CPPD-related arthropathy alternatively considered. XR RADIUS ULNA RIGHT (2 VIEWS)   Final Result   No acute fracture or dislocation           No results found.         PROCEDURES   Unless otherwise noted below, none     Procedures    CRITICAL CARE TIME       CONSULTS:  None      EMERGENCY DEPARTMENT COURSE and DIFFERENTIAL DIAGNOSIS/MDM:   Vitals:    Vitals:    10/19/22 1430 10/19/22 1446 10/19/22 1511 10/19/22 1515   BP:  (!) 137/94 (!) 191/126 (!) 166/108   Pulse: 87 90  99   Resp: 24 21  23   Temp:       SpO2: 90% 100% 93% 92%   Weight:           Patient was given the following medications:  Medications   morphine injection 4 mg (4 mg IntraVENous Given 10/19/22 1144)   ondansetron (ZOFRAN) injection 4 mg (4 mg IntraVENous Given 10/19/22 1144)   iopamidol (ISOVUE-370) 76 % injection 75 mL (75 mLs IntraVENous Given 10/19/22 1157)   magnesium sulfate 2000 mg in 50 mL IVPB premix (0 mg IntraVENous Stopped 10/19/22 1430)   potassium bicarb-citric acid (EFFER-K) effervescent tablet 40 mEq (40 mEq Oral Given 10/19/22 1225)   morphine injection 4 mg (4 mg IntraVENous Given 10/19/22 1519)   ondansetron (ZOFRAN) injection 4 mg (4 mg IntraVENous Given 10/19/22 1519)         Is this patient to be included in the SEP-1 Core Measure due to severe sepsis or septic shock? No   Exclusion criteria - the patient is NOT to be included for SEP-1 Core Measure due to: Infection is not suspected    Patient is a 28-year-old female who presents to the ED with complaint of falls. Patient had multiple falls since yesterday. First fall when she did get out of the car last evening. Second fall while try to get up out of bed today. Patient laid on the ground for several hours before grandson was able to get up off the ground. She is complaining of pain to her right sided rib cage/flank. Urinalysis showed no signs of infection. CBC showed normal white count, hemoglobin and platelets of 523. CMP relatively unremarkable other than potassium of 3.1. Was replaced orally here in the emergency department. Troponin normal.  EKG inter by attending. . INR 1.61 and slightly subtherapeutic. CK was 112. Magnesium 1.6 and replaced with IV replacement here in the emergency department. CT of the chest abdomen pelvis showed no acute abnormality. CT of the lumbar spine showed no acute abnormality. CT of thoracic spine showed no acute abnormality. CT of the cervical spine and head showed no acute abnormality. Right knee x-ray and right radius/ulna x-ray obtained given patient's falls with complaints of pain over these areas showed no acute injury. She is suffering from what appears to be multiple falls with associated injuries. She apparently has some weakness and has had multiple falls over the past couple days. Attempted ambulation here in ED but unable to ambulate due to weakness and some pain. Believe patient benefit from mission for further evaluation and treatment. May need PT/OT evaluation. Case discussed with hospitalist for admission. FINAL IMPRESSION      1. Multiple falls    2. Hypokalemia    3. Hypomagnesemia    4.  Unable to ambulate          DISPOSITION/PLAN   DISPOSITION Decision To Admit 10/19/2022 03:14:58 PM      PATIENT REFERRED TO:  No follow-up provider specified.     DISCHARGE MEDICATIONS:  New Prescriptions    No medications on file       DISCONTINUED MEDICATIONS:  Discontinued Medications    No medications on file              (Please note that portions of this note were completed with a voice recognition program.  Efforts were made to edit the dictations but occasionally words are mis-transcribed.)    KIM Durant (electronically signed)          KIM Min  10/19/22 [id number]

## 2022-10-19 NOTE — ED NOTES
Pt unable to ambulate at this time. States she is having too much.      Washington Guzman RN  10/19/22 2943

## 2022-10-19 NOTE — PROGRESS NOTES
Patient has arrived to unit from the ED. Vitals obtained, see flowsheets. Patient is awake, alert and oriented. Patient oriented to room and call light. Plan of care discussed with patient, patient agreeable. Call light within reach. Purewick in place. Call placed to pharmacy to verify tylenol order for fever. 1842: PRN tylenol administered.

## 2022-10-20 PROBLEM — Z86.73 HISTORY OF ARTERIAL ISCHEMIC STROKE: Status: ACTIVE | Noted: 2022-10-20

## 2022-10-20 PROBLEM — E78.5 HYPERLIPIDEMIA: Status: ACTIVE | Noted: 2022-07-06

## 2022-10-20 PROBLEM — R29.6 MULTIPLE FALLS: Status: ACTIVE | Noted: 2022-10-20

## 2022-10-20 PROBLEM — A41.9 SEPSIS (HCC): Status: ACTIVE | Noted: 2022-10-20

## 2022-10-20 PROBLEM — N17.9 AKI (ACUTE KIDNEY INJURY) (HCC): Status: ACTIVE | Noted: 2022-10-20

## 2022-10-20 PROBLEM — E66.3 OVERWEIGHT (BMI 25.0-29.9): Status: ACTIVE | Noted: 2022-10-20

## 2022-10-20 LAB
ANION GAP SERPL CALCULATED.3IONS-SCNC: 9 MMOL/L (ref 3–16)
BUN BLDV-MCNC: 16 MG/DL (ref 7–20)
CALCIUM SERPL-MCNC: 8.7 MG/DL (ref 8.3–10.6)
CHLORIDE BLD-SCNC: 97 MMOL/L (ref 99–110)
CO2: 26 MMOL/L (ref 21–32)
CREAT SERPL-MCNC: 1.7 MG/DL (ref 0.6–1.2)
GFR SERPL CREATININE-BSD FRML MDRD: 32 ML/MIN/{1.73_M2}
GLUCOSE BLD-MCNC: 99 MG/DL (ref 70–99)
HCT VFR BLD CALC: 37.3 % (ref 36–48)
HEMOGLOBIN: 12.3 G/DL (ref 12–16)
INR BLD: 1.45 (ref 0.87–1.14)
LACTIC ACID: 1.2 MMOL/L (ref 0.4–2)
MCH RBC QN AUTO: 29.5 PG (ref 26–34)
MCHC RBC AUTO-ENTMCNC: 32.9 G/DL (ref 31–36)
MCV RBC AUTO: 89.7 FL (ref 80–100)
PDW BLD-RTO: 12.9 % (ref 12.4–15.4)
PLATELET # BLD: 92 K/UL (ref 135–450)
PLATELET SLIDE REVIEW: ABNORMAL
PMV BLD AUTO: 10.1 FL (ref 5–10.5)
POTASSIUM SERPL-SCNC: 4.5 MMOL/L (ref 3.5–5.1)
PROCALCITONIN: 0.24 NG/ML (ref 0–0.15)
PROTHROMBIN TIME: 17.6 SEC (ref 11.7–14.5)
RAPID INFLUENZA  B AGN: NEGATIVE
RAPID INFLUENZA A AGN: NEGATIVE
RBC # BLD: 4.16 M/UL (ref 4–5.2)
SARS-COV-2, NAAT: NOT DETECTED
SLIDE REVIEW: ABNORMAL
SODIUM BLD-SCNC: 132 MMOL/L (ref 136–145)
TOTAL CK: 160 U/L (ref 26–192)
WBC # BLD: 4 K/UL (ref 4–11)

## 2022-10-20 PROCEDURE — 99223 1ST HOSP IP/OBS HIGH 75: CPT | Performed by: INTERNAL MEDICINE

## 2022-10-20 PROCEDURE — 2580000003 HC RX 258: Performed by: NURSE PRACTITIONER

## 2022-10-20 PROCEDURE — 84145 PROCALCITONIN (PCT): CPT

## 2022-10-20 PROCEDURE — 82550 ASSAY OF CK (CPK): CPT

## 2022-10-20 PROCEDURE — 2580000003 HC RX 258: Performed by: INTERNAL MEDICINE

## 2022-10-20 PROCEDURE — 97116 GAIT TRAINING THERAPY: CPT

## 2022-10-20 PROCEDURE — 83605 ASSAY OF LACTIC ACID: CPT

## 2022-10-20 PROCEDURE — 6370000000 HC RX 637 (ALT 250 FOR IP): Performed by: FAMILY MEDICINE

## 2022-10-20 PROCEDURE — 80048 BASIC METABOLIC PNL TOTAL CA: CPT

## 2022-10-20 PROCEDURE — 6360000002 HC RX W HCPCS: Performed by: FAMILY MEDICINE

## 2022-10-20 PROCEDURE — 97535 SELF CARE MNGMENT TRAINING: CPT

## 2022-10-20 PROCEDURE — 85027 COMPLETE CBC AUTOMATED: CPT

## 2022-10-20 PROCEDURE — 6370000000 HC RX 637 (ALT 250 FOR IP): Performed by: NURSE PRACTITIONER

## 2022-10-20 PROCEDURE — 97161 PT EVAL LOW COMPLEX 20 MIN: CPT

## 2022-10-20 PROCEDURE — 87804 INFLUENZA ASSAY W/OPTIC: CPT

## 2022-10-20 PROCEDURE — 94760 N-INVAS EAR/PLS OXIMETRY 1: CPT

## 2022-10-20 PROCEDURE — 2580000003 HC RX 258: Performed by: FAMILY MEDICINE

## 2022-10-20 PROCEDURE — 82570 ASSAY OF URINE CREATININE: CPT

## 2022-10-20 PROCEDURE — 97530 THERAPEUTIC ACTIVITIES: CPT

## 2022-10-20 PROCEDURE — 1200000000 HC SEMI PRIVATE

## 2022-10-20 PROCEDURE — 0202U NFCT DS 22 TRGT SARS-COV-2: CPT

## 2022-10-20 PROCEDURE — 87449 NOS EACH ORGANISM AG IA: CPT

## 2022-10-20 PROCEDURE — 85610 PROTHROMBIN TIME: CPT

## 2022-10-20 PROCEDURE — 6360000002 HC RX W HCPCS: Performed by: INTERNAL MEDICINE

## 2022-10-20 PROCEDURE — 87635 SARS-COV-2 COVID-19 AMP PRB: CPT

## 2022-10-20 PROCEDURE — 84300 ASSAY OF URINE SODIUM: CPT

## 2022-10-20 PROCEDURE — 6360000002 HC RX W HCPCS: Performed by: PHYSICIAN ASSISTANT

## 2022-10-20 PROCEDURE — 97165 OT EVAL LOW COMPLEX 30 MIN: CPT

## 2022-10-20 PROCEDURE — 87641 MR-STAPH DNA AMP PROBE: CPT

## 2022-10-20 PROCEDURE — 2700000000 HC OXYGEN THERAPY PER DAY

## 2022-10-20 PROCEDURE — 87040 BLOOD CULTURE FOR BACTERIA: CPT

## 2022-10-20 PROCEDURE — 87086 URINE CULTURE/COLONY COUNT: CPT

## 2022-10-20 PROCEDURE — 97110 THERAPEUTIC EXERCISES: CPT

## 2022-10-20 PROCEDURE — 6370000000 HC RX 637 (ALT 250 FOR IP): Performed by: INTERNAL MEDICINE

## 2022-10-20 PROCEDURE — 36415 COLL VENOUS BLD VENIPUNCTURE: CPT

## 2022-10-20 RX ORDER — WARFARIN SODIUM 2.5 MG/1
2.5 TABLET ORAL
Status: DISCONTINUED | OUTPATIENT
Start: 2022-10-25 | End: 2022-10-21

## 2022-10-20 RX ORDER — SODIUM CHLORIDE 9 MG/ML
INJECTION, SOLUTION INTRAVENOUS CONTINUOUS
Status: ACTIVE | OUTPATIENT
Start: 2022-10-20 | End: 2022-10-20

## 2022-10-20 RX ORDER — LINEZOLID 2 MG/ML
600 INJECTION, SOLUTION INTRAVENOUS EVERY 12 HOURS
Status: DISCONTINUED | OUTPATIENT
Start: 2022-10-20 | End: 2022-10-21

## 2022-10-20 RX ORDER — HYDRALAZINE HYDROCHLORIDE 25 MG/1
25 TABLET, FILM COATED ORAL EVERY 12 HOURS SCHEDULED
Status: DISCONTINUED | OUTPATIENT
Start: 2022-10-20 | End: 2022-10-22 | Stop reason: HOSPADM

## 2022-10-20 RX ORDER — METOPROLOL SUCCINATE 25 MG/1
25 TABLET, EXTENDED RELEASE ORAL DAILY
Status: DISCONTINUED | OUTPATIENT
Start: 2022-10-21 | End: 2022-10-22 | Stop reason: HOSPADM

## 2022-10-20 RX ORDER — WARFARIN SODIUM 5 MG/1
5 TABLET ORAL
Status: DISCONTINUED | OUTPATIENT
Start: 2022-10-21 | End: 2022-10-21

## 2022-10-20 RX ADMIN — POTASSIUM CHLORIDE 10 MEQ: 7.46 INJECTION, SOLUTION INTRAVENOUS at 02:46

## 2022-10-20 RX ADMIN — CARVEDILOL 3.12 MG: 3.12 TABLET, FILM COATED ORAL at 08:39

## 2022-10-20 RX ADMIN — POTASSIUM CHLORIDE 10 MEQ: 7.46 INJECTION, SOLUTION INTRAVENOUS at 01:38

## 2022-10-20 RX ADMIN — LOSARTAN POTASSIUM 100 MG: 100 TABLET, FILM COATED ORAL at 08:39

## 2022-10-20 RX ADMIN — ACETAMINOPHEN 650 MG: 325 TABLET ORAL at 08:39

## 2022-10-20 RX ADMIN — KETOROLAC TROMETHAMINE 15 MG: 15 INJECTION, SOLUTION INTRAMUSCULAR; INTRAVENOUS at 10:27

## 2022-10-20 RX ADMIN — POTASSIUM CHLORIDE 10 MEQ: 7.46 INJECTION, SOLUTION INTRAVENOUS at 00:18

## 2022-10-20 RX ADMIN — POTASSIUM CHLORIDE 10 MEQ: 7.46 INJECTION, SOLUTION INTRAVENOUS at 04:03

## 2022-10-20 RX ADMIN — ACETAMINOPHEN 650 MG: 325 TABLET ORAL at 23:56

## 2022-10-20 RX ADMIN — SODIUM CHLORIDE: 9 INJECTION, SOLUTION INTRAVENOUS at 13:34

## 2022-10-20 RX ADMIN — WARFARIN SODIUM 6 MG: 5 TABLET ORAL at 17:13

## 2022-10-20 RX ADMIN — FUROSEMIDE 20 MG: 20 TABLET ORAL at 08:39

## 2022-10-20 RX ADMIN — SPIRONOLACTONE 50 MG: 25 TABLET ORAL at 08:39

## 2022-10-20 RX ADMIN — KETOROLAC TROMETHAMINE 15 MG: 15 INJECTION, SOLUTION INTRAMUSCULAR; INTRAVENOUS at 17:13

## 2022-10-20 RX ADMIN — HYDRALAZINE HYDROCHLORIDE 25 MG: 25 TABLET, FILM COATED ORAL at 20:58

## 2022-10-20 RX ADMIN — NIFEDIPINE 90 MG: 30 TABLET, EXTENDED RELEASE ORAL at 08:39

## 2022-10-20 RX ADMIN — LINEZOLID 600 MG: 2 INJECTION, SOLUTION INTRAVENOUS at 15:00

## 2022-10-20 RX ADMIN — Medication 10 ML: at 08:41

## 2022-10-20 RX ADMIN — Medication 10 ML: at 21:00

## 2022-10-20 RX ADMIN — ENOXAPARIN SODIUM 40 MG: 100 INJECTION SUBCUTANEOUS at 20:58

## 2022-10-20 RX ADMIN — PIPERACILLIN AND TAZOBACTAM 3375 MG: 3; .375 INJECTION, POWDER, FOR SOLUTION INTRAVENOUS at 21:04

## 2022-10-20 RX ADMIN — PIPERACILLIN AND TAZOBACTAM 3375 MG: 3; .375 INJECTION, POWDER, FOR SOLUTION INTRAVENOUS at 14:56

## 2022-10-20 ASSESSMENT — ENCOUNTER SYMPTOMS
DIARRHEA: 0
RHINORRHEA: 0
COUGH: 1
ABDOMINAL PAIN: 0
SHORTNESS OF BREATH: 0
SINUS PAIN: 0
SORE THROAT: 0
SINUS PRESSURE: 0
EYE REDNESS: 0
CONSTIPATION: 0
EYE DISCHARGE: 0
WHEEZING: 0
BACK PAIN: 0
NAUSEA: 0

## 2022-10-20 ASSESSMENT — PAIN DESCRIPTION - PAIN TYPE: TYPE: ACUTE PAIN

## 2022-10-20 ASSESSMENT — PAIN DESCRIPTION - DESCRIPTORS
DESCRIPTORS: ACHING
DESCRIPTORS: DISCOMFORT;ACHING

## 2022-10-20 ASSESSMENT — PAIN SCALES - GENERAL
PAINLEVEL_OUTOF10: 8
PAINLEVEL_OUTOF10: 8
PAINLEVEL_OUTOF10: 7
PAINLEVEL_OUTOF10: 4
PAINLEVEL_OUTOF10: 8

## 2022-10-20 ASSESSMENT — PAIN DESCRIPTION - LOCATION
LOCATION: HEAD
LOCATION: HEAD

## 2022-10-20 ASSESSMENT — PAIN - FUNCTIONAL ASSESSMENT: PAIN_FUNCTIONAL_ASSESSMENT: ACTIVITIES ARE NOT PREVENTED

## 2022-10-20 ASSESSMENT — PAIN DESCRIPTION - ORIENTATION
ORIENTATION: RIGHT;LEFT
ORIENTATION: MID

## 2022-10-20 NOTE — PROGRESS NOTES
Pharmacy to Dose Warfarin    Pharmacy consulted to dose warfarin for PAF. INR Goal: 1.8-2.5    INR today: 1.45    Assessment/Plan:  - INR is subtherapeutic  -Will give one time dose of 6 mg today at 1800.  -Daily INR is ordered. Pharmacy will continue to monitor and make adjustments to regimen as necessary.  - Possible concomitant drug-drug interactions include: N/A     Pharmacy will continue to follow.     Thelma Ureña [facility]  [id number]

## 2022-10-20 NOTE — PROGRESS NOTES
Physical Therapy    [facility] [address]   Phone: [phone]    Physical Therapy    [] Initial Evaluation            [x] Daily Treatment Note         [] Discharge Summary      Patient: Delia Salazar   : 1950   MRN: [id number]   Date of Service:  10/21/2022  Admitting Diagnosis: Unable to ambulate  Current Admission Summary: Delia Salazar is a 80 y.o. female with past medical history of previous CVA, atrial fibrillation on Coumadin, CAD, CHF, hypertension, hyperlipidemia who presents to the ED with complaint of a fall. Patient states she was getting out of her car yesterday when she states she fell. She states she is unsure why she fell. She denies feeling lightheaded or dizzy. She denies any chest pain, shortness of breath or palpitations. She states she does not believe she lost her balance. She did not lose consciousness. Patient states she did brace herself when she fell. Patient states she had another fall when she attempted to get up from the bed last night. States she laid on the ground for several hours. Patient was brought to the ED for further evaluation and treatment. Quintin was eventually able to help patient up and she was able to ambulate after the fall. She did hit her head. She denies any headache or visual changes. Denies speech disturbances or numbness/tingling. Denies lightheadedness or dizziness. Denies any neck pain or back pain. Complaint of pain to the right-sided flank/rib cage. Also has pain to the right forearm and right knee. She states she had difficulty ambulating secondary to pain to the knee. States pain is aching rated 7/10. Denies any shortness of breath, abdominal pain, nausea/vomiting, urinary symptoms or changes in bowel movements. She states she did not lose consciousness. She came to the ED due to multiple falls for further evaluation and treatment.   Past Medical History:  has a past medical history of Acute cerebrovascular accident (CVA) ([facility] [address] LAST (acute kidney injury) ([facility] [address] Atrial fibrillation ([address] Bell palsy, CAD (coronary artery disease), Cerebral artery occlusion with cerebral infarction ([address] Chronic diastolic CHF (congestive heart failure) ([address] CVA (cerebral vascular accident) ([facility] [address] Hypertension, Intracranial hemorrhage ([facility] [address] Mixed hyperlipidemia, Nonintractable headache, Nontraumatic subcortical hemorrhage of cerebral hemisphere [facility]), Primary osteoarthritis of right knee, Sudden visual loss of left eye, Thyroid nodule, and TIA involving right internal carotid artery. Past Surgical History:  has a past surgical history that includes Cardiac surgery; Tubal ligation (Right, Torn Rotator Cuff); Coronary angioplasty with stent; and Colonoscopy. Discharge Recommendations: Zina Chao scored a 18/24 on the AM-PAC short mobility form. Current research shows that an AM-PAC score of 18 or greater is typically associated with a discharge to the patient's home setting. Based on the patient's AM-PAC score and their current functional mobility deficits, it is recommended that the patient have 2-3 sessions per week of Physical Therapy at d/c to increase the patient's independence. At this time, this patient demonstrates the endurance and safety to discharge home with HHPT and a follow up treatment frequency of 2-3x/wk. Please see assessment section for further patient specific details. If patient discharges prior to next session this note will serve as a discharge summary. Please see below for the latest assessment towards goals.     HOME HEALTH CARE: LEVEL 3 SAFETY     - Initial home health evaluation to occur within 24-48 hours, in patient home   - Therapy evaluations in home within 24-48 hours of discharge; including DME and home safety   - Frontload therapy 5 days, then 3x a week   - Therapy to evaluate if patient has [id number] needs for personal care   -  evaluation within 24-48 hours, includes evaluation of resources and insurance to determine [place], [place], LTC, and Medicaid options    DME Required For Discharge: patient has all required DME for discharge  Precautions/Restrictions: high fall risk, Isolation precautions COVID rule out   Weight Bearing Restrictions: no restrictions  [] Right Upper Extremity  [] Left Upper Extremity [] Right Lower Extremity  [] Left Lower Extremity     Required Braces/Orthotics: no braces required   [] Right  [] Left  Positional Restrictions:no positional restrictions    Pre-Admission Information   Lives With: great grandson (16 y.o)                   Type of Home: house  Home Layout: one level, able to live on main level  Home Access:  1 step to enter without rails   Bathroom Layout: walker accessible, wheelchair accessible, tub/shower unit  Toilet Height: standard height, elevated height  Bathroom Equipment: shower chair  Home Equipment: 4WW, single point cane  Transfer Assistance: Independent without use of device  Ambulation Assistance:Independent without use of device, . Comment: Uses walker out in the community  ADL Assistance: independent with all ADL's  IADL Assistance: independent with homemaking tasks  Active :        [x] Yes                 [] No  Hand Dominance: [] Left                 [] Right  Current Employment: part time employment. Occupation: caregiver  Hobbies: Taking care of lelia carrington  Recent Falls: 2 falls which lead to current admission, pt reports it feels like legs gave out on her       Vision:   Vision Gross Assessment: Impaired and Vision Corrective Device: wears glasses for reading  Hearing:   [facility]    Subjective  General: Pt in bathroom upon arrival. Pt agreeable to PT tx  Pain: 6/10. Location: back   Pain Interventions: RN notified       Functional Mobility  Bed Mobility  Bed mobility not completed on this date.   Comments: in bathroom upon entry, in chair at end of session   Transfers  Sit to stand transfer: stand by assistance, contact guard assistance  Stand to sit transfer: stand by assistance, contact guard assistance  Toilet transfer: stand by assistance  Comments: cues for safe hand placement. CGA progressing to SBA   Ambulation  Surface:level surface  Assistive Device: rolling walker  Assistance: stand by assistance, contact guard assistance  Distance:100  Gait Mechanics: narrow MICHELLE, decreased lynn   Comments:  Pt ambulates 100' with RW with SBA in room. Pt also ambulating 10' from bathroom to chair with SBA without device. Had pt stand from recliner to walk in room without device and pt had a major LOB requiring mod assist after first 2 steps due to scissoring her right foot over her left. Recommended continued use of RW or hands on assist to aide. Stair Mobility  Stair mobility not completed on this date. Pt on droplet precautions  Comments:  Wheelchair Mobility:  No w/c mobility completed on this date. Comments:  Balance  Static Sitting Balance: good(+): independent with high level dynamic balance in unsupported position  Dynamic Sitting Balance: good: independent with functional balance in unsupported position  Static Standing Balance: fair (-): maintains balance at SBA with use of UE support  Dynamic Standing Balance: fair (-): maintains balance at CGA with use of UE support  Comments: Pt sits at toilet with ind, able to manage brief and pericare in standing with supv, braces self on grab bar    Other Therapeutic Interventions   Heel raises, toe raises, marches, minisquats martin x 10 at RW    Functional Outcomes  AM-PAC Inpatient Mobility Raw Score : 18              Cognition  WFL  Orientation:    alert and oriented x 4  Command Following:   [facility]    Education  Barriers To Learning: none  Patient Education: patient educated on goals, PT role and benefits, plan of care, discharge recommendations.  Pt encouraged to RW at all times   Learning Assessment:  patient verbalizes and demonstrates understanding    Assessment  Activity Tolerance: Pt without any excessive SOB , did fatigue fairly easily with activity  Impairments Requiring Therapeutic Intervention: decreased functional mobility, decreased strength, decreased safety awareness, decreased endurance, decreased balance, increased pain  Prognosis: good  Clinical Assessment: Pt presents to hospital after several falls at home. Pt with LOB during gait without AD requiring assist to maintain balance, recommend RW for safety. Pt would benefit from home therapy to ensure safety and improve pt gait and balance. Will continue to follow while in house to maximize her safety and ind prior to return to home.   Safety Interventions: patient left in chair, chair alarm in place, call light within reach, gait belt, patient at risk for falls, and nurse notified    Plan  Frequency: 3-5 x/per week  Current Treatment Recommendations: strengthening, balance training, functional mobility training, transfer training, gait training, stair training, endurance training, and safety education    Goals  Patient Goals: none stated   Short Term Goals:  Time Frame: upon discharge   Patient will complete bed mobility at Independent   Patient will complete transfers at Independent   Patient will ambulate 150 ft with use of LRAD at modified independent  Patient will ascend/descend 1 stairs with (B) handrail at modified independent  No goals met this date  Therapy Session Time      Individual Group Co-treatment   Time In 1016       Time Out 1040       Minutes 24         Timed Code Treatment Minutes:  24 Minutes  Total Treatment Minutes:  24       Electronically Signed By: Edward Tillman, [address] [place] [address]

## 2022-10-20 NOTE — PROGRESS NOTES
[facility] PROGRESS NOTE    10/20/2022 9:07 AM        Name: Oralia Scales . Admitted: 10/19/2022  Primary Care Provider: Rebeca Ann MD (Tel: [phone])      Brief History: 79 yo female with hx CAD/PCI, ICH, chronic atrial fib, chronic dCHF, HTN. She presented to hospital after recurrent falls at home. She fell while getting out of car, later fell when getting out of bed and laid on ground for several hours. No acute findings on imaging. Spiked temp to 102.4 in ER. Lives at home with 15 yo great grandson (currently staying with ). Subjective:  Resting in bed, says she does not feel at all well. Complains of pain along right ribs from fall. Says she feels weak, fatigued. Denies HA, shortness of breath, abdominal pain, nausea.      Reviewed interval ancillary notes    Current Medications  carvedilol (COREG) tablet 3.125 mg, BID WC  furosemide (LASIX) tablet 20 mg, Daily  losartan (COZAAR) tablet 100 mg, Daily  NIFEdipine (PROCARDIA XL) extended release tablet 90 mg, Daily  spironolactone (ALDACTONE) tablet 50 mg, BID  sodium chloride flush 0.9 % injection 5-40 mL, 2 times per day  sodium chloride flush 0.9 % injection 5-40 mL, PRN  0.9 % sodium chloride infusion, PRN  enoxaparin (LOVENOX) injection 40 mg, Nightly  ondansetron (ZOFRAN-ODT) disintegrating tablet 4 mg, Q8H PRN   Or  ondansetron (ZOFRAN) injection 4 mg, Q6H PRN  polyethylene glycol (GLYCOLAX) packet 17 g, Daily PRN  acetaminophen (TYLENOL) tablet 650 mg, Q6H PRN   Or  acetaminophen (TYLENOL) suppository 650 mg, Q6H PRN  potassium chloride (KLOR-CON M) extended release tablet 40 mEq, PRN   Or  potassium bicarb-citric acid (EFFER-K) effervescent tablet 40 mEq, PRN   Or  potassium chloride 10 mEq/100 mL IVPB (Peripheral Line), PRN  [START ON 10/25/2022] warfarin (COUMADIN) tablet 2.5 mg, Once per day on Tue   And  warfarin (COUMADIN) tablet 5 mg, Once per day on Sun Mon Wed Thu Fri Sat  cefTRIAXone (ROCEPHIN) 1000 mg in sterile water 10 mL IV syringe, Q24H  hydrALAZINE (APRESOLINE) injection 10 mg, Q4H PRN  ketorolac (TORADOL) injection 15 mg, Q6H PRN        Objective:  /82   Pulse 71   Temp (!) 101.6 °F (38.7 °C) (Oral)   Resp 16   Ht 5' 7\" (1.702 m)   Wt 189 lb 9.5 oz (86 kg)   SpO2 94%   BMI 29.69 kg/m²     Intake/Output Summary (Last 24 hours) at 10/20/2022 0907  Last data filed at 10/20/2022 0538  Gross per 24 hour   Intake --   Output 900 ml   Net -900 ml      Wt Readings from Last 3 Encounters:   10/20/22 189 lb 9.5 oz (86 kg)   07/06/22 180 lb (81.6 kg)   07/06/22 180 lb (81.6 kg)       General appearance:  Appears comfortable  Eyes: Sclera clear. Pupils equal.  ENT: Moist oral mucosa. Trachea midline, no adenopathy. Cardiovascular: Regular rhythm, normal S1, S2. No murmur. No edema in lower extremities, tenderness to right lower chest wall on palpation  Respiratory: Not using accessory muscles. Good inspiratory effort. Clear to auscultation bilaterally, no wheeze or crackles. GI: Abdomen soft, no tenderness, not distended, normal bowel sounds  Musculoskeletal: No cyanosis in digits, neck supple  Neurology: CN 2-12 grossly intact. No speech or motor deficits  Psych: Normal affect. Alert and oriented in time, place and person  Skin: Warm, dry, normal turgor    Labs and Tests:  CBC:   Recent Labs     10/19/22  1055 10/20/22  0657   WBC 5.0 4.0   HGB 13.7 12.3   * 92*     BMP:    Recent Labs     10/19/22  1055 10/19/22  2102 10/20/22  0657   *  --  132*   K 3.1* 3.0* 4.5   CL 95*  --  97*   CO2 25  --  26   BUN 7  --  16   CREATININE 0.6  --  1.7*   GLUCOSE 98  --  99     Hepatic:   Recent Labs     10/19/22  1055   AST 22   ALT 13   BILITOT 1.4*   ALKPHOS 59       CXR 10/19/2022:  No acute cardiopulmonary findings    Xray right knee 10/19/2022:  No acute osseous injury.        Moderate osteoarthritis of the knee, most developed in the medial   compartment. CPPD-related arthropathy alternatively considered. Xray radius/ulna 10/19/2022:  No acute fracture or dislocation         CT head 10/19/2022:  No evidence of acute intracranial abnormality. CT cervical spine 10/19/2022:  No acute abnormality of the cervical spine. CT thoracic and lumbar spines 10/19/2022:  No acute osseous injury of the thoracic or lumbar spine. CT cervical spine, chest, abdomen and pelvis are discussed separately. CT chest/abd/pelvis 10/19/2022:  Negative for acute thoracic/abdominopelvic abnormality. Problem List  Principal Problem:    Unable to ambulate  Resolved Problems:    * No resolved hospital problems. *       Assessment & Plan:   Febrile illness. Temp to 102.4 in ER. Source unclear. She was started on ceftriaxone on admission for suspected UTI but UA not impressive with only trace leukocytes (3 WBC), no bacteria, negative nitrites. CT scan with no acute findings in chest/abd/pelvis. No leukocytosis, procalcitonin is elevated at 0.24, blood cultures with NGTD. Add on rapid COVID and influenza screens. Temp this am 101. 6. Consult ID for FUO. LAST. Creatinine 0.6 on presentation, 1.7 today. Add CK level to assess for rhabdo as she laid on floor for hours. DC po Lasix, spironolactone, losartan. Consult nephrology. Add po hydralazine with hold parameters. Begin IV fluids. Monitor I&O. Recurrent falls / right rib pain. Imaging negative for fracture. CT head with no acute findings. Likely secondary acute illness. Check orthostatic pressures. PT/OT consults pending. Chronic dCHF. Echo 7/2022 with EF 60-65%. Appears compensated. Diuretics on hold secondary LAST. Monitor daily weights. CAD. Hx PCI to LAD (2000). Troponin < 0.01 no acute changes on EKG. Denies chest pain. Chronic atrial fib. Controlled rate on beta blocker. Takes warfarin at home, INR subtherapeutic on presentation 1.61.  Continue warfarin, pharmacy dosing. Continue telemetry. HTN. BP elevated on admission, on low side today. Meds adjusted, hold parameters added. Continue to follow. Diet: ADULT DIET;  Regular  Code:[id number]-CC  DVT PPX: enoxaparin      ADAN Champagne CNP   10/20/2022 9:07 AM

## 2022-10-20 NOTE — PROGRESS NOTES
[facility] [address]   Phone: [phone]    Occupational Therapy    [x] Initial Evaluation            [] Daily Treatment Note         [] Discharge Summary      Patient: Marco Valdes   : 1950   MRN: [id number]   Date of Service:  10/20/2022    Admitting Diagnosis: Unable to ambulate  Current Admission Summary: Marco Valdes is a 80 y.o. female with past medical history of previous CVA, atrial fibrillation on Coumadin, CAD, CHF, hypertension, hyperlipidemia who presents to the ED with complaint of a fall. Patient states she was getting out of her car yesterday when she states she fell. She states she is unsure why she fell. She denies feeling lightheaded or dizzy. She denies any chest pain, shortness of breath or palpitations. She states she does not believe she lost her balance. She did not lose consciousness. Patient states she did brace herself when she fell. Patient states she had another fall when she attempted to get up from the bed last night. States she laid on the ground for several hours. Patient was brought to the ED for further evaluation and treatment. Quintin was eventually able to help patient up and she was able to ambulate after the fall. She did hit her head. She denies any headache or visual changes. Denies speech disturbances or numbness/tingling. Denies lightheadedness or dizziness. Denies any neck pain or back pain. Complaint of pain to the right-sided flank/rib cage. Also has pain to the right forearm and right knee. She states she had difficulty ambulating secondary to pain to the knee. States pain is aching rated 7/10. Denies any shortness of breath, abdominal pain, nausea/vomiting, urinary symptoms or changes in bowel movements. She states she did not lose consciousness. She came to the ED due to multiple falls for further evaluation and treatment.   Past Medical History:  has a past medical history of Acute cerebrovascular accident (CVA) ([address] Atrial fibrillation ([address] Bell palsy, CAD (coronary artery disease), Cerebral artery occlusion with cerebral infarction ([address] Chronic diastolic CHF (congestive heart failure) ([address] CVA (cerebral vascular accident) ([address] Hypertension, Intracranial hemorrhage ([address] Mixed hyperlipidemia, Nonintractable headache, Nontraumatic subcortical hemorrhage of cerebral hemisphere [facility]), Primary osteoarthritis of right knee, Sudden visual loss of left eye, Thyroid nodule, and TIA involving right internal carotid artery. Past Surgical History:  has a past surgical history that includes Cardiac surgery; Tubal ligation (Right, Torn Rotator Cuff); Coronary angioplasty with stent; and Colonoscopy. Discharge Recommendations: Rosario Fernandez scored a 18/24 on the AM-PAC ADL Inpatient form. Current research shows that an AM-PAC score of 18 or greater is typically associated with a discharge to the patient's home setting. Based on the patient's AM-PAC score, and their current ADL deficits, it is recommended that the patient have 2-3 sessions per week of Occupational Therapy at d/c to increase the patient's independence. At this time, this patient demonstrates the endurance and safety to discharge home with [address] [place] [address]S [place] (home vs OP services) and a follow up treatment frequency of 2-3x/wk. Please see assessment section for further patient specific details. If patient discharges prior to next session this note will serve as a discharge summary. Please see below for the latest assessment towards goals.      HOME HEALTH CARE: LEVEL 3 SAFETY     - Initial home health evaluation to occur within 24-48 hours, in patient home   - Therapy evaluations in home within 24-48 hours of discharge; including DME and home safety   - Frontload therapy 5 days, then 3x a week   - Therapy to evaluate if patient has [facility] Romel Ochoaell Jesse needs for personal care   -  evaluation within 24-48 hours, includes evaluation of resources and insurance to determine [place], [place], LTC, and Medicaid options    DME Required For Discharge: patient has all required DME for discharge  Precautions/Restrictions: high fall risk, Isolation precautions COVID rule out   Weight Bearing Restrictions: no restrictions  [] Right Upper Extremity        [] Left Upper Extremity         [] Right Lower Extremity         [] Left Lower Extremity             Required Braces/Orthotics: no braces required              [] Right            [] Left  Positional Restrictions:no positional restrictions     Pre-Admission Information   Lives With: great grandson (16 y.o)                   Type of Home: house  Home Layout: one level, able to live on main level  Home Access:  1 step to enter without rails   Bathroom Layout: walker accessible, wheelchair accessible, tub/shower unit  Toilet Height: standard height, elevated height  Bathroom Equipment: shower chair  Home Equipment: 4WW, single point cane  Transfer Assistance: Independent without use of device  Ambulation Assistance:Independent without use of device, . Comment: Uses walker out in the community  ADL Assistance: independent with all ADL's  IADL Assistance: independent with homemaking tasks  Active :        [x] Yes                 [] No  Hand Dominance: [] Left                 [] Right  Current Employment: part time employment. Occupation: caregiver  Hobbies: Taking care of lelia carrington  Recent Falls: 2 falls which lead to current admission, pt reports it feels like legs gave out on her      Examination   Vision:   Vision Gross Assessment: Impaired and Vision Corrective Device: wears glasses for reading  Hearing:   [facility] [place]  Observation:   Pt on 1L O2 resting at Spo2 97%  Posture:    Forward head, rounded shoulders   Sensation:   Pt reports numbness/tingling in B finger tips   ROM:   (B) UE AROM WFL  Strength:   (B) UE strength grossly WFL    Decision Making: low complexity  Clinical Presentation: stable      Subjective  General: Patient supine in bed, agreeable to evaluation. Recently placed in COVID R/O isolation  Pain: 6/10. Location: Back  Pain Interventions: RN notified and repositioned         Activities of Daily Living  Basic Activities of Daily Living  Feeding: setup assistance  Grooming: stand by assistance  Lower Extremity Dressing: stand by assistance  Toileting: stand by assistance. General Comments: Patient declined further ADLs at time of evaluation  Instrumental Activities of Daily Living  No IADL completed on this date. Functional Mobility  Bed Mobility  Supine to Sit: stand by assistance  Scooting: supervision  Comments:  Transfers  Sit to stand transfer:stand by assistance  Stand to sit transfer: stand by assistance  Bed / Chair transfer: stand by assistance. Bed / Chair equipment: rolling walker  Bed / Chair comments: slow lynn, forward flexed posture  Toilet transfer: stand by assistance, contact guard assistance  Toilet transfer equipment: standard toilet  Toilet transfer comments: cues for proximity to walker  Comments:  Functional Mobility:  Standing Balance: stand by assistance, contact guard assistance.     Standing Balance Comment: use of RW, forward flexed posture, slow lynn (pt states this is baseline)  Functional Mobility: .  stand by assistance  Functional Mobility Activity: to/from bathroom  Functional Mobility Device Use: rolling walker   To/from bathroom + bathroom>door>recliner (12 + 35 feet)  Other Therapeutic Interventions    Functional Outcomes  AM-PAC Inpatient Daily Activity Raw Score: 18    Cognition  WFL  Orientation:    alert and oriented x 4  Command Following:   [facility]     Education  Barriers To Learning: none  Patient Education: patient educated on goals, OT role and benefits, plan of care, discharge recommendations  Learning Assessment:  patient verbalizes understanding, would benefit from continued reinforcement    Assessment  Activity Tolerance: Good Impairments Requiring Therapeutic Intervention: decreased functional mobility, decreased ADL status, decreased endurance, decreased balance, decreased IADL, decreased posture  Prognosis: good  Clinical Assessment: Patient presented with the above deficits, which are below baseline, and would benefit from continued OT to address.  Patient adamantly opposed to SNF, agreeable to [address] [place] [address]  Safety Interventions: patient left in chair, chair alarm in place, call light within reach, patient at risk for falls, and nurse notified    Plan  Frequency: 3-5 x/per week  Current Treatment Recommendations: strengthening, balance training, functional mobility training, transfer training, endurance training, ADL/self-care training, IADL training, and equipment evaluation/education    Goals  Patient Goals: Home   Short Term Goals:  Time Frame: Upon discharge  Patient will complete lower body ADL at modified independent   Patient will complete toileting at modified independent   Patient will complete functional transfers at modified independent   Patient will complete functional mobility at modified independent   Patient will increase functional standing balance to 5-7 Mod I for improved ADL completion    Therapy Session Time     Individual Group Co-treatment   Time In    1329   Time Out    1416   Minutes    47        Timed Code Treatment Minutes:   32  Total Treatment Minutes:  47       Electronically Signed By: DENISE Salazar/MARCOS [id number]

## 2022-10-20 NOTE — CONSULTS
Infectious Diseases   Consult Note        Admission Date: 10/19/2022  Hospital Day: Hospital Day: 2   Attending: Chrsi Dangelo MD  Date of service: 10/20/22     Reason for admission: Hypokalemia [E87.6]  Hypomagnesemia [E83.42]  Unable to ambulate [R26.2]  Multiple falls [R29.6]    Chief complaint/ Reason for consult: Sepsis and high fever of unknown source      Microbiology:        I have reviewed allavailable micro lab data and cultures    Blood c      Antibiotics and immunizations:       Current antibiotics: All antibiotics and their doses were reviewed by me    Recent Abx Admin                     cefTRIAXone (ROCEPHIN) 1000 mg in sterile water 10 mL IV syringe (mg) 1,000 mg Given 10/19/22 2012                      Immunization History: All immunization history was reviewed by me today. Immunization History   Administered Date(s) Administered    Influenza Vaccine, unspecified formulation 02/10/2007, 12/20/2007, 01/30/2008    Pneumococcal Polysaccharide ([id number]) 10/01/2007    Tdap (Boostrix, Adacel) 12/20/2007       Known drug allergies: All allergies were reviewed and updated    Allergies   Allergen Reactions    Clonidine Rash, Shortness Of Breath and Hives    Codeine Hives, Itching, Nausea Only and Rash     Other reaction(s): Other (See Comments)  Pruritis    Hydrocodone-Acetaminophen      Itchy      Lisinopril Hives and Itching    Tramadol     Percocet [Oxycodone-Acetaminophen] Itching     Patient takes percocet with benadryl to control the itching       Social history:     Social History:  All social andepidemiologic history was reviewed and updated by me today as needed. Tobacco use:   reports that she has never smoked. She has never used smokeless tobacco.  Alcohol use:   reports no history of alcohol use. Currently lives in: [address] [place] [address] Road   reports no history of drug use.      COVID VACCINATION AND LAB RESULT RECORDS:     Internal Administration   First Dose      Second Dose           Last COVID Lab No results found for: SARS-COV-2, SARS-COV-2 RNA, SARS-COV-2, SARS-COV-2, SARS-COV-2 BY PCR, SARS-COV-2, SARS-COV-2, SARS-COV-2         Assessment:     The patient is a 80 y.o. old female who  has a past medical history of Acute cerebrovascular accident (CVA) ([facility] [address] (1/28/2020), LAST (acute kidney injury) ([facility] [address] (10/20/2022), Atrial fibrillation ([facility] [address] Bell palsy, CAD (coronary artery disease), Cerebral artery occlusion with cerebral infarction [facility]), Chronic diastolic CHF (congestive heart failure) ([facility] [address] (5/16/2020), CVA (cerebral vascular accident) ([facility] [address] (1/4/2017), Hypertension, Intracranial hemorrhage ([facility] [address] (4/2016), Mixed hyperlipidemia (7/6/2022), Nonintractable headache, Nontraumatic subcortical hemorrhage of cerebral hemisphere ([facility] [address] (4/30/2016), Primary osteoarthritis of right knee (3/18/2022), Sudden visual loss of left eye, Thyroid nodule (2/8/2018), and TIA involving right internal carotid artery. with following problems:    Sepsis with high fever, tachycardia, hypotension  Fall while getting out of bed at home  Acute kidney injury  History of stroke  Coronary artery disease  Essential hypertension  Mixed hyperlipidemia  Thyroid nodule  Overweight due to excess calorie intake : Body mass index is 29.69 kg/m². Discussion:      The patient was admitted with sepsis syndrome with high-grade fever, tachycardia and hypotension. The patient had a CT scan of the head without contrast and chest abdomen and pelvis done with IV contrast earlier today. I reviewed the CT images and independently interpreted the findings. No acute abnormalities noted on the CT scan. No leukocytosis.     Rapid COVID-19 and rapid flu test has been sent last night      Plan:     Diagnostic Workup:    Send 2 sets of blood cultures today  Send urine culture  Check lactic acid level start  Will order respiratory viral PCR panel  Continue to follow fever curve, WBC count and blood cultures  Follow up on liverand renal functions closely  Check a procalcitonin level  Nasal MRSA probe    Antimicrobials: Will order empiric IV linezolid 600 mg every 12 hour  Will order empiric IV Zosyn 3.375 g every 8 hours  Will stop IV ceftriaxone  IV fluid as needed to maintain mean arterial pressure greater than 65 mmHg  We will follow up on the culture results and clinical progress and will make further recommendations accordingly. Continue close vitals monitoring. Maintain good glycemic control. Fall precautions. Aspiration precautions. Continue to watch for new fever or diarrhea. DVT prophylaxis. Discussed all above with patient and RN. Drug Monitoring:    Continue serial monitoring for antibiotic toxicity as follows: CBC, CMP  Continue to watch for following: new or worsening fever, hypotension, hives, lip swelling and redness or purulence at vascular access sites. I/v access Management:    Continue to monitor i.v access sites for erythema, induration, discharge or tenderness. As always, continue efforts to minimizetubes/lines/drains as clinically appropriate to reduce chances of line associated infections. Current isolation precautions:    Currently active isolation(s): Droplet Plus       Level of complexity of visit and medical decision making: High     Risk of Complications/Morbidity: High     Illness(es)/ Infection present that pose threat to life/bodily function. There is potential for severe exacerbation of infection/side effects of treatment. Therapy requires intensive monitoring for antimicrobial agent toxicity. Thank you for involving me in the care of your patient. I will continue to follow. If you have any additional questions, please do not hesitate to contact me. Subjective:     Presenting complaint in ER:     Chief Complaint   Patient presents with    Fall     Fall for unknown reason into the grass last night; hit head & right ribs. Denies LOC; takes Coumadin.           HPI: Tracie Cortez is a 80 y.o. female patient, who was seen at the request of Dr. Mrita Carvalho MD.    History was obtained from chart review and the patient. The patient was admitted on 10/19/2022. I have been consulted to see the patient for above mentioned reason(s). The patient has multiple medical comorbidities, and presented to the ER for a fall. The patient was getting out of her car when she fell. The patient was able to break the fall. Then she suffered another fall while she was attempting to get out of her bed last night. He patient laid on the ground reportedly for several hours. The patient had difficulty ambulating. Her grandson was able to get her up to the bed. The patient reportedly did not hit her head. In the ER, patient had a high fever 102.4. White cell count was 4000. The patient has been started on IV ceftriaxone. Blood and urine cultures have been ordered by primary team today    I have been asked for my opinion for management for this patient. Past Medical History: All past medical history reviewed today.     Past Medical History:   Diagnosis Date    Acute cerebrovascular accident (CVA) ([address] 1/28/2020    LAST (acute kidney injury) ([address] 10/20/2022    Atrial fibrillation (HCC)     Bell palsy     diagnosed 15 years ago    CAD (coronary artery disease)     Cerebral artery occlusion with cerebral infarction ([address]     Chronic diastolic CHF (congestive heart failure) ([address] 5/16/2020    CVA (cerebral vascular accident) ([address] 1/4/2017    Hypertension     Intracranial hemorrhage ([address] 4/2016    Mixed hyperlipidemia 7/6/2022    Nonintractable headache     Nontraumatic subcortical hemorrhage of cerebral hemisphere ([address] 4/30/2016    Primary osteoarthritis of right knee 3/18/2022    Sudden visual loss of left eye     Thyroid nodule 2/8/2018    TIA involving right internal carotid artery          Past Surgical History: All pastsurgical history was reviewed today. Past Surgical History:   Procedure Laterality Date    CARDIAC SURGERY      COLONOSCOPY      CORONARY ANGIOPLASTY WITH STENT PLACEMENT      TUBAL LIGATION Right Torn Rotator Cuff    2013 @ [facility]         Family History: All family history was reviewed today. History reviewed. No pertinent family history. Medications: All current and past medications were reviewed.     Medications Prior to Admission: tiZANidine (ZANAFLEX) 4 MG tablet, Take 1 tablet by mouth 3 times daily as needed (neck pain)  losartan (COZAAR) 100 MG tablet, Take 1 tablet by mouth daily  carvedilol (COREG) 3.125 MG tablet, Take 1 tablet by mouth 2 times daily (with meals)  spironolactone (ALDACTONE) 50 MG tablet, Take 1 tablet by mouth 2 times daily TAKE 1 TABLET BY MOUTH DAILY  warfarin (COUMADIN) 5 MG tablet, Take 5 mg by mouth daily 5 mg everyday but on tuesdays 0.5 tablet  NIFEdipine (PROCARDIA XL) 90 MG extended release tablet, Take 1 tablet by mouth daily  furosemide (LASIX) 20 MG tablet, TAKE 1 TABLET BY MOUTH once DAILY  potassium chloride (KLOR-CON M) 20 MEQ extended release tablet, Take 0.5 tablets by mouth daily  Magnesium 500 MG CAPS, Take 400 mg by mouth daily  famotidine (PEPCID) 20 MG tablet, Take 1 tablet by mouth 2 times daily (Patient not taking: Reported on 10/19/2022)  atorvastatin (LIPITOR) 80 MG tablet, Take 1 tablet by mouth daily  acetaminophen (TYLENOL) 500 MG tablet, Take 2 tablets by mouth every 6 hours as needed for Pain (Patient not taking: Reported on 6/8/2022)  Calcium Carb-Cholecalciferol (CALCIUM + D3) 600-200 MG-UNIT TABS tablet, Take 1 tablet by mouth 2 times daily     [START ON 10/21/2022] warfarin  5 mg Oral Once per day on Sun Mon Wed Thu Fri Sat    And    [START ON 10/25/2022] warfarin  2.5 mg Oral Once per day on Tue    warfarin  6 mg Oral Once    hydrALAZINE  25 mg Oral 2 times per day    [START ON 10/21/2022] metoprolol succinate  25 mg Oral Daily    linezolid  600 mg IntraVENous Q12H piperacillin-tazobactam  3,375 mg IntraVENous Q8H    NIFEdipine  90 mg Oral Daily    sodium chloride flush  5-40 mL IntraVENous 2 times per day    enoxaparin  40 mg SubCUTAneous Nightly          REVIEW OF SYSTEMS:       Review of Systems   Constitutional:  Positive for fatigue and fever. Negative for chills and diaphoresis. HENT:  Negative for ear discharge, ear pain, postnasal drip, rhinorrhea, sinus pressure, sinus pain and sore throat. Eyes:  Negative for discharge and redness. Respiratory:  Positive for cough. Negative for shortness of breath and wheezing. Cardiovascular:  Negative for chest pain and leg swelling. Gastrointestinal:  Negative for abdominal pain, constipation, diarrhea and nausea. Endocrine: Negative for cold intolerance, heat intolerance and polydipsia. Genitourinary:  Negative for dysuria, flank pain, frequency, hematuria and urgency. Musculoskeletal:  Negative for back pain and myalgias. Skin:  Negative for rash. Allergic/Immunologic: Negative for immunocompromised state. Neurological:  Negative for dizziness, seizures and headaches. Hematological:  Does not bruise/bleed easily. Psychiatric/Behavioral:  Negative for agitation, hallucinations and suicidal ideas. The patient is not nervous/anxious. All other systems reviewed and are negative. Objective:       PHYSICAL EXAM:      Vitals:   Vitals:    10/20/22 0630 10/20/22 0822 10/20/22 0830 10/20/22 1130   BP:   121/82 87/63   Pulse:   71 71   Resp:  16  20   Temp:   (!) 101.6 °F (38.7 °C) 98.3 °F (36.8 °C)   TempSrc:   Oral Oral   SpO2: 93% 94% 94% 95%   Weight:       Height:           Physical Exam  Vitals and nursing note reviewed. Constitutional:       Appearance: She is well-developed. She is not diaphoretic. Comments: The patient was seen earlier today. Appears tired   HENT:      Head: Normocephalic and atraumatic. Right Ear: External ear normal. There is no impacted cerumen.       Left Ear: External ear normal. There is no impacted cerumen. Nose: Nose normal.      Mouth/Throat:      Mouth: Mucous membranes are moist.      Pharynx: Oropharynx is clear. No oropharyngeal exudate. Eyes:      General: No scleral icterus. Right eye: No discharge. Left eye: No discharge. Conjunctiva/sclera: Conjunctivae normal.      Pupils: Pupils are equal, round, and reactive to light. Neck:      Thyroid: No thyromegaly. Cardiovascular:      Rate and Rhythm: Normal rate and regular rhythm. Heart sounds: Normal heart sounds. No murmur heard. No friction rub. Pulmonary:      Effort: No respiratory distress. Breath sounds: No stridor. No wheezing or rales. Abdominal:      General: Bowel sounds are normal.      Palpations: Abdomen is soft. Tenderness: There is no abdominal tenderness. There is no guarding or rebound. Musculoskeletal:         General: No swelling, tenderness or deformity. Normal range of motion. Cervical back: Normal range of motion and neck supple. Right lower leg: No edema. Left lower leg: No edema. Lymphadenopathy:      Cervical: No cervical adenopathy. Skin:     General: Skin is warm and dry. Coloration: Skin is not jaundiced. Findings: No bruising, erythema or rash. Neurological:      General: No focal deficit present. Mental Status: She is alert and oriented to person, place, and time. Mental status is at baseline. Motor: No abnormal muscle tone. Psychiatric:         Mood and Affect: Mood normal.         Behavior: Behavior normal.         Lines and drains: All vascular access sites are healthy with no local erythema, discharge or tenderness. Intake and output:     I/O last 3 completed shifts:  In: -   Out: 900 [Urine:900]    Lab Data:   All available labs were reviewed by me today.      CBC:   Recent Labs     10/19/22  1055 10/20/22  0657   WBC 5.0 4.0   RBC 4.61 4.16   HGB 13.7 12.3   HCT 41.0 37.3   PLT 100* 92*   MCV 89.1 89.7   MCH 29.8 29.5   MCHC 33.5 32.9   RDW 12.3* 12.9        BMP:  Recent Labs     10/19/22  1055 10/19/22  2102 10/20/22  0657   *  --  132*   K 3.1* 3.0* 4.5   CL 95*  --  97*   CO2 25  --  26   BUN 7  --  16   CREATININE 0.6  --  1.7*   CALCIUM 9.2  --  8.7   GLUCOSE 98  --  99        Hepatic FunctionPanel:   Lab Results   Component Value Date/Time    ALKPHOS 59 10/19/2022 10:55 AM    ALT 13 10/19/2022 10:55 AM    AST 22 10/19/2022 10:55 AM    PROT 8.1 10/19/2022 10:55 AM    PROT 8.3 09/14/2012 12:27 AM    BILITOT 1.4 10/19/2022 10:55 AM    LABALBU 4.2 10/19/2022 10:55 AM       CPK:   Lab Results   Component Value Date    CKTOTAL 160 10/20/2022     ESR:   Lab Results   Component Value Date    SEDRATE 29 06/19/2022     CRP:   Lab Results   Component Value Date    CRP <3.0 06/19/2022         Imaging: All pertinent images and reports for the current visit were reviewed by me during this visit. I reviewed the chest x-ray/CT scan/MRI images and independently interpreted the findings and results today. CT CHEST ABDOMEN PELVIS W CONTRAST Additional Contrast? None   Final Result   Negative for acute thoracic/abdominopelvic abnormality. CT LUMBAR SPINE TRAUMA RECONSTRUCTION   Final Result   No acute osseous injury of the thoracic or lumbar spine. CT cervical spine, chest, abdomen and pelvis are discussed separately. CT THORACIC SPINE TRAUMA RECONSTRUCTION   Final Result   No acute osseous injury of the thoracic or lumbar spine. CT cervical spine, chest, abdomen and pelvis are discussed separately. CT CERVICAL SPINE WO CONTRAST   Final Result   No acute abnormality of the cervical spine. CT HEAD WO CONTRAST   Preliminary Result   No evidence of acute intracranial abnormality. XR CHEST PORTABLE   Final Result   No acute cardiopulmonary findings         XR KNEE RIGHT (3 VIEWS)   Final Result   No acute osseous injury.       Moderate osteoarthritis of the knee, most developed in the medial   compartment. CPPD-related arthropathy alternatively considered. XR RADIUS ULNA RIGHT (2 VIEWS)   Final Result   No acute fracture or dislocation             Outside records:    Labs, Microbiology, Radiology and pertinent results from Care everywhere, if available, were reviewed as a part ofthe consultation. Problem list:       Patient Active Problem List   Diagnosis Code    Permanent atrial fibrillation (HCC) I48.21    Chest pain R07.9    HTN (hypertension), benign I10    Coronary artery disease due to lipid rich plaque I25.10, I25.83    LVH (left ventricular hypertrophy) I51.7    Thyroid nodule E04.1    Chronic heart failure with preserved ejection fraction (HCC) I50.32    Bradycardia R00.1    Essential hypertension I10    Hypomagnesemia E83.42    Hypokalemia E87.6    Acute subdural hematoma S06. 5XAA    Subdural hematoma S06. 5XAA    Primary osteoarthritis of right knee M17.11    Primary osteoarthritis of left knee M17.12    Acute intractable headache R51.9    Headache, migraine, intractable, with status migrainosus G43.911    Dyslipidemia E78.5    PAF (paroxysmal atrial fibrillation) ([facility]) I48.0    Hyperlipidemia E78.5    Unable to ambulate R26.2    Multiple falls R29.6    Sepsis ([address] A41.9    LAST (acute kidney injury) ([address] N17.9    History of arterial ischemic stroke Z86.73    Overweight (BMI 25.0-29. 9) E66.3         Please note that this chart was generated using Dragon dictation software. Although every effort was made to ensure the accuracy of this automated transcription, some errors in transcription may have occurred inadvertently. If you may need any clarification, please do not hesitate to contact me through EPIC or at the phone number provided below with my electronic signature.   Any pictures or media included in this note were obtained after taking informed verbal consent from the patient and with their approval to include those in the patient's medical record. Tucker Clark MD, MPH, [address]  10/20/2022, 2:08 PM  [facility] Infectious Disease   [facility] [place] [facility], [address] [place] [address]  Office: [phone]  Fax: [phone]  In-person Clinic days:  Tuesday & Thursday a.m. Virtual clinic days: Monday, Wednesday & Friday a.m.

## 2022-10-21 PROBLEM — J10.1 INFLUENZA A (H1N1): Status: ACTIVE | Noted: 2022-10-21

## 2022-10-21 LAB
ANION GAP SERPL CALCULATED.3IONS-SCNC: 10 MMOL/L (ref 3–16)
BUN BLDV-MCNC: 11 MG/DL (ref 7–20)
CALCIUM SERPL-MCNC: 9.2 MG/DL (ref 8.3–10.6)
CHLORIDE BLD-SCNC: 96 MMOL/L (ref 99–110)
CO2: 29 MMOL/L (ref 21–32)
CREAT SERPL-MCNC: 0.8 MG/DL (ref 0.6–1.2)
CREATININE URINE: 120.9 MG/DL (ref 28–259)
GFR SERPL CREATININE-BSD FRML MDRD: >60 ML/MIN/{1.73_M2}
GLUCOSE BLD-MCNC: 104 MG/DL (ref 70–99)
HCT VFR BLD CALC: 39.6 % (ref 36–48)
HEMOGLOBIN: 13.3 G/DL (ref 12–16)
INR BLD: 1.48 (ref 0.87–1.14)
L. PNEUMOPHILA SEROGP 1 UR AG: NORMAL
MAGNESIUM: 2 MG/DL (ref 1.8–2.4)
MCH RBC QN AUTO: 30 PG (ref 26–34)
MCHC RBC AUTO-ENTMCNC: 33.7 G/DL (ref 31–36)
MCV RBC AUTO: 88.9 FL (ref 80–100)
MRSA SCREEN RT-PCR: NORMAL
ORGANISM: ABNORMAL
PDW BLD-RTO: 12.6 % (ref 12.4–15.4)
PLATELET # BLD: 93 K/UL (ref 135–450)
PMV BLD AUTO: 10.5 FL (ref 5–10.5)
POTASSIUM SERPL-SCNC: 3.7 MMOL/L (ref 3.5–5.1)
PROTHROMBIN TIME: 17.9 SEC (ref 11.7–14.5)
RBC # BLD: 4.45 M/UL (ref 4–5.2)
REPORT: NORMAL
RESPIRATORY PANEL PCR: ABNORMAL
SODIUM BLD-SCNC: 135 MMOL/L (ref 136–145)
SODIUM URINE: 54 MMOL/L
STREP PNEUMONIAE ANTIGEN, URINE: NORMAL
URINE CULTURE, ROUTINE: NORMAL
WBC # BLD: 2.3 K/UL (ref 4–11)

## 2022-10-21 PROCEDURE — 6360000002 HC RX W HCPCS: Performed by: PHYSICIAN ASSISTANT

## 2022-10-21 PROCEDURE — 99232 SBSQ HOSP IP/OBS MODERATE 35: CPT | Performed by: INTERNAL MEDICINE

## 2022-10-21 PROCEDURE — 2580000003 HC RX 258: Performed by: FAMILY MEDICINE

## 2022-10-21 PROCEDURE — 97110 THERAPEUTIC EXERCISES: CPT

## 2022-10-21 PROCEDURE — 6360000002 HC RX W HCPCS: Performed by: INTERNAL MEDICINE

## 2022-10-21 PROCEDURE — 1200000000 HC SEMI PRIVATE

## 2022-10-21 PROCEDURE — 80048 BASIC METABOLIC PNL TOTAL CA: CPT

## 2022-10-21 PROCEDURE — 97530 THERAPEUTIC ACTIVITIES: CPT

## 2022-10-21 PROCEDURE — 6370000000 HC RX 637 (ALT 250 FOR IP): Performed by: NURSE PRACTITIONER

## 2022-10-21 PROCEDURE — 2580000003 HC RX 258: Performed by: INTERNAL MEDICINE

## 2022-10-21 PROCEDURE — 85610 PROTHROMBIN TIME: CPT

## 2022-10-21 PROCEDURE — 83735 ASSAY OF MAGNESIUM: CPT

## 2022-10-21 PROCEDURE — 99233 SBSQ HOSP IP/OBS HIGH 50: CPT | Performed by: INTERNAL MEDICINE

## 2022-10-21 PROCEDURE — 6370000000 HC RX 637 (ALT 250 FOR IP): Performed by: INTERNAL MEDICINE

## 2022-10-21 PROCEDURE — 6360000002 HC RX W HCPCS: Performed by: FAMILY MEDICINE

## 2022-10-21 PROCEDURE — 85027 COMPLETE CBC AUTOMATED: CPT

## 2022-10-21 RX ORDER — WARFARIN SODIUM 2.5 MG/1
2.5 TABLET ORAL
Status: DISCONTINUED | OUTPATIENT
Start: 2022-10-25 | End: 2022-10-22 | Stop reason: HOSPADM

## 2022-10-21 RX ORDER — POTASSIUM CHLORIDE 20 MEQ/1
20 TABLET, EXTENDED RELEASE ORAL DAILY
Qty: 90 TABLET | Refills: 1 | Status: SHIPPED
Start: 2022-10-21

## 2022-10-21 RX ORDER — FUROSEMIDE 20 MG/1
TABLET ORAL
Qty: 90 TABLET | Refills: 0 | Status: SHIPPED
Start: 2022-10-28

## 2022-10-21 RX ORDER — LOSARTAN POTASSIUM 100 MG/1
50 TABLET ORAL DAILY
Qty: 30 TABLET | Refills: 1 | Status: SHIPPED
Start: 2022-10-21

## 2022-10-21 RX ORDER — WARFARIN SODIUM 5 MG/1
5 TABLET ORAL
Status: DISCONTINUED | OUTPATIENT
Start: 2022-10-22 | End: 2022-10-22 | Stop reason: HOSPADM

## 2022-10-21 RX ORDER — OSELTAMIVIR PHOSPHATE 75 MG/1
75 CAPSULE ORAL 2 TIMES DAILY
Status: DISCONTINUED | OUTPATIENT
Start: 2022-10-21 | End: 2022-10-22 | Stop reason: HOSPADM

## 2022-10-21 RX ORDER — SPIRONOLACTONE 50 MG/1
50 TABLET, FILM COATED ORAL 2 TIMES DAILY
Qty: 90 TABLET | Refills: 1 | Status: SHIPPED
Start: 2022-10-28

## 2022-10-21 RX ADMIN — NIFEDIPINE 90 MG: 30 TABLET, EXTENDED RELEASE ORAL at 08:02

## 2022-10-21 RX ADMIN — METOPROLOL SUCCINATE 25 MG: 25 TABLET, EXTENDED RELEASE ORAL at 08:03

## 2022-10-21 RX ADMIN — Medication 10 ML: at 08:06

## 2022-10-21 RX ADMIN — HYDRALAZINE HYDROCHLORIDE 25 MG: 25 TABLET, FILM COATED ORAL at 20:58

## 2022-10-21 RX ADMIN — OSELTAMIVIR PHOSPHATE 75 MG: 75 CAPSULE ORAL at 09:57

## 2022-10-21 RX ADMIN — HYDRALAZINE HYDROCHLORIDE 25 MG: 25 TABLET, FILM COATED ORAL at 08:02

## 2022-10-21 RX ADMIN — PIPERACILLIN AND TAZOBACTAM 3375 MG: 3; .375 INJECTION, POWDER, FOR SOLUTION INTRAVENOUS at 05:46

## 2022-10-21 RX ADMIN — WARFARIN SODIUM 6 MG: 1 TABLET ORAL at 17:40

## 2022-10-21 RX ADMIN — KETOROLAC TROMETHAMINE 15 MG: 15 INJECTION, SOLUTION INTRAMUSCULAR; INTRAVENOUS at 17:37

## 2022-10-21 RX ADMIN — KETOROLAC TROMETHAMINE 15 MG: 15 INJECTION, SOLUTION INTRAMUSCULAR; INTRAVENOUS at 08:02

## 2022-10-21 RX ADMIN — OSELTAMIVIR PHOSPHATE 75 MG: 75 CAPSULE ORAL at 20:58

## 2022-10-21 RX ADMIN — ENOXAPARIN SODIUM 40 MG: 100 INJECTION SUBCUTANEOUS at 20:58

## 2022-10-21 RX ADMIN — Medication 10 ML: at 21:04

## 2022-10-21 RX ADMIN — LINEZOLID 600 MG: 2 INJECTION, SOLUTION INTRAVENOUS at 02:30

## 2022-10-21 ASSESSMENT — PAIN DESCRIPTION - ORIENTATION
ORIENTATION: MID
ORIENTATION: MID

## 2022-10-21 ASSESSMENT — ENCOUNTER SYMPTOMS
DIARRHEA: 0
SORE THROAT: 0
EYE REDNESS: 0
BACK PAIN: 0
CONSTIPATION: 0
NAUSEA: 0
EYE DISCHARGE: 0
RHINORRHEA: 0
ABDOMINAL PAIN: 0
SINUS PAIN: 0
WHEEZING: 0
SHORTNESS OF BREATH: 0
COUGH: 1
SINUS PRESSURE: 0

## 2022-10-21 ASSESSMENT — PAIN DESCRIPTION - LOCATION
LOCATION: HEAD
LOCATION: BACK
LOCATION: HEAD
LOCATION: BACK

## 2022-10-21 ASSESSMENT — PAIN SCALES - GENERAL
PAINLEVEL_OUTOF10: 6
PAINLEVEL_OUTOF10: 0
PAINLEVEL_OUTOF10: 7
PAINLEVEL_OUTOF10: 6

## 2022-10-21 ASSESSMENT — PAIN DESCRIPTION - DESCRIPTORS
DESCRIPTORS: ACHING
DESCRIPTORS: DISCOMFORT
DESCRIPTORS: SHARP
DESCRIPTORS: ACHING

## 2022-10-21 ASSESSMENT — PAIN DESCRIPTION - ONSET: ONSET: ON-GOING

## 2022-10-21 ASSESSMENT — PAIN - FUNCTIONAL ASSESSMENT: PAIN_FUNCTIONAL_ASSESSMENT: ACTIVITIES ARE NOT PREVENTED

## 2022-10-21 ASSESSMENT — PAIN DESCRIPTION - PAIN TYPE: TYPE: ACUTE PAIN

## 2022-10-21 ASSESSMENT — PAIN DESCRIPTION - FREQUENCY: FREQUENCY: INTERMITTENT

## 2022-10-21 NOTE — PLAN OF CARE
Problem: Pain  Goal: Verbalizes/displays adequate comfort level or baseline comfort level  Outcome: Progressing     Problem: Safety - Adult  Goal: Free from fall injury  Outcome: Progressing  Flowsheets (Taken 10/20/2022 3916)  Free From Fall Injury: Instruct family/caregiver on patient safety     Problem: Skin/Tissue Integrity  Goal: Absence of new skin breakdown  Description: 1. Monitor for areas of redness and/or skin breakdown  2. Assess vascular access sites hourly  3. Every 4-6 hours minimum:  Change oxygen saturation probe site  4. Every 4-6 hours:  If on nasal continuous positive airway pressure, respiratory therapy assess nares and determine need for appliance change or resting period.   Outcome: Progressing

## 2022-10-21 NOTE — PROGRESS NOTES
Office : [phone]     Fax :[phone]       Nephrology  progress Note      Patient's Name: Marco Valdes  12:25 PM  10/21/2022    Reason for Consult: LAST      Requesting Physician:  Ayden Reed MD      Chief Complaint:    Chief Complaint   Patient presents with    Fall     Fall for unknown reason into the grass last night; hit head & right ribs. Denies LOC; takes Coumadin. History of Present Ilness:    Marco Valdes is a 80 y.o. female with prior history  of CVA, HTN , Intracranial hemorrhage, CHF, AFib , Chronic anticoagulation, OA, presented after sustaining a fall at home . Reports hitting her head. She laid on the ground for several hours. Her grandson then helped to get her up. She had another fall attempting to get out of bed. Denies focal muscle weakness, denies LOC . No chest pain , fever, chills , nausea or vomiting C/o pain in right arm and knee.    She is Febrile here with Tmax 102.4. she is also tachycardic   Trauma work up included   CT scan of head, chest and Abdomen did not reveal any acute hemorrhage, fracture or other pathology    Initial lab work showed elevated creat of 1.7   Baseline is 0.6     Interval hx     Feels better   Creatinine better   Low grade fever         I/O last 3 completed shifts:  In: -   Out: 3100 [Urine:3100]    Past Medical History:   Diagnosis Date    Acute cerebrovascular accident (CVA) ([address] 1/28/2020    LAST (acute kidney injury) ([address] 10/20/2022    Atrial fibrillation ([address]     Bell palsy     diagnosed 15 years ago    CAD (coronary artery disease)     Cerebral artery occlusion with cerebral infarction ([address]     Chronic diastolic CHF (congestive heart failure) ([address] 5/16/2020    CVA (cerebral vascular accident) ([address] 1/4/2017    Hypertension     Intracranial hemorrhage ([facility] 4/2016    Mixed hyperlipidemia 7/6/2022    Nonintractable headache     Nontraumatic subcortical hemorrhage of cerebral hemisphere ([facility] 4/30/2016    Primary osteoarthritis of right knee 3/18/2022    Sudden visual loss of left eye     Thyroid nodule 2/8/2018    TIA involving right internal carotid artery        Past Surgical History:   Procedure Laterality Date    CARDIAC SURGERY      COLONOSCOPY      CORONARY ANGIOPLASTY WITH STENT PLACEMENT      TUBAL LIGATION Right Torn Rotator Cuff    2013 @ [facility]       History reviewed. No pertinent family history. reports that she has never smoked. She has never used smokeless tobacco. She reports that she does not drink alcohol and does not use drugs.         Allergies:  Clonidine, Codeine, Hydrocodone-acetaminophen, Lisinopril, Tramadol, and Percocet [oxycodone-acetaminophen]    Current Medications:    oseltamivir (TAMIFLU) capsule 75 mg, BID  [START ON 10/22/2022] warfarin (COUMADIN) tablet 5 mg, Once per day on Sun Mon Wed Thu Fri Sat   And  [START ON 10/25/2022] warfarin (COUMADIN) tablet 2.5 mg, Once per day on Tue  warfarin (COUMADIN) tablet 6 mg, Once  hydrALAZINE (APRESOLINE) tablet 25 mg, 2 times per day  metoprolol succinate (TOPROL XL) extended release tablet 25 mg, Daily  NIFEdipine (PROCARDIA XL) extended release tablet 90 mg, Daily  sodium chloride flush 0.9 % injection 5-40 mL, 2 times per day  sodium chloride flush 0.9 % injection 5-40 mL, PRN  0.9 % sodium chloride infusion, PRN  enoxaparin (LOVENOX) injection 40 mg, Nightly  ondansetron (ZOFRAN-ODT) disintegrating tablet 4 mg, Q8H PRN   Or  ondansetron (ZOFRAN) injection 4 mg, Q6H PRN  polyethylene glycol (GLYCOLAX) packet 17 g, Daily PRN  acetaminophen (TYLENOL) tablet 650 mg, Q6H PRN   Or  acetaminophen (TYLENOL) suppository 650 mg, Q6H PRN  potassium chloride (KLOR-CON M) extended release tablet 40 mEq, PRN   Or  potassium bicarb-citric acid (EFFER-K) effervescent tablet 40 mEq, PRN   Or  potassium chloride 10 mEq/100 mL IVPB (Peripheral Line), PRN  hydrALAZINE (APRESOLINE) injection 10 mg, Q4H PRN  ketorolac (TORADOL) injection 15 mg, Q6H PRN      Review of Systems:   14 point ROS obtained but were negative except mentioned in HPI      Physical exam:     Vitals:  /71   Pulse 75   Temp 99.5 °F (37.5 °C) (Oral)   Resp 18   Ht 5' 7\" (1.702 m)   Wt 174 lb 3.2 oz (79 kg)   SpO2 96%   BMI 27.28 kg/m²   Constitutional:  OAA X3 NAD  Skin: no rash, turgor wnl  Heent:  eomi, mmm  Neck: no bruits or jvd noted  Cardiovascular:  S1, S2 without m/r/g  Respiratory: CTA B without w/r/r  Abdomen:  +bs, soft, nt, nd  Ext: no  lower extremity edema  Psychiatric: mood and affect appropriate  Musculoskeletal:  Rom, muscular strength intact    Labs:  CBC:      Latest Reference Range & Units 10/20/22 06:57   WBC 4.0 - 11.0 K/uL 4.0   RBC 4.00 - 5.20 M/uL 4.16   Hemoglobin Quant 12.0 - 16.0 g/dL 12.3   Hematocrit 36.0 - 48.0 % 37.3   MCV 80.0 - 100.0 fL 89.7   MCH 26.0 - 34.0 pg 29.5   MCHC 31.0 - 36.0 g/dL 32.9   MPV 5.0 - 10.5 fL 10.1   RDW 12.4 - 15.4 % 12.9   Platelet Count 406 - 450 K/uL 92 (L)   (L): Data is abnormally low    BMP:     Latest Reference Range & Units 10/20/22 06:57   Sodium 136 - 145 mmol/L 132 (L)   Potassium 3.5 - 5.1 mmol/L 4.5   Chloride 99 - 110 mmol/L 97 (L)   CO2 21 - 32 mmol/L 26   BUN,BUNPL 7 - 20 mg/dL 16   Creatinine 0.6 - 1.2 mg/dL 1.7 (H)   Anion Gap 3 - 16  9   Est, Glom Filt Rate >60  32 !   (L): Data is abnormally low  (H): Data is abnormally high  !: Data is abnormal    Hepatic:   Recent Labs     10/19/22  1055   AST 22   ALT 13   BILITOT 1.4*   ALKPHOS 59     Troponin:   Recent Labs     10/19/22  1055   TROPONINI <0.01     BNP: No results for input(s): BNP in the last 72 hours. Lipids: No results for input(s): CHOL, TRIG, HDL, LDLCALC, LABVLDL in the last 72 hours.   ABGs: No results for input(s): PHART, PO2ART, UTE9IGF in the last 72 hours. INR:   Recent Labs     10/19/22  1055 10/20/22  0657 10/21/22  0613   INR 1.61* 1.45* 1.48*     UA:  Recent Labs     10/19/22  1340   COLORU Yellow   CLARITYU Clear   GLUCOSEU Negative   BILIRUBINUR Negative   KETUA TRACE*   SPECGRAV >=1.030   BLOODU Negative   PHUR 6.5   PROTEINU Negative   UROBILINOGEN 2.0*   NITRU Negative   LEUKOCYTESUR TRACE*   LABMICR YES   URINETYPE NotGiven      Urine Microscopic:   Recent Labs     10/19/22  1340   BACTERIA None Seen   HYALCAST 0   WBCUA 3   RBCUA 1   EPIU 5     Urine Culture:   Recent Labs     10/20/22  1142   LABURIN No growth at 18 to 36 hours     Urine Chemistry:   Recent Labs     10/20/22  1142   LABCREA 120.9   NAUR 47                IMAGING:  CT CHEST ABDOMEN PELVIS W CONTRAST Additional Contrast? None   Final Result   Negative for acute thoracic/abdominopelvic abnormality. CT LUMBAR SPINE TRAUMA RECONSTRUCTION   Final Result   No acute osseous injury of the thoracic or lumbar spine. CT cervical spine, chest, abdomen and pelvis are discussed separately. CT THORACIC SPINE TRAUMA RECONSTRUCTION   Final Result   No acute osseous injury of the thoracic or lumbar spine. CT cervical spine, chest, abdomen and pelvis are discussed separately. CT CERVICAL SPINE WO CONTRAST   Final Result   No acute abnormality of the cervical spine. CT HEAD WO CONTRAST   Preliminary Result   No evidence of acute intracranial abnormality. XR CHEST PORTABLE   Final Result   No acute cardiopulmonary findings         XR KNEE RIGHT (3 VIEWS)   Final Result   No acute osseous injury. Moderate osteoarthritis of the knee, most developed in the medial   compartment. CPPD-related arthropathy alternatively considered. XR RADIUS ULNA RIGHT (2 VIEWS)   Final Result   No acute fracture or dislocation               Assessment/Plan :      1. LAST  Likely pre renal     Improved       Hold lasix     Hold aldactone     2.  HTN,BP labile   Start low dose losartan today     3. Hypokalemia   2/2 lasix     4. HYpomagnesemia   Replace magnesium.      Recommend to dose adjust all medications  based on renal functions  Maintain SBP> 90 mmHg   Daily weights   AVOID NSAIDs  Avoid Nephrotoxins  Monitor Intake/Output  Call if significant decrease in urine output          D/w primary team      Thank you for allowing us to participate in care of Bobby Romberg         Electronically signed by: Laura Prince MD, 10/21/2022, 12:25 PM      [facility] of [address] [place] [address]  Office : [phone]  Fax :[phone]

## 2022-10-21 NOTE — CONSULTS
Office : [phone]     Fax :[phone]       Nephrology Consult Note      Patient's Name: Tracie Cortez  8:37 AM  10/21/2022    Reason for Consult: LAST      Requesting Physician:  Maribel Bolden MD      Chief Complaint:    Chief Complaint   Patient presents with    Fall     Fall for unknown reason into the grass last night; hit head & right ribs. Denies LOC; takes Coumadin. History of Present Ilness:    Tracie Cortez is a 80 y.o. female with prior history  of CVA, HTN , Intracranial hemorrhage, CHF, AFib , Chronic anticoagulation, OA, presented after sustaining a fall at home . Reports hitting her head. She laid on the ground for several hours. Her grandson then helped to get her up. She had another fall attempting to get out of bed. Denies focal muscle weakness, denies LOC . No chest pain , fever, chills , nausea or vomiting C/o pain in right arm and knee. She is Febrile here with Tmax 102.4. she is also tachycardic   Trauma work up included   CT scan of head, chest and Abdomen did not reveal any acute hemorrhage, fracture or other pathology    Initial lab work showed elevated creat of 1.7   Baseline is 0.6     Prior to Admission medications    Medication Sig Start Date End Date Taking?  Authorizing Provider   losartan (COZAAR) 100 MG tablet Take 0.5 tablets by mouth daily 10/21/22  Yes Janeane Cranker, APRN - CNP   furosemide (LASIX) 20 MG tablet TAKE 1 TABLET BY MOUTH once DAILY 10/28/22  Yes Janeane Cranker, APRN - CNP   spironolactone (ALDACTONE) 50 MG tablet Take 1 tablet by mouth 2 times daily TAKE 1 TABLET BY MOUTH DAILY 10/28/22  Yes Janeane Cranker, APRN - CNP   potassium chloride (KLOR-CON M) 20 MEQ extended release tablet Take 1 tablet by mouth daily 10/21/22  Yes ADAN Rodriguez CNP   tiZANidine (ZANAFLEX) 4 MG tablet Take 1 tablet by mouth 3 times daily as needed (neck pain) 6/22/22   ADAN Romero CNP   carvedilol (COREG) 3.125 MG tablet Take 1 tablet by mouth 2 times daily (with meals) 6/22/22   Claritza Natarajan MD   warfarin (COUMADIN) 5 MG tablet Take 5 mg by mouth daily 5 mg everyday but on tuesdays 0.5 tablet    Historical Provider, MD   NIFEdipine (PROCARDIA XL) 90 MG extended release tablet Take 1 tablet by mouth daily 6/8/22   ADAN Isbell CNP   Magnesium 500 MG CAPS Take 400 mg by mouth daily 2/25/21 6/8/22  Jacob Avila MD   famotidine (PEPCID) 20 MG tablet Take 1 tablet by mouth 2 times daily  Patient not taking: Reported on 10/19/2022 1/28/21   Rudy Spurling, MD   atorvastatin (LIPITOR) 80 MG tablet Take 1 tablet by mouth daily 1/28/21   Rudy Spurling, MD   acetaminophen (TYLENOL) 500 MG tablet Take 2 tablets by mouth every 6 hours as needed for Pain 1/28/21   Rudy Spurling, MD   Calcium Carb-Cholecalciferol (CALCIUM + D3) 600-200 MG-UNIT TABS tablet Take 1 tablet by mouth 2 times daily 10/15/20   Rudy Spurling, MD         I/O last 3 completed shifts:  In: -   Out: 3100 [Urine:3100]    Past Medical History:   Diagnosis Date    Acute cerebrovascular accident (CVA) ([address] 1/28/2020    LAST (acute kidney injury) ([address] 10/20/2022    Atrial fibrillation ([address]     Bell palsy     diagnosed 15 years ago    CAD (coronary artery disease)     Cerebral artery occlusion with cerebral infarction ([address]     Chronic diastolic CHF (congestive heart failure) ([address] 5/16/2020    CVA (cerebral vascular accident) ([address] 1/4/2017    Hypertension     Intracranial hemorrhage ([address] 4/2016    Mixed hyperlipidemia 7/6/2022    Nonintractable headache     Nontraumatic subcortical hemorrhage of cerebral hemisphere ([address] 4/30/2016    Primary osteoarthritis of right knee 3/18/2022    Sudden visual loss of left eye     Thyroid nodule 2/8/2018    TIA involving right internal carotid artery        Past Surgical History:   Procedure Laterality Date    CARDIAC SURGERY      COLONOSCOPY      CORONARY ANGIOPLASTY WITH STENT PLACEMENT      TUBAL LIGATION Right Torn Rotator Cuff    2013 @ [facility]       History reviewed. No pertinent family history. reports that she has never smoked. She has never used smokeless tobacco. She reports that she does not drink alcohol and does not use drugs.         Allergies:  Clonidine, Codeine, Hydrocodone-acetaminophen, Lisinopril, Tramadol, and Percocet [oxycodone-acetaminophen]    Current Medications:    oseltamivir (TAMIFLU) capsule 75 mg, BID  warfarin (COUMADIN) tablet 5 mg, Once per day on Sun Mon Wed Thu Fri Sat   And  [START ON 10/25/2022] warfarin (COUMADIN) tablet 2.5 mg, Once per day on Tue  hydrALAZINE (APRESOLINE) tablet 25 mg, 2 times per day  metoprolol succinate (TOPROL XL) extended release tablet 25 mg, Daily  linezolid (ZYVOX) IVPB 600 mg, Q12H  piperacillin-tazobactam (ZOSYN) 3,375 mg in dextrose 5 % 50 mL IVPB extended infusion (mini-bag), Q8H  NIFEdipine (PROCARDIA XL) extended release tablet 90 mg, Daily  sodium chloride flush 0.9 % injection 5-40 mL, 2 times per day  sodium chloride flush 0.9 % injection 5-40 mL, PRN  0.9 % sodium chloride infusion, PRN  enoxaparin (LOVENOX) injection 40 mg, Nightly  ondansetron (ZOFRAN-ODT) disintegrating tablet 4 mg, Q8H PRN   Or  ondansetron (ZOFRAN) injection 4 mg, Q6H PRN  polyethylene glycol (GLYCOLAX) packet 17 g, Daily PRN  acetaminophen (TYLENOL) tablet 650 mg, Q6H PRN   Or  acetaminophen (TYLENOL) suppository 650 mg, Q6H PRN  potassium chloride (KLOR-CON M) extended release tablet 40 mEq, PRN   Or  potassium bicarb-citric acid (EFFER-K) effervescent tablet 40 mEq, PRN   Or  potassium chloride 10 mEq/100 mL IVPB (Peripheral Line), PRN  hydrALAZINE (APRESOLINE) injection 10 mg, Q4H PRN  ketorolac (TORADOL) injection 15 mg, Q6H PRN        Review of Systems: 14 point ROS obtained but were negative except mentioned in HPI      Physical exam:     Vitals:  /83   Pulse 91   Temp 99.5 °F (37.5 °C) (Oral)   Resp 18   Ht 5' 7\" (1.702 m)   Wt 174 lb 3.2 oz (79 kg)   SpO2 93%   BMI 27.28 kg/m²   Constitutional:  OAA X3 NAD  Skin: no rash, turgor wnl  Heent:  eomi, mmm  Neck: no bruits or jvd noted  Cardiovascular:  S1, S2 without m/r/g  Respiratory: CTA B without w/r/r  Abdomen:  +bs, soft, nt, nd  Ext: no  lower extremity edema  Psychiatric: mood and affect appropriate  Musculoskeletal:  Rom, muscular strength intact    Labs:  CBC:      Latest Reference Range & Units 10/20/22 06:57   WBC 4.0 - 11.0 K/uL 4.0   RBC 4.00 - 5.20 M/uL 4.16   Hemoglobin Quant 12.0 - 16.0 g/dL 12.3   Hematocrit 36.0 - 48.0 % 37.3   MCV 80.0 - 100.0 fL 89.7   MCH 26.0 - 34.0 pg 29.5   MCHC 31.0 - 36.0 g/dL 32.9   MPV 5.0 - 10.5 fL 10.1   RDW 12.4 - 15.4 % 12.9   Platelet Count 028 - 450 K/uL 92 (L)   (L): Data is abnormally low    BMP:     Latest Reference Range & Units 10/20/22 06:57   Sodium 136 - 145 mmol/L 132 (L)   Potassium 3.5 - 5.1 mmol/L 4.5   Chloride 99 - 110 mmol/L 97 (L)   CO2 21 - 32 mmol/L 26   BUN,BUNPL 7 - 20 mg/dL 16   Creatinine 0.6 - 1.2 mg/dL 1.7 (H)   Anion Gap 3 - 16  9   Est, Glom Filt Rate >60  32 !   (L): Data is abnormally low  (H): Data is abnormally high  !: Data is abnormal    Hepatic:   Recent Labs     10/19/22  1055   AST 22   ALT 13   BILITOT 1.4*   ALKPHOS 59     Troponin:   Recent Labs     10/19/22  1055   TROPONINI <0.01     BNP: No results for input(s): BNP in the last 72 hours. Lipids: No results for input(s): CHOL, TRIG, HDL, LDLCALC, LABVLDL in the last 72 hours. ABGs: No results for input(s): PHART, PO2ART, YXR6UQV in the last 72 hours.   INR:   Recent Labs     10/19/22  1055 10/20/22  0657 10/21/22  0613   INR 1.61* 1.45* 1.48*     UA:  Recent Labs     10/19/22  1340   COLORU Yellow   CLARITYU Clear   GLUCOSEU Negative   BILIRUBINUR Negative KETUA TRACE*   SPECGRAV >=1.030   BLOODU Negative   PHUR 6.5   PROTEINU Negative   UROBILINOGEN 2.0*   NITRU Negative   LEUKOCYTESUR TRACE*   LABMICR YES   URINETYPE NotGiven      Urine Microscopic:   Recent Labs     10/19/22  1340   BACTERIA None Seen   HYALCAST 0   WBCUA 3   RBCUA 1   EPIU 5     Urine Culture: No results for input(s): LABURIN in the last 72 hours. Urine Chemistry:   Recent Labs     10/20/22  1142   LABCREA 120.9   NAUR 47                IMAGING:  CT CHEST ABDOMEN PELVIS W CONTRAST Additional Contrast? None   Final Result   Negative for acute thoracic/abdominopelvic abnormality. CT LUMBAR SPINE TRAUMA RECONSTRUCTION   Final Result   No acute osseous injury of the thoracic or lumbar spine. CT cervical spine, chest, abdomen and pelvis are discussed separately. CT THORACIC SPINE TRAUMA RECONSTRUCTION   Final Result   No acute osseous injury of the thoracic or lumbar spine. CT cervical spine, chest, abdomen and pelvis are discussed separately. CT CERVICAL SPINE WO CONTRAST   Final Result   No acute abnormality of the cervical spine. CT HEAD WO CONTRAST   Preliminary Result   No evidence of acute intracranial abnormality. XR CHEST PORTABLE   Final Result   No acute cardiopulmonary findings         XR KNEE RIGHT (3 VIEWS)   Final Result   No acute osseous injury. Moderate osteoarthritis of the knee, most developed in the medial   compartment. CPPD-related arthropathy alternatively considered. XR RADIUS ULNA RIGHT (2 VIEWS)   Final Result   No acute fracture or dislocation               Assessment/Plan :      1. LAST  Likely pre renal   Hold lasix   Hold losartan   Hold aldactone     2. HTN,BP labile   Will start BP meds slowly based on renal function and bP     3. Hypokalemia   2/2 lasix     4. HYpomagnesemia   Replace magnesium.      Recommend to dose adjust all medications  based on renal functions  Maintain SBP> 90 mmHg   Daily weights AVOID NSAIDs  Avoid Nephrotoxins  Monitor Intake/Output  Call if significant decrease in urine output          D/w primary team      Thank you for allowing us to participate in care of Gaby Vick         Electronically signed by: Nelly Rodriguez MD, 10/21/2022, 8:37 AM      [facility] [address]  Office : [phone]  Fax :[phone]

## 2022-10-21 NOTE — PLAN OF CARE
Problem: Pain  Goal: Verbalizes/displays adequate comfort level or baseline comfort level  Outcome: Progressing     Problem: Safety - Adult  Goal: Free from fall injury  Outcome: Progressing     Problem: Skin/Tissue Integrity  Goal: Absence of new skin breakdown  Description: 1. Monitor for areas of redness and/or skin breakdown  2. Assess vascular access sites hourly  3. Every 4-6 hours minimum:  Change oxygen saturation probe site  4. Every 4-6 hours:  If on nasal continuous positive airway pressure, respiratory therapy assess nares and determine need for appliance change or resting period.   Outcome: Progressing     Problem: Chronic Conditions and Co-morbidities  Goal: Patient's chronic conditions and co-morbidity symptoms are monitored and maintained or improved  Outcome: Progressing

## 2022-10-21 NOTE — PROGRESS NOTES
[facility] PROGRESS NOTE    10/21/2022 12:38 PM        Name: Lynda Velázquez . Admitted: 10/19/2022  Primary Care Provider: Mary Davies MD (Tel: [phone])      Brief History: 79 yo female with hx CAD/PCI, ICH, chronic atrial fib, chronic dCHF, HTN. She presented to hospital after recurrent falls at home. She fell while getting out of car, later fell when getting out of bed and laid on ground for several hours. No acute findings on imaging. Spiked temp to 102.4 in ER. Lives at home with 15 yo great grandson (currently staying with ). Subjective:  Presently up in bedside chair. States she is starting to feel better. Overall, still weak but improving. Denies chest pain, shortness of breath, cough, abdominal pain, nausea. Tolerating diet. Temp max 100.8 past 24 hours. Influenza A screen positive and she was started on Tamiflu. Patient reports one of her grandchildren tested positive for flu as well.       Reviewed interval ancillary notes    Current Medications  oseltamivir (TAMIFLU) capsule 75 mg, BID  [START ON 10/22/2022] warfarin (COUMADIN) tablet 5 mg, Once per day on Sun Mon Wed Thu Fri Sat   And  [START ON 10/25/2022] warfarin (COUMADIN) tablet 2.5 mg, Once per day on Tue  warfarin (COUMADIN) tablet 6 mg, Once  hydrALAZINE (APRESOLINE) tablet 25 mg, 2 times per day  metoprolol succinate (TOPROL XL) extended release tablet 25 mg, Daily  NIFEdipine (PROCARDIA XL) extended release tablet 90 mg, Daily  sodium chloride flush 0.9 % injection 5-40 mL, 2 times per day  sodium chloride flush 0.9 % injection 5-40 mL, PRN  0.9 % sodium chloride infusion, PRN  enoxaparin (LOVENOX) injection 40 mg, Nightly  ondansetron (ZOFRAN-ODT) disintegrating tablet 4 mg, Q8H PRN   Or  ondansetron (ZOFRAN) injection 4 mg, Q6H PRN  polyethylene glycol (GLYCOLAX) packet 17 g, Daily PRN  acetaminophen (TYLENOL) tablet 650 mg, Q6H PRN   Or  acetaminophen (TYLENOL) suppository 650 mg, Q6H PRN  potassium chloride (KLOR-CON M) extended release tablet 40 mEq, PRN   Or  potassium bicarb-citric acid (EFFER-K) effervescent tablet 40 mEq, PRN   Or  potassium chloride 10 mEq/100 mL IVPB (Peripheral Line), PRN  hydrALAZINE (APRESOLINE) injection 10 mg, Q4H PRN  ketorolac (TORADOL) injection 15 mg, Q6H PRN      Objective:  /71   Pulse 75   Temp 99.5 °F (37.5 °C) (Oral)   Resp 18   Ht 5' 7\" (1.702 m)   Wt 174 lb 3.2 oz (79 kg)   SpO2 96%   BMI 27.28 kg/m²     Intake/Output Summary (Last 24 hours) at 10/21/2022 1238  Last data filed at 10/21/2022 0553  Gross per 24 hour   Intake --   Output 2200 ml   Net -2200 ml      Wt Readings from Last 3 Encounters:   10/21/22 174 lb 3.2 oz (79 kg)   07/06/22 180 lb (81.6 kg)   07/06/22 180 lb (81.6 kg)     General:  Awake, alert, oriented in NAD  Skin:  Warm and dry. No unusual bruising or rash  Neck:  Supple. No JVD appreciated  Chest:  Normal effort. Clear to auscultation, no wheezes/rhonchi/rales  Cardiovascular:  RRR, normal S1/S2, no murmur/gallop/rub  Abdomen:  Soft, nontender, +bowel sounds  Extremities:  No edema  Neurological: No focal deficits  Psychological: Normal mood and affect      Labs and Tests:  CBC:   Recent Labs     10/19/22  1055 10/20/22  0657 10/21/22  0613   WBC 5.0 4.0 2.3*   HGB 13.7 12.3 13.3   * 92* 93*     BMP:    Recent Labs     10/19/22  1055 10/19/22  2102 10/20/22  0657 10/21/22  0613   *  --  132* 135*   K 3.1* 3.0* 4.5 3.7   CL 95*  --  97* 96*   CO2 25  --  26 29   BUN 7  --  16 11   CREATININE 0.6  --  1.7* 0.8   GLUCOSE 98  --  99 104*     Hepatic:   Recent Labs     10/19/22  1055   AST 22   ALT 13   BILITOT 1.4*   ALKPHOS 59       CXR 10/19/2022:  No acute cardiopulmonary findings    Xray right knee 10/19/2022:  No acute osseous injury. Moderate osteoarthritis of the knee, most developed in the medial   compartment.   CPPD-related arthropathy alternatively considered. Xray radius/ulna 10/19/2022:  No acute fracture or dislocation         CT head 10/19/2022:  No evidence of acute intracranial abnormality. CT cervical spine 10/19/2022:  No acute abnormality of the cervical spine. CT thoracic and lumbar spines 10/19/2022:  No acute osseous injury of the thoracic or lumbar spine. CT cervical spine, chest, abdomen and pelvis are discussed separately. CT chest/abd/pelvis 10/19/2022:  Negative for acute thoracic/abdominopelvic abnormality. Problem List  Principal Problem:    Unable to ambulate  Active Problems:    Hyperlipidemia    Multiple falls    Sepsis ([address]    LAST (acute kidney injury) ([facility] [address]    History of arterial ischemic stroke    Overweight (BMI 25.0-29. 9)    Coronary artery disease due to lipid rich plaque    Labile essential hypertension  Resolved Problems:    * No resolved hospital problems. *       Assessment & Plan:   Influenza A. Temp to 102.4 in ER. She was started on ceftriaxone on admission for suspected UTI but UA not impressive with only trace leukocytes (3 WBC), no bacteria, negative nitrites. CT scan with no acute findings in chest/abd/pelvis. No leukocytosis, procalcitonin is elevated at 0.24, blood cultures with NGTD. Influenza A screen returned positive and she was started on Tamiflu (10/21). Appreciate ID recs. Sepsis present on admission based on high fever, tachycardia and hypotension. Secondary to influenza. Received IV fluids. Improved. LAST. Creatinine 0.6 on presentation, bumped to 1.7 (10/20) back to baseline today. CK levels WNL. Lasix, spironolactone and losartan held and she was given gentle hydration. Creatinine back to baseline today. Appreciate nephrology recs. Continue to follow. Recurrent falls / right rib pain. Imaging negative for fracture. CT head with no acute findings. Likely secondary acute illness.  No evidence of orthostatic hypotension, supine /77, HR 83; standing /89, . PT/OT evaluated and recommend hhc on DC to address ADL and functional mobility deficits. Home care orders placed. Chronic dCHF. Echo 7/2022 with EF 60-65%. Appears compensated. Diuretics on hold secondary LAST. Monitor daily weights. CAD. Hx PCI to LAD (2000). Troponin < 0.01 no acute changes on EKG. Denies chest pain. Chronic atrial fib. Controlled rate on beta blocker. Takes warfarin at home, INR subtherapeutic on presentation 1.61. Continue warfarin, pharmacy dosing. Continue telemetry. HTN. BP elevated on admission, much improved. Continue to follow. Leukopenia / thrombocytopenia. WBC  5.0->4.0->2.3, platelets 019->90->49. WBC likely secondary to acute, platelet count has been intermittently low in past. Continue to follow. Disposition: Home with home care within net 24-48 hours if cytopenias stable. Diet: ADULT DIET;  Regular  Code:DNR-CC  DVT PPX: enoxaparin      ADAN Bauer - CNP   10/21/2022 12:38 PM

## 2022-10-21 NOTE — DISCHARGE INSTR - COC
Continuity of Care Form    Patient Name: Justina Anderson   :  1950  MRN:  [id number]    Admit date:  10/19/2022  Discharge date:  t    Code Status Order: DNR-CC   Advance Directives:     Admitting Physician:  Omar Cotto MD  PCP: Helio Cisneros MD    Discharging Nurse: Ruben Velez Unit/Room#: 1YW-9609/1153-27  Discharging Unit Phone Number: [phone]    Emergency Contact:   Extended Emergency Contact Information  Primary Emergency Contact: Charla Romano  Address: [address]           [place] [place] [facility] [address]  Home Phone: [phone]  Mobile Phone: [phone]  Relation: Child  Secondary Emergency Contact: [address]  Phone: [phone]  Mobile Phone: [phone]  Relation: Brother/Sister    Past Surgical History:  Past Surgical History:   Procedure Laterality Date    CARDIAC SURGERY      COLONOSCOPY      CORONARY ANGIOPLASTY WITH STENT PLACEMENT      TUBAL LIGATION Right Torn Rotator Cuff    2013 @ [facility]       Immunization History:   Immunization History   Administered Date(s) Administered    Influenza Vaccine, unspecified formulation 02/10/2007, 2007, 2008    Pneumococcal Polysaccharide ([id number]) 10/01/2007    Tdap (Boostrix, Adacel) 2007       Active Problems:  Patient Active Problem List   Diagnosis Code    Permanent atrial fibrillation ([facility] [address] I48.21    Chest pain R07.9    HTN (hypertension), benign I10    Coronary artery disease due to lipid rich plaque I25.10, I25.83    LVH (left ventricular hypertrophy) I51.7    Thyroid nodule E04.1    Chronic heart failure with preserved ejection fraction (HCC) I50.32    Bradycardia R00.1    Essential hypertension I10    Hypomagnesemia E83.42    Hypokalemia E87.6    Acute subdural hematoma S06. 5XAA    Subdural hematoma S06. 5XAA    Primary osteoarthritis of right knee M17.11    Primary osteoarthritis of left knee M17.12    Acute intractable headache R51.9    Headache, migraine, intractable, with status migrainosus G43.911    Dyslipidemia E78.5    PAF (paroxysmal atrial fibrillation) (HCC) I48.0    Hyperlipidemia E78.5    Unable to ambulate R26.2    Multiple falls R29.6    Sepsis ([facility] [address] A41.9    LAST (acute kidney injury) ([facility] N17.9    History of arterial ischemic stroke Z86.73    Overweight (BMI 25.0-29. 9) E66.3    Influenza A (H1N1) J10.1       Isolation/Infection:   Isolation            Droplet          Patient Infection Status       Infection Onset Added Last Indicated Last Indicated By Review Planned Expiration Resolved Resolved By    Influenza 10/20/22 10/21/22 10/20/22 Respiratory Panel, Molecular, with COVID-19 (Restricted: peds pts or suitable admitted adults) 10/28/22 10/30/22      Resolved    COVID-19 (Rule Out) 10/20/22 10/20/22 10/20/22 Respiratory Panel, Molecular, with COVID-19 (Restricted: peds pts or suitable admitted adults) (Ordered)   10/20/22 Rule-Out Test Resulted            Nurse Assessment:  Last Vital Signs: /88   Pulse 81   Temp 98.7 °F (37.1 °C) (Oral)   Resp 18   Ht 5' 7\" (1.702 m)   Wt 174 lb 3.2 oz (79 kg)   SpO2 96%   BMI 27.28 kg/m²     Last documented pain score (0-10 scale): Pain Level: 7  Last Weight:   Wt Readings from Last 1 Encounters:   10/21/22 174 lb 3.2 oz (79 kg)     Mental Status:  oriented and alert    IV Access:  - None    Nursing Mobility/ADLs:  Walking   Independent  Transfer  Independent  Bathing  Independent  Dressing  Independent  [address]  Independent  Med Delivery   none    Wound Care Documentation and Therapy:        Elimination:  Continence:    Bowel: Yes  Bladder: Yes  Urinary Catheter: None   Colostomy/Ileostomy/Ileal Conduit: No       Date of Last BM: 10/19/2022    Intake/Output Summary (Last 24 hours) at 10/21/2022 1810  Last data filed at 10/21/2022 1559  Gross per 24 hour   Intake 480 ml   Output 2200 ml   Net -1720 ml     I/O last 3 completed shifts:  In: -   Out: [address] [Urine:3100]    Safety Concerns: History of Falls (last 30 days) and At Risk for Falls    Impairments/Disabilities:      None    Nutrition Therapy:  Current Nutrition Therapy:   - Oral Diet:  General    Routes of Feeding: Oral  Liquids: No Restrictions  Daily Fluid Restriction: no  Last Modified Barium Swallow with Video (Video Swallowing Test): not done    Treatments at the Time of Hospital Discharge:   Respiratory Treatments: N/A  Oxygen Therapy:  is not on home oxygen therapy. Ventilator:    - No ventilator support    Rehab Therapies: Physical Therapy and Occupational Therapy recommended  Weight Bearing Status/Restrictions: No weight bearing restrictions  Other Medical Equipment (for information only, NOT a DME order):  N/A  Other Treatments: N/A    Patient's personal belongings (please select all that are sent with patient):  None    RN SIGNATURE:  Electronically signed by Desmond Chawla RN on 10/22/22 at 1:41 PM EDT    CASE MANAGEMENT/SOCIAL WORK SECTION    Inpatient Status Date: 10/19/2022    Readmission Risk Assessment Score:  Readmission Risk              Risk of Unplanned Readmission:  13           Discharging to Facility/ [address] [address])  [address] [address]  Phone: [phone]  Fax: [phone]                  Dialysis Facility (if applicable)   Name:  Address:  Dialysis Schedule:  Phone:  Fax:    / signature: Lexy Harris RN, BSN  [phone]     PHYSICIAN SECTION    Prognosis: Good    Condition at Discharge: Stable    Rehab Potential (if transferring to Rehab): N/A    Recommended Labs or Other Treatments After Discharge: CBC, BMP weekly x 2    Physician Certification: I certify the above information and transfer of Concepcion Ramirez  is necessary for the continuing treatment of the diagnosis listed and that she requires [address] for greater 30 days.      Update Admission H&P: Changes in H&P as follows - patient positive for influenza flu    PHYSICIAN SIGNATURE:  Electronically signed by ADAN Pineda CNP on 10/21/22 at 6:11 PM EDT

## 2022-10-21 NOTE — PROGRESS NOTES
Infectious Diseases   Progress Note      Admission Date: 10/19/2022  Hospital Day: Hospital Day: 3   Attending: Owen Londono MD  Date of service: 10/21/2022     Chief complaint/ Reason for consult:     Sepsis with high fever, tachycardia, hypotension  Fall while getting out of bed at home  Acute kidney injury  History of stroke  Coronary artery disease    Microbiology:        I have reviewed allavailable micro lab data and cultures    Blood culture (2/2) - collected on 10/19/2022: Negative  Urine culture  - collected on 10/19/2022: Negative      Antibiotics and immunizations:       Current antibiotics: All antibiotics and their doses were reviewed by me    Recent Abx Admin                     oseltamivir (TAMIFLU) capsule 75 mg (mg) 75 mg Given 10/21/22 0957    piperacillin-tazobactam (ZOSYN) 3,375 mg in dextrose 5 % 50 mL IVPB extended infusion (mini-bag) (mg) 3,375 mg New Bag 10/21/22 0546     3,375 mg New Bag 10/20/22 2104     3,375 mg New Bag  1456    linezolid (ZYVOX) IVPB 600 mg (mg) 600 mg New Bag 10/21/22 0230     600 mg New Bag 10/20/22 1500                      Immunization History: All immunization history was reviewed by me today. Immunization History   Administered Date(s) Administered    Influenza Vaccine, unspecified formulation 02/10/2007, 12/20/2007, 01/30/2008    Pneumococcal Polysaccharide ([id number]) 10/01/2007    Tdap (Boostrix, Adacel) 12/20/2007       Known drug allergies: All allergies were reviewed and updated    Allergies   Allergen Reactions    Clonidine Rash, Shortness Of Breath and Hives    Codeine Hives, Itching, Nausea Only and Rash     Other reaction(s):  Other (See Comments)  Pruritis    Hydrocodone-Acetaminophen      Itchy      Lisinopril Hives and Itching    Tramadol     Percocet [Oxycodone-Acetaminophen] Itching     Patient takes percocet with benadryl to control the itching       Social history:     Social History:  All social andepidemiologic history was reviewed and updated by me today as needed. Tobacco use:   reports that she has never smoked. She has never used smokeless tobacco.  Alcohol use:   reports no history of alcohol use. Currently lives in: [address] [place] [address] Road   reports no history of drug use. COVID VACCINATION AND LAB RESULT RECORDS:     Internal Administration   First Dose      Second Dose           Last COVID Lab SARS-CoV-2, NAAT (no units)   Date Value   10/20/2022 Not Detected            Assessment:     The patient is a 80 y.o. old female who  has a past medical history of Acute cerebrovascular accident (CVA) ([facility] [address] (1/28/2020), LAST (acute kidney injury) ([facility] [address] (10/20/2022), Atrial fibrillation ([facility] [address] Bell palsy, CAD (coronary artery disease), Cerebral artery occlusion with cerebral infarction [facility]), Chronic diastolic CHF (congestive heart failure) ([facility] [address] (5/16/2020), CVA (cerebral vascular accident) ([facility] [address] (1/4/2017), Hypertension, Intracranial hemorrhage ([address] (4/2016), Mixed hyperlipidemia (7/6/2022), Nonintractable headache, Nontraumatic subcortical hemorrhage of cerebral hemisphere ([address] (4/30/2016), Primary osteoarthritis of right knee (3/18/2022), Sudden visual loss of left eye, Thyroid nodule (2/8/2018), and TIA involving right internal carotid artery. with following problems:    Sepsis with high fever, tachycardia, hypotension-secondary to influenza A  Fall while getting out of bed at home  Acute kidney injury-improved  History of stroke  Coronary artery disease-stable  Essential hypertension-blood pressure okay  Mixed hyperlipidemia  Thyroid nodule  Overweight due to excess calorie intake : Body mass index is 29.69 kg/m². Discussion:      I had ordered a respiratory viral PCR panel yesterday, which has been done. It has come back positive for H1N1 influenza a. This explains patient's symptoms. Patient is saturating 96% on room air. T-max 100.8 last night. Her procalcitonin is only 0.24.   No suspicion for any bacterial process    Her platelet count is 18,108 and white cell count is 2300. Cytopenias are likely secondary to influenza A infection    Plan:     Diagnostic Workup:      Continue to follow  fever curve, WBC count and blood cultures. Continue to monitor blood counts, liver and renal function. Antimicrobials: Will stop linezolid today  Stop IV Zosyn today  Start p.o. Tamiflu 75 mg every 12 hours  Recommend a 5-day course of Tamiflu  Droplet isolation for influenza A  Symptomatic management for influenza A  We will follow up on the culture results and clinical progress and will make further recommendations accordingly. Continue close vitals monitoring. Maintain good glycemic control. Fall precautions. Aspiration precautions. Continue to watch for new fever or diarrhea. DVT prophylaxis. Discussed all above with patient and RN. Discussed with hospitalist      Drug Monitoring:    Continue monitoring for antibiotic toxicity as follows: CBC, CMP   Continue to watch for following: new or worsening fever, new hypotension, hives, lip swelling and redness or purulence at vascular access sites. I/v access Management:    Continue to monitor i.v access sites for erythema, induration, discharge or tenderness. As always, continue efforts to minimize tubes/lines/drains as clinically appropriate to reduce chances of line associated infections. Patient education and counseling: The patient was educated in detail about the side-effects of various antibiotics and things to watch for like new rashes, lip swelling, severe reaction, worsening diarrhea, break through fever etc.  Discussed patient's condition and what to expect. All of the patient's questions were addressed in a satisfactory manner and patient verbalized understanding all instructions.       Level of complexity of visit and medical decision making: High     TIME SPENT TODAY:     - Spent over  35 minutes on visit (including interval history, physical exam, review of data including labs, cultures, imaging, development and implementation of treatment plan and coordination of complex care). More than 50 percent of this includes face-to-face time spent with the patient for counseling and coordination of care. Thanks for allowing me to participate in your patient's care. I will sign off today, but will be available to answer any further questions or concerns that may arise during patient's stay in the hospital.      Subjective: Interval history: Interval history was obtained from chart review and patient/ RN. The patient had fever yesterday. She has been feeling weak. REVIEW OF SYSTEMS:     Review of Systems   Constitutional:  Positive for fatigue. Negative for chills, diaphoresis and fever. HENT:  Negative for ear discharge, ear pain, postnasal drip, rhinorrhea, sinus pressure, sinus pain and sore throat. Eyes:  Negative for discharge and redness. Respiratory:  Positive for cough. Negative for shortness of breath and wheezing. Cardiovascular:  Negative for chest pain and leg swelling. Gastrointestinal:  Negative for abdominal pain, constipation, diarrhea and nausea. Endocrine: Negative for cold intolerance, heat intolerance and polydipsia. Genitourinary:  Negative for dysuria, flank pain, frequency, hematuria and urgency. Musculoskeletal:  Negative for back pain and myalgias. Skin:  Negative for rash. Allergic/Immunologic: Negative for immunocompromised state. Neurological:  Negative for dizziness, seizures and headaches. Hematological:  Does not bruise/bleed easily. Psychiatric/Behavioral:  Negative for agitation, hallucinations and suicidal ideas. The patient is not nervous/anxious. All other systems reviewed and are negative. Past Medical History: All past medical history reviewed today.     Past Medical History:   Diagnosis Date    Acute cerebrovascular accident (CVA) ([facility] [address] 1/28/2020    LAST (acute kidney injury) ([facility] [address] 10/20/2022    Atrial fibrillation (HCC)     Bell palsy     diagnosed 15 years ago    CAD (coronary artery disease)     Cerebral artery occlusion with cerebral infarction [facility])     Chronic diastolic CHF (congestive heart failure) ([facility] [address] 5/16/2020    CVA (cerebral vascular accident) ([facility] [address] 1/4/2017    Hypertension     Intracranial hemorrhage ([facility] [address] 4/2016    Mixed hyperlipidemia 7/6/2022    Nonintractable headache     Nontraumatic subcortical hemorrhage of cerebral hemisphere ([facility] [address] 4/30/2016    Primary osteoarthritis of right knee 3/18/2022    Sudden visual loss of left eye     Thyroid nodule 2/8/2018    TIA involving right internal carotid artery        Past Surgical History: All past surgical history was reviewed today. Past Surgical History:   Procedure Laterality Date    CARDIAC SURGERY      COLONOSCOPY      CORONARY ANGIOPLASTY WITH STENT PLACEMENT      TUBAL LIGATION Right Torn Rotator Cuff    2013 @ [facility]       Family History: All family history was reviewed today. History reviewed. No pertinent family history. Objective:       PHYSICAL EXAM:      Vitals:   Vitals:    10/21/22 0130 10/21/22 0530 10/21/22 0715 10/21/22 1214   BP:  115/77 117/83 112/71   Pulse:  83 91 75   Resp:  18 18    Temp: 99.8 °F (37.7 °C) 98.9 °F (37.2 °C) 99.5 °F (37.5 °C)    TempSrc: Oral Oral Oral    SpO2:  94% 93% 96%   Weight:  174 lb 3.2 oz (79 kg)     Height:           Physical Exam  Vitals and nursing note reviewed. Constitutional:       Appearance: She is well-developed. She is not diaphoretic. Comments: The patient was seen earlier today. HENT:      Head: Normocephalic and atraumatic. Right Ear: External ear normal. There is no impacted cerumen. Left Ear: External ear normal. There is no impacted cerumen. Nose: Nose normal.      Mouth/Throat:      Mouth: Mucous membranes are moist.      Pharynx: Oropharynx is clear. No oropharyngeal exudate.    Eyes:      General: No scleral icterus. Right eye: No discharge. Left eye: No discharge. Conjunctiva/sclera: Conjunctivae normal.      Pupils: Pupils are equal, round, and reactive to light. Neck:      Thyroid: No thyromegaly. Cardiovascular:      Rate and Rhythm: Normal rate and regular rhythm. Heart sounds: Normal heart sounds. No murmur heard. No friction rub. Pulmonary:      Effort: No respiratory distress. Breath sounds: No stridor. No wheezing or rales. Abdominal:      General: Bowel sounds are normal.      Palpations: Abdomen is soft. Tenderness: There is no abdominal tenderness. There is no guarding or rebound. Musculoskeletal:         General: No swelling, tenderness or deformity. Normal range of motion. Cervical back: Normal range of motion and neck supple. Right lower leg: No edema. Left lower leg: No edema. Lymphadenopathy:      Cervical: No cervical adenopathy. Skin:     General: Skin is warm and dry. Coloration: Skin is not jaundiced. Findings: No bruising, erythema or rash. Neurological:      General: No focal deficit present. Mental Status: She is alert and oriented to person, place, and time. Mental status is at baseline. Motor: No abnormal muscle tone. Psychiatric:         Mood and Affect: Mood normal.         Behavior: Behavior normal.          Lines and drains: All vascular access sites are healthy with no local erythema, discharge or tenderness. Intake and output:    I/O last 3 completed shifts:  In: -   Out: 3100 [Urine:3100]    Lab Data:   All available labs and old records have been reviewed by me.     CBC:  Recent Labs     10/19/22  1055 10/20/22  0657 10/21/22  0613   WBC 5.0 4.0 2.3*   RBC 4.61 4.16 4.45   HGB 13.7 12.3 13.3   HCT 41.0 37.3 39.6   * 92* 93*   MCV 89.1 89.7 88.9   MCH 29.8 29.5 30.0   MCHC 33.5 32.9 33.7   RDW 12.3* 12.9 12.6        BMP:  Recent Labs     10/19/22  1055 10/19/22  2102 10/20/22  0657 10/21/22  0613   *  --  132* 135*   K 3.1* 3.0* 4.5 3.7   CL 95*  --  97* 96*   CO2 25  --  26 29   BUN 7  --  16 11   CREATININE 0.6  --  1.7* 0.8   CALCIUM 9.2  --  8.7 9.2   GLUCOSE 98  --  99 104*        Hepatic Function Panel:   Lab Results   Component Value Date/Time    ALKPHOS 59 10/19/2022 10:55 AM    ALT 13 10/19/2022 10:55 AM    AST 22 10/19/2022 10:55 AM    PROT 8.1 10/19/2022 10:55 AM    PROT 8.3 09/14/2012 12:27 AM    BILITOT 1.4 10/19/2022 10:55 AM    LABALBU 4.2 10/19/2022 10:55 AM       CPK:   Lab Results   Component Value Date    CKTOTAL 160 10/20/2022     ESR:   Lab Results   Component Value Date    SEDRATE 29 06/19/2022     CRP:   Lab Results   Component Value Date    CRP <3.0 06/19/2022           Imaging: All pertinent images and reports for the current visit were reviewed by me during this visit. I reviewed the chest x-ray/CT scan/MRI images and independently interpreted the findings and results today. CT CHEST ABDOMEN PELVIS W CONTRAST Additional Contrast? None   Final Result   Negative for acute thoracic/abdominopelvic abnormality. CT LUMBAR SPINE TRAUMA RECONSTRUCTION   Final Result   No acute osseous injury of the thoracic or lumbar spine. CT cervical spine, chest, abdomen and pelvis are discussed separately. CT THORACIC SPINE TRAUMA RECONSTRUCTION   Final Result   No acute osseous injury of the thoracic or lumbar spine. CT cervical spine, chest, abdomen and pelvis are discussed separately. CT CERVICAL SPINE WO CONTRAST   Final Result   No acute abnormality of the cervical spine. CT HEAD WO CONTRAST   Preliminary Result   No evidence of acute intracranial abnormality. XR CHEST PORTABLE   Final Result   No acute cardiopulmonary findings         XR KNEE RIGHT (3 VIEWS)   Final Result   No acute osseous injury. Moderate osteoarthritis of the knee, most developed in the medial   compartment.   CPPD-related arthropathy alternatively considered. XR RADIUS ULNA RIGHT (2 VIEWS)   Final Result   No acute fracture or dislocation             Medications: All current and past medications were reviewed. oseltamivir  75 mg Oral BID    [START ON 10/22/2022] warfarin  5 mg Oral Once per day on Sun Mon Wed Thu Fri Sat    And    [START ON 10/25/2022] warfarin  2.5 mg Oral Once per day on Tue    warfarin  6 mg Oral Once    hydrALAZINE  25 mg Oral 2 times per day    metoprolol succinate  25 mg Oral Daily    NIFEdipine  90 mg Oral Daily    sodium chloride flush  5-40 mL IntraVENous 2 times per day    enoxaparin  40 mg SubCUTAneous Nightly        sodium chloride         sodium chloride flush, sodium chloride, ondansetron **OR** ondansetron, polyethylene glycol, acetaminophen **OR** acetaminophen, potassium chloride **OR** potassium alternative oral replacement **OR** potassium chloride, hydrALAZINE, ketorolac      Problem list:       Patient Active Problem List   Diagnosis Code    Permanent atrial fibrillation (HCC) I48.21    Chest pain R07.9    HTN (hypertension), benign I10    Coronary artery disease due to lipid rich plaque I25.10, I25.83    LVH (left ventricular hypertrophy) I51.7    Thyroid nodule E04.1    Chronic heart failure with preserved ejection fraction ([facility]) I50.32    Bradycardia R00.1    Labile essential hypertension I10    Hypomagnesemia E83.42    Hypokalemia E87.6    Acute subdural hematoma S06. 5XAA    Subdural hematoma S06. 5XAA    Primary osteoarthritis of right knee M17.11    Primary osteoarthritis of left knee M17.12    Acute intractable headache R51.9    Headache, migraine, intractable, with status migrainosus G43.911    Dyslipidemia E78.5    PAF (paroxysmal atrial fibrillation) ([facility]) I48.0    Hyperlipidemia E78.5    Unable to ambulate R26.2    Multiple falls R29.6    Sepsis ([facility] [address] A41.9    LAST (acute kidney injury) ([facility] [address] N17.9    History of arterial ischemic stroke Z86.73    Overweight (BMI 25.0-29. 9) E66.3 Please note that this chart was generated using Dragon dictation software. Although every effort was made to ensure the accuracy of this automated transcription, some errors in transcription may have occurred inadvertently. If you may need any clarification, please do not hesitate to contact me through EPIC or at the phone number provided below with my electronic signature. Any pictures or media included in this note were obtained after taking informed verbal consent from the patient and with their approval to include those in the patient's medical record. Harlin Litten, MD, MPH, CHRISTINA Meek  10/21/2022, 2:37 PM  [facility] Infectious Disease   [facility] [address] [facility], [address] [place] [address]  Office: [phone]  Fax: [phone]  In-person Clinic days:  Tuesday & Thursday a.m. Virtual clinic days: Monday, Wednesday & Friday a.m.

## 2022-10-21 NOTE — PROGRESS NOTES
Physician Progress Note      PATIENTPatrick Free  CSN #:                  [id number]  :                       1950  ADMIT DATE:       10/19/2022 10:26 AM  DISCH DATE:  RESPONDING  PROVIDER #:        Dorcas Black CNP          QUERY TEXT:    Pt admitted with Febrile illness and UTI. Pt noted to have Temp up to 102.4 on   day of admit,  and diagnosed with Sepsis per ID consult. If possible,   please document in the progress notes and/or discharge summary whether. .. The medical record reflects the following:  Risk Factors: Patient c/o weakness and fatigue and has had repeated falls  Clinical Indicators: , Temp up to 102.4 on day of admit, Procalcitonin   0.24 on 10/20/2022. Blood test + for Influenza A. Diagnosed with UTI, per ID   consult: Sepsis  Treatment: ID consult, IV Fluids, IV Zyvox, IV Zosyn, IV Rocephin  Options provided:  -- Sepsis, present on admission  -- Sepsis, present on admission, now resolved  -- *** (localized infection such as pneumonia, UTI, or cellulitis) without   Sepsis  -- Sepsis was ruled out  -- Other - I will add my own diagnosis  -- Disagree - Not applicable / Not valid  -- Disagree - Clinically unable to determine / Unknown  -- Refer to Clinical Documentation Reviewer    PROVIDER RESPONSE TEXT:    This patient was treated for sepsis this admission, which was present on   admission and is currently resolved.     Query created by: Graciela Joyce on 10/21/2022 3:00 PM      Electronically signed by:  Quita Black CNP 10/21/2022 3:04 PM

## 2022-10-21 NOTE — PROGRESS NOTES
[facility] [address]   Phone: [phone]    Occupational Therapy    [] Initial Evaluation            [x] Daily Treatment Note         [] Discharge Summary      Patient: Kevin Baeza   : 1950   MRN: [id number]   Date of Service:  10/21/2022    Admitting Diagnosis: Unable to ambulate      Current Admission Summary: Kevin Baeza is a 80 y.o. female with past medical history of previous CVA, atrial fibrillation on Coumadin, CAD, CHF, hypertension, hyperlipidemia who presents to the ED with complaint of a fall. Patient states she was getting out of her car yesterday when she states she fell. She states she is unsure why she fell. She denies feeling lightheaded or dizzy. She denies any chest pain, shortness of breath or palpitations. She states she does not believe she lost her balance. She did not lose consciousness. Patient states she did brace herself when she fell. Patient states she had another fall when she attempted to get up from the bed last night. States she laid on the ground for several hours. Patient was brought to the ED for further evaluation and treatment. Quintin was eventually able to help patient up and she was able to ambulate after the fall. She did hit her head. She denies any headache or visual changes. Denies speech disturbances or numbness/tingling. Denies lightheadedness or dizziness. Denies any neck pain or back pain. Complaint of pain to the right-sided flank/rib cage. Also has pain to the right forearm and right knee. She states she had difficulty ambulating secondary to pain to the knee. States pain is aching rated 7/10. Denies any shortness of breath, abdominal pain, nausea/vomiting, urinary symptoms or changes in bowel movements. She states she did not lose consciousness. She came to the ED due to multiple falls for further evaluation and treatment.   Past Medical History:  has a past medical history of Acute cerebrovascular accident (CVA) ([facility] [address] Atrial fibrillation ([address] Bell palsy, CAD (coronary artery disease), Cerebral artery occlusion with cerebral infarction ([address] Chronic diastolic CHF (congestive heart failure) ([address] CVA (cerebral vascular accident) ([address] Hypertension, Intracranial hemorrhage ([address] Mixed hyperlipidemia, Nonintractable headache, Nontraumatic subcortical hemorrhage of cerebral hemisphere [facility]), Primary osteoarthritis of right knee, Sudden visual loss of left eye, Thyroid nodule, and TIA involving right internal carotid artery. Past Surgical History:  has a past surgical history that includes Cardiac surgery; Tubal ligation (Right, Torn Rotator Cuff); Coronary angioplasty with stent; and Colonoscopy. Discharge Recommendations: Katia Dunlap scored a 19/24 on the AM-PAC ADL Inpatient form. Current research shows that an AM-PAC score of 18 or greater is typically associated with a discharge to the patient's home setting. Based on the patient's AM-PAC score, and their current ADL deficits, it is recommended that the patient have 2-3 sessions per week of Occupational Therapy at d/c to increase the patient's independence. At this time, this patient demonstrates the endurance and safety to discharge home with [address] [place] [address] (home vs OP services) and a follow up treatment frequency of 2-3x/wk. Please see assessment section for further patient specific details. Patient lives with his 15year old great grandson. Currently staying with his . Patient declining SNF stay even though states feels is still pretty weak. If patient discharges prior to next session this note will serve as a discharge summary. Please see below for the latest assessment towards goals.      HOME HEALTH CARE: LEVEL 3 SAFETY     - Initial home health evaluation to occur within 24-48 hours, in patient home   - Therapy evaluations in home within 24-48 hours of discharge; including DME and home safety   - Frontload therapy 5 days, then 3x a week   - Therapy to evaluate if patient has [address] needs for personal care   -  evaluation within 24-48 hours, includes evaluation of resources and insurance to determine [place], [place], LTC, and Medicaid options        DME Required For Discharge: patient has all required DME for discharge  Precautions/Restrictions: high fall risk, Isolation precautions COVID rule out, negative for flu. Weight Bearing Restrictions: no restrictions  [] Right Upper Extremity        [] Left Upper Extremity         [] Right Lower Extremity         [] Left Lower Extremity             Required Braces/Orthotics: no braces required              [] Right            [] Left  Positional Restrictions:no positional restrictions     Pre-Admission Information   Lives With: great grandson (16 y.o)                   Type of Home: house  Home Layout: one level, able to live on main level  Home Access:  1 step to enter without rails   Bathroom Layout: walker accessible, wheelchair accessible, tub/shower unit  Toilet Height: standard height, elevated height  Bathroom Equipment: shower chair  Home Equipment: 4WW, single point cane  Transfer Assistance: Independent without use of device  Ambulation Assistance:Independent without use of device, . Comment: Uses walker out in the community  ADL Assistance: independent with all ADL's  IADL Assistance: independent with homemaking tasks  Active :        [x] Yes                 [] No  Hand Dominance: [] Left                 [] Right  Current Employment: part time employment. Occupation: caregiver  Hobbies: Taking care of lelia carrington  Recent Falls: 2 falls which lead to current admission, pt reports it feels like legs gave out on her      Examination   Vision:   Vision Gross Assessment: Impaired and Vision Corrective Device: wears glasses for reading  Hearing:   [facility]  Observation:   Pt is on room air, sats 94-95%. Posture:    Forward head, rounded shoulders   Sensation:   Pt reports numbness/tingling in B finger tips   ROM:   (B) UE AROM WFL  Strength:   (B) UE strength grossly WFL    Decision Making: low complexity  Clinical Presentation: stable      Subjective  General: Patient supine in bed, agreeable to evaluation. Recently placed in COVID R/O isolation  Pain: 6/10. Location: Back  Pain Interventions: RN notified and repositioned         Activities of Daily Living  Basic Activities of Daily Living  Feeding: Independent  Grooming: supervision  Lower Extremity Dressing: stand by assistance  Toileting: stand by assistance. General Comments: Patient declined further ADLs this date, stated performed this AM.       Instrumental Activities of Daily Living  No IADL completed on this date. Functional Mobility  Bed Mobility  Bed mobility not completed on this date. Comments:  Transfers  Sit to stand transfer:supervision, stand by assistance  Stand to sit transfer: supervision, stand by assistance  Bed / Chair transfer: supervision, stand by assistance. Bed / Chair equipment: rolling walker  Bed / Chair comments: slow lynn, forward flexed posture  Toilet transfer: supervision, stand by assistance  Toilet transfer equipment: standard toilet, grab bars  Comments: grab bar on left side      Functional Mobility:  Standing Balance: stand by assistance, contact guard assistance. Standing Balance Comment: use of RW, forward flexed posture, slow lynn (pt states this is baseline)  Functional Mobility: .  stand by assistance  Functional Mobility Activity: to/from bathroom  Functional Mobility Device Use: rolling walker   Ambulated with SBA with RW x 30 feet within room.          Other Therapeutic Interventions-- orange therapy band (light resistance therapy band) 3 sets x 10  Shoulder flexion, elbow flexion/extension, elbow punching     Functional Outcomes  AM-PAC Inpatient Daily Activity Raw Score: 19    Cognition  WFL  Orientation:    alert and oriented x 4  Command Following:   [facility]     Education  Barriers To Learning: none  Patient Education: patient educated on goals, OT role and benefits, plan of care, discharge recommendations  Learning Assessment:  patient verbalizes understanding, would benefit from continued reinforcement    Assessment  Activity Tolerance: Good   Impairments Requiring Therapeutic Intervention: decreased functional mobility, decreased ADL status, decreased endurance, decreased balance, decreased IADL, decreased posture  Prognosis: good      Clinical Assessment: Patient presented with the above deficits, which are below baseline, and would benefit from continued OT to address.  Patient adamantly opposed to SNF, agreeable to [address] [place] [address]        Safety Interventions: patient left in chair, chair alarm in place, call light within reach, patient at risk for falls, and nurse notified    Plan  Frequency: 3-5 x/per week  Current Treatment Recommendations: strengthening, balance training, functional mobility training, transfer training, endurance training, ADL/self-care training, IADL training, and equipment evaluation/education    Goals  Patient Goals: Home   Short Term Goals:  Time Frame: Upon discharge-- continue all goals 10/21/22    Patient will complete lower body ADL at modified independent   Patient will complete toileting at modified independent   Patient will complete functional transfers at modified independent   Patient will complete functional mobility at modified independent   Patient will increase functional standing balance to 5-7 Mod I for improved ADL completion    Therapy Session Time     Individual Group Co-treatment   Time In 1131     Time Out 1156     Minutes 25          Timed Code Treatment Minutes:   25  Total Treatment Minutes:  25       Electronically Signed By: DENISE Haque/MARCOS David

## 2022-10-22 VITALS
HEART RATE: 87 BPM | WEIGHT: 174.16 LBS | TEMPERATURE: 97.1 F | SYSTOLIC BLOOD PRESSURE: 144 MMHG | RESPIRATION RATE: 17 BRPM | DIASTOLIC BLOOD PRESSURE: 108 MMHG | OXYGEN SATURATION: 98 % | BODY MASS INDEX: 27.34 KG/M2 | HEIGHT: 67 IN

## 2022-10-22 LAB
ANION GAP SERPL CALCULATED.3IONS-SCNC: 10 MMOL/L (ref 3–16)
BUN BLDV-MCNC: 8 MG/DL (ref 7–20)
CALCIUM SERPL-MCNC: 9.2 MG/DL (ref 8.3–10.6)
CHLORIDE BLD-SCNC: 96 MMOL/L (ref 99–110)
CO2: 27 MMOL/L (ref 21–32)
CREAT SERPL-MCNC: 0.6 MG/DL (ref 0.6–1.2)
GFR SERPL CREATININE-BSD FRML MDRD: >60 ML/MIN/{1.73_M2}
GLUCOSE BLD-MCNC: 100 MG/DL (ref 70–99)
HCT VFR BLD CALC: 38.3 % (ref 36–48)
HEMOGLOBIN: 12.8 G/DL (ref 12–16)
INR BLD: 1.75 (ref 0.87–1.14)
MCH RBC QN AUTO: 29.7 PG (ref 26–34)
MCHC RBC AUTO-ENTMCNC: 33.6 G/DL (ref 31–36)
MCV RBC AUTO: 88.5 FL (ref 80–100)
PDW BLD-RTO: 12.5 % (ref 12.4–15.4)
PLATELET # BLD: 106 K/UL (ref 135–450)
PMV BLD AUTO: 10.3 FL (ref 5–10.5)
POTASSIUM SERPL-SCNC: 3.5 MMOL/L (ref 3.5–5.1)
PROTHROMBIN TIME: 20.4 SEC (ref 11.7–14.5)
RBC # BLD: 4.32 M/UL (ref 4–5.2)
SODIUM BLD-SCNC: 133 MMOL/L (ref 136–145)
WBC # BLD: 2.6 K/UL (ref 4–11)

## 2022-10-22 PROCEDURE — 99232 SBSQ HOSP IP/OBS MODERATE 35: CPT | Performed by: INTERNAL MEDICINE

## 2022-10-22 PROCEDURE — 6370000000 HC RX 637 (ALT 250 FOR IP): Performed by: NURSE PRACTITIONER

## 2022-10-22 PROCEDURE — 2580000003 HC RX 258: Performed by: FAMILY MEDICINE

## 2022-10-22 PROCEDURE — 6360000002 HC RX W HCPCS: Performed by: PHYSICIAN ASSISTANT

## 2022-10-22 PROCEDURE — 80048 BASIC METABOLIC PNL TOTAL CA: CPT

## 2022-10-22 PROCEDURE — 85610 PROTHROMBIN TIME: CPT

## 2022-10-22 PROCEDURE — 36415 COLL VENOUS BLD VENIPUNCTURE: CPT

## 2022-10-22 PROCEDURE — 85027 COMPLETE CBC AUTOMATED: CPT

## 2022-10-22 RX ORDER — OSELTAMIVIR PHOSPHATE 75 MG/1
75 CAPSULE ORAL 2 TIMES DAILY
Qty: 7 CAPSULE | Refills: 0 | Status: SHIPPED | OUTPATIENT
Start: 2022-10-22 | End: 2022-10-26

## 2022-10-22 RX ADMIN — HYDRALAZINE HYDROCHLORIDE 25 MG: 25 TABLET, FILM COATED ORAL at 08:57

## 2022-10-22 RX ADMIN — NIFEDIPINE 90 MG: 30 TABLET, EXTENDED RELEASE ORAL at 08:57

## 2022-10-22 RX ADMIN — OSELTAMIVIR PHOSPHATE 75 MG: 75 CAPSULE ORAL at 08:57

## 2022-10-22 RX ADMIN — Medication 10 ML: at 08:59

## 2022-10-22 RX ADMIN — KETOROLAC TROMETHAMINE 15 MG: 15 INJECTION, SOLUTION INTRAMUSCULAR; INTRAVENOUS at 09:02

## 2022-10-22 RX ADMIN — METOPROLOL SUCCINATE 25 MG: 25 TABLET, EXTENDED RELEASE ORAL at 08:57

## 2022-10-22 NOTE — PLAN OF CARE
Problem: Pain  Goal: Verbalizes/displays adequate comfort level or baseline comfort level  10/22/2022 0050 by GURPREET RODGERS  Outcome: Progressing  Flowsheets (Taken 10/21/2022 2000)  Verbalizes/displays adequate comfort level or baseline comfort level: Encourage patient to monitor pain and request assistance  10/21/2022 1523 by Ambreen Katz RN  Outcome: Progressing     Problem: Safety - Adult  Goal: Free from fall injury  10/22/2022 0050 by Viri Carrillo  Outcome: Progressing  Flowsheets (Taken 10/22/2022 0048)  Free From Fall Injury: Instruct family/caregiver on patient safety  10/21/2022 1523 by Ambreen Katz RN  Outcome: Progressing     Problem: Skin/Tissue Integrity  Goal: Absence of new skin breakdown  Description: 1. Monitor for areas of redness and/or skin breakdown  2. Assess vascular access sites hourly  3. Every 4-6 hours minimum:  Change oxygen saturation probe site  4. Every 4-6 hours:  If on nasal continuous positive airway pressure, respiratory therapy assess nares and determine need for appliance change or resting period.   10/22/2022 0050 by Viri Carrillo  Outcome: Progressing  10/21/2022 1523 by Ambreen Katz RN  Outcome: Progressing     Problem: Chronic Conditions and Co-morbidities  Goal: Patient's chronic conditions and co-morbidity symptoms are monitored and maintained or improved  10/22/2022 0050 by Viri Carrillo  Outcome: Progressing  10/21/2022 1523 by Ambreen Katz RN  Outcome: Progressing

## 2022-10-22 NOTE — DISCHARGE SUMMARY
[facility] DISCHARGE SUMMARY    Patient Demographics    Patient. Miguel Mariano  Date of Birth. 1950  MRN. [id number]     Primary care provider. Rodrigo Moreland MD  (Tel: [phone])    Admit date: 10/19/2022    Discharge date (blank if same as Note Date): Note Date: 10/22/2022     Reason for Hospitalization. Chief Complaint   Patient presents with    Fall     Fall for unknown reason into the grass last night; hit head & right ribs. Denies LOC; takes Coumadin. Significant Findings. Principal Problem:    Unable to ambulate  Active Problems:    Hyperlipidemia    Multiple falls    Sepsis ([address]    LAST (acute kidney injury) ([facility] [address]    History of arterial ischemic stroke    Overweight (BMI 25.0-29. 9)    Influenza A (H1N1)    Coronary artery disease due to lipid rich plaque    Essential hypertension  Resolved Problems:    * No resolved hospital problems. *       Problems and results from this hospitalization that need follow up. LAST. Recommend BMP on follow up. Significant test results and incidental findings. CXR 10/19/2022:  No acute cardiopulmonary findings     Xray right knee 10/19/2022:  No acute osseous injury. Moderate osteoarthritis of the knee, most developed in the medial   compartment. CPPD-related arthropathy alternatively considered. Xray radius/ulna 10/19/2022:  No acute fracture or dislocation          CT head 10/19/2022:  No evidence of acute intracranial abnormality. CT cervical spine 10/19/2022:  No acute abnormality of the cervical spine. CT thoracic and lumbar spines 10/19/2022:  No acute osseous injury of the thoracic or lumbar spine. CT cervical spine, chest, abdomen and pelvis are discussed separately. CT chest/abd/pelvis 10/19/2022:  Negative for acute thoracic/abdominopelvic abnormality. Invasive procedures and treatments. None     [facility] Course. 79 yo female with hx CAD/PCI, ICH, chronic atrial fib, chronic dCHF, HTN. She presented to hospital after recurrent falls at home. She fell while getting out of car, later fell when getting out of bed and laid on ground for several hours. No acute findings on imaging. Spiked temp to 102.4 in ER. Influenza A. Temp to 102.4 in ER. She was started on ceftriaxone on admission for suspected UTI but UA not impressive with only trace leukocytes (3 WBC), no bacteria, negative nitrites. CT scan with no acute findings in chest/abd/pelvis. No leukocytosis, procalcitonin is elevated at 0.24, blood cultures with NGTD. Influenza A screen returned positive and she was started on Tamiflu (10/21). Sepsis present on admission based on high fever, tachycardia and hypotension. Secondary to influenza. Received IV fluids. Improved. LAST. Creatinine 0.6 on presentation, bumped to 1.7 (10/20) back to baseline today. CK levels WNL. Lasix, spironolactone and losartan held and she was given gentle hydration. Creatinine back to baseline on DC. Recurrent falls / right rib pain. Imaging negative for fracture. CT head with no acute findings. Weakness likely secondary acute illness. No evidence of orthostatic hypotension, supine /77, HR 83; standing /89, . PT/OT evaluated and recommend hhc on DC to address ADL and functional mobility deficits. Home care orders placed. Chronic dCHF. Echo 7/2022 with EF 60-65%. Appears compensated. Diuretics held secondary LAST. CAD. Hx PCI to LAD (2000). Troponin < 0.01 no acute changes on EKG. Denied chest pain. Chronic atrial fib. Controlled rate on beta blocker. Takes warfarin at home, INR subtherapeutic on presentation 1.61. HTN. BP elevated on admission, much improved on home meds. Leukopenia / thrombocytopenia. WBC  5.0->4.0->2.3->2.6, platelets 847->04->21->040.  WBC likely secondary to acute, platelet count has been intermittently low in past.     Patient is feeling better and eager forDC home. She has been ambulatory in room without issues. Reviewed DC plans, follow up and medications. Expresses understanding. Questions answered. Consults. IP CONSULT TO PHARMACY  IP CONSULT TO SOCIAL WORK  IP CONSULT TO SOCIAL WORK  IP CONSULT TO SOCIAL WORK  IP CONSULT TO NEPHROLOGY  IP CONSULT TO INFECTIOUS DISEASES  IP CONSULT TO HOME CARE NEEDS    Physical examination on discharge day. BP (!) 146/102   Pulse 86   Temp 98.2 °F (36.8 °C) (Oral)   Resp 18   Ht 5' 7\" (1.702 m)   Wt 174 lb 2.6 oz (79 kg)   SpO2 98%   BMI 27.28 kg/m²   General appearance. Alert. Looks comfortable. HEENT. Sclera clear. Moist mucus membranes. Cardiovascular. Regular rate and rhythm, normal S1, S2. No murmur. Respiratory. Not using accessory muscles. Clear to auscultation bilaterally, no wheeze. Gastrointestinal. Abdomen soft, non-tender, not distended, normal bowel sounds  Neurology. Facial symmetry. No speech deficits. Moving all extremities equally. Extremities. No edema in lower extremities. Skin. Warm, dry, normal turgor    Condition at time of discharge stable    Medication instructions provided to patient at discharge. Medication List        START taking these medications      acetaminophen 500 MG tablet  Commonly known as: TYLENOL  Take 2 tablets by mouth every 6 hours as needed for Pain     oseltamivir 75 MG capsule  Commonly known as: TAMIFLU  Take 1 capsule by mouth 2 times daily for 7 doses            CHANGE how you take these medications      furosemide 20 MG tablet  Commonly known as: LASIX  TAKE 1 TABLET BY MOUTH once DAILY  Start taking on: October 28, 2022  What changed: These instructions start on October 28, 2022. If you are unsure what to do until then, ask your doctor or other care provider.      losartan 100 MG tablet  Commonly known as: COZAAR  Take 0.5 tablets by mouth daily  What changed: how much to take potassium chloride 20 MEQ extended release tablet  Commonly known as: KLOR-CON M  Take 1 tablet by mouth daily  What changed: how much to take     spironolactone 50 MG tablet  Commonly known as: ALDACTONE  Take 1 tablet by mouth 2 times daily TAKE 1 TABLET BY MOUTH DAILY  Start taking on: October 28, 2022  What changed: These instructions start on October 28, 2022. If you are unsure what to do until then, ask your doctor or other care provider. CONTINUE taking these medications      atorvastatin 80 MG tablet  Commonly known as: LIPITOR  Take 1 tablet by mouth daily     Calcium + D3 600-200 MG-UNIT Tabs tablet  Take 1 tablet by mouth 2 times daily     carvedilol 3.125 MG tablet  Commonly known as: COREG  Take 1 tablet by mouth 2 times daily (with meals)     Magnesium 500 MG Caps  Take 400 mg by mouth daily     NIFEdipine 90 MG extended release tablet  Commonly known as: PROCARDIA XL  Take 1 tablet by mouth daily     tiZANidine 4 MG tablet  Commonly known as: ZANAFLEX  Take 1 tablet by mouth 3 times daily as needed (neck pain)     warfarin 5 MG tablet  Commonly known as: COUMADIN  Take as directed. If you are unsure how to take this medication, talk to your nurse or doctor. STOP taking these medications      famotidine 20 MG tablet  Commonly known as: PEPCID               Where to Get Your Medications        [address] [facility] [address] [address]      Hours: 24-hours Phone: [phone]   oseltamivir 75 MG capsule       Information about where to get these medications is not yet available    Ask your nurse or doctor about these medications  furosemide 20 MG tablet  losartan 100 MG tablet  potassium chloride 20 MEQ extended release tablet  spironolactone 50 MG tablet         Discharge recommendations given to patient. Follow Up. PCP in 1 week   Disposition. home  Activity. activity as tolerated  Diet: ADULT DIET; Regular      Spent > 30 minutes in discharge process.     Signed:  Coleen Ureña, APRN - [facility]     10/22/2022 10:33 AM

## 2022-10-22 NOTE — PLAN OF CARE
Problem: Pain  Goal: Verbalizes/displays adequate comfort level or baseline comfort level  10/22/2022 0856 by Mitali Carney RN  Outcome: Progressing  10/22/2022 0050 by Shai Mendez  Outcome: Progressing  Flowsheets (Taken 10/21/2022 2000)  Verbalizes/displays adequate comfort level or baseline comfort level: Encourage patient to monitor pain and request assistance     Problem: Safety - Adult  Goal: Free from fall injury  10/22/2022 0856 by Mitali Carney RN  Outcome: Progressing  10/22/2022 0050 by Shai Mendez  Outcome: Progressing  Flowsheets (Taken 10/22/2022 0048)  Free From Fall Injury: Instruct family/caregiver on patient safety     Problem: Skin/Tissue Integrity  Goal: Absence of new skin breakdown  Description: 1. Monitor for areas of redness and/or skin breakdown  2. Assess vascular access sites hourly  3. Every 4-6 hours minimum:  Change oxygen saturation probe site  4. Every 4-6 hours:  If on nasal continuous positive airway pressure, respiratory therapy assess nares and determine need for appliance change or resting period.   10/22/2022 0856 by Mitali Carney RN  Outcome: Progressing  10/22/2022 0050 by Shai Mendez  Outcome: Progressing     Problem: Chronic Conditions and Co-morbidities  Goal: Patient's chronic conditions and co-morbidity symptoms are monitored and maintained or improved  10/22/2022 0856 by Mitali Carney RN  Outcome: Progressing  10/22/2022 0050 by Shai Mendez  Outcome: Progressing

## 2022-10-22 NOTE — PROGRESS NOTES
Office : [phone]     Fax :[phone]       Nephrology  progress Note      Patient's Name: Shivani Mitchell  8:41 AM  10/22/2022    Reason for Consult: LAST      Requesting Physician:  Kelsey Martinez MD      Chief Complaint:    Chief Complaint   Patient presents with    Fall     Fall for unknown reason into the grass last night; hit head & right ribs. Denies LOC; takes Coumadin. History of Present Ilness:    Shivani Mitchell is a 80 y.o. female with prior history  of CVA, HTN , Intracranial hemorrhage, CHF, AFib , Chronic anticoagulation, OA, presented after sustaining a fall at home . Reports hitting her head. She laid on the ground for several hours. Her grandson then helped to get her up. She had another fall attempting to get out of bed. Denies focal muscle weakness, denies LOC . No chest pain , fever, chills , nausea or vomiting C/o pain in right arm and knee. She is Febrile here with Tmax 102.4. she is also tachycardic   Trauma work up included   CT scan of head, chest and Abdomen did not reveal any acute hemorrhage, fracture or other pathology    Initial lab work showed elevated creat of 1.7   Baseline is 0.6     Interval hx     Feels better   Creatinine better   Low grade fever         I/O last 3 completed shifts:   In: 18 [P.O.:480]  Out: 200 [Urine:2200]    Past Medical History:   Diagnosis Date    Acute cerebrovascular accident (CVA) ([facility] [address] 1/28/2020    LAST (acute kidney injury) ([facility] 10/20/2022    Atrial fibrillation (HCC)     Bell palsy     diagnosed 15 years ago    CAD (coronary artery disease)     Cerebral artery occlusion with cerebral infarction ([facility] [address]     Chronic diastolic CHF (congestive heart failure) ([address] 5/16/2020    CVA (cerebral vascular accident) ([facility] 1/4/2017    Hypertension     Intracranial hemorrhage ([facility] [address] 4/2016    Mixed hyperlipidemia 7/6/2022    Nonintractable headache     Nontraumatic subcortical hemorrhage of cerebral hemisphere ([facility] 4/30/2016    Primary osteoarthritis of right knee 3/18/2022    Sudden visual loss of left eye     Thyroid nodule 2/8/2018    TIA involving right internal carotid artery        Past Surgical History:   Procedure Laterality Date    CARDIAC SURGERY      COLONOSCOPY      CORONARY ANGIOPLASTY WITH STENT PLACEMENT      TUBAL LIGATION Right Torn Rotator Cuff    2013 @ [facility]       History reviewed. No pertinent family history. reports that she has never smoked. She has never used smokeless tobacco. She reports that she does not drink alcohol and does not use drugs.         Allergies:  Clonidine, Codeine, Hydrocodone-acetaminophen, Lisinopril, Tramadol, and Percocet [oxycodone-acetaminophen]    Current Medications:    oseltamivir (TAMIFLU) capsule 75 mg, BID  warfarin (COUMADIN) tablet 5 mg, Once per day on Sun Mon Wed Thu Fri Sat   And  [START ON 10/25/2022] warfarin (COUMADIN) tablet 2.5 mg, Once per day on Tue  hydrALAZINE (APRESOLINE) tablet 25 mg, 2 times per day  metoprolol succinate (TOPROL XL) extended release tablet 25 mg, Daily  NIFEdipine (PROCARDIA XL) extended release tablet 90 mg, Daily  sodium chloride flush 0.9 % injection 5-40 mL, 2 times per day  sodium chloride flush 0.9 % injection 5-40 mL, PRN  0.9 % sodium chloride infusion, PRN  enoxaparin (LOVENOX) injection 40 mg, Nightly  ondansetron (ZOFRAN-ODT) disintegrating tablet 4 mg, Q8H PRN   Or  ondansetron (ZOFRAN) injection 4 mg, Q6H PRN  polyethylene glycol (GLYCOLAX) packet 17 g, Daily PRN  acetaminophen (TYLENOL) tablet 650 mg, Q6H PRN   Or  acetaminophen (TYLENOL) suppository 650 mg, Q6H PRN  potassium chloride (KLOR-CON M) extended release tablet 40 mEq, PRN   Or  potassium bicarb-citric acid (EFFER-K) effervescent tablet 40 mEq, PRN Or  potassium chloride 10 mEq/100 mL IVPB (Peripheral Line), PRN  hydrALAZINE (APRESOLINE) injection 10 mg, Q4H PRN  ketorolac (TORADOL) injection 15 mg, Q6H PRN      Review of Systems:   14 point ROS obtained but were negative except mentioned in HPI      Physical exam:     Vitals:  /89   Pulse 72   Temp 98 °F (36.7 °C) (Oral)   Resp 18   Ht 5' 7\" (1.702 m)   Wt 174 lb 2.6 oz (79 kg)   SpO2 100%   BMI 27.28 kg/m²   Constitutional:  OAA X3 NAD  Skin: no rash, turgor wnl  Heent:  eomi, mmm  Neck: no bruits or jvd noted  Cardiovascular:  S1, S2 without m/r/g  Respiratory: CTA B without w/r/r  Abdomen:  +bs, soft, nt, nd  Ext: no  lower extremity edema  Psychiatric: mood and affect appropriate  Musculoskeletal:  Rom, muscular strength intact    Labs:  CBC:      Latest Reference Range & Units 10/20/22 06:57   WBC 4.0 - 11.0 K/uL 4.0   RBC 4.00 - 5.20 M/uL 4.16   Hemoglobin Quant 12.0 - 16.0 g/dL 12.3   Hematocrit 36.0 - 48.0 % 37.3   MCV 80.0 - 100.0 fL 89.7   MCH 26.0 - 34.0 pg 29.5   MCHC 31.0 - 36.0 g/dL 32.9   MPV 5.0 - 10.5 fL 10.1   RDW 12.4 - 15.4 % 12.9   Platelet Count 997 - 450 K/uL 92 (L)   (L): Data is abnormally low    BMP:     Latest Reference Range & Units 10/20/22 06:57   Sodium 136 - 145 mmol/L 132 (L)   Potassium 3.5 - 5.1 mmol/L 4.5   Chloride 99 - 110 mmol/L 97 (L)   CO2 21 - 32 mmol/L 26   BUN,BUNPL 7 - 20 mg/dL 16   Creatinine 0.6 - 1.2 mg/dL 1.7 (H)   Anion Gap 3 - 16  9   Est, Glom Filt Rate >60  32 !   (L): Data is abnormally low  (H): Data is abnormally high  !: Data is abnormal    Hepatic:   Recent Labs     10/19/22  1055   AST 22   ALT 13   BILITOT 1.4*   ALKPHOS 59     Troponin:   Recent Labs     10/19/22  1055   TROPONINI <0.01     BNP: No results for input(s): BNP in the last 72 hours. Lipids: No results for input(s): CHOL, TRIG, HDL, LDLCALC, LABVLDL in the last 72 hours. ABGs: No results for input(s): PHART, PO2ART, FJS5LEC in the last 72 hours.   INR:   Recent Labs 10/20/22  0657 10/21/22  0613 10/22/22  0646   INR 1.45* 1.48* 1.75*     UA:  Recent Labs     10/19/22  1340   COLORU Yellow   CLARITYU Clear   GLUCOSEU Negative   BILIRUBINUR Negative   KETUA TRACE*   SPECGRAV >=1.030   BLOODU Negative   PHUR 6.5   PROTEINU Negative   UROBILINOGEN 2.0*   NITRU Negative   LEUKOCYTESUR TRACE*   LABMICR YES   URINETYPE NotGiven      Urine Microscopic:   Recent Labs     10/19/22  1340   BACTERIA None Seen   HYALCAST 0   WBCUA 3   RBCUA 1   EPIU 5     Urine Culture:   Recent Labs     10/20/22  1142   LABURIN No growth at 18 to 36 hours     Urine Chemistry:   Recent Labs     10/20/22  1142   LABCREA 120.9   NAUR 47                IMAGING:  CT CHEST ABDOMEN PELVIS W CONTRAST Additional Contrast? None   Final Result   Negative for acute thoracic/abdominopelvic abnormality. CT LUMBAR SPINE TRAUMA RECONSTRUCTION   Final Result   No acute osseous injury of the thoracic or lumbar spine. CT cervical spine, chest, abdomen and pelvis are discussed separately. CT THORACIC SPINE TRAUMA RECONSTRUCTION   Final Result   No acute osseous injury of the thoracic or lumbar spine. CT cervical spine, chest, abdomen and pelvis are discussed separately. CT CERVICAL SPINE WO CONTRAST   Final Result   No acute abnormality of the cervical spine. CT HEAD WO CONTRAST   Preliminary Result   No evidence of acute intracranial abnormality. XR CHEST PORTABLE   Final Result   No acute cardiopulmonary findings         XR KNEE RIGHT (3 VIEWS)   Final Result   No acute osseous injury. Moderate osteoarthritis of the knee, most developed in the medial   compartment. CPPD-related arthropathy alternatively considered. XR RADIUS ULNA RIGHT (2 VIEWS)   Final Result   No acute fracture or dislocation               Assessment/Plan :      1. LAST  Likely pre renal     Improved     Hold lasix   Hold aldactone     2.  HTN,BP labile   Start low dose losartan at discharge

## 2022-10-22 NOTE — PROGRESS NOTES
Shift assessment completed and charted. VSS. A/o x4. Bed locked and in lowest position, call light within reach. SpO2 > 90% on RA. Pt stable and denied needs when writer left room.

## 2022-10-22 NOTE — CARE COORDINATION
Pt agreeable to [facility] as she has had them in the past.     CM spoke to [facility] in [facility] intake and they can accept pt. Pt updated. Patient discharged 10/22/2022 to home with [facility] services.    All discharge needs met per case management     Jennifer Jurado RN, BSN  [phone]

## 2022-10-22 NOTE — DISCHARGE INSTRUCTIONS
Do not resume Lasix (furosemide) and spironolactone (Aldactone) until 10/28. Losartan (Cozaar) dose has been decreased to 1/2 tablet daily.

## 2022-10-22 NOTE — PLAN OF CARE
Problem: Pain  Goal: Verbalizes/displays adequate comfort level or baseline comfort level  10/22/2022 1205 by Jose Strong RN  Outcome: Completed  10/22/2022 0856 by Jose Strong RN  Outcome: Progressing  10/22/2022 0050 by Kelechi Boyer  Outcome: Progressing  Flowsheets (Taken 10/21/2022 2000)  Verbalizes/displays adequate comfort level or baseline comfort level: Encourage patient to monitor pain and request assistance     Problem: Safety - Adult  Goal: Free from fall injury  10/22/2022 1205 by Jose Strong RN  Outcome: Completed  10/22/2022 0856 by Jose Strong RN  Outcome: Progressing  10/22/2022 0050 by Kelechi Boyer  Outcome: Progressing  Flowsheets (Taken 10/22/2022 0048)  Free From Fall Injury: Instruct family/caregiver on patient safety     Problem: Skin/Tissue Integrity  Goal: Absence of new skin breakdown  Description: 1. Monitor for areas of redness and/or skin breakdown  2. Assess vascular access sites hourly  3. Every 4-6 hours minimum:  Change oxygen saturation probe site  4. Every 4-6 hours:  If on nasal continuous positive airway pressure, respiratory therapy assess nares and determine need for appliance change or resting period.   10/22/2022 1205 by Jose Strong RN  Outcome: Completed  10/22/2022 0856 by Jose Strong RN  Outcome: Progressing  10/22/2022 0050 by GURPREET Huerta  Outcome: Progressing     Problem: Chronic Conditions and Co-morbidities  Goal: Patient's chronic conditions and co-morbidity symptoms are monitored and maintained or improved  10/22/2022 1205 by Jose Strong RN  Outcome: Completed  10/22/2022 0856 by Jose Strong RN  Outcome: Progressing  10/22/2022 0050 by Kelechi Boyer  Outcome: Progressing

## 2022-10-22 NOTE — PROGRESS NOTES
Discharge paperwork discussed with the patient and all questioned fully answered. She will call me if any problems arise. Sent with [facility] for Harry June. IV removed, all belongings taken with patient. Pt stable when she left the unit.

## 2022-10-24 ENCOUNTER — TELEPHONE (OUTPATIENT)
Dept: PHARMACY | Age: 72
End: 2022-10-24

## 2022-10-24 LAB
BLOOD CULTURE, ROUTINE: NORMAL
CULTURE, BLOOD 2: NORMAL

## 2022-10-24 NOTE — TELEPHONE ENCOUNTER
Pt in hospital 10/19-10/22 for fall and difficulty walking. INR 1.61 on admit. d/c on tamiflu x 4 d  10/20- 6 mg  10/21- 6 mg     Attempted to call pt, line busy, unable to lvm.

## 2022-10-25 NOTE — TELEPHONE ENCOUNTER
Pt returned call to clinic. States that she is starting to feel better. Scheduled INR check on Thurs 10/27.

## 2022-10-27 ENCOUNTER — ANTI-COAG VISIT (OUTPATIENT)
Dept: PHARMACY | Age: 72
End: 2022-10-27
Payer: MEDICARE

## 2022-10-27 DIAGNOSIS — I48.21 PERMANENT ATRIAL FIBRILLATION (HCC): Primary | ICD-10-CM

## 2022-10-27 LAB — INTERNATIONAL NORMALIZATION RATIO, POC: 3

## 2022-10-27 PROCEDURE — 85610 PROTHROMBIN TIME: CPT

## 2022-10-27 PROCEDURE — 99211 OFF/OP EST MAY X REQ PHY/QHP: CPT

## 2022-10-27 NOTE — PROGRESS NOTES
Ms. Shivani Mitchell is a 80 y.o. y/o female with history of A fib   She presents today for anticoagulation monitoring and adjustment. 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)    Patient Reported Findings:  Yes     No   [x]   []       Patient verifies current dosing regimen as listed - pt take 2.5 mg on Tues and Thurs changed AVS to match---> confirmed   []   [x]       S/S bleeding/bruising/swelling/SOB -denies  []   [x]       Blood in urine or stool - denies   []   [x]       Procedures scheduled in the future at this time - Is being assessed for watchman, will keep notified---> had cortisone shot in knee last fri --> no changes   []   [x]       Missed Dose-  Denies     []   [x]       Extra Dose - denies    []   [x]       Change in medications She continues with Tylenol 1000mg but only as needed --> states that she will take up to q6h---> inc tylenol to 3x/day --> less tylenol, once a day --> no changes, no tylenol recently --> She is taking tylenol for knee pain --> no changes   [x]   []       Change in health/diet/appetite-- normally has high vitamin K diet of canned spinach weekly and collards or broccoli about every other week. Will have some lower vitamin K veggies about 2 times a week such as green beans. --> states that she has been eating liver twice a week, likely why INR has dropped. Asked patient to decrease to once a week --> 1/2 cup of greens yesterday, stopped eating liver---> had spinach yesterday --> per pt loves liver and had it in the last week---> states no liver, had spinach once, no NVD --> same---> no greens, no liver, no NVD --> no changes --> appetite diminished   []   [x]       Change in alcohol use denies  []   [x]       Change in activity  [x]   []       Hospital admission- in hospital 10/19-10/22 for fall and difficulty walking. INR 1.61 on admit.  d/c on tamiflu x 4 d  10/20- 6 mg  10/21- 6 mg   [x]   []       Emergency department visit On 7/6 For Hypertensive urgency/elevated blood pressure. Patient to f/u with PCP. INR 2.55 in ED  []   [x]       Other complaints- states that she is using the pillbox, and she likes it. -> has not been using recently, but wants to restart ---> states she tried to use pillbox, asked to use pillbox and AVS and to cross off each day on AVS to prevent her from missing doses. Concerned for patient's ability to correctly remember recent history. She states that she is getting concerned for her memory. Clinical Outcomes:  Yes     No  []   [x]       Major bleeding event brain bleed 8/2021  []   [x]       Thromboembolic event  Duration of warfarin Therapy: indefinitely  INR Range:  1.8-2.5 -> lower INR goal dt h/o subcortical hemorrhage (2016)    She may be slower to reach steady state with warfarin dosing so consider checking INR about 10 days after dose adjustment. INR is 3 today  Continue 2.5mg on Tuesdays only and 5mg all other days. Asked pt to use paperwork to check off doses and bring back to next appt. --> pt did not bring in AVS but no missed doses.     Recheck INR in 3 weeks on 11/17    Patient consent signed 11/2/21    Referring cardiologist is Dr. Gloria Seip  INR (no units)   Date Value   10/22/2022 1.75 (H)   10/21/2022 1.48 (H)   10/20/2022 1.45 (H)   10/19/2022 1.61 (H)       For Pharmacy Admin Tracking Only    Total # of Interventions Recommended: 0  Total # of Interventions Accepted: 0  Time Spent (min): 15

## 2022-11-17 ENCOUNTER — ANTI-COAG VISIT (OUTPATIENT)
Dept: PHARMACY | Age: 72
End: 2022-11-17
Payer: MEDICARE

## 2022-11-17 DIAGNOSIS — I48.21 PERMANENT ATRIAL FIBRILLATION (HCC): Primary | ICD-10-CM

## 2022-11-17 LAB — INTERNATIONAL NORMALIZATION RATIO, POC: 3.4

## 2022-11-17 PROCEDURE — 99211 OFF/OP EST MAY X REQ PHY/QHP: CPT

## 2022-11-17 PROCEDURE — 85610 PROTHROMBIN TIME: CPT

## 2022-11-17 NOTE — PROGRESS NOTES
Ms. Concepcion Ramirez is a 80 y.o. y/o female with history of A fib   She presents today for anticoagulation monitoring and adjustment. 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)    Patient Reported Findings:  Yes     No   [x]   []       Patient verifies current dosing regimen as listed - pt take 2.5 mg on Tues and Thurs changed AVS to match---> confirmed   []   [x]       S/S bleeding/bruising/swelling/SOB -denies  []   [x]       Blood in urine or stool - denies   []   [x]       Procedures scheduled in the future at this time - Is being assessed for watchman, will keep notified---> had cortisone shot in knee last fri --> no changes   []   [x]       Missed Dose-  Denies     []   [x]       Extra Dose - denies    [x]   []       Change in medications She continues with Tylenol 1000mg but only as needed --> states that she will take up to q6h---> inc tylenol to 3x/day --> less tylenol, once a day --> no changes, no tylenol recently --> She is taking tylenol for knee pain --> has been taking tylenol arthritis   [x]   []       Change in health/diet/appetite-- normally has high vitamin K diet of canned spinach weekly and collards or broccoli about every other week. Will have some lower vitamin K veggies about 2 times a week such as green beans. --> states that she has been eating liver twice a week, likely why INR has dropped. Asked patient to decrease to once a week --> 1/2 cup of greens yesterday, stopped eating liver---> had spinach yesterday --> per pt loves liver and had it in the last week---> states no liver, had spinach once, no NVD --> same---> no greens, no liver, no NVD --> no changes    []   [x]       Change in alcohol use denies  []   [x]       Change in activity  [x]   []       Hospital admission- in hospital 10/19-10/22 for fall and difficulty walking. INR 1.61 on admit.  d/c on tamiflu x 4 d  10/20- 6 mg  10/21- 6 mg   [x]   []       Emergency department visit On 7/6 For Hypertensive urgency/elevated blood pressure. Patient to f/u with PCP. INR 2.55 in ED  []   [x]       Other complaints- states that she is using the pillbox, and she likes it. -> has not been using recently, but wants to restart ---> states she tried to use pillbox, asked to use pillbox and AVS and to cross off each day on AVS to prevent her from missing doses. Concerned for patient's ability to correctly remember recent history. She states that she is getting concerned for her memory. Clinical Outcomes:  Yes     No  []   [x]       Major bleeding event brain bleed 8/2021  []   [x]       Thromboembolic event  Duration of warfarin Therapy: indefinitely  INR Range:  1.8-2.5 -> lower INR goal dt h/o subcortical hemorrhage (2016)    She may be slower to reach steady state with warfarin dosing so consider checking INR about 10 days after dose adjustment. INR is 3.4 today after more tylenol acutely   Hold dose tomorrow then continue 2.5mg on Tuesdays only and 5mg all other days. Asked pt to use paperwork to check off doses and bring back to next appt. --> pt did not bring in AVS but no missed doses.     Recheck INR in 2 weeks on 12/1    Patient consent signed 11/2/21    Referring cardiologist is Dr. Candi Mijares  INR (no units)   Date Value   10/22/2022 1.75 (H)   10/21/2022 1.48 (H)   10/20/2022 1.45 (H)   10/19/2022 1.61 (H)     INR,(POC) (no units)   Date Value   10/27/2022 3.0       For Pharmacy Admin Tracking Only    Intervention Detail: Dose Adjustment: 1, reason: Therapy Optimization  Total # of Interventions Recommended: 1  Total # of Interventions Accepted: 1  Time Spent (min): 15

## 2022-11-17 NOTE — TELEPHONE ENCOUNTER
Warfarin prescription phoned into [facility] to [address] [facility] under Dr. Homero Caban  Warfarin 5 mg tabs  Take 2.5 mg (5 mg x 0.5) every Tue; 5 mg (5 mg x 1) all other days  90 days   2 refills    Harjinder Young, PharmD, [facility]

## 2022-12-01 ENCOUNTER — ANTI-COAG VISIT (OUTPATIENT)
Dept: PHARMACY | Age: 72
End: 2022-12-01
Payer: MEDICARE

## 2022-12-01 DIAGNOSIS — I48.21 PERMANENT ATRIAL FIBRILLATION (HCC): Primary | ICD-10-CM

## 2022-12-01 LAB — INTERNATIONAL NORMALIZATION RATIO, POC: 2.8

## 2022-12-01 PROCEDURE — 85610 PROTHROMBIN TIME: CPT

## 2022-12-01 PROCEDURE — 99211 OFF/OP EST MAY X REQ PHY/QHP: CPT

## 2022-12-01 NOTE — PROGRESS NOTES
Ms. Tavo You is a 80 y.o. y/o female with history of A fib   She presents today for anticoagulation monitoring and adjustment. 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)    Patient Reported Findings:  Yes     No   [x]   []       Patient verifies current dosing regimen as listed - pt take 2.5 mg on Tues and Thurs changed AVS to match---> confirmed   []   [x]       S/S bleeding/bruising/swelling/SOB -denies  []   [x]       Blood in urine or stool - denies   []   [x]       Procedures scheduled in the future at this time - Is being assessed for watchman, will keep notified---> had cortisone shot in knee last fri --> no changes   []   [x]       Missed Dose-  Denies     []   [x]       Extra Dose - denies    [x]   []       Change in medications She continues with Tylenol 1000mg but only as needed --> states that she will take up to q6h---> inc tylenol to 3x/day --> less tylenol, once a day --> no changes, no tylenol recently --> She is taking tylenol for knee pain --> has been taking tylenol arthritis---> no changes    [x]   []       Change in health/diet/appetite-- normally has high vitamin K diet of canned spinach weekly and collards or broccoli about every other week. Will have some lower vitamin K veggies about 2 times a week such as green beans. --> states that she has been eating liver twice a week, likely why INR has dropped. Asked patient to decrease to once a week --> 1/2 cup of greens yesterday, stopped eating liver---> had spinach yesterday --> per pt loves liver and had it in the last week---> states no liver, had spinach once, no NVD --> same---> no greens, no liver, no NVD --> no changes---> normal greens, no NVD    []   [x]       Change in alcohol use denies  []   [x]       Change in activity  [x]   []       Hospital admission- in hospital 10/19-10/22 for fall and difficulty walking. INR 1.61 on admit.  d/c on tamiflu x 4 d  10/20- 6 mg  10/21- 6 mg   [x]   []       Emergency department visit On 7/6 For Hypertensive urgency/elevated blood pressure. Patient to f/u with PCP. INR 2.55 in ED  []   [x]       Other complaints- states that she is using the pillbox, and she likes it. -> has not been using recently, but wants to restart ---> states she tried to use pillbox, asked to use pillbox and AVS and to cross off each day on AVS to prevent her from missing doses. Concerned for patient's ability to correctly remember recent history. She states that she is getting concerned for her memory. Clinical Outcomes:  Yes     No  []   [x]       Major bleeding event brain bleed 8/2021  []   [x]       Thromboembolic event  Duration of warfarin Therapy: indefinitely  INR Range:  1.8-2.5 -> lower INR goal dt h/o subcortical hemorrhage (2016)    She may be slower to reach steady state with warfarin dosing so consider checking INR about 10 days after dose adjustment. INR is 2.8 today   Continue 2.5mg on Tuesdays only and 5mg all other days. Asked pt to use paperwork to check off doses and bring back to next appt.    Recheck INR in 3 weeks on 12/22    Patient consent signed 11/2/21    Referring cardiologist is Dr. Gloria Seip  INR (no units)   Date Value   10/22/2022 1.75 (H)   10/21/2022 1.48 (H)   10/20/2022 1.45 (H)   10/19/2022 1.61 (H)     INR,(POC) (no units)   Date Value   12/01/2022 2.8   11/17/2022 3.4   10/27/2022 3.0       For Pharmacy Admin Tracking Only  Total # of Interventions Recommended: 0  Total # of Interventions Accepted: 0  Time Spent (min): 15

## 2022-12-22 ENCOUNTER — ANTI-COAG VISIT (OUTPATIENT)
Dept: PHARMACY | Age: 72
End: 2022-12-22
Payer: MEDICARE

## 2022-12-22 DIAGNOSIS — I48.21 PERMANENT ATRIAL FIBRILLATION (HCC): Primary | ICD-10-CM

## 2022-12-22 LAB — INTERNATIONAL NORMALIZATION RATIO, POC: 2

## 2022-12-22 PROCEDURE — 85610 PROTHROMBIN TIME: CPT

## 2022-12-22 PROCEDURE — 99211 OFF/OP EST MAY X REQ PHY/QHP: CPT

## 2023-01-19 ENCOUNTER — TELEPHONE (OUTPATIENT)
Dept: PHARMACY | Age: 73
End: 2023-01-19

## 2023-01-24 ENCOUNTER — TELEPHONE (OUTPATIENT)
Dept: PHARMACY | Age: 73
End: 2023-01-24

## 2023-01-24 ENCOUNTER — ANTI-COAG VISIT (OUTPATIENT)
Dept: PHARMACY | Age: 73
End: 2023-01-24
Payer: MEDICARE

## 2023-01-24 DIAGNOSIS — I48.21 PERMANENT ATRIAL FIBRILLATION (HCC): Primary | ICD-10-CM

## 2023-01-24 LAB — INTERNATIONAL NORMALIZATION RATIO, POC: 3

## 2023-01-24 PROCEDURE — 99211 OFF/OP EST MAY X REQ PHY/QHP: CPT

## 2023-01-24 PROCEDURE — 85610 PROTHROMBIN TIME: CPT

## 2023-01-24 NOTE — PROGRESS NOTES
Ms. Earlene Patel is a 71 y.o. y/o female with history of A fib   She presents today for anticoagulation monitoring and adjustment. 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)    Patient Reported Findings:  Yes     No   [x]   []       Patient verifies current dosing regimen as listed - pt take 2.5 mg on Tues and Thurs changed AVS to match---> confirmed   []   [x]       S/S bleeding/bruising/swelling/SOB -denies  []   [x]       Blood in urine or stool - denies   []   [x]       Procedures scheduled in the future at this time - Is being assessed for watchman, will keep notified---> had cortisone shot in knee last fri --> no changes   []   [x]       Missed Dose-  Denies     []   [x]       Extra Dose - denies    []   [x]       Change in medications She continues with Tylenol 1000mg but only as needed --> states that she will take up to q6h---> inc tylenol to 3x/day --> less tylenol, once a day --> no changes, no tylenol recently --> She is taking tylenol for knee pain --> has been taking tylenol arthritis---> no changes    []   [x]       Change in health/diet/appetite-- normally has high vitamin K diet of canned spinach weekly and collards or broccoli about every other week. Will have some lower vitamin K veggies about 2 times a week such as green beans. --> states that she has been eating liver twice a week, likely why INR has dropped. Asked patient to decrease to once a week --> 1/2 cup of greens yesterday, stopped eating liver---> had spinach yesterday --> per pt loves liver and had it in the last week---> states no liver, had spinach once, no NVD --> same---> no greens, no liver, no NVD --> no changes---> normal greens, no NVD    []   [x]       Change in alcohol use denies  []   [x]       Change in activity  [x]   []       Hospital admission- in hospital 10/19-10/22 for fall and difficulty walking. INR 1.61 on admit.  d/c on tamiflu x 4 d  10/20- 6 mg  10/21- 6 mg   [x]   []       Emergency department visit On 7/6 For Hypertensive urgency/elevated blood pressure. Patient to f/u with PCP. INR 2.55 in ED  []   [x]       Other complaints- states that she is using the pillbox, and she likes it. -> has not been using recently, but wants to restart ---> states she tried to use pillbox, asked to use pillbox and AVS and to cross off each day on AVS to prevent her from missing doses. Concerned for patient's ability to correctly remember recent history. She states that she is getting concerned for her memory. Clinical Outcomes:  Yes     No  []   [x]       Major bleeding event brain bleed 8/2021  []   [x]       Thromboembolic event  Duration of warfarin Therapy: indefinitely  INR Range:  1.8-2.5 -> lower INR goal dt h/o subcortical hemorrhage (2016)    She may be slower to reach steady state with warfarin dosing so consider checking INR about 10 days after dose adjustment. INR is 3 today   Continue 2.5mg on Tuesdays only and 5mg all other days. Asked pt to use paperwork to check off doses and bring back to next appt.    Refilled warfarin at opp  Recheck INR in 3 weeks on 2/14    Patient consent signed 1/24/23    Referring cardiologist is Dr. Simin Nuñez  INR (no units)   Date Value   10/22/2022 1.75 (H)   10/21/2022 1.48 (H)   10/20/2022 1.45 (H)   10/19/2022 1.61 (H)     INR,(POC) (no units)   Date Value   12/22/2022 2.0   12/01/2022 2.8   11/17/2022 3.4   10/27/2022 3.0       For Pharmacy Admin Tracking Only    Intervention Detail: Refill(s) Provided  Total # of Interventions Recommended: 1  Total # of Interventions Accepted: 1  Time Spent (min): 15

## 2023-02-14 ENCOUNTER — ANTI-COAG VISIT (OUTPATIENT)
Dept: PHARMACY | Age: 73
End: 2023-02-14
Payer: MEDICARE

## 2023-02-14 ENCOUNTER — OFFICE VISIT (OUTPATIENT)
Dept: FAMILY MEDICINE CLINIC | Age: 73
End: 2023-02-14
Payer: MEDICARE

## 2023-02-14 VITALS
SYSTOLIC BLOOD PRESSURE: 194 MMHG | HEART RATE: 99 BPM | HEIGHT: 67 IN | TEMPERATURE: 97.4 F | OXYGEN SATURATION: 99 % | BODY MASS INDEX: 28.66 KG/M2 | DIASTOLIC BLOOD PRESSURE: 122 MMHG | WEIGHT: 182.6 LBS

## 2023-02-14 DIAGNOSIS — I50.32 CHRONIC DIASTOLIC (CONGESTIVE) HEART FAILURE (HCC): ICD-10-CM

## 2023-02-14 DIAGNOSIS — I10 PRIMARY HYPERTENSION: Primary | ICD-10-CM

## 2023-02-14 DIAGNOSIS — Z79.01 LONG TERM (CURRENT) USE OF ANTICOAGULANTS: ICD-10-CM

## 2023-02-14 DIAGNOSIS — I48.21 PERMANENT ATRIAL FIBRILLATION (HCC): Primary | ICD-10-CM

## 2023-02-14 DIAGNOSIS — I48.21 PERMANENT ATRIAL FIBRILLATION (HCC): ICD-10-CM

## 2023-02-14 DIAGNOSIS — E78.2 MIXED HYPERLIPIDEMIA: ICD-10-CM

## 2023-02-14 DIAGNOSIS — I25.83 CORONARY ARTERY DISEASE DUE TO LIPID RICH PLAQUE: ICD-10-CM

## 2023-02-14 DIAGNOSIS — I25.10 CORONARY ARTERY DISEASE DUE TO LIPID RICH PLAQUE: ICD-10-CM

## 2023-02-14 PROBLEM — R00.1 BRADYCARDIA: Status: RESOLVED | Noted: 2020-05-15 | Resolved: 2023-02-14

## 2023-02-14 PROBLEM — S06.5XAA ACUTE SUBDURAL HEMATOMA (HCC): Status: RESOLVED | Noted: 2021-08-12 | Resolved: 2023-02-14

## 2023-02-14 PROBLEM — E66.3 OVERWEIGHT (BMI 25.0-29.9): Status: RESOLVED | Noted: 2022-10-20 | Resolved: 2023-02-14

## 2023-02-14 PROBLEM — J10.1 INFLUENZA A (H1N1): Status: RESOLVED | Noted: 2022-10-21 | Resolved: 2023-02-14

## 2023-02-14 PROBLEM — E83.42 HYPOMAGNESEMIA: Status: RESOLVED | Noted: 2021-02-25 | Resolved: 2023-02-14

## 2023-02-14 PROBLEM — S06.5XAA SUBDURAL HEMATOMA (HCC): Status: RESOLVED | Noted: 2021-09-02 | Resolved: 2023-02-14

## 2023-02-14 PROBLEM — N17.9 AKI (ACUTE KIDNEY INJURY) (HCC): Status: RESOLVED | Noted: 2022-10-20 | Resolved: 2023-02-14

## 2023-02-14 PROBLEM — R51.9 ACUTE INTRACTABLE HEADACHE: Status: RESOLVED | Noted: 2022-06-19 | Resolved: 2023-02-14

## 2023-02-14 PROBLEM — E87.6 HYPOKALEMIA: Status: RESOLVED | Noted: 2021-02-25 | Resolved: 2023-02-14

## 2023-02-14 PROBLEM — R26.2 UNABLE TO AMBULATE: Status: RESOLVED | Noted: 2022-10-19 | Resolved: 2023-02-14

## 2023-02-14 PROBLEM — A41.9 SEPSIS (HCC): Status: RESOLVED | Noted: 2022-10-20 | Resolved: 2023-02-14

## 2023-02-14 LAB — INTERNATIONAL NORMALIZATION RATIO, POC: 3.6

## 2023-02-14 PROCEDURE — 85610 PROTHROMBIN TIME: CPT

## 2023-02-14 PROCEDURE — 3080F DIAST BP >= 90 MM HG: CPT | Performed by: FAMILY MEDICINE

## 2023-02-14 PROCEDURE — 99212 OFFICE O/P EST SF 10 MIN: CPT

## 2023-02-14 PROCEDURE — 1123F ACP DISCUSS/DSCN MKR DOCD: CPT | Performed by: FAMILY MEDICINE

## 2023-02-14 PROCEDURE — 3077F SYST BP >= 140 MM HG: CPT | Performed by: FAMILY MEDICINE

## 2023-02-14 PROCEDURE — 99204 OFFICE O/P NEW MOD 45 MIN: CPT | Performed by: FAMILY MEDICINE

## 2023-02-14 RX ORDER — CARVEDILOL 3.12 MG/1
3.12 TABLET ORAL 2 TIMES DAILY WITH MEALS
Qty: 180 TABLET | Refills: 0 | Status: SHIPPED | OUTPATIENT
Start: 2023-02-14

## 2023-02-14 RX ORDER — SPIRONOLACTONE 50 MG/1
50 TABLET, FILM COATED ORAL 2 TIMES DAILY
Qty: 180 TABLET | Refills: 0 | Status: SHIPPED | OUTPATIENT
Start: 2023-02-14

## 2023-02-14 RX ORDER — ATORVASTATIN CALCIUM 80 MG/1
80 TABLET, FILM COATED ORAL DAILY
Qty: 90 TABLET | Refills: 0 | Status: SHIPPED | OUTPATIENT
Start: 2023-02-14

## 2023-02-14 RX ORDER — LOSARTAN POTASSIUM 100 MG/1
50 TABLET ORAL DAILY
Qty: 45 TABLET | Refills: 0 | Status: SHIPPED | OUTPATIENT
Start: 2023-02-14

## 2023-02-14 RX ORDER — FUROSEMIDE 20 MG/1
TABLET ORAL
Qty: 90 TABLET | Refills: 0 | Status: SHIPPED | OUTPATIENT
Start: 2023-02-14

## 2023-02-14 RX ORDER — NIFEDIPINE 90 MG/1
90 TABLET, EXTENDED RELEASE ORAL DAILY
Qty: 90 TABLET | Refills: 0 | Status: SHIPPED | OUTPATIENT
Start: 2023-02-14

## 2023-02-14 RX ORDER — POTASSIUM CHLORIDE 20 MEQ/1
20 TABLET, EXTENDED RELEASE ORAL DAILY
Qty: 90 TABLET | Refills: 0 | Status: SHIPPED | OUTPATIENT
Start: 2023-02-14

## 2023-02-14 SDOH — ECONOMIC STABILITY: HOUSING INSECURITY
IN THE LAST 12 MONTHS, WAS THERE A TIME WHEN YOU DID NOT HAVE A STEADY PLACE TO SLEEP OR SLEPT IN A SHELTER (INCLUDING NOW)?: NO

## 2023-02-14 SDOH — ECONOMIC STABILITY: FOOD INSECURITY: WITHIN THE PAST 12 MONTHS, YOU WORRIED THAT YOUR FOOD WOULD RUN OUT BEFORE YOU GOT MONEY TO BUY MORE.: NEVER TRUE

## 2023-02-14 SDOH — ECONOMIC STABILITY: FOOD INSECURITY: WITHIN THE PAST 12 MONTHS, THE FOOD YOU BOUGHT JUST DIDN'T LAST AND YOU DIDN'T HAVE MONEY TO GET MORE.: NEVER TRUE

## 2023-02-14 SDOH — ECONOMIC STABILITY: INCOME INSECURITY: HOW HARD IS IT FOR YOU TO PAY FOR THE VERY BASICS LIKE FOOD, HOUSING, MEDICAL CARE, AND HEATING?: NOT HARD AT ALL

## 2023-02-14 ASSESSMENT — PATIENT HEALTH QUESTIONNAIRE - PHQ9
SUM OF ALL RESPONSES TO PHQ QUESTIONS 1-9: 0
SUM OF ALL RESPONSES TO PHQ9 QUESTIONS 1 & 2: 0
SUM OF ALL RESPONSES TO PHQ QUESTIONS 1-9: 0
2. FEELING DOWN, DEPRESSED OR HOPELESS: 0
1. LITTLE INTEREST OR PLEASURE IN DOING THINGS: 0

## 2023-02-14 NOTE — PROGRESS NOTES
Ms. Sunil Lewis is a 71 y.o. y/o female with history of A fib   She presents today for anticoagulation monitoring and adjustment. 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)    Patient Reported Findings:  Yes     No   [x]   []       Patient verifies current dosing regimen as listed - pt take 2.5 mg on Tues and Thurs changed AVS to match---> confirmed   []   [x]       S/S bleeding/bruising/swelling/SOB -denies  []   [x]       Blood in urine or stool - denies   []   [x]       Procedures scheduled in the future at this time - Is being assessed for watchman, will keep notified---> had cortisone shot in knee last fri --> no changes   []   [x]       Missed Dose-  Denies     []   [x]       Extra Dose - denies    []   [x]       Change in medications She continues with Tylenol 1000mg but only as needed --> states that she will take up to q6h---> inc tylenol to 3x/day --> less tylenol, once a day --> no changes, no tylenol recently --> She is taking tylenol for knee pain --> has been taking tylenol arthritis---> no changes    []   [x]       Change in health/diet/appetite-- normally has high vitamin K diet of canned spinach weekly and collards or broccoli about every other week. Will have some lower vitamin K veggies about 2 times a week such as green beans. --> states that she has been eating liver twice a week, likely why INR has dropped. Asked patient to decrease to once a week --> 1/2 cup of greens yesterday, stopped eating liver---> had spinach yesterday --> per pt loves liver and had it in the last week---> states no liver, had spinach once, no NVD --> same---> no greens, no liver, no NVD --> no changes---> normal greens, no NVD ---> greens Sunday, no NVD   []   [x]       Change in alcohol use denies  []   [x]       Change in activity  [x]   []       Hospital admission- in hospital 10/19-10/22 for fall and difficulty walking. INR 1.61 on admit.  d/c on tamiflu x 4 d  10/20- 6 mg  10/21- 6 mg   [x]   [] Emergency department visit On 7/6 For Hypertensive urgency/elevated blood pressure. Patient to f/u with PCP. INR 2.55 in ED  []   [x]       Other complaints- states that she is using the pillbox, and she likes it. -> has not been using recently, but wants to restart ---> states she tried to use pillbox, asked to use pillbox and AVS and to cross off each day on AVS to prevent her from missing doses. Concerned for patient's ability to correctly remember recent history. She states that she is getting concerned for her memory. Clinical Outcomes:  Yes     No  []   [x]       Major bleeding event brain bleed 8/2021  []   [x]       Thromboembolic event  Duration of warfarin Therapy: indefinitely  INR Range:  1.8-2.5 -> lower INR goal dt h/o subcortical hemorrhage (2016)    She may be slower to reach steady state with warfarin dosing so consider checking INR about 10 days after dose adjustment. INR is 3.6 today despite denying changes. Since goal is lower dt hx hemorrhage, will dec dose. Hold tomorrow then decrease dose to 2.5mg on Tuesday and Friday and 5mg all other days (7.7%). Asked pt to use paperwork to check off doses and bring back to next appt.    Recheck INR in 2 weeks on 2/28    Patient consent signed 1/24/23    Referring cardiologist is Dr. Ce Hedrick  INR (no units)   Date Value   10/22/2022 1.75 (H)   10/21/2022 1.48 (H)   10/20/2022 1.45 (H)   10/19/2022 1.61 (H)     INR,(POC) (no units)   Date Value   02/14/2023 3.6   01/24/2023 3.0   12/22/2022 2.0   12/01/2022 2.8     For Pharmacy Admin Tracking Only    Intervention Detail: Dose Adjustment: 1, reason: Therapy Optimization  Total # of Interventions Recommended: 1  Total # of Interventions Accepted: 1  Time Spent (min): 15

## 2023-02-20 ENCOUNTER — TELEPHONE (OUTPATIENT)
Dept: FAMILY MEDICINE CLINIC | Age: 73
End: 2023-02-20

## 2023-02-20 NOTE — TELEPHONE ENCOUNTER
Rcvd fax from [facility] - Is Spironolactone once a day or bid? Both sets of directions were on the script.

## 2023-02-24 ENCOUNTER — OFFICE VISIT (OUTPATIENT)
Dept: CARDIOLOGY CLINIC | Age: 73
End: 2023-02-24
Payer: MEDICARE

## 2023-02-24 ENCOUNTER — TELEPHONE (OUTPATIENT)
Dept: CARDIOLOGY CLINIC | Age: 73
End: 2023-02-24

## 2023-02-24 VITALS
OXYGEN SATURATION: 99 % | HEART RATE: 57 BPM | WEIGHT: 179.5 LBS | SYSTOLIC BLOOD PRESSURE: 182 MMHG | DIASTOLIC BLOOD PRESSURE: 116 MMHG | BODY MASS INDEX: 28.17 KG/M2 | HEIGHT: 67 IN

## 2023-02-24 DIAGNOSIS — I25.10 CORONARY ARTERY DISEASE DUE TO LIPID RICH PLAQUE: Primary | ICD-10-CM

## 2023-02-24 DIAGNOSIS — I25.83 CORONARY ARTERY DISEASE DUE TO LIPID RICH PLAQUE: Primary | ICD-10-CM

## 2023-02-24 DIAGNOSIS — I10 PRIMARY HYPERTENSION: ICD-10-CM

## 2023-02-24 DIAGNOSIS — Z91.199 HISTORY OF NONCOMPLIANCE WITH MEDICAL TREATMENT: ICD-10-CM

## 2023-02-24 DIAGNOSIS — E78.2 MIXED HYPERLIPIDEMIA: ICD-10-CM

## 2023-02-24 DIAGNOSIS — I50.32 CHRONIC DIASTOLIC (CONGESTIVE) HEART FAILURE (HCC): ICD-10-CM

## 2023-02-24 PROCEDURE — 3078F DIAST BP <80 MM HG: CPT | Performed by: INTERNAL MEDICINE

## 2023-02-24 PROCEDURE — 1123F ACP DISCUSS/DSCN MKR DOCD: CPT | Performed by: INTERNAL MEDICINE

## 2023-02-24 PROCEDURE — 99214 OFFICE O/P EST MOD 30 MIN: CPT | Performed by: INTERNAL MEDICINE

## 2023-02-24 PROCEDURE — 3074F SYST BP LT 130 MM HG: CPT | Performed by: INTERNAL MEDICINE

## 2023-02-24 RX ORDER — LOSARTAN POTASSIUM 100 MG/1
50 TABLET ORAL DAILY
Qty: 45 TABLET | Refills: 3 | Status: SHIPPED | OUTPATIENT
Start: 2023-02-24 | End: 2023-02-28

## 2023-02-24 RX ORDER — CARVEDILOL 3.12 MG/1
3.12 TABLET ORAL 2 TIMES DAILY WITH MEALS
Qty: 180 TABLET | Refills: 3 | Status: ON HOLD | OUTPATIENT
Start: 2023-02-24 | End: 2023-03-11 | Stop reason: HOSPADM

## 2023-02-24 ASSESSMENT — ENCOUNTER SYMPTOMS
SHORTNESS OF BREATH: 1
BLOOD IN STOOL: 0
PHOTOPHOBIA: 0
ABDOMINAL PAIN: 0
COUGH: 0

## 2023-02-24 NOTE — PROGRESS NOTES
[facility]  2/24/23  Referring: Dr. Beyer    REASON FOR CONSULT/CHIEF COMPLAINT/HPI     Reason for visit/ Chief complaint  Follow up  Atrial fibrillation   HPI Emilee Paulson is a 72 y.o. here as a follow up for management of CAD, hypertension, hyperlipidemia, chf.  He has atrial fibrillation. Previous hemorrhagic stroke in  2018. Previously refused watchman    Linnea older sister and daughter have hypertension    Today she reports the last time she took her medications as prescribed was November. Reports she cant walk if her blood pressure in in the 130s, because this is to low for her.  Has noticed her   Exercise limited by orthopedic knee pain. No chest pain, shortness of breath palpitations or dizziness.    Currently not taking her medications     HISTORY/ALLERGIES/ROS     MedHx:   has a past medical history of Acute cerebrovascular accident (CVA) (HCC), LAST (acute kidney injury) (HCC), Atrial fibrillation (HCC), Bell palsy, CAD (coronary artery disease), Cerebral artery occlusion with cerebral infarction (HCC), Chronic diastolic CHF (congestive heart failure) (HCC), CVA (cerebral vascular accident) (HCC), Hypertension, Intracranial hemorrhage (HCC), Mixed hyperlipidemia, Nonintractable headache, Nontraumatic subcortical hemorrhage of cerebral hemisphere (HCC), Primary osteoarthritis of right knee, Sudden visual loss of left eye, Thyroid nodule, and TIA involving right internal carotid artery.  SurgHx:  has a past surgical history that includes Cardiac surgery; Tubal ligation (Right, Torn Rotator Cuff); Coronary angioplasty with stent; and Colonoscopy.   SocHx:   reports that she has never smoked. She has never used smokeless tobacco. She reports that she does not drink alcohol and does not use drugs.   FamHx: Family history of hypertension   Allergies: Clonidine, Codeine, Hydrocodone-acetaminophen, Lisinopril, Tramadol, and Percocet [oxycodone-acetaminophen]   ROS:  Review of Systems  Constitutional:  Positive for fatigue. Negative for activity change, diaphoresis and fever. HENT:  Negative for congestion and ear discharge. Eyes:  Negative for photophobia and visual disturbance. Respiratory:  Positive for shortness of breath. Negative for cough and chest tightness. Cardiovascular:  Negative for chest pain and palpitations. Gastrointestinal:  Negative for abdominal pain and blood in stool. Endocrine: Negative for cold intolerance and polydipsia. Genitourinary:  Negative for difficulty urinating and flank pain. Musculoskeletal:  Positive for arthralgias and myalgias. Skin:  Negative for rash and wound. Allergic/Immunologic: Negative for environmental allergies and immunocompromised state. Neurological:  Negative for headaches. Hematological:  Negative for adenopathy. Does not bruise/bleed easily. Psychiatric/Behavioral:  Negative for confusion. The patient is not hyperactive. MEDICATIONS      Prior to Admission medications    Medication Sig Start Date End Date Taking?  Authorizing Provider   ASPIRIN 81 PO Take by mouth   Yes Historical Provider, MD   atorvastatin (LIPITOR) 80 MG tablet Take 1 tablet by mouth daily 2/14/23  Yes Mike Jones MD   furosemide (LASIX) 20 MG tablet TAKE 1 TABLET BY MOUTH once DAILY 2/14/23  Yes Mike Jones MD   losartan (COZAAR) 100 MG tablet Take 0.5 tablets by mouth daily 2/14/23  Yes Mike Jones MD   NIFEdipine (PROCARDIA XL) 90 MG extended release tablet Take 1 tablet by mouth daily 2/14/23  Yes Mike Jones MD   potassium chloride (KLOR-CON M) 20 MEQ extended release tablet Take 1 tablet by mouth daily 2/14/23  Yes Mike Jones MD   spironolactone (ALDACTONE) 50 MG tablet Take 1 tablet by mouth 2 times daily TAKE 1 TABLET BY MOUTH DAILY 2/14/23  Yes Mike Jones MD   carvedilol (COREG) 3.125 MG tablet Take 1 tablet by mouth 2 times daily (with meals) 2/14/23  Yes Mike Jones MD   warfarin (COUMADIN) 5 MG tablet Take 5 mg by mouth daily 5 mg everyday but on tuesdays 0.5 tablet   Yes Historical Provider, MD   acetaminophen (TYLENOL) 500 MG tablet Take 2 tablets by mouth every 6 hours as needed for Pain 1/28/21  Yes Chandler Nicholson MD   Calcium Carb-Cholecalciferol (CALCIUM + D3) 600-200 MG-UNIT TABS tablet Take 1 tablet by mouth 2 times daily 10/15/20  Yes Chandler Nicholson MD       PHYSICAL EXAM        Vitals:    02/24/23 1441   BP: (!) 190/130   Pulse:    SpO2:       Weight: 179 lb 8 oz (81.4 kg)     Gen Alert, cooperative, no distress Heart  Irregularly irregular, no murmur   Head Normocephalic, atraumatic, no abnormalities Abd  Soft, NT, +BS, no mass, no OM   Eyes PERRLA, conj/corn clear Ext  Ext nl, AT, no C/C, +1 edema   Nose Nares normal, no drain age, Non-tender Pulse 2+ and symmetric   Throat Lips, mucosa, tongue normal Skin Color/text/turg nl, no rash/lesions   Neck S/S, TM, NT, no bruit Psych Nl mood and affect   Lung no CTA-B, unlabored, no DTP     Ch wall NT, no deform       LABS and Imaging     Relevant and available CV data reviewed    TTE 2/8/18   Summary   Normal left ventricle size and systolic function with an estimated ejection   fraction of 65-70%. No regional wall motion abnormalities are noted. There is mild to moderate concentric left ventricular hypertrophy. Diastolic filling parameters suggests grade III diastolic dysfunction . Moderate mitral regurgitation is present. There is moderate tricuspid regurgitation with RVSP estimated at 43 mmHg. At least moderate biatrial dilatation. In comparison to a study dated 6/15/17, there is no significant change. MEGHAN 3/11/19   Summary   Normal left ventricle size and systolic function with an estimated ejection   fraction of 60%. No regional wall motion abnormalities are noted. There is moderate concentric left ventricular hypertrophy. The left atrium is dilated.  There is no evidence of mass or thrombus in the left atrium or appendage. There is no evidence of mass or thrombus in the left atrium or appendage. The right atrium is dilated. SPECT 9/22/15  Summary    -Small sized anterior completely reversible defect consistent with ischemia  in the territory of the mid and distal LAD -Normal LV function. [facility] 9/23/15:   LVEDP - 12. LVgram - severe LVH with EF 70%, enlarged AO root consistent with htn  Cors: LM - nl, LAD - 20% prox, 30%mid, patent stent, distal irreg, LCX - 20% mid,     Holter: 1/30/18  Afib, rate not controlled, bradycardia at nighttime     EKG 9/8/2020   Atrial fibrillaiton HR 87    ASSESSMENT AND PLAN     1. Permanent Non-valvular Atrial Fibrillation  - [id number] score: 7, on Warfarin   - HASBLED 6  - Hx of hemorrhagic stroke in 2018  - Recommendation for watchman per Dr. Latoya Ruano  - didn't want Jeovanny Portia  - continue warfarin, accepts risk of recurrent bleeding and death    2. Chronic diastolic heart failure (NYHA Class II)  - Appears compensated   - EF 60% by 3/2019 MEGHAN, 65-70% by 2/2018 TTE  Plan:  - continue current meds    3. Primary Hypertension  -182/116. Personally rechecked by me  - not to goal, she is not taking her medications  Plan:  - patient instructed to take blood pressure medication when she gets home  - check blood pressure daily and call us    4. CAD   - No ischemia by 9/22/15 SPECT MPI  - PCI to LAD in 2000, patent by 9/2015 [facility], nonobstructive disease  -stable  Plan:  -cozaar 50 mg daily  - coreg 3.125 bid  - lipitor 80 mg daily  - lasix 20    5. Mixed Hyperlipidemia  - 1/2020:  TG 83 HDL 50 LDL 95  - 2/14/23 tc 201 hdl 68 ldl 118 tri 73  -stable  Plan  - On Atorvastatin 80 mg    6. Medical noncompliance  Plan  - referred to [facility] for help with medicines- faxed to [phone]      Follow Up:   End of March    Dr. Altaf Delgado:  This note was scribed in the presence of Dr. Brynda Heimlich, DO by Chula Ba RN        Physician Attestation  The scribe for and in the presence of morenita Woods DO). The scribe Chula Ba RN  may have prepopulated components of this note with my historical  intellectual property under my direct supervision. Any additions to this intellectual property were performed in my presence and at my direction.   Furthermore, the content and accuracy of this note have been reviewed by me John Woods DO).  2/24/2023 2:48 PM

## 2023-02-24 NOTE — PATIENT INSTRUCTIONS
Restart coreg and losartan  Continue water pill  Get blood pressure check same day as INR 2/28  Follow up end of march  Home health care

## 2023-02-24 NOTE — LETTER
March 8, 2023      Romina Bojorquez, [address] [facility] [address]       Patient: Luisa Ewing   MR Number: [id number]   YOB: 1950   Date of Visit: 2/24/2023       Dear Romina Bojorquez:    Thank you for referring Luisa Ewing to me for evaluation/treatment. Below are the relevant portions of my assessment and plan of care. If you have questions, please do not hesitate to call me. I look forward to following Nate Terrell along with you.     Sincerely,        Deborrah Pain, DO

## 2023-02-28 ENCOUNTER — TELEPHONE (OUTPATIENT)
Dept: CARDIOLOGY CLINIC | Age: 73
End: 2023-02-28

## 2023-02-28 ENCOUNTER — NURSE ONLY (OUTPATIENT)
Dept: CARDIOLOGY CLINIC | Age: 73
End: 2023-02-28

## 2023-02-28 ENCOUNTER — ANTI-COAG VISIT (OUTPATIENT)
Dept: PHARMACY | Age: 73
End: 2023-02-28
Payer: MEDICARE

## 2023-02-28 VITALS — SYSTOLIC BLOOD PRESSURE: 178 MMHG | DIASTOLIC BLOOD PRESSURE: 120 MMHG

## 2023-02-28 DIAGNOSIS — I10 PRIMARY HYPERTENSION: Primary | ICD-10-CM

## 2023-02-28 DIAGNOSIS — I10 PRIMARY HYPERTENSION: ICD-10-CM

## 2023-02-28 DIAGNOSIS — I48.21 PERMANENT ATRIAL FIBRILLATION (HCC): Primary | ICD-10-CM

## 2023-02-28 LAB — INTERNATIONAL NORMALIZATION RATIO, POC: 2.8

## 2023-02-28 PROCEDURE — 85610 PROTHROMBIN TIME: CPT

## 2023-02-28 PROCEDURE — 99211 OFF/OP EST MAY X REQ PHY/QHP: CPT

## 2023-02-28 RX ORDER — LOSARTAN POTASSIUM 100 MG/1
100 TABLET ORAL DAILY
Qty: 90 TABLET | Refills: 3 | Status: SHIPPED | OUTPATIENT
Start: 2023-02-28

## 2023-02-28 NOTE — TELEPHONE ENCOUNTER
Please call and tell her to double her cozaar to 100 mg daily and monitor her blood pressure at home,  if she cannot check at home, the home health nurse can check it when she comes.

## 2023-02-28 NOTE — TELEPHONE ENCOUNTER
Information faxed to [facility] for assistance with medications

## 2023-02-28 NOTE — TELEPHONE ENCOUNTER
March 31 at 1230 please, and thank you
Phoned pt. alhaji to cb and schedule appt.  DKW 03/31/23 at 12:30
Pt needs to be seen by DKW end of march. Please advise to date and time we can schedule.  Thank you
Pt scheduled 03/31/23 at 12:30 with JUDIW mff
17.45

## 2023-02-28 NOTE — PROGRESS NOTES
Ms. London Phelps is a 71 y.o. y/o female with history of A fib   She presents today for anticoagulation monitoring and adjustment. 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)    Patient Reported Findings:  Yes     No   [x]   []       Patient verifies current dosing regimen as listed - pt take 2.5 mg on Tues and Thurs changed AVS to match---> confirmed   []   [x]       S/S bleeding/bruising/swelling/SOB -denies  []   [x]       Blood in urine or stool - denies   []   [x]       Procedures scheduled in the future at this time - Is being assessed for watchman, will keep notified---> had cortisone shot in knee last fri --> no changes   []   [x]       Missed Dose-  Denies     []   [x]       Extra Dose - denies    []   [x]       Change in medications She continues with Tylenol 1000mg but only as needed --> states that she will take up to q6h---> inc tylenol to 3x/day --> less tylenol, once a day --> no changes, no tylenol recently --> She is taking tylenol for knee pain --> has been taking tylenol arthritis---> no changes---> restarted coreg and losartan, adjusted water pill    []   [x]       Change in health/diet/appetite-- normally has high vitamin K diet of canned spinach weekly and collards or broccoli about every other week. Will have some lower vitamin K veggies about 2 times a week such as green beans. --> states that she has been eating liver twice a week, likely why INR has dropped. Asked patient to decrease to once a week --> 1/2 cup of greens yesterday, stopped eating liver---> had spinach yesterday --> per pt loves liver and had it in the last week---> states no liver, had spinach once, no NVD --> same---> no greens, no liver, no NVD --> no changes---> normal greens, no NVD ---> greens Sunday, no NVD ---> no greens, no NVD  []   [x]       Change in alcohol use denies  []   [x]       Change in activity  [x]   []       Hospital admission- in hospital 10/19-10/22 for fall and difficulty walking.  INR 1.61 on admit. d/c on tamiflu x 4 d  10/20- 6 mg  10/21- 6 mg   [x]   []       Emergency department visit On 7/6 For Hypertensive urgency/elevated blood pressure. Patient to f/u with PCP. INR 2.55 in ED  []   [x]       Other complaints- states that she is using the pillbox, and she likes it. -> has not been using recently, but wants to restart ---> states she tried to use pillbox, asked to use pillbox and AVS and to cross off each day on AVS to prevent her from missing doses. Concerned for patient's ability to correctly remember recent history. She states that she is getting concerned for her memory. Clinical Outcomes:  Yes     No  []   [x]       Major bleeding event brain bleed 8/2021  []   [x]       Thromboembolic event  Duration of warfarin Therapy: indefinitely  INR Range:  1.8-2.5 -> lower INR goal dt h/o subcortical hemorrhage (2016)    She may be slower to reach steady state with warfarin dosing so consider checking INR about 10 days after dose adjustment. INR is 2.8 today after decreasing dose but then not eating greens on Sunday like normal. Will add them back in. Take 2.5mg on Tuesday and Friday and 5mg all other days. Asked pt to use paperwork to check off doses and bring back to next appt.    Recheck INR in 2 weeks on 3/13    Patient consent signed 1/24/23    Referring cardiologist is Dr. Jennifer Obrien  INR (no units)   Date Value   10/22/2022 1.75 (H)   10/21/2022 1.48 (H)   10/20/2022 1.45 (H)   10/19/2022 1.61 (H)     INR,(POC) (no units)   Date Value   02/28/2023 2.8   02/14/2023 3.6   01/24/2023 3.0   12/22/2022 2.0     For Pharmacy Admin Tracking Only    Total # of Interventions Recommended: 0  Total # of Interventions Accepted: 0  Time Spent (min): 15

## 2023-02-28 NOTE — TELEPHONE ENCOUNTER
Pt came in for a BP check today after starting Losartan. Pt denies any ill effects from the med but has been taking for about five days. Denies any HA at this time.       BP   02/28/23 1030 178/120 Abnormal       02/28/23 1029 182/102 Abnormal       Site

## 2023-03-06 ENCOUNTER — APPOINTMENT (OUTPATIENT)
Dept: GENERAL RADIOLOGY | Age: 73
DRG: 305 | End: 2023-03-06
Payer: MEDICARE

## 2023-03-06 ENCOUNTER — TELEPHONE (OUTPATIENT)
Dept: FAMILY MEDICINE CLINIC | Age: 73
End: 2023-03-06

## 2023-03-06 ENCOUNTER — HOSPITAL ENCOUNTER (INPATIENT)
Age: 73
LOS: 5 days | Discharge: HOME | DRG: 305 | End: 2023-03-11
Attending: INTERNAL MEDICINE | Admitting: INTERNAL MEDICINE
Payer: MEDICARE

## 2023-03-06 DIAGNOSIS — I10 UNCONTROLLED HYPERTENSION: ICD-10-CM

## 2023-03-06 DIAGNOSIS — E87.6 HYPOKALEMIA: ICD-10-CM

## 2023-03-06 DIAGNOSIS — R07.89 ATYPICAL CHEST PAIN: Primary | ICD-10-CM

## 2023-03-06 PROBLEM — I16.1 HYPERTENSIVE EMERGENCY: Status: ACTIVE | Noted: 2023-03-06

## 2023-03-06 LAB
A/G RATIO: 1 (ref 1.1–2.2)
ALBUMIN SERPL-MCNC: 4.3 G/DL (ref 3.4–5)
ALP BLD-CCNC: 69 U/L (ref 40–129)
ALT SERPL-CCNC: 7 U/L (ref 10–40)
ANION GAP SERPL CALCULATED.3IONS-SCNC: 9 MMOL/L (ref 3–16)
AST SERPL-CCNC: 17 U/L (ref 15–37)
BASOPHILS ABSOLUTE: 0 K/UL (ref 0–0.2)
BASOPHILS RELATIVE PERCENT: 0.6 %
BILIRUB SERPL-MCNC: 0.6 MG/DL (ref 0–1)
BUN BLDV-MCNC: 11 MG/DL (ref 7–20)
CALCIUM SERPL-MCNC: 9.9 MG/DL (ref 8.3–10.6)
CHLORIDE BLD-SCNC: 102 MMOL/L (ref 99–110)
CO2: 30 MMOL/L (ref 21–32)
CREAT SERPL-MCNC: 0.6 MG/DL (ref 0.6–1.2)
EOSINOPHILS ABSOLUTE: 0.1 K/UL (ref 0–0.6)
EOSINOPHILS RELATIVE PERCENT: 0.9 %
GFR SERPL CREATININE-BSD FRML MDRD: >60 ML/MIN/{1.73_M2}
GLUCOSE BLD-MCNC: 112 MG/DL (ref 70–99)
HCT VFR BLD CALC: 40.5 % (ref 36–48)
HEMOGLOBIN: 13.5 G/DL (ref 12–16)
INR BLD: 2.5 (ref 0.87–1.14)
LYMPHOCYTES ABSOLUTE: 1.7 K/UL (ref 1–5.1)
LYMPHOCYTES RELATIVE PERCENT: 25.4 %
MCH RBC QN AUTO: 29.7 PG (ref 26–34)
MCHC RBC AUTO-ENTMCNC: 33.3 G/DL (ref 31–36)
MCV RBC AUTO: 89.3 FL (ref 80–100)
MONOCYTES ABSOLUTE: 0.5 K/UL (ref 0–1.3)
MONOCYTES RELATIVE PERCENT: 7.3 %
NEUTROPHILS ABSOLUTE: 4.4 K/UL (ref 1.7–7.7)
NEUTROPHILS RELATIVE PERCENT: 65.8 %
PDW BLD-RTO: 13.1 % (ref 12.4–15.4)
PLATELET # BLD: 157 K/UL (ref 135–450)
PMV BLD AUTO: 10.1 FL (ref 5–10.5)
POTASSIUM SERPL-SCNC: 3.2 MMOL/L (ref 3.5–5.1)
PRO-BNP: 584 PG/ML (ref 0–124)
PROTHROMBIN TIME: 27.1 SEC (ref 11.7–14.5)
RBC # BLD: 4.54 M/UL (ref 4–5.2)
SODIUM BLD-SCNC: 141 MMOL/L (ref 136–145)
TOTAL PROTEIN: 8.4 G/DL (ref 6.4–8.2)
TROPONIN: <0.01 NG/ML
TROPONIN: <0.01 NG/ML
WBC # BLD: 6.7 K/UL (ref 4–11)

## 2023-03-06 PROCEDURE — 2580000003 HC RX 258: Performed by: INTERNAL MEDICINE

## 2023-03-06 PROCEDURE — 84484 ASSAY OF TROPONIN QUANT: CPT

## 2023-03-06 PROCEDURE — 6370000000 HC RX 637 (ALT 250 FOR IP): Performed by: INTERNAL MEDICINE

## 2023-03-06 PROCEDURE — 85610 PROTHROMBIN TIME: CPT

## 2023-03-06 PROCEDURE — 6370000000 HC RX 637 (ALT 250 FOR IP): Performed by: PHYSICIAN ASSISTANT

## 2023-03-06 PROCEDURE — 99285 EMERGENCY DEPT VISIT HI MDM: CPT

## 2023-03-06 PROCEDURE — 93005 ELECTROCARDIOGRAM TRACING: CPT | Performed by: INTERNAL MEDICINE

## 2023-03-06 PROCEDURE — 85025 COMPLETE CBC W/AUTO DIFF WBC: CPT

## 2023-03-06 PROCEDURE — 83880 ASSAY OF NATRIURETIC PEPTIDE: CPT

## 2023-03-06 PROCEDURE — 71045 X-RAY EXAM CHEST 1 VIEW: CPT

## 2023-03-06 PROCEDURE — 36415 COLL VENOUS BLD VENIPUNCTURE: CPT

## 2023-03-06 PROCEDURE — 80053 COMPREHEN METABOLIC PANEL: CPT

## 2023-03-06 PROCEDURE — 1200000000 HC SEMI PRIVATE

## 2023-03-06 PROCEDURE — 6360000002 HC RX W HCPCS: Performed by: INTERNAL MEDICINE

## 2023-03-06 RX ORDER — SODIUM CHLORIDE 0.9 % (FLUSH) 0.9 %
5-40 SYRINGE (ML) INJECTION PRN
Status: DISCONTINUED | OUTPATIENT
Start: 2023-03-06 | End: 2023-03-11 | Stop reason: HOSPADM

## 2023-03-06 RX ORDER — LABETALOL HYDROCHLORIDE 5 MG/ML
10 INJECTION, SOLUTION INTRAVENOUS EVERY 6 HOURS PRN
Status: DISCONTINUED | OUTPATIENT
Start: 2023-03-06 | End: 2023-03-09

## 2023-03-06 RX ORDER — WARFARIN SODIUM 5 MG/1
5 TABLET ORAL
Status: DISCONTINUED | OUTPATIENT
Start: 2023-03-08 | End: 2023-03-08

## 2023-03-06 RX ORDER — ONDANSETRON 4 MG/1
4 TABLET, ORALLY DISINTEGRATING ORAL EVERY 8 HOURS PRN
Status: DISCONTINUED | OUTPATIENT
Start: 2023-03-06 | End: 2023-03-11 | Stop reason: HOSPADM

## 2023-03-06 RX ORDER — ACETAMINOPHEN 500 MG
1000 TABLET ORAL EVERY 6 HOURS PRN
Status: DISCONTINUED | OUTPATIENT
Start: 2023-03-06 | End: 2023-03-11 | Stop reason: HOSPADM

## 2023-03-06 RX ORDER — SODIUM CHLORIDE 0.9 % (FLUSH) 0.9 %
5-40 SYRINGE (ML) INJECTION EVERY 12 HOURS SCHEDULED
Status: DISCONTINUED | OUTPATIENT
Start: 2023-03-06 | End: 2023-03-11 | Stop reason: HOSPADM

## 2023-03-06 RX ORDER — WARFARIN SODIUM 2.5 MG/1
2.5 TABLET ORAL
Status: DISCONTINUED | OUTPATIENT
Start: 2023-03-07 | End: 2023-03-11 | Stop reason: HOSPADM

## 2023-03-06 RX ORDER — LOSARTAN POTASSIUM 100 MG/1
100 TABLET ORAL DAILY
Status: DISCONTINUED | OUTPATIENT
Start: 2023-03-07 | End: 2023-03-10

## 2023-03-06 RX ORDER — CARVEDILOL 3.12 MG/1
3.12 TABLET ORAL ONCE
Status: COMPLETED | OUTPATIENT
Start: 2023-03-06 | End: 2023-03-06

## 2023-03-06 RX ORDER — FUROSEMIDE 20 MG/1
20 TABLET ORAL DAILY
Status: DISCONTINUED | OUTPATIENT
Start: 2023-03-07 | End: 2023-03-08

## 2023-03-06 RX ORDER — LOSARTAN POTASSIUM 25 MG/1
100 TABLET ORAL ONCE
Status: COMPLETED | OUTPATIENT
Start: 2023-03-06 | End: 2023-03-06

## 2023-03-06 RX ORDER — ATORVASTATIN CALCIUM 80 MG/1
80 TABLET, FILM COATED ORAL DAILY
Status: DISCONTINUED | OUTPATIENT
Start: 2023-03-06 | End: 2023-03-11 | Stop reason: HOSPADM

## 2023-03-06 RX ORDER — POLYETHYLENE GLYCOL 3350 17 G/17G
17 POWDER, FOR SOLUTION ORAL DAILY PRN
Status: DISCONTINUED | OUTPATIENT
Start: 2023-03-06 | End: 2023-03-11 | Stop reason: HOSPADM

## 2023-03-06 RX ORDER — CARVEDILOL 3.12 MG/1
3.12 TABLET ORAL 2 TIMES DAILY WITH MEALS
Status: DISCONTINUED | OUTPATIENT
Start: 2023-03-06 | End: 2023-03-07

## 2023-03-06 RX ORDER — ASPIRIN 81 MG/1
243 TABLET, CHEWABLE ORAL ONCE
Status: COMPLETED | OUTPATIENT
Start: 2023-03-06 | End: 2023-03-06

## 2023-03-06 RX ORDER — ONDANSETRON 2 MG/ML
4 INJECTION INTRAMUSCULAR; INTRAVENOUS EVERY 6 HOURS PRN
Status: DISCONTINUED | OUTPATIENT
Start: 2023-03-06 | End: 2023-03-11 | Stop reason: HOSPADM

## 2023-03-06 RX ORDER — POTASSIUM CHLORIDE 750 MG/1
40 TABLET, FILM COATED, EXTENDED RELEASE ORAL ONCE
Status: COMPLETED | OUTPATIENT
Start: 2023-03-06 | End: 2023-03-06

## 2023-03-06 RX ORDER — ASPIRIN 81 MG/1
81 TABLET ORAL DAILY
Status: DISCONTINUED | OUTPATIENT
Start: 2023-03-07 | End: 2023-03-11 | Stop reason: HOSPADM

## 2023-03-06 RX ORDER — SODIUM CHLORIDE 9 MG/ML
INJECTION, SOLUTION INTRAVENOUS PRN
Status: DISCONTINUED | OUTPATIENT
Start: 2023-03-06 | End: 2023-03-11 | Stop reason: HOSPADM

## 2023-03-06 RX ORDER — HYDRALAZINE HYDROCHLORIDE 20 MG/ML
10 INJECTION INTRAMUSCULAR; INTRAVENOUS EVERY 6 HOURS PRN
Status: DISCONTINUED | OUTPATIENT
Start: 2023-03-06 | End: 2023-03-09

## 2023-03-06 RX ORDER — WARFARIN SODIUM 5 MG/1
5 TABLET ORAL DAILY
Status: DISCONTINUED | OUTPATIENT
Start: 2023-03-06 | End: 2023-03-06

## 2023-03-06 RX ORDER — NITROGLYCERIN 0.4 MG/1
0.4 TABLET SUBLINGUAL EVERY 5 MIN PRN
Status: DISCONTINUED | OUTPATIENT
Start: 2023-03-06 | End: 2023-03-11 | Stop reason: HOSPADM

## 2023-03-06 RX ADMIN — OYSTER SHELL CALCIUM WITH VITAMIN D 1 TABLET: 500; 200 TABLET, FILM COATED ORAL at 20:02

## 2023-03-06 RX ADMIN — LOSARTAN POTASSIUM 100 MG: 25 TABLET, FILM COATED ORAL at 12:07

## 2023-03-06 RX ADMIN — ASPIRIN 243 MG: 81 TABLET, CHEWABLE ORAL at 14:54

## 2023-03-06 RX ADMIN — ATORVASTATIN CALCIUM 80 MG: 80 TABLET, FILM COATED ORAL at 19:06

## 2023-03-06 RX ADMIN — HYDRALAZINE HYDROCHLORIDE 10 MG: 20 INJECTION INTRAMUSCULAR; INTRAVENOUS at 15:55

## 2023-03-06 RX ADMIN — CARVEDILOL 3.12 MG: 3.12 TABLET, FILM COATED ORAL at 12:07

## 2023-03-06 RX ADMIN — CARVEDILOL 3.12 MG: 3.12 TABLET, FILM COATED ORAL at 19:06

## 2023-03-06 RX ADMIN — ACETAMINOPHEN 1000 MG: 500 TABLET ORAL at 15:49

## 2023-03-06 RX ADMIN — ACETAMINOPHEN 1000 MG: 500 TABLET ORAL at 22:34

## 2023-03-06 RX ADMIN — SODIUM CHLORIDE, PRESERVATIVE FREE 10 ML: 5 INJECTION INTRAVENOUS at 20:02

## 2023-03-06 RX ADMIN — POTASSIUM CHLORIDE 40 MEQ: 750 TABLET, FILM COATED, EXTENDED RELEASE ORAL at 14:54

## 2023-03-06 ASSESSMENT — LIFESTYLE VARIABLES
HOW MANY STANDARD DRINKS CONTAINING ALCOHOL DO YOU HAVE ON A TYPICAL DAY: PATIENT DOES NOT DRINK
HOW OFTEN DO YOU HAVE A DRINK CONTAINING ALCOHOL: NEVER

## 2023-03-06 ASSESSMENT — PAIN DESCRIPTION - FREQUENCY: FREQUENCY: CONTINUOUS

## 2023-03-06 ASSESSMENT — ENCOUNTER SYMPTOMS
DIARRHEA: 0
SHORTNESS OF BREATH: 0
ABDOMINAL PAIN: 0
NAUSEA: 0
VOMITING: 0
COUGH: 0
CHEST TIGHTNESS: 0

## 2023-03-06 ASSESSMENT — PAIN DESCRIPTION - LOCATION: LOCATION: HEAD

## 2023-03-06 ASSESSMENT — PAIN SCALES - GENERAL
PAINLEVEL_OUTOF10: 10
PAINLEVEL_OUTOF10: 0

## 2023-03-06 ASSESSMENT — HEART SCORE: ECG: 1

## 2023-03-06 ASSESSMENT — PAIN - FUNCTIONAL ASSESSMENT: PAIN_FUNCTIONAL_ASSESSMENT: NONE - DENIES PAIN

## 2023-03-06 NOTE — ED PROVIDER NOTES
Blood pressure         [address] [facility]        Pt Name: Ashley Leonardo  MRN: [id number]  [place] 1950  Date of evaluation: 3/6/2023  Provider: Mayte Neff PA-C  PCP: Mike Jones MD  Note Started: 12:34 PM EST 3/6/23      MARIANN. I have evaluated this patient. My supervising physician was available for consultation. CHIEF COMPLAINT       Chief Complaint   Patient presents with    Hypertension     Patient states home health RN took bp at home, \"and and she told me it was too high\". Bp 198/128 at time of triage. HX of HTN, CHF, a fib and brain bleed. HISTORY OF PRESENT ILLNESS: 1 or more Elements     History from : Patient    Limitations to history : None    Ashley Leonardo is a 71 y.o. female with a history of hypertension, hyperlipidemia, CAD, atrial fibrillation, chronic anticoagulation, CHF and CVA who presents to the emergency department today at the recommendation of her home health care nurse after she had her blood pressure taken this morning and it was around 220/118. Patient states she has been out of her losartan and carvedilol for the last 7 to 10 days stating the pharmacy has been out of it. She states she is supposed to pick that up today. Her blood pressure is 198/128 on arrival.  Patient denies having any chest pain, shortness of breath, palpitations or diaphoresis. She states she has had some right arm pain since yesterday of unknown etiology. She denies headache, lightheadedness or dizziness, vision changes, numbness, tingling or weakness in her extremities. Nursing Notes were all reviewed and agreed with or any disagreements were addressed in the HPI. REVIEW OF SYSTEMS :      Review of Systems   Constitutional:  Negative for chills, fatigue and fever. Respiratory:  Negative for cough, chest tightness and shortness of breath.     Cardiovascular:  Negative for chest pain, palpitations and leg swelling. Gastrointestinal:  Negative for abdominal pain, diarrhea, nausea and vomiting. Genitourinary:  Negative for difficulty urinating, dysuria, flank pain, frequency, hematuria and urgency. Musculoskeletal:  Positive for arthralgias. Neurological:  Negative for dizziness, weakness, light-headedness, numbness and headaches. All other systems reviewed and are negative. Positives and Pertinent negatives as per HPI. SURGICAL HISTORY     Past Surgical History:   Procedure Laterality Date    CARDIAC SURGERY      COLONOSCOPY      CORONARY ANGIOPLASTY WITH STENT PLACEMENT      TUBAL LIGATION Right Torn Rotator Cuff    2013 @ [facility]       CURRENTMEDICATIONS       Previous Medications    ACETAMINOPHEN (TYLENOL) 500 MG TABLET    Take 2 tablets by mouth every 6 hours as needed for Pain    ASPIRIN 81 PO    Take by mouth    ATORVASTATIN (LIPITOR) 80 MG TABLET    Take 1 tablet by mouth daily    CALCIUM CARB-CHOLECALCIFEROL (CALCIUM + D3) 600-200 MG-UNIT TABS TABLET    Take 1 tablet by mouth 2 times daily    CARVEDILOL (COREG) 3.125 MG TABLET    Take 1 tablet by mouth 2 times daily (with meals)    FUROSEMIDE (LASIX) 20 MG TABLET    TAKE 1 TABLET BY MOUTH once DAILY    LOSARTAN (COZAAR) 100 MG TABLET    Take 1 tablet by mouth daily    POTASSIUM CHLORIDE (KLOR-CON M) 20 MEQ EXTENDED RELEASE TABLET    Take 1 tablet by mouth daily    WARFARIN (COUMADIN) 5 MG TABLET    Take 5 mg by mouth daily 5 mg everyday but on tuesdays 0.5 tablet       ALLERGIES     Clonidine, Codeine, Hydrocodone-acetaminophen, Lisinopril, Tramadol, and Percocet [oxycodone-acetaminophen]    FAMILYHISTORY     History reviewed. No pertinent family history.      SOCIAL HISTORY       Social History     Tobacco Use    Smoking status: Never    Smokeless tobacco: Never   Vaping Use    Vaping Use: Never used   Substance Use Topics    Alcohol use: No    Drug use: No       SCREENINGS        Bj Coma Scale  Eye Opening: Spontaneous  Best Verbal Response: Oriented  Best Motor Response: Obeys commands  George Coma Scale Score: 15        Heart Score for chest pain patients  History: Moderately Suspicious  ECG: Non-Specifc repolarization disturbance/LBTB/PM  Patient Age: > 65 years  *Risk factors for Atherosclerotic disease: Hypercholesterolemia, Hypertension, Coronary Artery Disease  Risk Factors: > 3 Risk factors or history of atherosclerotic disease*  Troponin: < 1X normal limit  Heart Score Total: 6       CIWA Assessment  BP: (!) 190/126  Heart Rate: 78           PHYSICAL EXAM  1 or more Elements     ED Triage Vitals [03/06/23 1142]   BP Temp Temp Source Heart Rate Resp SpO2 Height Weight   (!) 198/128 98.4 °F (36.9 °C) Oral 78 20 98 % -- --       Physical Exam  Vitals and nursing note reviewed. Constitutional:       Appearance: She is well-developed. She is not diaphoretic. HENT:      Head: Normocephalic and atraumatic. Nose: Nose normal.      Mouth/Throat:      Mouth: Mucous membranes are dry. Pharynx: Oropharynx is clear. Eyes:      General:         Right eye: No discharge. Left eye: No discharge. Extraocular Movements: Extraocular movements intact. Conjunctiva/sclera: Conjunctivae normal.      Pupils: Pupils are equal, round, and reactive to light. Cardiovascular:      Rate and Rhythm: Normal rate and regular rhythm. Pulses: Normal pulses. Heart sounds: Normal heart sounds. Pulmonary:      Effort: Pulmonary effort is normal. No respiratory distress. Breath sounds: Normal breath sounds. Abdominal:      General: Abdomen is flat. Bowel sounds are normal. There is no distension. Palpations: Abdomen is soft. Tenderness: There is no abdominal tenderness. There is no guarding. Musculoskeletal:         General: Normal range of motion. Cervical back: Normal range of motion and neck supple. Skin:     General: Skin is warm and dry.       Capillary Refill: Capillary refill takes less than 2 seconds. Coloration: Skin is not pale. Neurological:      Mental Status: She is alert and oriented to person, place, and time. Psychiatric:         Behavior: Behavior normal.           DIAGNOSTIC RESULTS   LABS:    Labs Reviewed   COMPREHENSIVE METABOLIC PANEL - Abnormal; Notable for the following components:       Result Value    Potassium 3.2 (*)     Glucose 112 (*)     Total Protein 8.4 (*)     Albumin/Globulin Ratio 1.0 (*)     ALT 7 (*)     All other components within normal limits   PROTIME-INR - Abnormal; Notable for the following components:    Protime 27.1 (*)     INR 2.50 (*)     All other components within normal limits   BRAIN NATRIURETIC PEPTIDE - Abnormal; Notable for the following components:    Pro- (*)     All other components within normal limits   CBC WITH AUTO DIFFERENTIAL   TROPONIN       When ordered only abnormal lab results are displayed. All other labs were within normal range or not returned as of this dictation. EKG: When ordered, EKG's are interpreted by the Emergency Department Physician in the absence of a cardiologist.  Please see their note for interpretation of EKG. RADIOLOGY:   Non-plain film images such as CT, Ultrasound and MRI are read by the radiologist. Plain radiographic images are visualized and preliminarily interpreted by the ED Provider with the below findings:        Interpretation per the Radiologist below, if available at the time of this note:    XR CHEST PORTABLE   Final Result   Marked cardiomegaly. No acute congestion or infiltrate           XR CHEST PORTABLE    Result Date: 3/6/2023  EXAMINATION: ONE XRAY VIEW OF THE CHEST 3/6/2023 12:02 pm COMPARISON: October 19, 2022 HISTORY: ORDERING SYSTEM PROVIDED HISTORY: HTN TECHNOLOGIST PROVIDED HISTORY: Reason for exam:->HTN Reason for Exam: Hypertension (Patient states home health RN took bp at home, \"and and she told me it was too high\". Bp 198/128 at time of triage.  HX of HTN, CHF, a fib and brain bleed. ) FINDINGS: Marked cardiomegaly. Lungs are clear. No pneumothorax. Significant osteopenic changes and degenerative changes noted in the bony structures. .     Marked cardiomegaly. No acute congestion or infiltrate           PROCEDURES   Unless otherwise noted below, none     Procedures    CRITICAL CARE TIME (.cctime)       PAST MEDICAL HISTORY      has a past medical history of Acute cerebrovascular accident (CVA) ([facility] [address] (1/28/2020), LAST (acute kidney injury) ([facility] [address] (10/20/2022), Atrial fibrillation ([facility] [address] Bell palsy, CAD (coronary artery disease), Cerebral artery occlusion with cerebral infarction [facility]), Chronic diastolic CHF (congestive heart failure) ([facility] [address] (5/16/2020), CVA (cerebral vascular accident) ([facility] [address] (1/4/2017), Hypertension, Intracranial hemorrhage ([facility] [address] (4/2016), Mixed hyperlipidemia (7/6/2022), Nonintractable headache, Nontraumatic subcortical hemorrhage of cerebral hemisphere ([facility] [address] (4/30/2016), Primary osteoarthritis of right knee (3/18/2022), Sudden visual loss of left eye, Thyroid nodule (2/8/2018), and TIA involving right internal carotid artery. Chronic Conditions affecting Care: None    EMERGENCY DEPARTMENT COURSE and DIFFERENTIAL DIAGNOSIS/MDM:   Vitals:    Vitals:    03/06/23 1142 03/06/23 1308   BP: (!) 198/128 (!) 190/126   Pulse: 78    Resp: 20    Temp: 98.4 °F (36.9 °C)    TempSrc: Oral    SpO2: 98%        Patient was given the following medications:  Medications   aspirin chewable tablet 243 mg (has no administration in time range)   potassium chloride (KLOR-CON) extended release tablet 40 mEq (has no administration in time range)   losartan (COZAAR) tablet 100 mg (100 mg Oral Given 3/6/23 1207)   carvedilol (COREG) tablet 3.125 mg (3.125 mg Oral Given 3/6/23 1207)             Is this patient to be included in the SEP-1 Core Measure due to severe sepsis or septic shock? No   Exclusion criteria - the patient is NOT to be included for SEP-1 Core Measure due to:   Infection is not suspected    CONSULTS: (Who and What was discussed)  IP CONSULT TO CARDIOLOGY  Discussion with Other Profesionals : None    Social Determinants : None    Records Reviewed : None    CC/HPI Summary, DDx, ED Course, and Reassessment: Patient presented to the emergency department today at the recommendation of her home health care nurse she was found to be 220/118. She states she has been out of her blood pressure medication for at least 1 week. She was supposed to pick them up today. Patient is complaining of right arm pain that began yesterday. She denies actual chest pain or shortness of breath. Her EKG shows no signs of acute ischemia or infarction. Troponin <0.01.  proBNP is 584 and chest x-ray shows marked cardiomegaly. CBC is without leukocytosis or anemia. BMP with a mild hypokalemia of 3.2. Patient is anticoagulated with Coumadin and INR is 2.50. Patient is given her home doses of carvedilol and losartan. Repeat blood pressure is not much better. She is given aspirin 243 mg as she does take aspirin 81 mg daily. Patient is also given K-Dur 40 mEq. Disposition Considerations (include 1 Tests not done, Shared Decision Making, Pt Expectation of Test or Tx.): I had a lengthy discussion with the patient regarding testing completed in the emergency department today as well as the results. Her last stress test was in 2020 and was abnormal.  She has a heart score of 6. I feel she does require admission to the hospital for further cardiac evaluation and treatment. She is agreeable with this. Hospitalist is consulted who is agreeable with admission. Admission      I am the Primary Clinician of Record. FINAL IMPRESSION      1. Atypical chest pain    2. Uncontrolled hypertension    3. Hypokalemia          DISPOSITION/PLAN     DISPOSITION Decision To Admit 03/06/2023 02:01:47 PM      PATIENT REFERRED TO:  No follow-up provider specified.     DISCHARGE MEDICATIONS:  New Prescriptions    No medications on file       DISCONTINUED MEDICATIONS:  Discontinued Medications    No medications on file              (Please note that portions of this note were completed with a voice recognition program.  Efforts were made to edit the dictations but occasionally words are mis-transcribed.)    Rosalinda Lewis PA-C (electronically signed)           Rosalinda Lewis PA-C  03/06/23 1407

## 2023-03-06 NOTE — H&P
Hospital Medicine History & Physical      PCP: Sarah Wheat MD    Date of Admission: 3/6/2023    Date of Service: Pt seen/examined on 3/6 and Admitted to Inpatient with expected LOS greater than two midnights due to medical therapy. Chief Complaint:  Elevated BP      History Of Present Illness:     71 y.o. female with PMH of CVA, AF, CAD, McGregor palsy, dCHF, htn, hld  who presents with uncontrolled hypertension. Was seen per home health car and found to have BP above 454P systolic so referred to ED. Pt states her BP is always high, but has been running even higher than normal as her pharmacy has been out of her BP meds and she has been unable to get them. Does have intermittent headache and dizziness at times. Also with R arm pain since yesterday. Denies chest pain, dyspnea, abd pain, vision deficits, gross focal motor weakness or numbness. Past Medical History:          Diagnosis Date    Acute cerebrovascular accident (CVA) ([address] 01/28/2020    brain bleed    LAST (acute kidney injury) ([address] 10/20/2022    Atrial fibrillation (H[facility])     Bell palsy     diagnosed 15 years ago    CAD (coronary artery disease)     Cerebral artery occlusion with cerebral infarction ([address]     Chronic diastolic CHF (congestive heart failure) ([address] 05/16/2020    CVA (cerebral vascular accident) ([address] 01/04/2017    Hypertension     Intracranial hemorrhage ([address] 04/2016    Mixed hyperlipidemia 07/06/2022    Nonintractable headache     currently controlled    Nontraumatic subcortical hemorrhage of cerebral hemisphere ([address] 04/30/2016    Poor vision     after bells palsy in 2012    Primary osteoarthritis of right knee 03/18/2022    Sudden visual loss of left eye     patient states \"In very corner of my eye. \"    Thyroid nodule 02/08/2018    TIA involving right internal carotid artery        Past Surgical History:          Procedure Laterality Date    CARDIAC SURGERY      stentsx2    COLONOSCOPY      CORONARY ANGIOPLASTY WITH STENT PLACEMENT      ROTATOR CUFF REPAIR Right     TUBAL LIGATION         Medications Prior to Admission:      Prior to Admission medications    Medication Sig Start Date End Date Taking? Authorizing Provider   losartan (COZAAR) 100 MG tablet Take 1 tablet by mouth daily 2/28/23   Uri Ortiz, DO   carvedilol (COREG) 3.125 MG tablet Take 1 tablet by mouth 2 times daily (with meals) 2/24/23   Roxdenita Ortiz, DO   ASPIRIN 81 PO Take by mouth    Historical Provider, MD   atorvastatin (LIPITOR) 80 MG tablet Take 1 tablet by mouth daily 2/14/23   Markell Jesus MD   furosemide (LASIX) 20 MG tablet TAKE 1 TABLET BY MOUTH once DAILY 2/14/23   Markell Jesus MD   potassium chloride (KLOR-CON M) 20 MEQ extended release tablet Take 1 tablet by mouth daily 2/14/23   Markell Jesus MD   warfarin (COUMADIN) 5 MG tablet Take 5 mg by mouth daily 5 mg everyday but on tuesdays 0.5 tablet    Historical Provider, MD   acetaminophen (TYLENOL) 500 MG tablet Take 2 tablets by mouth every 6 hours as needed for Pain 1/28/21   Abby Edmondson MD   Calcium Carb-Cholecalciferol (CALCIUM + D3) 600-200 MG-UNIT TABS tablet Take 1 tablet by mouth 2 times daily 10/15/20   Abby Edmondson MD       Allergies:  Clonidine, Codeine, Hydrocodone-acetaminophen, Lisinopril, Tramadol, and Percocet [oxycodone-acetaminophen]    Social History:      TOBACCO:   reports that she has never smoked. She has never used smokeless tobacco.  ETOH:   reports no history of alcohol use. Family History:      History reviewed. No pertinent family history. REVIEW OF SYSTEMS:   Pertinent positives as noted in the HPI. All other systems reviewed and negative. PHYSICAL EXAM:    BP (!) 177/125   Pulse 75   Temp 98 °F (36.7 °C) (Temporal)   Resp 22   SpO2 99%     Gen/overall appearance: Not in acute distress. Alert.   Head: Normocephalic, atraumatic  Eyes: EOMI, no scleral icterus  CVS: regular rate and rhythm, Normal S1S2  Pulm: Clear to auscultation bilaterally. No crackles/wheezes  Extremities: No edema. No erythema or warmth  Neuro: No gross focal deficits noted, 5/5 strength bilat, sensation intact, CNs grossly intact  Skin: Warm, dry    Labs:     Recent Labs     03/06/23  1207   WBC 6.7   HGB 13.5   HCT 40.5        Recent Labs     03/06/23  1207      K 3.2*      CO2 30   BUN 11   CREATININE 0.6   CALCIUM 9.9     Recent Labs     03/06/23  1207   AST 17   ALT 7*   BILITOT 0.6   ALKPHOS 69     Recent Labs     03/06/23  1207   INR 2.50*     Recent Labs     03/06/23  1207   TROPONINI <0.01       Urinalysis:      Lab Results   Component Value Date/Time    NITRU Negative 10/19/2022 01:40 PM    WBCUA 3 10/19/2022 01:40 PM    BACTERIA None Seen 10/19/2022 01:40 PM    RBCUA 1 10/19/2022 01:40 PM    BLOODU Negative 10/19/2022 01:40 PM    SPECGRAV >=1.030 10/19/2022 01:40 PM    GLUCOSEU Negative 10/19/2022 01:40 PM         ASSESSMENT:    Active Hospital Problems    Diagnosis Date Noted    Hypertensive emergency [I16.1] 03/06/2023     Priority: Medium       Hypertensive urgency / emergency 2/2 inability to get home po BP meds  Dizziness  - IV hydralazine and labetalol  - slow steady decrease in BP  - resume oral BP regimen; titrate as needed  - serial trops  - trend labs for end organ damage    RUE pain. Questionable cardiac equivalent  - serial trops  - monitor tele  - already on ASA and statin  - consider stress if persistent  - cardio already consulted in ED    Hypokalemia  - monitor and replace    PMH of CVA, AF on coumadin, CAD, [place] palsy, compensated chronic dCHF, hld  - resume hold meds    DVT Prophylaxis: coumadin  Diet: ADULT DIET; Regular; Low Fat/Low Chol/High Fiber/FERNANDO; No Caffeine  Code Status: Prior       Jose Gonzalez MD    Thank you Ministerio Peña MD for the opportunity to be involved in this patient's care.

## 2023-03-06 NOTE — PROGRESS NOTES
4 Eyes Skin Assessment     NAME:  Wallace Vazquez  YOB: 1950  MEDICAL RECORD NUMBER:  [id number]    The patient is being assessed for  Admission    I agree that One RN has performed a thorough Head to Toe Skin Assessment on the patient. ALL assessment sites listed below have been assessed. Areas assessed by both nurses:    Head, Face, Ears, Shoulders, Back, Chest, Arms, Elbows, Hands, Sacrum. Buttock, Coccyx, Ischium, and Legs. Feet and Heels        Does the Patient have a Wound?  No noted wound(s)       Reji Prevention initiated by RN: NA   Wound Care Orders initiated by RN: NA    Pressure Injury (Stage 3,4, Unstageable, DTI, NWPT, and Complex wounds) if present, place referral order by RN under : NA    New and Established Ostomies, if present place, referral order under : NA      Nurse 1 eSignature: Electronically signed by Yakov Thomason RN on 3/6/23 at 6:51 PM EST    **SHARE this note so that the co-signing nurse can place an eSignature**    Nurse 2 eSignature: Electronically signed by Bijan Ye RN on 3/8/23 at 8:58 AM EST

## 2023-03-06 NOTE — PROGRESS NOTES
Clinical Pharmacy Note: Pharmacy to Dose Warfarin    Pharmacy consulted by Dr. Magda Masterson to dose warfarin. Rebecca Degroot is a 71 y.o. female  is receiving warfarin for indication: afib. INR Goal Range: 1.8-2.5  Prior to admission warfarin dosing regimen: 2.5 mg on Tuesday and Friday and 5 mg AOD  INR today:   Lab Results   Component Value Date/Time    INR 2.50 03/06/2023 12:07 PM       Assessment/Plan:  INR is therapeutic on prior to admission dosing regimen. Based on today's assessment, dose warfarin Home regimen entered. However no dose today as pt is reported to taking 5mg prior to hospital arrival.  Daily INR is ordered. Pharmacy will continue to monitor and make adjustments to regimen as necessary.      Thank you for the consult,     Bear Hough

## 2023-03-06 NOTE — PROGRESS NOTES
Pt transported to ICU room 3902, Pt transferred self from stretcher to bed.  Pt alert and oriented, Pt blood pressure 177/125, see flow sheet and MAR for details, care ongoing

## 2023-03-06 NOTE — PROGRESS NOTES
Patient seen in ED, room 02. Admission completed with the following exceptions:  4 Eyes Assessment, Immunizations, Covid Vaccines, Rights and Responsibilities, Orientation to room, Plan of Care, Education/Learning Assessment and Education Plan, white board, height and weight, pain assessment and head to toe assessment. Patient is alert and oriented X 4. Patient lives at home with her 15year old grandson and is being admitted for hypertensive emergency. Home Medications as well as Outside Sources has been verbally reviewed with patient and updated as appropriate and is now Completed. Plan of care updated if indicated. All questions answered. Order placed for spiritual services for advance directives.

## 2023-03-07 ENCOUNTER — APPOINTMENT (OUTPATIENT)
Dept: MRI IMAGING | Age: 73
DRG: 305 | End: 2023-03-07
Payer: MEDICARE

## 2023-03-07 LAB
ANION GAP SERPL CALCULATED.3IONS-SCNC: 7 MMOL/L (ref 3–16)
BUN BLDV-MCNC: 14 MG/DL (ref 7–20)
CALCIUM SERPL-MCNC: 9.8 MG/DL (ref 8.3–10.6)
CHLORIDE BLD-SCNC: 102 MMOL/L (ref 99–110)
CO2: 32 MMOL/L (ref 21–32)
CREAT SERPL-MCNC: 0.6 MG/DL (ref 0.6–1.2)
EKG ATRIAL RATE: 357 BPM
EKG ATRIAL RATE: 78 BPM
EKG DIAGNOSIS: NORMAL
EKG DIAGNOSIS: NORMAL
EKG Q-T INTERVAL: 382 MS
EKG Q-T INTERVAL: 440 MS
EKG QRS DURATION: 94 MS
EKG QRS DURATION: 96 MS
EKG QTC CALCULATION (BAZETT): 448 MS
EKG QTC CALCULATION (BAZETT): 461 MS
EKG R AXIS: -15 DEGREES
EKG R AXIS: -18 DEGREES
EKG T AXIS: -11 DEGREES
EKG T AXIS: -22 DEGREES
EKG VENTRICULAR RATE: 66 BPM
EKG VENTRICULAR RATE: 83 BPM
GFR SERPL CREATININE-BSD FRML MDRD: >60 ML/MIN/{1.73_M2}
GLUCOSE BLD-MCNC: 103 MG/DL (ref 70–99)
HCT VFR BLD CALC: 36.5 % (ref 36–48)
HEMOGLOBIN: 12.3 G/DL (ref 12–16)
INR BLD: 3.08 (ref 0.87–1.14)
MAGNESIUM: 1.7 MG/DL (ref 1.8–2.4)
MCH RBC QN AUTO: 29.9 PG (ref 26–34)
MCHC RBC AUTO-ENTMCNC: 33.7 G/DL (ref 31–36)
MCV RBC AUTO: 88.6 FL (ref 80–100)
PDW BLD-RTO: 13.4 % (ref 12.4–15.4)
PLATELET # BLD: 148 K/UL (ref 135–450)
PMV BLD AUTO: 10 FL (ref 5–10.5)
POTASSIUM REFLEX MAGNESIUM: 3.4 MMOL/L (ref 3.5–5.1)
PROTHROMBIN TIME: 32 SEC (ref 11.7–14.5)
RBC # BLD: 4.12 M/UL (ref 4–5.2)
SODIUM BLD-SCNC: 141 MMOL/L (ref 136–145)
TROPONIN: <0.01 NG/ML
WBC # BLD: 5.2 K/UL (ref 4–11)

## 2023-03-07 PROCEDURE — 70551 MRI BRAIN STEM W/O DYE: CPT

## 2023-03-07 PROCEDURE — 6370000000 HC RX 637 (ALT 250 FOR IP): Performed by: INTERNAL MEDICINE

## 2023-03-07 PROCEDURE — 85027 COMPLETE CBC AUTOMATED: CPT

## 2023-03-07 PROCEDURE — 93010 ELECTROCARDIOGRAM REPORT: CPT | Performed by: INTERNAL MEDICINE

## 2023-03-07 PROCEDURE — 99223 1ST HOSP IP/OBS HIGH 75: CPT | Performed by: INTERNAL MEDICINE

## 2023-03-07 PROCEDURE — 84484 ASSAY OF TROPONIN QUANT: CPT

## 2023-03-07 PROCEDURE — 1200000000 HC SEMI PRIVATE

## 2023-03-07 PROCEDURE — 85610 PROTHROMBIN TIME: CPT

## 2023-03-07 PROCEDURE — 2580000003 HC RX 258: Performed by: INTERNAL MEDICINE

## 2023-03-07 PROCEDURE — 6360000002 HC RX W HCPCS: Performed by: INTERNAL MEDICINE

## 2023-03-07 PROCEDURE — 83735 ASSAY OF MAGNESIUM: CPT

## 2023-03-07 PROCEDURE — 36415 COLL VENOUS BLD VENIPUNCTURE: CPT

## 2023-03-07 PROCEDURE — 80048 BASIC METABOLIC PNL TOTAL CA: CPT

## 2023-03-07 PROCEDURE — 93005 ELECTROCARDIOGRAM TRACING: CPT | Performed by: INTERNAL MEDICINE

## 2023-03-07 RX ORDER — CARVEDILOL 6.25 MG/1
6.25 TABLET ORAL 2 TIMES DAILY WITH MEALS
Status: DISCONTINUED | OUTPATIENT
Start: 2023-03-07 | End: 2023-03-07

## 2023-03-07 RX ORDER — CARVEDILOL 3.12 MG/1
3.12 TABLET ORAL 2 TIMES DAILY
Status: DISCONTINUED | OUTPATIENT
Start: 2023-03-07 | End: 2023-03-07

## 2023-03-07 RX ORDER — POTASSIUM CHLORIDE 750 MG/1
40 TABLET, FILM COATED, EXTENDED RELEASE ORAL ONCE
Status: COMPLETED | OUTPATIENT
Start: 2023-03-07 | End: 2023-03-07

## 2023-03-07 RX ORDER — CARVEDILOL 3.12 MG/1
3.12 TABLET ORAL ONCE
Status: DISCONTINUED | OUTPATIENT
Start: 2023-03-07 | End: 2023-03-07

## 2023-03-07 RX ORDER — MAGNESIUM SULFATE IN WATER 40 MG/ML
2000 INJECTION, SOLUTION INTRAVENOUS ONCE
Status: COMPLETED | OUTPATIENT
Start: 2023-03-07 | End: 2023-03-07

## 2023-03-07 RX ORDER — KETOROLAC TROMETHAMINE 15 MG/ML
15 INJECTION, SOLUTION INTRAMUSCULAR; INTRAVENOUS ONCE
Status: COMPLETED | OUTPATIENT
Start: 2023-03-07 | End: 2023-03-07

## 2023-03-07 RX ORDER — CARVEDILOL 3.12 MG/1
3.12 TABLET ORAL 2 TIMES DAILY WITH MEALS
Status: DISCONTINUED | OUTPATIENT
Start: 2023-03-07 | End: 2023-03-08

## 2023-03-07 RX ORDER — AMLODIPINE BESYLATE 5 MG/1
5 TABLET ORAL DAILY
Status: DISCONTINUED | OUTPATIENT
Start: 2023-03-07 | End: 2023-03-07

## 2023-03-07 RX ADMIN — MAGNESIUM SULFATE HEPTAHYDRATE 2000 MG: 40 INJECTION, SOLUTION INTRAVENOUS at 08:55

## 2023-03-07 RX ADMIN — ATORVASTATIN CALCIUM 80 MG: 80 TABLET, FILM COATED ORAL at 08:52

## 2023-03-07 RX ADMIN — WARFARIN SODIUM 2.5 MG: 2.5 TABLET ORAL at 17:27

## 2023-03-07 RX ADMIN — OYSTER SHELL CALCIUM WITH VITAMIN D 1 TABLET: 500; 200 TABLET, FILM COATED ORAL at 20:02

## 2023-03-07 RX ADMIN — CARVEDILOL 3.12 MG: 3.12 TABLET, FILM COATED ORAL at 08:52

## 2023-03-07 RX ADMIN — OYSTER SHELL CALCIUM WITH VITAMIN D 1 TABLET: 500; 200 TABLET, FILM COATED ORAL at 08:52

## 2023-03-07 RX ADMIN — SODIUM CHLORIDE, PRESERVATIVE FREE 10 ML: 5 INJECTION INTRAVENOUS at 20:02

## 2023-03-07 RX ADMIN — ASPIRIN 81 MG: 81 TABLET, COATED ORAL at 08:52

## 2023-03-07 RX ADMIN — ONDANSETRON 4 MG: 2 INJECTION INTRAMUSCULAR; INTRAVENOUS at 08:02

## 2023-03-07 RX ADMIN — SODIUM CHLORIDE, PRESERVATIVE FREE 10 ML: 5 INJECTION INTRAVENOUS at 08:08

## 2023-03-07 RX ADMIN — KETOROLAC TROMETHAMINE 15 MG: 15 INJECTION, SOLUTION INTRAMUSCULAR; INTRAVENOUS at 12:12

## 2023-03-07 RX ADMIN — POTASSIUM CHLORIDE 40 MEQ: 750 TABLET, FILM COATED, EXTENDED RELEASE ORAL at 08:52

## 2023-03-07 RX ADMIN — CARVEDILOL 3.12 MG: 3.12 TABLET, FILM COATED ORAL at 17:27

## 2023-03-07 RX ADMIN — LOSARTAN POTASSIUM 100 MG: 100 TABLET, FILM COATED ORAL at 08:52

## 2023-03-07 RX ADMIN — FUROSEMIDE 20 MG: 20 TABLET ORAL at 08:52

## 2023-03-07 ASSESSMENT — PAIN DESCRIPTION - DESCRIPTORS
DESCRIPTORS: DULL;DISCOMFORT
DESCRIPTORS: THROBBING

## 2023-03-07 ASSESSMENT — PAIN DESCRIPTION - ORIENTATION
ORIENTATION_2: RIGHT
ORIENTATION: MID

## 2023-03-07 ASSESSMENT — PAIN SCALES - GENERAL
PAINLEVEL_OUTOF10: 5
PAINLEVEL_OUTOF10: 8
PAINLEVEL_OUTOF10: 4

## 2023-03-07 ASSESSMENT — PAIN DESCRIPTION - LOCATION
LOCATION: CHEST
LOCATION: HEAD
LOCATION_2: ARM

## 2023-03-07 ASSESSMENT — PAIN DESCRIPTION - INTENSITY: RATING_2: 5

## 2023-03-07 NOTE — CONSULTS
[facility]  Cardiac Consult     Referring Provider:  Philip Beyer MD         History of Present Illness:  71 y/o female with h/o chronic afib, CAD, HTN, and chronic diastolic CHF admitted with HTN.    She says she has not been able to get her BP meds for two months due to issues with her insurance. She was to pick the up yesterday. However, her home health nurse came and found her BP to be severely elevated and had her go to the ER where she was admitted. She denies any symptoms associated with her HTN.    She has had some numbness and pain in right arm and shoulder for afew days. Increased by moving arm and some soreness in arm.             Past Medical History:   has a past medical history of Acute cerebrovascular accident (CVA) ([facility]), LAST (acute kidney injury) ([facility]), Atrial fibrillation ([facility]), Bell palsy, CAD (coronary artery disease), Cerebral artery occlusion with cerebral infarction ([facility]), Chronic diastolic CHF (congestive heart failure) ([facility]), CVA (cerebral vascular accident) ([facility]), Hypertension, Intracranial hemorrhage ([facility]), Mixed hyperlipidemia, Nonintractable headache, Nontraumatic subcortical hemorrhage of cerebral hemisphere ([facility]), Poor vision, Primary osteoarthritis of right knee, Sudden visual loss of left eye, Thyroid nodule, and TIA involving right internal carotid artery.      CAD:PCI to LAD in 2000, patent by 9/2015 [facility], nonobstructive disease    Surgical History:   has a past surgical history that includes Cardiac surgery; Tubal ligation; Coronary angioplasty with stent; Colonoscopy; and Rotator cuff repair (Right).     Social History:   reports that she has never smoked. She has never used smokeless tobacco. She reports that she does not drink alcohol and does not use drugs.     Family History:  Negative for premature CAD    Medications:   oyster shell calcium w/D  1 tablet Oral BID    atorvastatin  80 mg Oral Daily    carvedilol  3.125 mg Oral BID WC    losartan  100 mg Oral  Daily    aspirin  81 mg Oral Daily    furosemide  20 mg Oral Daily    sodium chloride flush  5-40 mL IntraVENous 2 times per day    warfarin  2.5 mg Oral Once per day on Tue Fri    [START ON 3/8/2023] warfarin  5 mg Oral Once per day on Sun Mon Wed Thu Sat         Allergies:  Clonidine, Codeine, Hydrocodone-acetaminophen, Lisinopril, Tramadol, and Percocet [oxycodone-acetaminophen]     [x] Medications and dosages reviewed. ROS:  [x]Full ROS obtained and negative except as mentioned in HPI      Physical Examination:    Vitals:    03/07/23 0600   BP:    Pulse: 64   Resp: 16   Temp:    SpO2: 100%        GENERAL: elderly female, No acute distress  NEUROLOGICAL: Alert and oriented  PSYCH: Calm affect  SKIN: Warm and dry, No visible rash,   EYES: Pupils equal and round, Sclera non-icteric,   HENT:  External ears and nose unremarkable, mucus membranes moist  MUSCULOSKELETAL: Normal cephalic, neck supple  CAROTID: Normal upstroke, no bruits  CARDIAC: JVP normal, Normal PMI, regular rate and rhythm, normal S1S2, no murmur, rub, or gallop  RESPIRATORY: Normal respiratory effort, clear to auscultation bilaterally  EXTREMITIES: No edema, Pulses 2+ in all extremities. BP equal in both arms. GASTROINTESTINAL: normal bowel sounds, soft, non-tender, No hepatomegaly     All testing and labs listed below were personally reviewed. CXR-Personally reviewed-Cardiomegaly, clear lung  (Similar to 10/2022)  Marked cardiomegaly. Lungs are clear. No pneumothorax. Significant   osteopenic changes and degenerative changes noted in the bony structures. .   Impression:  Marked cardiomegaly. No acute congestion or infiltrate     LABS     Latest Reference Range & Units 3/6/23 12:07 3/6/23 18:36 3/7/23 04:44   Troponin <0.01 ng/mL <0.01 <0.01 <0.01     Calcium9.9mg/dL Total Protein8. 4 High g/dL Albumin4.3g/dL Albumin/Globulin Ratio1. 0 Low  Total Bilirubin0.6mg/dL Alkaline Wwlidspfqfd76H/L ALT7 Low U/L AST17U/L     Pro-CEN419 High pg/mL WBC5.2K/uL RBC4. 12M/uL Ahzjeldfel18.3g/dL Rejajrybwr91.5% MCV88. 6fL MCH29.9pg MCHC33.7g/dL RDW13.4% Uhwtiquli295S/uL     INR3.08 High      Voaazx685sgtn/L Potassium reflex Magnesium3. 4 Low mmol/L Ujgeddag336gzdf/L SA566aduh/L Anion Gap7 Sswpsob831 High mg/dL WGV60rq/dL Creatinine0.6mg/dL     EKG:  Afib, early R wave transition    ECHO 7/2022  Summary   Normal left ventricle size, wall thickness, and systolic function with an   estimated ejection fraction of 60-65%. No regional wall motion abnormalities   are seen. Indeterminate diastolic function. The left atrium is severely dilated. The right atrium is moderately dilated. The aortic root is mildly dilated. Moderate mitral regurgitation is present. Mild to moderate tricuspid regurgitation. Mild pulmonic regurgitation present. MYOVIEW 10/2020  Impression    *Normal LV function with EF of 65%    *Normal myocardial perfusion     CARDIAC CATH 2015    [facility] 9/23/15:   LVEDP - 12. LVgram - severe LVH with EF 70%, enlarged AO root consistent with htn  Cors: LM - nl, LAD - 20% prox, 30%mid, patent stent, distal irreg, LCX - 20% mid,     ASSESSMENT:    HTN:  Severe exacerbation requiring hospitalization. Exacerbated by missing meds  BP remains elevated  Increase coreg to 6.25 mg BID and add Norvasc 5  Continue cozaar    Afib:  Chronic afib on anticoagulation with coumadin  Stable  follow    Right Arm Numbness;  Seems to be neurologic. Unclear if issue is in her neck or shoulder. She has no symptoms of chest pain to suggests dissection and all pulses equal and BP the same in both arms.    No suggestion of this being ACS    Plan:  BP control as above    Thank you for allowing me to participate in the care of this individual.      Luis Mcgraw M.D., [facility]

## 2023-03-07 NOTE — PROGRESS NOTES
Pharmacy to Dose Warfarin    Pharmacy consulted to dose warfarin for Afib. INR Goal: 2-3    INR today: 3.08    Home dose: 5 mg daily except 2.5 mg Tues/Fri    Assessment/Plan:  - continue home dose, will received 2.5 mg tonight  - Possible concomitant drug-drug interactions include: aspirin     Pharmacy will continue to follow.     Nai Paredes, PharmD, BCPS  [id number]  3/7/2023 7:54 AM

## 2023-03-07 NOTE — DISCHARGE INSTR - COC
Continuity of Care Form    Patient Name: Nita Villanueva   :  1950  MRN:  [id number]    Admit date:  3/6/2023  Discharge date:  ***    Code Status Order: DNR-CC   Advance Directives:     Admitting Physician:  Charles Gregory MD  PCP: Anastacio Jeager MD    Discharging Nurse: [facility] Unit/Room#: JRQ-8700/1402-41  Discharging Unit Phone Number: ***    Emergency Contact:   Extended Emergency Contact Information  Primary Emergency Contact: [address] Phone: [phone]  Mobile Phone: [phone]  Relation: Brother/Sister  Secondary Emergency Contact: [address] Phone: [phone]  Mobile Phone: [phone]  Relation: Child    Past Surgical History:  Past Surgical History:   Procedure Laterality Date    CARDIAC SURGERY      stentsx2    COLONOSCOPY      CORONARY ANGIOPLASTY WITH STENT PLACEMENT      ROTATOR CUFF REPAIR Right     TUBAL LIGATION         Immunization History:   Immunization History   Administered Date(s) Administered    COVID-19, MODERNA BLUE border, Primary or Immunocompromised, (age 12y+), IM, 100 mcg/0.5mL 2021, 2021    Influenza Vaccine, unspecified formulation 02/10/2007, 2007, 2008    Influenza Virus Vaccine 02/10/2007, 2007, 2008    Pneumococcal Polysaccharide (Bxewkfvmg81) 10/01/2007    Tdap (Boostrix, Adacel) 2007       Active Problems:  Patient Active Problem List   Diagnosis Code    Permanent atrial fibrillation ([facility] [address] I48.21    Coronary artery disease due to lipid rich plaque I25.10, I25.83    Thyroid nodule E04.1    Chronic diastolic (congestive) heart failure (HCC) I50.32    Primary hypertension I10    Primary osteoarthritis of right knee M17.11    Primary osteoarthritis of left knee M17.12    Headache, migraine, intractable, with status migrainosus G43.911    Hyperlipidemia E78.5    Multiple falls R29.6    History of arterial ischemic stroke Z86.73    Long term (current) use of anticoagulants Z79.01 Hypertensive emergency I16.1       Isolation/Infection:   Isolation            No Isolation          Patient Infection Status       Infection Onset Added Last Indicated Last Indicated By Review Planned Expiration Resolved Resolved By    None active    Resolved    Influenza 10/20/22 10/21/22 10/20/22 Respiratory Panel, Molecular, with COVID-19 (Restricted: peds pts or suitable admitted adults)   10/30/22     COVID-19 (Rule Out) 10/20/22 10/20/22 10/20/22 Respiratory Panel, Molecular, with COVID-19 (Restricted: peds pts or suitable admitted adults) (Ordered)   10/20/22 Rule-Out Test Resulted            Nurse Assessment:  Last Vital Signs: BP (!) 118/96   Pulse 61   Temp 97.6 °F (36.4 °C) (Temporal)   Resp 18   Wt 179 lb 14.3 oz (81.6 kg)   SpO2 100%   BMI 28.18 kg/m²     Last documented pain score (0-10 scale): Pain Level: 8  Last Weight:   Wt Readings from Last 1 Encounters:   23 179 lb 14.3 oz (81.6 kg)     Mental Status:  {IP PT MENTAL STATUS:}    IV Access:  { KRIS IV ACCESS:908544215}    Nursing Mobility/ADLs:  Walking   {[facility] DME CTEV:001746751}  Transfer  {[facility] DME NEEX:184466625}  Bathing  {[facility] DME YKHJ:756373435}  Dressing  {P DME RROV:866666416}  Toileting  {P DME PNSF:087187544}  Feeding  {[facility] DME YSGR:196347042}  Med Admin  {[facility] DME PUUC:858347707}  Med Delivery   { KRIS MED Delivery:460509402}    Wound Care Documentation and Therapy:        Elimination:  Continence: Bowel: {YES / NJ:92123}  Bladder: {YES / MU:81924}  Urinary Catheter: {Urinary Catheter:133463187}   Colostomy/Ileostomy/Ileal Conduit: {YES / QK:}       Date of Last BM: ***    Intake/Output Summary (Last 24 hours) at 3/7/2023 1427  Last data filed at 3/7/2023 1241  Gross per 24 hour   Intake 295.31 ml   Output --   Net 295.31 ml     No intake/output data recorded.     Safety Concerns:     Saurabh8 Lisa PONCE Safety Concerns:960005179}    Impairments/Disabilities:      [address]ON[address] Impairments/Disabilities:176170008}    Nutrition Therapy:  Current Nutrition Therapy:   [address] KRIS Diet List:881263133}    Routes of Feeding: {CHP DME Other Feedings:248822700}  Liquids: {Slp liquid thickness:12903}  Daily Fluid Restriction: {CHP DME Yes amt example:770621494}  Last Modified Barium Swallow with Video (Video Swallowing Test): {Done Not Done NQQM:080600547}    Treatments at the Time of Hospital Discharge:   Respiratory Treatments: ***  Oxygen Therapy:  {Therapy; copd oxygen:75252}  Ventilator:    { CC Vent XPHW:746714496}    Rehab Therapies: {THERAPEUTIC INTERVENTION:0848544735}  Weight Bearing Status/Restrictions: {[facility] Weight Bearin}  Other Medical Equipment (for information only, NOT a DME order):  {EQUIPMENT:460109582}  Other Treatments: ***    Patient's personal belongings (please select all that are sent with patient):  {[facility] DME Belongings:523730820}    RN SIGNATURE:  {Esignature:255787125}    CASE MANAGEMENT/SOCIAL WORK SECTION    Inpatient Status Date: 2023    Readmission Risk Assessment Score: 11  Readmission Risk              Risk of Unplanned Readmission:  15           Discharging to Facility/ Agency   Name: Virgie Rubio will call for Appointment  Phone: [phone]  Fax: [phone]         / signature: Electronically signed by DAVIDSON Dobson, ARELIW on 3/7/23 at 2:31 PM EST    PHYSICIAN SECTION    Prognosis: {Prognosis:8032599932}    Condition at Discharge: [address] Patient Condition:205059412}    Rehab Potential (if transferring to Rehab): {Prognosis:8444051775}    Recommended Labs or Other Treatments After Discharge: ***    Physician Certification: I certify the above information and transfer of José Xiong  is necessary for the continuing treatment of the diagnosis listed and that she requires {Admit to Appropriate Level of Care:39822} for {GREATER/LESS:953783625} 30 days.      Update Admission H&P: {CHP DME Changes in ANIVJ:282235992}    PHYSICIAN SIGNATURE:  {Esignature:085715249}

## 2023-03-07 NOTE — PROGRESS NOTES
Patient's hypertension of a SBP in 180s has dropped to a SBP in the 90s. Patient reports that she feels awful. Pounding headache and nausea. Zofran already given, but says Tylenol did not help her headache when this happened this exact way over night (SBP 200s dropping to 90s). Cardiology and hospitalist contacted and aware. Holding 1100 BP meds.

## 2023-03-07 NOTE — CARE COORDINATION
Discharge Planning Note:    Chart reviewed and it appears that patient has minimal needs for discharge at this time. Please reach out to case management if any discharge needs are identified. Case management will continue to follow progress and update discharge plan as needed. Pt is active with [address] [facility]) and will need updated [facility] orders at DC. No PT/OT evals ordered at this time.

## 2023-03-07 NOTE — PROGRESS NOTES
Shift handoff and assessment complete. Patient admitted for Hypertensive Crisis. ED note states that she has been out of BP meds at home for 7-10 days, but she reported to Cardiology that it has been about 2 months. She is A&Ox4. Follows commands and displays appropriate judgment. Sating 100% on RA. Clear breath sounds throughout. A fib at baseline with Coumadin at home. (5 mg on Sun, Mon, Wed, Thurs, and Saturday. 2.5 mg Tues and Fri.) States hypertensive at baseline and not symptomatic. Feels ill when BP is low/WDL. Abdomen soft and rounded with active bowel sounds x4. Tenderness to RUQ. Reports vomiting last night and Zofran given this AM for nausea. C/o numbness, tingling, and pain in RUE. Scoring mid chest pain 5/10. Weakness and +1 pitting swelling in BLE. Uses a cane at baseline, but otherwise independent. Mag and K low this morning and replacement given. Skin intact. Able to turn self in bed. Educated on the importance of frequent reposition for the prevention of pressure ulcer formation.

## 2023-03-07 NOTE — PLAN OF CARE
Problem: Safety - Adult  Goal: Free from fall injury  Outcome: Progressing     Problem: ABCDS Injury Assessment  Goal: Absence of physical injury  Outcome: Progressing  Patient educated on the importance of calling out before exiting the bed and always waiting for assistance. Bed in lowest position with wheels locked. Alarm activated. 2/4 side rails up. Non-skid socks on. Call light within reach. Hourly checks. All requested needs provided. Problem: Skin/Tissue Integrity  Goal: Absence of new skin breakdown  Description: 1. Monitor for areas of redness and/or skin breakdown  2. Assess vascular access sites hourly  3. Every 4-6 hours minimum:  Change oxygen saturation probe site  4. Every 4-6 hours:  If on nasal continuous positive airway pressure, respiratory therapy assess nares and determine need for appliance change or resting period. Outcome: Progressing  Able to turn self in bed. Educated on the importance of frequent reposition for the prevention of pressure ulcer formation. Problem: Pain  Goal: Verbalizes/displays adequate comfort level or baseline comfort level  Outcome: Progressing  C/o 5/10 pain in mid chest and R arm.         Problem: Chronic Conditions and Co-morbidities  Goal: Patient's chronic conditions and co-morbidity symptoms are monitored and maintained or improved  3/7/2023 0826 by Yuko Kamara RN  Outcome: Progressing  Flowsheets (Taken 3/7/2023 0800)  Care Plan - Patient's Chronic Conditions and Co-Morbidity Symptoms are Monitored and Maintained or Improved:   Monitor and assess patient's chronic conditions and comorbid symptoms for stability, deterioration, or improvement   Update acute care plan with appropriate goals if chronic or comorbid symptoms are exacerbated and prevent overall improvement and discharge   Collaborate with multidisciplinary team to address chronic and comorbid conditions and prevent exacerbation or deterioration  3/6/2023 2229 by Jacqueline RODRÍGUEZ Joanie Derby, RN  Outcome: Progressing   BP elevated with scheduled meds. Reports hypertensive at baseline.         Problem: Discharge Planning  Goal: Discharge to home or other facility with appropriate resources  3/7/2023 0826 by Santino Almeida RN  Outcome: Progressing  Flowsheets (Taken 3/7/2023 0800)  Discharge to home or other facility with appropriate resources: Identify barriers to discharge with patient and caregiver

## 2023-03-07 NOTE — CARE COORDINATION
[address] [employer] [address] [phone] was active with this pt prior to admit. Discharge planner notified. Will follow.

## 2023-03-07 NOTE — CARE COORDINATION
Case Management Assessment  Initial Evaluation    Date/Time of Evaluation: 3/7/2023 2:29 PM  Assessment Completed by: DAVIDSON Carranza, ARELIW    If patient is discharged prior to next notation, then this note serves as note for discharge by case management. Patient Name: Lucita Fleischer                   YOB: 1950  Diagnosis: Hypokalemia [E87.6]  Atypical chest pain [R07.89]  Uncontrolled hypertension [I10]  Hypertensive emergency [I16.1]                   Date / Time: 3/6/2023 11:38 AM    Patient Admission Status: Inpatient   Readmission Risk (Low < 19, Mod (19-27), High > 27): Readmission Risk Score: 13.7    Current PCP: Kay Umana MD  PCP verified by CM? Yes    Chart Reviewed: Yes      History Provided by: Patient  Patient Orientation: Alert and Oriented    Patient Cognition: Alert    Hospitalization in the last 30 days (Readmission):  No    If yes, Readmission Assessment in CM Navigator will be completed. Advance Directives:      Code Status: DNR-CC   Patient's Primary Decision Maker is: Legal Next of Kin    Primary Decision MakerMatilda Shante - Child - [phone]    Discharge Planning:    Patient lives with: Family Members (w/15year old grandson) Type of Home: House (1 level - 1 step in)  Primary Care Giver: Self  Patient Support Systems include: Family Members   Current Financial resources: Medicare  Current community resources: None  Current services prior to admission: Durable Medical Equipment, Home Care (Active w/[facility])            Current DME: Qiun Carter            Type of Home Care services:  Nursing Services    ADLS  Prior functional level: Independent in ADLs/IADLs (uses a cane at all times)  Current functional level:  Independent in ADLs/IADLs (using cane at all times)    PT AM-PAC:   /24  OT AM-PAC:   /24    Family can provide assistance at DC: Yes (Stated great grandson is only 15 but helps her with anything she needs.)  Would you like Case Management to discuss the discharge plan with any other family members/significant others, and if so, who? Yes (w/great grandson)  Plans to Return to Present Housing: Yes  Other Identified Issues/Barriers to RETURNING to current housing: None  Potential Assistance needed at discharge: Outpatient PT/OT            Potential DME:    Patient expects to discharge to: House  Plan for transportation at discharge:  (May need Lyft home.)    Financial    Payor: Alejandrina Martinez / Plan: Remberto Puff ESSENTIAL/PLUS / Product Type: *No Product type* /     Potential assistance Purchasing Medications: No  Meds-to-Beds request:        [address] [facility], [address] [place] [phone]  Phone: [phone] Fax: [phone]    [facility] ([facility]) - [address] [facility] [phone] - F [phone]  [address] [place] [id number]  Phone: [phone] Fax: [address], [address] [phone] [facility] [address] [phone]  [address] [place] [phone]  Phone: [phone] Fax: [phone] [facility] [address] [phone] [address] [phone]  [address] [place] [address]  Phone: [phone] Fax: [phone]      Notes:    Factors facilitating achievement of predicted outcomes: Family support and Cooperative    Barriers to discharge: None    Additional Case Management Notes:  [facility]. Pt will need a new HHC order at discharge. No other case management needs have been identified at this time. Will continue to follow.     The Plan for Transition of Care is related to the following treatment goals of Hypokalemia [E87.6]  Atypical chest pain [R07.89]  Uncontrolled hypertension [I10]  Hypertensive emergency [J45.3]    IF APPLICABLE: The Patient and/or patient representative Flaco Duran and her family were provided with a choice of provider and agrees with the discharge plan. Freedom of choice list with basic dialogue that supports the patient's individualized plan of care/goals and shares the quality data associated with the providers was provided to:     Patient Representative Name:       The Patient and/or Patient Representative Agree with the Discharge Plan?       DAVIDSON Acosta, MIRTHA  Case Management Department    Electronically signed by DAVIDSON Acosta LSW on 3/7/2023 at 2:30 PM

## 2023-03-08 LAB
ANION GAP SERPL CALCULATED.3IONS-SCNC: 5 MMOL/L (ref 3–16)
BUN BLDV-MCNC: 15 MG/DL (ref 7–20)
CALCIUM SERPL-MCNC: 9.8 MG/DL (ref 8.3–10.6)
CHLORIDE BLD-SCNC: 104 MMOL/L (ref 99–110)
CO2: 30 MMOL/L (ref 21–32)
CREAT SERPL-MCNC: 0.8 MG/DL (ref 0.6–1.2)
GFR SERPL CREATININE-BSD FRML MDRD: >60 ML/MIN/{1.73_M2}
GLUCOSE BLD-MCNC: 102 MG/DL (ref 70–99)
HCT VFR BLD CALC: 38.2 % (ref 36–48)
HEMOGLOBIN: 12.4 G/DL (ref 12–16)
INR BLD: 3.04 (ref 0.87–1.14)
MCH RBC QN AUTO: 29.2 PG (ref 26–34)
MCHC RBC AUTO-ENTMCNC: 32.4 G/DL (ref 31–36)
MCV RBC AUTO: 90.2 FL (ref 80–100)
PDW BLD-RTO: 13.3 % (ref 12.4–15.4)
PLATELET # BLD: 126 K/UL (ref 135–450)
PMV BLD AUTO: 10.2 FL (ref 5–10.5)
POTASSIUM REFLEX MAGNESIUM: 4.5 MMOL/L (ref 3.5–5.1)
PROTHROMBIN TIME: 31.7 SEC (ref 11.7–14.5)
RBC # BLD: 4.24 M/UL (ref 4–5.2)
SODIUM BLD-SCNC: 139 MMOL/L (ref 136–145)
WBC # BLD: 4.3 K/UL (ref 4–11)

## 2023-03-08 PROCEDURE — 82088 ASSAY OF ALDOSTERONE: CPT

## 2023-03-08 PROCEDURE — 97530 THERAPEUTIC ACTIVITIES: CPT

## 2023-03-08 PROCEDURE — 84244 ASSAY OF RENIN: CPT

## 2023-03-08 PROCEDURE — 97166 OT EVAL MOD COMPLEX 45 MIN: CPT

## 2023-03-08 PROCEDURE — 36415 COLL VENOUS BLD VENIPUNCTURE: CPT

## 2023-03-08 PROCEDURE — 2580000003 HC RX 258: Performed by: INTERNAL MEDICINE

## 2023-03-08 PROCEDURE — 1200000000 HC SEMI PRIVATE

## 2023-03-08 PROCEDURE — 85027 COMPLETE CBC AUTOMATED: CPT

## 2023-03-08 PROCEDURE — 6370000000 HC RX 637 (ALT 250 FOR IP): Performed by: INTERNAL MEDICINE

## 2023-03-08 PROCEDURE — 97116 GAIT TRAINING THERAPY: CPT

## 2023-03-08 PROCEDURE — 85610 PROTHROMBIN TIME: CPT

## 2023-03-08 PROCEDURE — 80048 BASIC METABOLIC PNL TOTAL CA: CPT

## 2023-03-08 PROCEDURE — 97162 PT EVAL MOD COMPLEX 30 MIN: CPT

## 2023-03-08 PROCEDURE — 99232 SBSQ HOSP IP/OBS MODERATE 35: CPT | Performed by: INTERNAL MEDICINE

## 2023-03-08 PROCEDURE — 6360000002 HC RX W HCPCS: Performed by: INTERNAL MEDICINE

## 2023-03-08 RX ORDER — CARVEDILOL 3.12 MG/1
3.12 TABLET ORAL ONCE
Status: COMPLETED | OUTPATIENT
Start: 2023-03-08 | End: 2023-03-08

## 2023-03-08 RX ORDER — CARVEDILOL 6.25 MG/1
6.25 TABLET ORAL 2 TIMES DAILY WITH MEALS
Status: DISCONTINUED | OUTPATIENT
Start: 2023-03-08 | End: 2023-03-11 | Stop reason: HOSPADM

## 2023-03-08 RX ORDER — SPIRONOLACTONE 25 MG/1
25 TABLET ORAL DAILY
Status: DISCONTINUED | OUTPATIENT
Start: 2023-03-08 | End: 2023-03-11 | Stop reason: HOSPADM

## 2023-03-08 RX ORDER — WARFARIN SODIUM 5 MG/1
5 TABLET ORAL
Status: DISCONTINUED | OUTPATIENT
Start: 2023-03-09 | End: 2023-03-11 | Stop reason: HOSPADM

## 2023-03-08 RX ORDER — KETOROLAC TROMETHAMINE 15 MG/ML
15 INJECTION, SOLUTION INTRAMUSCULAR; INTRAVENOUS ONCE
Status: COMPLETED | OUTPATIENT
Start: 2023-03-08 | End: 2023-03-08

## 2023-03-08 RX ORDER — WARFARIN SODIUM 2.5 MG/1
2.5 TABLET ORAL
Status: COMPLETED | OUTPATIENT
Start: 2023-03-08 | End: 2023-03-08

## 2023-03-08 RX ADMIN — OYSTER SHELL CALCIUM WITH VITAMIN D 1 TABLET: 500; 200 TABLET, FILM COATED ORAL at 21:08

## 2023-03-08 RX ADMIN — KETOROLAC TROMETHAMINE 15 MG: 15 INJECTION, SOLUTION INTRAMUSCULAR; INTRAVENOUS at 18:43

## 2023-03-08 RX ADMIN — ATORVASTATIN CALCIUM 80 MG: 80 TABLET, FILM COATED ORAL at 07:52

## 2023-03-08 RX ADMIN — WARFARIN SODIUM 2.5 MG: 2.5 TABLET ORAL at 17:47

## 2023-03-08 RX ADMIN — CARVEDILOL 3.12 MG: 3.12 TABLET, FILM COATED ORAL at 07:52

## 2023-03-08 RX ADMIN — CARVEDILOL 6.25 MG: 6.25 TABLET, FILM COATED ORAL at 17:47

## 2023-03-08 RX ADMIN — SPIRONOLACTONE 25 MG: 25 TABLET ORAL at 17:48

## 2023-03-08 RX ADMIN — CARVEDILOL 3.12 MG: 3.12 TABLET, FILM COATED ORAL at 12:44

## 2023-03-08 RX ADMIN — LOSARTAN POTASSIUM 100 MG: 100 TABLET, FILM COATED ORAL at 07:52

## 2023-03-08 RX ADMIN — ASPIRIN 81 MG: 81 TABLET, COATED ORAL at 07:52

## 2023-03-08 RX ADMIN — FUROSEMIDE 20 MG: 20 TABLET ORAL at 07:53

## 2023-03-08 RX ADMIN — OYSTER SHELL CALCIUM WITH VITAMIN D 1 TABLET: 500; 200 TABLET, FILM COATED ORAL at 07:52

## 2023-03-08 RX ADMIN — SODIUM CHLORIDE, PRESERVATIVE FREE 10 ML: 5 INJECTION INTRAVENOUS at 07:52

## 2023-03-08 RX ADMIN — SODIUM CHLORIDE, PRESERVATIVE FREE 10 ML: 5 INJECTION INTRAVENOUS at 21:08

## 2023-03-08 ASSESSMENT — ENCOUNTER SYMPTOMS: CHEST TIGHTNESS: 0

## 2023-03-08 ASSESSMENT — PAIN SCALES - GENERAL
PAINLEVEL_OUTOF10: 0
PAINLEVEL_OUTOF10: 10

## 2023-03-08 ASSESSMENT — PAIN DESCRIPTION - LOCATION: LOCATION: BACK

## 2023-03-08 NOTE — PROGRESS NOTES
Shift handoff and assessment complete. Patient is A&Ox4. Remains hypertensive. Denies pain, numbness, and tingling in R arm which is changed since yesterday. Also denies RUQ pain at this time. In chronic, controlled A fib on Coumadin. +1 pitting edema in BLE, but otherwise skin is intact. Uses cane at baseline and is independent after set up. Able to turn self in bed. Educated on the importance of frequent reposition for the prevention of pressure ulcer formation.

## 2023-03-08 NOTE — TELEPHONE ENCOUNTER
I spoke with pt. She is in the hospital right now. I relaye message per MMRN. I advised they she may set up an MA visit when she gets better.

## 2023-03-08 NOTE — PROGRESS NOTES
Hospitalist Progress Note      PCP: Philip Beyer MD    Date of Admission: 3/6/2023    Chief Complaint: high blood pressure/ hypertensive urgency    Hospital Course: 72 y.o. female with PMH of CVA, AF, CAD, [place] palsy, dCHF, htn, hld  who presents with uncontrolled hypertension.  Was seen per home health car and found to have BP above 200s systolic so referred to ED.  Pt states her BP is always high, but has been running even higher than normal as her pharmacy has been out of her BP meds and she has been unable to get them.  Does have intermittent headache and dizziness at times.  Also with R arm pain since yesterday.  Denies chest pain, dyspnea, abd pain, vision deficits, gross focal motor weakness or numbness.  She has been weaned off of gtts and is now on oral meds.      Subjective: she is doing well but complains of left arm pain and wekaness       Medications:  Reviewed    Infusion Medications    sodium chloride       Scheduled Medications    carvedilol  3.125 mg Oral BID with meals    oyster shell calcium w/D  1 tablet Oral BID    atorvastatin  80 mg Oral Daily    losartan  100 mg Oral Daily    aspirin  81 mg Oral Daily    furosemide  20 mg Oral Daily    sodium chloride flush  5-40 mL IntraVENous 2 times per day    warfarin  2.5 mg Oral Once per day on Tue Fri    [START ON 3/8/2023] warfarin  5 mg Oral Once per day on Sun Mon Wed Thu Sat     PRN Meds: acetaminophen, sodium chloride flush, sodium chloride, ondansetron **OR** ondansetron, polyethylene glycol, nitroGLYCERIN, hydrALAZINE, labetalol      Intake/Output Summary (Last 24 hours) at 3/7/2023 2003  Last data filed at 3/7/2023 1900  Gross per 24 hour   Intake 535.31 ml   Output --   Net 535.31 ml       Physical Exam Performed:    BP (!) 148/101   Pulse 61   Temp 97 °F (36.1 °C) (Temporal)   Resp 22   Wt 179 lb 14.3 oz (81.6 kg)   SpO2 100%   BMI 28.18 kg/m²     General appearance: No apparent distress, appears stated age and  cooperative. HEENT: Pupils equal, round, and reactive to light. Conjunctivae/corneas clear. Neck: Supple, with full range of motion. No jugular venous distention. Trachea midline. Respiratory:  Normal respiratory effort. Clear to auscultation, bilaterally without Rales/Wheezes/Rhonchi. Cardiovascular: Regular rate and rhythm with normal S1/S2 without murmurs, rubs or gallops. Abdomen: Soft, non-tender, non-distended with normal bowel sounds. Musculoskeletal: No clubbing, cyanosis or edema bilaterally. Full range of motion without deformity. Skin: Skin color, texture, turgor normal.  No rashes or lesions. Neurologic:  Neurovascularly intact without any focal sensory/motor deficits. Cranial nerves: II-XII intact, grossly non-focal.  Psychiatric: Alert and oriented, thought content appropriate, normal insight  Capillary Refill: Brisk, 3 seconds, normal   Peripheral Pulses: +2 palpable, equal bilaterally       Labs:   Recent Labs     03/06/23  1207 03/07/23  0444   WBC 6.7 5.2   HGB 13.5 12.3   HCT 40.5 36.5    148     Recent Labs     03/06/23  1207 03/07/23  0444    141   K 3.2* 3.4*    102   CO2 30 32   BUN 11 14   CREATININE 0.6 0.6   CALCIUM 9.9 9.8     Recent Labs     03/06/23  1207   AST 17   ALT 7*   BILITOT 0.6   ALKPHOS 69     Recent Labs     03/06/23  1207 03/07/23  0444   INR 2.50* 3.08*     Recent Labs     03/06/23  1207 03/06/23  1836 03/07/23  0444   TROPONINI <0.01 <0.01 <0.01       Urinalysis:      Lab Results   Component Value Date/Time    NITRU Negative 10/19/2022 01:40 PM    WBCUA 3 10/19/2022 01:40 PM    BACTERIA None Seen 10/19/2022 01:40 PM    RBCUA 1 10/19/2022 01:40 PM    BLOODU Negative 10/19/2022 01:40 PM    SPECGRAV >=1.030 10/19/2022 01:40 PM    GLUCOSEU Negative 10/19/2022 01:40 PM       Radiology:  MRI BRAIN WO CONTRAST   Preliminary Result   Cerebral atrophy. Severe chronic small vessel ischemic changes.   Remote   lacunar infarcts in the left cerebellum, left kosta, and basal ganglia and   thalami. No acute brain parenchymal abnormality. XR CHEST PORTABLE   Final Result   Marked cardiomegaly. No acute congestion or infiltrate             IP CONSULT TO CARDIOLOGY  IP CONSULT TO SPIRITUAL SERVICES  PHARMACY TO DOSE MEDICATION    Assessment/Plan:    Active Hospital Problems    Diagnosis     Hypertensive emergency [I16.1]      Priority: Medium     Hypertensive urgency / emergency 2/2 inability to get home po BP meds  Dizziness  - IV hydralazine and labetalol  - slow steady decrease in BP  - resume oral BP regimen; titrate as needed  - serial trops  - trend labs for end organ damage  - MRI has rule out acute strokes     RUE pain. Questionable cardiac equivalent  - serial trops  - monitor tele  - already on ASA and statin  - consider stress if persistent  - cardio already consulted ,following  - MRI negative for new stroke     Hypokalemia  - monitor and replace     PMH of CVA, AF on coumadin, CAD, [place] palsy, compensated chronic dCHF, hld  - resume hold meds    DVT Prophylaxis: warfarin  Diet: ADULT DIET;  Regular; Low Fat/Low Chol/High Fiber/FERNANDO; No Caffeine  Code Status: DNR-CC  PT/OT Eval Status: eval and treat     Dispo - she can be downgraded form the icu level of care          Scottie Barajas MD

## 2023-03-08 NOTE — PROGRESS NOTES
[facility]   Phone: [phone]    Occupational Therapy    [x] Initial Evaluation            [] Daily Treatment Note         [] Discharge Summary      Patient: Sean Irwin   : 1950   MRN: [id number]   Date of Service:  3/8/2023    Admitting Diagnosis:  Hypertensive emergency  Current Admission Summary: 71 y.o. female with PMH of CVA, AF, CAD, [place] palsy, dCHF, htn, hld  who presents with uncontrolled hypertension. Was seen per home health car and found to have BP above 008C systolic so referred to ED. Pt states her BP is always high, but has been running even higher than normal as her pharmacy has been out of her BP meds and she has been unable to get them. Does have intermittent headache and dizziness at times. Also with R arm pain since yesterday. Denies chest pain, dyspnea, abd pain, vision deficits, gross focal motor weakness or numbness. She has been weaned off of gtts and is now on oral meds. Past Medical History:  has a past medical history of Acute cerebrovascular accident (CVA) ([address] LAST (acute kidney injury) ([address] Atrial fibrillation ([address] Bell palsy, CAD (coronary artery disease), Cerebral artery occlusion with cerebral infarction ([address] Chronic diastolic CHF (congestive heart failure) ([address] CVA (cerebral vascular accident) ([address] Hypertension, Intracranial hemorrhage ([address] Mixed hyperlipidemia, Nonintractable headache, Nontraumatic subcortical hemorrhage of cerebral hemisphere ([address] Poor vision, Primary osteoarthritis of right knee, Sudden visual loss of left eye, Thyroid nodule, and TIA involving right internal carotid artery. Past Surgical History:  has a past surgical history that includes Cardiac surgery; Tubal ligation; Coronary angioplasty with stent; Colonoscopy; and Rotator cuff repair (Right). Discharge Recommendations: Sean Irwin scored a 19/24 on the AM-PAC ADL Inpatient form.  Current research shows that an AM-PAC score of 18 or greater is typically associated with a discharge to the patient's home setting. Based on the patient's AM-PAC score, and their current ADL deficits, it is recommended that the patient have 2-3 sessions per week of Occupational Therapy at d/c to increase the patient's independence. At this time, this patient demonstrates the endurance and safety to discharge home with home (home vs OP services) and a follow up treatment frequency of 2-3x/wk. Please see assessment section for further patient specific details. If patient discharges prior to next session this note will serve as a discharge summary. Please see below for the latest assessment towards goals.      HOME HEALTH CARE: LEVEL 1 STANDARD    - Initial home health evaluation to occur within 24-48 hours, in patient home   - Therapy to evaluate with goal of regaining prior level of functioning   - Therapy to evaluate if patient has [address] needs for personal care     DME Required For Discharge: patient has all required DME for discharge    Precautions/Restrictions: medium fall risk  Weight Bearing Restrictions: no restrictions  [] Right Upper Extremity  [] Left Upper Extremity [] Right Lower Extremity  [] Left Lower Extremity     Required Braces/Orthotics: no braces required   [] Right  [] Left  Positional Restrictions:no positional restrictions    Pre-Admission Information   Lives With:  lives with her 15 y/o great grandson and her son     Type of Home: house  Home Layout: one level  Home Access:  2 step to enter without rails   Bathroom Layout: tub only  Bathroom Equipment:  n/a  Toilet Height: standard height  Home Equipment: rolling walker, single point cane  Transfer Assistance: modified independent with use of straight point cane  Ambulation Assistance:modified independent with use of SPC, RW for longer distance  ADL Assistance: independent with all ADL's  IADL Assistance: independent with homemaking tasks  Active : [x] Yes  [] No  Hand Dominance: [] Left  [x] Right  Current Employment: retired. Occupation: used to work as a home health aide  Hobbies:   Recent Falls: no falls for the last 9 months    Examination   Vision:   Vision Gross Assessment: Impaired and Vision Corrective Device: wears glasses for reading  Pt has poor peripheral vision on both sides, pt reports she drives but only locally and not past 6 PM, pt reports no new vision changes and this has been her baseline   Hearing:   WFL    Observation:   General Observation:  orthostatic BP taken: seated- 140/104) , standing- (175/122), seated post ambulation- (163/111)  Posture:   Rounded shoulders  Sensation:   WFL  Tone:   Normotonic  Coordination Testing:   Coordination and Movement Description: decreased speed  Finger to Nose: WFL  Alternating Pronation/Supination: WFL  Finger/Thumb Opposition: WFL    ROM:   (B) UE AROM WFL  Pt reports R LE arthritis in knee which impacts ROM and ambulation, pt reports wanting to see therapy for it, pt reports she does not ambulate long distances and is aware of her limits  Strength:   (B) UE strength grossly [facility]    Therapist Clinical Decision Making (Complexity): medium complexity  Clinical Presentation: evolving      Subjective  General: pt seated in chair, just finished lunch, agreeable to OT/PT eval  Pain: 0/10- states she has arthritis in R knee and it hurts when she walks for too long  Pain Interventions: not applicable        Activities of Daily Living  Basic Activities of Daily Living  General Comments: pt kindly declines ADLs and stated she completed them this AM, states she is able to complete all independently, but nurse typically provides SBA for ambulation to and from bathroom for safety  Instrumental Activities of Daily Living  No IADL completed on this date. Functional Mobility  Bed Mobility  Bed mobility not completed on this date.   Comments:  Transfers  Sit to stand transfer:stand by assistance  Stand to sit transfer: stand by assistance  Comments:  Functional Mobility:  Sitting Balance: Independent. Standing Balance: contact guard assistance. Functional Mobility: .  contact guard assistance  Functional Mobility Activity: 80 feet in hallway  Functional Mobility Device Use: rolling walker  Functional Mobility Comment: pt used cane she typically uses at home for first 40 feet, cane readjusted to fit pt height, pt had decreased lynn and step length, pt reported feeling good and wanted to walk more, no LOB, last 40 feet pt used RW, pt demonstrated increased lynn and stability, pt reported feeling at baseline with ambulation, reported some SOB post ambulation, HR remained between 90-95 BPM, spO2>90%    Other Therapeutic Interventions    Functional Outcomes  AM-PAC Inpatient Daily Activity Raw Score: 19    Cognition  WFL  Orientation:    alert and oriented x 4  Command Following:   [facility]  Barriers To Learning: none  Patient Education: patient educated on goals, OT role and benefits, plan of care, ADL adaptive strategies, proper use of assistive device/equipment, transfer training, discharge recommendations  Learning Assessment:  patient verbalizes and demonstrates understanding    Assessment  Activity Tolerance: good  Impairments Requiring Therapeutic Intervention: decreased functional mobility, decreased strength, decreased endurance, decreased vision  Prognosis: good  Clinical Assessment: pt presents with above deficits and would benefit from continued skilled therapy while in the hospital to ensure carry over and safe ADL performance and safe functional mobility. Pt has support system and lives in a one level home.  Pt has vision concerns (poor peripheral), but nothing new since being in hospital. Pt also has high blood pressure that should continued to be monitored prior to activity   Safety Interventions: patient left in chair, chair alarm in place, call light within reach, patient at risk for falls, and nurse notified    Plan  Frequency: 1-2 x/per week  Current Treatment Recommendations: strengthening, functional mobility training, endurance training, patient/caregiver education, ADL/self-care training, home management training, home exercise program, and safety education    Goals  Patient Goals: not stated   Short Term Goals:  Time Frame: d/c  Patient will complete upper body ADL at supervision   Patient will complete lower body ADL at stand by assistance   Patient will complete toileting at supervision   Patient will complete functional mobility at stand by assistance   Patient will increase functional standing balance to 2 minutes for improved ADL completion  Patient to gather and transport IADL items at stand by assistance     Therapy Session Time     Individual Group Co-treatment   Time In    1304   Time Out    1342   Minutes    38      23  Timed Code Treatment Minutes:   23  Total Treatment Minutes:  [address]/OT    Electronically Signed By: DENISE Herrera/L [id number] (I have reviewed and agree with the above documentation.  I was present during session and guided decision making throughout)

## 2023-03-08 NOTE — ACP (ADVANCE CARE PLANNING)
Advance Care Planning     Advance Care Planning Inpatient Note  [facility] Care Department    Today's Date: 3/8/2023  Unit: [facility] ICU    Received request from IDT Member, Camron Moses RN. Upon review of chart and communication with care team, patient's decision making abilities are not in question. . Patient was present in the room during visit. Goals of ACP Conversation:  Discuss advance care planning documents    Health Care Decision Makers:       Primary Decision Maker: Yony Harry Child - [phone]  Summary:  [address]  Patient verbally verified she would like her oldest son, Matt To, to be her Primary Decision Maker for José Luis Thakkar as already listed in patient's chart. Correct spelling of son's name provided by patient and correction made in chart. Patient indicated she would like to have grand-daughter, Keerthi Acosta, included as a decision maker. Telephone number not available. Advance Care Planning Documents (Patient Wishes):  Healthcare Power of /Advance Directive Appointment of [address] completed in 2020 on file in chart. Patient would like to update documents. Assessment:  Patient confirmed interest in updating advance directives identifying her oldest son as her health care decision maker. \"I have a Power of Brigido Lainez so my son can do business for me and I need one for health care,\" patient said. Patient shared current health concerns and spiritual needs.       Interventions:  Provided education on documents for clarity and greater understanding  Encouraged ongoing ACP conversation with future decision makers and loved ones  Reviewed but did not complete ACP document  Documents given to patient for review  Spiritual support provided through active listening, affirmation, blessing, and prayer    Care Preferences Communicated:   No.  Care preferences not communicated at this time with respect to wishes yet to be expressed in revised document(s). Outcomes/Plan:  Patient to review documents and notify RN if/when she would like  follow-up to continue advance directive conversation and/or for assistance in completing documents.       Electronically signed by Almeta Ahumada on 3/8/2023 at 6:16 PM

## 2023-03-08 NOTE — PROGRESS NOTES
[facility] [address] Department   Phone: [phone]    Physical Therapy    [x] Initial Evaluation            [] Daily Treatment Note         [] Discharge Summary      Patient: Colby Mccurdy   : 1950   MRN: [id number]   Date of Service:  3/8/2023  Admitting Diagnosis: Hypertensive emergency    Current Admission Summary: Colby Mccurdy is a 71 y.o. female with a history of hypertension, hyperlipidemia, CAD, atrial fibrillation, chronic anticoagulation, CHF and CVA who presents to the emergency department today at the recommendation of her home health care nurse after she had her blood pressure taken this morning and it was around 220/118. Patient states she has been out of her losartan and carvedilol for the last 7 to 10 days stating the pharmacy has been out of it. She states she is supposed to pick that up today. Her blood pressure is 198/128 on arrival.  Patient denies having any chest pain, shortness of breath, palpitations or diaphoresis. She states she has had some right arm pain since yesterday of unknown etiology. She denies headache, lightheadedness or dizziness, vision changes, numbness, tingling or weakness in her extremities. Past Medical History:  has a past medical history of Acute cerebrovascular accident (CVA) ([address] LAST (acute kidney injury) ([address] Atrial fibrillation ([address] Bell palsy, CAD (coronary artery disease), Cerebral artery occlusion with cerebral infarction ([address] Chronic diastolic CHF (congestive heart failure) ([address] CVA (cerebral vascular accident) ([address] Hypertension, Intracranial hemorrhage ([address] Mixed hyperlipidemia, Nonintractable headache, Nontraumatic subcortical hemorrhage of cerebral hemisphere ([address] Poor vision, Primary osteoarthritis of right knee, Sudden visual loss of left eye, Thyroid nodule, and TIA involving right internal carotid artery.     Past Surgical History:  has a past surgical history that includes Cardiac surgery; Tubal ligation; Coronary angioplasty with stent; Colonoscopy; and Rotator cuff repair (Right). Discharge Recommendations: Rachel Contreras scored a 18/24 on the AM-PAC short mobility form. Current research shows that an AM-PAC score of 18 or greater is typically associated with a discharge to the patient's home setting. Based on the patient's AM-PAC score and their current functional mobility deficits, it is recommended that the patient have 2-3 sessions per week of Physical Therapy at d/c to increase the patient's independence. At this time, this patient demonstrates the endurance and safety to discharge home with home health physical therapy services and a follow up treatment frequency of 2-3x/wk. Please see assessment section for further patient specific details. HOME HEALTH CARE: LEVEL 1 STANDARD  - Initial home health evaluation to occur within 24-48 hours, in patient home   - Therapy to evaluate with goal of regaining prior level of functioning   - Therapy to evaluate if patient has [address] needs for personal care   If patient discharges prior to next session this note will serve as a discharge summary. Please see below for the latest assessment towards goals.        DME Required For Discharge: no DME required at discharge  Precautions/Restrictions: medium fall risk  Weight Bearing Restrictions: no restrictions  [] Right Upper Extremity  [] Left Upper Extremity [] Right Lower Extremity  [] Left Lower Extremity     Required Braces/Orthotics: no braces required   [] Right  [] Left  Positional Restrictions:no positional restrictions    Pre-Admission Information   Lives With:  lives with her son who is able to help at home as needed, and her 15 y/o great-grandson  Type of Home: house  Home Layout: one level  Home Access:  2 steps to enter without rails   Bathroom Layout: tub only  Bathroom Equipment: N/A  Toilet Height: standard height  Home Equipment: rolling walker, single point cane  Transfer Assistance: modified independent with use of single point cane  Ambulation Assistance:modified independent with use of single point cane. Patient uses walker for longer distances but states she rarely walks long distances 2/2 SOB  ADL Assistance: independent with all ADL's  IADL Assistance: independent with homemaking tasks  Active :        [x] Yes                 [] No  Hand Dominance: [] Left                 [x] Right  Current Employment: retired. Occupation: used to work as a home health aide  Hobbies:   Recent Falls: no falls for the last 9 months    Examination   Vision:   Vision Gross Assessment: Impaired and Vision Corrective Device: wears glasses for reading  Hearing:   [employer] [facility]  Observation:   General Observation:  patient sitting upright in chair and watching television upon arrival  Posture:   Mild rounded shoulders during gait and with holding AD  Sensation:   denies numbness and tingling  Coordination Testing:   Finger to Nose: WFL    ROM:   (B) LE AROM WFL  (B) UE AROM WFL  Strength:   (B) LE strength grossly -4  Therapist Clinical Decision Making (Complexity): medium complexity  Clinical Presentation: evolving      Subjective  General: patient sitting upright in chair upon arrival; pleasant and agreeable to therapy today. Stated she was able to walk to the bathroom with help earlier in the day to freshen up. Pain: 0/10; patient reports no pain other than pre-existing arthritis in R knee  Pain Interventions: MMT for bilateral knees in seated position was modified to avoid provocation of knee pain     Functional Mobility  Bed Mobility  Bed mobility not completed on this date. Comments:  Transfers  Sit to stand transfer: stand by assistance  Stand to sit transfer: stand by assistance  Comments: gait belt used; patient able to stand from chair by pushing off hand rests. Once standing, BP was taken and was at 175/122. RN aware.   No symptoms noted, and patient stood and held onto rolling walker. Ambulation  Surface:level surface  Assistive Device: single point cane was used for first 40 ft of ambulation, rolling walker was used for last 40 ft of ambulation  Assistance: CGA of 1 for ambulation in hallway  Distance: 80'  Gait Mechanics: decreased gait speed, decreased step length  Comments: single point cane and rolling walker were both fitted appropriately prior to ambulation; patient exhibited improved gait mechanics and steadiness with using walker. No LOB or significant SOB noted. After returning to room and sitting in chair, pt BP was 163/111 and had an SpO2 of 100%. Stair Mobility  Stair mobility not completed on this date. Comments: assess stair mobility at next visit in order to assess safety with stair ambulation using single point cane at home  Wheelchair Mobility:  No w/c mobility completed on this date. Comments:  Balance  Static Sitting Balance: fair (+): maintains balance at SBA/supervision without use of UE support  Dynamic Sitting Balance: fair (+): maintains balance at SBA/supervision without use of UE support  Static Standing Balance: fair (-): maintains balance at SBA with use of UE support  Dynamic Standing Balance: fair (-): maintains balance at CGA with use of UE support  Comments:    Other Therapeutic Interventions    Functional Outcomes  AM-PAC Inpatient Mobility Raw Score : 18              Cognition  WFL  Orientation:    alert and oriented x 4  Command Following:   [facility]    Education  Barriers To Learning: none  Patient Education: patient educated on goals, PT role and benefits, plan of care, general safety, functional mobility training, proper use of assistive device/equipment  Learning Assessment:  patient verbalizes and demonstrates understanding    Assessment  Activity Tolerance: Patient tolerated activity today with minimal complaints of SOB and fatigue; stated she wanted to turn around after walking 40' because she felt a little SOB.  She clarified that this SOB was not significant. When asked how she felt about ambulation today, patient stated it felt the same as when she walks at home. Patient BP prior to mobilization was 140/104 in sitting. After standing, BP was 175/122. After ambulation in hallway and sitting in chair again, BP was 163/111. SpO2 and HR remained stable throughout session and bedside RN notified of BP changes/patient progress. Impairments Requiring Therapeutic Intervention: decreased functional mobility, decreased strength, decreased endurance, decreased balance  Prognosis: good  Clinical Assessment: Prior to admission, patient was MOD  I at home and completing ADLs and functional activity without assistance. Now patient requires [id number] and contact guard assistance for ambulation and transfers. Patient will continue to benefit from skilled physical therapy services after discharge from hospital to continue improving strength, balance, independence with ADLs and independence with ambulation in the home.   Safety Interventions: patient left in chair, chair alarm in place, call light within reach, gait belt, and nurse notified    Plan  Frequency: 3-5 x/per week  Current Treatment Recommendations: strengthening, balance training, functional mobility training, gait training, endurance training, and ADL/self-care training    Goals  Patient Goals: to return home   Short Term Goals:  Time Frame: discharge  Patient will complete bed mobility at modified independent   Patient will complete transfers at [facility]   Patient will ambulate 150 ft with use of LRAD at [facility]  Patient will ascend/descend 2 stairs without use of HR at contact guard assistance    Therapy Session Time      Individual Group Co-treatment   Time In     1304   Time Out     1342   Minutes     38     Timed Code Treatment Minutes:  23 Minutes  Total Treatment Minutes:  38       Electronically Signed By: Missy Mcnally, THIERRY    I read, reviewed, and agree with the above evaluation.   Jordi Green PT, DPT, ATC-R [id number]

## 2023-03-08 NOTE — PROGRESS NOTES
[address]   Progress Note  Cardiology    CC:  HTN, Right arm numbness    HPI:    Up in a chair. Feels well. Right arm issues resolved. BP high again. Systolic fell to upper 56'R yesterday and she felt bad. Better today. Medications/Labs all Reviewed    Lab Results   Component Value Date    WBC 4.3 03/08/2023    HGB 12.4 03/08/2023    HCT 38.2 03/08/2023    MCV 90.2 03/08/2023     (L) 03/08/2023     Lab Results   Component Value Date    CREATININE 0.8 03/08/2023    BUN 15 03/08/2023     03/08/2023    K 4.5 03/08/2023     03/08/2023    CO2 30 03/08/2023     Lab Results   Component Value Date    INR 3.04 (H) 03/08/2023    PROTIME 31.7 (H) 03/08/2023        PHYSICAL EXAM   BP (!) 161/126   Pulse 71   Temp 96.9 °F (36.1 °C) (Temporal)   Resp 20   Ht 5' 7\" (1.702 m)   Wt 178 lb 9.2 oz (81 kg)   SpO2 99%   BMI 27.97 kg/m²      Respiratory:  Resp Assessment: Normal respiratory effort  Resp Auscultation: Clear to auscultation bilaterally   Cardiovascular: Auscultation: regular rate and rhythm, normal S1S2, no murmur, rub or gallop  Palpation:  Nl PMI  JVP:  normal  Extremities: No Edema  Abdomen:  Soft, non-tender  Normal bowel sounds  Extremities:   No Cyanosis or Clubbing  Neurological/Psychiatric:  Oriented to time, place, and person  Non-anxious  Skin:   Warm and dry      ASSESSMENT:     HTN:  High again today  Exacerbated by missing meds on admit  Increase coreg to 6.25 mg BID ( not increased yesterday as BP fell and norvasc not added)  Continue cozaar  She has had low K+, check renin/Dusty  Change lasix to aldactone after labs drawn     Afib:  Chronic afib on anticoagulation with coumadin  Stable  follow     Right Arm Numbness;  Seems to be neurologic. Unclear if issue is in her neck or shoulder. She has no symptoms of chest pain to suggests dissection and all pulses equal and BP the same in both arms. No suggestion of this being ACS    Now resolved. Follow    Hypokalemia:  Resolved with replacement  Chronically low  Plan as above     Plan:  Increase coreg  Check renin/Dusty  Change lasix to aldactone        Zackary Mullen MD, 3/8/2023 11:09 AM

## 2023-03-08 NOTE — PLAN OF CARE
Problem: Discharge Planning  Goal: Discharge to home or other facility with appropriate resources  3/7/2023 2111 by Norma Elkins RN  Outcome: Progressing  3/7/2023 0826 by Marcy Mariano RN  Outcome: Progressing  Flowsheets (Taken 3/7/2023 0800)  Discharge to home or other facility with appropriate resources: Identify barriers to discharge with patient and caregiver     Problem: Chronic Conditions and Co-morbidities  Goal: Patient's chronic conditions and co-morbidity symptoms are monitored and maintained or improved  3/7/2023 2111 by Norma Elkins RN  Outcome: Progressing  3/7/2023 0826 by Marcy Mariano RN  Outcome: Progressing  Flowsheets (Taken 3/7/2023 0800)  Care Plan - Patient's Chronic Conditions and Co-Morbidity Symptoms are Monitored and Maintained or Improved:   Monitor and assess patient's chronic conditions and comorbid symptoms for stability, deterioration, or improvement   Update acute care plan with appropriate goals if chronic or comorbid symptoms are exacerbated and prevent overall improvement and discharge   Collaborate with multidisciplinary team to address chronic and comorbid conditions and prevent exacerbation or deterioration     Problem: Pain  Goal: Verbalizes/displays adequate comfort level or baseline comfort level  3/7/2023 2111 by Norma Elkins RN  Outcome: Progressing  3/7/2023 0826 by Marcy Mariano RN  Outcome: Progressing     Problem: Skin/Tissue Integrity  Goal: Absence of new skin breakdown  Description: 1. Monitor for areas of redness and/or skin breakdown  2. Assess vascular access sites hourly  3. Every 4-6 hours minimum:  Change oxygen saturation probe site  4. Every 4-6 hours:  If on nasal continuous positive airway pressure, respiratory therapy assess nares and determine need for appliance change or resting period.   3/7/2023 2111 by Norma Elkins RN  Outcome: Progressing  3/7/2023 0826 by Marcy Mariano RN  Outcome: Progressing     Problem: Safety - Adult  Goal: Free from fall injury  3/7/2023 2111 by Maxim Peterson RN  Outcome: Progressing  3/7/2023 0826 by Jose Lau RN  Outcome: Progressing     Problem: ABCDS Injury Assessment  Goal: Absence of physical injury  3/7/2023 2111 by Maxim Peterson RN  Outcome: Progressing  3/7/2023 0826 by Jose Lau RN  Outcome: Progressing

## 2023-03-08 NOTE — PLAN OF CARE
Problem: Safety - Adult  Goal: Free from fall injury  3/8/2023 1104 by Bogdan Gagnon RN  Outcome: Progressing     Problem: ABCDS Injury Assessment  Goal: Absence of physical injury  3/8/2023 1104 by Bogdan Gagnon RN  Outcome: Progressing  Flowsheets (Taken 3/8/2023 1015)  Absence of Physical Injury: Implement safety measures based on patient assessment  3/7/2023 2111 by Efrain Pepe RN  Outcome: Progressing  Patient educated on the importance of calling out before exiting the bed and always waiting for assistance. Bed in lowest position with wheels locked. Alarm activated. 2/4 side rails up. Non-skid socks on. Call light within reach. Hourly checks. All requested needs provided. Problem: Skin/Tissue Integrity  Goal: Absence of new skin breakdown  Description: 1. Monitor for areas of redness and/or skin breakdown  2. Assess vascular access sites hourly  3. Every 4-6 hours minimum:  Change oxygen saturation probe site  4. Every 4-6 hours:  If on nasal continuous positive airway pressure, respiratory therapy assess nares and determine need for appliance change or resting period. 3/8/2023 1104 by Bogdan Gagnon RN  Outcome: Progressing  Able to turn self in bed. Educated on the importance of frequent reposition for the prevention of pressure ulcer formation. Problem: Pain  Goal: Verbalizes/displays adequate comfort level or baseline comfort level  3/8/2023 1104 by Bogdan Gagnon RN  Outcome: Progressing  Denies pain at this time.         Problem: Chronic Conditions and Co-morbidities  Goal: Patient's chronic conditions and co-morbidity symptoms are monitored and maintained or improved  3/8/2023 1104 by Bogdan Gagnon RN  Outcome: Progressing     Problem: Discharge Planning  Goal: Discharge to home or other facility with appropriate resources  3/8/2023 1104 by Bogdan Gagnon RN  Outcome: Progressing  3/7/2023 2111 by Efrain Pepe RN  Outcome: Progressing

## 2023-03-08 NOTE — PROGRESS NOTES
Pharmacy to Dose Warfarin    Pharmacy consulted to dose warfarin for Afib. INR Goal: 2-3    INR today: 3.04    Home dose: 5 mg daily except 2.5 mg Tues/Fri    Assessment/Plan:  - will give warfarin 2.5 mg tonight, plan to resume home dose tomorrow   - Possible concomitant drug-drug interactions include: aspirin     Pharmacy will continue to follow.     Rene Samuel, NahomyD, BCPS  [id number]  3/8/2023 8:03 AM

## 2023-03-09 LAB
ANION GAP SERPL CALCULATED.3IONS-SCNC: 6 MMOL/L (ref 3–16)
BUN BLDV-MCNC: 18 MG/DL (ref 7–20)
CALCIUM SERPL-MCNC: 9.8 MG/DL (ref 8.3–10.6)
CHLORIDE BLD-SCNC: 103 MMOL/L (ref 99–110)
CO2: 31 MMOL/L (ref 21–32)
CREAT SERPL-MCNC: 0.6 MG/DL (ref 0.6–1.2)
GFR SERPL CREATININE-BSD FRML MDRD: >60 ML/MIN/{1.73_M2}
GLUCOSE BLD-MCNC: 98 MG/DL (ref 70–99)
HCT VFR BLD CALC: 37.8 % (ref 36–48)
HEMOGLOBIN: 12.4 G/DL (ref 12–16)
INR BLD: 2.48 (ref 0.87–1.14)
MCH RBC QN AUTO: 29.2 PG (ref 26–34)
MCHC RBC AUTO-ENTMCNC: 32.7 G/DL (ref 31–36)
MCV RBC AUTO: 89.2 FL (ref 80–100)
PDW BLD-RTO: 13 % (ref 12.4–15.4)
PLATELET # BLD: 137 K/UL (ref 135–450)
PMV BLD AUTO: 10 FL (ref 5–10.5)
POTASSIUM REFLEX MAGNESIUM: 4.8 MMOL/L (ref 3.5–5.1)
PROTHROMBIN TIME: 26.9 SEC (ref 11.7–14.5)
RBC # BLD: 4.24 M/UL (ref 4–5.2)
SODIUM BLD-SCNC: 140 MMOL/L (ref 136–145)
WBC # BLD: 5 K/UL (ref 4–11)

## 2023-03-09 PROCEDURE — 85610 PROTHROMBIN TIME: CPT

## 2023-03-09 PROCEDURE — 97530 THERAPEUTIC ACTIVITIES: CPT

## 2023-03-09 PROCEDURE — 85027 COMPLETE CBC AUTOMATED: CPT

## 2023-03-09 PROCEDURE — 6370000000 HC RX 637 (ALT 250 FOR IP): Performed by: INTERNAL MEDICINE

## 2023-03-09 PROCEDURE — 99232 SBSQ HOSP IP/OBS MODERATE 35: CPT | Performed by: INTERNAL MEDICINE

## 2023-03-09 PROCEDURE — 80048 BASIC METABOLIC PNL TOTAL CA: CPT

## 2023-03-09 PROCEDURE — 1200000000 HC SEMI PRIVATE

## 2023-03-09 PROCEDURE — 83835 ASSAY OF METANEPHRINES: CPT

## 2023-03-09 PROCEDURE — 36415 COLL VENOUS BLD VENIPUNCTURE: CPT

## 2023-03-09 PROCEDURE — 82384 ASSAY THREE CATECHOLAMINES: CPT

## 2023-03-09 PROCEDURE — 2580000003 HC RX 258: Performed by: INTERNAL MEDICINE

## 2023-03-09 PROCEDURE — 6360000002 HC RX W HCPCS: Performed by: INTERNAL MEDICINE

## 2023-03-09 RX ORDER — PRAZOSIN HYDROCHLORIDE 1 MG/1
1 CAPSULE ORAL 2 TIMES DAILY
Status: DISCONTINUED | OUTPATIENT
Start: 2023-03-09 | End: 2023-03-10

## 2023-03-09 RX ADMIN — LOSARTAN POTASSIUM 100 MG: 100 TABLET, FILM COATED ORAL at 07:50

## 2023-03-09 RX ADMIN — ATORVASTATIN CALCIUM 80 MG: 80 TABLET, FILM COATED ORAL at 07:50

## 2023-03-09 RX ADMIN — OYSTER SHELL CALCIUM WITH VITAMIN D 1 TABLET: 500; 200 TABLET, FILM COATED ORAL at 07:53

## 2023-03-09 RX ADMIN — CARVEDILOL 6.25 MG: 6.25 TABLET, FILM COATED ORAL at 18:29

## 2023-03-09 RX ADMIN — PRAZOSIN HYDROCHLORIDE 1 MG: 1 CAPSULE ORAL at 11:22

## 2023-03-09 RX ADMIN — SODIUM CHLORIDE, PRESERVATIVE FREE 10 ML: 5 INJECTION INTRAVENOUS at 07:54

## 2023-03-09 RX ADMIN — ACETAMINOPHEN 1000 MG: 500 TABLET ORAL at 20:52

## 2023-03-09 RX ADMIN — SPIRONOLACTONE 25 MG: 25 TABLET ORAL at 08:44

## 2023-03-09 RX ADMIN — HYDRALAZINE HYDROCHLORIDE 10 MG: 20 INJECTION INTRAMUSCULAR; INTRAVENOUS at 07:17

## 2023-03-09 RX ADMIN — PRAZOSIN HYDROCHLORIDE 1 MG: 1 CAPSULE ORAL at 20:52

## 2023-03-09 RX ADMIN — ACETAMINOPHEN 1000 MG: 500 TABLET ORAL at 08:44

## 2023-03-09 RX ADMIN — WARFARIN SODIUM 5 MG: 5 TABLET ORAL at 18:29

## 2023-03-09 RX ADMIN — SODIUM CHLORIDE, PRESERVATIVE FREE 10 ML: 5 INJECTION INTRAVENOUS at 20:52

## 2023-03-09 RX ADMIN — ASPIRIN 81 MG: 81 TABLET, COATED ORAL at 07:50

## 2023-03-09 RX ADMIN — CARVEDILOL 6.25 MG: 6.25 TABLET, FILM COATED ORAL at 07:50

## 2023-03-09 RX ADMIN — OYSTER SHELL CALCIUM WITH VITAMIN D 1 TABLET: 500; 200 TABLET, FILM COATED ORAL at 20:51

## 2023-03-09 ASSESSMENT — PAIN DESCRIPTION - LOCATION
LOCATION: BACK
LOCATION: BACK
LOCATION: HEAD

## 2023-03-09 ASSESSMENT — PAIN DESCRIPTION - DESCRIPTORS
DESCRIPTORS: ACHING

## 2023-03-09 ASSESSMENT — PAIN DESCRIPTION - ORIENTATION
ORIENTATION: ANTERIOR
ORIENTATION: RIGHT
ORIENTATION: RIGHT

## 2023-03-09 ASSESSMENT — PAIN SCALES - GENERAL
PAINLEVEL_OUTOF10: 7
PAINLEVEL_OUTOF10: 7
PAINLEVEL_OUTOF10: 0
PAINLEVEL_OUTOF10: 5
PAINLEVEL_OUTOF10: 8

## 2023-03-09 ASSESSMENT — PAIN DESCRIPTION - FREQUENCY
FREQUENCY: CONTINUOUS
FREQUENCY: CONTINUOUS

## 2023-03-09 NOTE — PROGRESS NOTES
Pt shift assessment completed see flowsheet for full details. Pt  alert and oriented , Blood pressure elevated 200/153  MD Aware, schedule meds given. Tylenol given for headache. Pt denies any need @this time, call light within reach, will continue to monitor.

## 2023-03-09 NOTE — PROGRESS NOTES
[facility] [address]   Phone: [phone]    Physical Therapy    [] Initial Evaluation            [x] Daily Treatment Note         [] Discharge Summary      Patient: Lucita Fleischer   : 1950   MRN: [id number]   Date of Service:  3/9/2023  Admitting Diagnosis: Hypertensive emergency    Current Admission Summary: Lucita Fleischer is a 71 y.o. female with a history of hypertension, hyperlipidemia, CAD, atrial fibrillation, chronic anticoagulation, CHF and CVA who presents to the emergency department today at the recommendation of her home health care nurse after she had her blood pressure taken this morning and it was around 220/118. Patient states she has been out of her losartan and carvedilol for the last 7 to 10 days stating the pharmacy has been out of it. She states she is supposed to pick that up today. Her blood pressure is 198/128 on arrival.  Patient denies having any chest pain, shortness of breath, palpitations or diaphoresis. She states she has had some right arm pain since yesterday of unknown etiology. She denies headache, lightheadedness or dizziness, vision changes, numbness, tingling or weakness in her extremities. Past Medical History:  has a past medical history of Acute cerebrovascular accident (CVA) ([address] LAST (acute kidney injury) ([address] Atrial fibrillation ([address] Bell palsy, CAD (coronary artery disease), Cerebral artery occlusion with cerebral infarction ([address] Chronic diastolic CHF (congestive heart failure) ([address] CVA (cerebral vascular accident) ([address] Hypertension, Intracranial hemorrhage ([address] Mixed hyperlipidemia, Nonintractable headache, Nontraumatic subcortical hemorrhage of cerebral hemisphere ([address] Poor vision, Primary osteoarthritis of right knee, Sudden visual loss of left eye, Thyroid nodule, and TIA involving right internal carotid artery.     Past Surgical History:  has a past surgical history that includes Cardiac surgery; Tubal ligation; Coronary angioplasty with stent; Colonoscopy; and Rotator cuff repair (Right). Discharge Recommendations: Rosey Vergara scored a 18/24 on the AM-PAC short mobility form. Current research shows that an AM-PAC score of 18 or greater is typically associated with a discharge to the patient's home setting. Based on the patient's AM-PAC score and their current functional mobility deficits, it is recommended that the patient have 2-3 sessions per week of Physical Therapy at d/c to increase the patient's independence. At this time, this patient demonstrates the endurance and safety to discharge home with home health physical therapy services and a follow up treatment frequency of 2-3x/wk. Please see assessment section for further patient specific details. HOME HEALTH CARE: LEVEL 1 STANDARD  - Initial home health evaluation to occur within 24-48 hours, in patient home   - Therapy to evaluate with goal of regaining prior level of functioning   - Therapy to evaluate if patient has [address] needs for personal care   If patient discharges prior to next session this note will serve as a discharge summary. Please see below for the latest assessment towards goals.        DME Required For Discharge: no DME required at discharge  Precautions/Restrictions: medium fall risk  Weight Bearing Restrictions: no restrictions  [] Right Upper Extremity  [] Left Upper Extremity [] Right Lower Extremity  [] Left Lower Extremity     Required Braces/Orthotics: no braces required   [] Right  [] Left  Positional Restrictions:no positional restrictions    Pre-Admission Information   Lives With:  lives with her son who is able to help at home as needed, and her 15 y/o great-grandson  Type of Home: house  Home Layout: one level  Home Access:  2 steps to enter without rails   Bathroom Layout: tub only  Bathroom Equipment: N/A  Toilet Height: standard height  Home Equipment: rolling walker, single point cane  Transfer Assistance: modified independent with use of single point cane  Ambulation Assistance:modified independent with use of single point cane. Patient uses walker for longer distances but states she rarely walks long distances 2/2 SOB  ADL Assistance: independent with all ADL's  IADL Assistance: independent with homemaking tasks  Active :        [x] Yes                 [] No  Hand Dominance: [] Left                 [x] Right  Current Employment: retired. Occupation: used to work as a home health aide  Hobbies:   Recent Falls: no falls for the last 9 months    Examination   Vision:   Vision Gross Assessment: Impaired and Vision Corrective Device: wears glasses for reading  Hearing:   [employer]        Subjective  General: Pt in bathroom independently upon arrival.   Pain: 0/10;   Pain Interventions: N/A     Functional Mobility  Bed Mobility  Bed mobility not completed on this date. Comments:  Transfers  Sit to stand transfer: stand by assistance  Stand to sit transfer: stand by assistance  Comments: from bed    Ambulation Trial   Surface:level surface  Assistive Device: no device  Assistance: stand by assistance  Distance:15' (bathroom to bed) + 5' (bed to chair)  Gait Mechanics: flexed posture, reaching for environmental surfaces, decreased lynn, mildly unsteady  Comments:  /125 following mobility. Breakfast arrived. Stair Mobility  Stair mobility not completed on this date. Comments: assess stair mobility at next visit in order to assess safety with stair ambulation using single point cane at home  Wheelchair Mobility:  No w/c mobility completed on this date.   Comments:  Balance  Static Sitting Balance: fair (+): maintains balance at SBA/supervision without use of UE support  Dynamic Sitting Balance: fair (+): maintains balance at SBA/supervision without use of UE support  Static Standing Balance: fair (+): maintains balance at SBA/supervision without use of UE support  Dynamic Standing Balance: fair (-): maintains balance at SBA with use of UE support  Comments:    Other Therapeutic Interventions    Functional Outcomes  AM-PAC Inpatient Mobility Raw Score : 18              Cognition  WFL  Orientation:    alert and oriented x 4  Command Following:   [facility]    Education  Barriers To Learning: none  Patient Education: patient educated on goals, PT role and benefits, plan of care, general safety, functional mobility training, proper use of assistive device/equipment  Learning Assessment:  patient verbalizes and demonstrates understanding    Assessment  Activity Tolerance: Session limited by hypertension, arrival of breakfast.   Impairments Requiring Therapeutic Intervention: decreased functional mobility, decreased strength, decreased endurance, decreased balance  Prognosis: good  Clinical Assessment: Prior to admission, patient was MOD  I at home and completing ADLs and functional activity without assistance. Now patient requires [id number] for ambulation and transfers. Patient will continue to benefit from skilled physical therapy services after discharge from hospital to continue improving strength, balance, independence with ADLs and independence with ambulation in the home. Safety Interventions: patient left in chair, chair alarm in place, call light within reach, gait belt, and nurse notified    Plan  Frequency: 3-5 x/per week  Current Treatment Recommendations: strengthening, balance training, functional mobility training, gait training, endurance training, and ADL/self-care training    Goals  Patient Goals: to return home   Short Term Goals:  Time Frame: discharge  Patient will complete bed mobility at modified independent   Patient will complete transfers at [facility]   Patient will ambulate 150 ft with use of LRAD at [facility]  Patient will ascend/descend 2 stairs without use of HR at contact guard assistance  No goals met this treatment.     Therapy Session Time      Individual Group Co-treatment   Time In 0853       Time Out 0907       Minutes 14         Timed Code Treatment Minutes:   14  Total Treatment Minutes:  14       Electronically Signed By: Yolanda Sandoval, PT, DPT [id number]

## 2023-03-09 NOTE — PROGRESS NOTES
Pt b/p goes up and down see flowsheets for trends. Pt asymptomatic, tolerating diet well, schedule b/p med given. Will continue to monitor.

## 2023-03-09 NOTE — PLAN OF CARE
Problem: Discharge Planning  Goal: Discharge to home or other facility with appropriate resources  3/9/2023 0918 by Jacques Thurman RN  Outcome: Progressing  Discharge to home or other facility with appropriate resources:   Identify barriers to discharge with patient and caregiver   Arrange for needed discharge resources and transportation as appropriate   Identify discharge learning needs (meds, wound care, etc)   Refer to discharge planning if patient needs post-hospital services based on physician order or complex needs related to functional status, cognitive ability or social support system     Problem: Chronic Conditions and Co-morbidities  Goal: Patient's chronic conditions and co-morbidity symptoms are monitored and maintained or improved  3/9/2023 0918 by Jacques Thurman RN  Outcome: Progressing   Update acute care plan with appropriate goals if chronic or comorbid symptoms are exacerbated and prevent overall improvement and discharge   Collaborate with multidisciplinary team to address chronic and comorbid conditions and prevent exacerbation or deterioration   Monitor and assess patient's chronic conditions and comorbid symptoms for stability, deterioration, or improvement     Problem: Pain  Goal: Verbalizes/displays adequate comfort level or baseline comfort level  3/9/2023 0918 by Jacques Thurman RN  Outcome: Progressing     Problem: Skin/Tissue Integrity  Goal: Absence of new skin breakdown  Description: 1. Monitor for areas of redness and/or skin breakdown  2. Assess vascular access sites hourly  3. Every 4-6 hours minimum:  Change oxygen saturation probe site  4. Every 4-6 hours:  If on nasal continuous positive airway pressure, respiratory therapy assess nares and determine need for appliance change or resting period.   3/9/2023 0918 by Jacques Thurman RN  Outcome: Progressing  Problem: Safety - Adult  Goal: Free from fall injury  3/9/2023 0918 by Jacques Thurman RN  Outcome: Progressing  Problem: ABCDS Injury Assessment  Goal: Absence of physical injury  3/9/2023 0918 by Lyndsey Castro RN

## 2023-03-09 NOTE — PLAN OF CARE
Problem: Discharge Planning  Goal: Discharge to home or other facility with appropriate resources  3/8/2023 2220 by Elysia Gunn RN  Outcome: Progressing  Flowsheets (Taken 3/8/2023 2100)  Discharge to home or other facility with appropriate resources:   Identify barriers to discharge with patient and caregiver   Arrange for needed discharge resources and transportation as appropriate   Identify discharge learning needs (meds, wound care, etc)   Refer to discharge planning if patient needs post-hospital services based on physician order or complex needs related to functional status, cognitive ability or social support system  3/8/2023 1104 by Francy Gunderson RN  Outcome: Progressing     Problem: Chronic Conditions and Co-morbidities  Goal: Patient's chronic conditions and co-morbidity symptoms are monitored and maintained or improved  3/8/2023 2220 by Elysia Gunn RN  Outcome: Progressing  Flowsheets (Taken 3/8/2023 2100)  Care Plan - Patient's Chronic Conditions and Co-Morbidity Symptoms are Monitored and Maintained or Improved:   Update acute care plan with appropriate goals if chronic or comorbid symptoms are exacerbated and prevent overall improvement and discharge   Collaborate with multidisciplinary team to address chronic and comorbid conditions and prevent exacerbation or deterioration   Monitor and assess patient's chronic conditions and comorbid symptoms for stability, deterioration, or improvement  3/8/2023 1104 by Francy Gunderson RN  Outcome: Progressing     Problem: Pain  Goal: Verbalizes/displays adequate comfort level or baseline comfort level  3/8/2023 2220 by Elysia Gunn RN  Outcome: Progressing  3/8/2023 1104 by Francy Gunderson RN  Outcome: Progressing     Problem: Skin/Tissue Integrity  Goal: Absence of new skin breakdown  Description: 1. Monitor for areas of redness and/or skin breakdown  2. Assess vascular access sites hourly  3.   Every 4-6 hours minimum:  Change oxygen saturation probe site  4. Every 4-6 hours:  If on nasal continuous positive airway pressure, respiratory therapy assess nares and determine need for appliance change or resting period.   3/8/2023 2220 by Stephany Davies RN  Outcome: Progressing  3/8/2023 1104 by Jose Lau RN  Outcome: Progressing     Problem: Safety - Adult  Goal: Free from fall injury  3/8/2023 2220 by Stephany Davies RN  Outcome: Carolyne Bates (Taken 3/8/2023 2159)  Free From Fall Injury: Instruct family/caregiver on patient safety  3/8/2023 1104 by Jose Lau RN  Outcome: Progressing     Problem: ABCDS Injury Assessment  Goal: Absence of physical injury  3/8/2023 2220 by Stephany Davies RN  Outcome: Progressing  Flowsheets (Taken 3/8/2023 2159)  Absence of Physical Injury: Implement safety measures based on patient assessment  3/8/2023 1104 by Jose Lau RN  Outcome: Progressing  Flowsheets (Taken 3/8/2023 1015)  Absence of Physical Injury: Implement safety measures based on patient assessment

## 2023-03-09 NOTE — PROGRESS NOTES
Assessments completed, see flowsheets for complex findings. Pt A&Ox4, Afib, RA. BP noted to be elevated at times but does not meet requirements for PRNs. Pt up to bathroom with standby assist with cane, tolerates well. Turns self in bed. Denies pain or dizziness with ambulation. Standard safety measures in place.

## 2023-03-09 NOTE — PROGRESS NOTES
Hospitalist Progress Note      PCP: Tirstan Joshi MD    Date of Admission: 3/6/2023    Chief Complaint: high blood pressure/ hypertensive urgency    Hospital Course: 71 y.o. female with PMH of CVA, AF, CAD, [place] palsy, dCHF, htn, hld  who presents with uncontrolled hypertension. Was seen per home health car and found to have BP above 280C systolic so referred to ED. Pt states her BP is always high, but has been running even higher than normal as her pharmacy has been out of her BP meds and she has been unable to get them. Does have intermittent headache and dizziness at times. Also with R arm pain since yesterday. Denies chest pain, dyspnea, abd pain, vision deficits, gross focal motor weakness or numbness. She has been weaned off of gtts and is now on oral meds.       Subjective: she is doing well but complains of left arm pain and wekaness       Medications:  Reviewed    Infusion Medications    sodium chloride       Scheduled Medications    warfarin  5 mg Oral Once per day on Sun Mon Wed Thu Sat    carvedilol  6.25 mg Oral BID with meals    spironolactone  25 mg Oral Daily    oyster shell calcium w/D  1 tablet Oral BID    atorvastatin  80 mg Oral Daily    losartan  100 mg Oral Daily    aspirin  81 mg Oral Daily    sodium chloride flush  5-40 mL IntraVENous 2 times per day    warfarin  2.5 mg Oral Once per day on Tue Fri     PRN Meds: acetaminophen, sodium chloride flush, sodium chloride, ondansetron **OR** ondansetron, polyethylene glycol, nitroGLYCERIN, hydrALAZINE, labetalol      Intake/Output Summary (Last 24 hours) at 3/9/2023 0735  Last data filed at 3/8/2023 1000  Gross per 24 hour   Intake 240 ml   Output --   Net 240 ml         Physical Exam Performed:    BP (!) 182/142   Pulse 63   Temp 97.4 °F (36.3 °C) (Temporal)   Resp 20   Ht 5' 7\" (1.702 m)   Wt 181 lb 7 oz (82.3 kg)   SpO2 98%   BMI 28.42 kg/m²     General appearance: No apparent distress, appears stated age and cooperative. HEENT: Pupils equal, round, and reactive to light. Conjunctivae/corneas clear. Neck: Supple, with full range of motion. No jugular venous distention. Trachea midline. Respiratory:  Normal respiratory effort. Clear to auscultation, bilaterally without Rales/Wheezes/Rhonchi. Cardiovascular: Regular rate and rhythm with normal S1/S2 without murmurs, rubs or gallops. Abdomen: Soft, non-tender, non-distended with normal bowel sounds. Musculoskeletal: No clubbing, cyanosis or edema bilaterally. Full range of motion without deformity. Skin: Skin color, texture, turgor normal.  No rashes or lesions. Neurologic:  Neurovascularly intact without any focal sensory/motor deficits. Cranial nerves: II-XII intact, grossly non-focal.  Psychiatric: Alert and oriented, thought content appropriate, normal insight  Capillary Refill: Brisk, 3 seconds, normal   Peripheral Pulses: +2 palpable, equal bilaterally       Labs:   Recent Labs     03/07/23  0444 03/08/23  0421 03/09/23  0634   WBC 5.2 4.3 5.0   HGB 12.3 12.4 12.4   HCT 36.5 38.2 37.8    126* 137       Recent Labs     03/07/23  0444 03/08/23  0420 03/09/23  0634    139 140   K 3.4* 4.5 4.8    104 103   CO2 32 30 31   BUN 14 15 18   CREATININE 0.6 0.8 0.6   CALCIUM 9.8 9.8 9.8       Recent Labs     03/06/23  1207   AST 17   ALT 7*   BILITOT 0.6   ALKPHOS 69       Recent Labs     03/07/23  0444 03/08/23  0421 03/09/23  0634   INR 3.08* 3.04* 2.48*       Recent Labs     03/06/23  1207 03/06/23  1836 03/07/23  0444   TROPONINI <0.01 <0.01 <0.01         Urinalysis:      Lab Results   Component Value Date/Time    NITRU Negative 10/19/2022 01:40 PM    WBCUA 3 10/19/2022 01:40 PM    BACTERIA None Seen 10/19/2022 01:40 PM    RBCUA 1 10/19/2022 01:40 PM    BLOODU Negative 10/19/2022 01:40 PM    SPECGRAV >=1.030 10/19/2022 01:40 PM    GLUCOSEU Negative 10/19/2022 01:40 PM       Radiology:  MRI BRAIN WO CONTRAST   Final Result   Cerebral atrophy. Severe chronic small vessel ischemic changes. Remote   lacunar infarcts in the left cerebellum, left kosta, and basal ganglia and   thalami. No acute brain parenchymal abnormality. XR CHEST PORTABLE   Final Result   Marked cardiomegaly. No acute congestion or infiltrate             IP CONSULT TO CARDIOLOGY  IP CONSULT TO SPIRITUAL SERVICES  PHARMACY TO DOSE MEDICATION    Assessment/Plan:    Active Hospital Problems    Diagnosis     Hypertensive emergency [I16.1]      Priority: Medium     Hypertensive urgency / emergency 2/2 inability to get home po BP meds  Dizziness  - IV hydralazine and labetalol  - slow steady decrease in BP  - resume oral BP regimen; titrate as needed  - serial trops  - trend labs for end organ damage  - MRI has ruled out acute strokes  Bp better still up and down. RUE pain. Questionable cardiac equivalent  - serial trops  - monitor tele  - already on ASA and statin  - consider stress if persistent  - cardio already consulted ,following  - MRI negative for new stroke     Hypokalemia  - monitor and replace     PMH of CVA, AF on coumadin, CAD, [place] palsy, compensated chronic dCHF, hld  - resume hold meds    DVT Prophylaxis: warfarin  Diet: ADULT DIET;  Regular; Low Fat/Low Chol/High Fiber/FERNANDO; No Caffeine  Code Status: DNR-CC  PT/OT Eval Status: eval and treat     Dispo - she can be downgraded form the icu level of care          Gulshan Cooney MD

## 2023-03-09 NOTE — PROGRESS NOTES
[address]   Progress Note  Cardiology    CC:  HTN, Right arm numbness    HPI:    BP still up and down. High most of the time, but systolic falls into 40'N at times. Medications/Labs all Reviewed    Lab Results   Component Value Date    WBC 5.0 03/09/2023    HGB 12.4 03/09/2023    HCT 37.8 03/09/2023    MCV 89.2 03/09/2023     03/09/2023     Lab Results   Component Value Date    CREATININE 0.6 03/09/2023    BUN 18 03/09/2023     03/09/2023    K 4.8 03/09/2023     03/09/2023    CO2 31 03/09/2023     Lab Results   Component Value Date    INR 2.48 (H) 03/09/2023    PROTIME 26.9 (H) 03/09/2023        PHYSICAL EXAM   BP (!) 182/142   Pulse 63   Temp 97.5 °F (36.4 °C) (Temporal)   Resp 20   Ht 5' 7\" (1.702 m)   Wt 181 lb 7 oz (82.3 kg)   SpO2 98%   BMI 28.42 kg/m²      Respiratory:  Resp Assessment: Normal respiratory effort  Resp Auscultation: Clear to auscultation bilaterally   Cardiovascular: Auscultation: regular rate and rhythm, normal S1S2, no murmur, rub or gallop  Palpation:  Nl PMI  JVP:  normal  Extremities: No LE Edema  Abdomen:  Soft, non-tender  Normal bowel sounds  Extremities:   No Cyanosis or Clubbing  Neurological/Psychiatric:  Oriented to time, place, and person  Non-anxious  Skin:   Warm and dry      ASSESSMENT:     HTN:  High most of the time  Systolic falls into 46'E at times and this makes treatment very difficult    Check orthostatic BP to male sure this is not the issue  Renin/cathy sent, send catecholamines and metanephrines. Continue coreg, aldactone and cozaar  Prazosin added by medicine this am  I would watch today. Hopefully we can d/c tomorrow     Afib:  Chronic afib on anticoagulation with coumadin  Stable  follow     Right Arm Numbness;  Seems to be neurologic. Unclear if issue is in her neck or shoulder. She has no symptoms of chest pain to suggests dissection and all pulses equal and BP the same in both arms.    No suggestion of this being ACS    Now resolved.  Follow    Hypokalemia:  Resolved   Recheck in am with aldactone to check for toxicity     Plan:  Follow BP today  Check orthostatics  Check for secondary HTN  Recheck Chem in am on aldactone  Hopefully can send home tomorrow        Monika Melton MD, 3/9/2023 11:42 AM

## 2023-03-10 LAB
ANION GAP SERPL CALCULATED.3IONS-SCNC: 5 MMOL/L (ref 3–16)
BUN BLDV-MCNC: 17 MG/DL (ref 7–20)
CALCIUM SERPL-MCNC: 9.6 MG/DL (ref 8.3–10.6)
CHLORIDE BLD-SCNC: 104 MMOL/L (ref 99–110)
CO2: 30 MMOL/L (ref 21–32)
CREAT SERPL-MCNC: 0.6 MG/DL (ref 0.6–1.2)
GFR SERPL CREATININE-BSD FRML MDRD: >60 ML/MIN/{1.73_M2}
GLUCOSE BLD-MCNC: 102 MG/DL (ref 70–99)
INR BLD: 2.81 (ref 0.87–1.14)
POTASSIUM SERPL-SCNC: 4.2 MMOL/L (ref 3.5–5.1)
PROTHROMBIN TIME: 29.7 SEC (ref 11.7–14.5)
SODIUM BLD-SCNC: 139 MMOL/L (ref 136–145)

## 2023-03-10 PROCEDURE — 99232 SBSQ HOSP IP/OBS MODERATE 35: CPT | Performed by: INTERNAL MEDICINE

## 2023-03-10 PROCEDURE — 1200000000 HC SEMI PRIVATE

## 2023-03-10 PROCEDURE — 36415 COLL VENOUS BLD VENIPUNCTURE: CPT

## 2023-03-10 PROCEDURE — 6370000000 HC RX 637 (ALT 250 FOR IP): Performed by: INTERNAL MEDICINE

## 2023-03-10 PROCEDURE — 6360000002 HC RX W HCPCS: Performed by: INTERNAL MEDICINE

## 2023-03-10 PROCEDURE — 2580000003 HC RX 258: Performed by: INTERNAL MEDICINE

## 2023-03-10 PROCEDURE — 85610 PROTHROMBIN TIME: CPT

## 2023-03-10 PROCEDURE — 80048 BASIC METABOLIC PNL TOTAL CA: CPT

## 2023-03-10 RX ORDER — PRAZOSIN HYDROCHLORIDE 1 MG/1
2 CAPSULE ORAL 2 TIMES DAILY
Status: DISCONTINUED | OUTPATIENT
Start: 2023-03-10 | End: 2023-03-11 | Stop reason: HOSPADM

## 2023-03-10 RX ORDER — KETOROLAC TROMETHAMINE 15 MG/ML
15 INJECTION, SOLUTION INTRAMUSCULAR; INTRAVENOUS EVERY 6 HOURS PRN
Status: DISCONTINUED | OUTPATIENT
Start: 2023-03-10 | End: 2023-03-10

## 2023-03-10 RX ORDER — VALSARTAN 40 MG/1
80 TABLET ORAL 2 TIMES DAILY
Status: DISCONTINUED | OUTPATIENT
Start: 2023-03-10 | End: 2023-03-11 | Stop reason: HOSPADM

## 2023-03-10 RX ADMIN — PRAZOSIN HYDROCHLORIDE 2 MG: 1 CAPSULE ORAL at 20:59

## 2023-03-10 RX ADMIN — ATORVASTATIN CALCIUM 80 MG: 80 TABLET, FILM COATED ORAL at 09:20

## 2023-03-10 RX ADMIN — LOSARTAN POTASSIUM 100 MG: 100 TABLET, FILM COATED ORAL at 09:20

## 2023-03-10 RX ADMIN — SPIRONOLACTONE 25 MG: 25 TABLET ORAL at 09:20

## 2023-03-10 RX ADMIN — KETOROLAC TROMETHAMINE 15 MG: 15 INJECTION, SOLUTION INTRAMUSCULAR; INTRAVENOUS at 09:19

## 2023-03-10 RX ADMIN — SODIUM CHLORIDE, PRESERVATIVE FREE 10 ML: 5 INJECTION INTRAVENOUS at 09:20

## 2023-03-10 RX ADMIN — SODIUM CHLORIDE, PRESERVATIVE FREE 10 ML: 5 INJECTION INTRAVENOUS at 21:00

## 2023-03-10 RX ADMIN — ASPIRIN 81 MG: 81 TABLET, COATED ORAL at 09:20

## 2023-03-10 RX ADMIN — OYSTER SHELL CALCIUM WITH VITAMIN D 1 TABLET: 500; 200 TABLET, FILM COATED ORAL at 21:04

## 2023-03-10 RX ADMIN — CARVEDILOL 6.25 MG: 6.25 TABLET, FILM COATED ORAL at 17:48

## 2023-03-10 RX ADMIN — CARVEDILOL 6.25 MG: 6.25 TABLET, FILM COATED ORAL at 05:35

## 2023-03-10 RX ADMIN — PRAZOSIN HYDROCHLORIDE 1 MG: 1 CAPSULE ORAL at 09:24

## 2023-03-10 RX ADMIN — OYSTER SHELL CALCIUM WITH VITAMIN D 1 TABLET: 500; 200 TABLET, FILM COATED ORAL at 09:25

## 2023-03-10 RX ADMIN — VALSARTAN 80 MG: 40 TABLET, FILM COATED ORAL at 17:48

## 2023-03-10 RX ADMIN — WARFARIN SODIUM 2.5 MG: 2.5 TABLET ORAL at 17:48

## 2023-03-10 ASSESSMENT — PAIN SCALES - GENERAL
PAINLEVEL_OUTOF10: 8
PAINLEVEL_OUTOF10: 0
PAINLEVEL_OUTOF10: 8
PAINLEVEL_OUTOF10: 4
PAINLEVEL_OUTOF10: 6
PAINLEVEL_OUTOF10: 0

## 2023-03-10 ASSESSMENT — PAIN DESCRIPTION - ORIENTATION
ORIENTATION: RIGHT
ORIENTATION: RIGHT;LEFT
ORIENTATION: RIGHT;LEFT

## 2023-03-10 ASSESSMENT — PAIN DESCRIPTION - LOCATION
LOCATION: BACK
LOCATION: KNEE
LOCATION: KNEE

## 2023-03-10 ASSESSMENT — PAIN DESCRIPTION - DESCRIPTORS
DESCRIPTORS: ACHING
DESCRIPTORS: ACHING

## 2023-03-10 ASSESSMENT — PAIN - FUNCTIONAL ASSESSMENT: PAIN_FUNCTIONAL_ASSESSMENT: ACTIVITIES ARE NOT PREVENTED

## 2023-03-10 NOTE — PLAN OF CARE
Problem: Pain  Goal: Verbalizes/displays adequate comfort level or baseline comfort level  3/9/2023 2156 by Sindhu Bender RN  Outcome: Progressing     Problem: Skin/Tissue Integrity  Goal: Absence of new skin breakdown  Description: 1. Monitor for areas of redness and/or skin breakdown  2. Assess vascular access sites hourly  3. Every 4-6 hours minimum:  Change oxygen saturation probe site  4. Every 4-6 hours:  If on nasal continuous positive airway pressure, respiratory therapy assess nares and determine need for appliance change or resting period.   3/9/2023 2156 by Sindhu Bender RN  Outcome: Progressing     Problem: Safety - Adult  Goal: Free from fall injury  3/9/2023 2156 by Sindhu Bender RN  Outcome: Progressing     Problem: ABCDS Injury Assessment  Goal: Absence of physical injury  3/9/2023 2156 by Sindhu Bender RN  Outcome: Progressing      Problem: Chronic Conditions and Co-morbidities  Goal: Patient's chronic conditions and co-morbidity symptoms are monitored and maintained or improved  3/9/2023 2156 by Sindhu Bender RN  Outcome: Progressing     Problem: Discharge Planning  Goal: Discharge to home or other facility with appropriate resources  3/9/2023 2156 by Sindhu Bender RN  Outcome: Progressing

## 2023-03-10 NOTE — PROGRESS NOTES
Hospitalist Progress Note      PCP: Katie Greenberg MD    Date of Admission: 3/6/2023    Chief Complaint: high blood pressure/ hypertensive urgency    Hospital Course: 71 y.o. female with PMH of CVA, AF, CAD, [place] palsy, dCHF, htn, hld  who presents with uncontrolled hypertension. Was seen per home health car and found to have BP above 066Z systolic so referred to ED. Pt states her BP is always high, but has been running even higher than normal as her pharmacy has been out of her BP meds and she has been unable to get them. Does have intermittent headache and dizziness at times. Also with R arm pain since yesterday. Denies chest pain, dyspnea, abd pain, vision deficits, gross focal motor weakness or numbness. She has been weaned off of gtts and is now on oral meds.       Subjective: she is doing well but complains of left arm pain and wekaness       Medications:  Reviewed    Infusion Medications    sodium chloride       Scheduled Medications    prazosin  2 mg Oral BID    valsartan  80 mg Oral BID    warfarin  5 mg Oral Once per day on Sun Mon Wed Thu Sat    carvedilol  6.25 mg Oral BID with meals    spironolactone  25 mg Oral Daily    oyster shell calcium w/D  1 tablet Oral BID    atorvastatin  80 mg Oral Daily    aspirin  81 mg Oral Daily    sodium chloride flush  5-40 mL IntraVENous 2 times per day    warfarin  2.5 mg Oral Once per day on Tue Fri     PRN Meds: acetaminophen, sodium chloride flush, sodium chloride, ondansetron **OR** ondansetron, polyethylene glycol, nitroGLYCERIN      Intake/Output Summary (Last 24 hours) at 3/10/2023 1442  Last data filed at 3/10/2023 1300  Gross per 24 hour   Intake 780 ml   Output --   Net 780 ml         Physical Exam Performed:    BP (!) 194/135   Pulse 74   Temp 97.5 °F (36.4 °C) (Temporal)   Resp 17   Ht 5' 7\" (1.702 m)   Wt 185 lb 3 oz (84 kg)   SpO2 97%   BMI 29.00 kg/m²     General appearance: No apparent distress, appears stated age and cooperative. HEENT: Pupils equal, round, and reactive to light. Conjunctivae/corneas clear. Neck: Supple, with full range of motion. No jugular venous distention. Trachea midline. Respiratory:  Normal respiratory effort. Clear to auscultation, bilaterally without Rales/Wheezes/Rhonchi. Cardiovascular: Regular rate and rhythm with normal S1/S2 without murmurs, rubs or gallops. Abdomen: Soft, non-tender, non-distended with normal bowel sounds. Musculoskeletal: No clubbing, cyanosis or edema bilaterally. Full range of motion without deformity. Skin: Skin color, texture, turgor normal.  No rashes or lesions. Neurologic:  Neurovascularly intact without any focal sensory/motor deficits. Cranial nerves: II-XII intact, grossly non-focal.  Psychiatric: Alert and oriented, thought content appropriate, normal insight  Capillary Refill: Brisk, 3 seconds, normal   Peripheral Pulses: +2 palpable, equal bilaterally       Labs:   Recent Labs     03/08/23 0421 03/09/23  0634   WBC 4.3 5.0   HGB 12.4 12.4   HCT 38.2 37.8   * 137       Recent Labs     03/08/23  0420 03/09/23  0634 03/10/23  0530    140 139   K 4.5 4.8 4.2    103 104   CO2 30 31 30   BUN 15 18 17   CREATININE 0.8 0.6 0.6   CALCIUM 9.8 9.8 9.6       No results for input(s): AST, ALT, BILIDIR, BILITOT, ALKPHOS in the last 72 hours. Recent Labs     03/08/23  0421 03/09/23  0634 03/10/23  0530   INR 3.04* 2.48* 2.81*       No results for input(s): CKTOTAL, TROPONINI in the last 72 hours. Urinalysis:      Lab Results   Component Value Date/Time    NITRU Negative 10/19/2022 01:40 PM    WBCUA 3 10/19/2022 01:40 PM    BACTERIA None Seen 10/19/2022 01:40 PM    RBCUA 1 10/19/2022 01:40 PM    BLOODU Negative 10/19/2022 01:40 PM    SPECGRAV >=1.030 10/19/2022 01:40 PM    GLUCOSEU Negative 10/19/2022 01:40 PM       Radiology:  MRI BRAIN WO CONTRAST   Final Result   Cerebral atrophy. Severe chronic small vessel ischemic changes.   Remote lacunar infarcts in the left cerebellum, left kosta, and basal ganglia and   thalami. No acute brain parenchymal abnormality. XR CHEST PORTABLE   Final Result   Marked cardiomegaly. No acute congestion or infiltrate             IP CONSULT TO CARDIOLOGY  IP CONSULT TO SPIRITUAL SERVICES  PHARMACY TO DOSE MEDICATION  IP CONSULT TO HOME CARE NEEDS    Assessment/Plan:    Active Hospital Problems    Diagnosis     Hypertensive emergency [I16.1]      Priority: Medium     Hypertensive urgency / emergency 2/2 inability to get home po BP meds  Dizziness  - IV hydralazine and labetalol  - slow steady decrease in BP  - resume oral BP regimen; titrate as needed  - serial trops  - trend labs for end organ damage  - MRI has ruled out acute strokes  Bp better still up and down. Discussed with cardiology change to valsartan and continue other current meds     RUE pain. Questionable cardiac equivalent  - serial trops  - monitor tele  - already on ASA and statin  - consider stress if persistent  - cardio already consulted ,following  - MRI negative for new stroke     Hypokalemia  - monitor and replace     PMH of CVA, AF on coumadin, CAD, [place] palsy, compensated chronic dCHF, hld  - resume hold meds    DVT Prophylaxis: warfarin  Diet: ADULT DIET;  Regular; Low Fat/Low Chol/High Fiber/FERNANDO; No Caffeine  Code Status: DNR-CC  PT/OT Eval Status: eval and treat     Dispo - she can be downgraded form and moved from ICU  Cards will be okay with her discharging tomorrow          Bandar Max MD

## 2023-03-10 NOTE — PROGRESS NOTES
Pharmacy to Dose Warfarin    Pharmacy consulted to dose warfarin for Afib. INR Goal: 2-3    INR today: 2.81    Home dose: 5 mg daily except 2.5 mg Tues/Fri    Assessment/Plan:  - continue home dose, 2.5 mg tonight  - Possible concomitant drug-drug interactions include: aspirin     Pharmacy will continue to follow.     Nahomy CatesD, BCPS  [id number]  3/10/2023 8:31 AM

## 2023-03-10 NOTE — PLAN OF CARE
Problem: Discharge Planning  Goal: Discharge to home or other facility with appropriate resources  3/10/2023 1115 by Gabriel Hamilton RN  Outcome: Progressing  Flowsheets (Taken 3/10/2023 0900)  Discharge to home or other facility with appropriate resources:   Identify barriers to discharge with patient and caregiver   Arrange for needed discharge resources and transportation as appropriate   Identify discharge learning needs (meds, wound care, etc)   Refer to discharge planning if patient needs post-hospital services based on physician order or complex needs related to functional status, cognitive ability or social support system  3/9/2023 2156 by Vance Roca RN  Outcome: Progressing     Problem: Chronic Conditions and Co-morbidities  Goal: Patient's chronic conditions and co-morbidity symptoms are monitored and maintained or improved  3/10/2023 1115 by Gabriel Hamilton RN  Outcome: Progressing  Flowsheets (Taken 3/10/2023 0900)  Care Plan - Patient's Chronic Conditions and Co-Morbidity Symptoms are Monitored and Maintained or Improved:   Monitor and assess patient's chronic conditions and comorbid symptoms for stability, deterioration, or improvement   Collaborate with multidisciplinary team to address chronic and comorbid conditions and prevent exacerbation or deterioration   Update acute care plan with appropriate goals if chronic or comorbid symptoms are exacerbated and prevent overall improvement and discharge  3/9/2023 2156 by Vance Roca RN  Outcome: Progressing     Problem: Pain  Goal: Verbalizes/displays adequate comfort level or baseline comfort level  3/10/2023 1115 by Gabriel Hamilton RN  Outcome: Progressing  3/9/2023 2156 by Vance Roca RN  Outcome: Progressing     Problem: Skin/Tissue Integrity  Goal: Absence of new skin breakdown  3/10/2023 1115 by Gabriel Hamilton RN  Outcome: Progressing  3/9/2023 2156 by Vance Roca RN  Outcome: Progressing     Problem: Safety - Adult  Goal: Free from fall injury  3/10/2023 1115 by Renee Mazariegos RN  Outcome: Progressing  3/9/2023 2156 by Jessenia Felipe RN  Outcome: Progressing     Problem: ABCDS Injury Assessment  Goal: Absence of physical injury  3/10/2023 1115 by Renee Mazariegos RN  Outcome: Progressing  3/9/2023 2156 by Jessenia Felipe RN  Outcome: Progressing  Flowsheets (Taken 3/9/2023 2154)  Absence of Physical Injury: Implement safety measures based on patient assessment

## 2023-03-10 NOTE — PROGRESS NOTES
[facility]   Progress Note  Cardiology    CC:  HTN, Right arm numbness    HPI:    She feels well.  all morning. Medications/Labs all Reviewed    Lab Results   Component Value Date    WBC 5.0 03/09/2023    HGB 12.4 03/09/2023    HCT 37.8 03/09/2023    MCV 89.2 03/09/2023     03/09/2023     Lab Results   Component Value Date    CREATININE 0.6 03/10/2023    BUN 17 03/10/2023     03/10/2023    K 4.2 03/10/2023     03/10/2023    CO2 30 03/10/2023     Lab Results   Component Value Date    INR 2.81 (H) 03/10/2023    PROTIME 29.7 (H) 03/10/2023        PHYSICAL EXAM   BP (!) 194/135   Pulse 74   Temp 97.5 °F (36.4 °C) (Temporal)   Resp 17   Ht 5' 7\" (1.702 m)   Wt 185 lb 3 oz (84 kg)   SpO2 97%   BMI 29.00 kg/m²      Respiratory:  Resp Assessment: Normal respiratory effort  Resp Auscultation: Clear to auscultation bilaterally   Cardiovascular: Auscultation: regular rate and rhythm, normal S1S2, no murmur, rub or gallop  Palpation:  Nl PMI  JVP:  normal  Extremities: No LE Edema  Abdomen:  Soft, non-tender  Normal bowel sounds  Extremities:   No Cyanosis or Clubbing  Neurological/Psychiatric:  Oriented to time, place, and person  Non-anxious  Skin:   Warm and dry      ASSESSMENT:     HTN:  High most of the time  Systolic falls into 93'E at times and this makes treatment very difficult. No orthostatic change on exam    Renin/cathy sent, send catecholamines and metanephrines. Try changing cozaar to diovan. We have to go slowly as at times SBP falls to 90  Start diovan 80 BID. Likely will need 160 BID     Afib:  Chronic afib on anticoagulation with coumadin  Stable  follow     Right Arm Numbness;  Seems to be neurologic. Unclear if issue is in her neck or shoulder. She has no symptoms of chest pain to suggests dissection and all pulses equal and BP the same in both arms. No suggestion of this being ACS    Now resolved.  Follow    Hypokalemia:  Resolved Follow  K=4.2     Plan:  Follow BP today  Change cozaar to diovan  If we can get BP decent will d/c to f/u next week in clinic  Discussed with Dr. Sheldon Collins MD, 3/10/2023 1:03 PM

## 2023-03-10 NOTE — PROGRESS NOTES
Assessment completed, see flowsheets. Awake, A/Ox4, follows commands. BP elevated, other VSS  Monitor AFIB with CVR   Lungs clear. Denies SOB  C/O bilat knee pain 8/10. Assisted up to chair, gait steady using cane. Plan of care discussed- attempt to bring down BP, comfort/safety, assist with needs prn. Pt v/u.

## 2023-03-10 NOTE — PROGRESS NOTES
Hospitalist Progress Note      PCP: Catherine Avery MD    Date of Admission: 3/6/2023    Chief Complaint: high blood pressure/ hypertensive urgency    Hospital Course: 71 y.o. female with PMH of CVA, AF, CAD, [place] palsy, dCHF, htn, hld  who presents with uncontrolled hypertension. Was seen per home health car and found to have BP above 382J systolic so referred to ED. Pt states her BP is always high, but has been running even higher than normal as her pharmacy has been out of her BP meds and she has been unable to get them. Does have intermittent headache and dizziness at times. Also with R arm pain since yesterday. Denies chest pain, dyspnea, abd pain, vision deficits, gross focal motor weakness or numbness. She has been weaned off of gtts and is now on oral meds.       Subjective: she is doing well but complains of left arm pain and wekaness       Medications:  Reviewed    Infusion Medications    sodium chloride       Scheduled Medications    prazosin  1 mg Oral BID    warfarin  5 mg Oral Once per day on Sun Mon Wed Thu Sat    carvedilol  6.25 mg Oral BID with meals    spironolactone  25 mg Oral Daily    oyster shell calcium w/D  1 tablet Oral BID    atorvastatin  80 mg Oral Daily    losartan  100 mg Oral Daily    aspirin  81 mg Oral Daily    sodium chloride flush  5-40 mL IntraVENous 2 times per day    warfarin  2.5 mg Oral Once per day on Tue Fri     PRN Meds: acetaminophen, sodium chloride flush, sodium chloride, ondansetron **OR** ondansetron, polyethylene glycol, nitroGLYCERIN      Intake/Output Summary (Last 24 hours) at 3/9/2023 1924  Last data filed at 3/9/2023 1450  Gross per 24 hour   Intake 300 ml   Output --   Net 300 ml         Physical Exam Performed:    BP (!) 151/115   Pulse 92   Temp 97.8 °F (36.6 °C) (Temporal)   Resp 20   Ht 5' 7\" (1.702 m)   Wt 181 lb 7 oz (82.3 kg)   SpO2 99%   BMI 28.42 kg/m²     General appearance: No apparent distress, appears stated age and cooperative. HEENT: Pupils equal, round, and reactive to light. Conjunctivae/corneas clear. Neck: Supple, with full range of motion. No jugular venous distention. Trachea midline. Respiratory:  Normal respiratory effort. Clear to auscultation, bilaterally without Rales/Wheezes/Rhonchi. Cardiovascular: Regular rate and rhythm with normal S1/S2 without murmurs, rubs or gallops. Abdomen: Soft, non-tender, non-distended with normal bowel sounds. Musculoskeletal: No clubbing, cyanosis or edema bilaterally. Full range of motion without deformity. Skin: Skin color, texture, turgor normal.  No rashes or lesions. Neurologic:  Neurovascularly intact without any focal sensory/motor deficits. Cranial nerves: II-XII intact, grossly non-focal.  Psychiatric: Alert and oriented, thought content appropriate, normal insight  Capillary Refill: Brisk, 3 seconds, normal   Peripheral Pulses: +2 palpable, equal bilaterally       Labs:   Recent Labs     03/07/23 0444 03/08/23 0421 03/09/23  0634   WBC 5.2 4.3 5.0   HGB 12.3 12.4 12.4   HCT 36.5 38.2 37.8    126* 137       Recent Labs     03/07/23 0444 03/08/23  0420 03/09/23  0634    139 140   K 3.4* 4.5 4.8    104 103   CO2 32 30 31   BUN 14 15 18   CREATININE 0.6 0.8 0.6   CALCIUM 9.8 9.8 9.8       No results for input(s): AST, ALT, BILIDIR, BILITOT, ALKPHOS in the last 72 hours. Recent Labs     03/07/23 0444 03/08/23  0421 03/09/23  0634   INR 3.08* 3.04* 2.48*       Recent Labs     03/07/23 0444   TROPONINI <0.01         Urinalysis:      Lab Results   Component Value Date/Time    NITRU Negative 10/19/2022 01:40 PM    WBCUA 3 10/19/2022 01:40 PM    BACTERIA None Seen 10/19/2022 01:40 PM    RBCUA 1 10/19/2022 01:40 PM    BLOODU Negative 10/19/2022 01:40 PM    SPECGRAV >=1.030 10/19/2022 01:40 PM    GLUCOSEU Negative 10/19/2022 01:40 PM       Radiology:  MRI BRAIN WO CONTRAST   Final Result   Cerebral atrophy.   Severe chronic small vessel ischemic changes. Remote   lacunar infarcts in the left cerebellum, left kosta, and basal ganglia and   thalami. No acute brain parenchymal abnormality. XR CHEST PORTABLE   Final Result   Marked cardiomegaly. No acute congestion or infiltrate             IP CONSULT TO CARDIOLOGY  IP CONSULT TO SPIRITUAL SERVICES  PHARMACY TO DOSE MEDICATION    Assessment/Plan:    Active Hospital Problems    Diagnosis     Hypertensive emergency [I16.1]      Priority: Medium     Hypertensive urgency / emergency 2/2 inability to get home po BP meds  Dizziness  - IV hydralazine and labetalol  - slow steady decrease in BP  - resume oral BP regimen; titrate as needed  - serial trops  - trend labs for end organ damage  - MRI has ruled out acute strokes  Bp better still up and down. RUE pain. Questionable cardiac equivalent  - serial trops  - monitor tele  - already on ASA and statin  - consider stress if persistent  - cardio already consulted ,following  - MRI negative for new stroke     Hypokalemia  - monitor and replace     PMH of CVA, AF on coumadin, CAD, [place] palsy, compensated chronic dCHF, hld  - resume hold meds    DVT Prophylaxis: warfarin  Diet: ADULT DIET;  Regular; Low Fat/Low Chol/High Fiber/FERNANDO; No Caffeine  Code Status: DNR-CC  PT/OT Eval Status: eval and treat     Dispo - she can be downgraded form and moved from ICU  Cards will be okay with her discharging tomorrow          Teofilo Blair MD

## 2023-03-11 VITALS
WEIGHT: 179.68 LBS | DIASTOLIC BLOOD PRESSURE: 103 MMHG | OXYGEN SATURATION: 97 % | TEMPERATURE: 97 F | SYSTOLIC BLOOD PRESSURE: 143 MMHG | BODY MASS INDEX: 28.2 KG/M2 | HEIGHT: 67 IN | HEART RATE: 87 BPM | RESPIRATION RATE: 24 BRPM

## 2023-03-11 LAB
ALDOSTERONE/RENIN ACTIVITY CALCULATION: 30.4 RATIO
ALDOSTERONE: 30.4 NG/DL
INR BLD: 2.65 (ref 0.87–1.14)
PROTHROMBIN TIME: 28.4 SEC (ref 11.7–14.5)
RENIN ACTIVITY: 1 NG/ML/HR

## 2023-03-11 PROCEDURE — 6370000000 HC RX 637 (ALT 250 FOR IP): Performed by: INTERNAL MEDICINE

## 2023-03-11 PROCEDURE — 99232 SBSQ HOSP IP/OBS MODERATE 35: CPT | Performed by: INTERNAL MEDICINE

## 2023-03-11 PROCEDURE — 94760 N-INVAS EAR/PLS OXIMETRY 1: CPT

## 2023-03-11 PROCEDURE — 85610 PROTHROMBIN TIME: CPT

## 2023-03-11 PROCEDURE — 2580000003 HC RX 258: Performed by: INTERNAL MEDICINE

## 2023-03-11 PROCEDURE — 36415 COLL VENOUS BLD VENIPUNCTURE: CPT

## 2023-03-11 RX ORDER — CARVEDILOL 6.25 MG/1
6.25 TABLET ORAL 2 TIMES DAILY WITH MEALS
Qty: 60 TABLET | Refills: 3 | Status: SHIPPED | OUTPATIENT
Start: 2023-03-11

## 2023-03-11 RX ORDER — VALSARTAN 80 MG/1
80 TABLET ORAL 2 TIMES DAILY
Qty: 60 TABLET | Refills: 3 | Status: SHIPPED | OUTPATIENT
Start: 2023-03-11

## 2023-03-11 RX ORDER — SPIRONOLACTONE 25 MG/1
25 TABLET ORAL DAILY
Qty: 30 TABLET | Refills: 3 | Status: SHIPPED | OUTPATIENT
Start: 2023-03-12

## 2023-03-11 RX ORDER — PRAZOSIN HYDROCHLORIDE 2 MG/1
2 CAPSULE ORAL 2 TIMES DAILY
Qty: 60 CAPSULE | Refills: 3 | Status: SHIPPED | OUTPATIENT
Start: 2023-03-11 | End: 2023-03-17

## 2023-03-11 RX ADMIN — ATORVASTATIN CALCIUM 80 MG: 80 TABLET, FILM COATED ORAL at 09:29

## 2023-03-11 RX ADMIN — SODIUM CHLORIDE, PRESERVATIVE FREE 10 ML: 5 INJECTION INTRAVENOUS at 09:40

## 2023-03-11 RX ADMIN — PRAZOSIN HYDROCHLORIDE 2 MG: 1 CAPSULE ORAL at 09:40

## 2023-03-11 RX ADMIN — CARVEDILOL 6.25 MG: 6.25 TABLET, FILM COATED ORAL at 09:29

## 2023-03-11 RX ADMIN — VALSARTAN 80 MG: 40 TABLET, FILM COATED ORAL at 09:26

## 2023-03-11 RX ADMIN — ASPIRIN 81 MG: 81 TABLET, COATED ORAL at 09:29

## 2023-03-11 RX ADMIN — SPIRONOLACTONE 25 MG: 25 TABLET ORAL at 09:29

## 2023-03-11 RX ADMIN — OYSTER SHELL CALCIUM WITH VITAMIN D 1 TABLET: 500; 200 TABLET, FILM COATED ORAL at 09:40

## 2023-03-11 ASSESSMENT — PAIN SCALES - GENERAL
PAINLEVEL_OUTOF10: 0

## 2023-03-11 NOTE — PROGRESS NOTES
Shift assessment complete. VS obtained. BP remains elevated. Pt denies pain. See flowsheets for more details. Scheduled medications given per MAR. Pt is A&O x 4. On RA, respirations regular/unlabored. Lung sounds clear. A-fib. Abdomen soft/rounded with active bowel sounds. Peripheral pulses palpable. Skin remains intact. Pt reposition self and remains comfortable at this time. Denies further needs. Call light within reach and standard safety measures in place.

## 2023-03-11 NOTE — PLAN OF CARE
Problem: Chronic Conditions and Co-morbidities  Goal: Patient's chronic conditions and co-morbidity symptoms are monitored and maintained or improved  3/10/2023 2248 by Michelle Tracy RN  Outcome: Progressing     Problem: Pain  Goal: Verbalizes/displays adequate comfort level or baseline comfort level  3/10/2023 2248 by Michelle Tracy RN  Outcome: Progressing     Problem: Skin/Tissue Integrity  Goal: Absence of new skin breakdown  Description: 1. Monitor for areas of redness and/or skin breakdown  2. Assess vascular access sites hourly  3. Every 4-6 hours minimum:  Change oxygen saturation probe site  4. Every 4-6 hours:  If on nasal continuous positive airway pressure, respiratory therapy assess nares and determine need for appliance change or resting period.   3/10/2023 2248 by Michelle Tracy RN  Outcome: Progressing     Problem: Safety - Adult  Goal: Free from fall injury  3/10/2023 2248 by Michelle Tracy RN  Outcome: Progressing     Problem: ABCDS Injury Assessment  Goal: Absence of physical injury  3/10/2023 2248 by Michelle Tracy RN  Outcome: Progressing      Problem: Discharge Planning  Goal: Discharge to home or other facility with appropriate resources  3/10/2023 2248 by Michelle Tracy RN  Outcome: Progressing

## 2023-03-11 NOTE — PROGRESS NOTES
[address]   Progress Note  Cardiology      Halle Echevarria   Admission date:  3/6/2023  CC-f/up for labile hypertension  Subjective:  She feels much better.  Unfortunately BP is quite labile    Objective:  Medications/Labs all Reviewed     prazosin  2 mg Oral BID    valsartan  80 mg Oral BID    warfarin  5 mg Oral Once per day on Sun Mon Wed Thu Sat    carvedilol  6.25 mg Oral BID with meals    spironolactone  25 mg Oral Daily    oyster shell calcium w/D  1 tablet Oral BID    atorvastatin  80 mg Oral Daily    aspirin  81 mg Oral Daily    sodium chloride flush  5-40 mL IntraVENous 2 times per day    warfarin  2.5 mg Oral Once per day on Tue Fri       BMP:   Lab Results   Component Value Date/Time     03/10/2023 05:30 AM    K 4.2 03/10/2023 05:30 AM    K 4.8 03/09/2023 06:34 AM     03/10/2023 05:30 AM    CO2 30 03/10/2023 05:30 AM    BUN 17 03/10/2023 05:30 AM    CREATININE 0.6 03/10/2023 05:30 AM    MG 1.70 03/07/2023 04:44 AM     CBC:    Lab Results   Component Value Date/Time    WBC 5.0 03/09/2023 06:34 AM    RBC 4.24 03/09/2023 06:34 AM    HGB 12.4 03/09/2023 06:34 AM    HCT 37.8 03/09/2023 06:34 AM    MCV 89.2 03/09/2023 06:34 AM    RDW 13.0 03/09/2023 06:34 AM     03/09/2023 06:34 AM      PT/INR:    Lab Results   Component Value Date    INR 2.65 (H) 03/11/2023    PROTIME 28.4 (H) 03/11/2023     Cardiac Enzymes:    Lab Results   Component Value Date/Time    CKTOTAL 160 10/20/2022 06:57 AM     Lab Results   Component Value Date    TROPONINI <0.01 03/07/2023    TROPONINI <0.01 03/06/2023    TROPONINI <0.01 03/06/2023     BNP:  No results found for: BNP  FASTING LIPID PANEL:    Lab Results   Component Value Date/Time    CHOL 201 02/14/2023 11:32 AM    HDL 68 02/14/2023 11:32 AM    TRIG 73 02/14/2023 11:32 AM       Physical Examination:    BP (!) 155/112   Pulse 96   Temp 98 °F (36.7 °C) (Temporal)   Resp 21   Ht 5' 7\" (1.702 m)   Wt 179 lb 10.8 oz (81.5 kg)   SpO2 99% BMI 28.14 kg/m²      Respiratory:  Resp Assessment: Normal respiratory effort  Resp Auscultation: Clear to auscultation bilaterally   Cardiovascular: Auscultation: regular rate and rhythm, normal S1S2, no murmur, rub or gallop  Palpation:  Nl PMI  JVP:  normal  Extremities: No Edema  Abdomen:  Soft, non-tender  Normal bowel sounds  Extremities:   No Cyanosis or Clubbing  Neurological/Psychiatric:  Oriented to time, place, and person  Non-anxious  Skin Warm and dry    Assessment:    Principal Problem:    Hypertensive emergency  Plan: improved      Plan:   [id number] Jennifer Rodriguez for discharge on current meds.  If BP remains hard to control on outpatient basis can raise diovan dose and/or minipres dose

## 2023-03-11 NOTE — PROGRESS NOTES
Pharmacy to Dose Warfarin    Pharmacy consulted to dose warfarin for Afib. INR Goal: 2-3    INR today: 2.65    Home dose: 5 mg daily except 2.5 mg Tues/Fri    Assessment/Plan:  - continue home dose, 5 mg tonight  - Possible concomitant drug-drug interactions include: aspirin     Pharmacy will continue to follow.     Rafa Mercedes, PharmD, BCPS  [id number]  3/11/2023 8:47 AM

## 2023-03-13 ENCOUNTER — TELEPHONE (OUTPATIENT)
Dept: FAMILY MEDICINE CLINIC | Age: 73
End: 2023-03-13

## 2023-03-13 ENCOUNTER — TELEPHONE (OUTPATIENT)
Dept: PHARMACY | Age: 73
End: 2023-03-13

## 2023-03-13 LAB
CATECHOLAMINE INTERPRETATION, PLASMA: ABNORMAL
DOPAMINE PLASMA: 29 PG/ML (ref 0–20)
EPINEPHRINE PLASMA: 18 PG/ML (ref 10–200)
METANEPH/PLASMA INTERP: ABNORMAL
METANEPHRINE FREE PLASMA: 0.14 NMOL/L (ref 0–0.49)
NOREPINEPHRINE: 956 PG/ML (ref 80–520)
NORMETANEPHRINE FREE PLASMA: 1.16 NMOL/L (ref 0–0.89)

## 2023-03-13 NOTE — TELEPHONE ENCOUNTER
Care Transitions Initial Follow Up Call    Outreach made within 2 business days of discharge: Yes    Patient: Shivani Micthell Patient : 1950   MRN: [id number]  Reason for Admission: There are no discharge diagnoses documented for the most recent discharge. Discharge Date: 3/11/23       Spoke with: [facility] department/facility: [facility] Interactive Patient Contact:  Was patient able to fill all prescriptions: No: she is going to [facility]'s today to get them- instructed to call  if problem   Was patient instructed to bring all medications to the follow-up visit: Yes  Is patient taking all medications as directed in the discharge summary?  Yes  Does patient understand their discharge instructions: Yes  Does patient have questions or concerns that need addressed prior to 7-14 day follow up office visit: no    Scheduled appointment with PCP within 7-14 days    Follow Up  Future Appointments   Date Time Provider Kayla Jones   3/14/2023 11:15 AM MD KISHOR Paulson [address] [place] - [facility]   3/17/2023 12:00 PM Lottie Flanagan DO [facility]   2023 11:00 AM MD [address]       Ty Licona MA

## 2023-03-13 NOTE — TELEPHONE ENCOUNTER
Tiffany Dominguez called from [facility]. Patient was discharged from in-patient F and has been referred to them for home care. They will be opening her case this week, (Wed/Thur). She wanted to confirm patient is a clinic patient and date of next INR check. Explained patient was on our schedule for today however, we don't know if she will be coming in today. Tiffany Dominguez will ask patient's nurse to contact the clinic for V.O. after she open's patient case this week.

## 2023-03-13 NOTE — TELEPHONE ENCOUNTER
Care Transitions Initial Follow Up Call    Outreach made within 2 business days of discharge: Yes    Patient: Katia Dunlap Patient : 1950   MRN: [id number]  Reason for Admission: There are no discharge diagnoses documented for the most recent discharge. Discharge Date: 3/11/23       Spoke with: Left message on recorder for patient to call back at  [phone]      Discharge department/facility:     [facility] Interactive Patient Contact:  Was patient able to fill all prescriptions:   Was patient instructed to bring all medications to the follow-up visit:   Is patient taking all medications as directed in the discharge summary?    Does patient understand their discharge instructions:   Does patient have questions or concerns that need addressed prior to 7-14 day follow up office visit:     Scheduled appointment with PCP within 7-14 days    Follow Up  Future Appointments   Date Time Provider Kayla Jones   3/14/2023 11:15 AM MD KISHOR [address] [place] - [facility]   3/17/2023 12:00 PM Natalie Saldaña DO [facility]   2023 11:00 AM MD Sammie Teague [address]       Yonatan Dangelo MA

## 2023-03-13 NOTE — CARE COORDINATION
Aware of discharge with home care. Home care orders sent to [facility]. Discharge planner notified.

## 2023-03-15 ENCOUNTER — OFFICE VISIT (OUTPATIENT)
Dept: FAMILY MEDICINE CLINIC | Age: 73
End: 2023-03-15

## 2023-03-15 ENCOUNTER — ANTI-COAG VISIT (OUTPATIENT)
Dept: PHARMACY | Age: 73
End: 2023-03-15

## 2023-03-15 VITALS
OXYGEN SATURATION: 98 % | BODY MASS INDEX: 28.47 KG/M2 | WEIGHT: 181.8 LBS | DIASTOLIC BLOOD PRESSURE: 90 MMHG | TEMPERATURE: 97.1 F | SYSTOLIC BLOOD PRESSURE: 148 MMHG | HEART RATE: 84 BPM

## 2023-03-15 DIAGNOSIS — I48.21 PERMANENT ATRIAL FIBRILLATION (HCC): ICD-10-CM

## 2023-03-15 DIAGNOSIS — I10 PRIMARY HYPERTENSION: Primary | ICD-10-CM

## 2023-03-15 DIAGNOSIS — I48.21 PERMANENT ATRIAL FIBRILLATION (HCC): Primary | ICD-10-CM

## 2023-03-15 DIAGNOSIS — Z79.01 LONG TERM (CURRENT) USE OF ANTICOAGULANTS: ICD-10-CM

## 2023-03-15 DIAGNOSIS — Z09 HOSPITAL DISCHARGE FOLLOW-UP: ICD-10-CM

## 2023-03-15 DIAGNOSIS — I50.32 CHRONIC DIASTOLIC (CONGESTIVE) HEART FAILURE (HCC): ICD-10-CM

## 2023-03-15 LAB — INR BLD: 1.3

## 2023-03-15 NOTE — TELEPHONE ENCOUNTER
Pt called to r/s her [facility] appt, asked if she has Harry June , she stated they are coming out today. Explained that Harry June is supposed to check her INR and call results into [facility].

## 2023-03-15 NOTE — PROGRESS NOTES
Jeffrey Villanueva w/ [facility] LVM w/ pts INR results of 1.6. Pt was d/c'd on Sat 3/11. D/C instructions were to take 5 mgs of warfarin daily except on Tues take 2.5 mgs. Pt has 5 mg tabs. 0489 25 37 29 w/ dosing instructions and next INR check . Admitted 3/6-3/11 for HTN. Dcd with HH. Started prazosin, spiron, and valsartan, dcd losartan and adjusted coreg. 3/6- admitted with INR of 2.5  3/7- 2.5mg, INR 3.08  3/8- 2.5mg, INR 3.04  3/9- 5mg, INR 2.48  3/10-2.5mg, INR 2.81  3/11- dcd and INR was 2.65 and was told to take 2.5mg on Tues, 5mg AOD    Called Jeffrey Villanueva back. INR was actually 1.3. Already took today's dose of 5mg. Denies missed doses or extra greens. Unclear why INR dropped so much after discharge. Instructed to have patient take 7.5mg tomorrow then take 5mg daily until Monday since INR was 1.3 which is below goal range of 2-3. Will check INR again Monday, 3/20.     Nimisha Zendejas, PharmD, [facility] Tracking Only    Intervention Detail: Dose Adjustment: 1, reason: Therapy Optimization  Total # of Interventions Recommended: 1  Total # of Interventions Accepted: 1  Time Spent (min): 15

## 2023-03-15 NOTE — PROGRESS NOTES
Ayad Cortez is a 71 y.o. Female who came into the clinic today for her posthospitalization recheck and   for appropriate management. The patient was admitted to [facility] on 3/6/2023 and was   discharged on 3/11/2023. The patient was sent to the emergency department in concerns to very high blood   pressure reading of 220/118 mmHg when checked by the home health care nurse. I did review all the admission   note, discharge summary, investigations, procedures and consults done for the patient during this hospitalization   in detail with her today. In the clinic today the patient's blood pressure was slightly elevated at 148/90 mmHg. On inquiring the patient informs me that she has not been taking the carvedilol and spironolactone since her    discharge from the hospital.  Patient informs me that she was not sure if she was supposed to take these two   medications or not. I went through all the medication's with the patient once again in detail with her today and   recommended her to follow recommendations. The patient has an upcoming appointment with her cardiologist   in a couple of days. Otherwise today she did not have any other questions or concerns and all the question and   concerns were appropriately answered.     I reviewed and discussed below mentioned labs with the patient today:    Lab Results   Component Value Date/Time    WBC 5.0 03/09/2023 06:34 AM    RBC 4.24 03/09/2023 06:34 AM    MCV 89.2 03/09/2023 06:34 AM    MCHC 32.7 03/09/2023 06:34 AM     Lab Results   Component Value Date/Time    ANIONGAP 5 03/10/2023 05:30 AM    GLUCOSE 102 03/10/2023 05:30 AM    BUN 17 03/10/2023 05:30 AM    CREATININE 0.6 03/10/2023 05:30 AM    AGRATIO 1.0 03/06/2023 12:07 PM    CALCIUM 9.6 03/10/2023 05:30 AM     Past Medical History:   Diagnosis Date    Acute cerebrovascular accident (CVA) ([address] 01/28/2020    brain bleed    LAST (acute kidney injury) ([facility] [address] 10/20/2022    Atrial fibrillation ([facility]     Bell palsy     diagnosed 15 years ago    CAD (coronary artery disease)     Cerebral artery occlusion with cerebral infarction ([facility] [address]     Chronic diastolic CHF (congestive heart failure) ([facility] [address] 05/16/2020    CVA (cerebral vascular accident) ([facility] [address] 01/04/2017    Hypertension     Intracranial hemorrhage ([facility] [address] 04/2016    Mixed hyperlipidemia 07/06/2022    Nonintractable headache     currently controlled    Nontraumatic subcortical hemorrhage of cerebral hemisphere ([facility] [address] 04/30/2016    Poor vision     after bells palsy in 2012    Primary osteoarthritis of right knee 03/18/2022    Sudden visual loss of left eye     patient states \"In very corner of my eye. \"    Thyroid nodule 02/08/2018    TIA involving right internal carotid artery        Patient Active Problem List   Diagnosis    Permanent atrial fibrillation ([facility] [address]    Coronary artery disease due to lipid rich plaque    Thyroid nodule    Chronic diastolic (congestive) heart failure (HCC)    Primary hypertension    Primary osteoarthritis of right knee    Primary osteoarthritis of left knee    Headache, migraine, intractable, with status migrainosus    Hyperlipidemia    Multiple falls    History of arterial ischemic stroke    Long term (current) use of anticoagulants    Hypertensive emergency       Past Surgical History:   Procedure Laterality Date    CARDIAC SURGERY      stentsx2    COLONOSCOPY      CORONARY ANGIOPLASTY WITH STENT PLACEMENT      ROTATOR CUFF REPAIR Right     TUBAL LIGATION         History reviewed. No pertinent family history.     Social History     Tobacco Use    Smoking status: Never    Smokeless tobacco: Never   Substance Use Topics    Alcohol use: No       Current Outpatient Medications on File Prior to Visit   Medication Sig Dispense Refill    prazosin (MINIPRESS) 2 MG capsule Take 1 capsule by mouth 2 times daily 60 capsule 3    valsartan (DIOVAN) 80 MG tablet Take 1 tablet by mouth 2 times daily 60 tablet 3    ASPIRIN 81 PO Take by mouth      atorvastatin (LIPITOR) 80 MG tablet Take 1 tablet by mouth daily 90 tablet 0    furosemide (LASIX) 20 MG tablet TAKE 1 TABLET BY MOUTH once DAILY 90 tablet 0    potassium chloride (KLOR-CON M) 20 MEQ extended release tablet Take 1 tablet by mouth daily 90 tablet 0    warfarin (COUMADIN) 5 MG tablet Take 5 mg by mouth daily 5 mg everyday but on tuesdays 0.5 tablet      acetaminophen (TYLENOL) 500 MG tablet Take 2 tablets by mouth every 6 hours as needed for Pain 120 tablet 3    Calcium Carb-Cholecalciferol (CALCIUM + D3) 600-200 MG-UNIT TABS tablet Take 1 tablet by mouth 2 times daily 120 tablet 3    carvedilol (COREG) 6.25 MG tablet Take 1 tablet by mouth with breakfast and with evening meal (Patient not taking: Reported on 3/15/2023) 60 tablet 3    spironolactone (ALDACTONE) 25 MG tablet Take 1 tablet by mouth daily (Patient not taking: Reported on 3/15/2023) 30 tablet 3     No current facility-administered medications on file prior to visit. Allergies   Allergen Reactions    Clonidine Rash, Shortness Of Breath and Hives    Codeine Hives, Itching, Nausea Only and Rash     Other reaction(s): Other (See Comments)  Pruritis    Hydrocodone-Acetaminophen      Itchy      Lisinopril Hives and Itching    Tramadol Other (See Comments)     Unknown reaction    Percocet [Oxycodone-Acetaminophen] Itching     REVIEW OF SYSTEMS:   CONSTITUTIONAL: No weight loss, fever, chills. Complains of weakness or fatigue. HEENT: Eyes: No visual loss, blurred vision, double vision or yellow sclerae. Ears, Nose, Throat: No hearing loss, sneezing, congestion, runny nose or sore throat. SKIN: No rash or itching. CARDIOVASCULAR: No chest pain, chest pressure or chest discomfort. No palpitations. Complains of edema. RESPIRATORY: No shortness of breath, cough or sputum. GASTROINTESTINAL: No anorexia, nausea, vomiting, diarrhea or constipation. No abdominal pain, hematochezia or melena.    GENITOURINARY:No dysuria, urgency, frequency, hematuria. NEUROLOGICAL: No headache, dizziness, syncope, paralysis, ataxia,   numbness or tingling in the extremities. No change in bowel or bladder control. MUSCULOSKELETAL: No muscle pain, back pain. Complains of bilateral knee joint pain or stiffness. PSYCHIATRIC: No history of depression or anxiety. ENDOCRINOLOGIC: No reports of sweating, cold or heat intolerance. No polyuria or polydipsia. Objective     . BP (!) 148/90   Pulse 84   Temp 97.1 °F (36.2 °C)   Wt 181 lb 12.8 oz (82.5 kg)   SpO2 98%   BMI 28.47 kg/m²     GENERAL:  Chuyita Lr is a 71 y.o.  female who is not in any acute distress. She was well attired and well groomed and was speaking in full sentences. LUNGS:  Normal ventilatory breath sounds are heard bilaterally. No crackles   or wheezes heard. CARDIOVASCULAR: Irregularly irregular rhythm. Normal S1, S2 heard. No murmurs heard. No JVD. EXTREMITIES:  All 4 extremities were moving fine. Full range of motion is   present. No deformity noted. Peripheral pulses were felt. Bilateral 1+ lower   extremity edema. Neurovascular integrity maintained. NEUROLOGICAL:  The patient was alert, conscious, and cooperative. Oriented   to time, place, and person. Muscle power in all 4 limbs is 5/5. Cranial   nerves II thru XII are grossly intact. No sensory or motor loss. Assessment/Plan     Diagnoses and all orders for this visit:    Primary hypertension    Chronic diastolic (congestive) heart failure (HCC)    Permanent atrial fibrillation ([address]    Long term (current) use of anticoagulants    Hospital discharge follow-up  -     SD DISCHARGE MEDS RECONCILED W/ CURRENT OUTPATIENT MED LIST      Advise given:  - Take all prescription medication as prescribed. - Eat five servings of fruits and vegetables everyday. - The importance of proper foot care and regularly checking feet to prevent sores and possibly loss of limbs was reviewed.    - The importance of keeping the blood pressure monitoring to prevent stroke, heart attacks, kidney failure, blindness, and loss of limbs was reviewed. - Appropriate handout were given to the patient. -  All the questions and concerns were appropriately answered. - Patient / family member / caregiver verbalized understanding of patient instructions from today's visit. - The patient was advised to follow up with me in 1 month for recheck or can call me before if has any other questions or concerns.

## 2023-03-20 ENCOUNTER — ANTI-COAG VISIT (OUTPATIENT)
Dept: PHARMACY | Age: 73
End: 2023-03-20

## 2023-03-20 DIAGNOSIS — I48.21 PERMANENT ATRIAL FIBRILLATION (HCC): Primary | ICD-10-CM

## 2023-03-20 LAB — INR BLD: 3

## 2023-03-20 NOTE — PROGRESS NOTES
Manohar Page w/[facility] called w/ INR results of 3.0 pt takes 7.5 mgs of warfarin on Tues /Fri and 5 mgs all other days. No medication or diet changes. [address] call to [facility] [place]. Instructed for patient to take 2.5 mg on Tues and Fri and 5 mg all other days of the week. Recheck INR in 1 week, 3/27.       For Pharmacy Admin Tracking Only    Intervention Detail: Dose Adjustment: 1, reason: Therapy Optimization  Total # of Interventions Recommended: 1  Total # of Interventions Accepted: 1  Time Spent (min): 10

## 2023-03-28 ENCOUNTER — ANTI-COAG VISIT (OUTPATIENT)
Dept: PHARMACY | Age: 73
End: 2023-03-28

## 2023-03-28 DIAGNOSIS — I48.21 PERMANENT ATRIAL FIBRILLATION (HCC): Primary | ICD-10-CM

## 2023-03-28 LAB — INR BLD: 4

## 2023-03-28 NOTE — PROGRESS NOTES
PT 47.4 / INR 4.0 Patient took 2.5mg warfarin on Tue/Fri and 5mg all other days. Please call [facility] [phone] with warfarin dosing and order for next INR check. Returned call to [facility] [place]. Instructed for patient to hold tonight then decrease dose to 2.5mg on Sun, Tues, and Fri and 5mg all other days.  Recheck INR in 1 week, 4/4    For Pharmacy Admin Tracking Only    Intervention Detail: Dose Adjustment: 1, reason: Therapy Optimization  Total # of Interventions Recommended: 1  Total # of Interventions Accepted: 1  Time Spent (min): 15

## 2023-03-29 ENCOUNTER — TELEPHONE (OUTPATIENT)
Dept: CARDIOLOGY CLINIC | Age: 73
End: 2023-03-29

## 2023-03-29 DIAGNOSIS — I10 PRIMARY HYPERTENSION: Primary | ICD-10-CM

## 2023-03-29 NOTE — TELEPHONE ENCOUNTER
Please call and tell patient that Dr Leonard Dyer would like her to stop taking her potassium and repeat renal panel in one month.  She should also reschedule the appt that she missed today, thanks
Spoke to pt, told her Dr. Mosley Just would like her to stop taking her potassium and repeat renal panel in one month. She is rescheduled for May. Pt verbalized understanding.
Detail Level: Simple

## 2023-04-04 ENCOUNTER — ANTI-COAG VISIT (OUTPATIENT)
Dept: PHARMACY | Age: 73
End: 2023-04-04

## 2023-04-04 DIAGNOSIS — I48.21 PERMANENT ATRIAL FIBRILLATION (HCC): Primary | ICD-10-CM

## 2023-04-04 LAB — INR BLD: 4

## 2023-04-04 NOTE — PROGRESS NOTES
INR 4.0 (no dosing given) [facility]. Please call [facility] [phone] with warfarin dosing and order for next INR check. John Edwards also requests a call to the patient with dosing, [phone]. Returned call to [facility]. She states that she verified with pt held dose last week and 2.5 mg on Fri and Sun and 5 mg all other days of the week. Advised for patient to hold dose tonight, take 2.5 mg tomorrow, Fri and Sun and 5 mg on Thurs, Sat and Mon. Recheck INR in 1 week, 4/11.         For Pharmacy Admin Tracking Only    Intervention Detail: Dose Adjustment: 1, reason: Therapy Optimization  Total # of Interventions Recommended: 1  Total # of Interventions Accepted: 1  Time Spent (min): 10

## 2023-04-17 ENCOUNTER — ANTI-COAG VISIT (OUTPATIENT)
Dept: PHARMACY | Age: 73
End: 2023-04-17
Payer: MEDICARE

## 2023-04-17 DIAGNOSIS — I48.21 PERMANENT ATRIAL FIBRILLATION (HCC): Primary | ICD-10-CM

## 2023-04-17 LAB — INTERNATIONAL NORMALIZATION RATIO, POC: 3.3

## 2023-04-17 PROCEDURE — 85610 PROTHROMBIN TIME: CPT

## 2023-04-17 PROCEDURE — 99212 OFFICE O/P EST SF 10 MIN: CPT

## 2023-04-17 NOTE — PROGRESS NOTES
Ms. Shanel Cornell is a 71 y.o. y/o female with history of A fib   She presents today for anticoagulation monitoring and adjustment. 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)    Patient Reported Findings:  Yes     No   [x]   []       Patient verifies current dosing regimen as listed - pt take 2.5 mg on Tues and Thurs changed AVS to match---> confirmed   []   [x]       S/S bleeding/bruising/swelling/SOB -denies  []   [x]       Blood in urine or stool - denies   []   [x]       Procedures scheduled in the future at this time - Is being assessed for watchman, will keep notified---> had cortisone shot in knee last fri --> no changes   []   [x]       Missed Dose-  Denies     []   [x]       Extra Dose - denies    []   [x]       Change in medications She continues with Tylenol 1000mg but only as needed --> states that she will take up to q6h---> inc tylenol to 3x/day --> less tylenol, once a day --> no changes, no tylenol recently --> She is taking tylenol for knee pain --> has been taking tylenol arthritis---> no changes---> restarted coreg and losartan, adjusted water pill ---> no changes    []   [x]       Change in health/diet/appetite-- normally has high vitamin K diet of canned spinach weekly and collards or broccoli about every other week. Will have some lower vitamin K veggies about 2 times a week such as green beans. --> states that she has been eating liver twice a week, likely why INR has dropped.  Asked patient to decrease to once a week --> 1/2 cup of greens yesterday, stopped eating liver---> had spinach yesterday --> per pt loves liver and had it in the last week---> states no liver, had spinach once, no NVD --> same---> no greens, no liver, no NVD --> no changes---> normal greens, no NVD ---> greens Sunday, no NVD ---> no greens, no NVD  []   [x]       Change in alcohol use denies  []   [x]       Change in activity  [x]   []       Hospital admission- in hospital 10/19-10/22 for fall and

## 2023-04-20 ENCOUNTER — ANTI-COAG VISIT (OUTPATIENT)
Dept: PHARMACY | Age: 73
End: 2023-04-20
Payer: MEDICARE

## 2023-04-20 DIAGNOSIS — I48.21 PERMANENT ATRIAL FIBRILLATION (HCC): Primary | ICD-10-CM

## 2023-04-20 LAB — INTERNATIONAL NORMALIZATION RATIO, POC: 1.4

## 2023-04-20 PROCEDURE — 85610 PROTHROMBIN TIME: CPT

## 2023-04-20 PROCEDURE — 99212 OFFICE O/P EST SF 10 MIN: CPT

## 2023-04-20 NOTE — PROGRESS NOTES
admission-  []   [x]       Emergency department visit   []   [x]       Other complaints- states that she is using the pillbox, and she likes it. -> has not been using recently, but wants to restart ---> states she tried to use pillbox, asked to use pillbox and AVS and to cross off each day on AVS to prevent her from missing doses. Concerned for patient's ability to correctly remember recent history. She states that she is getting concerned for her memory. Clinical Outcomes:  Yes     No  []   [x]       Major bleeding event brain bleed 8/2021  []   [x]       Thromboembolic event  Duration of warfarin Therapy: indefinitely  INR Range:  1.8-2.5 -> lower INR goal dt h/o subcortical hemorrhage (2016)    She may be slower to reach steady state with warfarin dosing so consider checking INR about 10 days after dose adjustment. RTC from [facility]. inability to stick patient. [facility].     INR is 1.4 today after holding doses continuously  Decrease weekly dose to 5 mg on Tues, Thurs and Sat and 2.5 mg all other days of the week   Recheck INR on Fri 4/28    Patient consent signed 1/24/23    Referring cardiologist is Dr. Ivy Pina  INR (no units)   Date Value   04/12/2023 5.10   04/04/2023 4.00   03/28/2023 4.00   03/20/2023 3.00     INR,(POC) (no units)   Date Value   04/17/2023 3.3     For Pharmacy Admin Tracking Only    Intervention Detail: Dose Adjustment: 1, reason: Therapy Optimization  Total # of Interventions Recommended: 1  Total # of Interventions Accepted: 1  Time Spent (min): 15

## 2023-04-28 ENCOUNTER — HOSPITAL ENCOUNTER (OUTPATIENT)
Dept: GENERAL RADIOLOGY | Age: 73
Discharge: HOME | End: 2023-04-28
Payer: MEDICARE

## 2023-04-28 ENCOUNTER — HOSPITAL ENCOUNTER (OUTPATIENT)
Dept: WOMENS IMAGING | Age: 73
Discharge: HOME | End: 2023-04-28
Payer: MEDICARE

## 2023-04-28 ENCOUNTER — ANTI-COAG VISIT (OUTPATIENT)
Dept: PHARMACY | Age: 73
End: 2023-04-28
Payer: MEDICARE

## 2023-04-28 VITALS — WEIGHT: 183 LBS | HEIGHT: 67 IN | BODY MASS INDEX: 28.72 KG/M2

## 2023-04-28 DIAGNOSIS — I48.21 PERMANENT ATRIAL FIBRILLATION (HCC): Primary | ICD-10-CM

## 2023-04-28 DIAGNOSIS — Z12.31 ENCOUNTER FOR SCREENING MAMMOGRAM FOR HIGH-RISK PATIENT: ICD-10-CM

## 2023-04-28 DIAGNOSIS — M81.0 AGE-RELATED OSTEOPOROSIS WITHOUT CURRENT PATHOLOGICAL FRACTURE: ICD-10-CM

## 2023-04-28 LAB — INTERNATIONAL NORMALIZATION RATIO, POC: 2

## 2023-04-28 PROCEDURE — 2500000003 HC RX 250 WO HCPCS: Performed by: STUDENT IN AN ORGANIZED HEALTH CARE EDUCATION/TRAINING PROGRAM

## 2023-04-28 PROCEDURE — 85610 PROTHROMBIN TIME: CPT

## 2023-04-28 PROCEDURE — 99211 OFF/OP EST MAY X REQ PHY/QHP: CPT

## 2023-04-28 PROCEDURE — 77067 SCR MAMMO BI INCL CAD: CPT

## 2023-04-28 PROCEDURE — 77080 DXA BONE DENSITY AXIAL: CPT

## 2023-04-28 RX ORDER — LIDOCAINE HYDROCHLORIDE 10 MG/ML
10 INJECTION, SOLUTION EPIDURAL; INFILTRATION; INTRACAUDAL; PERINEURAL ONCE
Status: DISCONTINUED | OUTPATIENT
Start: 2023-04-28 | End: 2023-04-29 | Stop reason: HOSPADM

## 2023-04-28 NOTE — PROGRESS NOTES
Ms. Tavo You is a 71 y.o. y/o female with history of A fib   She presents today for anticoagulation monitoring and adjustment. 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)    Patient Reported Findings:  Yes     No   [x]   []       Patient verifies current dosing regimen as listed - pt take 2.5 mg on Tues and Thurs changed AVS to match---> confirmed --> took 2.5 mg on Mon then held tues and wed --> took 5 mg on Mon, Wed and Fri and 2.5 mg all other days of the week. Went of memory   []   [x]       S/S bleeding/bruising/swelling/SOB -denies  []   [x]       Blood in urine or stool - denies   []   [x]       Procedures scheduled in the future at this time - Is being assessed for watchman, will keep notified---> had cortisone shot in knee last fri --> no changes   []   [x]       Missed Dose-  Denies     []   [x]       Extra Dose - denies    []   [x]       Change in medications She continues with Tylenol 1000mg but only as needed --> states that she will take up to q6h---> inc tylenol to 3x/day --> less tylenol, once a day --> no changes, no tylenol recently --> She is taking tylenol for knee pain --> has been taking tylenol arthritis---> no changes---> restarted coreg and losartan, adjusted water pill ---> no changes    []   [x]       Change in health/diet/appetite-- normally has high vitamin K diet of canned spinach weekly and collards or broccoli about every other week. Will have some lower vitamin K veggies about 2 times a week such as green beans. --> states that she has been eating liver twice a week, likely why INR has dropped.  Asked patient to decrease to once a week --> 1/2 cup of greens yesterday, stopped eating liver---> had spinach yesterday --> per pt loves liver and had it in the last week---> states no liver, had spinach once, no NVD --> same---> no greens, no liver, no NVD---> normal greens, no NVD ---> greens Sunday, no NVD ---> no greens, no NVD --> no changes  []   [x]       Change in

## 2023-05-01 RX ORDER — CAL/D3/MAG11/ZINC/COP/MANG/BOR 600 MG-800
1 TABLET ORAL DAILY
Qty: 90 TABLET | Refills: 4 | Status: SHIPPED | OUTPATIENT
Start: 2023-05-01

## 2023-05-01 RX ORDER — ALENDRONATE SODIUM 70 MG/1
70 TABLET ORAL
Qty: 12 TABLET | Refills: 4 | Status: SHIPPED | OUTPATIENT
Start: 2023-05-01

## 2023-05-05 ENCOUNTER — TELEPHONE (OUTPATIENT)
Dept: CARDIOLOGY CLINIC | Age: 73
End: 2023-05-05

## 2023-05-05 ENCOUNTER — OFFICE VISIT (OUTPATIENT)
Dept: CARDIOLOGY CLINIC | Age: 73
End: 2023-05-05

## 2023-05-05 VITALS
SYSTOLIC BLOOD PRESSURE: 130 MMHG | OXYGEN SATURATION: 99 % | HEIGHT: 67 IN | HEART RATE: 63 BPM | WEIGHT: 184.5 LBS | BODY MASS INDEX: 28.96 KG/M2 | DIASTOLIC BLOOD PRESSURE: 90 MMHG

## 2023-05-05 DIAGNOSIS — I50.32 CHRONIC DIASTOLIC (CONGESTIVE) HEART FAILURE (HCC): ICD-10-CM

## 2023-05-05 DIAGNOSIS — R06.02 SOB (SHORTNESS OF BREATH): ICD-10-CM

## 2023-05-05 DIAGNOSIS — I48.0 PAF (PAROXYSMAL ATRIAL FIBRILLATION) (HCC): ICD-10-CM

## 2023-05-05 DIAGNOSIS — I50.32 CHRONIC HEART FAILURE WITH PRESERVED EJECTION FRACTION (HCC): ICD-10-CM

## 2023-05-05 DIAGNOSIS — E78.2 MIXED HYPERLIPIDEMIA: ICD-10-CM

## 2023-05-05 DIAGNOSIS — I25.10 CORONARY ARTERY DISEASE DUE TO LIPID RICH PLAQUE: Primary | ICD-10-CM

## 2023-05-05 DIAGNOSIS — I25.83 CORONARY ARTERY DISEASE DUE TO LIPID RICH PLAQUE: Primary | ICD-10-CM

## 2023-05-05 DIAGNOSIS — I10 ESSENTIAL HYPERTENSION: ICD-10-CM

## 2023-05-05 ASSESSMENT — ENCOUNTER SYMPTOMS
SHORTNESS OF BREATH: 1
COUGH: 0
CHEST TIGHTNESS: 0
PHOTOPHOBIA: 0
BLOOD IN STOOL: 0
ABDOMINAL PAIN: 0

## 2023-05-11 ENCOUNTER — ANTI-COAG VISIT (OUTPATIENT)
Dept: PHARMACY | Age: 73
End: 2023-05-11
Payer: MEDICARE

## 2023-05-11 DIAGNOSIS — I48.21 PERMANENT ATRIAL FIBRILLATION (HCC): Primary | ICD-10-CM

## 2023-05-11 LAB — INTERNATIONAL NORMALIZATION RATIO, POC: 2.4

## 2023-05-11 PROCEDURE — 85610 PROTHROMBIN TIME: CPT

## 2023-05-11 PROCEDURE — 99211 OFF/OP EST MAY X REQ PHY/QHP: CPT

## 2023-05-11 NOTE — PROGRESS NOTES
Ms. Jose E Lake is a 71 y.o. y/o female with history of A fib   She presents today for anticoagulation monitoring and adjustment. 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)    Patient Reported Findings:  Yes     No   [x]   []       Patient verifies current dosing regimen as listed - pt take 2.5 mg on Tues and Thurs changed AVS to match---> confirmed --> took 2.5 mg on Mon then held tues and wed --> took 5 mg on Mon, Wed and Fri and 2.5 mg all other days of the week. Went of memory   []   [x]       S/S bleeding/bruising/swelling/SOB -denies  []   [x]       Blood in urine or stool - denies   []   [x]       Procedures scheduled in the future at this time - Is being assessed for watchman, will keep notified---> had cortisone shot in knee last fri --> no changes   []   [x]       Missed Dose-  Denies     []   [x]       Extra Dose - Denies    []   [x]       Change in medications She continues with Tylenol 1000mg but only as needed --> states that she will take up to q6h---> inc tylenol to 3x/day --> less tylenol, once a day --> no changes, no tylenol recently --> She is taking tylenol for knee pain --> has been taking tylenol arthritis---> no changes---> restarted coreg and losartan, adjusted water pill ---> no changes    []   [x]       Change in health/diet/appetite-- normally has high vitamin K diet of canned spinach weekly and collards or broccoli about every other week. Will have some lower vitamin K veggies about 2 times a week such as green beans. --> states that she has been eating liver twice a week, likely why INR has dropped.  Asked patient to decrease to once a week --> 1/2 cup of greens yesterday, stopped eating liver---> had spinach yesterday --> per pt loves liver and had it in the last week---> states no liver, had spinach once, no NVD --> same---> no greens, no liver, no NVD---> normal greens, no NVD ---> greens Sunday, no NVD ---> no greens, no NVD --> no changes, no NVD   []   [x]

## 2023-05-21 ENCOUNTER — APPOINTMENT (OUTPATIENT)
Dept: CT IMAGING | Age: 73
End: 2023-05-21
Payer: MEDICARE

## 2023-05-21 ENCOUNTER — APPOINTMENT (OUTPATIENT)
Dept: GENERAL RADIOLOGY | Age: 73
End: 2023-05-21
Payer: MEDICARE

## 2023-05-21 ENCOUNTER — HOSPITAL ENCOUNTER (EMERGENCY)
Age: 73
Discharge: HOME | End: 2023-05-21
Payer: MEDICARE

## 2023-05-21 VITALS
DIASTOLIC BLOOD PRESSURE: 88 MMHG | OXYGEN SATURATION: 100 % | HEART RATE: 63 BPM | SYSTOLIC BLOOD PRESSURE: 125 MMHG | TEMPERATURE: 98.1 F | RESPIRATION RATE: 21 BRPM

## 2023-05-21 DIAGNOSIS — S09.90XA CLOSED HEAD INJURY, INITIAL ENCOUNTER: ICD-10-CM

## 2023-05-21 DIAGNOSIS — S16.1XXA STRAIN OF NECK MUSCLE, INITIAL ENCOUNTER: ICD-10-CM

## 2023-05-21 DIAGNOSIS — E04.9 THYROID GOITER: ICD-10-CM
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
## 2023-05-21 DIAGNOSIS — M25.78 CERVICAL OSTEOPHYTE: ICD-10-CM

## 2023-05-21 LAB
ALBUMIN SERPL-MCNC: 4.4 G/DL (ref 3.4–5)
ALBUMIN/GLOB SERPL: 1.2 {RATIO} (ref 1.1–2.2)
ALP SERPL-CCNC: 69 U/L (ref 40–129)
ALT SERPL-CCNC: 8 U/L (ref 10–40)
ANION GAP SERPL CALCULATED.3IONS-SCNC: 10 MMOL/L (ref 3–16)
AST SERPL-CCNC: 19 U/L (ref 15–37)
BASOPHILS # BLD: 0.1 K/UL (ref 0–0.2)
BASOPHILS NFR BLD: 0.7 %
BILIRUB SERPL-MCNC: 0.8 MG/DL (ref 0–1)
BUN SERPL-MCNC: 10 MG/DL (ref 7–20)
CALCIUM SERPL-MCNC: 10.5 MG/DL (ref 8.3–10.6)
CHLORIDE SERPL-SCNC: 104 MMOL/L (ref 99–110)
CO2 SERPL-SCNC: 26 MMOL/L (ref 21–32)
CREAT SERPL-MCNC: 0.7 MG/DL (ref 0.6–1.2)
DEPRECATED RDW RBC AUTO: 13.8 % (ref 12.4–15.4)
EOSINOPHIL # BLD: 0.1 K/UL (ref 0–0.6)
EOSINOPHIL NFR BLD: 1.8 %
GFR SERPLBLD CREATININE-BSD FMLA CKD-EPI: >60 ML/MIN/{1.73_M2}
GLUCOSE SERPL-MCNC: 105 MG/DL (ref 70–99)
HCT VFR BLD AUTO: 42.6 % (ref 36–48)
HGB BLD-MCNC: 13.9 G/DL (ref 12–16)
INR PPP: 2.07 (ref 0.84–1.16)
LYMPHOCYTES # BLD: 2.2 K/UL (ref 1–5.1)
LYMPHOCYTES NFR BLD: 31.9 %
MCH RBC QN AUTO: 29.5 PG (ref 26–34)
MCHC RBC AUTO-ENTMCNC: 32.6 G/DL (ref 31–36)
MCV RBC AUTO: 90.3 FL (ref 80–100)
MONOCYTES # BLD: 0.6 K/UL (ref 0–1.3)
MONOCYTES NFR BLD: 8.7 %
NEUTROPHILS # BLD: 4 K/UL (ref 1.7–7.7)
NEUTROPHILS NFR BLD: 56.9 %
NT-PROBNP SERPL-MCNC: 349 PG/ML (ref 0–124)
PLATELET # BLD AUTO: 146 K/UL (ref 135–450)
PMV BLD AUTO: 11.2 FL (ref 5–10.5)
POTASSIUM SERPL-SCNC: 3.7 MMOL/L (ref 3.5–5.1)
PROT SERPL-MCNC: 8 G/DL (ref 6.4–8.2)
PROTHROMBIN TIME: 23.2 SEC (ref 11.5–14.8)
RBC # BLD AUTO: 4.72 M/UL (ref 4–5.2)
REASON FOR REJECTION: NORMAL
REJECTED TEST: NORMAL
SODIUM SERPL-SCNC: 140 MMOL/L (ref 136–145)
TROPONIN, HIGH SENSITIVITY: 20 NG/L (ref 0–14)
TROPONIN, HIGH SENSITIVITY: 7 NG/L (ref 0–14)
WBC # BLD AUTO: 7 K/UL (ref 4–11)

## 2023-05-21 PROCEDURE — 93005 ELECTROCARDIOGRAM TRACING: CPT | Performed by: PHYSICIAN ASSISTANT

## 2023-05-21 PROCEDURE — 2500000003 HC RX 250 WO HCPCS: Performed by: PHYSICIAN ASSISTANT

## 2023-05-21 PROCEDURE — 85025 COMPLETE CBC W/AUTO DIFF WBC: CPT

## 2023-05-21 PROCEDURE — 6370000000 HC RX 637 (ALT 250 FOR IP): Performed by: PHYSICIAN ASSISTANT

## 2023-05-21 PROCEDURE — 70450 CT HEAD/BRAIN W/O DYE: CPT

## 2023-05-21 PROCEDURE — 72125 CT NECK SPINE W/O DYE: CPT

## 2023-05-21 PROCEDURE — 96374 THER/PROPH/DIAG INJ IV PUSH: CPT

## 2023-05-21 PROCEDURE — 83880 ASSAY OF NATRIURETIC PEPTIDE: CPT

## 2023-05-21 PROCEDURE — 80053 COMPREHEN METABOLIC PANEL: CPT

## 2023-05-21 PROCEDURE — 71045 X-RAY EXAM CHEST 1 VIEW: CPT

## 2023-05-21 PROCEDURE — 73030 X-RAY EXAM OF SHOULDER: CPT

## 2023-05-21 PROCEDURE — 99285 EMERGENCY DEPT VISIT HI MDM: CPT

## 2023-05-21 PROCEDURE — 85610 PROTHROMBIN TIME: CPT

## 2023-05-21 PROCEDURE — 73560 X-RAY EXAM OF KNEE 1 OR 2: CPT

## 2023-05-21 PROCEDURE — 84484 ASSAY OF TROPONIN QUANT: CPT

## 2023-05-21 RX ORDER — ACETAMINOPHEN 500 MG
1000 TABLET ORAL ONCE
Status: COMPLETED | OUTPATIENT
Start: 2023-05-21 | End: 2023-05-21

## 2023-05-21 RX ORDER — LABETALOL HYDROCHLORIDE 5 MG/ML
10 INJECTION, SOLUTION INTRAVENOUS ONCE
Status: COMPLETED | OUTPATIENT
Start: 2023-05-21 | End: 2023-05-21

## 2023-05-21 RX ORDER — NAPROXEN 500 MG/1
500 TABLET ORAL 2 TIMES DAILY PRN
Qty: 20 TABLET | Refills: 0 | Status: SHIPPED | OUTPATIENT
Start: 2023-05-21

## 2023-05-21 RX ORDER — CARVEDILOL 3.12 MG/1
6.25 TABLET ORAL ONCE
Status: COMPLETED | OUTPATIENT
Start: 2023-05-21 | End: 2023-05-21

## 2023-05-21 RX ADMIN — CARVEDILOL 6.25 MG: 3.12 TABLET, FILM COATED ORAL at 21:08

## 2023-05-21 RX ADMIN — LABETALOL HYDROCHLORIDE 10 MG: 5 INJECTION INTRAVENOUS at 21:42

## 2023-05-21 RX ADMIN — ACETAMINOPHEN 1000 MG: 500 TABLET ORAL at 21:08

## 2023-05-21 ASSESSMENT — PAIN SCALES - GENERAL
PAINLEVEL_OUTOF10: 8
PAINLEVEL_OUTOF10: 8

## 2023-05-21 ASSESSMENT — PAIN DESCRIPTION - LOCATION: LOCATION: HEAD

## 2023-05-21 ASSESSMENT — PAIN - FUNCTIONAL ASSESSMENT: PAIN_FUNCTIONAL_ASSESSMENT: 0-10

## 2023-05-21 ASSESSMENT — PAIN DESCRIPTION - ORIENTATION: ORIENTATION: LEFT

## 2023-05-22 LAB
EKG ATRIAL RATE: 357 BPM
EKG DIAGNOSIS: NORMAL
EKG Q-T INTERVAL: 376 MS
EKG QRS DURATION: 92 MS
EKG QTC CALCULATION (BAZETT): 462 MS
EKG R AXIS: 3 DEGREES
EKG T AXIS: -26 DEGREES
EKG VENTRICULAR RATE: 91 BPM

## 2023-05-22 PROCEDURE — 93010 ELECTROCARDIOGRAM REPORT: CPT | Performed by: INTERNAL MEDICINE

## 2023-05-22 NOTE — ED NOTES
Discharge instructions given, patient acknowledged understanding, rx given x1, patient was taken to car by wheelchair      Yanci Nuñez RN  05/21/23 [id number]

## 2023-05-24 ENCOUNTER — TELEPHONE (OUTPATIENT)
Dept: PHARMACY | Age: 73
End: 2023-05-24

## 2023-05-24 NOTE — TELEPHONE ENCOUNTER
Called pt about recent ER visit on 5/21 post fall. INR was 2.07 but was prescribed naproxen prn. LVM.     Vanessa Brar, PharmD, [address] Only    Total # of Interventions Recommended: 0  Total # of Interventions Accepted: 0  Time Spent (min): 15

## 2023-05-30 ENCOUNTER — OFFICE VISIT (OUTPATIENT)
Dept: PRIMARY CARE CLINIC | Age: 73
End: 2023-05-30
Payer: MEDICARE

## 2023-05-30 VITALS
HEART RATE: 69 BPM | BODY MASS INDEX: 29.35 KG/M2 | TEMPERATURE: 97.3 F | HEIGHT: 67 IN | DIASTOLIC BLOOD PRESSURE: 100 MMHG | WEIGHT: 187 LBS | SYSTOLIC BLOOD PRESSURE: 140 MMHG

## 2023-05-30 DIAGNOSIS — H53.9 VISION ABNORMALITIES: ICD-10-CM

## 2023-05-30 DIAGNOSIS — E01.0 THYROMEGALY: ICD-10-CM

## 2023-05-30 DIAGNOSIS — I10 PRIMARY HYPERTENSION: ICD-10-CM

## 2023-05-30 DIAGNOSIS — Z23 NEED FOR PNEUMOCOCCAL VACCINATION: Primary | ICD-10-CM

## 2023-05-30 DIAGNOSIS — S46.911A STRAIN OF RIGHT SHOULDER, INITIAL ENCOUNTER: ICD-10-CM

## 2023-05-30 DIAGNOSIS — Z23 NEED FOR DIPHTHERIA-TETANUS-PERTUSSIS (TDAP) VACCINE: ICD-10-CM

## 2023-05-30 DIAGNOSIS — Z23 NEED FOR SHINGLES VACCINE: ICD-10-CM

## 2023-05-30 PROCEDURE — 3080F DIAST BP >= 90 MM HG: CPT | Performed by: INTERNAL MEDICINE

## 2023-05-30 PROCEDURE — 1123F ACP DISCUSS/DSCN MKR DOCD: CPT | Performed by: INTERNAL MEDICINE

## 2023-05-30 PROCEDURE — 99214 OFFICE O/P EST MOD 30 MIN: CPT | Performed by: INTERNAL MEDICINE

## 2023-05-30 PROCEDURE — 3075F SYST BP GE 130 - 139MM HG: CPT | Performed by: INTERNAL MEDICINE

## 2023-05-30 RX ORDER — CAL/D3/MAG11/ZINC/COP/MANG/BOR 600 MG-800
1 TABLET ORAL DAILY
Qty: 90 TABLET | Refills: 4 | Status: SHIPPED | OUTPATIENT
Start: 2023-05-30

## 2023-05-30 RX ORDER — ACETAMINOPHEN 500 MG
1000 TABLET ORAL EVERY 6 HOURS PRN
Qty: 120 TABLET | Refills: 3 | Status: SHIPPED | OUTPATIENT
Start: 2023-05-30

## 2023-05-30 RX ORDER — ALENDRONATE SODIUM 70 MG/1
70 TABLET ORAL
Qty: 12 TABLET | Refills: 4 | Status: SHIPPED | OUTPATIENT
Start: 2023-05-30

## 2023-05-30 ASSESSMENT — ENCOUNTER SYMPTOMS
SHORTNESS OF BREATH: 0
COUGH: 0
CONSTIPATION: 0
DIARRHEA: 0
WHEEZING: 0
BLOOD IN STOOL: 0
ABDOMINAL DISTENTION: 0
BACK PAIN: 1
ABDOMINAL PAIN: 0
CHEST TIGHTNESS: 0
GASTROINTESTINAL NEGATIVE: 1

## 2023-05-30 NOTE — PROGRESS NOTES
Subjective:      Patient ID: Morenita Pepe is a 71 y.o. female. 5/30/2023 Patient presents with: Follow-up: 6 weeks   Fall   was cooking when she fell suddenly and hurt her back . Rt side still sore . Was seen in ER 5/21/23 l4/11/2023 Patient presents with:  New Patient: Establish care    Was seeing PCP in [facility] . Too far          Review of Systems   Constitutional:  Negative for activity change, appetite change, fatigue, fever and unexpected weight change. Covid vac 12/21 #3    Pneum vac 10/07    Tdap 12/07     Flu vac     Shingrex             Had shingles x 2 ! HENT: Negative. Upper dentures    Eyes:  Positive for visual disturbance. Eye exam 2021     S/P cataract surgery   Respiratory:  Negative for cough, chest tightness, shortness of breath and wheezing. Never smoker     No Etoh    Cardiovascular: Negative. Atrial Fib > 5 yrs on Coumadin     No known CAD    Gastrointestinal: Negative. Negative for abdominal distention, abdominal pain, blood in stool, constipation and diarrhea. Colonoscopy >    No FH of ca colon    Endocrine:        No FH of Diabetes    Genitourinary:  Positive for frequency. Negative for dysuria, menstrual problem, urgency and vaginal discharge. Mammogram  5/23    Katherine Menopause . 4 lkids   Nl gestation    Musculoskeletal:  Positive for arthralgias (chest ; right), back pain and gait problem. DEXA 4/23  osteoporosis   Allergic/Immunologic: Negative for environmental allergies and food allergies. Neurological:  Negative for dizziness, weakness and headaches. 3/23   MRI  Brain  Cerebral atrophy. Severe chronic small vessel ischemic changes. Remote  lacunar infarcts in the left cerebellum, left kosta, and basal ganglia and  thalami. No acute brain parenchymal abnormality.       Previous hemorrhagic stroke in  2018       Psychiatric/Behavioral:  Negative for behavioral problems, dysphoric mood and sleep

## 2023-06-01 ENCOUNTER — ANTI-COAG VISIT (OUTPATIENT)
Dept: PHARMACY | Age: 73
End: 2023-06-01
Payer: MEDICARE

## 2023-06-01 DIAGNOSIS — I48.21 PERMANENT ATRIAL FIBRILLATION (HCC): Primary | ICD-10-CM

## 2023-06-01 LAB — INTERNATIONAL NORMALIZATION RATIO, POC: 1.8

## 2023-06-01 PROCEDURE — 85610 PROTHROMBIN TIME: CPT

## 2023-06-01 PROCEDURE — 99212 OFFICE O/P EST SF 10 MIN: CPT

## 2023-06-01 NOTE — PROGRESS NOTES
Ms. Barbara Johnson is a 71 y.o. y/o female with history of A fib   She presents today for anticoagulation monitoring and adjustment. 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)    Patient Reported Findings:  Yes     No   [x]   []       Patient verifies current dosing regimen as listed - pt take 2.5 mg on Tues and Thurs changed AVS to match---> confirmed --> took 2.5 mg on Mon then held tues and wed --> took 5 mg on Mon, Wed and Fri and 2.5 mg all other days of the week. Went of memory---> confirmed    []   [x]       S/S bleeding/bruising/swelling/SOB -did bruise shoulder after fall, followed up with doctor and monitoring bump   []   [x]       Blood in urine or stool - denies   []   [x]       Procedures scheduled in the future at this time - Is being assessed for watchman, will keep notified---> had cortisone shot in knee last fri --> no changes   []   [x]       Missed Dose-  Denies     []   [x]       Extra Dose - Denies    []   [x]       Change in medications She continues with Tylenol 1000mg but only as needed --> states that she will take up to q6h---> inc tylenol to 3x/day --> less tylenol, once a day --> no changes, no tylenol recently --> She is taking tylenol for knee pain --> has been taking tylenol arthritis---> no changes---> restarted coreg and losartan, adjusted water pill ---> naproxen prn but doctor advised against and gave voltaren and tylenol     []   [x]       Change in health/diet/appetite-- normally has high vitamin K diet of canned spinach weekly and collards or broccoli about every other week. Will have some lower vitamin K veggies about 2 times a week such as green beans. --> states that she has been eating liver twice a week, likely why INR has dropped.  Asked patient to decrease to once a week --> 1/2 cup of greens yesterday, stopped eating liver---> had spinach yesterday --> per pt loves liver and had it in the last week---> states no liver, had spinach once, no NVD --> same---> no

## 2023-07-03 ENCOUNTER — OFFICE VISIT (OUTPATIENT)
Dept: PRIMARY CARE CLINIC | Age: 73
End: 2023-07-03
Payer: COMMERCIAL

## 2023-07-03 VITALS
TEMPERATURE: 96.8 F | HEART RATE: 82 BPM | HEIGHT: 67 IN | WEIGHT: 188.6 LBS | SYSTOLIC BLOOD PRESSURE: 155 MMHG | DIASTOLIC BLOOD PRESSURE: 105 MMHG | BODY MASS INDEX: 29.6 KG/M2 | OXYGEN SATURATION: 99 %

## 2023-07-03 DIAGNOSIS — I10 PRIMARY HYPERTENSION: ICD-10-CM

## 2023-07-03 DIAGNOSIS — I48.21 PERMANENT ATRIAL FIBRILLATION (HCC): ICD-10-CM

## 2023-07-03 DIAGNOSIS — M17.0 PRIMARY OSTEOARTHRITIS OF BOTH KNEES: Primary | ICD-10-CM

## 2023-07-03 DIAGNOSIS — I50.32 CHRONIC DIASTOLIC (CONGESTIVE) HEART FAILURE (HCC): ICD-10-CM

## 2023-07-03 PROCEDURE — 99214 OFFICE O/P EST MOD 30 MIN: CPT | Performed by: INTERNAL MEDICINE

## 2023-07-03 PROCEDURE — 3080F DIAST BP >= 90 MM HG: CPT | Performed by: INTERNAL MEDICINE

## 2023-07-03 PROCEDURE — 1123F ACP DISCUSS/DSCN MKR DOCD: CPT | Performed by: INTERNAL MEDICINE

## 2023-07-03 PROCEDURE — 3077F SYST BP >= 140 MM HG: CPT | Performed by: INTERNAL MEDICINE

## 2023-07-03 RX ORDER — PREDNISONE 20 MG/1
40 TABLET ORAL DAILY
Qty: 12 TABLET | Refills: 0 | Status: SHIPPED | OUTPATIENT
Start: 2023-07-03 | End: 2023-07-09

## 2023-07-03 RX ORDER — FUROSEMIDE 20 MG/1
TABLET ORAL
Qty: 90 TABLET | Refills: 3 | Status: SHIPPED | OUTPATIENT
Start: 2023-07-03

## 2023-07-03 RX ORDER — ACETAMINOPHEN 500 MG
500 TABLET ORAL EVERY 6 HOURS PRN
Qty: 120 TABLET | Refills: 1 | Status: SHIPPED | OUTPATIENT
Start: 2023-07-03

## 2023-07-03 RX ORDER — SPIRONOLACTONE 25 MG/1
25 TABLET ORAL DAILY
Qty: 30 TABLET | Refills: 3 | Status: SHIPPED | OUTPATIENT
Start: 2023-07-03

## 2023-07-03 RX ORDER — VALSARTAN 80 MG/1
80 TABLET ORAL 2 TIMES DAILY
Qty: 60 TABLET | Refills: 3 | Status: SHIPPED | OUTPATIENT
Start: 2023-07-03

## 2023-07-03 RX ORDER — CARVEDILOL 6.25 MG/1
6.25 TABLET ORAL 2 TIMES DAILY WITH MEALS
Qty: 180 TABLET | OUTPATIENT
Start: 2023-07-03

## 2023-07-03 RX ORDER — CARVEDILOL 6.25 MG/1
6.25 TABLET ORAL 2 TIMES DAILY WITH MEALS
Qty: 60 TABLET | Refills: 3 | Status: SHIPPED | OUTPATIENT
Start: 2023-07-03

## 2023-07-03 ASSESSMENT — ENCOUNTER SYMPTOMS
ABDOMINAL DISTENTION: 0
BLOOD IN STOOL: 0
DIARRHEA: 0
BACK PAIN: 1
ABDOMINAL PAIN: 0
CHEST TIGHTNESS: 0
GASTROINTESTINAL NEGATIVE: 1
COUGH: 0
WHEEZING: 0
SHORTNESS OF BREATH: 0
CONSTIPATION: 0

## 2023-07-03 NOTE — PROGRESS NOTES
Coumadin Clinic   -     carvedilol (COREG) 6.25 MG tablet; Take 1 tablet by mouth with breakfast and with evening meal  -     warfarin (COUMADIN) 5 MG tablet; Take 1 tablet by mouth daily 5 mg everyday but on tuesdays 0.5 tablet      Primary hypertension  BP up  Pain ? Advised low  salt diet . F/u in a [facility]   -     spironolactone (ALDACTONE) 25 MG tablet; Take 1 tablet by mouth daily  -     valsartan (DIOVAN) 80 MG tablet; Take 1 tablet by mouth 2 times daily  -     furosemide (LASIX) 20 MG tablet; TAKE 1 TABLET BY MOUTH once DAILY  -     carvedilol (COREG) 6.25 MG tablet;  Take 1 tablet by mouth with breakfast and with evening meal            Plan:              Leroy Fox MD

## 2023-07-07 ENCOUNTER — TELEPHONE (OUTPATIENT)
Dept: PHARMACY | Age: 73
End: 2023-07-07

## 2023-07-12 ENCOUNTER — ANTI-COAG VISIT (OUTPATIENT)
Dept: PHARMACY | Age: 73
End: 2023-07-12
Payer: COMMERCIAL

## 2023-07-12 DIAGNOSIS — I48.21 PERMANENT ATRIAL FIBRILLATION (HCC): Primary | ICD-10-CM

## 2023-07-12 LAB — INTERNATIONAL NORMALIZATION RATIO, POC: 2.6

## 2023-07-12 PROCEDURE — 99211 OFF/OP EST MAY X REQ PHY/QHP: CPT

## 2023-07-12 PROCEDURE — 85610 PROTHROMBIN TIME: CPT

## 2023-07-12 NOTE — PROGRESS NOTES
post fall. INR was 2.07 but was prescribed naproxen prn  []   [x]       Other complaints- states that she is using the pillbox, and she likes it. -> has not been using recently, but wants to restart ---> states she tried to use pillbox, asked to use pillbox and AVS and to cross off each day on AVS to prevent her from missing doses. Concerned for patient's ability to correctly remember recent history. She states that she is getting concerned for her memory. Clinical Outcomes:  Yes     No  []   [x]       Major bleeding event brain bleed 8/2021  []   [x]       Thromboembolic event  Duration of warfarin Therapy: indefinitely  INR Range:  1.8-2.5 -> lower INR goal dt h/o subcortical hemorrhage (2016)    She may be slower to reach steady state with warfarin dosing so consider checking INR about 10 days after dose adjustment. INR is 2.6 today.     Continue weekly dose of 5 mg on Mon, Wed and Fri and 2.5 mg all other days of the week   Recheck INR in 3 weeks on 8/2    Patient consent signed 1/24/23    Referring cardiologist is Dr. Analy Samson  INR (no units)   Date Value   05/21/2023 2.07 (H)   04/12/2023 5.10   04/04/2023 4.00   03/28/2023 4.00     INR,(POC) (no units)   Date Value   06/15/2023 2.3   06/01/2023 1.8   05/11/2023 2.4   04/28/2023 2.0     For Pharmacy Admin Tracking Only    Total # of Interventions Recommended: 0  Total # of Interventions Accepted: 0  Time Spent (min): 15

## 2023-07-18 ENCOUNTER — OFFICE VISIT (OUTPATIENT)
Dept: ORTHOPEDIC SURGERY | Age: 73
End: 2023-07-18
Payer: COMMERCIAL

## 2023-07-18 VITALS — HEIGHT: 67 IN | BODY MASS INDEX: 29.51 KG/M2 | WEIGHT: 188 LBS

## 2023-07-18 DIAGNOSIS — M17.11 PRIMARY OSTEOARTHRITIS OF RIGHT KNEE: ICD-10-CM

## 2023-07-18 DIAGNOSIS — M17.12 PRIMARY OSTEOARTHRITIS OF LEFT KNEE: Primary | ICD-10-CM

## 2023-07-18 PROCEDURE — 20610 DRAIN/INJ JOINT/BURSA W/O US: CPT | Performed by: NURSE PRACTITIONER

## 2023-07-18 PROCEDURE — 99213 OFFICE O/P EST LOW 20 MIN: CPT | Performed by: NURSE PRACTITIONER

## 2023-07-18 PROCEDURE — 1123F ACP DISCUSS/DSCN MKR DOCD: CPT | Performed by: NURSE PRACTITIONER

## 2023-07-18 RX ORDER — TRIAMCINOLONE ACETONIDE 40 MG/ML
40 INJECTION, SUSPENSION INTRA-ARTICULAR; INTRAMUSCULAR ONCE
Status: COMPLETED | OUTPATIENT
Start: 2023-07-18 | End: 2023-07-18

## 2023-07-18 RX ORDER — LIDOCAINE HYDROCHLORIDE 10 MG/ML
40 INJECTION, SOLUTION INFILTRATION; PERINEURAL ONCE
Status: COMPLETED | OUTPATIENT
Start: 2023-07-18 | End: 2023-07-18

## 2023-07-18 RX ADMIN — TRIAMCINOLONE ACETONIDE 40 MG: 40 INJECTION, SUSPENSION INTRA-ARTICULAR; INTRAMUSCULAR at 10:55

## 2023-07-18 RX ADMIN — TRIAMCINOLONE ACETONIDE 40 MG: 40 INJECTION, SUSPENSION INTRA-ARTICULAR; INTRAMUSCULAR at 10:54

## 2023-07-18 RX ADMIN — LIDOCAINE HYDROCHLORIDE 40 MG: 10 INJECTION, SOLUTION INFILTRATION; PERINEURAL at 10:53

## 2023-07-18 RX ADMIN — LIDOCAINE HYDROCHLORIDE 40 MG: 10 INJECTION, SOLUTION INFILTRATION; PERINEURAL at 10:54

## 2023-07-18 NOTE — PROGRESS NOTES
02/08/2018    TIA involving right internal carotid artery      Past Surgical History:   Procedure Laterality Date    CARDIAC SURGERY      stentsx2    COLONOSCOPY      CORONARY ANGIOPLASTY WITH STENT PLACEMENT      ROTATOR CUFF REPAIR Right     TUBAL LIGATION       No family history on file. Social History     Socioeconomic History    Marital status:      Spouse name: Zoila Torres    Number of children: 5    Years of education: 12    Highest education level: Not on file   Occupational History    Not on file   Tobacco Use    Smoking status: Never    Smokeless tobacco: Never   Vaping Use    Vaping Use: Never used   Substance and Sexual Activity    Alcohol use: No    Drug use: No    Sexual activity: Not Currently   Other Topics Concern    Not on file   Social History Narrative    Not on file     Social Determinants of Health     Financial Resource Strain: Low Risk     Difficulty of Paying Living Expenses: Not hard at all   Food Insecurity: No Food Insecurity    Worried About [place] in the Last Year: Never true    [address] in the Last Year: Never true   Transportation Needs: Unknown    Lack of Transportation (Medical): Not on file    Lack of Transportation (Non-Medical):  No   Physical Activity: Not on file   Stress: Not on file   Social Connections: Not on file   Intimate Partner Violence: Not on file   Housing Stability: Unknown    Unable to Pay for Housing in the Last Year: Not on file    Number of Places Lived in the Last Year: Not on file    Unstable Housing in the Last Year: No     Current Outpatient Medications   Medication Sig Dispense Refill    diclofenac sodium (VOLTAREN) 1 % GEL Apply 4 g topically 4 times daily 1 g 1    acetaminophen (TYLENOL) 500 MG tablet Take 1 tablet by mouth every 6 hours as needed for Pain 120 tablet 1    valsartan (DIOVAN) 80 MG tablet Take 1 tablet by mouth 2 times daily 60 tablet 3    furosemide (LASIX) 20 MG tablet TAKE 1 TABLET BY MOUTH once DAILY 90 tablet 3

## 2023-07-24 ENCOUNTER — HOSPITAL ENCOUNTER (OUTPATIENT)
Dept: NON INVASIVE DIAGNOSTICS | Age: 73
Discharge: HOME | End: 2023-07-24
Payer: COMMERCIAL

## 2023-07-24 DIAGNOSIS — I25.83 CORONARY ARTERY DISEASE DUE TO LIPID RICH PLAQUE: ICD-10-CM

## 2023-07-24 DIAGNOSIS — I25.10 CORONARY ARTERY DISEASE DUE TO LIPID RICH PLAQUE: ICD-10-CM

## 2023-07-24 DIAGNOSIS — R06.02 SOB (SHORTNESS OF BREATH): ICD-10-CM

## 2023-07-24 DIAGNOSIS — I50.32 CHRONIC DIASTOLIC (CONGESTIVE) HEART FAILURE (HCC): ICD-10-CM

## 2023-07-24 LAB
LV EF: 60 %
LV EF: 66 %
LVEF MODALITY: NORMAL
LVEF MODALITY: NORMAL

## 2023-07-24 PROCEDURE — 3430000000 HC RX DIAGNOSTIC RADIOPHARMACEUTICAL: Performed by: INTERNAL MEDICINE

## 2023-07-24 PROCEDURE — 6360000002 HC RX W HCPCS: Performed by: INTERNAL MEDICINE

## 2023-07-24 PROCEDURE — 78452 HT MUSCLE IMAGE SPECT MULT: CPT | Performed by: INTERNAL MEDICINE

## 2023-07-24 PROCEDURE — A9502 TC99M TETROFOSMIN: HCPCS | Performed by: INTERNAL MEDICINE

## 2023-07-24 PROCEDURE — 93306 TTE W/DOPPLER COMPLETE: CPT

## 2023-07-24 PROCEDURE — 93017 CV STRESS TEST TRACING ONLY: CPT | Performed by: INTERNAL MEDICINE

## 2023-07-24 RX ORDER — AMINOPHYLLINE DIHYDRATE 25 MG/ML
100 INJECTION, SOLUTION INTRAVENOUS ONCE
Status: COMPLETED | OUTPATIENT
Start: 2023-07-24 | End: 2023-07-24

## 2023-07-24 RX ORDER — REGADENOSON 0.08 MG/ML
0.4 INJECTION, SOLUTION INTRAVENOUS
Status: COMPLETED | OUTPATIENT
Start: 2023-07-24 | End: 2023-07-24

## 2023-07-24 RX ADMIN — REGADENOSON 0.4 MG: 0.08 INJECTION, SOLUTION INTRAVENOUS at 10:25

## 2023-07-24 RX ADMIN — AMINOPHYLLINE 100 MG: 25 INJECTION, SOLUTION INTRAVENOUS at 10:31

## 2023-07-24 RX ADMIN — TETROFOSMIN 10 MILLICURIE: 1.38 INJECTION, POWDER, LYOPHILIZED, FOR SOLUTION INTRAVENOUS at 08:26

## 2023-07-24 RX ADMIN — TETROFOSMIN 30 MILLICURIE: 1.38 INJECTION, POWDER, LYOPHILIZED, FOR SOLUTION INTRAVENOUS at 10:26

## 2023-07-24 NOTE — PROGRESS NOTES
Instructed on Lexiscan Stress Test Procedure including possible side effects/ adverse reactions. Patient verbalizes  understanding and denies having any questions . See [id number] [address] Cardiology

## 2023-07-24 NOTE — PROGRESS NOTES
Patient presented for OP echo and nuclear stress test.  Complains of 8/10 chest pain that radiates to her left jaw/neck. EKG completed, Atrial Fib rate 80s and manual /110. Patient reports that she took her medications this am.  Dr. Isaac Palafox informed of these findings/ he reviewed chart and verbal order to proceed with testing. Patient agrees with this plan.

## 2023-07-25 ENCOUNTER — OFFICE VISIT (OUTPATIENT)
Dept: CARDIOLOGY CLINIC | Age: 73
End: 2023-07-25
Payer: COMMERCIAL

## 2023-07-25 VITALS
HEIGHT: 67 IN | DIASTOLIC BLOOD PRESSURE: 88 MMHG | OXYGEN SATURATION: 98 % | HEART RATE: 71 BPM | BODY MASS INDEX: 29.03 KG/M2 | SYSTOLIC BLOOD PRESSURE: 148 MMHG | WEIGHT: 185 LBS

## 2023-07-25 DIAGNOSIS — E78.2 MIXED HYPERLIPIDEMIA: ICD-10-CM

## 2023-07-25 DIAGNOSIS — I25.83 CORONARY ARTERY DISEASE DUE TO LIPID RICH PLAQUE: Primary | ICD-10-CM

## 2023-07-25 DIAGNOSIS — I50.32 CHRONIC DIASTOLIC (CONGESTIVE) HEART FAILURE (HCC): ICD-10-CM

## 2023-07-25 DIAGNOSIS — I50.32 CHRONIC HEART FAILURE WITH PRESERVED EJECTION FRACTION (HCC): ICD-10-CM

## 2023-07-25 DIAGNOSIS — I48.0 PAF (PAROXYSMAL ATRIAL FIBRILLATION) (HCC): ICD-10-CM

## 2023-07-25 DIAGNOSIS — I10 ESSENTIAL HYPERTENSION: ICD-10-CM

## 2023-07-25 DIAGNOSIS — I77.810 ASCENDING AORTA DILATATION (HCC): ICD-10-CM

## 2023-07-25 DIAGNOSIS — R06.02 SOB (SHORTNESS OF BREATH): ICD-10-CM

## 2023-07-25 DIAGNOSIS — I25.10 CORONARY ARTERY DISEASE DUE TO LIPID RICH PLAQUE: Primary | ICD-10-CM

## 2023-07-25 PROCEDURE — 3074F SYST BP LT 130 MM HG: CPT | Performed by: INTERNAL MEDICINE

## 2023-07-25 PROCEDURE — 99214 OFFICE O/P EST MOD 30 MIN: CPT | Performed by: INTERNAL MEDICINE

## 2023-07-25 PROCEDURE — 1123F ACP DISCUSS/DSCN MKR DOCD: CPT | Performed by: INTERNAL MEDICINE

## 2023-07-25 PROCEDURE — 3078F DIAST BP <80 MM HG: CPT | Performed by: INTERNAL MEDICINE

## 2023-07-25 RX ORDER — FUROSEMIDE 40 MG/1
40 TABLET ORAL DAILY
Qty: 90 TABLET | Refills: 5 | Status: SHIPPED | OUTPATIENT
Start: 2023-07-25

## 2023-07-25 ASSESSMENT — ENCOUNTER SYMPTOMS
COUGH: 0
CHEST TIGHTNESS: 0
SHORTNESS OF BREATH: 1
ABDOMINAL PAIN: 0
PHOTOPHOBIA: 0
BLOOD IN STOOL: 0

## 2023-07-25 NOTE — PATIENT INSTRUCTIONS
Increase lasix to 40 mg daily  Daily weights  Avoid salt in diet  Labs with pcp 8/1  Follow up in 3 months  Ct of chest in January  Please get blood pressure cuff and check blood pressure three times a week at home      How to take a blood pressure by the American Heart Association   - don't smoke drink caffeinated beverages or exercise within 30 minutes before measuring your blood pressure. Empty your bladder and ensure at least 5 minutes of quiet rest before measurement. - sit with you back straight and supported ( on a dining chair, rather than a sofa) your feet should be flat on the floor and your legs should not be crossed. Our arm should be supported on a flat surface (such as a table) with the upper arm at heart level. Make sure the bottom of the cuff is placed directly above the bend of the elbow. - don't take the measurements over clothes.     Date             Blood Pressure          Comment                                                 Date             Blood Pressure          Comment      ----------          -------------------          ---------------------------------                         ----------          -------------------          ---------------------------------        ----------          -------------------          ---------------------------------                         ----------          -------------------          ---------------------------------        ----------          -------------------          ---------------------------------                         ----------          -------------------          ---------------------------------        ----------          -------------------          ---------------------------------                        ----------          -------------------          ---------------------------------        ----------          -------------------          ---------------------------------                        ----------

## 2023-07-25 NOTE — PROGRESS NOTES
[address] [place] [address]  8/6/23  Referring: Dr. Tenzin Chavira CONSULT/CHIEF COMPLAINT/HPI     Reason for visit/ Chief complaint  Follow up  Atrial fibrillation   HPI Emigdio Hazel is a 71 y.o. here as a follow up for hospital follow up for hypertension. She has a history of   CAD, hypertension (20 year history), hyperlipidemia, chf.  He has atrial fibrillation. Previous hemorrhagic stroke in  2018. Previously refused watchman    She has home health come to her house to help with the blood pressure and medication management. Yesterday she had an echo and myoview to evaluate worsening sob, the echo showed mild to mod MR and dilated ascending aorta 4.8 cm. Myoview did not show ischemia    Today she states she has to sleep on two pillows to sleep to avoid shortness of breath, choking feeling. ( This is unchanged for 2 years) No chest pain palpitations dizziness. She has children who check on her at home. Does not check blood pressure at home. No edema. Walks with a cane. Took all medications today. Weight is up 7 pounds since April 11. Exercise limited by left knee pain, putting cream on it to help with the pain. Will be doing water aerobics    Patient is compliant  with medication and is tolerating them well without side effects       HISTORY/ALLERGIES/ROS     MedHx:   has a past medical history of Acute cerebrovascular accident (CVA) ([address]), LAST (acute kidney injury) ([address]), Atrial fibrillation ([address]), Bell palsy, CAD (coronary artery disease), Cerebral artery occlusion with cerebral infarction ([address]), Chronic diastolic CHF (congestive heart failure) ([address]), CVA (cerebral vascular accident) ([address]), Hypertension, Intracranial hemorrhage ([address]), Mixed hyperlipidemia, Nonintractable headache, Nontraumatic subcortical hemorrhage of cerebral hemisphere ([address]), Poor vision, Primary osteoarthritis of right knee, Sudden visual loss of left eye, Thyroid nodule, and TIA involving right internal carotid artery.   SurgHx:

## 2023-07-26 ENCOUNTER — HOSPITAL ENCOUNTER (OUTPATIENT)
Dept: PHYSICAL THERAPY | Age: 73
Setting detail: THERAPIES SERIES
Discharge: HOME | End: 2023-07-26
Payer: COMMERCIAL

## 2023-07-26 NOTE — PROGRESS NOTES
Physical Therapy    [address]"     Physical Therapy  Cancellation/No-show Note  Patient Name:  Maricn Garcia  :  1950   Date:  2023  Cancelled visits to date: 1  No-shows to date: 0    Patient status for today's appointment patient:  [x]  Cancelled---patient very late for evaluation; had her complete intake forms and r/s evaluation  []  Rescheduled appointment  []  No-show     Reason given by patient:  []  Patient ill  []  Conflicting appointment  []  No transportation    []  Conflict with work  []  No reason given  []  Other:     Comments:      Phone call information:   []  Phone call made today to patient at _ time at number provided:      []  Patient answered, conversation as follows:    []  Patient did not answer, message left as follows:  []  Phone call not made today  [x]  Phone call not needed - pt contacted us to cancel and provided reason for cancellation.      Electronically signed by:  Karina Walters PT

## 2023-07-28 ENCOUNTER — HOSPITAL ENCOUNTER (OUTPATIENT)
Dept: PHYSICAL THERAPY | Age: 73
Setting detail: THERAPIES SERIES
Discharge: HOME | End: 2023-07-28
Payer: COMMERCIAL

## 2023-07-28 PROCEDURE — 97110 THERAPEUTIC EXERCISES: CPT

## 2023-07-28 PROCEDURE — 97530 THERAPEUTIC ACTIVITIES: CPT

## 2023-07-28 PROCEDURE — 97162 PT EVAL MOD COMPLEX 30 MIN: CPT

## 2023-07-28 NOTE — PLAN OF CARE
never with the pool. States she has trouble getting up, resting, laying down, bending over but is able to drive to appointments. States pain is bilateral anterior area in both of her knees. Has pain all the time. Always uses a cane at home and outside of the home. Feels like it takes her longer to do her ADLs but is able to complete them without help but has pain with them. Goals for therapy is to be able to walk and be able to do more than she is able to do now. Has had falls in the past but states she hasn't had any recently - states those falls was when she was walking and just fell down and had the cane with her. States she was in the kitchen trying to cook and that it was about a month ago. Had a coritzone shot in both knees last week or before and states it was her third time and that they haven't been helpful - feels like they last a week but doesn't feel relief from it. Hard to get comfy when she is in bed. Feels like her R leg is worse than her L leg. Relevant Medical History:  Xray 3 views of the right knee was obtained 10/19/2022 and left knee 3 views taken today in office and reviewed. These demonstrate severe degenerative changes with narrowing of the joint space in the medial joint space compartment, subchondral sclerosis, and marginal osteophytosis. Bilateral coritzone injections    Refer for aqua therapy       Functional Scale:       Date assessed:  LEFS: raw score = 12/80; dysfunction = %  7/28/23    Pain Scale: 8/10 in B anterior knee   Easing factors: none - takes pain medications but states its not helpful  Provocative factors: walking, rest,      Type: [x]Constant   []Intermittent  []Radiating [x]Localized []other:     Numbness/Tingling: Denies    Occupation/School: Retired    Living Status/Prior Level of Function:Prior to this injury / incident, pt was independent with ADLs and IADLs, lives in duplex on one floor and lives with great grandson.  Has on step to get into the house no

## 2023-07-28 NOTE — FLOWSHEET NOTE
[address]  Phone: [phone]   Fax: [phone]    Physical Therapy Daily Treatment Note    Date:  2023     Patient Name:  Mabel Lara    :  1950  MRN: [id number]  Medical Diagnosis:  Primary osteoarthritis of left knee [M17.12]  Primary osteoarthritis of right knee [M17.11]  Treatment Diagnosis: fall risk, dec BLE strength  and length, impaired posture, and gait abnormalities. Insurance/Certification information:  PT Insurance Information: [employer] health plan, $40 copay, no ded, no auth  Physician Information:  Maggy Cancer, APRN -*    Plan of care signed (Y/N): []  Yes [x]  No     Date of Patient follow up with Physician:      Progress Report: []  Yes  [x]  No     Date Range for reporting period:  Beginnin2023  Ending:     Progress report due (10 Rx/or 30 days whichever is less): visit #7 or 93    Recertification due (POC duration/ or 90 days whichever is less): visit #14 or 10/28/23    Visit # Insurance Allowable Auth required?  Date Range    [employer] health plan, $40 copay, no ded, no auth []  Yes  []  No          Latex Allergy:  [x]NO      []YES  Preferred Language for Healthcare:   [x]English       []other:    Functional Scale:       Date assessed:  LEFS: raw score = 12/80; dysfunction = %                        23    Pain level:  8/10 in B anterior knee     SUBJECTIVE:  See eval    OBJECTIVE: See eval      RESTRICTIONS/PRECAUTIONS: fall risk, B sev degenerative OA, afib,    Exercises/Interventions:     Therapeutic Exercise (69710)  Resistance / level Sets/sec Reps Notes / Cues   Nustep       Seated hip flexor  Seated hip abd  10  10 1  1 : added to HEP   Seated HS stretch  30\" 1B : added to HEP                                Gait (81372)                     Therapeutic Activities (78967)       -pt educated on diagnosis, prognosis and expectations for rehab  -all pt questions were answered  Gave pt

## 2023-08-01 ENCOUNTER — OFFICE VISIT (OUTPATIENT)
Dept: PRIMARY CARE CLINIC | Age: 73
End: 2023-08-01
Payer: COMMERCIAL

## 2023-08-01 VITALS
DIASTOLIC BLOOD PRESSURE: 89 MMHG | WEIGHT: 183 LBS | BODY MASS INDEX: 31.24 KG/M2 | HEIGHT: 64 IN | SYSTOLIC BLOOD PRESSURE: 124 MMHG | RESPIRATION RATE: 18 BRPM | HEART RATE: 87 BPM

## 2023-08-01 DIAGNOSIS — Z23 NEED FOR DIPHTHERIA-TETANUS-PERTUSSIS (TDAP) VACCINE: ICD-10-CM

## 2023-08-01 DIAGNOSIS — M17.0 PRIMARY OSTEOARTHRITIS OF BOTH KNEES: ICD-10-CM

## 2023-08-01 DIAGNOSIS — Z12.11 SCREEN FOR COLON CANCER: ICD-10-CM

## 2023-08-01 DIAGNOSIS — H91.93 DECREASED HEARING OF BOTH EARS: ICD-10-CM

## 2023-08-01 DIAGNOSIS — H53.9 VISION ABNORMALITIES: ICD-10-CM

## 2023-08-01 DIAGNOSIS — Z71.89 COUNSELING ON INJURY PREVENTION: ICD-10-CM

## 2023-08-01 DIAGNOSIS — Z00.00 INITIAL MEDICARE ANNUAL WELLNESS VISIT: Primary | ICD-10-CM

## 2023-08-01 DIAGNOSIS — Z23 NEED FOR COVID-19 VACCINE: ICD-10-CM

## 2023-08-01 DIAGNOSIS — Z23 NEED FOR SHINGLES VACCINE: ICD-10-CM

## 2023-08-01 DIAGNOSIS — Z23 FLU VACCINE NEED: ICD-10-CM

## 2023-08-01 PROCEDURE — 1123F ACP DISCUSS/DSCN MKR DOCD: CPT | Performed by: INTERNAL MEDICINE

## 2023-08-01 PROCEDURE — 99213 OFFICE O/P EST LOW 20 MIN: CPT | Performed by: INTERNAL MEDICINE

## 2023-08-01 PROCEDURE — 3074F SYST BP LT 130 MM HG: CPT | Performed by: INTERNAL MEDICINE

## 2023-08-01 PROCEDURE — G0438 PPPS, INITIAL VISIT: HCPCS | Performed by: INTERNAL MEDICINE

## 2023-08-01 PROCEDURE — 3079F DIAST BP 80-89 MM HG: CPT | Performed by: INTERNAL MEDICINE

## 2023-08-01 RX ORDER — ACETAMINOPHEN 500 MG
500 TABLET ORAL EVERY 6 HOURS PRN
Qty: 120 TABLET | Refills: 1 | Status: SHIPPED | OUTPATIENT
Start: 2023-08-01

## 2023-08-01 RX ORDER — ALENDRONATE SODIUM 70 MG/1
70 TABLET ORAL
Qty: 12 TABLET | Refills: 1 | Status: CANCELLED | OUTPATIENT
Start: 2023-08-01

## 2023-08-01 ASSESSMENT — ENCOUNTER SYMPTOMS
BACK PAIN: 1
CHEST TIGHTNESS: 0
BLOOD IN STOOL: 0
DIARRHEA: 0
WHEEZING: 0
SHORTNESS OF BREATH: 0
CONSTIPATION: 0
GASTROINTESTINAL NEGATIVE: 1
ABDOMINAL DISTENTION: 0
COUGH: 0
ABDOMINAL PAIN: 0

## 2023-08-01 ASSESSMENT — LIFESTYLE VARIABLES
HOW OFTEN DO YOU HAVE A DRINK CONTAINING ALCOHOL: NEVER
HOW MANY STANDARD DRINKS CONTAINING ALCOHOL DO YOU HAVE ON A TYPICAL DAY: PATIENT DOES NOT DRINK

## 2023-08-01 ASSESSMENT — PATIENT HEALTH QUESTIONNAIRE - PHQ9
SUM OF ALL RESPONSES TO PHQ QUESTIONS 1-9: 0
2. FEELING DOWN, DEPRESSED OR HOPELESS: 0
SUM OF ALL RESPONSES TO PHQ9 QUESTIONS 1 & 2: 0
1. LITTLE INTEREST OR PLEASURE IN DOING THINGS: 0

## 2023-08-01 NOTE — PROGRESS NOTES
Subjective:      Patient ID: Taylor Kim is a 71 y.o. female. 8/1/2023 Patient presents with:  Medicare AWV  Arthritis:   Did get \"shots\" in both knees 3 wks ago . No change in level of pain . Will see Ortho again in 3 mths! Last seen   7/3/2023  Patient presents with:  Joint Swelling: Right knee swelling/ pain, ongoing for the last month- no fall                 5/30/2023 Patient presents with: Follow-up: 6 weeks   Fall   was cooking when she fell suddenly and hurt her back . Rt side still sore . Was seen in ER 5/21/23 l4/11/2023 Patient presents with:  New Patient: Establish care    Was seeing PCP in [facility] . Too far          Review of Systems   Constitutional:  Negative for activity change, appetite change, fatigue, fever and unexpected weight change. Covid vac 12/21 #3    Pneum vac 10/07    Tdap 12/07     Flu vac     Shingrex             Had shingles x 2 ! HENT: Negative. Upper dentures    Eyes:         Eye exam 2021     S/P cataract surgery   Respiratory:  Negative for cough, chest tightness, shortness of breath and wheezing. Never smoker     No Etoh    Cardiovascular: Negative. Atrial Fib > 5 yrs on Coumadin     No known CAD    Gastrointestinal: Negative. Negative for abdominal distention, abdominal pain, blood in stool, constipation and diarrhea. Colonoscopy >    No FH of ca colon    Endocrine:        No FH of Diabetes    Genitourinary:  Negative for dysuria, menstrual problem, urgency and vaginal discharge. Mammogram  5/23    Katherine Menopause . 4 lkids   Nl gestation    Musculoskeletal:  Positive for arthralgias (S/P shots both knees . Still hurting), back pain and gait problem. DEXA 4/23  osteoporosis   Allergic/Immunologic: Negative for environmental allergies and food allergies. Neurological:  Negative for dizziness, weakness and headaches. 3/23   MRI  Brain  Cerebral atrophy.   Severe chronic small

## 2023-08-02 ENCOUNTER — ANTI-COAG VISIT (OUTPATIENT)
Dept: PHARMACY | Age: 73
End: 2023-08-02
Payer: COMMERCIAL

## 2023-08-02 DIAGNOSIS — I48.21 PERMANENT ATRIAL FIBRILLATION (HCC): Primary | ICD-10-CM

## 2023-08-02 LAB — INTERNATIONAL NORMALIZATION RATIO, POC: 2.8

## 2023-08-02 PROCEDURE — 85610 PROTHROMBIN TIME: CPT

## 2023-08-02 PROCEDURE — 99211 OFF/OP EST MAY X REQ PHY/QHP: CPT

## 2023-08-02 NOTE — PROGRESS NOTES
Ms. Marco Glaser is a 71 y.o. y/o female with history of A fib   She presents today for anticoagulation monitoring and adjustment. 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)    Patient Reported Findings:  Yes     No   [x]   []       Patient verifies current dosing regimen as listed - pt take 2.5 mg on Tues and Thurs changed AVS to match---> confirmed --> took 2.5 mg on Mon then held tues and wed --> took 5 mg on Mon, Wed and Fri and 2.5 mg all other days of the week. Went of memory---> confirmed    []   [x]       S/S bleeding/bruising/swelling/SOB -did bruise shoulder after fall, followed up with doctor and monitoring bump ---> denies   []   [x]       Blood in urine or stool - denies   []   [x]       Procedures scheduled in the future at this time - Is being assessed for watchman, will keep notified---> had cortisone shot in knee last fri --> no changes   []   [x]       Missed Dose-  Denies     []   [x]       Extra Dose - Denies    [x]   []       Change in medications She continues with Tylenol 1000mg but only as needed --> inc tylenol to 3x/day --> less tylenol, once a day --> naproxen prn but doctor advised against and gave voltaren and tylenol -->  took prednisone 40 mg x 6 d on 7/3. No tylenol currently ---> inc lasix  [x]   []       Change in health/diet/appetite-- normally has high vitamin K diet of canned spinach weekly and collards or broccoli about every other week. Will have some lower vitamin K veggies about 2 times a week such as green beans. --> states that she has been eating liver twice a week, likely why INR has dropped.  Asked patient to decrease to once a week --> 1/2 cup of greens yesterday, stopped eating liver---> per pt loves liver and had it in the last week---> no greens, no NVD --> no changes, no NVD --> not much vit k recently    []   [x]       Change in alcohol use denies  []   [x]       Change in activity  []   [x]       Hospital admission-  []   [x]       Emergency

## 2023-08-02 NOTE — TELEPHONE ENCOUNTER
Warfarin prescription phoned into [address] to [address] [facility] under Dr. Wanda Willis  Warfarin 5 mg tabs  Take 5 mg (5 mg x 1) every Mon, Wed, Fri; 2.5 mg (5 mg x 0.5) all other days  90 days   2 refills    Benoit Ham, NahomyD, [facility]

## 2023-08-04 ENCOUNTER — HOSPITAL ENCOUNTER (OUTPATIENT)
Dept: PHYSICAL THERAPY | Age: 73
Setting detail: THERAPIES SERIES
Discharge: HOME | End: 2023-08-04

## 2023-08-04 NOTE — PROGRESS NOTES
Physical Therapy    [address]     Physical Therapy  Cancellation/No-show Note  Patient Name:  Mary Lozano  :  1950   Date:  2023  Cancelled visits to date: 0  No-shows to date: 1    Patient status for today's appointment patient:  []  Cancelled  []  Rescheduled appointment  [x]  No-show  (red aquatic)     Reason given by patient:  []  Patient ill  []  Conflicting appointment  []  No transportation    []  Conflict with work  []  No reason given  []  Other:     Comments:      Phone call information:   [x]  Phone call made today to patient at 11:50 am at number provided:      []  Patient answered, conversation as follows:    [x]  Patient did not answer, message left as follows:  Phone # went straight to [employer]. Notified pt of her missed aquatic visit,and to notify us if unable to make an appt. Told pt to contact us with any questions, and reminded pt of her next scheduled appt time. []  Phone call not made today  []  Phone call not needed - pt contacted us to cancel and provided reason for cancellation.      Electronically signed by:  Go Hewitt PT

## 2023-08-07 DIAGNOSIS — Z12.11 SCREEN FOR COLON CANCER: Primary | ICD-10-CM

## 2023-08-07 LAB
CONTROL: NORMAL
HEMOCCULT STL QL: NEGATIVE

## 2023-08-07 PROCEDURE — 82274 ASSAY TEST FOR BLOOD FECAL: CPT | Performed by: INTERNAL MEDICINE

## 2023-08-11 ENCOUNTER — HOSPITAL ENCOUNTER (OUTPATIENT)
Dept: PHYSICAL THERAPY | Age: 73
Setting detail: THERAPIES SERIES
Discharge: HOME | End: 2023-08-11

## 2023-08-11 NOTE — PROGRESS NOTES
Physical Therapy    [address] [employer]     Physical Therapy  Cancellation/No-show Note  Patient Name:  Nina Peña  :  1950   Date:  2023  Cancelled visits to date: 0  No-shows to date: 2    Patient status for today's appointment patient:  []  Cancelled  []  Rescheduled appointment  [x]  No-show ,  (red aquatic)     Reason given by patient:  []  Patient ill  []  Conflicting appointment  []  No transportation    []  Conflict with work  []  No reason given  []  Other:     Comments:      Phone call information:   []  Phone call made today to patient at 11:50 am at number provided:      []  Patient answered, conversation as follows:    []  Patient did not answer, message left as follows:  Phone # went straight to [employer]. Notified pt of her missed aquatic visit,and to notify us if unable to make an appt. Told pt to contact us with any questions, and reminded pt of her next scheduled appt time. [x]  Phone call not made today  []  Phone call not needed - pt contacted us to cancel and provided reason for cancellation.      Electronically signed by:  Bryson Collier, PTA, ATC

## 2023-08-18 ENCOUNTER — HOSPITAL ENCOUNTER (OUTPATIENT)
Dept: PHYSICAL THERAPY | Age: 73
Setting detail: THERAPIES SERIES
Discharge: HOME | End: 2023-08-18

## 2023-08-18 NOTE — PROGRESS NOTES
Physical Therapy    [address] [employer]     Physical Therapy  Cancellation/No-show Note  Patient Name:  Leatha Del Cid  :  1950   Date:  2023  Cancelled visits to date: 0  No-shows to date: 2    Patient status for today's appointment patient:  []  Cancelled  []  Rescheduled appointment  [x]  No-show  (red aquatic),  (aquatics)     Reason given by patient:  []  Patient ill  []  Conflicting appointment  []  No transportation    []  Conflict with work  []  No reason given  []  Other:     Comments:      Phone call information:   []  Phone call made today to patient at 11:50 am at number provided:      []  Patient answered, conversation as follows:    []  Patient did not answer, message left as follows:  Phone # went straight to voicemail. Notified pt of her missed aquatic visit,and to notify us if unable to make an appt. Told pt to contact us with any questions, and reminded pt of her next scheduled appt time. [x]  Phone call not made today  []  Phone call not needed - pt contacted us to cancel and provided reason for cancellation.      Electronically signed by:  Hawk Christine PT

## 2023-08-23 ENCOUNTER — ANTI-COAG VISIT (OUTPATIENT)
Dept: PHARMACY | Age: 73
End: 2023-08-23
Payer: COMMERCIAL

## 2023-08-23 DIAGNOSIS — I48.21 PERMANENT ATRIAL FIBRILLATION (HCC): Primary | ICD-10-CM

## 2023-08-23 LAB — INTERNATIONAL NORMALIZATION RATIO, POC: 2.8

## 2023-08-23 PROCEDURE — 99211 OFF/OP EST MAY X REQ PHY/QHP: CPT

## 2023-08-23 PROCEDURE — 85610 PROTHROMBIN TIME: CPT

## 2023-08-23 NOTE — PROGRESS NOTES
[x]       Emergency department visit -ER visit on 5/21 post fall. INR was 2.07 but was prescribed naproxen prn  []   [x]       Other complaints- states that she is using the pillbox, and she likes it. -> has not been using recently, but wants to restart ---> states she tried to use pillbox, asked to use pillbox and AVS and to cross off each day on AVS to prevent her from missing doses. Concerned for patient's ability to correctly remember recent history. She states that she is getting concerned for her memory. Clinical Outcomes:  Yes     No  []   [x]       Major bleeding event brain bleed 8/2021  []   [x]       Thromboembolic event  Duration of warfarin Therapy: indefinitely  INR Range:  1.8-2.5 -> lower INR goal dt h/o subcortical hemorrhage (2016)    She may be slower to reach steady state with warfarin dosing so consider checking INR about 10 days after dose adjustment.     INR is 2.8 again today  Continue weekly dose of 5 mg on Mon, Wed and Fri and 2.5 mg all other days of the week   Recheck INR in 4 weeks on 9/20    Patient consent signed 1/24/23    Referring cardiologist is Dr. Lisa Burch  INR (no units)   Date Value   05/21/2023 2.07 (H)   04/12/2023 5.10   04/04/2023 4.00   03/28/2023 4.00     INR,(POC) (no units)   Date Value   08/02/2023 2.8   07/12/2023 2.6   06/15/2023 2.3   06/01/2023 1.8     For Pharmacy Admin Tracking Only    Total # of Interventions Recommended: 0  Total # of Interventions Accepted: 0  Time Spent (min): 15

## 2023-08-28 ENCOUNTER — HOSPITAL ENCOUNTER (OUTPATIENT)
Dept: CT IMAGING | Age: 73
Discharge: HOME | End: 2023-08-28
Attending: INTERNAL MEDICINE
Payer: MEDICARE

## 2023-08-28 DIAGNOSIS — I77.810 ASCENDING AORTA DILATATION (HCC): ICD-10-CM

## 2023-08-28 PROCEDURE — 71250 CT THORAX DX C-: CPT

## 2023-08-29 NOTE — RESULT ENCOUNTER NOTE
Please let her know her aorta is enlarged like the echo reported, we will repeat the ct scan in 6 months. No indication for surgery or any intervention, we will just better control her blood pressure. She should continue to monitor her blood pressure at home and call us in a few weeks    She had an incidental finding of a fractured rib, that could have happened when she fell in May, please let her know.      Dr Sukhjinder Crowell

## 2023-08-31 ENCOUNTER — TELEPHONE (OUTPATIENT)
Dept: CARDIOLOGY CLINIC | Age: 73
End: 2023-08-31

## 2023-08-31 NOTE — TELEPHONE ENCOUNTER
----- Message from Contreras Yen RN sent at 8/29/2023  5:17 PM EDT -----  Please let her know her aorta is enlarged like the echo reported, we will repeat the ct scan in 6 months. No indication for surgery or any intervention, we will just better control her blood pressure. She should continue to monitor her blood pressure at home and call us in a few weeks    She had an incidental finding of a fractured rib, that could have happened when she fell in May, please let her know.      Dr Edgar Rueda

## 2023-09-28 ENCOUNTER — TELEPHONE (OUTPATIENT)
Dept: PHARMACY | Age: 73
End: 2023-09-28

## 2023-09-28 DIAGNOSIS — I10 PRIMARY HYPERTENSION: ICD-10-CM

## 2023-09-28 RX ORDER — SPIRONOLACTONE 25 MG/1
25 TABLET ORAL DAILY
Qty: 30 TABLET | Refills: 3 | Status: SHIPPED | OUTPATIENT
Start: 2023-09-28

## 2023-09-28 RX ORDER — VALSARTAN 80 MG/1
80 TABLET ORAL 2 TIMES DAILY
Qty: 60 TABLET | Refills: 3 | Status: SHIPPED | OUTPATIENT
Start: 2023-09-28

## 2023-09-28 NOTE — TELEPHONE ENCOUNTER
Medication:   Requested Prescriptions     Pending Prescriptions Disp Refills    valsartan (DIOVAN) 80 MG tablet [Pharmacy Med Name: VALSARTAN 80MG TABLETS] 60 tablet 3     Sig: TAKE 1 TABLET BY MOUTH TWICE DAILY    spironolactone (ALDACTONE) 25 MG tablet [Pharmacy Med Name: SPIRONOLACTONE 25MG TABLETS] 30 tablet 3     Sig: TAKE 1 TABLET BY MOUTH DAILY        Last Filled:      Patient Phone Number: [phone] (home)     Last appt: 8/1/2023   Next appt: 11/1/2023    Last OARRS:        No data to display

## 2023-09-28 NOTE — TELEPHONE ENCOUNTER
Called patient about insurance - no longer has [employer] (switched to [facility] [employer]). Asked pt to update insurance at registration before coming to us on Monday 10/2 so she is not billed incorrectly. Pt agrees. Fei Bradshaw, [employer], [facility].     For Pharmacy Admin Tracking Only    Total # of Interventions Recommended: 0  Total # of Interventions Accepted: 0  Time Spent (min): 15

## 2023-10-02 ENCOUNTER — ANTI-COAG VISIT (OUTPATIENT)
Dept: PHARMACY | Age: 73
End: 2023-10-02
Payer: MEDICARE

## 2023-10-02 DIAGNOSIS — I48.21 PERMANENT ATRIAL FIBRILLATION (HCC): Primary | ICD-10-CM

## 2023-10-02 LAB — INTERNATIONAL NORMALIZATION RATIO, POC: 1.4

## 2023-10-02 PROCEDURE — 99212 OFFICE O/P EST SF 10 MIN: CPT

## 2023-10-02 PROCEDURE — 85610 PROTHROMBIN TIME: CPT

## 2023-10-02 NOTE — PROGRESS NOTES
Ms. Jose Antonio Rees is a 71 y.o. y/o female with history of A fib   She presents today for anticoagulation monitoring and adjustment. 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)    Patient Reported Findings:  Yes     No   [x]   []       Patient verifies current dosing regimen as listed - pt take 2.5 mg on Tues and Thurs changed AVS to match---> confirmed --> took 2.5 mg on Mon then held tues and wed --> took 5 mg on Mon, Wed and Fri and 2.5 mg all other days of the week. Went of memory---> confirmed    []   [x]       S/S bleeding/bruising/swelling/SOB -did bruise shoulder after fall, followed up with doctor and monitoring bump ---> denies   []   [x]       Blood in urine or stool - denies   []   [x]       Procedures scheduled in the future at this time - Is being assessed for watchman, will keep notified---> had cortisone shot in knee last fri --> no changes   []   [x]       Missed Dose-  Denies     []   [x]       Extra Dose - Denies    [x]   []       Change in medications She continues with Tylenol 1000mg but only as needed --> inc tylenol to 3x/day --> less tylenol, once a day --> naproxen prn but doctor advised against and gave voltaren and tylenol -->  took prednisone 40 mg x 6 d on 7/3. No tylenol currently ---> inc lasix --> has been taking more tylenol acutely    []   [x]       Change in health/diet/appetite-- normally has high vitamin K diet of canned spinach weekly and collards or broccoli about every other week. Will have some lower vitamin K veggies about 2 times a week such as green beans. --> states that she has been eating liver twice a week, likely why INR has dropped.  Asked patient to decrease to once a week --> 1/2 cup of greens yesterday, stopped eating liver---> per pt loves liver and had it in the last week---> no greens, no NVD --> no changes, no NVD --> not much vit k recently --> no changes    []   [x]       Change in alcohol use denies  []   [x]       Change in activity  []   [x]

## 2023-10-12 ENCOUNTER — ANTI-COAG VISIT (OUTPATIENT)
Dept: PHARMACY | Age: 73
End: 2023-10-12
Payer: MEDICARE

## 2023-10-12 DIAGNOSIS — I48.21 PERMANENT ATRIAL FIBRILLATION (HCC): Primary | ICD-10-CM

## 2023-10-12 LAB — INTERNATIONAL NORMALIZATION RATIO, POC: 2

## 2023-10-12 PROCEDURE — 99211 OFF/OP EST MAY X REQ PHY/QHP: CPT

## 2023-10-12 PROCEDURE — 85610 PROTHROMBIN TIME: CPT

## 2023-10-12 NOTE — PROGRESS NOTES
Ms. Yasemin Montaño is a 71 y.o. y/o female with history of A fib   She presents today for anticoagulation monitoring and adjustment. 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)    Patient Reported Findings:  Yes     No   [x]   []       Patient verifies current dosing regimen as listed - pt take 2.5 mg on Tues and Thurs changed AVS to match---> confirmed --> took 2.5 mg on Mon then held tues and wed --> took 5 mg on Mon, Wed and Fri and 2.5 mg all other days of the week. Went of memory---> confirmed    []   [x]       S/S bleeding/bruising/swelling/SOB -did bruise shoulder after fall, followed up with doctor and monitoring bump ---> denies   []   [x]       Blood in urine or stool - denies   []   [x]       Procedures scheduled in the future at this time - Is being assessed for watchman, will keep notified---> had cortisone shot in knee last fri --> no changes   []   [x]       Missed Dose-  Denies     []   [x]       Extra Dose - Denies    [x]   []       Change in medications She continues with Tylenol 1000mg but only as needed --> inc tylenol to 3x/day --> less tylenol, once a day --> naproxen prn but doctor advised against and gave voltaren and tylenol -->  took prednisone 40 mg x 6 d on 7/3. No tylenol currently ---> inc lasix --> has been taking more tylenol acutely --> no changes, still taking tylenol    []   [x]       Change in health/diet/appetite-- normally has high vitamin K diet of canned spinach weekly and collards or broccoli about every other week. Will have some lower vitamin K veggies about 2 times a week such as green beans. --> states that she has been eating liver twice a week, likely why INR has dropped.  Asked patient to decrease to once a week --> 1/2 cup of greens yesterday, stopped eating liver---> per pt loves liver and had it in the last week---> no greens, no NVD --> no changes, no NVD   []   [x]       Change in alcohol use denies  []   [x]       Change in activity  []   [x]

## 2023-10-24 ASSESSMENT — ENCOUNTER SYMPTOMS
SHORTNESS OF BREATH: 1
PHOTOPHOBIA: 0
COUGH: 0
ABDOMINAL PAIN: 0
CHEST TIGHTNESS: 0
BLOOD IN STOOL: 0

## 2023-10-24 NOTE — PROGRESS NOTES
Psych Nl mood and affect   Lung no CTA-B, unlabored, no DTP neuro Facial droop from stroke   Ch wall NT, no deform       LABS and Imaging     Relevant and available CV data reviewed  TTE 7/24/23-Normal left ventricle size, wall thickness, and systolic function with an estimated ejection fraction of 60%. No regional wall motion abnormalities are seen. Severe left atrial enlargement. The ascending aorta is mild-to-moderately dilated at 4.8 cm. Aortic valve appears sclerotic but opens adequately. No evidence of aortic valve regurgitation. Mildly thickened mitral valve without evidence of stenosis. There is mild-to-moderate mitral regurgitation. Indeterminate diastolic function. 7/24/23- Myoview -Myocardial perfusion is equivocal.  -There is a very small anterior apical, fixed, mild-moderate intensity fixed defect c/w breast attenuation vs scar.  -This appears similar to 2020 scan. -LV function is normal with EF=   LHC 9/23/15:   LVEDP - 12. LVgram - severe LVH with EF 70%, enlarged AO root consistent with htn  Cors: LM - nl, LAD - 20% prox, 30%mid, patent stent, distal irreg, LCX - 20% mid,   8/29/23- ct of chest without contrast-  Holter: 1/30/18  Afib, rate not controlled, bradycardia at nighttime   Aneurysmal dilation of the ascending thoracic aorta measuring 5 cm. Enlargement of the main pulmonary artery measuring 4.1 cm, which may be found  in setting of pulmonary artery hypertension. Subacute nondisplaced fracture of the right anterolateral 6th rib. EKG 9/8/2020   Atrial fibrillaiton HR 87    ASSESSMENT AND PLAN     1. Permanent Non-valvular Atrial Fibrillation  -[id number] score: 7, on Warfarin   -HASBLED 6  -Hx of hemorrhagic stroke in 2018  -Recommendation for watchman per Dr. Katherine Vanegas  7/24/23- echo myoview  -stable  Plan  -didn't want watchman  -continue warfarin, accepts risk of recurrent bleeding and death      2.  Chronic diastolic heart failure (NYHA Class II)  -Appears compensated

## 2023-10-26 ENCOUNTER — OFFICE VISIT (OUTPATIENT)
Dept: CARDIOLOGY CLINIC | Age: 73
End: 2023-10-26
Payer: MEDICARE

## 2023-10-26 ENCOUNTER — ANTI-COAG VISIT (OUTPATIENT)
Dept: PHARMACY | Age: 73
End: 2023-10-26
Payer: MEDICARE

## 2023-10-26 VITALS
DIASTOLIC BLOOD PRESSURE: 98 MMHG | BODY MASS INDEX: 32.27 KG/M2 | HEART RATE: 57 BPM | WEIGHT: 189 LBS | OXYGEN SATURATION: 99 % | HEIGHT: 64 IN | SYSTOLIC BLOOD PRESSURE: 178 MMHG

## 2023-10-26 DIAGNOSIS — E78.2 MIXED HYPERLIPIDEMIA: ICD-10-CM

## 2023-10-26 DIAGNOSIS — I48.0 PAF (PAROXYSMAL ATRIAL FIBRILLATION) (HCC): ICD-10-CM

## 2023-10-26 DIAGNOSIS — I10 ESSENTIAL HYPERTENSION: ICD-10-CM

## 2023-10-26 DIAGNOSIS — I48.21 PERMANENT ATRIAL FIBRILLATION (HCC): Primary | ICD-10-CM

## 2023-10-26 DIAGNOSIS — I25.83 CORONARY ARTERY DISEASE DUE TO LIPID RICH PLAQUE: Primary | ICD-10-CM

## 2023-10-26 DIAGNOSIS — I10 PRIMARY HYPERTENSION: ICD-10-CM

## 2023-10-26 DIAGNOSIS — I48.21 PERMANENT ATRIAL FIBRILLATION (HCC): ICD-10-CM

## 2023-10-26 DIAGNOSIS — I77.810 ASCENDING AORTA DILATATION (HCC): ICD-10-CM

## 2023-10-26 DIAGNOSIS — R06.83 SNORING: ICD-10-CM

## 2023-10-26 DIAGNOSIS — I25.10 CORONARY ARTERY DISEASE DUE TO LIPID RICH PLAQUE: Primary | ICD-10-CM

## 2023-10-26 DIAGNOSIS — I50.32 CHRONIC DIASTOLIC (CONGESTIVE) HEART FAILURE (HCC): ICD-10-CM

## 2023-10-26 LAB — INTERNATIONAL NORMALIZATION RATIO, POC: 3.2

## 2023-10-26 PROCEDURE — 1036F TOBACCO NON-USER: CPT | Performed by: INTERNAL MEDICINE

## 2023-10-26 PROCEDURE — 3074F SYST BP LT 130 MM HG: CPT | Performed by: INTERNAL MEDICINE

## 2023-10-26 PROCEDURE — G8417 CALC BMI ABV UP PARAM F/U: HCPCS | Performed by: INTERNAL MEDICINE

## 2023-10-26 PROCEDURE — 3078F DIAST BP <80 MM HG: CPT | Performed by: INTERNAL MEDICINE

## 2023-10-26 PROCEDURE — 99211 OFF/OP EST MAY X REQ PHY/QHP: CPT

## 2023-10-26 PROCEDURE — G8427 DOCREV CUR MEDS BY ELIG CLIN: HCPCS | Performed by: INTERNAL MEDICINE

## 2023-10-26 PROCEDURE — 1090F PRES/ABSN URINE INCON ASSESS: CPT | Performed by: INTERNAL MEDICINE

## 2023-10-26 PROCEDURE — 99214 OFFICE O/P EST MOD 30 MIN: CPT | Performed by: INTERNAL MEDICINE

## 2023-10-26 PROCEDURE — 1123F ACP DISCUSS/DSCN MKR DOCD: CPT | Performed by: INTERNAL MEDICINE

## 2023-10-26 PROCEDURE — G8484 FLU IMMUNIZE NO ADMIN: HCPCS | Performed by: INTERNAL MEDICINE

## 2023-10-26 PROCEDURE — G8399 PT W/DXA RESULTS DOCUMENT: HCPCS | Performed by: INTERNAL MEDICINE

## 2023-10-26 PROCEDURE — 3017F COLORECTAL CA SCREEN DOC REV: CPT | Performed by: INTERNAL MEDICINE

## 2023-10-26 PROCEDURE — 85610 PROTHROMBIN TIME: CPT

## 2023-10-26 RX ORDER — FUROSEMIDE 40 MG/1
40 TABLET ORAL DAILY
Qty: 90 TABLET | Refills: 5 | Status: SHIPPED | OUTPATIENT
Start: 2023-10-26 | End: 2023-11-01 | Stop reason: SDUPTHER

## 2023-10-26 RX ORDER — ATORVASTATIN CALCIUM 80 MG/1
80 TABLET, FILM COATED ORAL DAILY
Qty: 90 TABLET | Refills: 4 | Status: ON HOLD | OUTPATIENT
Start: 2023-10-26

## 2023-10-26 RX ORDER — VALSARTAN 80 MG/1
80 TABLET ORAL 2 TIMES DAILY
Qty: 180 TABLET | Refills: 3 | Status: SHIPPED | OUTPATIENT
Start: 2023-10-26 | End: 2023-11-01

## 2023-10-26 RX ORDER — SPIRONOLACTONE 25 MG/1
25 TABLET ORAL DAILY
Qty: 90 TABLET | Refills: 3 | Status: SHIPPED | OUTPATIENT
Start: 2023-10-26 | End: 2023-10-31 | Stop reason: SDUPTHER

## 2023-10-26 RX ORDER — CARVEDILOL 6.25 MG/1
6.25 TABLET ORAL 2 TIMES DAILY WITH MEALS
Qty: 180 TABLET | Refills: 3 | Status: SHIPPED | OUTPATIENT
Start: 2023-10-26 | End: 2023-11-01 | Stop reason: SDUPTHER

## 2023-10-26 NOTE — PROGRESS NOTES
Ms. Maxwell Stein is a 71 y.o. y/o female with history of A fib   She presents today for anticoagulation monitoring and adjustment. 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)    Patient Reported Findings:  Yes     No   [x]   []       Patient verifies current dosing regimen as listed - pt take 2.5 mg on Tues and Thurs changed AVS to match---> confirmed --> took 2.5 mg on Mon then held tues and wed --> took 5 mg on Mon, Wed and Fri and 2.5 mg all other days of the week. Went of memory---> confirmed    []   [x]       S/S bleeding/bruising/swelling/SOB -did bruise shoulder after fall, followed up with doctor and monitoring bump ---> denies   []   [x]       Blood in urine or stool - denies   []   [x]       Procedures scheduled in the future at this time - Is being assessed for watchman, will keep notified---> had cortisone shot in knee last fri --> no changes   []   [x]       Missed Dose-  Denies     []   [x]       Extra Dose - Denies    [x]   []       Change in medications She continues with Tylenol 1000mg but only as needed --> inc tylenol to 3x/day --> less tylenol, once a day --> naproxen prn but doctor advised against and gave voltaren and tylenol -->  took prednisone 40 mg x 6 d on 7/3. No tylenol currently ---> inc lasix --> has been taking more tylenol acutely --> no changes, still taking tylenol --> has been taking more tylenol this week   [x]   []       Change in health/diet/appetite-- normally has high vitamin K diet of canned spinach weekly and collards or broccoli about every other week. Will have some lower vitamin K veggies about 2 times a week such as green beans. --> states that she has been eating liver twice a week, likely why INR has dropped.  Asked patient to decrease to once a week --> 1/2 cup of greens yesterday, stopped eating liver---> per pt loves liver and had it in the last week---> no greens, no NVD --> no changes, no NVD --> no vit k    []   [x]       Change in alcohol use

## 2023-10-27 ENCOUNTER — HOSPITAL ENCOUNTER (OUTPATIENT)
Age: 73
Discharge: HOME | End: 2023-10-27
Payer: MEDICARE

## 2023-10-27 DIAGNOSIS — I10 PRIMARY HYPERTENSION: ICD-10-CM

## 2023-10-27 LAB
ALBUMIN SERPL-MCNC: 4.6 G/DL (ref 3.4–5)
ANION GAP SERPL CALCULATED.3IONS-SCNC: 11 MMOL/L (ref 3–16)
BILIRUB UR QL STRIP.AUTO: NEGATIVE
BUN SERPL-MCNC: 10 MG/DL (ref 7–20)
CALCIUM SERPL-MCNC: 9.6 MG/DL (ref 8.3–10.6)
CHLORIDE SERPL-SCNC: 103 MMOL/L (ref 99–110)
CLARITY UR: CLEAR
CO2 SERPL-SCNC: 28 MMOL/L (ref 21–32)
COLOR UR: YELLOW
CREAT SERPL-MCNC: 0.6 MG/DL (ref 0.6–1.2)
GFR SERPLBLD CREATININE-BSD FMLA CKD-EPI: >60 ML/MIN/{1.73_M2}
GLUCOSE SERPL-MCNC: 102 MG/DL (ref 70–99)
GLUCOSE UR STRIP.AUTO-MCNC: NEGATIVE MG/DL
HGB UR QL STRIP.AUTO: NEGATIVE
KETONES UR STRIP.AUTO-MCNC: NEGATIVE MG/DL
LEUKOCYTE ESTERASE UR QL STRIP.AUTO: NEGATIVE
MAGNESIUM SERPL-MCNC: 1.4 MG/DL (ref 1.8–2.4)
NITRITE UR QL STRIP.AUTO: NEGATIVE
NT-PROBNP SERPL-MCNC: 1033 PG/ML (ref 0–124)
PH UR STRIP.AUTO: 5.5 [PH] (ref 5–8)
PHOSPHATE SERPL-MCNC: 3.3 MG/DL (ref 2.5–4.9)
POTASSIUM SERPL-SCNC: 3.3 MMOL/L (ref 3.5–5.1)
PROT UR STRIP.AUTO-MCNC: NEGATIVE MG/DL
PTH-INTACT SERPL-MCNC: 80.7 PG/ML (ref 14–72)
SODIUM SERPL-SCNC: 142 MMOL/L (ref 136–145)
SP GR UR STRIP.AUTO: <=1.005 (ref 1–1.03)
UA COMPLETE W REFLEX CULTURE PNL UR: NORMAL
UA DIPSTICK W REFLEX MICRO PNL UR: NORMAL
URN SPEC COLLECT METH UR: NORMAL
UROBILINOGEN UR STRIP-ACNC: 0.2 E.U./DL

## 2023-10-27 PROCEDURE — 83735 ASSAY OF MAGNESIUM: CPT

## 2023-10-27 PROCEDURE — 36415 COLL VENOUS BLD VENIPUNCTURE: CPT

## 2023-10-27 PROCEDURE — 83880 ASSAY OF NATRIURETIC PEPTIDE: CPT

## 2023-10-27 PROCEDURE — 84244 ASSAY OF RENIN: CPT

## 2023-10-27 PROCEDURE — 83970 ASSAY OF PARATHORMONE: CPT

## 2023-10-27 PROCEDURE — 82088 ASSAY OF ALDOSTERONE: CPT

## 2023-10-27 PROCEDURE — 80069 RENAL FUNCTION PANEL: CPT

## 2023-10-27 PROCEDURE — 81003 URINALYSIS AUTO W/O SCOPE: CPT

## 2023-10-31 DIAGNOSIS — I10 PRIMARY HYPERTENSION: ICD-10-CM

## 2023-10-31 LAB
ALDOST SERPL-MCNC: 9.1 NG/DL
ALDOST/RENIN PLAS-RTO: 91.2 RATIO
RENIN PLAS-CCNC: 0.1 NG/ML/HR

## 2023-10-31 RX ORDER — SPIRONOLACTONE 25 MG/1
50 TABLET ORAL DAILY
Qty: 90 TABLET | Refills: 3
Start: 2023-10-31 | End: 2023-11-01 | Stop reason: SDUPTHER

## 2023-10-31 RX ORDER — MAGNESIUM OXIDE 400 MG/1
400 TABLET ORAL DAILY
Qty: 30 TABLET | Refills: 1 | Status: ON HOLD
Start: 2023-10-31

## 2023-11-01 ENCOUNTER — OFFICE VISIT (OUTPATIENT)
Dept: PRIMARY CARE CLINIC | Age: 73
End: 2023-11-01
Payer: MEDICARE

## 2023-11-01 VITALS
HEART RATE: 72 BPM | TEMPERATURE: 97.2 F | OXYGEN SATURATION: 100 % | WEIGHT: 189.2 LBS | DIASTOLIC BLOOD PRESSURE: 100 MMHG | HEIGHT: 64 IN | BODY MASS INDEX: 32.3 KG/M2 | SYSTOLIC BLOOD PRESSURE: 180 MMHG

## 2023-11-01 DIAGNOSIS — M17.0 PRIMARY OSTEOARTHRITIS OF BOTH KNEES: ICD-10-CM

## 2023-11-01 DIAGNOSIS — I48.21 PERMANENT ATRIAL FIBRILLATION (HCC): ICD-10-CM

## 2023-11-01 DIAGNOSIS — I10 PRIMARY HYPERTENSION: ICD-10-CM

## 2023-11-01 PROCEDURE — 1123F ACP DISCUSS/DSCN MKR DOCD: CPT | Performed by: INTERNAL MEDICINE

## 2023-11-01 PROCEDURE — 3080F DIAST BP >= 90 MM HG: CPT | Performed by: INTERNAL MEDICINE

## 2023-11-01 PROCEDURE — G8484 FLU IMMUNIZE NO ADMIN: HCPCS | Performed by: INTERNAL MEDICINE

## 2023-11-01 PROCEDURE — 1036F TOBACCO NON-USER: CPT | Performed by: INTERNAL MEDICINE

## 2023-11-01 PROCEDURE — G8417 CALC BMI ABV UP PARAM F/U: HCPCS | Performed by: INTERNAL MEDICINE

## 2023-11-01 PROCEDURE — G8399 PT W/DXA RESULTS DOCUMENT: HCPCS | Performed by: INTERNAL MEDICINE

## 2023-11-01 PROCEDURE — 1090F PRES/ABSN URINE INCON ASSESS: CPT | Performed by: INTERNAL MEDICINE

## 2023-11-01 PROCEDURE — 99214 OFFICE O/P EST MOD 30 MIN: CPT | Performed by: INTERNAL MEDICINE

## 2023-11-01 PROCEDURE — G8427 DOCREV CUR MEDS BY ELIG CLIN: HCPCS | Performed by: INTERNAL MEDICINE

## 2023-11-01 PROCEDURE — 3017F COLORECTAL CA SCREEN DOC REV: CPT | Performed by: INTERNAL MEDICINE

## 2023-11-01 PROCEDURE — 3077F SYST BP >= 140 MM HG: CPT | Performed by: INTERNAL MEDICINE

## 2023-11-01 RX ORDER — ACETAMINOPHEN 500 MG
500 TABLET ORAL EVERY 6 HOURS PRN
Qty: 120 TABLET | Refills: 1 | Status: SHIPPED | OUTPATIENT
Start: 2023-11-01

## 2023-11-01 RX ORDER — VALSARTAN 320 MG/1
320 TABLET ORAL DAILY
Qty: 90 TABLET | Refills: 1 | Status: SHIPPED | OUTPATIENT
Start: 2023-11-01

## 2023-11-01 RX ORDER — CARVEDILOL 6.25 MG/1
6.25 TABLET ORAL 2 TIMES DAILY WITH MEALS
Qty: 180 TABLET | Refills: 3 | Status: ON HOLD | OUTPATIENT
Start: 2023-11-01

## 2023-11-01 RX ORDER — FUROSEMIDE 40 MG/1
40 TABLET ORAL DAILY
Qty: 90 TABLET | Refills: 5 | Status: SHIPPED | OUTPATIENT
Start: 2023-11-01

## 2023-11-01 RX ORDER — PREDNISONE 20 MG/1
40 TABLET ORAL DAILY
Qty: 10 TABLET | Refills: 0 | Status: SHIPPED | OUTPATIENT
Start: 2023-11-01 | End: 2023-11-06

## 2023-11-01 RX ORDER — SPIRONOLACTONE 50 MG/1
50 TABLET, FILM COATED ORAL DAILY
Qty: 90 TABLET | Refills: 1 | Status: SHIPPED | OUTPATIENT
Start: 2023-11-01

## 2023-11-01 ASSESSMENT — ENCOUNTER SYMPTOMS
CHEST TIGHTNESS: 0
COUGH: 0
ABDOMINAL PAIN: 0
BLOOD IN STOOL: 0
CONSTIPATION: 0
ABDOMINAL DISTENTION: 0
GASTROINTESTINAL NEGATIVE: 1
BACK PAIN: 1
SHORTNESS OF BREATH: 0
WHEEZING: 0
DIARRHEA: 0

## 2023-11-01 NOTE — PROGRESS NOTES
Subjective:      Patient ID: Dinora Madden is a 71 y.o. female. 11/1/2023 Patient presents with:  Arthritis: 3 month f/u              8/1/2023 Patient presents with:  Medicare AWV  Arthritis:   Did get \"shots\" in both knees 3 wks ago . No change in level of pain . Will see Ortho again in 3 mths! 7/3/2023  Patient presents with:  Joint Swelling: Right knee swelling/ pain, ongoing for the last month- no fall                 5/30/2023 Patient presents with: Follow-up: 6 weeks   Fall   was cooking when she fell suddenly and hurt her back . Rt side still sore . Was seen in ER 5/21/23 l4/11/2023 Patient presents with:  New Patient: Establish care    Was seeing PCP in [facility] . Too far            Review of Systems   Constitutional:  Negative for activity change, appetite change, fatigue, fever and unexpected weight change. Covid vac 12/21 #3    Pneum vac 10/07    Tdap 12/07     Flu vac     Shingrex             Had shingles x 2 ! HENT: Negative. Upper dentures    Eyes:         Eye exam 2021     S/P cataract surgery   Respiratory:  Negative for cough, chest tightness, shortness of breath and wheezing. Never smoker     No Etoh    Cardiovascular: Negative. Atrial Fib > 5 yrs on Coumadin     No known CAD    Gastrointestinal: Negative. Negative for abdominal distention, abdominal pain, blood in stool, constipation and diarrhea. Colonoscopy >    No FH of ca colon    Endocrine:        No FH of Diabetes    Genitourinary:  Negative for dysuria, menstrual problem, urgency and vaginal discharge. Mammogram  5/23    Katherine Menopause . 4 lkids   Nl gestation    Musculoskeletal:  Positive for arthralgias (S/P shots both knees . Still hurting), back pain and gait problem. DEXA 4/23  osteoporosis   Allergic/Immunologic: Negative for environmental allergies and food allergies. Neurological:  Negative for dizziness, weakness and headaches.

## 2023-11-05 ENCOUNTER — HOSPITAL ENCOUNTER (INPATIENT)
Age: 73
LOS: 3 days | Discharge: HOME HEALTH | DRG: 305 | End: 2023-11-08
Attending: STUDENT IN AN ORGANIZED HEALTH CARE EDUCATION/TRAINING PROGRAM | Admitting: STUDENT IN AN ORGANIZED HEALTH CARE EDUCATION/TRAINING PROGRAM
Payer: MEDICARE

## 2023-11-05 ENCOUNTER — APPOINTMENT (OUTPATIENT)
Dept: GENERAL RADIOLOGY | Age: 73
DRG: 305 | End: 2023-11-05
Payer: MEDICARE

## 2023-11-05 ENCOUNTER — APPOINTMENT (OUTPATIENT)
Dept: CT IMAGING | Age: 73
DRG: 305 | End: 2023-11-05
Payer: MEDICARE

## 2023-11-05 DIAGNOSIS — I66.29 ASYMPTOMATIC STENOSIS OF POSTERIOR CEREBRAL ARTERY: ICD-10-CM

## 2023-11-05 DIAGNOSIS — I77.810 THORACIC AORTIC ECTASIA (HCC): ICD-10-CM

## 2023-11-05 DIAGNOSIS — I10 MALIGNANT HYPERTENSION: Primary | ICD-10-CM

## 2023-11-05 DIAGNOSIS — I66.09 STENOSIS OF MIDDLE CEREBRAL ARTERY, UNSPECIFIED LATERALITY: ICD-10-CM

## 2023-11-05 LAB
ALBUMIN SERPL-MCNC: 4.8 G/DL (ref 3.4–5)
ALBUMIN/GLOB SERPL: 1.4 {RATIO} (ref 1.1–2.2)
ALP SERPL-CCNC: 60 U/L (ref 40–129)
ALT SERPL-CCNC: 16 U/L (ref 10–40)
ANION GAP SERPL CALCULATED.3IONS-SCNC: 13 MMOL/L (ref 3–16)
AST SERPL-CCNC: 21 U/L (ref 15–37)
BASOPHILS # BLD: 0 K/UL (ref 0–0.2)
BASOPHILS NFR BLD: 0.2 %
BILIRUB SERPL-MCNC: 0.6 MG/DL (ref 0–1)
BUN SERPL-MCNC: 12 MG/DL (ref 7–20)
CALCIUM SERPL-MCNC: 9.8 MG/DL (ref 8.3–10.6)
CHLORIDE SERPL-SCNC: 101 MMOL/L (ref 99–110)
CO2 SERPL-SCNC: 25 MMOL/L (ref 21–32)
CREAT SERPL-MCNC: <0.5 MG/DL (ref 0.6–1.2)
DEPRECATED RDW RBC AUTO: 13.4 % (ref 12.4–15.4)
EOSINOPHIL # BLD: 0 K/UL (ref 0–0.6)
EOSINOPHIL NFR BLD: 0 %
GFR SERPLBLD CREATININE-BSD FMLA CKD-EPI: >60 ML/MIN/{1.73_M2}
GLUCOSE SERPL-MCNC: 122 MG/DL (ref 70–99)
HCT VFR BLD AUTO: 38.8 % (ref 36–48)
HGB BLD-MCNC: 12.7 G/DL (ref 12–16)
INR PPP: 1.97 (ref 0.84–1.16)
LYMPHOCYTES # BLD: 1.2 K/UL (ref 1–5.1)
LYMPHOCYTES NFR BLD: 12.9 %
MAGNESIUM SERPL-MCNC: 1.8 MG/DL (ref 1.8–2.4)
MCH RBC QN AUTO: 29.6 PG (ref 26–34)
MCHC RBC AUTO-ENTMCNC: 32.8 G/DL (ref 31–36)
MCV RBC AUTO: 90.3 FL (ref 80–100)
MONOCYTES # BLD: 0.5 K/UL (ref 0–1.3)
MONOCYTES NFR BLD: 5.4 %
NEUTROPHILS # BLD: 7.7 K/UL (ref 1.7–7.7)
NEUTROPHILS NFR BLD: 81.5 %
NT-PROBNP SERPL-MCNC: 2016 PG/ML (ref 0–124)
PLATELET # BLD AUTO: 151 K/UL (ref 135–450)
PMV BLD AUTO: 10.4 FL (ref 5–10.5)
POTASSIUM SERPL-SCNC: 3.7 MMOL/L (ref 3.5–5.1)
PROT SERPL-MCNC: 8.2 G/DL (ref 6.4–8.2)
PROTHROMBIN TIME: 22.3 SEC (ref 11.5–14.8)
RBC # BLD AUTO: 4.3 M/UL (ref 4–5.2)
SODIUM SERPL-SCNC: 139 MMOL/L (ref 136–145)
TROPONIN, HIGH SENSITIVITY: 7 NG/L (ref 0–14)
TROPONIN, HIGH SENSITIVITY: 7 NG/L (ref 0–14)
WBC # BLD AUTO: 9.5 K/UL (ref 4–11)

## 2023-11-05 PROCEDURE — 2100000000 HC CCU R&B

## 2023-11-05 PROCEDURE — 93005 ELECTROCARDIOGRAM TRACING: CPT | Performed by: STUDENT IN AN ORGANIZED HEALTH CARE EDUCATION/TRAINING PROGRAM

## 2023-11-05 PROCEDURE — 84484 ASSAY OF TROPONIN QUANT: CPT

## 2023-11-05 PROCEDURE — 2500000003 HC RX 250 WO HCPCS: Performed by: NURSE PRACTITIONER

## 2023-11-05 PROCEDURE — 85610 PROTHROMBIN TIME: CPT

## 2023-11-05 PROCEDURE — 71045 X-RAY EXAM CHEST 1 VIEW: CPT

## 2023-11-05 PROCEDURE — 70498 CT ANGIOGRAPHY NECK: CPT

## 2023-11-05 PROCEDURE — 6370000000 HC RX 637 (ALT 250 FOR IP): Performed by: STUDENT IN AN ORGANIZED HEALTH CARE EDUCATION/TRAINING PROGRAM

## 2023-11-05 PROCEDURE — 71275 CT ANGIOGRAPHY CHEST: CPT

## 2023-11-05 PROCEDURE — 2580000003 HC RX 258: Performed by: NURSE PRACTITIONER

## 2023-11-05 PROCEDURE — 85025 COMPLETE CBC W/AUTO DIFF WBC: CPT

## 2023-11-05 PROCEDURE — 80053 COMPREHEN METABOLIC PANEL: CPT

## 2023-11-05 PROCEDURE — 83880 ASSAY OF NATRIURETIC PEPTIDE: CPT

## 2023-11-05 PROCEDURE — 99285 EMERGENCY DEPT VISIT HI MDM: CPT

## 2023-11-05 PROCEDURE — 70450 CT HEAD/BRAIN W/O DYE: CPT

## 2023-11-05 PROCEDURE — 83735 ASSAY OF MAGNESIUM: CPT

## 2023-11-05 PROCEDURE — 6360000004 HC RX CONTRAST MEDICATION: Performed by: STUDENT IN AN ORGANIZED HEALTH CARE EDUCATION/TRAINING PROGRAM

## 2023-11-05 RX ORDER — ACETAMINOPHEN 500 MG
1000 TABLET ORAL
Status: DISCONTINUED | OUTPATIENT
Start: 2023-11-05 | End: 2023-11-06 | Stop reason: HOSPADM

## 2023-11-05 RX ADMIN — SODIUM CHLORIDE 5 MG/HR: 9 INJECTION, SOLUTION INTRAVENOUS at 22:22

## 2023-11-05 RX ADMIN — IOPAMIDOL 140 ML: 755 INJECTION, SOLUTION INTRAVENOUS at 20:41

## 2023-11-05 RX ADMIN — NITROGLYCERIN 0.5 INCH: 20 OINTMENT TOPICAL at 21:00

## 2023-11-05 ASSESSMENT — ENCOUNTER SYMPTOMS
ABDOMINAL PAIN: 0
SHORTNESS OF BREATH: 1
NAUSEA: 0
VOMITING: 0
CHEST TIGHTNESS: 0
DIARRHEA: 0

## 2023-11-06 LAB
ANION GAP SERPL CALCULATED.3IONS-SCNC: 8 MMOL/L (ref 3–16)
BASOPHILS # BLD: 0 K/UL (ref 0–0.2)
BASOPHILS NFR BLD: 0.4 %
BUN SERPL-MCNC: 11 MG/DL (ref 7–20)
CALCIUM SERPL-MCNC: 9.6 MG/DL (ref 8.3–10.6)
CHLORIDE SERPL-SCNC: 106 MMOL/L (ref 99–110)
CO2 SERPL-SCNC: 27 MMOL/L (ref 21–32)
CREAT SERPL-MCNC: 0.6 MG/DL (ref 0.6–1.2)
DEPRECATED RDW RBC AUTO: 13.4 % (ref 12.4–15.4)
EOSINOPHIL # BLD: 0 K/UL (ref 0–0.6)
EOSINOPHIL NFR BLD: 0.1 %
GFR SERPLBLD CREATININE-BSD FMLA CKD-EPI: >60 ML/MIN/{1.73_M2}
GLUCOSE SERPL-MCNC: 117 MG/DL (ref 70–99)
HCT VFR BLD AUTO: 36.1 % (ref 36–48)
HGB BLD-MCNC: 11.8 G/DL (ref 12–16)
INR PPP: 2.21 (ref 0.84–1.16)
LYMPHOCYTES # BLD: 2.1 K/UL (ref 1–5.1)
LYMPHOCYTES NFR BLD: 20.6 %
MAGNESIUM SERPL-MCNC: 1.7 MG/DL (ref 1.8–2.4)
MCH RBC QN AUTO: 29.8 PG (ref 26–34)
MCHC RBC AUTO-ENTMCNC: 32.6 G/DL (ref 31–36)
MCV RBC AUTO: 91.5 FL (ref 80–100)
MONOCYTES # BLD: 0.8 K/UL (ref 0–1.3)
MONOCYTES NFR BLD: 8.1 %
NEUTROPHILS # BLD: 7.3 K/UL (ref 1.7–7.7)
NEUTROPHILS NFR BLD: 70.8 %
PLATELET # BLD AUTO: 128 K/UL (ref 135–450)
PMV BLD AUTO: 9.9 FL (ref 5–10.5)
POTASSIUM SERPL-SCNC: 3.2 MMOL/L (ref 3.5–5.1)
PROTHROMBIN TIME: 24.4 SEC (ref 11.5–14.8)
RBC # BLD AUTO: 3.94 M/UL (ref 4–5.2)
SODIUM SERPL-SCNC: 141 MMOL/L (ref 136–145)
TROPONIN, HIGH SENSITIVITY: 7 NG/L (ref 0–14)
TROPONIN, HIGH SENSITIVITY: 8 NG/L (ref 0–14)
WBC # BLD AUTO: 10.4 K/UL (ref 4–11)

## 2023-11-06 PROCEDURE — 2000000000 HC ICU R&B

## 2023-11-06 PROCEDURE — 2100000000 HC CCU R&B

## 2023-11-06 PROCEDURE — 83735 ASSAY OF MAGNESIUM: CPT

## 2023-11-06 PROCEDURE — 6360000002 HC RX W HCPCS: Performed by: STUDENT IN AN ORGANIZED HEALTH CARE EDUCATION/TRAINING PROGRAM

## 2023-11-06 PROCEDURE — 85025 COMPLETE CBC W/AUTO DIFF WBC: CPT

## 2023-11-06 PROCEDURE — 85610 PROTHROMBIN TIME: CPT

## 2023-11-06 PROCEDURE — 6360000002 HC RX W HCPCS: Performed by: INTERNAL MEDICINE

## 2023-11-06 PROCEDURE — 6370000000 HC RX 637 (ALT 250 FOR IP): Performed by: STUDENT IN AN ORGANIZED HEALTH CARE EDUCATION/TRAINING PROGRAM

## 2023-11-06 PROCEDURE — 84484 ASSAY OF TROPONIN QUANT: CPT

## 2023-11-06 PROCEDURE — 2580000003 HC RX 258: Performed by: STUDENT IN AN ORGANIZED HEALTH CARE EDUCATION/TRAINING PROGRAM

## 2023-11-06 PROCEDURE — 6370000000 HC RX 637 (ALT 250 FOR IP): Performed by: INTERNAL MEDICINE

## 2023-11-06 PROCEDURE — 80048 BASIC METABOLIC PNL TOTAL CA: CPT

## 2023-11-06 PROCEDURE — 36415 COLL VENOUS BLD VENIPUNCTURE: CPT

## 2023-11-06 RX ORDER — VALSARTAN 160 MG/1
320 TABLET ORAL DAILY
Status: DISCONTINUED | OUTPATIENT
Start: 2023-11-06 | End: 2023-11-08 | Stop reason: HOSPADM

## 2023-11-06 RX ORDER — METOCLOPRAMIDE HYDROCHLORIDE 5 MG/ML
10 INJECTION INTRAMUSCULAR; INTRAVENOUS EVERY 6 HOURS
Status: DISCONTINUED | OUTPATIENT
Start: 2023-11-06 | End: 2023-11-07

## 2023-11-06 RX ORDER — KETOROLAC TROMETHAMINE 15 MG/ML
15 INJECTION, SOLUTION INTRAMUSCULAR; INTRAVENOUS EVERY 6 HOURS
Status: DISCONTINUED | OUTPATIENT
Start: 2023-11-06 | End: 2023-11-07

## 2023-11-06 RX ORDER — HYDRALAZINE HYDROCHLORIDE 20 MG/ML
10 INJECTION INTRAMUSCULAR; INTRAVENOUS EVERY 4 HOURS PRN
Status: DISCONTINUED | OUTPATIENT
Start: 2023-11-06 | End: 2023-11-08 | Stop reason: HOSPADM

## 2023-11-06 RX ORDER — ASPIRIN 81 MG/1
81 TABLET, CHEWABLE ORAL DAILY
Status: DISCONTINUED | OUTPATIENT
Start: 2023-11-06 | End: 2023-11-08 | Stop reason: HOSPADM

## 2023-11-06 RX ORDER — MAGNESIUM SULFATE IN WATER 40 MG/ML
2000 INJECTION, SOLUTION INTRAVENOUS ONCE
Status: COMPLETED | OUTPATIENT
Start: 2023-11-06 | End: 2023-11-06

## 2023-11-06 RX ORDER — ACETAMINOPHEN 650 MG/1
650 SUPPOSITORY RECTAL EVERY 6 HOURS PRN
Status: DISCONTINUED | OUTPATIENT
Start: 2023-11-06 | End: 2023-11-08 | Stop reason: HOSPADM

## 2023-11-06 RX ORDER — SODIUM CHLORIDE 9 MG/ML
INJECTION, SOLUTION INTRAVENOUS PRN
Status: DISCONTINUED | OUTPATIENT
Start: 2023-11-06 | End: 2023-11-08 | Stop reason: HOSPADM

## 2023-11-06 RX ORDER — ACETAMINOPHEN 325 MG/1
650 TABLET ORAL EVERY 6 HOURS PRN
Status: DISCONTINUED | OUTPATIENT
Start: 2023-11-06 | End: 2023-11-08 | Stop reason: HOSPADM

## 2023-11-06 RX ORDER — POLYETHYLENE GLYCOL 3350 17 G/17G
17 POWDER, FOR SOLUTION ORAL DAILY PRN
Status: DISCONTINUED | OUTPATIENT
Start: 2023-11-06 | End: 2023-11-08 | Stop reason: HOSPADM

## 2023-11-06 RX ORDER — LABETALOL HYDROCHLORIDE 5 MG/ML
10 INJECTION, SOLUTION INTRAVENOUS EVERY 4 HOURS PRN
Status: DISCONTINUED | OUTPATIENT
Start: 2023-11-06 | End: 2023-11-08 | Stop reason: HOSPADM

## 2023-11-06 RX ORDER — LORAZEPAM 2 MG/ML
0.5 INJECTION INTRAMUSCULAR EVERY 6 HOURS
Status: DISCONTINUED | OUTPATIENT
Start: 2023-11-06 | End: 2023-11-07

## 2023-11-06 RX ORDER — SPIRONOLACTONE 25 MG/1
50 TABLET ORAL DAILY
Status: DISCONTINUED | OUTPATIENT
Start: 2023-11-06 | End: 2023-11-08 | Stop reason: HOSPADM

## 2023-11-06 RX ORDER — POTASSIUM CHLORIDE 20 MEQ/1
40 TABLET, EXTENDED RELEASE ORAL PRN
Status: DISCONTINUED | OUTPATIENT
Start: 2023-11-06 | End: 2023-11-08 | Stop reason: HOSPADM

## 2023-11-06 RX ORDER — MAGNESIUM SULFATE IN WATER 40 MG/ML
2000 INJECTION, SOLUTION INTRAVENOUS PRN
Status: DISCONTINUED | OUTPATIENT
Start: 2023-11-06 | End: 2023-11-08 | Stop reason: HOSPADM

## 2023-11-06 RX ORDER — SODIUM CHLORIDE 0.9 % (FLUSH) 0.9 %
5-40 SYRINGE (ML) INJECTION PRN
Status: DISCONTINUED | OUTPATIENT
Start: 2023-11-06 | End: 2023-11-08 | Stop reason: HOSPADM

## 2023-11-06 RX ORDER — WARFARIN SODIUM 2.5 MG/1
2.5 TABLET ORAL
Status: DISCONTINUED | OUTPATIENT
Start: 2023-11-07 | End: 2023-11-08 | Stop reason: HOSPADM

## 2023-11-06 RX ORDER — FUROSEMIDE 10 MG/ML
20 INJECTION INTRAMUSCULAR; INTRAVENOUS DAILY
Status: DISCONTINUED | OUTPATIENT
Start: 2023-11-06 | End: 2023-11-07

## 2023-11-06 RX ORDER — POTASSIUM CHLORIDE 7.45 MG/ML
10 INJECTION INTRAVENOUS PRN
Status: DISCONTINUED | OUTPATIENT
Start: 2023-11-06 | End: 2023-11-08 | Stop reason: HOSPADM

## 2023-11-06 RX ORDER — SODIUM CHLORIDE 0.9 % (FLUSH) 0.9 %
5-40 SYRINGE (ML) INJECTION EVERY 12 HOURS SCHEDULED
Status: DISCONTINUED | OUTPATIENT
Start: 2023-11-06 | End: 2023-11-08 | Stop reason: HOSPADM

## 2023-11-06 RX ORDER — ONDANSETRON 2 MG/ML
4 INJECTION INTRAMUSCULAR; INTRAVENOUS EVERY 6 HOURS PRN
Status: DISCONTINUED | OUTPATIENT
Start: 2023-11-06 | End: 2023-11-08 | Stop reason: HOSPADM

## 2023-11-06 RX ORDER — CARVEDILOL 6.25 MG/1
6.25 TABLET ORAL 2 TIMES DAILY WITH MEALS
Status: DISCONTINUED | OUTPATIENT
Start: 2023-11-06 | End: 2023-11-08

## 2023-11-06 RX ORDER — ATORVASTATIN CALCIUM 80 MG/1
80 TABLET, FILM COATED ORAL DAILY
Status: DISCONTINUED | OUTPATIENT
Start: 2023-11-06 | End: 2023-11-08 | Stop reason: HOSPADM

## 2023-11-06 RX ORDER — WARFARIN SODIUM 5 MG/1
5 TABLET ORAL
Status: DISCONTINUED | OUTPATIENT
Start: 2023-11-06 | End: 2023-11-08 | Stop reason: HOSPADM

## 2023-11-06 RX ORDER — POTASSIUM CHLORIDE 20 MEQ/1
40 TABLET, EXTENDED RELEASE ORAL ONCE
Status: COMPLETED | OUTPATIENT
Start: 2023-11-06 | End: 2023-11-06

## 2023-11-06 RX ORDER — ONDANSETRON 4 MG/1
4 TABLET, ORALLY DISINTEGRATING ORAL EVERY 8 HOURS PRN
Status: DISCONTINUED | OUTPATIENT
Start: 2023-11-06 | End: 2023-11-08 | Stop reason: HOSPADM

## 2023-11-06 RX ADMIN — LORAZEPAM 0.5 MG: 2 INJECTION INTRAMUSCULAR; INTRAVENOUS at 21:47

## 2023-11-06 RX ADMIN — KETOROLAC TROMETHAMINE 15 MG: 15 INJECTION, SOLUTION INTRAMUSCULAR; INTRAVENOUS at 21:47

## 2023-11-06 RX ADMIN — SODIUM CHLORIDE, PRESERVATIVE FREE 10 ML: 5 INJECTION INTRAVENOUS at 21:47

## 2023-11-06 RX ADMIN — SPIRONOLACTONE 50 MG: 25 TABLET ORAL at 08:20

## 2023-11-06 RX ADMIN — METOCLOPRAMIDE 10 MG: 5 INJECTION, SOLUTION INTRAMUSCULAR; INTRAVENOUS at 21:47

## 2023-11-06 RX ADMIN — LORAZEPAM 0.5 MG: 2 INJECTION INTRAMUSCULAR; INTRAVENOUS at 14:05

## 2023-11-06 RX ADMIN — CARVEDILOL 6.25 MG: 6.25 TABLET, FILM COATED ORAL at 08:19

## 2023-11-06 RX ADMIN — METOCLOPRAMIDE 10 MG: 5 INJECTION, SOLUTION INTRAMUSCULAR; INTRAVENOUS at 14:04

## 2023-11-06 RX ADMIN — MAGNESIUM SULFATE HEPTAHYDRATE 2000 MG: 40 INJECTION, SOLUTION INTRAVENOUS at 08:33

## 2023-11-06 RX ADMIN — ASPIRIN 81 MG: 81 TABLET, CHEWABLE ORAL at 08:19

## 2023-11-06 RX ADMIN — ATORVASTATIN CALCIUM 80 MG: 80 TABLET, FILM COATED ORAL at 08:19

## 2023-11-06 RX ADMIN — FUROSEMIDE 20 MG: 10 INJECTION, SOLUTION INTRAMUSCULAR; INTRAVENOUS at 08:20

## 2023-11-06 RX ADMIN — POTASSIUM CHLORIDE 40 MEQ: 1500 TABLET, EXTENDED RELEASE ORAL at 08:29

## 2023-11-06 RX ADMIN — SODIUM CHLORIDE, PRESERVATIVE FREE 10 ML: 5 INJECTION INTRAVENOUS at 08:21

## 2023-11-06 RX ADMIN — POTASSIUM CHLORIDE 40 MEQ: 1500 TABLET, EXTENDED RELEASE ORAL at 14:09

## 2023-11-06 RX ADMIN — CARVEDILOL 6.25 MG: 6.25 TABLET, FILM COATED ORAL at 17:24

## 2023-11-06 RX ADMIN — WARFARIN SODIUM 5 MG: 5 TABLET ORAL at 17:24

## 2023-11-06 RX ADMIN — VALSARTAN 320 MG: 160 TABLET, FILM COATED ORAL at 08:20

## 2023-11-06 RX ADMIN — ACETAMINOPHEN 650 MG: 325 TABLET ORAL at 08:20

## 2023-11-06 RX ADMIN — KETOROLAC TROMETHAMINE 15 MG: 15 INJECTION, SOLUTION INTRAMUSCULAR; INTRAVENOUS at 14:03

## 2023-11-06 ASSESSMENT — PAIN SCALES - GENERAL
PAINLEVEL_OUTOF10: 0
PAINLEVEL_OUTOF10: 0
PAINLEVEL_OUTOF10: 4
PAINLEVEL_OUTOF10: 0
PAINLEVEL_OUTOF10: 4

## 2023-11-06 ASSESSMENT — PAIN DESCRIPTION - ORIENTATION: ORIENTATION: MID

## 2023-11-06 ASSESSMENT — PAIN DESCRIPTION - FREQUENCY: FREQUENCY: CONTINUOUS

## 2023-11-06 ASSESSMENT — PAIN DESCRIPTION - LOCATION: LOCATION: HEAD

## 2023-11-06 ASSESSMENT — PAIN DESCRIPTION - ONSET: ONSET: ON-GOING

## 2023-11-06 ASSESSMENT — PAIN DESCRIPTION - DESCRIPTORS: DESCRIPTORS: ACHING

## 2023-11-06 NOTE — ED NOTES
ED TO INPATIENT SBAR HANDOFF    Patient Name: Marika Bourgeois   :  1950  71 y.o. MRN:  [id number]  Preferred Name    ED Room #:  ED-0023/23  Family/Caregiver Present yes   Restraints no   Sitter no   Sepsis Risk Score Sepsis Risk Score: 1.11    Situation  Code Status: Prior No additional code details. Allergies: Clonidine, Codeine, Hydrocodone-acetaminophen, Lisinopril, Tramadol, and Percocet [oxycodone-acetaminophen]  Weight: No data found. Arrived from: home  Chief Complaint:   Chief Complaint   Patient presents with    Shortness of Breath     Patient states sob since noon, states has hx of afib. Patient states has been told she has CHF. Hospital Problem/Diagnosis:  Principal Problem:    Hypertensive emergency  Resolved Problems:    * No resolved hospital problems. *    Imaging:   CTA HEAD NECK W CONTRAST   Final Result   1. No acute arterial abnormality in the head or neck. 2. Moderate stenosis of the left middle cerebral artery M1 segment. 3. Moderate stenosis of the right posterior cerebral artery P2 segment. 4. No significant arterial stenosis in the neck. 5. Incidental 3.4 cm left thyroid nodule was previously evaluated by   ultrasound on 2018. CTA CHEST W WO CONTRAST   Preliminary Result   No acute abnormality in the thoracic aorta. No acute abnormality in the   chest.      Stable ectasia of the mid ascending thoracic aorta up to 45 mm, unchanged   from . CT HEAD WO CONTRAST   Final Result   No acute intracranial abnormality. XR CHEST PORTABLE   Final Result   No acute abnormality.            Abnormal labs:   Abnormal Labs Reviewed   COMPREHENSIVE METABOLIC PANEL W/ REFLEX TO MG FOR LOW K - Abnormal; Notable for the following components:       Result Value    Glucose 122 (*)     Creatinine <0.5 (*)     All other components within normal limits   BRAIN NATRIURETIC PEPTIDE - Abnormal; Notable for the following components:    Pro-BNP 2,016 (*)

## 2023-11-06 NOTE — ED PROVIDER NOTES
[facility]        Pt Name: Jose Antonio Rees  MRN: [id number]  [address] 1950  Date of evaluation: 11/5/2023  Provider: ADAN Workman - TEREZA  PCP: Basil Barroso MD  Note Started: 7:46 PM EST 11/5/23       I have seen and evaluated this patient with my supervising physician Christian Ruiz, [address] [place] [address] [address]       Chief Complaint   Patient presents with    Shortness of Breath     Patient states sob since noon, states has hx of afib. Patient states has been told she has CHF. HISTORY OF PRESENT ILLNESS: 1 or more Elements     History from : Patient    Limitations to history : None    Jose Antonio Rees is a 71 y.o. female who presents to the emergency department with complaint of headache, shortness of breath, and chest pain. She reports that today at about 11:00 while at [employer] that symptoms began, states that she was at rest when she developed a headache that was sudden onset with pain that went down the back of her neck and into her chest.  She reports that she had blurred vision at that time, the blurred vision has since improved and is no longer present. She does report that it feels like it is difficult to breathe and she has noticed more swelling in her arms and legs than usual with history of CHF and atrial fibrillation. She has not missed her medications, states that she is due for her 1 dose of Coreg this evening but has not yet taken it because she has not eaten. She is in no acute distress. She is resting with lights out. Reports that she is experiencing pain to the left side of her head and neck at this time. Nursing Notes were all reviewed and agreed with or any disagreements were addressed in the HPI. REVIEW OF SYSTEMS :      Review of Systems   Constitutional:  Positive for fatigue. Negative for activity change, chills and fever. Eyes:  Positive for visual disturbance.    Respiratory:

## 2023-11-06 NOTE — H&P
V2.0  History and Physical      Name:  Marco Glaser /Age/Sex: 1950  (71 y.o. female)   MRN & CSN:  [id number] & [id number] Encounter Date/Time: 2023 10:44 PM EST   Location:  [facility]2906/2906[facility]01 PCP: Buck Antonio MD       Hospital Day: 2    Assessment and Plan:   Marco Glaser is a 71 y.o. female with a pmh of A fib, hyperlipidemia, HTN, OA, CHFpEF who presents with Hypertensive emergency    Hospital Problems             Last Modified POA    * (Principal) Hypertensive emergency 2023 Yes       Plan:  Hypertension urgency:  -Patient presented with chief complaint of headache, shortness of breath, chest pain. -In the ED, patient was hypertensive with blood pressure of 224/141. Patient mentions that she is compliant with her home hypertensive medications. However, she missed her Coreg this morning. -CT head was done and showed no acute intracranial abnormalities. -CTA of the head and neck was done moderate stenosis of the left middle cerebral artery M1 segment, moderate stenosis of the right posterior cerebral artery P2 segment. -CT of the chest was done showed No acute abnormality in the thoracic aorta. No acute abnormality in the chest.  -Patient was started on nicardipine drip in the ED.  -Wean off nicardipine drip  -Start home meds  -As needed labetalol and hydralazine  -Monitor blood pressure. # Atrial Fibrillation:  - Continue home Warfarin and Coreg       #. Chronic diastolic heart failure:   - Appears compensated   - Strict I/O  - Daily weight  - Lasix 20 mg daily    #. Primary Hypertension:  -2017-renal ultrasound- neg for renal artery stenosis  - Continue coreg 6.25 bid  - Continue Aldactone 50 mg daily  - Continue Diovan 320 mg daily  - lasix 20 mg daily         #. CAD   - Continue coreg  - Atorvastatin 80 mg    #.  Mixed Hyperlipidemia  -continue on Atorvastatin 80 mg       Disposition:   Current Living situation: Home  Expected Disposition: Home  Estimated D/C: 2-3

## 2023-11-06 NOTE — ED PROVIDER NOTES
In addition to the advanced practice provider, I personally saw Giacomo Harrison and performed a substantive portion of the visit including all aspects of the medical decision making. Medical Decision Making  80-year-old female presenting for evaluation of headache, shortness of breath and chest pain. She states that symptom started around 11 AM while she was at [employer]. She was at rest when she developed headache that was somewhat sudden in onset. She then noticed pain down the back of her neck and into her chest.  She feels like it is difficult to breathe. She states that she is noticing more swelling in her arms and legs than usual.  She does have a history of CHF. She states she has not missed any of her blood pressure medic with the exception of 1 dose of Coreg this evening. I reviewed telephone encounter with her cardiology office. Dr. Francisca Duffy increased her Aldactone to 50 mg daily at the end of October. On exam pt is resting comfortably  Speech is clear. No facial drooping. Extraocular movements are intact. She moves upper extremities to command with appropriate strength. Cardiac RRR, no murmur  Lungs clear bilaterally, no increased work of breathing  Abdomen soft nontender  Trace nonpitting edema bilateral calfs    EKG  The Ekg interpreted by me in the absence of a cardiologist shows. Atrial fibrillation with a ventricular rate of 89. Axis normal.  QTc appropriate. RV conduction delay changes. No specific ST or T wave abnormality. No significant change from prior 5-. SEP-1  Is this patient to be included in the SEP-1 Core Measure due to severe sepsis or septic shock? No   Exclusion criteria - the patient is NOT to be included for SEP-1 Core Measure due to:   Infection is not suspected    Screenings     Bj Coma Scale  Eye Opening: Spontaneous  Best Verbal Response: Oriented  Best Motor Response: Obeys commands  [place] Coma Scale Score: 15     CTA HEAD NECK W CONTRAST

## 2023-11-06 NOTE — CARE COORDINATION
Chart reviewed. From home, cardene gtt. CM will monitor for d/c needs.     Kelton Nicholson RN, BSN  [phone]

## 2023-11-07 LAB
ANION GAP SERPL CALCULATED.3IONS-SCNC: 9 MMOL/L (ref 3–16)
BASOPHILS # BLD: 0 K/UL (ref 0–0.2)
BASOPHILS NFR BLD: 0.7 %
BUN SERPL-MCNC: 13 MG/DL (ref 7–20)
CALCIUM SERPL-MCNC: 9.2 MG/DL (ref 8.3–10.6)
CHLORIDE SERPL-SCNC: 106 MMOL/L (ref 99–110)
CO2 SERPL-SCNC: 28 MMOL/L (ref 21–32)
CREAT SERPL-MCNC: 0.6 MG/DL (ref 0.6–1.2)
DEPRECATED RDW RBC AUTO: 13.3 % (ref 12.4–15.4)
EKG ATRIAL RATE: 250 BPM
EKG DIAGNOSIS: NORMAL
EKG Q-T INTERVAL: 362 MS
EKG QRS DURATION: 90 MS
EKG QTC CALCULATION (BAZETT): 440 MS
EKG R AXIS: 48 DEGREES
EKG T AXIS: -7 DEGREES
EKG VENTRICULAR RATE: 89 BPM
EOSINOPHIL # BLD: 0.1 K/UL (ref 0–0.6)
EOSINOPHIL NFR BLD: 1.3 %
GFR SERPLBLD CREATININE-BSD FMLA CKD-EPI: >60 ML/MIN/{1.73_M2}
GLUCOSE SERPL-MCNC: 94 MG/DL (ref 70–99)
HCT VFR BLD AUTO: 34.6 % (ref 36–48)
HGB BLD-MCNC: 11.4 G/DL (ref 12–16)
INR PPP: 2.34 (ref 0.84–1.16)
LYMPHOCYTES # BLD: 2.1 K/UL (ref 1–5.1)
LYMPHOCYTES NFR BLD: 41.3 %
MCH RBC QN AUTO: 29.9 PG (ref 26–34)
MCHC RBC AUTO-ENTMCNC: 32.8 G/DL (ref 31–36)
MCV RBC AUTO: 91.1 FL (ref 80–100)
MONOCYTES # BLD: 0.3 K/UL (ref 0–1.3)
MONOCYTES NFR BLD: 5.8 %
NEUTROPHILS # BLD: 2.6 K/UL (ref 1.7–7.7)
NEUTROPHILS NFR BLD: 50.9 %
PLATELET # BLD AUTO: 120 K/UL (ref 135–450)
PMV BLD AUTO: 9.4 FL (ref 5–10.5)
POTASSIUM SERPL-SCNC: 4.2 MMOL/L (ref 3.5–5.1)
PROTHROMBIN TIME: 25.5 SEC (ref 11.5–14.8)
RBC # BLD AUTO: 3.8 M/UL (ref 4–5.2)
SODIUM SERPL-SCNC: 143 MMOL/L (ref 136–145)
WBC # BLD AUTO: 5.2 K/UL (ref 4–11)

## 2023-11-07 PROCEDURE — 6370000000 HC RX 637 (ALT 250 FOR IP): Performed by: STUDENT IN AN ORGANIZED HEALTH CARE EDUCATION/TRAINING PROGRAM

## 2023-11-07 PROCEDURE — 97165 OT EVAL LOW COMPLEX 30 MIN: CPT

## 2023-11-07 PROCEDURE — 85610 PROTHROMBIN TIME: CPT

## 2023-11-07 PROCEDURE — 93010 ELECTROCARDIOGRAM REPORT: CPT | Performed by: INTERNAL MEDICINE

## 2023-11-07 PROCEDURE — 80048 BASIC METABOLIC PNL TOTAL CA: CPT

## 2023-11-07 PROCEDURE — 97116 GAIT TRAINING THERAPY: CPT

## 2023-11-07 PROCEDURE — 6360000002 HC RX W HCPCS: Performed by: STUDENT IN AN ORGANIZED HEALTH CARE EDUCATION/TRAINING PROGRAM

## 2023-11-07 PROCEDURE — 6360000002 HC RX W HCPCS: Performed by: INTERNAL MEDICINE

## 2023-11-07 PROCEDURE — 6370000000 HC RX 637 (ALT 250 FOR IP): Performed by: INTERNAL MEDICINE

## 2023-11-07 PROCEDURE — 92610 EVALUATE SWALLOWING FUNCTION: CPT

## 2023-11-07 PROCEDURE — 85025 COMPLETE CBC W/AUTO DIFF WBC: CPT

## 2023-11-07 PROCEDURE — 97530 THERAPEUTIC ACTIVITIES: CPT

## 2023-11-07 PROCEDURE — 99222 1ST HOSP IP/OBS MODERATE 55: CPT | Performed by: OTOLARYNGOLOGY

## 2023-11-07 PROCEDURE — 2000000000 HC ICU R&B

## 2023-11-07 PROCEDURE — 97161 PT EVAL LOW COMPLEX 20 MIN: CPT

## 2023-11-07 PROCEDURE — 2580000003 HC RX 258: Performed by: STUDENT IN AN ORGANIZED HEALTH CARE EDUCATION/TRAINING PROGRAM

## 2023-11-07 RX ORDER — KETOROLAC TROMETHAMINE 30 MG/ML
30 INJECTION, SOLUTION INTRAMUSCULAR; INTRAVENOUS EVERY 6 HOURS PRN
Status: DISCONTINUED | OUTPATIENT
Start: 2023-11-07 | End: 2023-11-08 | Stop reason: HOSPADM

## 2023-11-07 RX ORDER — FUROSEMIDE 40 MG/1
40 TABLET ORAL DAILY
Status: DISCONTINUED | OUTPATIENT
Start: 2023-11-07 | End: 2023-11-08 | Stop reason: HOSPADM

## 2023-11-07 RX ORDER — NIFEDIPINE 30 MG/1
30 TABLET, EXTENDED RELEASE ORAL 2 TIMES DAILY
Status: DISCONTINUED | OUTPATIENT
Start: 2023-11-07 | End: 2023-11-07

## 2023-11-07 RX ADMIN — VALSARTAN 320 MG: 160 TABLET, FILM COATED ORAL at 08:49

## 2023-11-07 RX ADMIN — HYDRALAZINE HYDROCHLORIDE 10 MG: 20 INJECTION, SOLUTION INTRAMUSCULAR; INTRAVENOUS at 09:37

## 2023-11-07 RX ADMIN — HYDRALAZINE HYDROCHLORIDE 10 MG: 20 INJECTION, SOLUTION INTRAMUSCULAR; INTRAVENOUS at 03:54

## 2023-11-07 RX ADMIN — CARVEDILOL 6.25 MG: 6.25 TABLET, FILM COATED ORAL at 17:03

## 2023-11-07 RX ADMIN — FUROSEMIDE 40 MG: 40 TABLET ORAL at 08:50

## 2023-11-07 RX ADMIN — ATORVASTATIN CALCIUM 80 MG: 80 TABLET, FILM COATED ORAL at 08:50

## 2023-11-07 RX ADMIN — CARVEDILOL 6.25 MG: 6.25 TABLET, FILM COATED ORAL at 08:50

## 2023-11-07 RX ADMIN — SODIUM CHLORIDE, PRESERVATIVE FREE 10 ML: 5 INJECTION INTRAVENOUS at 08:50

## 2023-11-07 RX ADMIN — WARFARIN SODIUM 2.5 MG: 2.5 TABLET ORAL at 17:03

## 2023-11-07 RX ADMIN — NIFEDIPINE 30 MG: 30 TABLET, EXTENDED RELEASE ORAL at 08:49

## 2023-11-07 RX ADMIN — SPIRONOLACTONE 50 MG: 25 TABLET ORAL at 08:49

## 2023-11-07 RX ADMIN — ASPIRIN 81 MG: 81 TABLET, CHEWABLE ORAL at 08:50

## 2023-11-07 RX ADMIN — KETOROLAC TROMETHAMINE 30 MG: 30 INJECTION, SOLUTION INTRAMUSCULAR; INTRAVENOUS at 15:52

## 2023-11-07 ASSESSMENT — PAIN SCALES - GENERAL
PAINLEVEL_OUTOF10: 6
PAINLEVEL_OUTOF10: 0

## 2023-11-07 NOTE — CARE COORDINATION
[address] home care referral. Spoke with pt and re: home care plan of care/services. Agreeable. Demographic's verified. Will follow for home care.

## 2023-11-07 NOTE — DISCHARGE INSTR - COC
[facility] -[phone]. Physician Certification: I certify the above information and transfer of Teetee Dominguez  is necessary for the continuing treatment of the diagnosis listed and that she requires [address] [place] [address] for less 30 days.      Update Admission H&P: No change in H&P    PHYSICIAN SIGNATURE:  Electronically signed by Shannan Jackson MD on 11/7/23 at 8:17 PM EST

## 2023-11-07 NOTE — CARE COORDINATION
Case Management Assessment  Initial Evaluation    Date/Time of Evaluation: 11/7/2023 11:41 AM   Assessment Completed by: Nikki Villegas RN    If patient is discharged prior to next notation, then this note serves as note for discharge by case management. Patient Name: Ade Pavon                   YOB: 1950  Diagnosis: Malignant hypertension [I10]  Thoracic aortic ectasia ([address]) [I77.810]  Hypertensive emergency [I16.1]  Stenosis of middle cerebral artery, unspecified laterality [I66.09]  Asymptomatic stenosis of posterior cerebral artery [I66.29]                   Date / Time: 11/5/2023  6:54 PM    Patient Admission Status: Inpatient   Readmission Risk (Low < 19, Mod (19-27), High > 27): Readmission Risk Score: 12    Current PCP: Sunday Ryan MD  PCP verified by ? Yes    Chart Reviewed: Yes      History Provided by: Patient  Patient Orientation: Alert and Oriented, Person, Place, Situation    Patient Cognition: Alert    Hospitalization in the last 30 days (Readmission):  No    If yes, Readmission Assessment in  Navigator will be completed. Advance Directives:      Code Status: Full Code   Patient's Primary Decision Maker is: Legal Next of Kin    Primary Decision MakeFlorencio Gomez - [phone]    Discharge Planning:    Patient lives with: Family Members, Children (son and 15 yo great grandson) Type of Home: House (single level with 1 step to enter)  Primary Care Giver: Self  Patient Support Systems include: Family Members, Children   Current Financial resources: Medicare  Current community resources: None  Current services prior to admission: Durable Medical Equipment            Current DME: [facility]            Type of Home Care services:  None    ADLS  Prior functional level: Independent in ADLs/IADLs  Current functional level:  Other (see comment) (HC recommended on d/c)    PT AM-PAC: 20 /24  OT AM-PAC: 20 /24    Family can provide assistance at DC:

## 2023-11-07 NOTE — CONSULTS
increased echogenicity. A  smaller solid nodule lies at the superior margin of the dominant nodule. This nodule is well-defined and hypoechoic measuring 1.0 by 0.9 by 0.8 cm. Blood flow is demonstrated within the nodules. Cervical lymphadenopathy: No abnormal lymph nodes in the imaged portions of  the neck. IMPRESSION:  Bilateral solid thyroid nodules with a dominant nodule measuring 3 cm in size  and corresponding to the nodule on the CT scan. The nodule is indeterminate  in etiology. Follow-up with nuclear medicine thyroid uptake and scan  suggested. If the nodule is cold, a biopsy would be indicated. CTA OF THE HEAD AND NECK WITH CONTRAST 2/7/2018 2:59 pm        FINDINGS:     SOFT TISSUES: There is a 3.6 cm nodule arising from the left lobe of the  thyroid gland. There is no pathologically enlarged lymphadenopathy  identified. IMPRESSION:  1. No major branch occlusion, significant stenosis or cerebral aneurysm  identified within the [place]. 2. No arterial stenosis identified within the neck. 3. Indeterminate 3.6 cm left lobe thyroid nodule for which a dedicated  thyroid ultrasound is recommended as per the recommendations below        IMPRESSION:   Left thyroid nodule, large, 3.4 cm with rightward tracheal displacement, which seems to be mild to moderate, seen on CTA head and neck; may be associated with dysphagia and/or dyspnea. Dysphagia. RECOMMENDATIONS:    Thyroid ultrasound; this facility does only as an outpatient. FNA of thyroid nodule, consider. Outpatient follow up in office after discharge home. TIME:  Total unit time = 67 minutes.

## 2023-11-07 NOTE — ACP (ADVANCE CARE PLANNING)
Advanced Care Planning Note. Purpose of Encounter: Advanced care planning in light of hypertensive urgency  Parties In Attendance: Patient, daughter  Decisional Capacity: Yes  Subjective: Patient with HA  Objective: Cr 0.6  Goals of Care Determination: Patient wants limited support (No CPR, short vent, ok for surgery and HD, no trach or PEG)  Plan:  Cardene gtt, Toradol IV, Ativan IV, Reglan IV  Code Status: Full code   Time spent on Advanced care Plannin minutes  Advanced Care Planning Documents: Completed advanced directives on chart, daughter is the POA.     Ryan Montero MD  2023 11:16 PM

## 2023-11-07 NOTE — PLAN OF CARE
Problem: Safety - Adult  Goal: Free from fall injury  Outcome: Progressing     Problem: ABCDS Injury Assessment  Goal: Absence of physical injury  Outcome: Progressing     Problem: Pain  Goal: Verbalizes/displays adequate comfort level or baseline comfort level  Outcome: Progressing     Problem: Chronic Conditions and Co-morbidities  Goal: Patient's chronic conditions and co-morbidity symptoms are monitored and maintained or improved  Outcome: Progressing    Electronically signed by Danuta Patterson RN on 11/7/2023 at 12:25 AM

## 2023-11-07 NOTE — FLOWSHEET NOTE
Notified Dr. Dalia Rowland that after receiving morning dose of nifedipine pt's BP dropped to 79/63. New order to d/c nifedipine and monitor BP for the rest of the day.

## 2023-11-08 ENCOUNTER — TELEPHONE (OUTPATIENT)
Dept: PHARMACY | Age: 73
End: 2023-11-08

## 2023-11-08 ENCOUNTER — TELEPHONE (OUTPATIENT)
Dept: PRIMARY CARE CLINIC | Age: 73
End: 2023-11-08

## 2023-11-08 VITALS
RESPIRATION RATE: 16 BRPM | DIASTOLIC BLOOD PRESSURE: 105 MMHG | HEART RATE: 65 BPM | HEIGHT: 64 IN | OXYGEN SATURATION: 99 % | TEMPERATURE: 96.9 F | WEIGHT: 190 LBS | SYSTOLIC BLOOD PRESSURE: 125 MMHG | BODY MASS INDEX: 32.44 KG/M2

## 2023-11-08 LAB
ANION GAP SERPL CALCULATED.3IONS-SCNC: 11 MMOL/L (ref 3–16)
BASOPHILS # BLD: 0 K/UL (ref 0–0.2)
BASOPHILS NFR BLD: 0.8 %
BUN SERPL-MCNC: 15 MG/DL (ref 7–20)
CALCIUM SERPL-MCNC: 9.4 MG/DL (ref 8.3–10.6)
CHLORIDE SERPL-SCNC: 99 MMOL/L (ref 99–110)
CO2 SERPL-SCNC: 26 MMOL/L (ref 21–32)
CREAT SERPL-MCNC: 0.6 MG/DL (ref 0.6–1.2)
DEPRECATED RDW RBC AUTO: 13.7 % (ref 12.4–15.4)
EOSINOPHIL # BLD: 0.1 K/UL (ref 0–0.6)
EOSINOPHIL NFR BLD: 1.5 %
GFR SERPLBLD CREATININE-BSD FMLA CKD-EPI: >60 ML/MIN/{1.73_M2}
GLUCOSE SERPL-MCNC: 98 MG/DL (ref 70–99)
HCT VFR BLD AUTO: 38.8 % (ref 36–48)
HGB BLD-MCNC: 12.9 G/DL (ref 12–16)
INR PPP: 1.89 (ref 0.84–1.16)
LYMPHOCYTES # BLD: 2.1 K/UL (ref 1–5.1)
LYMPHOCYTES NFR BLD: 38.9 %
MCH RBC QN AUTO: 30.1 PG (ref 26–34)
MCHC RBC AUTO-ENTMCNC: 33.1 G/DL (ref 31–36)
MCV RBC AUTO: 90.8 FL (ref 80–100)
MONOCYTES # BLD: 0.3 K/UL (ref 0–1.3)
MONOCYTES NFR BLD: 6.5 %
NEUTROPHILS # BLD: 2.8 K/UL (ref 1.7–7.7)
NEUTROPHILS NFR BLD: 52.3 %
PLATELET # BLD AUTO: 159 K/UL (ref 135–450)
PMV BLD AUTO: 10.2 FL (ref 5–10.5)
POTASSIUM SERPL-SCNC: 4.6 MMOL/L (ref 3.5–5.1)
PROTHROMBIN TIME: 21.6 SEC (ref 11.5–14.8)
RBC # BLD AUTO: 4.28 M/UL (ref 4–5.2)
SODIUM SERPL-SCNC: 136 MMOL/L (ref 136–145)
WBC # BLD AUTO: 5.3 K/UL (ref 4–11)

## 2023-11-08 PROCEDURE — 92526 ORAL FUNCTION THERAPY: CPT

## 2023-11-08 PROCEDURE — 85610 PROTHROMBIN TIME: CPT

## 2023-11-08 PROCEDURE — 80048 BASIC METABOLIC PNL TOTAL CA: CPT

## 2023-11-08 PROCEDURE — 6370000000 HC RX 637 (ALT 250 FOR IP): Performed by: INTERNAL MEDICINE

## 2023-11-08 PROCEDURE — 85025 COMPLETE CBC W/AUTO DIFF WBC: CPT

## 2023-11-08 PROCEDURE — 2580000003 HC RX 258: Performed by: STUDENT IN AN ORGANIZED HEALTH CARE EDUCATION/TRAINING PROGRAM

## 2023-11-08 PROCEDURE — 6370000000 HC RX 637 (ALT 250 FOR IP): Performed by: STUDENT IN AN ORGANIZED HEALTH CARE EDUCATION/TRAINING PROGRAM

## 2023-11-08 RX ORDER — CARVEDILOL 6.25 MG/1
6.25 TABLET ORAL ONCE
Status: COMPLETED | OUTPATIENT
Start: 2023-11-08 | End: 2023-11-08

## 2023-11-08 RX ORDER — CARVEDILOL 12.5 MG/1
12.5 TABLET ORAL 2 TIMES DAILY WITH MEALS
Qty: 60 TABLET | Refills: 3 | Status: SHIPPED | OUTPATIENT
Start: 2023-11-08

## 2023-11-08 RX ORDER — CARVEDILOL 6.25 MG/1
12.5 TABLET ORAL 2 TIMES DAILY WITH MEALS
Status: DISCONTINUED | OUTPATIENT
Start: 2023-11-08 | End: 2023-11-08 | Stop reason: HOSPADM

## 2023-11-08 RX ADMIN — FUROSEMIDE 40 MG: 40 TABLET ORAL at 08:00

## 2023-11-08 RX ADMIN — VALSARTAN 320 MG: 160 TABLET, FILM COATED ORAL at 08:00

## 2023-11-08 RX ADMIN — ATORVASTATIN CALCIUM 80 MG: 80 TABLET, FILM COATED ORAL at 08:00

## 2023-11-08 RX ADMIN — SODIUM CHLORIDE, PRESERVATIVE FREE 10 ML: 5 INJECTION INTRAVENOUS at 08:00

## 2023-11-08 RX ADMIN — ASPIRIN 81 MG: 81 TABLET, CHEWABLE ORAL at 08:00

## 2023-11-08 RX ADMIN — SPIRONOLACTONE 50 MG: 25 TABLET ORAL at 08:00

## 2023-11-08 RX ADMIN — CARVEDILOL 6.25 MG: 6.25 TABLET, FILM COATED ORAL at 08:00

## 2023-11-08 RX ADMIN — CARVEDILOL 6.25 MG: 6.25 TABLET, FILM COATED ORAL at 10:38

## 2023-11-08 ASSESSMENT — PAIN SCALES - GENERAL: PAINLEVEL_OUTOF10: 0

## 2023-11-08 NOTE — TELEPHONE ENCOUNTER
----- Message from [employer] sent at 11/8/2023  1:25 PM EST -----  Lindsay Echeverria states pt is getting d/c'd from [facility] today, would like to know if we want them to check INR.  [phone]

## 2023-11-08 NOTE — TELEPHONE ENCOUNTER
Called Callum Roberts back, states potential discharge today and HH has been ordered for Friday 11/10. Admitted 11/5-present for HTN emergency. Adjusted coreg. 11/5- held?  INR 1.97  11/6- 5mg, INR 2.21  11/7- 2.5mg, INR 2.34  11/8- INR 1.89    Reba Hurley, PharmD, [address]    Total # of Interventions Recommended: 0  Total # of Interventions Accepted: 0  Time Spent (min): 15

## 2023-11-08 NOTE — TELEPHONE ENCOUNTER
Left message on machine per HIPPA  TCM hosp f/u please transfer call to [facility] ext [phone]. Care Transitions Initial Follow Up Call    Outreach made within 2 business days of discharge: Yes    Patient: Ade Pavon Patient : 1950   MRN: [id number]  Reason for Admission: There are no discharge diagnoses documented for the most recent discharge.   Discharge Date: 23       Spoke with: Left message    Discharge department/facility: [facility]    Scheduled appointment with PCP within 7-14 days    Follow Up  Future Appointments   Date Time Provider [address]   2023  1:45 PM ADAN Ness - CNP FF Ortho [facility]   11/10/2023  5:15 PM [facility]TICO[facility]    2023  9:40 AM Gloria Suarez MD Phylicia Pole RD [facility] [place] - DYD   2023 12:30 PM Shantanu Russo DO FF Cardio [facility]   2023  1:00 PM Dimitry Gregg MD FF SLEEP MED [facility]       Moshe Downey MA

## 2023-11-08 NOTE — CARE COORDINATION
Patient discharged 11/08/2023 to home with [facility] services.    All discharge needs met per case management     Kelton Nicholson RN, BSN  [phone]

## 2023-11-08 NOTE — TELEPHONE ENCOUNTER
La Nena Guidry, Gave rep verbal order for skilled nursing, physical and occupational therapy, provider will fax over plan of care

## 2023-11-08 NOTE — CARE COORDINATION
11/08/23 1205   IMM Letter   IMM Letter given to Patient/Family/Significant other/Guardian/POA/by: Ariella Brody RN CM - second notice   IMM Letter date given: 11/08/23   IMM Letter time given: 0866  (copy made for hard chart)

## 2023-11-08 NOTE — CARE COORDINATION
Aware of discharge with home care. Home care orders sent to [facility]. Discharge planner notified.

## 2023-11-08 NOTE — DISCHARGE SUMMARY
injury. No significant stenosis of the brachiocephalic or subclavian arteries. CAROTID ARTERIES: No dissection, arterial injury, or hemodynamically significant stenosis by NASCET criteria. VERTEBRAL ARTERIES: No dissection, arterial injury, or significant stenosis. SOFT TISSUES: The lung apices are clear. No cervical or superior mediastinal lymphadenopathy. The larynx and pharynx are unremarkable. No acute abnormality of the salivary and thyroid glands. 3.4 cm exophytic inferior left thyroid nodule causing rightward tracheal displacement. BONES: No acute osseous abnormality. Mild degenerative changes in the cervical spine. CTA HEAD: ANTERIOR CIRCULATION: No significant stenosis of the internal carotid or anterior cerebral arteries bilaterally. Moderate stenosis of the left middle cerebral artery M1 segment. No significant stenosis of the right middle cerebral artery. No aneurysm. POSTERIOR CIRCULATION: No significant stenosis of the vertebral, basilar, or left posterior cerebral arteries. Moderate stenosis of the right posterior cerebral artery P2 segment. No aneurysm. OTHER: No dural venous sinus thrombosis on this non-dedicated study. BRAIN: No mass effect or midline shift. No extra-axial fluid collection. The gray-white differentiation is maintained. 1. No acute arterial abnormality in the head or neck. 2. Moderate stenosis of the left middle cerebral artery M1 segment. 3. Moderate stenosis of the right posterior cerebral artery P2 segment. 4. No significant arterial stenosis in the neck. 5. Incidental 3.4 cm left thyroid nodule was previously evaluated by ultrasound on 02/08/2018.      CT HEAD WO CONTRAST    Result Date: 11/5/2023  EXAMINATION: CT OF THE HEAD WITHOUT CONTRAST  11/5/2023 8:15 pm TECHNIQUE: CT of the head was performed without the administration of intravenous contrast. Automated exposure control, iterative reconstruction, and/or weight based adjustment of the mA/kV was utilized to

## 2023-11-09 ENCOUNTER — APPOINTMENT (OUTPATIENT)
Dept: PHARMACY | Age: 73
End: 2023-11-09
Payer: MEDICARE

## 2023-11-09 ENCOUNTER — TELEPHONE (OUTPATIENT)
Dept: PHARMACY | Age: 73
End: 2023-11-09

## 2023-11-09 NOTE — TELEPHONE ENCOUNTER
Miguel Phelps from [facility] called stating shes trying to get a hold of pt to schedule [phone]  [address] [facility] visit. Tried calling pt had to M .    Miguel Phelps [phone]

## 2023-11-09 NOTE — PATIENT INSTRUCTIONS
Patient Education        High Blood Pressure: Care Instructions  Overview     It's normal for blood pressure to go up and down throughout the day. But if it stays up, you have high blood pressure. Another name for high blood pressure is hypertension. Despite what a lot of people think, high blood pressure usually doesn't cause headaches or make you feel dizzy or lightheaded. It usually has no symptoms. But it does increase your risk of stroke, heart attack, and other problems. You and your doctor will talk about your risks of these problems based on your blood pressure. Your doctor will give you a goal for your blood pressure. Your goal will be based on your health and your age. Lifestyle changes, such as eating healthy and being active, are always important to help lower blood pressure. You might also take medicine to reach your blood pressure goal.  Follow-up care is a key part of your treatment and safety. Be sure to make and go to all appointments, and call your doctor if you are having problems. It's also a good idea to know your test results and keep a list of the medicines you take. How can you care for yourself at home? Medical treatment  If you stop taking your medicine, your blood pressure will go back up. You may take one or more types of medicine to lower your blood pressure. Be safe with medicines. Take your medicine exactly as prescribed. Call your doctor if you think you are having a problem with your medicine. Talk to your doctor before you start taking aspirin every day. Aspirin can help certain people lower their risk of a heart attack or stroke. But taking aspirin isn't right for everyone, because it can cause serious bleeding. See your doctor regularly. You may need to see the doctor more often at first or until your blood pressure comes down. If you are taking blood pressure medicine, talk to your doctor before you take decongestants or anti-inflammatory medicine, such as ibuprofen.  Some of these medicines can raise blood pressure. Learn how to check your blood pressure at home. Lifestyle changes  Stay at a healthy weight. This is especially important if you put on weight around the waist. Losing even 10 pounds can help you lower your blood pressure. If your doctor recommends it, get more exercise. Walking is a good choice. Bit by bit, increase the amount you walk every day. Try for at least 30 minutes on most days of the week. You also may want to swim, bike, or do other activities. Avoid or limit alcohol. Talk to your doctor about whether you can drink any alcohol. Try to limit how much sodium you eat to less than 2,300 milligrams (mg) a day. Your doctor may ask you to try to eat less than 1,500 mg a day. Eat plenty of fruits (such as bananas and oranges), vegetables, legumes, whole grains, and low-fat dairy products. Lower the amount of saturated fat in your diet. Saturated fat is found in animal products such as milk, cheese, and meat. Limiting these foods may help you lose weight and also lower your risk for heart disease. Do not smoke. Smoking increases your risk for heart attack and stroke. If you need help quitting, talk to your doctor about stop-smoking programs and medicines. These can increase your chances of quitting for good. When should you call for help? Call 911  anytime you think you may need emergency care. This may mean having symptoms that suggest that your blood pressure is causing a serious heart or blood vessel problem. Your blood pressure may be over 180/120. For example, call 911 if:    You have symptoms of a heart attack. These may include:  Chest pain or pressure, or a strange feeling in the chest.  Sweating. Shortness of breath. Nausea or vomiting. Pain, pressure, or a strange feeling in the back, neck, jaw, or upper belly or in one or both shoulders or arms. Lightheadedness or sudden weakness. A fast or irregular heartbeat.      You have symptoms of a no lesions or palpable masses. Neck Note: Normal   Thyroid   Gland Characteristics - normal size and consistency and no palpable nodules. Chest and Lung Exam   Chest and lung exam reveals - on auscultation, normal breath sounds, no adventitious sounds and normal vocal resonance. Breast   Breast - Left - Normal. Right - Normal.     Cardiovascular   Cardiovascular examination reveals - normal heart sounds, regular rate and rhythm with no murmurs. Abdomen   Inspection: - Inspection Normal.   Palpation/Percussion: Palpation and Percussion of the abdomen reveal - Non Tender, No Rebound tenderness, No Rigidity (guarding), No hepatosplenomegaly, No Palpable abdominal masses and Soft. Auscultation: Auscultation of the abdomen reveals - Bowel sounds normal.     Female Genitourinary     External Genitalia   Vulva: - Normal. Perineum - Normal. Bartholin's Gland - Bilateral - Normal. Clitoris - Normal.   Introitus: Characteristics - Normal.   Urethra: Characteristics - Normal.     Speculum & Bimanual   Vagina: Vaginal Mucosa - Normal.   Vaginal Wall: - Normal.   Vaginal Lesions - None. Cervix: Characteristics - Normal.   Uterus: Characteristics - Normal.   Adnexa: - Normal.   Bladder - Normal.     Peripheral Vascular   Normal    Neuropsychiatric   Examination of related systems reveals - The patient is well-nourished and well-groomed. Mental status exam performed with findings of - Oriented X3 with appropriate mood and affect. Musculoskeletal  Normal      General Lymphatics  Normal           Medical problems and test results were reviewed with the patient today. ASSESSMENT and PLAN    1. Well woman exam  2. Screening for malignant neoplasm of cervix  -     PAP LB, Reflex HPV ASCUS           No follow-ups on file.        Verlie Severe, MD  11/14/2023 stroke. These may include:  Sudden numbness, tingling, weakness, or loss of movement in your face, arm, or leg, especially on only one side of your body. Sudden vision changes. Sudden trouble speaking. Sudden confusion or trouble understanding simple statements. Sudden problems with walking or balance. A sudden, severe headache that is different from past headaches. You have severe back or belly pain. Do not wait until your blood pressure comes down on its own. Get help right away. Call your doctor now or seek immediate care if:    Your blood pressure is much higher than normal (such as 180/120 or higher), but you don't have symptoms. You think high blood pressure is causing symptoms, such as:  Severe headache. Blurry vision. Watch closely for changes in your health, and be sure to contact your doctor if:    Your blood pressure measures higher than your doctor recommends at least 2 times. That means the top number is higher or the bottom number is higher, or both. You think you may be having side effects from your blood pressure medicine. Where can you learn more? Go to http://www.woods.com/ and enter M190 to learn more about \"High Blood Pressure: Care Instructions. \"  Current as of: October 6, 2021               Content Version: 13.5  © 2006-2022 [employer]. Care instructions adapted under license by [facility] [employer] [facility] ([facility]). If you have questions about a medical condition or this instruction, always ask your healthcare professional. [place] [address] any warranty or liability for your use of this information.

## 2023-11-14 ENCOUNTER — OFFICE VISIT (OUTPATIENT)
Dept: ENT CLINIC | Age: 73
End: 2023-11-14
Payer: MEDICARE

## 2023-11-14 VITALS
BODY MASS INDEX: 31.76 KG/M2 | DIASTOLIC BLOOD PRESSURE: 112 MMHG | HEART RATE: 95 BPM | HEIGHT: 64 IN | WEIGHT: 186 LBS | TEMPERATURE: 97.4 F | OXYGEN SATURATION: 96 % | SYSTOLIC BLOOD PRESSURE: 191 MMHG

## 2023-11-14 DIAGNOSIS — K21.9 LPRD (LARYNGOPHARYNGEAL REFLUX DISEASE): Chronic | ICD-10-CM

## 2023-11-14 DIAGNOSIS — R79.89 ELEVATED PARATHYROID HORMONE: ICD-10-CM

## 2023-11-14 DIAGNOSIS — K21.9 LPRD (LARYNGOPHARYNGEAL REFLUX DISEASE): ICD-10-CM

## 2023-11-14 DIAGNOSIS — E04.1 THYROID NODULE: Primary | Chronic | ICD-10-CM

## 2023-11-14 PROCEDURE — 1123F ACP DISCUSS/DSCN MKR DOCD: CPT | Performed by: OTOLARYNGOLOGY

## 2023-11-14 PROCEDURE — 3017F COLORECTAL CA SCREEN DOC REV: CPT | Performed by: OTOLARYNGOLOGY

## 2023-11-14 PROCEDURE — 3078F DIAST BP <80 MM HG: CPT | Performed by: OTOLARYNGOLOGY

## 2023-11-14 PROCEDURE — G8427 DOCREV CUR MEDS BY ELIG CLIN: HCPCS | Performed by: OTOLARYNGOLOGY

## 2023-11-14 PROCEDURE — G8484 FLU IMMUNIZE NO ADMIN: HCPCS | Performed by: OTOLARYNGOLOGY

## 2023-11-14 PROCEDURE — 1036F TOBACCO NON-USER: CPT | Performed by: OTOLARYNGOLOGY

## 2023-11-14 PROCEDURE — 1111F DSCHRG MED/CURRENT MED MERGE: CPT | Performed by: OTOLARYNGOLOGY

## 2023-11-14 PROCEDURE — G8399 PT W/DXA RESULTS DOCUMENT: HCPCS | Performed by: OTOLARYNGOLOGY

## 2023-11-14 PROCEDURE — 3074F SYST BP LT 130 MM HG: CPT | Performed by: OTOLARYNGOLOGY

## 2023-11-14 PROCEDURE — 99204 OFFICE O/P NEW MOD 45 MIN: CPT | Performed by: OTOLARYNGOLOGY

## 2023-11-14 PROCEDURE — 1090F PRES/ABSN URINE INCON ASSESS: CPT | Performed by: OTOLARYNGOLOGY

## 2023-11-14 PROCEDURE — G8417 CALC BMI ABV UP PARAM F/U: HCPCS | Performed by: OTOLARYNGOLOGY

## 2023-11-14 RX ORDER — OMEPRAZOLE 20 MG/1
CAPSULE, DELAYED RELEASE ORAL
Qty: 180 CAPSULE | OUTPATIENT
Start: 2023-11-14

## 2023-11-14 RX ORDER — OMEPRAZOLE 20 MG/1
CAPSULE, DELAYED RELEASE ORAL
Qty: 60 CAPSULE | Refills: 2 | Status: SHIPPED | OUTPATIENT
Start: 2023-11-14

## 2023-11-14 NOTE — PATIENT INSTRUCTIONS
Proceed with thyroid ultrasound ordered by Dr. Art Williamson on 5/30/2023. Proceed with parathyroid scan. Proceed with labs ordered today.        ====================================================================      Laryngopharyngeal Reflux    Laryngopharyngeal reflux (LPR) is a condition in which stomach fluid refluxes, or flows backwards, up into your throat. This affects the back area of your voice box. It happens hundreds of times a day and involves a very small amount of fluid each time. There is no sensation or awareness of this reflux, such as the heartburn, pain, or indigestion associated with a related condition, gastroesophageal reflux, or GERD. These two conditions are distinctly different, although they may coexist in a given person. Generally, you will not be aware when LPR is happening. However, the stomach fluid contains an enzyme, called pepsin, which causes inflammation in your throat, which in turn, causes your symptoms. The most common symptoms associated with LPR reflux are a sensation of a lump in the throat or mucus that you cannot clear or something stuck in the throat, chronic cough, chronic hoarseness, sorethroat, and postnasal drainage. The most common sign of LPR is frequent throat clearing. A medication, called a proton pump inhibitor (PPI) is usually effective treatment for this condition. This medication decreases the amount of acid your stomach lining makes and puts into the stomach fluid. This raises the pH of the stomach fluid. Under this condition the pepsin is less active when reflux does occur and it, therefore, does not irritate your throat as much. Research with omeprazole, one type of PPI medication, showed that twice daily therapy may be needed to provide maximum and optimum effect to control or improve these symptoms. In the treatment of this condition, 40 mg once daily DOES NOT EQUAL 20 mg twice daily!   In addition, the PPI medication must be taken on an

## 2023-11-14 NOTE — PROGRESS NOTES
Intact; Future    LPRD (laryngopharyngeal reflux disease)  -     omeprazole (PRILOSEC) 20 MG delayed release capsule; Take 1 capsule by mouth 2 times daily; take on an empty stomach and eat a meal or snack 45 to 60 minutes hour after each dose. RECOMMENDATIONS / PLAN:   Patient was advised to proceed with the thyroid ultrasound ordered by Dr. Nadiya Vance. See Patient Instructions on file for this visit. Patient was advised to review and follow my instructions in the AVS, which we reviewed together, and to call and speak to the MA or LPN with any questions regarding these instructions. Return in about 1 month (around 12/14/2023) for recheck/follow-up, and sooner if condition worsens.

## 2023-11-21 ENCOUNTER — ANTI-COAG VISIT (OUTPATIENT)
Dept: PHARMACY | Age: 73
End: 2023-11-21
Payer: MEDICARE

## 2023-11-21 DIAGNOSIS — I48.21 PERMANENT ATRIAL FIBRILLATION (HCC): Primary | ICD-10-CM

## 2023-11-21 LAB — INTERNATIONAL NORMALIZATION RATIO, POC: 2.2

## 2023-11-21 PROCEDURE — 99211 OFF/OP EST MAY X REQ PHY/QHP: CPT

## 2023-11-21 PROCEDURE — 85610 PROTHROMBIN TIME: CPT

## 2023-11-21 NOTE — PROGRESS NOTES
Change in alcohol use denies  []   [x]       Change in activity  [x]   []       Hospital admission-Admitted 11/5-11/8for HTN emergency. Adjusted coreg. 11/5- held? INR 1.97  11/6- 5mg, INR 2.21  11/7- 2.5mg, INR 2.34  11/8- INR 1.89  []   [x]       Emergency department visit -ER visit on 5/21 post fall. INR was 2.07 but was prescribed naproxen prn  []   [x]       Other complaints- states that she is using the pillbox, and she likes it. -> has not been using recently, but wants to restart ---> states she tried to use pillbox, asked to use pillbox and AVS and to cross off each day on AVS to prevent her from missing doses. Concerned for patient's ability to correctly remember recent history. She states that she is getting concerned for her memory. Clinical Outcomes:  Yes     No  []   [x]       Major bleeding event brain bleed 8/2021  []   [x]       Thromboembolic event  Duration of warfarin Therapy: indefinitely  INR Range:  1.8-2.5 -> lower INR goal dt h/o subcortical hemorrhage (2016)    She may be slower to reach steady state with warfarin dosing so consider checking INR about 10 days after dose adjustment.     INR is 2.2 today   Continue weekly dose of 5 mg on Mon, Wed and Fri and 2.5 mg all other days of the week   Recheck INR in 3 weeks, 12/12    Patient consent signed 1/24/23    Referring cardiologist is Dr. Kay Gasca  INR (no units)   Date Value   11/08/2023 1.89 (H)   11/07/2023 2.34 (H)   11/06/2023 2.21 (H)   11/05/2023 1.97 (H)     For Pharmacy Admin Tracking Only    Total # of Interventions Recommended: 0  Total # of Interventions Accepted: 0  Time Spent (min): 15

## 2023-12-06 ENCOUNTER — OFFICE VISIT (OUTPATIENT)
Dept: PRIMARY CARE CLINIC | Age: 73
End: 2023-12-06
Payer: MEDICARE

## 2023-12-06 VITALS
DIASTOLIC BLOOD PRESSURE: 90 MMHG | OXYGEN SATURATION: 100 % | HEIGHT: 64 IN | SYSTOLIC BLOOD PRESSURE: 155 MMHG | BODY MASS INDEX: 32.06 KG/M2 | TEMPERATURE: 97.3 F | WEIGHT: 187.8 LBS | HEART RATE: 60 BPM

## 2023-12-06 DIAGNOSIS — Z12.11 SCREEN FOR COLON CANCER: ICD-10-CM

## 2023-12-06 DIAGNOSIS — Z23 NEED FOR SHINGLES VACCINE: ICD-10-CM

## 2023-12-06 DIAGNOSIS — I10 PRIMARY HYPERTENSION: ICD-10-CM

## 2023-12-06 DIAGNOSIS — Z23 NEED FOR COVID-19 VACCINE: ICD-10-CM

## 2023-12-06 DIAGNOSIS — Z23 NEED FOR DIPHTHERIA-TETANUS-PERTUSSIS (TDAP) VACCINE: ICD-10-CM

## 2023-12-06 DIAGNOSIS — Z29.11 NEED FOR RSV VACCINATION: ICD-10-CM

## 2023-12-06 DIAGNOSIS — Z09 HOSPITAL DISCHARGE FOLLOW-UP: Primary | ICD-10-CM

## 2023-12-06 DIAGNOSIS — Z23 NEED FOR PNEUMOCOCCAL VACCINATION: ICD-10-CM

## 2023-12-06 DIAGNOSIS — R53.83 OTHER FATIGUE: ICD-10-CM

## 2023-12-06 DIAGNOSIS — Z23 FLU VACCINE NEED: ICD-10-CM

## 2023-12-06 PROCEDURE — 3017F COLORECTAL CA SCREEN DOC REV: CPT | Performed by: INTERNAL MEDICINE

## 2023-12-06 PROCEDURE — 3080F DIAST BP >= 90 MM HG: CPT | Performed by: INTERNAL MEDICINE

## 2023-12-06 PROCEDURE — G8427 DOCREV CUR MEDS BY ELIG CLIN: HCPCS | Performed by: INTERNAL MEDICINE

## 2023-12-06 PROCEDURE — 1123F ACP DISCUSS/DSCN MKR DOCD: CPT | Performed by: INTERNAL MEDICINE

## 2023-12-06 PROCEDURE — 1111F DSCHRG MED/CURRENT MED MERGE: CPT | Performed by: INTERNAL MEDICINE

## 2023-12-06 PROCEDURE — 3077F SYST BP >= 140 MM HG: CPT | Performed by: INTERNAL MEDICINE

## 2023-12-06 PROCEDURE — G8417 CALC BMI ABV UP PARAM F/U: HCPCS | Performed by: INTERNAL MEDICINE

## 2023-12-06 PROCEDURE — 1090F PRES/ABSN URINE INCON ASSESS: CPT | Performed by: INTERNAL MEDICINE

## 2023-12-06 PROCEDURE — 99214 OFFICE O/P EST MOD 30 MIN: CPT | Performed by: INTERNAL MEDICINE

## 2023-12-06 PROCEDURE — G8399 PT W/DXA RESULTS DOCUMENT: HCPCS | Performed by: INTERNAL MEDICINE

## 2023-12-06 PROCEDURE — G8484 FLU IMMUNIZE NO ADMIN: HCPCS | Performed by: INTERNAL MEDICINE

## 2023-12-06 PROCEDURE — 1036F TOBACCO NON-USER: CPT | Performed by: INTERNAL MEDICINE

## 2023-12-06 RX ORDER — VALSARTAN 320 MG/1
320 TABLET ORAL DAILY
Qty: 90 TABLET | Refills: 1 | Status: SHIPPED | OUTPATIENT
Start: 2023-12-06

## 2023-12-06 RX ORDER — CARVEDILOL 12.5 MG/1
TABLET ORAL
Qty: 180 TABLET | OUTPATIENT
Start: 2023-12-06

## 2023-12-06 RX ORDER — CARVEDILOL 12.5 MG/1
12.5 TABLET ORAL 2 TIMES DAILY WITH MEALS
Qty: 60 TABLET | Refills: 5 | Status: SHIPPED | OUTPATIENT
Start: 2023-12-06

## 2023-12-06 RX ORDER — SPIRONOLACTONE 50 MG/1
50 TABLET, FILM COATED ORAL DAILY
Qty: 90 TABLET | Refills: 1 | Status: SHIPPED | OUTPATIENT
Start: 2023-12-06

## 2023-12-06 RX ORDER — FUROSEMIDE 40 MG/1
40 TABLET ORAL DAILY
Qty: 90 TABLET | Refills: 5 | Status: SHIPPED | OUTPATIENT
Start: 2023-12-06

## 2023-12-06 ASSESSMENT — ENCOUNTER SYMPTOMS
COUGH: 0
BLOOD IN STOOL: 0
ABDOMINAL PAIN: 0
WHEEZING: 0
BACK PAIN: 1
CONSTIPATION: 0
SHORTNESS OF BREATH: 0
GASTROINTESTINAL NEGATIVE: 1
DIARRHEA: 0
CHEST TIGHTNESS: 0
ABDOMINAL DISTENTION: 0

## 2023-12-06 NOTE — PROGRESS NOTES
Subjective:      Patient ID: Patrick Kendrick is a 71 y.o. female. 12/6/2023 Patient presents with: Follow-Up from Hospital: [facility]- 11/5/2023 - 11/8/2023 (3 days)- Hypertensive emergency  Fatigue        Carvedilol bumped to 12.5 mg bid           11/1/2023 Patient presents with:  Arthritis: 3 month f/u              8/1/2023 Patient presents with:  Medicare AWV  Arthritis:   Did get \"shots\" in both knees 3 wks ago . No change in level of pain . Will see Ortho again in 3 mths! 7/3/2023  Patient presents with:  Joint Swelling: Right knee swelling/ pain, ongoing for the last month- no fall                 5/30/2023 Patient presents with: Follow-up: 6 weeks   Fall   was cooking when she fell suddenly and hurt her back . Rt side still sore . Was seen in ER 5/21/23 l4/11/2023 Patient presents with:  New Patient: Establish care    Was seeing PCP in [facility] . Too far            Review of Systems   Constitutional:  Positive for fatigue. Negative for activity change, appetite change, fever and unexpected weight change. Covid vac 12/21 #3    Pneum vac 10/07    Tdap 12/07     Flu vac     Shingrex             Had shingles x 2 ! HENT: Negative. Upper dentures    Eyes:         Eye exam 2021     S/P cataract surgery   Respiratory:  Negative for cough, chest tightness, shortness of breath and wheezing. Never smoker     No Etoh    Cardiovascular: Negative. Atrial Fib > 5 yrs on Coumadin     No known CAD    Gastrointestinal: Negative. Negative for abdominal distention, abdominal pain, blood in stool, constipation and diarrhea. Colonoscopy >    No FH of ca colon    Endocrine:        No FH of Diabetes    Genitourinary:  Negative for dysuria, menstrual problem, urgency and vaginal discharge. Mammogram  5/23    Katherine Menopause . 4 lkids   Nl gestation    Musculoskeletal:  Positive for arthralgias (S/P shots both knees .  Still hurting)

## 2023-12-07 ENCOUNTER — HOSPITAL ENCOUNTER (OUTPATIENT)
Dept: NUCLEAR MEDICINE | Age: 73
Discharge: HOME | End: 2023-12-07
Attending: OTOLARYNGOLOGY
Payer: MEDICARE

## 2023-12-07 DIAGNOSIS — R79.89 ELEVATED PARATHYROID HORMONE: ICD-10-CM

## 2023-12-07 PROCEDURE — 78072 PARATHYRD PLANAR W/SPECT&CT: CPT

## 2023-12-07 PROCEDURE — A9500 TC99M SESTAMIBI: HCPCS | Performed by: OTOLARYNGOLOGY

## 2023-12-07 PROCEDURE — 3430000000 HC RX DIAGNOSTIC RADIOPHARMACEUTICAL: Performed by: OTOLARYNGOLOGY

## 2023-12-07 RX ORDER — TETRAKIS(2-METHOXYISOBUTYLISOCYANIDE)COPPER(I) TETRAFLUOROBORATE 1 MG/ML
22.75 INJECTION, POWDER, LYOPHILIZED, FOR SOLUTION INTRAVENOUS
Status: COMPLETED | OUTPATIENT
Start: 2023-12-07 | End: 2023-12-07

## 2023-12-07 RX ADMIN — Medication 22.75 MILLICURIE: at 08:15

## 2023-12-11 ENCOUNTER — CLINICAL DOCUMENTATION (OUTPATIENT)
Dept: CARDIOLOGY CLINIC | Age: 73
End: 2023-12-11

## 2023-12-12 ENCOUNTER — TELEPHONE (OUTPATIENT)
Dept: PHARMACY | Age: 73
End: 2023-12-12

## 2023-12-14 ENCOUNTER — OFFICE VISIT (OUTPATIENT)
Dept: ENT CLINIC | Age: 73
End: 2023-12-14
Payer: MEDICARE

## 2023-12-14 ENCOUNTER — ANTI-COAG VISIT (OUTPATIENT)
Dept: PHARMACY | Age: 73
End: 2023-12-14
Payer: MEDICARE

## 2023-12-14 VITALS
WEIGHT: 186 LBS | HEIGHT: 64 IN | OXYGEN SATURATION: 98 % | BODY MASS INDEX: 31.76 KG/M2 | TEMPERATURE: 97.6 F | DIASTOLIC BLOOD PRESSURE: 108 MMHG | HEART RATE: 81 BPM | SYSTOLIC BLOOD PRESSURE: 154 MMHG

## 2023-12-14 DIAGNOSIS — I48.21 PERMANENT ATRIAL FIBRILLATION (HCC): Primary | ICD-10-CM

## 2023-12-14 DIAGNOSIS — K21.9 LPRD (LARYNGOPHARYNGEAL REFLUX DISEASE): Chronic | ICD-10-CM

## 2023-12-14 DIAGNOSIS — R79.89 ELEVATED PARATHYROID HORMONE: ICD-10-CM

## 2023-12-14 DIAGNOSIS — E04.1 THYROID NODULE: Primary | Chronic | ICD-10-CM

## 2023-12-14 LAB — INTERNATIONAL NORMALIZATION RATIO, POC: 2.5

## 2023-12-14 PROCEDURE — 1090F PRES/ABSN URINE INCON ASSESS: CPT | Performed by: OTOLARYNGOLOGY

## 2023-12-14 PROCEDURE — 99214 OFFICE O/P EST MOD 30 MIN: CPT | Performed by: OTOLARYNGOLOGY

## 2023-12-14 PROCEDURE — G8417 CALC BMI ABV UP PARAM F/U: HCPCS | Performed by: OTOLARYNGOLOGY

## 2023-12-14 PROCEDURE — G8428 CUR MEDS NOT DOCUMENT: HCPCS | Performed by: OTOLARYNGOLOGY

## 2023-12-14 PROCEDURE — G8484 FLU IMMUNIZE NO ADMIN: HCPCS | Performed by: OTOLARYNGOLOGY

## 2023-12-14 PROCEDURE — 3017F COLORECTAL CA SCREEN DOC REV: CPT | Performed by: OTOLARYNGOLOGY

## 2023-12-14 PROCEDURE — 3080F DIAST BP >= 90 MM HG: CPT | Performed by: OTOLARYNGOLOGY

## 2023-12-14 PROCEDURE — 99211 OFF/OP EST MAY X REQ PHY/QHP: CPT

## 2023-12-14 PROCEDURE — 85610 PROTHROMBIN TIME: CPT

## 2023-12-14 PROCEDURE — G8399 PT W/DXA RESULTS DOCUMENT: HCPCS | Performed by: OTOLARYNGOLOGY

## 2023-12-14 PROCEDURE — 1123F ACP DISCUSS/DSCN MKR DOCD: CPT | Performed by: OTOLARYNGOLOGY

## 2023-12-14 PROCEDURE — 1036F TOBACCO NON-USER: CPT | Performed by: OTOLARYNGOLOGY

## 2023-12-14 PROCEDURE — 3077F SYST BP >= 140 MM HG: CPT | Performed by: OTOLARYNGOLOGY

## 2023-12-14 NOTE — PROGRESS NOTES
CHIEF COMPLAINT  Chief Complaint   Patient presents with    Thyroid Problem     Nodule.    Other     Elevated parathyroid hormone.    Laryngitis     LPRD        HISTORY OF PRESENT ILLNESS    Emilee Paulson is a 73 y.o. female here for recheck and follow up of Thyroid nodule, Elevated parathyroid hormone, and LPRD.  She did not get the thyroid ultrasound done, and did not get free T4, TSH or intact PTH done.        REVIEW OF SYSTEMS  Constitutional:  Denied fever and chills.  ENT/sinus:  Denied otalgia, otorrhea, nasal pain, rhinorrhea, sore throat, and sinus/facial pain.          EXAMINATION    WDWN, NAD  Voice:  Normal.  Neck:  NT, No masses.  Trachea midline.  Nodes:  No lymphadenopathy.   Thyroid:  Normal with no goiter, nodules or tenderness.         REVIEW OF IMAGES:         I independently interpreted the images of the Parathyroid scan, showing no evidence of parathyroid adenoma.      NUCLEAR MEDICINE PARATHYROID SCINTIGRAPHY WITH SPECT/CT. 12/7/2023     FINDINGS:  Immediate images:     There is asymmetric increased activity in the left thyroid lobe compared to  the right.     Delayed images:     Fairly symmetric washout of radiotracer bilaterally.  No focally increased  activity.     Physiologic activity is present in the salivary glands and heart.     SPECT/CT:     SPECT CT images show of the radiotracer corresponds to an asymmetrically  enlarged left thyroid lobe.  No abnormal uptake to localize a parathyroid  adenoma.     IMPRESSION:  No abnormal uptake to localize a parathyroid adenoma.        IMPRESSION / DIAGNOSES / ORDERS:   Emilee was seen today for thyroid problem, other and laryngitis.    Diagnoses and all orders for this visit:    LPRD (laryngopharyngeal reflux disease)    Thyroid nodule    Elevated parathyroid hormone           RECOMMENDATIONS / PLAN:   Patient was encouraged to proceed with the blood tests and thyroid ultrasound previously ordered.  Return in about 6 weeks (around

## 2023-12-14 NOTE — PATIENT INSTRUCTIONS
Proceed with your blood tests T4, TSH and PTH levels).  Proceed with the thyroid ultrasound ordered by Dr. Suarez.

## 2023-12-30 ENCOUNTER — APPOINTMENT (OUTPATIENT)
Dept: GENERAL RADIOLOGY | Age: 73
DRG: 813 | End: 2023-12-30
Payer: MEDICARE

## 2023-12-30 ENCOUNTER — HOSPITAL ENCOUNTER (INPATIENT)
Age: 73
LOS: 6 days | Discharge: HOME | DRG: 813 | End: 2024-01-06
Attending: INTERNAL MEDICINE | Admitting: INTERNAL MEDICINE
Payer: MEDICARE

## 2023-12-30 DIAGNOSIS — M25.462 EFFUSION OF LEFT KNEE: Primary | ICD-10-CM

## 2023-12-30 DIAGNOSIS — Z79.01 ANTICOAGULATED ON COUMADIN: ICD-10-CM

## 2023-12-30 DIAGNOSIS — I48.21 PERMANENT ATRIAL FIBRILLATION (HCC): ICD-10-CM

## 2023-12-30 DIAGNOSIS — R26.2 UNABLE TO AMBULATE: ICD-10-CM

## 2023-12-30 DIAGNOSIS — I10 PRIMARY HYPERTENSION: ICD-10-CM

## 2023-12-30 LAB
INR PPP: 2.51 (ref 0.84–1.16)
PROTHROMBIN TIME: 27 SEC (ref 11.5–14.8)

## 2023-12-30 PROCEDURE — 99285 EMERGENCY DEPT VISIT HI MDM: CPT

## 2023-12-30 PROCEDURE — 85610 PROTHROMBIN TIME: CPT

## 2023-12-30 PROCEDURE — 6370000000 HC RX 637 (ALT 250 FOR IP): Performed by: PHYSICIAN ASSISTANT

## 2023-12-30 PROCEDURE — 73562 X-RAY EXAM OF KNEE 3: CPT

## 2023-12-30 RX ORDER — ONDANSETRON 4 MG/1
4 TABLET, ORALLY DISINTEGRATING ORAL ONCE
Status: COMPLETED | OUTPATIENT
Start: 2023-12-30 | End: 2023-12-30

## 2023-12-30 RX ORDER — CYCLOBENZAPRINE HCL 10 MG
10 TABLET ORAL ONCE
Status: COMPLETED | OUTPATIENT
Start: 2023-12-30 | End: 2023-12-30

## 2023-12-30 RX ORDER — LIDOCAINE 4 G/G
1 PATCH TOPICAL ONCE
Status: COMPLETED | OUTPATIENT
Start: 2023-12-30 | End: 2023-12-31

## 2023-12-30 RX ORDER — MORPHINE SULFATE 4 MG/ML
4 INJECTION, SOLUTION INTRAMUSCULAR; INTRAVENOUS EVERY 30 MIN PRN
Status: DISCONTINUED | OUTPATIENT
Start: 2023-12-30 | End: 2023-12-31

## 2023-12-30 RX ORDER — FUROSEMIDE 20 MG/1
20 TABLET ORAL DAILY
Status: ON HOLD | COMMUNITY
End: 2024-01-06 | Stop reason: HOSPADM

## 2023-12-30 RX ORDER — OXYCODONE HYDROCHLORIDE AND ACETAMINOPHEN 5; 325 MG/1; MG/1
2 TABLET ORAL ONCE
Status: COMPLETED | OUTPATIENT
Start: 2023-12-30 | End: 2023-12-30

## 2023-12-30 RX ADMIN — OXYCODONE HYDROCHLORIDE AND ACETAMINOPHEN 2 TABLET: 5; 325 TABLET ORAL at 22:35

## 2023-12-30 RX ADMIN — ONDANSETRON 4 MG: 4 TABLET, ORALLY DISINTEGRATING ORAL at 22:35

## 2023-12-30 RX ADMIN — CYCLOBENZAPRINE 10 MG: 10 TABLET, FILM COATED ORAL at 22:36

## 2023-12-30 ASSESSMENT — LIFESTYLE VARIABLES
HOW OFTEN DO YOU HAVE A DRINK CONTAINING ALCOHOL: MONTHLY OR LESS
HOW OFTEN DO YOU HAVE A DRINK CONTAINING ALCOHOL: NEVER

## 2023-12-30 ASSESSMENT — PAIN DESCRIPTION - PAIN TYPE: TYPE: ACUTE PAIN

## 2023-12-30 ASSESSMENT — PAIN DESCRIPTION - FREQUENCY: FREQUENCY: CONTINUOUS

## 2023-12-30 ASSESSMENT — PAIN DESCRIPTION - LOCATION
LOCATION: LEG
LOCATION: KNEE

## 2023-12-30 ASSESSMENT — PAIN DESCRIPTION - ONSET: ONSET: SUDDEN

## 2023-12-30 ASSESSMENT — PAIN SCALES - GENERAL
PAINLEVEL_OUTOF10: 10
PAINLEVEL_OUTOF10: 10

## 2023-12-30 ASSESSMENT — PAIN DESCRIPTION - DESCRIPTORS: DESCRIPTORS: STABBING

## 2023-12-30 ASSESSMENT — PAIN - FUNCTIONAL ASSESSMENT
PAIN_FUNCTIONAL_ASSESSMENT: INTOLERABLE, UNABLE TO DO ANY ACTIVE OR PASSIVE ACTIVITIES
PAIN_FUNCTIONAL_ASSESSMENT: 0-10

## 2023-12-30 ASSESSMENT — PAIN DESCRIPTION - ORIENTATION: ORIENTATION: LEFT

## 2023-12-31 PROBLEM — M25.462 EFFUSION OF LEFT KNEE: Status: ACTIVE | Noted: 2023-12-31

## 2023-12-31 PROBLEM — M25.569 KNEE PAIN: Status: ACTIVE | Noted: 2023-12-31

## 2023-12-31 LAB
ANION GAP SERPL CALCULATED.3IONS-SCNC: 10 MMOL/L (ref 3–16)
BASOPHILS # BLD: 0 K/UL (ref 0–0.2)
BASOPHILS # BLD: 0 K/UL (ref 0–0.2)
BASOPHILS NFR BLD: 0.2 %
BASOPHILS NFR BLD: 0.6 %
BUN SERPL-MCNC: 9 MG/DL (ref 7–20)
CALCIUM SERPL-MCNC: 9.9 MG/DL (ref 8.3–10.6)
CHLORIDE SERPL-SCNC: 102 MMOL/L (ref 99–110)
CO2 SERPL-SCNC: 28 MMOL/L (ref 21–32)
CREAT SERPL-MCNC: <0.5 MG/DL (ref 0.6–1.2)
DEPRECATED RDW RBC AUTO: 13.2 % (ref 12.4–15.4)
DEPRECATED RDW RBC AUTO: 13.5 % (ref 12.4–15.4)
EOSINOPHIL # BLD: 0 K/UL (ref 0–0.6)
EOSINOPHIL # BLD: 0.1 K/UL (ref 0–0.6)
EOSINOPHIL NFR BLD: 0.4 %
EOSINOPHIL NFR BLD: 1 %
GFR SERPLBLD CREATININE-BSD FMLA CKD-EPI: >60 ML/MIN/{1.73_M2}
GLUCOSE SERPL-MCNC: 135 MG/DL (ref 70–99)
HCT VFR BLD AUTO: 33.8 % (ref 36–48)
HCT VFR BLD AUTO: 34.3 % (ref 36–48)
HGB BLD-MCNC: 11.2 G/DL (ref 12–16)
HGB BLD-MCNC: 11.6 G/DL (ref 12–16)
INR PPP: 2.66 (ref 0.84–1.16)
LYMPHOCYTES # BLD: 1 K/UL (ref 1–5.1)
LYMPHOCYTES # BLD: 1.1 K/UL (ref 1–5.1)
LYMPHOCYTES NFR BLD: 15.2 %
LYMPHOCYTES NFR BLD: 17.3 %
MCH RBC QN AUTO: 30.2 PG (ref 26–34)
MCH RBC QN AUTO: 30.3 PG (ref 26–34)
MCHC RBC AUTO-ENTMCNC: 33.3 G/DL (ref 31–36)
MCHC RBC AUTO-ENTMCNC: 33.7 G/DL (ref 31–36)
MCV RBC AUTO: 89.6 FL (ref 80–100)
MCV RBC AUTO: 90.9 FL (ref 80–100)
MONOCYTES # BLD: 0.3 K/UL (ref 0–1.3)
MONOCYTES # BLD: 0.4 K/UL (ref 0–1.3)
MONOCYTES NFR BLD: 5 %
MONOCYTES NFR BLD: 6 %
NEUTROPHILS # BLD: 4.7 K/UL (ref 1.7–7.7)
NEUTROPHILS # BLD: 5.5 K/UL (ref 1.7–7.7)
NEUTROPHILS NFR BLD: 77.1 %
NEUTROPHILS NFR BLD: 77.2 %
PLATELET # BLD AUTO: 112 K/UL (ref 135–450)
PLATELET # BLD AUTO: 133 K/UL (ref 135–450)
PMV BLD AUTO: 10.4 FL (ref 5–10.5)
PMV BLD AUTO: 9.8 FL (ref 5–10.5)
POTASSIUM SERPL-SCNC: 3.5 MMOL/L (ref 3.5–5.1)
PROTHROMBIN TIME: 28.1 SEC (ref 11.5–14.8)
RBC # BLD AUTO: 3.71 M/UL (ref 4–5.2)
RBC # BLD AUTO: 3.83 M/UL (ref 4–5.2)
SODIUM SERPL-SCNC: 140 MMOL/L (ref 136–145)
WBC # BLD AUTO: 6.1 K/UL (ref 4–11)
WBC # BLD AUTO: 7.1 K/UL (ref 4–11)

## 2023-12-31 PROCEDURE — 6370000000 HC RX 637 (ALT 250 FOR IP): Performed by: INTERNAL MEDICINE

## 2023-12-31 PROCEDURE — 2580000003 HC RX 258: Performed by: PHYSICIAN ASSISTANT

## 2023-12-31 PROCEDURE — 80048 BASIC METABOLIC PNL TOTAL CA: CPT

## 2023-12-31 PROCEDURE — 85025 COMPLETE CBC W/AUTO DIFF WBC: CPT

## 2023-12-31 PROCEDURE — 96374 THER/PROPH/DIAG INJ IV PUSH: CPT

## 2023-12-31 PROCEDURE — 6370000000 HC RX 637 (ALT 250 FOR IP): Performed by: PHYSICIAN ASSISTANT

## 2023-12-31 PROCEDURE — 36415 COLL VENOUS BLD VENIPUNCTURE: CPT

## 2023-12-31 PROCEDURE — G0378 HOSPITAL OBSERVATION PER HR: HCPCS

## 2023-12-31 PROCEDURE — 6360000002 HC RX W HCPCS: Performed by: PHYSICIAN ASSISTANT

## 2023-12-31 PROCEDURE — 99221 1ST HOSP IP/OBS SF/LOW 40: CPT | Performed by: PHYSICIAN ASSISTANT

## 2023-12-31 PROCEDURE — 6360000002 HC RX W HCPCS: Performed by: INTERNAL MEDICINE

## 2023-12-31 PROCEDURE — 85610 PROTHROMBIN TIME: CPT

## 2023-12-31 PROCEDURE — 1200000000 HC SEMI PRIVATE

## 2023-12-31 RX ORDER — MORPHINE SULFATE 4 MG/ML
4 INJECTION, SOLUTION INTRAMUSCULAR; INTRAVENOUS
Status: DISCONTINUED | OUTPATIENT
Start: 2023-12-31 | End: 2024-01-02

## 2023-12-31 RX ORDER — KETOROLAC TROMETHAMINE 30 MG/ML
30 INJECTION, SOLUTION INTRAMUSCULAR; INTRAVENOUS ONCE
Status: COMPLETED | OUTPATIENT
Start: 2023-12-31 | End: 2023-12-31

## 2023-12-31 RX ORDER — MORPHINE SULFATE 2 MG/ML
2 INJECTION, SOLUTION INTRAMUSCULAR; INTRAVENOUS
Status: DISCONTINUED | OUTPATIENT
Start: 2023-12-31 | End: 2024-01-02

## 2023-12-31 RX ORDER — ATORVASTATIN CALCIUM 40 MG/1
80 TABLET, FILM COATED ORAL DAILY
Status: DISCONTINUED | OUTPATIENT
Start: 2023-12-31 | End: 2024-01-06 | Stop reason: HOSPADM

## 2023-12-31 RX ORDER — SODIUM CHLORIDE 0.9 % (FLUSH) 0.9 %
5-40 SYRINGE (ML) INJECTION EVERY 12 HOURS SCHEDULED
Status: DISCONTINUED | OUTPATIENT
Start: 2023-12-31 | End: 2024-01-06 | Stop reason: HOSPADM

## 2023-12-31 RX ORDER — VALSARTAN 160 MG/1
320 TABLET ORAL DAILY
Status: DISCONTINUED | OUTPATIENT
Start: 2023-12-31 | End: 2024-01-04

## 2023-12-31 RX ORDER — ASPIRIN 81 MG/1
81 TABLET, CHEWABLE ORAL DAILY
Status: DISCONTINUED | OUTPATIENT
Start: 2023-12-31 | End: 2024-01-06 | Stop reason: HOSPADM

## 2023-12-31 RX ORDER — SENNOSIDES A AND B 8.6 MG/1
1 TABLET, FILM COATED ORAL DAILY PRN
Status: DISCONTINUED | OUTPATIENT
Start: 2023-12-31 | End: 2024-01-06 | Stop reason: HOSPADM

## 2023-12-31 RX ORDER — SPIRONOLACTONE 25 MG/1
50 TABLET ORAL DAILY
Status: DISCONTINUED | OUTPATIENT
Start: 2023-12-31 | End: 2024-01-04

## 2023-12-31 RX ORDER — PHYTONADIONE 5 MG/1
5 TABLET ORAL ONCE
Status: COMPLETED | OUTPATIENT
Start: 2023-12-31 | End: 2023-12-31

## 2023-12-31 RX ORDER — PANTOPRAZOLE SODIUM 40 MG/1
40 TABLET, DELAYED RELEASE ORAL
Status: DISCONTINUED | OUTPATIENT
Start: 2023-12-31 | End: 2024-01-06 | Stop reason: HOSPADM

## 2023-12-31 RX ORDER — SODIUM CHLORIDE 9 MG/ML
INJECTION, SOLUTION INTRAVENOUS PRN
Status: DISCONTINUED | OUTPATIENT
Start: 2023-12-31 | End: 2024-01-06 | Stop reason: HOSPADM

## 2023-12-31 RX ORDER — LIDOCAINE 4 G/G
1 PATCH TOPICAL NIGHTLY
Status: DISCONTINUED | OUTPATIENT
Start: 2023-12-31 | End: 2024-01-06 | Stop reason: HOSPADM

## 2023-12-31 RX ORDER — ACETAMINOPHEN 500 MG
500 TABLET ORAL EVERY 6 HOURS PRN
Status: DISCONTINUED | OUTPATIENT
Start: 2023-12-31 | End: 2024-01-06 | Stop reason: HOSPADM

## 2023-12-31 RX ORDER — CARVEDILOL 6.25 MG/1
12.5 TABLET ORAL 2 TIMES DAILY WITH MEALS
Status: DISCONTINUED | OUTPATIENT
Start: 2023-12-31 | End: 2024-01-05

## 2023-12-31 RX ORDER — SODIUM CHLORIDE 0.9 % (FLUSH) 0.9 %
5-40 SYRINGE (ML) INJECTION PRN
Status: DISCONTINUED | OUTPATIENT
Start: 2023-12-31 | End: 2024-01-06 | Stop reason: HOSPADM

## 2023-12-31 RX ORDER — FUROSEMIDE 20 MG/1
20 TABLET ORAL DAILY
Status: DISCONTINUED | OUTPATIENT
Start: 2023-12-31 | End: 2024-01-04

## 2023-12-31 RX ORDER — ONDANSETRON 2 MG/ML
4 INJECTION INTRAMUSCULAR; INTRAVENOUS EVERY 6 HOURS PRN
Status: DISCONTINUED | OUTPATIENT
Start: 2023-12-31 | End: 2024-01-06 | Stop reason: HOSPADM

## 2023-12-31 RX ADMIN — CARVEDILOL 12.5 MG: 6.25 TABLET, FILM COATED ORAL at 17:50

## 2023-12-31 RX ADMIN — VALSARTAN 320 MG: 160 TABLET, FILM COATED ORAL at 09:12

## 2023-12-31 RX ADMIN — PANTOPRAZOLE SODIUM 40 MG: 40 TABLET, DELAYED RELEASE ORAL at 15:43

## 2023-12-31 RX ADMIN — ATORVASTATIN CALCIUM 80 MG: 40 TABLET, FILM COATED ORAL at 09:12

## 2023-12-31 RX ADMIN — MORPHINE SULFATE 4 MG: 4 INJECTION, SOLUTION INTRAMUSCULAR; INTRAVENOUS at 15:44

## 2023-12-31 RX ADMIN — MORPHINE SULFATE 4 MG: 4 INJECTION, SOLUTION INTRAMUSCULAR; INTRAVENOUS at 00:07

## 2023-12-31 RX ADMIN — MORPHINE SULFATE 4 MG: 4 INJECTION, SOLUTION INTRAMUSCULAR; INTRAVENOUS at 20:34

## 2023-12-31 RX ADMIN — MORPHINE SULFATE 4 MG: 4 INJECTION, SOLUTION INTRAMUSCULAR; INTRAVENOUS at 02:50

## 2023-12-31 RX ADMIN — MORPHINE SULFATE 4 MG: 4 INJECTION, SOLUTION INTRAMUSCULAR; INTRAVENOUS at 09:24

## 2023-12-31 RX ADMIN — ASPIRIN 81 MG: 81 TABLET, CHEWABLE ORAL at 09:12

## 2023-12-31 RX ADMIN — FUROSEMIDE 20 MG: 20 TABLET ORAL at 09:12

## 2023-12-31 RX ADMIN — SODIUM CHLORIDE, PRESERVATIVE FREE 10 ML: 5 INJECTION INTRAVENOUS at 05:06

## 2023-12-31 RX ADMIN — KETOROLAC TROMETHAMINE 30 MG: 30 INJECTION, SOLUTION INTRAMUSCULAR; INTRAVENOUS at 12:18

## 2023-12-31 RX ADMIN — SODIUM CHLORIDE, PRESERVATIVE FREE 10 ML: 5 INJECTION INTRAVENOUS at 20:40

## 2023-12-31 RX ADMIN — CARVEDILOL 12.5 MG: 6.25 TABLET, FILM COATED ORAL at 09:12

## 2023-12-31 RX ADMIN — PANTOPRAZOLE SODIUM 40 MG: 40 TABLET, DELAYED RELEASE ORAL at 05:06

## 2023-12-31 RX ADMIN — PHYTONADIONE 5 MG: 5 TABLET ORAL at 15:43

## 2023-12-31 RX ADMIN — PHYTONADIONE 2 MG: 10 INJECTION, EMULSION INTRAMUSCULAR; INTRAVENOUS; SUBCUTANEOUS at 05:41

## 2023-12-31 RX ADMIN — MORPHINE SULFATE 4 MG: 4 INJECTION, SOLUTION INTRAMUSCULAR; INTRAVENOUS at 05:06

## 2023-12-31 RX ADMIN — SODIUM CHLORIDE, PRESERVATIVE FREE 10 ML: 5 INJECTION INTRAVENOUS at 09:00

## 2023-12-31 RX ADMIN — SPIRONOLACTONE 50 MG: 25 TABLET ORAL at 09:12

## 2023-12-31 ASSESSMENT — PAIN DESCRIPTION - ORIENTATION
ORIENTATION: LEFT

## 2023-12-31 ASSESSMENT — PAIN SCALES - GENERAL
PAINLEVEL_OUTOF10: 10
PAINLEVEL_OUTOF10: 8
PAINLEVEL_OUTOF10: 10
PAINLEVEL_OUTOF10: 8
PAINLEVEL_OUTOF10: 8
PAINLEVEL_OUTOF10: 9
PAINLEVEL_OUTOF10: 8

## 2023-12-31 ASSESSMENT — PAIN DESCRIPTION - ONSET: ONSET: ON-GOING

## 2023-12-31 ASSESSMENT — PAIN DESCRIPTION - LOCATION
LOCATION: KNEE
LOCATION: LEG
LOCATION: LEG
LOCATION: KNEE

## 2023-12-31 ASSESSMENT — PAIN DESCRIPTION - DESCRIPTORS
DESCRIPTORS: ACHING;SORE;THROBBING
DESCRIPTORS: SORE;THROBBING
DESCRIPTORS: ACHING
DESCRIPTORS: THROBBING;ACHING

## 2023-12-31 ASSESSMENT — PAIN DESCRIPTION - FREQUENCY: FREQUENCY: INTERMITTENT

## 2023-12-31 ASSESSMENT — PAIN DESCRIPTION - PAIN TYPE: TYPE: ACUTE PAIN

## 2023-12-31 NOTE — H&P
[facility] Medicine History & Physical      Patient Name: Emilee Paulson    : 1950    PCP: Stefano Suarez MD    Date of Service:  Patient seen and examined on 2023     Chief Complaint:  Left knee pain    History Of Present Illness:    Emilee Paulson is a 73 y.o. female who presented to ED for evaluation of left knee pain.  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  She describes pain as sharp and constant.  She denies any known aggravating or alleviating factors.  She denies falling or injuring herself in any way.  She does have a history of arthritis in her knee.  She denies fever, chills, numbness, tingling, or weakness.  She denies back pain.  She is anticoagulated on Coumadin for A-fib.  She denies any other complaints or concerns at this time.      Past Medical History:    Patient has a past medical history of Acute cerebrovascular accident (CVA) ([facility]), LAST (acute kidney injury) ([facility]), Atrial fibrillation ([facility]), Bell palsy, CAD (coronary artery disease), Cerebral artery occlusion with cerebral infarction ([facility]), Chronic diastolic CHF (congestive heart failure) ([facility]), CVA (cerebral vascular accident) ([facility]), Hypertension, Intracranial hemorrhage (HCC), Mixed hyperlipidemia, Nonintractable headache, Nontraumatic subcortical hemorrhage of cerebral hemisphere ([facility]), Poor vision, Primary osteoarthritis of right knee, Sudden visual loss of left eye, Thyroid nodule, and TIA involving right internal carotid artery.    Past Surgical History:    Patient has a past surgical history that includes Cardiac surgery; Tubal ligation; Coronary angioplasty with stent; Colonoscopy; and Rotator cuff repair (Right).    Medications Prior to Admission:      Prior to Admission medications    Medication Sig Start Date End Date Taking? Authorizing Provider   furosemide (LASIX) 20 MG tablet Take 1  tobacco.  ETOH:   reports no history of alcohol use.  DRUGS:  reports no history of drug use.    Family History:      Reviewed in detail positive as follows:    History reviewed. No pertinent family history.    REVIEW OF SYSTEMS:   Pertinent positives as noted in the HPI. All other systems reviewed and negative.    PHYSICAL EXAM PERFORMED:    BP (!) 199/123   Pulse 71   Temp 97.7 °F (36.5 °C) (Oral)   Resp 24   Ht 1.626 m (5' 4\")   Wt 84.8 kg (187 lb)   SpO2 100%   BMI 32.10 kg/m²     General appearance:  Awake, alert, no apparent distress  HEENT:  Normocephalic, atraumatic without obvious deformity. PERRL. EOM intact. Conjunctivae/corneas clear.  Neck: Supple, with full range of motion. No JVD. Trachea midline.  Respiratory:  Clear to auscultation bilaterally without rales, wheezes, or rhonchi. Normal respiratory effort.   Cardiovascular:  Regular rate and rhythm without murmurs, rubs or gallops.   Abdomen: Soft, NT, ND, without rebound or guarding. Normal bowel sounds.  Extremities:  Tenderness and edema noted to left knee.  No erythema or warmth.  Full passive range of motion of all joints.  +2 palpable pulses, equal bilaterally.  Normal sensation to light touch.  Neurovascularly intact.  No tenderness over the hip or back. Capillary refill brisk,< 3 seconds   Skin: No rashes or lesions. Warm/dry.  Neurologic:  Neurovascularly intact without any focal sensory/motor deficits. Cranial nerves: II-XII intact, grossly non-focal. Alert and oriented x 3. Normal speech.  Psychiatric:  Thought content appropriate, normal insight.    Labs:   CBC   Recent Labs     12/31/23  0011   WBC 7.1   HGB 11.6*   HCT 34.3*   *        RENAL  Recent Labs     12/31/23  0011      K 3.5      CO2 28   BUN 9   CREATININE <0.5*       LFTS  No results for input(s): \"AST\", \"ALT\", \"ALB\", \"BILIDIR\", \"BILITOT\", \"ALKPHOS\" in the last 72 hours.    COAG  Recent Labs     12/30/23  2225   INR 2.51*       CARDIAC ENZYMES  No

## 2023-12-31 NOTE — PLAN OF CARE
[id number]  Problem: Discharge Planning  Goal: Discharge to home or other facility with appropriate resources  Outcome: Progressing     Problem: Pain  Goal: Verbalizes/displays adequate comfort level or baseline comfort level  12/31/2023 1212 by Columba Feliciano, RN  Outcome: Progressing     Problem: Safety - Adult  Goal: Free from fall injury  12/31/2023 1212 by Columba Feliciano, RN  Outcome: Progressing     Problem: ABCDS Injury Assessment  Goal: Absence of physical injury  Outcome: Progressing     Problem: Chronic Conditions and Co-morbidities  Goal: Patient's chronic conditions and co-morbidity symptoms are monitored and maintained or improved  Outcome: Progressing

## 2023-12-31 NOTE — ED NOTES
ED TO INPATIENT SBAR HANDOFF    Patient Name: Emilee Paulson   :  1950  73 y.o.   MRN:  [id number]  Preferred Name  Emilee  ED Room #:  ED-0021/21  Family/Caregiver Present yes  Restraints no   Sitter no   Sepsis Risk Score Sepsis Risk Score: 0.85    Situation  Code Status: Prior No additional code details.    Allergies: Clonidine, Codeine, Hydrocodone-acetaminophen, Lisinopril, Tramadol, and Percocet [oxycodone-acetaminophen]  Weight: Patient Vitals for the past 96 hrs (Last 3 readings):   Weight   23 2125 84.8 kg (187 lb)     Arrived from: home  Chief Complaint:   Chief Complaint   Patient presents with    Leg Pain     Pt arrived to ED via [facility] ems from home with c/o left sided leg pain. Per pt, she was cutting up greens for her [place] celebration when suddenly she began having sharp pain that radiates from her thigh to her shin. Per pt she has never experienced pain like this before. Pt takes warfarin for afib. Hx of afib, htn, arthritis.      Hospital Problem/Diagnosis:  Active Problems:    * No active hospital problems. *  Resolved Problems:    * No resolved hospital problems. *    Imaging:   XR KNEE LEFT (3 VIEWS)   Final Result   No acute osseous abnormality.  Moderate osteoarthrosis with new large   suprapatellar effusion.           Abnormal labs:   Abnormal Labs Reviewed   PROTIME-INR - Abnormal; Notable for the following components:       Result Value    Protime 27.0 (*)     INR 2.51 (*)     All other components within normal limits   CBC WITH AUTO DIFFERENTIAL - Abnormal; Notable for the following components:    RBC 3.83 (*)     Hemoglobin 11.6 (*)     Hematocrit 34.3 (*)     Platelets 133 (*)     All other components within normal limits   BASIC METABOLIC PANEL - Abnormal; Notable for the following components:    Glucose 135 (*)     Creatinine <0.5 (*)     All other components within normal limits     Critical values: no     Abnormal Assessment Findings:

## 2023-12-31 NOTE — PROGRESS NOTES
Hospitalist Progress Note      PCP: Stefano Suarez MD    Date of Admission: 12/30/2023    Chief Complaint: Left knee    Hospital Course:   Pain patient complaining of severe left knee pain rating it 10/10  Spontaneous onset  No recent trauma        Medications:  Reviewed      Exam:    BP (!) 149/91   Pulse 74   Temp 97.2 °F (36.2 °C) (Oral)   Resp 18   Ht 1.626 m (5' 4\")   Wt 84.8 kg (187 lb)   SpO2 100%   BMI 32.10 kg/m²     General appearance: No apparent distress, appears stated age and cooperative.  HEENT: Pupils equal, round, and reactive to light. Conjunctivae/corneas clear.  Neck: Supple, with full range of motion. No jugular venous distention. Trachea midline.  Respiratory:  Normal respiratory effort. Clear to auscultation, bilaterally without RALES/WHEEZES/Rhonchi.  Cardiovascular: Regular rate and rhythm with normal S1/S2 without MURMURS, rubs or gallops.  Abdomen: Soft, non-tender, non-distended with normal bowel sounds.  Musculoskeletal: No clubbing, cyanosis or EDEMA bilaterally.  Full range of motion without deformity.  Skin: Skin color, texture, turgor normal.  No rashes or lesions.  Neurologic:  Neurovascularly intact without any focal sensory/motor deficits. Cranial nerves: II-XII intact, grossly non-focal.  Left knee swollen decreased range of motion no erythema    Labs:   Recent Labs     12/31/23  0011 12/31/23  0605   WBC 7.1 6.1   HGB 11.6* 11.2*   HCT 34.3* 33.8*   * 112*     Recent Labs     12/31/23  0011      K 3.5      CO2 28   BUN 9   CREATININE <0.5*   CALCIUM 9.9     No results for input(s): \"AST\", \"ALT\", \"BILIDIR\", \"BILITOT\", \"ALKPHOS\" in the last 72 hours.  Recent Labs     12/30/23  2225 12/31/23  0605   INR 2.51* 2.66*     No results for input(s): \"CKTOTAL\", \"TROPONINI\" in the last 72 hours.    Urinalysis:      Lab Results   Component Value Date/Time    NITRU Negative 10/27/2023 10:20 AM    WBCUA 3 10/19/2022 01:40 PM    BACTERIA None Seen

## 2023-12-31 NOTE — CONSULTS
[facility] ORTHOPEDIC SURGERY CONSULT    CHIEF COMPLAINT:   Chief Complaint   Patient presents with    Leg Pain     Pt arrived to ED via [facility] ems from home with c/o left sided leg pain. Per pt, she was cutting up greens for her [place]RIANNE celebration when suddenly she began having sharp pain that radiates from her thigh to her shin. Per pt she has never experienced pain like this before. Pt takes warfarin for afib. Hx of afib, htn, arthritis.        HISTORY OF PRESENT ILLNESS:  Emilee Paluson is a 73 y.o. female who is consulted for left knee pain and swelling x 1 day. States she was standing in her kitchen, cooking for a [employer] party when she had a gradual onset of knee pain and swelling. No injury. Pain located mostly over lateral aspect, described as aching, but severe. Worse with weightbearing and flexion. Denies fever or chills. Patient is on chronic Coumadin due to h/o A fib. Known history of bilateral knee arthritis, last steroid injection by Dr. Littlejohn in 2022. No prior surgeries to left knee. Does not use ambulatory device at baseline.  Coumadin on hold since yesterday.      PAST MEDICAL HISTORY:    Past Medical History:   Diagnosis Date    Acute cerebrovascular accident (CVA) ([facility]) 01/28/2020    brain bleed    LAST (acute kidney injury) ([facility]) 10/20/2022    Atrial fibrillation ([facility])     Bell palsy     diagnosed 15 years ago    CAD (coronary artery disease)     Cerebral artery occlusion with cerebral infarction ([facility])     Chronic diastolic CHF (congestive heart failure) ([facility]) 05/16/2020    CVA (cerebral vascular accident) ([facility]) 01/04/2017    Hypertension     Intracranial hemorrhage ([facility]) 04/2016    Mixed hyperlipidemia 07/06/2022    Nonintractable headache     currently controlled    Nontraumatic subcortical hemorrhage of cerebral hemisphere ([facility]) 04/30/2016    Poor vision     after bells palsy in 2012    Primary osteoarthritis of right knee 03/18/2022    Sudden visual loss of left eye      patient states \"In very corner of my eye.\"    Thyroid nodule 02/08/2018    TIA involving right internal carotid artery        PAST SURGICAL HISTORY:    Past Surgical History:   Procedure Laterality Date    CARDIAC SURGERY      stentsx2    COLONOSCOPY      CORONARY ANGIOPLASTY WITH STENT PLACEMENT      ROTATOR CUFF REPAIR Right     TUBAL LIGATION         ALLERGIES:   Allergies   Allergen Reactions    Clonidine Rash, Shortness Of Breath and Hives    Codeine Hives, Itching, Nausea Only and Rash     Other reaction(s): Other (See Comments)  Pruritis    Hydrocodone-Acetaminophen      Itchy      Lisinopril Hives and Itching    Tramadol Other (See Comments)     Unknown reaction    Percocet [Oxycodone-Acetaminophen] Itching         MEDICATIONS:    Current Facility-Administered Medications   Medication Dose Route Frequency Provider Last Rate Last Admin    morphine (PF) injection 2 mg  2 mg IntraVENous Q3H PRN Sahara Azul PA-C        Or    morphine injection 4 mg  4 mg IntraVENous Q3H PRN Sahara Azul PA-C   4 mg at 12/31/23 1544    acetaminophen (TYLENOL) tablet 500 mg  500 mg Oral Q6H PRN Sahara Azul PA-C        aspirin chewable tablet 81 mg  81 mg Oral Daily Sahara Azul PA-C   81 mg at 12/31/23 0912    atorvastatin (LIPITOR) tablet 80 mg  80 mg Oral Daily Sahara Azul PA-C   80 mg at 12/31/23 0912    carvedilol (COREG) tablet 12.5 mg  12.5 mg Oral BID with meals Sahara Azul PA-C   12.5 mg at 12/31/23 0912    furosemide (LASIX) tablet 20 mg  20 mg Oral Daily Sahara Azul PA-C   20 mg at 12/31/23 0912    pantoprazole (PROTONIX) tablet 40 mg  40 mg Oral BID AC Sahara Azul PA-C   40 mg at 12/31/23 1543    spironolactone (ALDACTONE) tablet 50 mg  50 mg Oral Daily Sahara Azul PA-C   50 mg at 12/31/23 0912    valsartan (DIOVAN) tablet 320 mg  320 mg Oral Daily Sahara Azul PA-C   320 mg at 12/31/23 0912    lidocaine 4 % external patch 1 patch  1 patch

## 2023-12-31 NOTE — CARE COORDINATION
Case Management Note:    Patient admitted as Observation with an anticipated short hospitalization length of stay. Chart reviewed and it appears that patient has minimal needs for discharge at this time. Medical staff to inform case management team if discharge needs are identified.      Case management will continue to follow progress and update discharge plan as needed.     Electronically signed by Alma Rosa Machado on 12/31/23 at 9:07 AM EST

## 2023-12-31 NOTE — PROGRESS NOTES
Pharmacy Home Medication Reconciliation Note    A medication reconciliation has been completed for Emilee Paulson 1950    Pharmacy: [facility] [address] [place] [address]  Information provided by: patient    The patient's home medication list is as follows:  No current facility-administered medications on file prior to encounter.     Current Outpatient Medications on File Prior to Encounter   Medication Sig Dispense Refill    furosemide (LASIX) 20 MG tablet Take 1 tablet by mouth daily      carvedilol (COREG) 12.5 MG tablet Take 1 tablet by mouth with breakfast and with evening meal 60 tablet 5    furosemide (LASIX) 40 MG tablet Take 1 tablet by mouth daily (Patient not taking: Reported on 12/30/2023) 90 tablet 5    spironolactone (ALDACTONE) 50 MG tablet Take 1 tablet by mouth daily 90 tablet 1    valsartan (DIOVAN) 320 MG tablet Take 1 tablet by mouth daily 90 tablet 1    omeprazole (PRILOSEC) 20 MG delayed release capsule Take 1 capsule by mouth 2 times daily; take on an empty stomach and eat a meal or snack 45 to 60 minutes hour after each dose. 60 capsule 2    acetaminophen (TYLENOL) 500 MG tablet Take 1 tablet by mouth every 6 hours as needed for Pain 120 tablet 1    magnesium oxide (MAG-OX) 400 MG tablet Take 1 tablet by mouth daily 30 tablet 1    atorvastatin (LIPITOR) 80 MG tablet Take 1 tablet by mouth daily 90 tablet 4    Caltrate 600+D Plus Minerals (CALTRATE) 600-800 MG-UNIT TABS tablet Take 1 tablet by mouth daily 90 tablet 4    warfarin (COUMADIN) 5 MG tablet Take 1 tablet by mouth daily 5 mg everyday but on tuesdays 0.5 tablet (Patient taking differently: Take 0.5-1 tablets by mouth See Admin Instructions Take by mouth one whole tablet (5 mg) on Monday, Wednesday, Friday, and 1/2 tablet (2.5 mg) all other days.) 90 tablet 4    Misc. Devices (PILL BOX 7 DAY) MISC 1 box by Does not apply route See Admin Instructions 1 each 0    aspirin 81 MG chewable tablet Take 1 tablet by mouth  daily         Of note, patient takes Warfarin 5 mg Monday, Wednesday, Friday, and 2.5 mg all other days in the morning: took today's dose and all other AM meds.    Timing of last doses updated.    Thank you,  Mariana Chen CPhT

## 2023-12-31 NOTE — PROGRESS NOTES
Patient admitted to room 479 per stretcher from ED.  Patient c/o 8 out of 10 pain to left knee.  Knee wrapped with ace wrap.  Pulses positive. Patient denies numbness or tingling. Patient has facial droop 2/2 bells palsy. Admission complete.  Patient oriented to room/unit.  Call light placed within reach.

## 2023-12-31 NOTE — ED PROVIDER NOTES
[facility] EMERGENCY DEPARTMENT  EMERGENCY DEPARTMENT ENCOUNTER        Pt Name: Emilee Paulson  MRN: [id number]  Birthdate 1950  Date of evaluation: 12/30/2023  Provider: Bri Mccullough PA-C  PCP: Stefano Suarez MD  Note Started: 11:18 PM EST 12/30/23      MARIANN. I have evaluated this patient.        CHIEF COMPLAINT       Chief Complaint   Patient presents with    Leg Pain     Pt arrived to ED via [facility] ems from home with c/o left sided leg pain. Per pt, she was cutting up greens for her [employer] celebration when suddenly she began having sharp pain that radiates from her thigh to her shin. Per pt she has never experienced pain like this before. Pt takes warfarin for afib. Hx of afib, htn, arthritis.        HISTORY OF PRESENT ILLNESS: 1 or more Elements     History From: Patient  Limitations to history : None    Emilee Paulson is a 73 y.o. female who presents to the emergency department today for evaluation for concerns of left knee pain.  The patient states that she was standing, and she states that she was prepping greens for her New Year's [employer] party tomorrow.  The patient states that she began to experience pain and swelling to her left knee.  The patient denies falling or injuring himself in any way.  She does have a history of arthritis of the knee.  Patient is rating her pain as a 10/10, her pain is sharp, constant and she otherwise denies any known alleviating or improving factors.  Patient does have also have a history of arthritis in her right knee.  She has no fall, injury.  No fever or chills.  No numbness, tingling or weakness.  She has no abdominal pain.  No back pain.  Patient does have a history of A-fib she is anticoagulated on Coumadin, no other complaints.    Nursing Notes were all reviewed and agreed with or any disagreements were addressed in the HPI.    REVIEW OF SYSTEMS :      Review of Systems   Constitutional:  Negative for activity change, appetite change  (01/04/2017), Hypertension, Intracranial hemorrhage (HCC) (04/2016), Mixed hyperlipidemia (07/06/2022), Nonintractable headache, Nontraumatic subcortical hemorrhage of cerebral hemisphere (HCC) (04/30/2016), Poor vision, Primary osteoarthritis of right knee (03/18/2022), Sudden visual loss of left eye, Thyroid nodule (02/08/2018), and TIA involving right internal carotid artery.    CONSULTS: (Who and What was discussed)  IP CONSULT TO HOSPITALIST  IP CONSULT TO ORTHOPEDIC SURGERY      Social Determinants Significantly Affecting Health : None    Records Reviewed (External and Source) previous ENT visit on 12/14/2020    CC/HPI Summary, DDx, ED Course, and Reassessment: Briefly, this is a 73-year-old female who presents to the emergency department today for evaluation for concerns of left knee pain.  Patient has had pain since standing today and cooking.    On examination there is tenderness and edema noted to the left knee, otherwise neurovascular intact    Disposition Considerations (tests considered but not done, Admit vs D/C, Shared Decision Making, Pt Expectation of Test or Tx.):    Patient is anticoagulated on Coumadin, INR is 2.5, x-ray does show a suprapatellar effusion    CBC shows no evidence of leukocytosis, mild anemia.  BMP is unremarkable.    She was given multiple doses of pain medications, is unable to ambulate, therefore feel that she would benefit from admission, and orthopedic consultation.  Ace wrap applied.  Hospitalist has been paged for admission patient family updated, agreeable with plan, stable for admission    I am the Primary Clinician of Record.  FINAL IMPRESSION      1. Effusion of left knee    2. Anticoagulated on Coumadin    3. Unable to ambulate          DISPOSITION/PLAN     DISPOSITION Decision To Admit 12/31/2023 12:42:03 AM      PATIENT REFERRED TO:  No follow-up provider specified.    DISCHARGE MEDICATIONS:  New Prescriptions    No medications on file       DISCONTINUED

## 2024-01-01 ENCOUNTER — APPOINTMENT (OUTPATIENT)
Dept: GENERAL RADIOLOGY | Age: 74
DRG: 813 | End: 2024-01-01
Payer: MEDICARE

## 2024-01-01 PROBLEM — M00.9 PYOGENIC ARTHRITIS OF LEFT KNEE JOINT (HCC): Status: ACTIVE | Noted: 2024-01-01

## 2024-01-01 LAB
ANION GAP SERPL CALCULATED.3IONS-SCNC: 8 MMOL/L (ref 3–16)
APPEARANCE FLUID: NORMAL
BASOPHILS # BLD: 0 K/UL (ref 0–0.2)
BASOPHILS NFR BLD: 0.4 %
BDY FLUID QUALITY: NORMAL
BUN SERPL-MCNC: 14 MG/DL (ref 7–20)
CALCIUM SERPL-MCNC: 9.3 MG/DL (ref 8.3–10.6)
CELL COUNT FLUID TYPE: NORMAL
CHLORIDE SERPL-SCNC: 101 MMOL/L (ref 99–110)
CO2 SERPL-SCNC: 27 MMOL/L (ref 21–32)
COLOR FLUID: NORMAL
CREAT SERPL-MCNC: 0.8 MG/DL (ref 0.6–1.2)
CRP SERPL-MCNC: 95.8 MG/L (ref 0–5.1)
CRYSTALS FLD MICRO: NORMAL
DEPRECATED RDW RBC AUTO: 13.3 % (ref 12.4–15.4)
EOSINOPHIL # BLD: 0 K/UL (ref 0–0.6)
EOSINOPHIL NFR BLD: 0.1 %
ERYTHROCYTE [SEDIMENTATION RATE] IN BLOOD BY WESTERGREN METHOD: 49 MM/HR (ref 0–30)
GFR SERPLBLD CREATININE-BSD FMLA CKD-EPI: >60 ML/MIN/{1.73_M2}
GLUCOSE SERPL-MCNC: 129 MG/DL (ref 70–99)
HCT VFR BLD AUTO: 33.5 % (ref 36–48)
HGB BLD-MCNC: 11.1 G/DL (ref 12–16)
INR PPP: 1.32 (ref 0.84–1.16)
LYMPHOCYTES # BLD: 1 K/UL (ref 1–5.1)
LYMPHOCYTES NFR BLD: 12 %
LYMPHOCYTES NFR FLD: 3 %
MACROPHAGES # FLD: 6 %
MAGNESIUM SERPL-MCNC: 1.5 MG/DL (ref 1.8–2.4)
MCH RBC QN AUTO: 30 PG (ref 26–34)
MCHC RBC AUTO-ENTMCNC: 33.2 G/DL (ref 31–36)
MCV RBC AUTO: 90.5 FL (ref 80–100)
MONOCYTES # BLD: 1.1 K/UL (ref 0–1.3)
MONOCYTES NFR BLD: 12.7 %
NEUTROPHIL, FLUID: 91 %
NEUTROPHILS # BLD: 6.4 K/UL (ref 1.7–7.7)
NEUTROPHILS NFR BLD: 74.8 %
NUC CELL # FLD: NORMAL /CUMM
ORGANISM: ABNORMAL
PLATELET # BLD AUTO: 112 K/UL (ref 135–450)
PMV BLD AUTO: 10.1 FL (ref 5–10.5)
POTASSIUM SERPL-SCNC: 3.2 MMOL/L (ref 3.5–5.1)
PROTHROMBIN TIME: 16.4 SEC (ref 11.5–14.8)
RBC # BLD AUTO: 3.7 M/UL (ref 4–5.2)
RBC FLUID: NORMAL /CUMM
REPORT: NORMAL
RESP PATH DNA+RNA PNL NPH NAA+NON-PROBE: ABNORMAL
SODIUM SERPL-SCNC: 136 MMOL/L (ref 136–145)
SPECIMEN SOURCE FLD: NORMAL
TOTAL CELLS COUNTED FLD: 100
URATE SERPL-MCNC: 7.2 MG/DL (ref 2.6–6)
WBC # BLD AUTO: 8.5 K/UL (ref 4–11)

## 2024-01-01 PROCEDURE — 0202U NFCT DS 22 TRGT SARS-COV-2: CPT

## 2024-01-01 PROCEDURE — 85652 RBC SED RATE AUTOMATED: CPT

## 2024-01-01 PROCEDURE — 87075 CULTR BACTERIA EXCEPT BLOOD: CPT

## 2024-01-01 PROCEDURE — 2580000003 HC RX 258: Performed by: PHYSICIAN ASSISTANT

## 2024-01-01 PROCEDURE — 99232 SBSQ HOSP IP/OBS MODERATE 35: CPT | Performed by: PHYSICIAN ASSISTANT

## 2024-01-01 PROCEDURE — 87040 BLOOD CULTURE FOR BACTERIA: CPT

## 2024-01-01 PROCEDURE — 2580000003 HC RX 258: Performed by: INTERNAL MEDICINE

## 2024-01-01 PROCEDURE — 86140 C-REACTIVE PROTEIN: CPT

## 2024-01-01 PROCEDURE — 6370000000 HC RX 637 (ALT 250 FOR IP): Performed by: INTERNAL MEDICINE

## 2024-01-01 PROCEDURE — 6370000000 HC RX 637 (ALT 250 FOR IP): Performed by: PHYSICIAN ASSISTANT

## 2024-01-01 PROCEDURE — 83735 ASSAY OF MAGNESIUM: CPT

## 2024-01-01 PROCEDURE — 99223 1ST HOSP IP/OBS HIGH 75: CPT | Performed by: INTERNAL MEDICINE

## 2024-01-01 PROCEDURE — 1200000000 HC SEMI PRIVATE

## 2024-01-01 PROCEDURE — 85610 PROTHROMBIN TIME: CPT

## 2024-01-01 PROCEDURE — 20610 DRAIN/INJ JOINT/BURSA W/O US: CPT | Performed by: PHYSICIAN ASSISTANT

## 2024-01-01 PROCEDURE — 36415 COLL VENOUS BLD VENIPUNCTURE: CPT

## 2024-01-01 PROCEDURE — 87205 SMEAR GRAM STAIN: CPT

## 2024-01-01 PROCEDURE — 85025 COMPLETE CBC W/AUTO DIFF WBC: CPT

## 2024-01-01 PROCEDURE — 6360000002 HC RX W HCPCS: Performed by: PHYSICIAN ASSISTANT

## 2024-01-01 PROCEDURE — 87070 CULTURE OTHR SPECIMN AEROBIC: CPT

## 2024-01-01 PROCEDURE — 80048 BASIC METABOLIC PNL TOTAL CA: CPT

## 2024-01-01 PROCEDURE — 89051 BODY FLUID CELL COUNT: CPT

## 2024-01-01 PROCEDURE — 93005 ELECTROCARDIOGRAM TRACING: CPT | Performed by: INTERNAL MEDICINE

## 2024-01-01 PROCEDURE — 89060 EXAM SYNOVIAL FLUID CRYSTALS: CPT

## 2024-01-01 PROCEDURE — 84550 ASSAY OF BLOOD/URIC ACID: CPT

## 2024-01-01 PROCEDURE — 6360000002 HC RX W HCPCS: Performed by: INTERNAL MEDICINE

## 2024-01-01 PROCEDURE — 71046 X-RAY EXAM CHEST 2 VIEWS: CPT

## 2024-01-01 RX ORDER — MAGNESIUM SULFATE IN WATER 40 MG/ML
2000 INJECTION, SOLUTION INTRAVENOUS ONCE
Status: COMPLETED | OUTPATIENT
Start: 2024-01-01 | End: 2024-01-01

## 2024-01-01 RX ORDER — LIDOCAINE HYDROCHLORIDE 10 MG/ML
1 INJECTION, SOLUTION EPIDURAL; INFILTRATION; INTRACAUDAL; PERINEURAL ONCE
Status: DISCONTINUED | OUTPATIENT
Start: 2024-01-01 | End: 2024-01-01 | Stop reason: RX

## 2024-01-01 RX ORDER — ENOXAPARIN SODIUM 100 MG/ML
40 INJECTION SUBCUTANEOUS DAILY
Status: DISCONTINUED | OUTPATIENT
Start: 2024-01-01 | End: 2024-01-06 | Stop reason: HOSPADM

## 2024-01-01 RX ORDER — LIDOCAINE HYDROCHLORIDE 10 MG/ML
2 INJECTION, SOLUTION EPIDURAL; INFILTRATION; INTRACAUDAL; PERINEURAL ONCE
OUTPATIENT
Start: 2024-01-01 | End: 2024-01-01

## 2024-01-01 RX ADMIN — ACETAMINOPHEN 500 MG: 500 TABLET ORAL at 06:32

## 2024-01-01 RX ADMIN — MAGNESIUM SULFATE HEPTAHYDRATE 2000 MG: 40 INJECTION, SOLUTION INTRAVENOUS at 17:49

## 2024-01-01 RX ADMIN — POTASSIUM BICARBONATE 40 MEQ: 782 TABLET, EFFERVESCENT ORAL at 17:53

## 2024-01-01 RX ADMIN — CARVEDILOL 12.5 MG: 6.25 TABLET, FILM COATED ORAL at 09:35

## 2024-01-01 RX ADMIN — MORPHINE SULFATE 4 MG: 4 INJECTION, SOLUTION INTRAMUSCULAR; INTRAVENOUS at 09:34

## 2024-01-01 RX ADMIN — SODIUM CHLORIDE, PRESERVATIVE FREE 10 ML: 5 INJECTION INTRAVENOUS at 09:36

## 2024-01-01 RX ADMIN — ACETAMINOPHEN 500 MG: 500 TABLET ORAL at 00:29

## 2024-01-01 RX ADMIN — SODIUM CHLORIDE, PRESERVATIVE FREE 10 ML: 5 INJECTION INTRAVENOUS at 12:27

## 2024-01-01 RX ADMIN — PANTOPRAZOLE SODIUM 40 MG: 40 TABLET, DELAYED RELEASE ORAL at 16:09

## 2024-01-01 RX ADMIN — PANTOPRAZOLE SODIUM 40 MG: 40 TABLET, DELAYED RELEASE ORAL at 06:32

## 2024-01-01 RX ADMIN — POTASSIUM BICARBONATE 40 MEQ: 782 TABLET, EFFERVESCENT ORAL at 16:09

## 2024-01-01 RX ADMIN — SPIRONOLACTONE 50 MG: 25 TABLET ORAL at 09:34

## 2024-01-01 RX ADMIN — ATORVASTATIN CALCIUM 80 MG: 40 TABLET, FILM COATED ORAL at 09:35

## 2024-01-01 RX ADMIN — VALSARTAN 320 MG: 160 TABLET, FILM COATED ORAL at 09:43

## 2024-01-01 RX ADMIN — ASPIRIN 81 MG: 81 TABLET, CHEWABLE ORAL at 09:35

## 2024-01-01 RX ADMIN — MORPHINE SULFATE 4 MG: 4 INJECTION, SOLUTION INTRAMUSCULAR; INTRAVENOUS at 02:47

## 2024-01-01 RX ADMIN — MORPHINE SULFATE 4 MG: 4 INJECTION, SOLUTION INTRAMUSCULAR; INTRAVENOUS at 12:27

## 2024-01-01 RX ADMIN — ENOXAPARIN SODIUM 40 MG: 100 INJECTION SUBCUTANEOUS at 16:09

## 2024-01-01 RX ADMIN — VANCOMYCIN HYDROCHLORIDE 1750 MG: 10 INJECTION, POWDER, LYOPHILIZED, FOR SOLUTION INTRAVENOUS at 12:32

## 2024-01-01 RX ADMIN — CARVEDILOL 12.5 MG: 6.25 TABLET, FILM COATED ORAL at 17:53

## 2024-01-01 RX ADMIN — CEFEPIME 2000 MG: 2 INJECTION, POWDER, FOR SOLUTION INTRAVENOUS at 17:49

## 2024-01-01 RX ADMIN — FUROSEMIDE 20 MG: 20 TABLET ORAL at 09:34

## 2024-01-01 RX ADMIN — MORPHINE SULFATE 4 MG: 4 INJECTION, SOLUTION INTRAMUSCULAR; INTRAVENOUS at 21:58

## 2024-01-01 ASSESSMENT — PAIN DESCRIPTION - ORIENTATION
ORIENTATION: LEFT

## 2024-01-01 ASSESSMENT — PAIN DESCRIPTION - LOCATION
LOCATION: KNEE
LOCATION: KNEE
LOCATION: LEG
LOCATION: LEG
LOCATION: KNEE

## 2024-01-01 ASSESSMENT — PAIN SCALES - GENERAL
PAINLEVEL_OUTOF10: 10
PAINLEVEL_OUTOF10: 10
PAINLEVEL_OUTOF10: 7
PAINLEVEL_OUTOF10: 8
PAINLEVEL_OUTOF10: 6
PAINLEVEL_OUTOF10: 9
PAINLEVEL_OUTOF10: 0
PAINLEVEL_OUTOF10: 7
PAINLEVEL_OUTOF10: 5

## 2024-01-01 ASSESSMENT — PAIN DESCRIPTION - PAIN TYPE
TYPE: ACUTE PAIN
TYPE: ACUTE PAIN

## 2024-01-01 ASSESSMENT — PAIN DESCRIPTION - DESCRIPTORS
DESCRIPTORS: ACHING;DISCOMFORT
DESCRIPTORS: ACHING

## 2024-01-01 ASSESSMENT — PAIN DESCRIPTION - FREQUENCY
FREQUENCY: CONTINUOUS
FREQUENCY: INTERMITTENT

## 2024-01-01 ASSESSMENT — PAIN DESCRIPTION - ONSET
ONSET: ON-GOING
ONSET: ON-GOING

## 2024-01-01 NOTE — PLAN OF CARE
Problem: Discharge Planning  Goal: Discharge to home or other facility with appropriate resources  Outcome: Progressing     Problem: Pain  Goal: Verbalizes/displays adequate comfort level or baseline comfort level  Outcome: Progressing     Problem: Safety - Adult  Goal: Free from fall injury  Outcome: Progressing     Problem: ABCDS Injury Assessment  Goal: Absence of physical injury  Outcome: Progressing     Problem: Chronic Conditions and Co-morbidities  Goal: Patient's chronic conditions and co-morbidity symptoms are monitored and maintained or improved  Outcome: Progressing

## 2024-01-01 NOTE — CONSULTS
Infectious Diseases Inpatient Consult Note    Reason for Consult:   Fever, L knee inflammatory arthritis   Requesting Physician:   Dr Kidd  Primary Care Physician:  Stefano Suarez MD  History Obtained From:   Pt, EPIC    Admit Date: 12/30/2023  Hospital Day: 3    CHIEF COMPLAINT:       Chief Complaint   Patient presents with    Leg Pain     Pt arrived to ED via [facility] ems from home with c/o left sided leg pain. Per pt, she was cutting up greens for her [place]RIAN[place] celebration when suddenly she began having sharp pain that radiates from her thigh to her shin. Per pt she has never experienced pain like this before. Pt takes warfarin for afib. Hx of afib, htn, arthritis.        HISTORY OF PRESENT ILLNESS:      74 yo woman  PMH - CVA, AF, CAD, CHF, HTN, hx CNS bleed, knee OA, thyroid nodule  PSurgHx - cardiac procedure, R shoulder rotator cuff repair    Presents with L knee pain over 1 day.  No trauma.  No infection at other site present     In ED 12/30 - afeb , WBC 6.1  X-ray w suprapatellar effusion    1/1/24 T 101  Arthrocentesis L knee - 70 ml sanguinous fluid.  WBC 54,729 - 91%PMN, RBC 1,191,000  Started on Vancomycin / Cefepime      Past Medical History:    Past Medical History:   Diagnosis Date    Acute cerebrovascular accident (CVA) ([facility]) 01/28/2020    brain bleed    LAST (acute kidney injury) ([facility]) 10/20/2022    Atrial fibrillation ([facility])     Bell palsy     diagnosed 15 years ago    CAD (coronary artery disease)     Cerebral artery occlusion with cerebral infarction ([facility])     Chronic diastolic CHF (congestive heart failure) ([facility]) 05/16/2020    CVA (cerebral vascular accident) ([facility]) 01/04/2017    Hypertension     Intracranial hemorrhage ([facility]) 04/2016    Mixed hyperlipidemia 07/06/2022    Nonintractable headache     currently controlled    Nontraumatic subcortical hemorrhage of cerebral hemisphere ([facility]) 04/30/2016    Poor vision     after bells palsy in 2012    Primary osteoarthritis of right knee 03/18/2022

## 2024-01-01 NOTE — PROGRESS NOTES
Reporting left leg pain 9/10. BP elevated 155/116. Mild temp at 99.4. IS brought in and encouraged with requested return demo of cough & deep breathing. Pt. Appeared to be febrile overnight. Reviewed POC & AM meds no questions, given per order, see MAR.   The care plan and education has been reviewed and mutually agreed upon with the patient.

## 2024-01-01 NOTE — PROGRESS NOTES
Hospitalist Progress Note      PCP: Stefano Suarez MD    Date of Admission: 12/30/2023    Chief Complaint: Left knee    Hospital Course:   Pain patient complaining of severe left knee pain rating it 10/10  Had fever last night  No cough no dysuria  S/p arthrocentesis    Medications:  Reviewed      Exam:    /84   Pulse 78   Temp 99.6 °F (37.6 °C) (Oral)   Resp 18   Ht 1.626 m (5' 4\")   Wt 86.7 kg (191 lb 3.2 oz)   SpO2 95%   BMI 32.82 kg/m²     General appearance: No apparent distress, appears stated age and cooperative.  HEENT: Pupils equal, round, and reactive to light. Conjunctivae/corneas clear.  Neck: Supple, with full range of motion. No jugular venous distention. Trachea midline.  Respiratory:  Normal respiratory effort. Clear to auscultation, bilaterally without RALES/WHEEZES/Rhonchi.  Cardiovascular: Regular rate and rhythm with normal S1/S2 without MURMURS, rubs or gallops.  Abdomen: Soft, non-tender, non-distended with normal bowel sounds.  Musculoskeletal: No clubbing, cyanosis or EDEMA bilaterally.  Full range of motion without deformity.  Skin: Skin color, texture, turgor normal.  No rashes or lesions.  Neurologic:  Neurovascularly intact without any focal sensory/motor deficits. Cranial nerves: II-XII intact, grossly non-focal.  Left knee swollen decreased range of motion no erythema    Labs:   Recent Labs     12/31/23  0011 12/31/23  0605 01/01/24  0827   WBC 7.1 6.1 8.5   HGB 11.6* 11.2* 11.1*   HCT 34.3* 33.8* 33.5*   * 112* 112*       Recent Labs     12/31/23  0011 01/01/24  0827    136   K 3.5 3.2*    101   CO2 28 27   BUN 9 14   CREATININE <0.5* 0.8   CALCIUM 9.9 9.3       No results for input(s): \"AST\", \"ALT\", \"BILIDIR\", \"BILITOT\", \"ALKPHOS\" in the last 72 hours.  Recent Labs     12/30/23  2225 12/31/23  0605 01/01/24  0827   INR 2.51* 2.66* 1.32*       No results for input(s): \"CKTOTAL\", \"TROPONINI\" in the last 72 hours.    Urinalysis:      Lab

## 2024-01-01 NOTE — PROGRESS NOTES
2030: shift assessment done, VSS, PRN medication given as per MAR, call light within reach, plan of care ongoing. The care plan and education has been reviewed and mutually agreed upon with the patient.

## 2024-01-01 NOTE — PROGRESS NOTES
[facility] FOLLOW UP    CHIEF COMPLAINT:   Chief Complaint   Patient presents with    Leg Pain     Pt arrived to ED via [facility] ems from home with c/o left sided leg pain. Per pt, she was cutting up greens for her BRIANNE celebration when suddenly she began having sharp pain that radiates from her thigh to her shin. Per pt she has never experienced pain like this before. Pt takes warfarin for afib. Hx of afib, htn, arthritis.        HISTORY OF PRESENT ILLNESS:    1/1/23  FU left knee effusion  Temp 99.4 overnight  Knee still very painful with any ROM  INR 1.32 this morning      PAST MEDICAL HISTORY:    Past Medical History:   Diagnosis Date    Acute cerebrovascular accident (CVA) ([facility]) 01/28/2020    brain bleed    LAST (acute kidney injury) ([facility]) 10/20/2022    Atrial fibrillation ([facility])     Bell palsy     diagnosed 15 years ago    CAD (coronary artery disease)     Cerebral artery occlusion with cerebral infarction ([facility])     Chronic diastolic CHF (congestive heart failure) ([facility]) 05/16/2020    CVA (cerebral vascular accident) ([facility]) 01/04/2017    Hypertension     Intracranial hemorrhage ([facility]) 04/2016    Mixed hyperlipidemia 07/06/2022    Nonintractable headache     currently controlled    Nontraumatic subcortical hemorrhage of cerebral hemisphere ([facility]) 04/30/2016    Poor vision     after bells palsy in 2012    Primary osteoarthritis of right knee 03/18/2022    Sudden visual loss of left eye     patient states \"In very corner of my eye.\"    Thyroid nodule 02/08/2018    TIA involving right internal carotid artery        PAST SURGICAL HISTORY:    Past Surgical History:   Procedure Laterality Date    CARDIAC SURGERY      stentsx2    COLONOSCOPY      CORONARY ANGIOPLASTY WITH STENT PLACEMENT      ROTATOR CUFF REPAIR Right     TUBAL LIGATION         ALLERGIES:   Allergies   Allergen Reactions    Clonidine Rash, Shortness Of Breath and Hives    Codeine Hives, Itching, Nausea Only and Rash      osteoarthritis  On chronic anticoagulation s/p A fib      PLAN:  Given the fact that the patient spiked a low-grade fever this morning, the knee was aspirated to rule out septic arthritis. See procedure note below.   Plan pending results of arthrocentesis.    Procedure:  Risks and benefits of a joint aspiration were discussed with the patient, including the possibility of adverse local site reactions such as dermal atrophy and skin discoloration.  Although rare, an introduction of infection is possible and may necessitate surgical treatment. The patient elected to proceed, and after verbal consent was obtained and drug allergies were reviewed, the injection site was prepped with alcohol and ChloraPrep. the left superior medial knee skin was infiltrated with plain lidocaine. Then using an 18-gauge needle, approximately 70 mL of sanguinous fluid was aspirated without issue. Band-Aid and Ace-wrap was applied. We will send to the lab for the left knee synovial fluid cell count, crystals, and cultures.      Columba Cabrera PA-C  01/01/24  11:23 AM

## 2024-01-01 NOTE — CONSULTS
Electrophysiology Consultation   Date: 1/1/2024  Admit Date:  12/30/2023  Reason for Consultation: Spontaneous knee hematoma.   Consult Requesting Physician: Bernabe Dominguez MD     Chief Complaint   Patient presents with    Leg Pain     Pt arrived to ED via [facility] ems from home with c/o left sided leg pain. Per pt, she was cutting up greens for her BRIANNE celebration when suddenly she began having sharp pain that radiates from her thigh to her shin. Per pt she has never experienced pain like this before. Pt takes warfarin for afib. Hx of afib, htn, arthritis.      HPI:   Mrs. Paulson is a pleasant 73 year old female with a medical history significant for permanent atrial fibrillation, ischemic cardiomyopathy ([place]), hypertension, hyperlipidemia, history of falls, cerebral vascular disease, heart failure with preserved ejection fraction, thyroid nodule, hemorrhagic stroke, and dilated ascending aorta who presents from home with spontaneous hemorrhagic knee effusion.  According to patient she was in her usual state of health until the day before yesterday when she was washing some greens and sudden had severe left knee pain with effusion.  She denies trauma.  She presented to [facility] for evaluation.    Patient previous had declined Watchman however is open to discussing watchman in future.    Patient denies fevers, chest pain, orthopnea, PND, lower extremity edema, abdominal swelling, shortness of breath, dyspnea on exertion, chills, visual changes, headaches, sore throat, cough, abdominal pain, nausea, vomiting, bleeding, bruising, dysuria, confusion, depression, anxiety, skin lesions, etc.    Emergency Room/Hospital Course:  Patient was evaluated in the ER on 12/30/2023.  Her CMP was reassuring.  Her CBC was reassuring.  Her INR was within  therapeutic range.  Radiograph of her left knee showed arthritis and large suprapatellar effusion.  She was evaluated by orthopedics and cardiology was consulted  risk study.    I independently reviewed the ECG and telemetry.    Scheduled Meds:   cefepime  2,000 mg IntraVENous Q12H    [START ON 1/2/2024] vancomycin  1,500 mg IntraVENous Q24H    potassium bicarb-citric acid  40 mEq Oral Q2H    magnesium sulfate  2,000 mg IntraVENous Once    enoxaparin  40 mg SubCUTAneous Daily    aspirin  81 mg Oral Daily    atorvastatin  80 mg Oral Daily    carvedilol  12.5 mg Oral BID with meals    furosemide  20 mg Oral Daily    pantoprazole  40 mg Oral BID AC    spironolactone  50 mg Oral Daily    valsartan  320 mg Oral Daily    lidocaine  1 patch TransDERmal Nightly    sodium chloride flush  5-40 mL IntraVENous 2 times per day     Continuous Infusions:   sodium chloride       PRN Meds:.morphine **OR** morphine, acetaminophen, sodium chloride flush, sodium chloride, ondansetron, senna     Assessment:   Patient Active Problem List    Diagnosis Date Noted    Hypertensive emergency 03/06/2023    Long term (current) use of anticoagulants 02/14/2023    Multiple falls 10/20/2022    History of arterial ischemic stroke 10/20/2022    Mixed hyperlipidemia 07/06/2022    Headache, migraine, intractable, with status migrainosus 06/20/2022    PAF (paroxysmal atrial fibrillation) ([facility])     Effusion of left knee 12/31/2023    Knee pain 12/31/2023    Ascending aorta dilatation ([facility]) 10/26/2023    SOB (shortness of breath) 07/25/2023    Primary osteoarthritis of right knee 03/18/2022    Primary osteoarthritis of left knee 03/18/2022    Essential hypertension 10/01/2020    Chronic diastolic (congestive) heart failure ([facility]) 02/03/2020    Thyroid nodule 02/08/2018    Coronary artery disease due to lipid rich plaque 10/26/2015    Permanent atrial fibrillation ([facility]) 02/13/2015      Active Hospital Problems    Diagnosis Date Noted    Effusion of left knee [M25.462] 12/31/2023    Knee pain [M25.569] 12/31/2023     Mrs. Paulson is a pleasant 73 year old female with a medical history significant for permanent atrial

## 2024-01-01 NOTE — PROGRESS NOTES
Clinical Pharmacy Note: Pharmacy to Dose Vancomycin    Emilee Paulson is a 73 y.o. female started on Vancomycin for bone and joint infection; consult received from Dr. Dominguez to manage therapy. Also receiving the following antibiotics: cefepime.    Goal AUC: 400-600 mg/L*hr    Assessment/Plan:  A 1750 mg loading dose was given on 1/1 at 1232  Initiate vancomycin 1500 mg IV every 24 hours. Bayesian modeling predicts an AUC of 458 mg/L*hr and a trough of 11 mcg/mL at steady state concentration.  A vancomycin random level has been ordered for 1/2 at 0600  Changes in regimen will be determined based on culture results, renal function, and clinical response.  Pharmacy will continue to monitor and adjust regimen as necessary.    Allergies:  Clonidine, Codeine, Hydrocodone-acetaminophen, Lisinopril, Tramadol, and Percocet [oxycodone-acetaminophen]     Recent Labs     12/31/23  0011 01/01/24  0827   CREATININE <0.5* 0.8       Recent Labs     12/31/23  0605 01/01/24  0827   WBC 6.1 8.5       Ht Readings from Last 1 Encounters:   12/30/23 1.626 m (5' 4\")        Wt Readings from Last 1 Encounters:   01/01/24 86.7 kg (191 lb 3.2 oz)         Estimated Creatinine Clearance: 68 mL/min (based on SCr of 0.8 mg/dL).      Thank you for the consult,    Joanna Aguirre, PharmD, BCCCP

## 2024-01-02 PROBLEM — E66.811 CLASS 1 OBESITY DUE TO EXCESS CALORIES WITH BODY MASS INDEX (BMI) OF 32.0 TO 32.9 IN ADULT: Status: ACTIVE | Noted: 2024-01-02

## 2024-01-02 PROBLEM — I48.20 CHRONIC ATRIAL FIBRILLATION (HCC): Status: ACTIVE | Noted: 2024-01-02

## 2024-01-02 PROBLEM — U07.1 COVID: Status: ACTIVE | Noted: 2024-01-02

## 2024-01-02 PROBLEM — R70.0 ELEVATED ERYTHROCYTE SEDIMENTATION RATE: Status: ACTIVE | Noted: 2024-01-02

## 2024-01-02 PROBLEM — E66.09 CLASS 1 OBESITY DUE TO EXCESS CALORIES WITH BODY MASS INDEX (BMI) OF 32.0 TO 32.9 IN ADULT: Status: ACTIVE | Noted: 2024-01-02

## 2024-01-02 PROBLEM — R79.82 CRP ELEVATED: Status: ACTIVE | Noted: 2024-01-02

## 2024-01-02 LAB
ANION GAP SERPL CALCULATED.3IONS-SCNC: 9 MMOL/L (ref 3–16)
BASOPHILS # BLD: 0 K/UL (ref 0–0.2)
BASOPHILS NFR BLD: 0.3 %
BUN SERPL-MCNC: 10 MG/DL (ref 7–20)
CALCIUM SERPL-MCNC: 9.6 MG/DL (ref 8.3–10.6)
CHLORIDE SERPL-SCNC: 99 MMOL/L (ref 99–110)
CO2 SERPL-SCNC: 28 MMOL/L (ref 21–32)
CREAT SERPL-MCNC: 0.6 MG/DL (ref 0.6–1.2)
DEPRECATED RDW RBC AUTO: 13.3 % (ref 12.4–15.4)
EKG ATRIAL RATE: 83 BPM
EKG DIAGNOSIS: NORMAL
EKG Q-T INTERVAL: 378 MS
EKG QRS DURATION: 94 MS
EKG QTC CALCULATION (BAZETT): 472 MS
EKG R AXIS: -12 DEGREES
EKG T AXIS: 16 DEGREES
EKG VENTRICULAR RATE: 94 BPM
EOSINOPHIL # BLD: 0 K/UL (ref 0–0.6)
EOSINOPHIL NFR BLD: 0.1 %
GFR SERPLBLD CREATININE-BSD FMLA CKD-EPI: >60 ML/MIN/{1.73_M2}
GLUCOSE SERPL-MCNC: 112 MG/DL (ref 70–99)
HCT VFR BLD AUTO: 32.5 % (ref 36–48)
HGB BLD-MCNC: 11 G/DL (ref 12–16)
LYMPHOCYTES # BLD: 1.3 K/UL (ref 1–5.1)
LYMPHOCYTES NFR BLD: 16.7 %
MCH RBC QN AUTO: 30.4 PG (ref 26–34)
MCHC RBC AUTO-ENTMCNC: 33.7 G/DL (ref 31–36)
MCV RBC AUTO: 90.3 FL (ref 80–100)
MONOCYTES # BLD: 0.9 K/UL (ref 0–1.3)
MONOCYTES NFR BLD: 11.9 %
NEUTROPHILS # BLD: 5.5 K/UL (ref 1.7–7.7)
NEUTROPHILS NFR BLD: 71 %
PLATELET # BLD AUTO: 97 K/UL (ref 135–450)
PMV BLD AUTO: 10.8 FL (ref 5–10.5)
POTASSIUM SERPL-SCNC: 3.6 MMOL/L (ref 3.5–5.1)
RBC # BLD AUTO: 3.6 M/UL (ref 4–5.2)
SODIUM SERPL-SCNC: 136 MMOL/L (ref 136–145)
VANCOMYCIN SERPL-MCNC: 6.3 UG/ML
WBC # BLD AUTO: 7.8 K/UL (ref 4–11)

## 2024-01-02 PROCEDURE — 1200000000 HC SEMI PRIVATE

## 2024-01-02 PROCEDURE — 6360000002 HC RX W HCPCS: Performed by: PHYSICIAN ASSISTANT

## 2024-01-02 PROCEDURE — 97530 THERAPEUTIC ACTIVITIES: CPT

## 2024-01-02 PROCEDURE — 85025 COMPLETE CBC W/AUTO DIFF WBC: CPT

## 2024-01-02 PROCEDURE — 2580000003 HC RX 258: Performed by: PHYSICIAN ASSISTANT

## 2024-01-02 PROCEDURE — 97535 SELF CARE MNGMENT TRAINING: CPT

## 2024-01-02 PROCEDURE — 36415 COLL VENOUS BLD VENIPUNCTURE: CPT

## 2024-01-02 PROCEDURE — 6370000000 HC RX 637 (ALT 250 FOR IP): Performed by: PHYSICIAN ASSISTANT

## 2024-01-02 PROCEDURE — 97162 PT EVAL MOD COMPLEX 30 MIN: CPT

## 2024-01-02 PROCEDURE — 87086 URINE CULTURE/COLONY COUNT: CPT

## 2024-01-02 PROCEDURE — 99233 SBSQ HOSP IP/OBS HIGH 50: CPT | Performed by: INTERNAL MEDICINE

## 2024-01-02 PROCEDURE — 93010 ELECTROCARDIOGRAM REPORT: CPT | Performed by: INTERNAL MEDICINE

## 2024-01-02 PROCEDURE — 80202 ASSAY OF VANCOMYCIN: CPT

## 2024-01-02 PROCEDURE — 6360000002 HC RX W HCPCS: Performed by: INTERNAL MEDICINE

## 2024-01-02 PROCEDURE — 97166 OT EVAL MOD COMPLEX 45 MIN: CPT

## 2024-01-02 PROCEDURE — 6360000002 HC RX W HCPCS: Performed by: NURSE PRACTITIONER

## 2024-01-02 PROCEDURE — 99233 SBSQ HOSP IP/OBS HIGH 50: CPT | Performed by: NURSE PRACTITIONER

## 2024-01-02 PROCEDURE — 81001 URINALYSIS AUTO W/SCOPE: CPT

## 2024-01-02 PROCEDURE — 2580000003 HC RX 258: Performed by: INTERNAL MEDICINE

## 2024-01-02 PROCEDURE — 6370000000 HC RX 637 (ALT 250 FOR IP): Performed by: NURSE PRACTITIONER

## 2024-01-02 PROCEDURE — 80048 BASIC METABOLIC PNL TOTAL CA: CPT

## 2024-01-02 RX ORDER — ACETAMINOPHEN 500 MG
1000 TABLET ORAL 3 TIMES DAILY
Status: DISCONTINUED | OUTPATIENT
Start: 2024-01-02 | End: 2024-01-06 | Stop reason: HOSPADM

## 2024-01-02 RX ORDER — OXYCODONE HYDROCHLORIDE 5 MG/1
10 TABLET ORAL EVERY 4 HOURS PRN
Status: DISCONTINUED | OUTPATIENT
Start: 2024-01-02 | End: 2024-01-04

## 2024-01-02 RX ORDER — OXYCODONE HYDROCHLORIDE 5 MG/1
5 TABLET ORAL EVERY 4 HOURS PRN
Status: DISCONTINUED | OUTPATIENT
Start: 2024-01-02 | End: 2024-01-04

## 2024-01-02 RX ORDER — KETOROLAC TROMETHAMINE 30 MG/ML
30 INJECTION, SOLUTION INTRAMUSCULAR; INTRAVENOUS EVERY 6 HOURS
Status: COMPLETED | OUTPATIENT
Start: 2024-01-02 | End: 2024-01-03

## 2024-01-02 RX ORDER — MORPHINE SULFATE 2 MG/ML
2 INJECTION, SOLUTION INTRAMUSCULAR; INTRAVENOUS EVERY 4 HOURS PRN
Status: DISCONTINUED | OUTPATIENT
Start: 2024-01-02 | End: 2024-01-06 | Stop reason: HOSPADM

## 2024-01-02 RX ADMIN — SODIUM CHLORIDE, PRESERVATIVE FREE 10 ML: 5 INJECTION INTRAVENOUS at 16:55

## 2024-01-02 RX ADMIN — CEFEPIME 2000 MG: 2 INJECTION, POWDER, FOR SOLUTION INTRAVENOUS at 23:59

## 2024-01-02 RX ADMIN — SODIUM CHLORIDE, PRESERVATIVE FREE 10 ML: 5 INJECTION INTRAVENOUS at 10:58

## 2024-01-02 RX ADMIN — KETOROLAC TROMETHAMINE 30 MG: 30 INJECTION, SOLUTION INTRAMUSCULAR; INTRAVENOUS at 16:54

## 2024-01-02 RX ADMIN — FUROSEMIDE 20 MG: 20 TABLET ORAL at 10:57

## 2024-01-02 RX ADMIN — KETOROLAC TROMETHAMINE 30 MG: 30 INJECTION, SOLUTION INTRAMUSCULAR; INTRAVENOUS at 10:56

## 2024-01-02 RX ADMIN — ACETAMINOPHEN 500 MG: 500 TABLET ORAL at 06:06

## 2024-01-02 RX ADMIN — ASPIRIN 81 MG: 81 TABLET, CHEWABLE ORAL at 10:58

## 2024-01-02 RX ADMIN — PANTOPRAZOLE SODIUM 40 MG: 40 TABLET, DELAYED RELEASE ORAL at 16:54

## 2024-01-02 RX ADMIN — ACETAMINOPHEN 1000 MG: 500 TABLET ORAL at 10:56

## 2024-01-02 RX ADMIN — VALSARTAN 320 MG: 160 TABLET, FILM COATED ORAL at 10:58

## 2024-01-02 RX ADMIN — ACETAMINOPHEN 1000 MG: 500 TABLET ORAL at 21:07

## 2024-01-02 RX ADMIN — MORPHINE SULFATE 4 MG: 4 INJECTION, SOLUTION INTRAMUSCULAR; INTRAVENOUS at 06:19

## 2024-01-02 RX ADMIN — SPIRONOLACTONE 50 MG: 25 TABLET ORAL at 10:57

## 2024-01-02 RX ADMIN — ATORVASTATIN CALCIUM 80 MG: 40 TABLET, FILM COATED ORAL at 10:58

## 2024-01-02 RX ADMIN — ENOXAPARIN SODIUM 40 MG: 100 INJECTION SUBCUTANEOUS at 10:58

## 2024-01-02 RX ADMIN — CARVEDILOL 12.5 MG: 6.25 TABLET, FILM COATED ORAL at 16:54

## 2024-01-02 RX ADMIN — CEFEPIME 2000 MG: 2 INJECTION, POWDER, FOR SOLUTION INTRAVENOUS at 16:52

## 2024-01-02 RX ADMIN — CARVEDILOL 12.5 MG: 6.25 TABLET, FILM COATED ORAL at 10:57

## 2024-01-02 RX ADMIN — PANTOPRAZOLE SODIUM 40 MG: 40 TABLET, DELAYED RELEASE ORAL at 06:06

## 2024-01-02 RX ADMIN — VANCOMYCIN HYDROCHLORIDE 1500 MG: 1.5 INJECTION, POWDER, LYOPHILIZED, FOR SOLUTION INTRAVENOUS at 11:29

## 2024-01-02 RX ADMIN — KETOROLAC TROMETHAMINE 30 MG: 30 INJECTION, SOLUTION INTRAMUSCULAR; INTRAVENOUS at 22:54

## 2024-01-02 RX ADMIN — ACETAMINOPHEN 1000 MG: 500 TABLET ORAL at 16:54

## 2024-01-02 RX ADMIN — VANCOMYCIN HYDROCHLORIDE 1000 MG: 1 INJECTION, POWDER, LYOPHILIZED, FOR SOLUTION INTRAVENOUS at 22:54

## 2024-01-02 ASSESSMENT — PAIN DESCRIPTION - DESCRIPTORS
DESCRIPTORS: ACHING
DESCRIPTORS: DISCOMFORT;PRESSURE;SHARP;SHOOTING
DESCRIPTORS: PRESSURE;SHOOTING
DESCRIPTORS: SHARP

## 2024-01-02 ASSESSMENT — ENCOUNTER SYMPTOMS
NAUSEA: 0
WHEEZING: 0
CONSTIPATION: 0
EYE DISCHARGE: 0
BACK PAIN: 0
RHINORRHEA: 0
COUGH: 0
ABDOMINAL PAIN: 0
DIARRHEA: 0
SHORTNESS OF BREATH: 0
SINUS PAIN: 0
EYE REDNESS: 0
SINUS PRESSURE: 0
SORE THROAT: 0

## 2024-01-02 ASSESSMENT — PAIN DESCRIPTION - LOCATION
LOCATION: KNEE;BACK
LOCATION: BACK;KNEE
LOCATION: KNEE
LOCATION: KNEE
LOCATION: KNEE;BACK
LOCATION: KNEE;BACK

## 2024-01-02 ASSESSMENT — PAIN SCALES - GENERAL
PAINLEVEL_OUTOF10: 8
PAINLEVEL_OUTOF10: 8
PAINLEVEL_OUTOF10: 6
PAINLEVEL_OUTOF10: 8
PAINLEVEL_OUTOF10: 6
PAINLEVEL_OUTOF10: 8

## 2024-01-02 ASSESSMENT — PAIN DESCRIPTION - ORIENTATION
ORIENTATION: LEFT

## 2024-01-02 NOTE — PROGRESS NOTES
Clinical Pharmacy Note: Pharmacy to Dose Vancomycin    Vancomycin Day: 2  Indication: bone and joint infection   Current Dose: 1500 mg Q24H   Dosing Method: Bayesian Modeling    Random: 6.3    Recent Labs     01/01/24  0827 01/02/24  0640   BUN 14 10       Recent Labs     01/01/24  0827 01/02/24  0640   CREATININE 0.8 0.6       Recent Labs     01/01/24  0827 01/02/24  0640   WBC 8.5 7.8         Intake/Output Summary (Last 24 hours) at 1/2/2024 1310  Last data filed at 1/2/2024 1056  Gross per 24 hour   Intake 10 ml   Output 500 ml   Net -490 ml         Ht Readings from Last 1 Encounters:   12/30/23 1.626 m (5' 4\")        Wt Readings from Last 1 Encounters:   01/02/24 86.2 kg (190 lb 1.6 oz)         Body mass index is 32.63 kg/m².    Estimated Creatinine Clearance: 90 mL/min (based on SCr of 0.6 mg/dL).      Assessment/Plan:  Vancomycin level is currently subtherapeutic.  Increase vancomycin regimen to 1000 mg  every 12 hours.   Bayesian Modeling predicts an AUC of 428 mg/L*hr and trough of 11.9 mg/L.  A vancomycin random level has been ordered on 1/5 at 0600 for follow-up.  Changes in regimen will be determined based on culture results, renal function, and clinical response.  Pharmacy will continue to monitor and adjust regimen as necessary.    Thank you for the consult,    Bertha Dewitt, NahomyD [facility]  PGY-1 Resident  [id number]

## 2024-01-02 NOTE — PROGRESS NOTES
training, functional mobility training, transfer training, endurance training, patient/caregiver education, ADL/self-care training, IADL training, pain management, safety education, and equipment evaluation/education    Goals  Patient Goals: to return home   Short Term Goals:  Time Frame: by d/c  Patient will complete upper body ADL at set up assistance   Patient will complete lower body ADL at moderate assistance   Patient will complete toileting at moderate assistance   Patient will complete functional transfers at moderate assistance   Patient will complete functional mobility at moderate assistance     Above goals reviewed on 1/2/2024.  All goals are ongoing at this time unless indicated above.       Therapy Session Time     Individual Group Co-treatment   Time In    0915   Time Out    1000   Minutes    45        Timed Code Treatment Minutes:  Timed Code Treatment Minutes: 30 Minutes  Total Treatment Minutes:  45 minutes total       Electronically Signed By: Alejandro Dominguez OT, Alejandro Dominguez OTR/MARCOS [id number]

## 2024-01-02 NOTE — PROGRESS NOTES
[facility]THOPED[facility] FOLLOW UP    CHIEF COMPLAINT:   Chief Complaint   Patient presents with    Leg Pain     Pt arrived to ED via [facility] ems from home with c/o left sided leg pain. Per pt, she was cutting up greens for her BRIANNE celebration when suddenly she began having sharp pain that radiates from her thigh to her shin. Per pt she has never experienced pain like this before. Pt takes warfarin for afib. Hx of afib, htn, arthritis.        HISTORY OF PRESENT ILLNESS:    1/1/23  FU left knee effusion, s/p aspiration  Temp 100 overnight  Knee still very painful with any ROM  INR 1.32 on 1/1  Uric acid 7.2  No growth from aspirate yesterday (70ml of bloody synovial fluid)  No crystals  1,917,100 RBC and 54,729 WBC with 91% neutrophil count    REVIEW OF SYSTEMS:  Constitutional: no fevers, chills or weight loss  Eyes: no vision changes  ENT: no ear pain, congestion, sore throat or voice changes  Respiratory: no cough, shortness of breath, or wheezing  Cardiovascular: no chest pain or dyspnea on exertion or no palpitations  Gastrointestinal:  no abdominal pain, change in bowel habits, or black or bloody stools or and no nausea, vomiting or diarrhea  Genitourinary:  no dysuria, trouble voiding, or hematuria  Hematologic/lymphatic: no easy bruising or lymphadenopathy  Musculoskeletal: See HPI.  Neurological: no dizziness, weakness or tremors, seizures.    Endocrine: no hair loss, increased thirst, dry skin or hot/cold intolerance  Dermatological: no rash, new skin lesions or itching    Labs:  Recent Labs     01/02/24  0640 01/01/24  0827 12/31/23  0605   WBC 7.8 8.5 6.1   HGB 11.0* 11.1* 11.2*   HCT 32.5* 33.5* 33.8*   MCV 90.3 90.5 90.9   PLT 97* 112* 112*     Lab Results   Component Value Date     01/02/2024    K 3.6 01/02/2024    CL 99 01/02/2024    CO2 28 01/02/2024    BUN 10 01/02/2024    CREATININE 0.6 01/02/2024    GLUCOSE 112 (H) 01/02/2024    CALCIUM 9.6 01/02/2024    PROT 8.2

## 2024-01-02 NOTE — PROGRESS NOTES
Infectious Diseases   Progress Note      Admission Date: 12/30/2023  Hospital Day: Hospital Day: 4   Attending: Salma Branham MD  Date of service: 1/2/2024     Chief complaint/ Reason for consult:     Left knee inflammatory arthritis  COVID 19 infection  Elevated CRP of 95.8  Elevated ESR of 49    Microbiology:        I have reviewed allavailable micro lab data and cultures    Blood culture (2/2) - collected on 1/124: in process      Antibiotics and immunizations:       Current antibiotics: All antibiotics and their doses were reviewed by me    Recent Abx Admin                     vancomycin (VANCOCIN) 1,500 mg in sodium chloride 0.9 % 250 mL IVPB ([id number]) (mg) 1,500 mg New Bag 01/02/24 1129    ceFEPIme (MAXIPIME) 2,000 mg in sodium chloride 0.9 % 100 mL IVPB (mini-bag) ()  Restarted 01/01/24 2055     2,000 mg New Bag  1749    vancomycin (VANCOCIN) 1,750 mg in sodium chloride 0.9 % 500 mL IVPB ()  Restarted 01/01/24 1556                      Immunization History: All immunization history was reviewed by me today.    Immunization History   Administered Date(s) Administered    COVID-19, MODERNA BLUE border, Primary or Immunocompromised, (age 12y+), IM, 100 mcg/0.5mL 04/27/2021, 05/25/2021, 12/13/2021    Influenza Vaccine, unspecified formulation 02/10/2007, 12/20/2007, 01/30/2008    Influenza Virus Vaccine 02/10/2007, 12/20/2007, 01/30/2008    Pneumococcal, PPSV23, PNEUMOVAX 23, (age 2y+), SC/IM, 0.5mL 10/01/2007    TDaP, ADACEL (age 10y-64y), BOOSTRIX (age 10y+), IM, 0.5mL 12/20/2007       Known drug allergies:     All allergies were reviewed and updated    Allergies   Allergen Reactions    Clonidine Rash, Shortness Of Breath and Hives    Codeine Hives, Itching, Nausea Only and Rash     Other reaction(s): Other (See Comments)  Pruritis    Hydrocodone-Acetaminophen      Itchy      Lisinopril Hives and Itching    Tramadol Other (See Comments)     Unknown reaction    Percocet [Oxycodone-Acetaminophen] Itching  a total of 4 grams in a 24-hour period in patients younger than 45 years and above 3 grams in a 24-hour period in a patient of age 45 years or older.    Continue to monitor serum creatinine and Vanco levels closely, while the patient is on I/v Vancomycin.   Pain control per primary  Supportive care for COVID-19  We will follow up on the culture results and clinical progress and will make further recommendations accordingly.  Continue close vitals monitoring.  Maintain good glycemic control.  Fall precautions.  Aspiration precautions.  Continue to watch for new fever or diarrhea.  DVT prophylaxis.  Discussed all above with patient and RN.      Drug Monitoring:    Continue monitoring for antibiotic toxicity as follows: CBC, CMP, vancomycin trough  Continue to watch for following: new or worsening fever, new hypotension, hives, lip swelling and redness or purulence at vascular access sites.     I/v access Management:    Continue to monitor i.v access sites for erythema, induration, discharge or tenderness.   As always, continue efforts to minimize tubes/lines/drains as clinically appropriate to reduce chances of line associated infections.    Patient education and counseling:        The patient was educated in detail about the side-effects of various antibiotics and things to watch for like new rashes, lip swelling, severe reaction, worsening diarrhea, break through fever etc.  Discussed patient's condition and what to expect. All of the patient's questions were addressed in a satisfactory manner and patient verbalized understanding all instructions.      Level of complexity of visit and medical decision making: High     Risk of Complications/Morbidity: High     Illness(es)/ Infection present that pose threat to life/bodily function.   There is potential for severe exacerbation of infection/side effects of treatment.  Therapy requires intensive monitoring for antimicrobial agent toxicity.     Thank you for involving me

## 2024-01-02 NOTE — PROGRESS NOTES
[facility]   Electrophysiology Progress Note     Admit Date: 2023     Reason for follow up: Atrial fibrillation, spontaneous knee hematoma    HPI and Interval History:   Patient seen and examined. Clinical notes reviewed.   Telemetry reviewed. No new complaint today.   No major events overnight.   Denies having chest pain, shortness of breath, dyspnea on exertion, Orthopnea, PND at the time of this visit.  Complains of pain in her knee and legs  Review of System:  All other systems reviewed and are negative except for that noted above. Pertinent negatives are:     General: negative for fever, chills   Ophthalmic ROS: negative for - eye pain or loss of vision  ENT ROS: negative for - headaches, sore throat   Respiratory: negative for - cough, sputum  Cardiovascular: Reviewed in HPI  Gastrointestinal: negative for - abdominal pain, diarrhea, N/V  Hematology: negative for - bleeding, blood clots, bruising or jaundice  Genito-Urinary:  negative for - Dysuria or incontinence  Musculoskeletal: negative for - Joint swelling, muscle pain  Neurological: negative for - confusion, dizziness, headaches   Psychiatric: No anxiety, no depression.  Dermatological: negative for - rash      Physical Examination:  Vitals:    24 0645   BP: (!) 146/97   Pulse: (!) 102   Resp:    Temp:    SpO2:       In: 100 [P.O.:100]  Out: 600    Wt Readings from Last 3 Encounters:   24 86.2 kg (190 lb 1.6 oz)   23 84.4 kg (186 lb)   23 85.2 kg (187 lb 12.8 oz)     Temp  Av.5 °F (37.5 °C)  Min: 99.1 °F (37.3 °C)  Max: 100 °F (37.8 °C)  Pulse  Av  Min: 78  Max: 102  BP  Min: 108/74  Max: 173/108  SpO2  Av.8 %  Min: 95 %  Max: 98 %    Intake/Output Summary (Last 24 hours) at 2024 0854  Last data filed at 2024 1727  Gross per 24 hour   Intake 100 ml   Output 600 ml   Net -500 ml       Telemetry: Atrial fibrillation  Constitutional: Oriented. No distress.   Head: Normocephalic and atraumatic.

## 2024-01-02 NOTE — PROGRESS NOTES
Dominance: [] Left  [x] Right  Current Employment: retired.  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     Examination   Vision:   Vision Gross Assessment: Impaired and Vision Corrective Device: wears glasses for reading  Hearing:   hard of hearing (\"sometimes\" per patient)  Observation:   Slight facial droop - residual from bell's palsy per patient  Purewick in place  RA  Sensation:   denies numbness and tingling  ROM:   RLE WFL    Pt tolerates minimal movement of L knee. With max increased time able to achieve ~80 degrees knee flexion seated EOB  Strength:   RLE WFL  LLE note formally assessed due to increased pain.  Therapist Clinical Decision Making (Complexity): medium complexity  Clinical Presentation: evolving      Subjective  General: Supine in bed upon arrival with alarm on.  Pain: 7/10.  Location: L knee/calf  reporting L sided low back pain with mobility. Ice pack applied.  Pain Interventions: pain medication in place prior to arrival, RN notified of patient request for pain medication, and ice applied       Functional Mobility  Bed Mobility:  Supine to Sit: 2 person assistance with max A of 2   Sit to Supine: 2 person assistance with max A of 2   Scooting: maximum assistance  Comments:  Transfers:  Sit to stand transfer: 2 person assistance with max A of 2   Stand to sit transfer: moderate assistance  Comments: from slightly elevated bed to RW  Ambulation:  Ambulation not tested on this date secondary to unable to tolerate WBing on LLE.  Distance:   Gait Mechanics:   Comments:    Stair Mobility:  Stair mobility not completed on this date.  Comments:    Balance:  Static Sitting Balance: fair (+): maintains balance at SBA/supervision without use of UE support  Dynamic Sitting Balance: fair (+): maintains balance at SBA/supervision without use of UE support  Static Standing Balance: fair (-): maintains balance at CGA with use of UE  Individual Group Co-treatment   Time In 0907 0915   Time Out 0915   1000   Minutes 8   45     Timed Code Treatment Minutes:  38 Minutes  Total Treatment Minutes:  53       Electronically Signed By: Bianca Arvizu, PT    Thanks, Bianca Arvizu, PT, DPT [id number]

## 2024-01-03 PROBLEM — M00.9 PYOGENIC ARTHRITIS OF LEFT KNEE JOINT (HCC): Status: ACTIVE | Noted: 2024-01-03

## 2024-01-03 PROBLEM — M25.062 HEMARTHROSIS OF LEFT KNEE: Status: ACTIVE | Noted: 2024-01-01

## 2024-01-03 LAB
ALBUMIN SERPL-MCNC: 3.3 G/DL (ref 3.4–5)
ALBUMIN/GLOB SERPL: 0.9 {RATIO} (ref 1.1–2.2)
ALP SERPL-CCNC: 86 U/L (ref 40–129)
ALT SERPL-CCNC: 18 U/L (ref 10–40)
ANION GAP SERPL CALCULATED.3IONS-SCNC: 8 MMOL/L (ref 3–16)
AST SERPL-CCNC: 32 U/L (ref 15–37)
BACTERIA UR CULT: NORMAL
BASOPHILS # BLD: 0 K/UL (ref 0–0.2)
BASOPHILS NFR BLD: 0.2 %
BILIRUB SERPL-MCNC: 2.4 MG/DL (ref 0–1)
BUN SERPL-MCNC: 18 MG/DL (ref 7–20)
CALCIUM SERPL-MCNC: 9.3 MG/DL (ref 8.3–10.6)
CHLORIDE SERPL-SCNC: 103 MMOL/L (ref 99–110)
CO2 SERPL-SCNC: 27 MMOL/L (ref 21–32)
CREAT SERPL-MCNC: 1.2 MG/DL (ref 0.6–1.2)
DEPRECATED RDW RBC AUTO: 13.5 % (ref 12.4–15.4)
EOSINOPHIL # BLD: 0 K/UL (ref 0–0.6)
EOSINOPHIL NFR BLD: 0.6 %
FERRITIN SERPL IA-MCNC: 422.3 NG/ML (ref 15–150)
GFR SERPLBLD CREATININE-BSD FMLA CKD-EPI: 48 ML/MIN/{1.73_M2}
GLUCOSE SERPL-MCNC: 109 MG/DL (ref 70–99)
HCT VFR BLD AUTO: 31 % (ref 36–48)
HGB BLD-MCNC: 10.4 G/DL (ref 12–16)
LYMPHOCYTES # BLD: 0.8 K/UL (ref 1–5.1)
LYMPHOCYTES NFR BLD: 11.7 %
MAGNESIUM SERPL-MCNC: 2.1 MG/DL (ref 1.8–2.4)
MCH RBC QN AUTO: 30.6 PG (ref 26–34)
MCHC RBC AUTO-ENTMCNC: 33.7 G/DL (ref 31–36)
MCV RBC AUTO: 90.9 FL (ref 80–100)
MONOCYTES # BLD: 0.7 K/UL (ref 0–1.3)
MONOCYTES NFR BLD: 10.6 %
NEUTROPHILS # BLD: 5 K/UL (ref 1.7–7.7)
NEUTROPHILS NFR BLD: 76.9 %
PHOSPHATE SERPL-MCNC: 2.8 MG/DL (ref 2.5–4.9)
PLATELET # BLD AUTO: 89 K/UL (ref 135–450)
PMV BLD AUTO: 10.6 FL (ref 5–10.5)
POTASSIUM SERPL-SCNC: 3.6 MMOL/L (ref 3.5–5.1)
PROT SERPL-MCNC: 7 G/DL (ref 6.4–8.2)
RBC # BLD AUTO: 3.41 M/UL (ref 4–5.2)
SODIUM SERPL-SCNC: 138 MMOL/L (ref 136–145)
WBC # BLD AUTO: 6.5 K/UL (ref 4–11)

## 2024-01-03 PROCEDURE — 36415 COLL VENOUS BLD VENIPUNCTURE: CPT

## 2024-01-03 PROCEDURE — 83735 ASSAY OF MAGNESIUM: CPT

## 2024-01-03 PROCEDURE — 82607 VITAMIN B-12: CPT

## 2024-01-03 PROCEDURE — 6360000002 HC RX W HCPCS: Performed by: NURSE PRACTITIONER

## 2024-01-03 PROCEDURE — 99233 SBSQ HOSP IP/OBS HIGH 50: CPT | Performed by: INTERNAL MEDICINE

## 2024-01-03 PROCEDURE — 82746 ASSAY OF FOLIC ACID SERUM: CPT

## 2024-01-03 PROCEDURE — 85025 COMPLETE CBC W/AUTO DIFF WBC: CPT

## 2024-01-03 PROCEDURE — 6370000000 HC RX 637 (ALT 250 FOR IP): Performed by: PHYSICIAN ASSISTANT

## 2024-01-03 PROCEDURE — 6360000002 HC RX W HCPCS: Performed by: INTERNAL MEDICINE

## 2024-01-03 PROCEDURE — 99232 SBSQ HOSP IP/OBS MODERATE 35: CPT | Performed by: NURSE PRACTITIONER

## 2024-01-03 PROCEDURE — 6370000000 HC RX 637 (ALT 250 FOR IP): Performed by: NURSE PRACTITIONER

## 2024-01-03 PROCEDURE — 84100 ASSAY OF PHOSPHORUS: CPT

## 2024-01-03 PROCEDURE — 83550 IRON BINDING TEST: CPT

## 2024-01-03 PROCEDURE — 2580000003 HC RX 258: Performed by: INTERNAL MEDICINE

## 2024-01-03 PROCEDURE — 6370000000 HC RX 637 (ALT 250 FOR IP): Performed by: INTERNAL MEDICINE

## 2024-01-03 PROCEDURE — 80053 COMPREHEN METABOLIC PANEL: CPT

## 2024-01-03 PROCEDURE — 82728 ASSAY OF FERRITIN: CPT

## 2024-01-03 PROCEDURE — 1200000000 HC SEMI PRIVATE

## 2024-01-03 PROCEDURE — 83540 ASSAY OF IRON: CPT

## 2024-01-03 RX ORDER — SENNA AND DOCUSATE SODIUM 50; 8.6 MG/1; MG/1
1 TABLET, FILM COATED ORAL DAILY
Status: DISCONTINUED | OUTPATIENT
Start: 2024-01-03 | End: 2024-01-06 | Stop reason: HOSPADM

## 2024-01-03 RX ORDER — SODIUM CHLORIDE 9 MG/ML
INJECTION, SOLUTION INTRAVENOUS CONTINUOUS
Status: DISCONTINUED | OUTPATIENT
Start: 2024-01-03 | End: 2024-01-05

## 2024-01-03 RX ORDER — POLYETHYLENE GLYCOL 3350 17 G/17G
17 POWDER, FOR SOLUTION ORAL DAILY
Status: DISCONTINUED | OUTPATIENT
Start: 2024-01-03 | End: 2024-01-06 | Stop reason: HOSPADM

## 2024-01-03 RX ORDER — DIPHENHYDRAMINE HYDROCHLORIDE 50 MG/ML
25 INJECTION INTRAMUSCULAR; INTRAVENOUS EVERY 6 HOURS PRN
Status: DISCONTINUED | OUTPATIENT
Start: 2024-01-03 | End: 2024-01-06 | Stop reason: HOSPADM

## 2024-01-03 RX ADMIN — ACETAMINOPHEN 1000 MG: 500 TABLET ORAL at 15:15

## 2024-01-03 RX ADMIN — FUROSEMIDE 20 MG: 20 TABLET ORAL at 08:32

## 2024-01-03 RX ADMIN — KETOROLAC TROMETHAMINE 30 MG: 30 INJECTION, SOLUTION INTRAMUSCULAR; INTRAVENOUS at 05:33

## 2024-01-03 RX ADMIN — OXYCODONE HYDROCHLORIDE 5 MG: 5 TABLET ORAL at 08:32

## 2024-01-03 RX ADMIN — SPIRONOLACTONE 50 MG: 25 TABLET ORAL at 08:32

## 2024-01-03 RX ADMIN — ACETAMINOPHEN 1000 MG: 500 TABLET ORAL at 08:31

## 2024-01-03 RX ADMIN — DICLOFENAC SODIUM 2 G: 10 GEL TOPICAL at 15:16

## 2024-01-03 RX ADMIN — OXYCODONE HYDROCHLORIDE 10 MG: 5 TABLET ORAL at 16:56

## 2024-01-03 RX ADMIN — POLYETHYLENE GLYCOL 3350 17 G: 17 POWDER, FOR SOLUTION ORAL at 12:53

## 2024-01-03 RX ADMIN — CEFEPIME 2000 MG: 2 INJECTION, POWDER, FOR SOLUTION INTRAVENOUS at 20:21

## 2024-01-03 RX ADMIN — STANDARDIZED SENNA CONCENTRATE AND DOCUSATE SODIUM 1 TABLET: 8.6; 5 TABLET ORAL at 12:52

## 2024-01-03 RX ADMIN — DIPHENHYDRAMINE HYDROCHLORIDE 25 MG: 50 INJECTION INTRAMUSCULAR; INTRAVENOUS at 20:30

## 2024-01-03 RX ADMIN — ATORVASTATIN CALCIUM 80 MG: 40 TABLET, FILM COATED ORAL at 08:30

## 2024-01-03 RX ADMIN — PANTOPRAZOLE SODIUM 40 MG: 40 TABLET, DELAYED RELEASE ORAL at 16:44

## 2024-01-03 RX ADMIN — CARVEDILOL 12.5 MG: 6.25 TABLET, FILM COATED ORAL at 08:31

## 2024-01-03 RX ADMIN — VALSARTAN 320 MG: 160 TABLET, FILM COATED ORAL at 08:31

## 2024-01-03 RX ADMIN — CEFEPIME 2000 MG: 2 INJECTION, POWDER, FOR SOLUTION INTRAVENOUS at 08:29

## 2024-01-03 RX ADMIN — OXYCODONE HYDROCHLORIDE 10 MG: 5 TABLET ORAL at 12:52

## 2024-01-03 RX ADMIN — DICLOFENAC SODIUM 2 G: 10 GEL TOPICAL at 20:09

## 2024-01-03 RX ADMIN — VANCOMYCIN HYDROCHLORIDE 1000 MG: 1 INJECTION, POWDER, LYOPHILIZED, FOR SOLUTION INTRAVENOUS at 13:03

## 2024-01-03 RX ADMIN — ENOXAPARIN SODIUM 40 MG: 100 INJECTION SUBCUTANEOUS at 08:30

## 2024-01-03 RX ADMIN — ACETAMINOPHEN 1000 MG: 500 TABLET ORAL at 20:08

## 2024-01-03 RX ADMIN — PANTOPRAZOLE SODIUM 40 MG: 40 TABLET, DELAYED RELEASE ORAL at 05:33

## 2024-01-03 RX ADMIN — ASPIRIN 81 MG: 81 TABLET, CHEWABLE ORAL at 08:32

## 2024-01-03 RX ADMIN — CARVEDILOL 12.5 MG: 6.25 TABLET, FILM COATED ORAL at 16:44

## 2024-01-03 ASSESSMENT — PAIN DESCRIPTION - DESCRIPTORS
DESCRIPTORS: SHARP

## 2024-01-03 ASSESSMENT — PAIN SCALES - GENERAL
PAINLEVEL_OUTOF10: 7
PAINLEVEL_OUTOF10: 8
PAINLEVEL_OUTOF10: 7
PAINLEVEL_OUTOF10: 8

## 2024-01-03 ASSESSMENT — PAIN DESCRIPTION - LOCATION
LOCATION: KNEE

## 2024-01-03 ASSESSMENT — PAIN DESCRIPTION - PAIN TYPE
TYPE: ACUTE PAIN
TYPE: ACUTE PAIN

## 2024-01-03 ASSESSMENT — ENCOUNTER SYMPTOMS
COUGH: 0
WHEEZING: 0
SINUS PRESSURE: 0
SHORTNESS OF BREATH: 0
NAUSEA: 0
SORE THROAT: 0
ABDOMINAL PAIN: 0
RHINORRHEA: 0
SINUS PAIN: 0
EYE REDNESS: 0
CONSTIPATION: 0
BACK PAIN: 0
DIARRHEA: 0
EYE DISCHARGE: 0

## 2024-01-03 ASSESSMENT — PAIN - FUNCTIONAL ASSESSMENT
PAIN_FUNCTIONAL_ASSESSMENT: PREVENTS OR INTERFERES WITH ALL ACTIVE AND SOME PASSIVE ACTIVITIES

## 2024-01-03 ASSESSMENT — PAIN DESCRIPTION - ORIENTATION
ORIENTATION: LEFT

## 2024-01-03 NOTE — PROGRESS NOTES
consulted: poor candidate for long term anticoagulation.   [id number]  score of 4.  Evaluated for Watchman device.  Resume Coumadin when stable/Xarelto.    Chronic diastolic heart failure:  No acute decompensation.  On Lasix, beta-blocker and ARB    Hypokalemia and hypomagnesemia:  Replaced.  Monitor electrolytes and replace as needed.      Coronary artery disease: Continue BB, ASA & Statin.      DVT Prophylaxis: Lovenox  Diet: ADULT DIET; Regular  Code Status: Full Code      Dispo - SNF once acute medical processes have resolved    Salma Branham MD

## 2024-01-03 NOTE — PROGRESS NOTES
Percocet [Oxycodone-Acetaminophen] Itching       Social history:     Social History:  All social andepidemiologic history was reviewed and updated by me today as needed.     Tobacco use:   reports that she has never smoked. She has never used smokeless tobacco.  Alcohol use:   reports no history of alcohol use.  Currently lives in: [place] [address]   reports no history of drug use.     COVID VACCINATION AND LAB RESULT RECORDS:     Internal Administration   First Dose COVID-19, MODERNA BLUE border, Primary or Immunocompromised, (age 12y+), IM, 100 mcg/0.5mL  04/27/2021   Second Dose COVID-19, MODERNA BLUE border, Primary or Immunocompromised, (age 12y+), IM, 100 mcg/0.5mL   05/25/2021       Last COVID Lab SARS-CoV-2, NAAT (no units)   Date Value   10/20/2022 Not Detected     POC Anion Gap (no units)   Date Value   05/25/2016 9 (L)     POC BUN (mg/dL)   Date Value   05/25/2016 18     POC Chloride (mmol/L)   Date Value   05/25/2016 104     POC Creatinine (mg/dL)   Date Value   05/25/2016 1.2     POC Glucose (mg/dl)   Date Value   02/25/2018 100 (H)     INR,(POC) (no units)   Date Value   12/14/2023 2.5     POC Potassium (mmol/L)   Date Value   05/25/2016 5.1     Sample Type (no units)   Date Value   05/25/2016 HUDSON     POC Sodium (mmol/L)   Date Value   05/25/2016 138     POC Troponin I (ng/mL)   Date Value   09/24/2014 0.01            Assessment:     The patient is a 73 y.o. old female who  has a past medical history of Acute cerebrovascular accident (CVA) ([facility]) (01/28/2020), LAST (acute kidney injury) ([facility]) (10/20/2022), Atrial fibrillation ([facility]), Bell palsy, CAD (coronary artery disease), Cerebral artery occlusion with cerebral infarction ([facility]), Chronic diastolic CHF (congestive heart failure) ([facility]) (05/16/2020), CVA (cerebral vascular accident) ([facility]) (01/04/2017), Hypertension, Intracranial hemorrhage ([facility]) (04/2016), Mixed hyperlipidemia (07/06/2022), Nonintractable headache, Nontraumatic subcortical  (shortness of breath) R06.02    Ascending aorta dilatation (HCC) I77.810    Effusion of left knee M25.462    Knee pain M25.569    Hemarthrosis of left knee M25.062    COVID U07.1    CRP elevated R79.82    Elevated erythrocyte sedimentation rate R70.0    Class 1 obesity due to excess calories with body mass index (BMI) of 32.0 to 32.9 in adult E66.09, Z68.32    Chronic atrial fibrillation (HCC) I48.20       Please note that this chart was generated using Dragon dictation software. Although every effort was made to ensure the accuracy of this automated transcription, some errors in transcription may have occurred inadvertently. If you may need any clarification, please do not hesitate to contact me through EPIC or at the phone number provided below with my electronic signature.  Any pictures or media included in this note were obtained after taking informed verbal consent from the patient and with their approval to include those in the patient's medical record.        Clinton Mcgowan MD, MPH, FACP, FIDSA  1/3/2024, 2:40 PM  [facility] Infectious Disease   [address] ([address]  [place] [address]  Office: [phone]  Fax: [phone]  In-person Clinic days:  Tuesday & Thursday a.m.  Virtual clinic days: Monday, Wednesday & Friday a.m.

## 2024-01-03 NOTE — PROGRESS NOTES
Shift assessment completed, pt is alert and oriented, VSS, fall precautions in place, call light within reach, denies any other needs at this time, see flowsheets and MAR, will monitor pt. The care plan and education has been reviewed and mutually agreed upon with the patient.

## 2024-01-03 NOTE — PLAN OF CARE
Problem: Discharge Planning  Goal: Discharge to home or other facility with appropriate resources  1/3/2024 0044 by Francisco Echols RN  Outcome: Progressing     Problem: Pain  Goal: Verbalizes/displays adequate comfort level or baseline comfort level  1/3/2024 0044 by Francisco Echols RN  Outcome: Progressing     Problem: Safety - Adult  Goal: Free from fall injury  1/3/2024 0044 by Francisco Echols RN  Outcome: Progressing     Problem: ABCDS Injury Assessment  Goal: Absence of physical injury  1/3/2024 0044 by Francisco Echols RN  Outcome: Progressing     Problem: Chronic Conditions and Co-morbidities  Goal: Patient's chronic conditions and co-morbidity symptoms are monitored and maintained or improved  1/3/2024 0044 by Francisco Echols RN  Outcome: Progressing     Problem: Skin/Tissue Integrity  Goal: Absence of new skin breakdown  Description: 1.  Monitor for areas of redness and/or skin breakdown  2.  Assess vascular access sites hourly  3.  Every 4-6 hours minimum:  Change oxygen saturation probe site  4.  Every 4-6 hours:  If on nasal continuous positive airway pressure, respiratory therapy assess nares and determine need for appliance change or resting period.  1/3/2024 0044 by Francisco Echols RN  Outcome: Progressing

## 2024-01-03 NOTE — CARE COORDINATION
SW spoke with pt's son, Terrence, regarding pt's decline for SNF and desire to discharge home even though she is a 2 person assist and non-ambulatory at this time.  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  Son asked if pt could go to a SNF but \"not stay overnight.\"  SW stated no this would not be possible.  Son stated that they would like C set up then, and family would help assist with the rest.  SW explained that HHC would be minimal to maybe up to 3 visits per week.  Son verbalized understanding and stated that between him and other family members, they would be able to assist patient with all her needs.    Called pt in her room and confirmed she wants to discharge home and that family will be able to assist.  Pt agreeable to [facility] and wants the same agency she had last time which was [facility].  Discussed other needs at home and patient was agreeable to a referral to [facility] Fast [facility] program to see if she qualifies for any home non-skilled services.      Called [facility] w/[facility] who accepted patient back.  Faxed a referral to [facility] Program and called [facility] informing of pt's information and faxed referral.      Discharge Plan:  Declining SNF.  Home w/[facility] and assistance from family for daily needs.  Referral to [facility]    Electronically signed by DAVIDSON Cleveland, LSW on 1/3/2024 at 3:08 PM

## 2024-01-03 NOTE — PROGRESS NOTES
ORTHOPAEDIC PROGRESS NOTE    Chief Complaint   Patient presents with    Leg Pain     Pt arrived to ED via [facility] ems from home with c/o left sided leg pain. Per pt, she was cutting up greens for her [employer]RIANNE celebration when suddenly she began having sharp pain that radiates from her thigh to her shin. Per pt she has never experienced pain like this before. Pt takes warfarin for afib. Hx of afib, htn, arthritis.        HPI  Left knee symptoms unchanged  No acute events    Past Medical History:   Diagnosis Date    Acute cerebrovascular accident (CVA) ([facility]) 01/28/2020    brain bleed    LAST (acute kidney injury) ([facility]) 10/20/2022    Atrial fibrillation ([facility])     Bell palsy     diagnosed 15 years ago    CAD (coronary artery disease)     Cerebral artery occlusion with cerebral infarction ([facility])     Chronic diastolic CHF (congestive heart failure) ([facility]) 05/16/2020    CVA (cerebral vascular accident) ([facility]) 01/04/2017    Hypertension     Intracranial hemorrhage ([facility]) 04/2016    Mixed hyperlipidemia 07/06/2022    Nonintractable headache     currently controlled    Nontraumatic subcortical hemorrhage of cerebral hemisphere ([facility]) 04/30/2016    Poor vision     after bells palsy in 2012    Primary osteoarthritis of right knee 03/18/2022    Sudden visual loss of left eye     patient states \"In very corner of my eye.\"    Thyroid nodule 02/08/2018    TIA involving right internal carotid artery        Past Surgical History:   Procedure Laterality Date    CARDIAC SURGERY      stentsx2    COLONOSCOPY      CORONARY ANGIOPLASTY WITH STENT PLACEMENT      ROTATOR CUFF REPAIR Right     TUBAL LIGATION         Allergies   Allergen Reactions    Clonidine Rash, Shortness Of Breath and Hives    Codeine Hives, Itching, Nausea Only and Rash     Other reaction(s): Other (See Comments)  Pruritis    Hydrocodone-Acetaminophen      Itchy      Lisinopril Hives and Itching    Tramadol Other (See Comments)     Unknown reaction    Percocet

## 2024-01-03 NOTE — CARE COORDINATION
Case Management Assessment  Initial Evaluation    Date/Time of Evaluation: 1/3/2024 9:03 AM  Assessment Completed by: Mark Elmore Jr, RN    If patient is discharged prior to next notation, then this note serves as note for discharge by case management.    Patient Name: Emilee Paulson                   YOB: 1950  Diagnosis: Unable to ambulate [R26.2]  Effusion of left knee [M25.462]  Anticoagulated on Coumadin [Z79.01]  Knee pain [M25.569]                   Date / Time: 12/30/2023  9:11 PM    Patient Admission Status: Inpatient   Readmission Risk (Low < 19, Mod (19-27), High > 27): Readmission Risk Score: 17.7    Current PCP: Stefano Suarez MD  PCP verified by CM? (P) Yes    Chart Reviewed: Yes      History Provided by: (P) Patient  Patient Orientation: (P) Alert and Oriented    Patient Cognition: (P) Alert    Hospitalization in the last 30 days (Readmission):  No    If yes, Readmission Assessment in  Navigator will be completed.    Advance Directives:      Code Status: Full Code   Patient's Primary Decision Maker is: (P) Legal Next of Kin    Primary Decision Maker: Terrence Desouza - Child - [phone]    Discharge Planning:    Patient lives with: (P) Children Type of Home: (P) House (two steps to enter)  Primary Care Giver: (P) Self  Patient Support Systems include: (P) Family Members   Current Financial resources: (P) Medicare  Current community resources: (P) None  Current services prior to admission: (P) None            Current DME:              Type of Home Care services:  (P) None    ADLS  Prior functional level: (P) Assistance with the following:, Mobility (cane for ambulation)  Current functional level: (P) Assistance with the following:, Mobility    PT AM-PAC: 7 /24  OT AM-PAC: 14 /24    Family can provide assistance at DC: (P) Yes  Would you like Case Management to discuss the discharge plan with any other family members/significant others, and if so, who? (P) No  Plans to Return   RN  Case Management Department  Ph: [phone] Fax: [phone]

## 2024-01-04 LAB
ANION GAP SERPL CALCULATED.3IONS-SCNC: 7 MMOL/L (ref 3–16)
BILIRUB UR QL STRIP.AUTO: ABNORMAL
BUN SERPL-MCNC: 18 MG/DL (ref 7–20)
CALCIUM SERPL-MCNC: 9.4 MG/DL (ref 8.3–10.6)
CHLORIDE SERPL-SCNC: 103 MMOL/L (ref 99–110)
CLARITY UR: CLEAR
CO2 SERPL-SCNC: 26 MMOL/L (ref 21–32)
COLOR UR: ABNORMAL
CREAT SERPL-MCNC: 1.3 MG/DL (ref 0.6–1.2)
FOLATE SERPL-MCNC: 5.61 NG/ML (ref 4.78–24.2)
GFR SERPLBLD CREATININE-BSD FMLA CKD-EPI: 44 ML/MIN/{1.73_M2}
GLUCOSE SERPL-MCNC: 105 MG/DL (ref 70–99)
GLUCOSE UR STRIP.AUTO-MCNC: NEGATIVE MG/DL
HGB UR QL STRIP.AUTO: NEGATIVE
IRON SATN MFR SERPL: 11 % (ref 15–50)
IRON SERPL-MCNC: 26 UG/DL (ref 37–145)
KETONES UR STRIP.AUTO-MCNC: NEGATIVE MG/DL
LEUKOCYTE ESTERASE UR QL STRIP.AUTO: NEGATIVE
NITRITE UR QL STRIP.AUTO: NEGATIVE
PH UR STRIP.AUTO: 6.5 [PH] (ref 5–8)
POTASSIUM SERPL-SCNC: 3.4 MMOL/L (ref 3.5–5.1)
PROT UR STRIP.AUTO-MCNC: 30 MG/DL
SODIUM SERPL-SCNC: 136 MMOL/L (ref 136–145)
SP GR UR STRIP.AUTO: 1.01 (ref 1–1.03)
TIBC SERPL-MCNC: 227 UG/DL (ref 260–445)
UA DIPSTICK W REFLEX MICRO PNL UR: YES
URN SPEC COLLECT METH UR: ABNORMAL
UROBILINOGEN UR STRIP-ACNC: >=8 E.U./DL
VANCOMYCIN SERPL-MCNC: 22 UG/ML
VIT B12 SERPL-MCNC: 1079 PG/ML (ref 211–911)

## 2024-01-04 PROCEDURE — 51798 US URINE CAPACITY MEASURE: CPT

## 2024-01-04 PROCEDURE — 2500000003 HC RX 250 WO HCPCS: Performed by: NURSE PRACTITIONER

## 2024-01-04 PROCEDURE — 2580000003 HC RX 258: Performed by: INTERNAL MEDICINE

## 2024-01-04 PROCEDURE — 80048 BASIC METABOLIC PNL TOTAL CA: CPT

## 2024-01-04 PROCEDURE — 97116 GAIT TRAINING THERAPY: CPT

## 2024-01-04 PROCEDURE — 99232 SBSQ HOSP IP/OBS MODERATE 35: CPT | Performed by: INTERNAL MEDICINE

## 2024-01-04 PROCEDURE — 6360000002 HC RX W HCPCS: Performed by: INTERNAL MEDICINE

## 2024-01-04 PROCEDURE — 1200000000 HC SEMI PRIVATE

## 2024-01-04 PROCEDURE — 36415 COLL VENOUS BLD VENIPUNCTURE: CPT

## 2024-01-04 PROCEDURE — 6360000002 HC RX W HCPCS: Performed by: NURSE PRACTITIONER

## 2024-01-04 PROCEDURE — 80202 ASSAY OF VANCOMYCIN: CPT

## 2024-01-04 PROCEDURE — 6370000000 HC RX 637 (ALT 250 FOR IP): Performed by: NURSE PRACTITIONER

## 2024-01-04 PROCEDURE — 97110 THERAPEUTIC EXERCISES: CPT

## 2024-01-04 PROCEDURE — 97530 THERAPEUTIC ACTIVITIES: CPT

## 2024-01-04 PROCEDURE — 6370000000 HC RX 637 (ALT 250 FOR IP): Performed by: INTERNAL MEDICINE

## 2024-01-04 PROCEDURE — 6370000000 HC RX 637 (ALT 250 FOR IP): Performed by: PHYSICIAN ASSISTANT

## 2024-01-04 PROCEDURE — 97535 SELF CARE MNGMENT TRAINING: CPT

## 2024-01-04 RX ORDER — HYDRALAZINE HYDROCHLORIDE 20 MG/ML
5 INJECTION INTRAMUSCULAR; INTRAVENOUS EVERY 4 HOURS PRN
Status: DISCONTINUED | OUTPATIENT
Start: 2024-01-04 | End: 2024-01-05

## 2024-01-04 RX ORDER — LIDOCAINE HYDROCHLORIDE 10 MG/ML
5 INJECTION, SOLUTION EPIDURAL; INFILTRATION; INTRACAUDAL; PERINEURAL ONCE
Status: COMPLETED | OUTPATIENT
Start: 2024-01-04 | End: 2024-01-04

## 2024-01-04 RX ORDER — TIZANIDINE 4 MG/1
4 TABLET ORAL EVERY 6 HOURS PRN
Status: DISCONTINUED | OUTPATIENT
Start: 2024-01-04 | End: 2024-01-06 | Stop reason: HOSPADM

## 2024-01-04 RX ORDER — POTASSIUM CHLORIDE 7.45 MG/ML
10 INJECTION INTRAVENOUS
Status: DISCONTINUED | OUTPATIENT
Start: 2024-01-04 | End: 2024-01-04

## 2024-01-04 RX ORDER — POTASSIUM CHLORIDE 20 MEQ/1
40 TABLET, EXTENDED RELEASE ORAL
Status: DISCONTINUED | OUTPATIENT
Start: 2024-01-04 | End: 2024-01-06

## 2024-01-04 RX ORDER — METHYLPREDNISOLONE ACETATE 40 MG/ML
40 INJECTION, SUSPENSION INTRA-ARTICULAR; INTRALESIONAL; INTRAMUSCULAR; SOFT TISSUE ONCE
Status: COMPLETED | OUTPATIENT
Start: 2024-01-04 | End: 2024-01-04

## 2024-01-04 RX ADMIN — VANCOMYCIN HYDROCHLORIDE 1000 MG: 1 INJECTION, POWDER, LYOPHILIZED, FOR SOLUTION INTRAVENOUS at 00:46

## 2024-01-04 RX ADMIN — ACETAMINOPHEN 1000 MG: 500 TABLET ORAL at 20:29

## 2024-01-04 RX ADMIN — ACETAMINOPHEN 1000 MG: 500 TABLET ORAL at 08:58

## 2024-01-04 RX ADMIN — CEFEPIME 2000 MG: 2 INJECTION, POWDER, FOR SOLUTION INTRAVENOUS at 10:18

## 2024-01-04 RX ADMIN — POTASSIUM CHLORIDE 10 MEQ: 7.46 INJECTION, SOLUTION INTRAVENOUS at 08:14

## 2024-01-04 RX ADMIN — ASPIRIN 81 MG: 81 TABLET, CHEWABLE ORAL at 08:59

## 2024-01-04 RX ADMIN — LIDOCAINE HYDROCHLORIDE 5 ML: 10 INJECTION, SOLUTION EPIDURAL; INFILTRATION; INTRACAUDAL; PERINEURAL at 12:02

## 2024-01-04 RX ADMIN — CARVEDILOL 12.5 MG: 6.25 TABLET, FILM COATED ORAL at 09:03

## 2024-01-04 RX ADMIN — SODIUM CHLORIDE 1000 ML: 9 INJECTION, SOLUTION INTRAVENOUS at 14:54

## 2024-01-04 RX ADMIN — PANTOPRAZOLE SODIUM 40 MG: 40 TABLET, DELAYED RELEASE ORAL at 15:19

## 2024-01-04 RX ADMIN — METHYLPREDNISOLONE ACETATE 40 MG: 40 INJECTION, SUSPENSION INTRA-ARTICULAR; INTRALESIONAL; INTRAMUSCULAR; SOFT TISSUE at 12:04

## 2024-01-04 RX ADMIN — OXYCODONE HYDROCHLORIDE 5 MG: 5 TABLET ORAL at 08:58

## 2024-01-04 RX ADMIN — TIZANIDINE 4 MG: 4 TABLET ORAL at 17:25

## 2024-01-04 RX ADMIN — PANTOPRAZOLE SODIUM 40 MG: 40 TABLET, DELAYED RELEASE ORAL at 05:14

## 2024-01-04 RX ADMIN — POTASSIUM CHLORIDE 40 MEQ: 1500 TABLET, EXTENDED RELEASE ORAL at 08:59

## 2024-01-04 RX ADMIN — MORPHINE SULFATE 2 MG: 2 INJECTION, SOLUTION INTRAMUSCULAR; INTRAVENOUS at 05:39

## 2024-01-04 RX ADMIN — DICLOFENAC SODIUM 4 G: 10 GEL TOPICAL at 20:30

## 2024-01-04 RX ADMIN — DICLOFENAC SODIUM 2 G: 10 GEL TOPICAL at 09:00

## 2024-01-04 RX ADMIN — POLYETHYLENE GLYCOL 3350 17 G: 17 POWDER, FOR SOLUTION ORAL at 08:59

## 2024-01-04 RX ADMIN — CARVEDILOL 12.5 MG: 6.25 TABLET, FILM COATED ORAL at 17:25

## 2024-01-04 RX ADMIN — SODIUM CHLORIDE 200 MG: 9 INJECTION, SOLUTION INTRAVENOUS at 15:18

## 2024-01-04 RX ADMIN — ACETAMINOPHEN 1000 MG: 500 TABLET ORAL at 15:12

## 2024-01-04 RX ADMIN — ATORVASTATIN CALCIUM 80 MG: 40 TABLET, FILM COATED ORAL at 09:03

## 2024-01-04 RX ADMIN — STANDARDIZED SENNA CONCENTRATE AND DOCUSATE SODIUM 1 TABLET: 8.6; 5 TABLET ORAL at 08:59

## 2024-01-04 ASSESSMENT — ENCOUNTER SYMPTOMS
SINUS PRESSURE: 0
EYE DISCHARGE: 0
RHINORRHEA: 0
NAUSEA: 0
EYE REDNESS: 0
COUGH: 0
SINUS PAIN: 0
SORE THROAT: 0
SHORTNESS OF BREATH: 0
BACK PAIN: 0
ABDOMINAL PAIN: 0
CONSTIPATION: 0
DIARRHEA: 0
WHEEZING: 0

## 2024-01-04 ASSESSMENT — PAIN DESCRIPTION - DESCRIPTORS
DESCRIPTORS: DISCOMFORT;SHARP
DESCRIPTORS: SHARP;STABBING;SHOOTING
DESCRIPTORS: SHARP;DISCOMFORT;SHOOTING
DESCRIPTORS: ACHING;SHARP

## 2024-01-04 ASSESSMENT — PAIN DESCRIPTION - ORIENTATION
ORIENTATION: LEFT

## 2024-01-04 ASSESSMENT — PAIN DESCRIPTION - LOCATION
LOCATION: KNEE;LEG
LOCATION: KNEE

## 2024-01-04 ASSESSMENT — PAIN SCALES - GENERAL
PAINLEVEL_OUTOF10: 7
PAINLEVEL_OUTOF10: 7
PAINLEVEL_OUTOF10: 10
PAINLEVEL_OUTOF10: 8
PAINLEVEL_OUTOF10: 10
PAINLEVEL_OUTOF10: 6

## 2024-01-04 ASSESSMENT — PAIN - FUNCTIONAL ASSESSMENT
PAIN_FUNCTIONAL_ASSESSMENT: PREVENTS OR INTERFERES WITH ALL ACTIVE AND SOME PASSIVE ACTIVITIES
PAIN_FUNCTIONAL_ASSESSMENT: PREVENTS OR INTERFERES WITH ALL ACTIVE AND SOME PASSIVE ACTIVITIES

## 2024-01-04 ASSESSMENT — PAIN DESCRIPTION - ONSET: ONSET: ON-GOING

## 2024-01-04 ASSESSMENT — PAIN DESCRIPTION - FREQUENCY: FREQUENCY: CONTINUOUS

## 2024-01-04 ASSESSMENT — PAIN DESCRIPTION - PAIN TYPE: TYPE: ACUTE PAIN

## 2024-01-04 NOTE — PROGRESS NOTES
Infectious Diseases   Progress Note      Admission Date: 12/30/2023  Hospital Day: Hospital Day: 6   Attending: Salma Branham MD  Date of service: 1/4/2024     Chief complaint/ Reason for consult:     Left knee inflammatory arthritis  COVID 19 infection  Elevated CRP of 95.8  Elevated ESR of 49    Microbiology:        I have reviewed allavailable micro lab data and cultures    Blood culture (2/2) - collected on 1/1/24: negative  Left knee synovial fluid culture collected on 1/1/2024: negative      Antibiotics and immunizations:       Current antibiotics: All antibiotics and their doses were reviewed by me    Recent Abx Admin                     ceFEPIme (MAXIPIME) 2,000 mg in sodium chloride 0.9 % 100 mL IVPB (mini-bag) (mg) 2,000 mg New Bag 01/04/24 1018     2,000 mg New Bag 01/03/24 2021    vancomycin (VANCOCIN) 1,000 mg in sodium chloride 0.9 % 250 mL IVPB ([id number]) (mg) 1,000 mg New Bag 01/04/24 0046                      Immunization History: All immunization history was reviewed by me today.    Immunization History   Administered Date(s) Administered    COVID-19, MODERNA BLUE border, Primary or Immunocompromised, (age 12y+), IM, 100 mcg/0.5mL 04/27/2021, 05/25/2021, 12/13/2021    Influenza Vaccine, unspecified formulation 02/10/2007, 12/20/2007, 01/30/2008    Influenza Virus Vaccine 02/10/2007, 12/20/2007, 01/30/2008    Pneumococcal, PPSV23, PNEUMOVAX 23, (age 2y+), SC/IM, 0.5mL 10/01/2007    TDaP, ADACEL (age 10y-64y), BOOSTRIX (age 10y+), IM, 0.5mL 12/20/2007       Known drug allergies:     All allergies were reviewed and updated    Allergies   Allergen Reactions    Clonidine Rash, Shortness Of Breath and Hives    Codeine Hives, Itching, Nausea Only and Rash     Other reaction(s): Other (See Comments)  Pruritis    Hydrocodone-Acetaminophen      Itchy      Lisinopril Hives and Itching    Tramadol Other (See Comments)     Unknown reaction    Percocet [Oxycodone-Acetaminophen] Itching       Social

## 2024-01-04 NOTE — PROGRESS NOTES
subcortical hemorrhage of cerebral hemisphere (HCC), Poor vision, Primary osteoarthritis of right knee, Sudden visual loss of left eye, Thyroid nodule, and TIA involving right internal carotid artery.  Past Surgical History:  has a past surgical history that includes Cardiac surgery; Tubal ligation; Coronary angioplasty with stent; Colonoscopy; and Rotator cuff repair (Right).    Discharge Recommendations: Emilee Paulson scored a 15/24 on the AM-PAC ADL Inpatient form. Current research shows that an AM-PAC score of 17 or less is typically not associated with a discharge to the patient's home setting. Based on the patient's AM-PAC score and their current ADL deficits, it is recommended that the patient have 3-5 sessions per week of Occupational Therapy at d/c to increase the patient's independence.  Please see assessment section for further patient specific details.    If patient discharges prior to next session this note will serve as a discharge summary.  Please see below for the latest assessment towards goals.      DME Required For Discharge: rolling walker, wheelchair, bedside commode, tub transfer bench- family planning on taking pt home    Precautions/Restrictions: high fall risk, Isolation precautions COVID19 (+)  Weight Bearing Restrictions: weight bearing as tolerated  [] Right Upper Extremity  [] Left Upper Extremity [] Right Lower Extremity  [x] Left Lower Extremity     Required Braces/Orthotics: no braces required   [] Right  [] Left  Positional Restrictions:no positional restrictions    Pre-Admission Information   Lives With:  14 year old great grandson                     Type of Home: house  Home Layout: one level  Home Access:  1+1  step to enter without rails   Bathroom Layout: tub/shower unit  Bathroom Equipment:  none  Toilet Height: standard height  Home Equipment: single point cane  Transfer Assistance: modified independent with use of SPC  Ambulation Assistance:modified independent with use  rolling walker  Required Assistance: contact guard assistance  Comment: no LOB noted, heavy UE use, cues for postural corrections  Balance:  Static Sitting Balance: fair (+): maintains balance at SBA/supervision without use of UE support  Dynamic Sitting Balance: fair (+): maintains balance at SBA/supervision without use of UE support  Static Standing Balance: fair (-): maintains balance at CGA with use of UE support  Comments:     Other Therapeutic Interventions    Functional Outcomes  AM-PAC Inpatient Daily Activity Raw Score: 15    Cognition  WFL  Orientation:    alert and oriented x 4  Command Following:   WFL     Education  Barriers To Learning: none  Patient Education: patient educated on goals, OT role and benefits, plan of care, transfer training, discharge recommendations  Learning Assessment:  patient verbalizes understanding, would benefit from continued reinforcement    Assessment  Activity Tolerance: tolerated fair, very limited by pain.   Impairments Requiring Therapeutic Intervention: decreased functional mobility, decreased ADL status, decreased strength, decreased endurance, decreased balance, decreased IADL, increased pain  Prognosis: good and fair  Clinical Assessment: Pt is currently functioning below occupational baseline and demo the deficits listed above. Pt is typically IND with ADL/IADl tasks. Pt able to tolerate functional transfers and short distance ambulation this session, however continues to be limited by pain. Pt would benefit from continued skilled OT services to address these deficits and increase IND, safety, and ease with all occupational pursuits.  Safety Interventions: patient left in chair, chair alarm in place, call light within reach, gait belt, and nurse notified    Plan  Frequency: 3-5 x/per week  Current Treatment Recommendations: strengthening, balance training, functional mobility training, transfer training, endurance training, patient/caregiver education, ADL/self-care

## 2024-01-04 NOTE — CONSULTS
MD Heather Angulo MD Samir Brahmbhatt, MD                Office: [phone]                      Fax: [phone]               [employer]                     Nephrology consult received. Full consult report will follow.   Thank you for allowing us to participate in this patient's care. Please do not hesitate to contact us anytime. We will follow along with you.       Mario Owens MD  [facility]   [phone] or Via [employer].

## 2024-01-04 NOTE — PROGRESS NOTES
Clinical Pharmacy Note: Pharmacy to Dose Vancomycin    Vancomycin Day: 4  Indication: bone and joint infection  Current Dose: 1000 mg every 12 hours   Dosing Method: Bayesian Modeling    Random: 22    Recent Labs     01/03/24  0632 01/04/24  0514   BUN 18 18       Recent Labs     01/03/24  0632 01/04/24  0514   CREATININE 1.2 1.3*       Recent Labs     01/02/24  0640 01/03/24  0632   WBC 7.8 6.5         Intake/Output Summary (Last 24 hours) at 1/4/2024 1104  Last data filed at 1/3/2024 1720  Gross per 24 hour   Intake 776 ml   Output 1000 ml   Net -224 ml         Ht Readings from Last 1 Encounters:   12/30/23 1.626 m (5' 4\")        Wt Readings from Last 1 Encounters:   01/04/24 87.5 kg (192 lb 15.2 oz)         Body mass index is 33.12 kg/m².    Estimated Creatinine Clearance: 42 mL/min (A) (based on SCr of 1.3 mg/dL (H)).      Assessment/Plan:  Vancomycin level is currently supratherapeutic.  Decrease vancomycin regimen to 750 mg every 24 hours.   Bayesian Modeling predicts an AUC of 463 mg/L*hr and trough of 14.6 mg/L.  A vancomycin random level has been ordered on 1/5 at 0600 for follow-up.  Changes in regimen will be determined based on culture results, renal function, and clinical response.  Pharmacy will continue to monitor and adjust regimen as necessary.    Thank you for the consult,    Bertha Dewitt, PharmD [facility]  PGY-1 Resident  [id number]

## 2024-01-04 NOTE — PROGRESS NOTES
Hospitalist Progress Note      PCP: Stefano Suarez MD    Date of Admission: 12/30/2023    Chief Complaint: Left knee    Hospital Course:   Patient seen and examined.   Low-grade fevers  Left knee pain improved following arthrocentesis and steroid injection today.  No cough, dyspnea or chest pain.    Medications:  Reviewed      Exam:    BP (!) 170/119   Pulse 91   Temp 100 °F (37.8 °C) (Oral)   Resp 20   Ht 1.626 m (5' 4\")   Wt 87.5 kg (192 lb 15.2 oz)   SpO2 98%   BMI 33.12 kg/m²     General appearance: No apparent distress, appears stated age and cooperative.  HEENT: Pupils equal, round, and reactive to light. Conjunctivae clear.  Neck: Supple, with full range of motion. No jugular venous distention. Trachea midline.  Respiratory:  Normal respiratory effort. Clear to auscultation, bilaterally without RALES/WHEEZES/Rhonchi.  Cardiovascular: Regular rate and rhythm with normal S1/S2 without MURMURS, rubs or gallops.  Abdomen: Soft, non-tender, non-distended with normal bowel sounds.  Musculoskeletal: No clubbing, cyanosis.  Full range of motion without deformity.  Skin: Skin color, texture, turgor normal.  No rashes or lesions.  Neurologic:  Neurovascularly intact without any focal sensory/motor deficits. Cranial nerves: II-XII intact, grossly non-focal.  Left knee swollen decreased range of motion     Labs:   Recent Labs     01/02/24  0640 01/03/24  0632   WBC 7.8 6.5   HGB 11.0* 10.4*   HCT 32.5* 31.0*   PLT 97* 89*     Recent Labs     01/02/24  0640 01/03/24  0632 01/04/24  0514    138 136   K 3.6 3.6 3.4*   CL 99 103 103   CO2 28 27 26   BUN 10 18 18   CREATININE 0.6 1.2 1.3*   CALCIUM 9.6 9.3 9.4   PHOS  --  2.8  --      Recent Labs     01/03/24  0632   AST 32   ALT 18   BILITOT 2.4*   ALKPHOS 86     No results for input(s): \"INR\" in the last 72 hours.    No results for input(s): \"CKTOTAL\", \"TROPONINI\" in the last 72 hours.    Urinalysis:      Lab Results   Component Value Date/Time     hypoxia:  Supportive care.  No indication for steroids at this time.    Prerenal azotemia:  Scr 0.5 on admission now 1.2.  Hold Aldactone, Lasix and valsartan.  Toradol discontinued.  Nephrology consult appreciated: Continue IV hydration.  Monitor BMP & BP.    Constipation: Monitor on bowel regimen.      Mild anemia: work up c/w AMBROCIO/AoCD.  Started on iron replacement.    Hypokalemia: Replaced.  Monitor electrolytes and replace as needed.    Persistent atrial fibrillation:  On Coumadin at home.  Cardiology consulted: poor candidate for long term anticoagulation.   [id number]  score of 4.  Evaluated for Watchman device.  Resume Coumadin when stable/ok with orthopedics.    Chronic diastolic heart failure:  No acute decompensation.  Hold Aldactone, Lasix & Valsartan.  Continue beta-blocker.    Hypokalemia and hypomagnesemia:  Replaced.  Monitor electrolytes and replace as needed.      HTN: monitor BP on Coreg, as needed hydralazine.      Coronary artery disease: Continue BB, ASA & Statin.      DVT Prophylaxis: None  Diet: ADULT DIET; Regular  Code Status: Full Code      Dispo - SNF once acute medical processes have resolved    Salma Branham MD

## 2024-01-04 NOTE — PROGRESS NOTES
Shift assessment completed, pt is alert and oriented but forgetful at times, VSS, fall precautions in place, call light within reach, denies any other needs at this time, see flowsheets and MAR, will monitor pt. The care plan and education has been reviewed and mutually agreed upon with the patient.

## 2024-01-04 NOTE — PROGRESS NOTES
[facility] FOLLOW UP    CHIEF COMPLAINT:   Chief Complaint   Patient presents with    Leg Pain     Pt arrived to ED via [facility] ems from home with c/o left sided leg pain. Per pt, she was cutting up greens for her BRIANNE celebration when suddenly she began having sharp pain that radiates from her thigh to her shin. Per pt she has never experienced pain like this before. Pt takes warfarin for afib. Hx of afib, htn, arthritis.        HISTORY OF PRESENT ILLNESS:    The patient was assisted to the ground last night after she attempted to get up on her own and go to the bathroom.  She denies any new injuries.  Declines SNF and says she will have 24/7 care at home from her adult children.     Temp 100   Knee still very painful with any ROM  INR 1.32 on 1/1  Uric acid 7.2  No growth from aspirate 1/1 (70ml of bloody synovial fluid)  No crystals  1,917,100 RBC and 54,729 WBC with 91% neutrophil count    REVIEW OF SYSTEMS:  Constitutional: no fevers, chills or weight loss  Eyes: no vision changes  ENT: no ear pain, congestion, sore throat or voice changes  Respiratory: no cough, shortness of breath, or wheezing  Cardiovascular: no chest pain or dyspnea on exertion or no palpitations  Gastrointestinal:  no abdominal pain, change in bowel habits, or black or bloody stools or and no nausea, vomiting or diarrhea  Genitourinary:  no dysuria, trouble voiding, or hematuria  Hematologic/lymphatic: no easy bruising or lymphadenopathy  Musculoskeletal: See HPI.  Neurological: no dizziness, weakness or tremors, seizures.    Endocrine: no hair loss, increased thirst, dry skin or hot/cold intolerance  Dermatological: no rash, new skin lesions or itching    Labs:  Recent Labs     01/03/24  0632 01/02/24  0640 01/01/24  0827   WBC 6.5 7.8 8.5   HGB 10.4* 11.0* 11.1*   HCT 31.0* 32.5* 33.5*   MCV 90.9 90.3 90.5   PLT 89* 97* 112*     Lab Results   Component Value Date     01/04/2024    K 3.4 (L)

## 2024-01-04 NOTE — PLAN OF CARE
Problem: Discharge Planning  Goal: Discharge to home or other facility with appropriate resources  1/4/2024 0008 by Francisco Echols RN  Outcome: Progressing     Problem: Pain  Goal: Verbalizes/displays adequate comfort level or baseline comfort level  1/4/2024 0008 by Francisco Echols RN  Outcome: Progressing     Problem: Safety - Adult  Goal: Free from fall injury  1/4/2024 0008 by Francisco Echols RN  Outcome: Progressing     Problem: ABCDS Injury Assessment  Goal: Absence of physical injury  1/4/2024 0008 by Francisco Echols RN  Outcome: Progressing     Problem: Chronic Conditions and Co-morbidities  Goal: Patient's chronic conditions and co-morbidity symptoms are monitored and maintained or improved  1/4/2024 0008 by Francisco Echols RN  Outcome: Progressing     Problem: Skin/Tissue Integrity  Goal: Absence of new skin breakdown  Description: 1.  Monitor for areas of redness and/or skin breakdown  2.  Assess vascular access sites hourly  3.  Every 4-6 hours minimum:  Change oxygen saturation probe site  4.  Every 4-6 hours:  If on nasal continuous positive airway pressure, respiratory therapy assess nares and determine need for appliance change or resting period.  1/4/2024 0008 by Francisco Echols RN  Outcome: Progressing

## 2024-01-04 NOTE — PROGRESS NOTES
[facility] - Inpatient Rehabilitation Department   Phone: [phone]    Physical Therapy    [] Initial Evaluation            [x] Daily Treatment Note         [] Discharge Summary      Patient: Emilee Paulson   : 1950   MRN: [id number]   Date of Service:  2024  Admitting Diagnosis: Effusion of left knee  Current Admission Summary: Emilee Paulson is a 73 y.o. female who presents to the emergency department today for evaluation for concerns of left knee pain.  The patient states that she was standing, and she states that she was prepping greens for her New Year's Lesly party tomorrow.  The patient states that she began to experience pain and swelling to her left knee.  The patient denies falling or injuring himself in any way.  She does have a history of arthritis of the knee.  Patient is rating her pain as a 10/10, her pain is sharp, constant and she otherwise denies any known alleviating or improving factors.  Patient does have also have a history of arthritis in her right knee.  She has no fall, injury.  No fever or chills.  No numbness, tingling or weakness.  She has no abdominal pain.  No back pain.  Patient does have a history of A-fib she is anticoagulated on Coumadin, no other complaints.     Seen by ortho: Left knee hematoma. WBAT. Encouraged to work on knee ROM  Maximal elevation of left knee with multiple pillows  Aspiration   2nd aspiration : 50mL of blood aspirated, lidocaine and Depo-medrol injected    Past Medical History:  has a past medical history of Acute cerebrovascular accident (CVA) ([facility]), LAST (acute kidney injury) (Formerly [facility]), Atrial fibrillation ([facility]), Bell palsy, CAD (coronary artery disease), Cerebral artery occlusion with cerebral infarction ([facility]ly [facility]), Chronic diastolic CHF (congestive heart failure) (Formerly [facility]), CVA (cerebral vascular accident) ([facility]ly [facility]), Hypertension, Intracranial hemorrhage ([facility]ly [facility]), Mixed hyperlipidemia, Nonintractable headache, Nontraumatic subcortical  Assistance: modified independent with use of SPC  Ambulation Assistance:modified independent with use of SPC  ADL Assistance: independent with all ADL's  IADL Assistance: independent with homemaking tasks  Active :        [x] Yes  [] No  Hand Dominance: [] Left  [x] Right  Current Employment: retired.  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     Examination   Vision:   Vision Gross Assessment: Impaired and Vision Corrective Device: wears glasses for reading  Hearing:   hard of hearing (\"sometimes\" per patient)  Observation:   Slight facial droop - residual from Bell's Palsy per patient  Purewick in place  RA      Subjective  General: Supine in bed upon arrival. Agreeable to PT/OT session.  Pain: 8/10.  Location: L knee  Pain Interventions: pain medication in place prior to arrival, ice applied, repositioned , and therapy activities modified       Functional Mobility  Bed Mobility:  Supine to Sit: stand by assistance  Scooting: stand by assistance  Comments: HOB elevated, extra time needed  Transfers:  Sit to stand transfer: 2 person assistance with max A x 2 from EOB and CGA x 1 from recliner with use of arms of recliner   Stand to sit transfer: contact guard assistance  Stand pivot transfer: contact guard assistance  Comments: pt had difficulty with initial stand from EOB; cueing for UE placement; CGA with RW for transfer from bed <> chair with cueing for placement  Ambulation:  Surface:level surface  Assistive Device: rolling walker  Other Appliance: wheelchair follow  Assistance: contact guard assistance  Distance: 8ft + 8ft  Gait Mechanics: decreased stance time on L LE, short step length/height, narrow MICHELLE, heavy UE assist, forward flexed posture  Comments:  cueing for posture, horizontal gaze  Stair Mobility:  Stair mobility not completed on this date.  Comments:    Balance:  Static Sitting Balance: good: independent with functional balance

## 2024-01-04 NOTE — PROGRESS NOTES
Pt c/o burning at IV site with K replacement via IV. Attempted to reduce rate and is Y sited with NS but still has c/o burning. Paged Dr Branham. K replacement changed to PO. Pt c/o decreased sensation in left foot and left knee is warm and swollen. Also has temp of 100.0 F this morning. Spoke with LAKSHMI Penn and informed of these conditions. States he will be in to round this morning. No other new orders at this time.

## 2024-01-04 NOTE — CONSULTS
MD Heather Angulo MD Samir Brahmbhatt, MD                  Office: [phone]                      Fax: [phone]             [employer]                   NEPHROLOGY INITIAL CONSULT NOTE:     PATIENT NAME: Emilee Paulson  : 1950  MRN: [id number]  REASON FOR CONSULT: For evaluation and management of Acute Kidney Injury . (My recommendations will be communicated by way of shared medical record.)  Ordered On  Ordered By   2024  8:28 AM Salma Branham MD         RECOMMENDATIONS:   - random Vanco level 24: 22-likely due to LAST,  - IV antibiotic: Vancomycin: trough goal <15, pharmacy team assisting.      - Avoid further Toradol, NSAIDs,  - Stop Lasix, Aldactone, valsartan    - Hypertension uncontrolled, add as needed hydralazine  - Continue Coreg,  - Okay to keep slightly higher BP    - K-Chlor 40 mg a day  - Monitor K level,  - Monitor magnesium.    -check UA w/ microscopy, urine lytes,  -monitor PVR w/ bladder scan for orellana insertion need.     -at higher risk for decompensation, needing closer monitoring.    D/C plan from renal stand point:  - likely ~this weekend.      IMPRESSION:       Admitted on:  2023  9:11 PM   For:  Unable to ambulate [R26.2]  Effusion of left knee [M25.462]  Anticoagulated on Coumadin [Z79.01]  Knee pain [M25.569]       LAST  -   No h/o CKD:   - BL Scr-lowest recently less than 0.5, as of on admission,-> now slowly worsening since admission  - 1.3 when consulted.     - Etiology of LAST -presumably prerenal is most likely, possibility of COVID-19 related, in the setting of Lasix, Aldactone, valsartan, NSAIDs use       Associated problems:   - Volume status: hypovolemic  Chronic diastolic heart failure  : HTN : no need for tight control   : Na: WNL     - Azotemia: pre-renal better   - Electrolytes: K: Hypokalemia, mild  - Acid-Base: Stable  - Anemia: Mild, likely of chronic disease, with some underlying iron deficiency anemia      Other  \"YCO6QEA\", \"BEVEN\", \"S4EIJAAT\"    LDH:  No results found for: \"LDH\"  Uric Acid:    Lab Results   Component Value Date/Time    LABURIC 7.2 01/01/2024 11:53 AM       PT/INR:    Lab Results   Component Value Date/Time    PROTIME 16.4 01/01/2024 08:27 AM    PROTIME 16.3 09/14/2018 10:13 AM    INR 1.32 01/01/2024 08:27 AM     Warfarin PT/INR:  No components found for: \"PTPATWAR\", \"PTINRWAR\"  PTT:    Lab Results   Component Value Date/Time    APTT 30.8 10/19/2022 10:55 AM   [APTT}  Last 3 Troponin:    Lab Results   Component Value Date/Time    TROPONINI <0.01 03/07/2023 04:44 AM    TROPONINI <0.01 03/06/2023 06:36 PM    TROPONINI <0.01 03/06/2023 12:07 PM       U/A:    Lab Results   Component Value Date/Time    COLORU Yellow 10/27/2023 10:20 AM    PROTEINU Negative 10/27/2023 10:20 AM    PHUR 5.5 10/27/2023 10:20 AM    WBCUA 3 10/19/2022 01:40 PM    RBCUA 1 10/19/2022 01:40 PM    BACTERIA None Seen 10/19/2022 01:40 PM    CLARITYU Clear 10/27/2023 10:20 AM    SPECGRAV <=1.005 10/27/2023 10:20 AM    LEUKOCYTESUR Negative 10/27/2023 10:20 AM    UROBILINOGEN 0.2 10/27/2023 10:20 AM    BILIRUBINUR Negative 10/27/2023 10:20 AM    BLOODU Negative 10/27/2023 10:20 AM    GLUCOSEU Negative 10/27/2023 10:20 AM     Microalbumen/Creatinine ratio:  No components found for: \"RUCREAT\"  24 Hour Urine for Protein:  No components found for: \"RAWUPRO\", \"UHRS3\", \"DPAX32OQ\", \"UTV3\"  24 Hour Urine for Creatinine Clearance:  No components found for: \"CREAT4\", \"UHRS10\", \"UTV10\"  Urine Toxicology:  No components found for: \"IAMMENTA\", \"IBARBIT\", \"IBENZO\", \"ICOCAINE\", \"IMARTHC\", \"IOPIATES\", \"IPHENCYC\"    HgBA1c:    Lab Results   Component Value Date/Time    LABA1C 5.3 06/20/2022 02:07 AM     RPR:  No results found for: \"RPR\"  HIV:  No results found for: \"HIV\"  ELISA:  No results found for: \"ANATITER\", \"ELISA\"  RF:  No results found for: \"RF\"  DSDNA:  No components found for: \"DNA\"  AMYLASE:  No results found for: \"AMYLASE\"  LIPASE:    Lab Results

## 2024-01-05 LAB
ANION GAP SERPL CALCULATED.3IONS-SCNC: 6 MMOL/L (ref 3–16)
BACTERIA BLD CULT ORG #2: NORMAL
BACTERIA BLD CULT: NORMAL
BACTERIA URNS QL MICRO: ABNORMAL /HPF
BACTERIA URNS QL MICRO: NORMAL /HPF
BILIRUB UR QL STRIP.AUTO: NEGATIVE
BUN SERPL-MCNC: 12 MG/DL (ref 7–20)
CALCIUM SERPL-MCNC: 10 MG/DL (ref 8.3–10.6)
CHLORIDE SERPL-SCNC: 104 MMOL/L (ref 99–110)
CHLORIDE UR-SCNC: 59 MMOL/L
CLARITY UR: CLEAR
CO2 SERPL-SCNC: 26 MMOL/L (ref 21–32)
COLOR UR: ABNORMAL
CREAT SERPL-MCNC: 0.8 MG/DL (ref 0.6–1.2)
DEPRECATED RDW RBC AUTO: 13.3 % (ref 12.4–15.4)
EPI CELLS #/AREA URNS AUTO: 4 /HPF (ref 0–5)
EPI CELLS #/AREA URNS AUTO: 5 /HPF (ref 0–5)
GFR SERPLBLD CREATININE-BSD FMLA CKD-EPI: >60 ML/MIN/{1.73_M2}
GLUCOSE SERPL-MCNC: 108 MG/DL (ref 70–99)
GLUCOSE UR STRIP.AUTO-MCNC: NEGATIVE MG/DL
HCT VFR BLD AUTO: 30.3 % (ref 36–48)
HGB BLD-MCNC: 10 G/DL (ref 12–16)
HGB UR QL STRIP.AUTO: NEGATIVE
HYALINE CASTS #/AREA URNS AUTO: 0 /LPF (ref 0–8)
HYALINE CASTS #/AREA URNS AUTO: 2 /LPF (ref 0–8)
KETONES UR STRIP.AUTO-MCNC: NEGATIVE MG/DL
LEUKOCYTE ESTERASE UR QL STRIP.AUTO: ABNORMAL
MCH RBC QN AUTO: 29.9 PG (ref 26–34)
MCHC RBC AUTO-ENTMCNC: 33.1 G/DL (ref 31–36)
MCV RBC AUTO: 90.5 FL (ref 80–100)
NITRITE UR QL STRIP.AUTO: NEGATIVE
PH UR STRIP.AUTO: 5.5 [PH] (ref 5–8)
PLATELET # BLD AUTO: 119 K/UL (ref 135–450)
PMV BLD AUTO: 9.9 FL (ref 5–10.5)
POTASSIUM SERPL-SCNC: 3.7 MMOL/L (ref 3.5–5.1)
POTASSIUM UR-SCNC: 25.8 MMOL/L
PROT UR STRIP.AUTO-MCNC: 30 MG/DL
RBC # BLD AUTO: 3.34 M/UL (ref 4–5.2)
RBC CLUMPS #/AREA URNS AUTO: 0 /HPF (ref 0–4)
RBC CLUMPS #/AREA URNS AUTO: 2 /HPF (ref 0–4)
SODIUM SERPL-SCNC: 136 MMOL/L (ref 136–145)
SODIUM UR-SCNC: 47 MMOL/L
SP GR UR STRIP.AUTO: 1.01 (ref 1–1.03)
UA DIPSTICK W REFLEX MICRO PNL UR: YES
URN SPEC COLLECT METH UR: ABNORMAL
UROBILINOGEN UR STRIP-ACNC: 1 E.U./DL
VANCOMYCIN SERPL-MCNC: 10.6 UG/ML
WBC # BLD AUTO: 3.7 K/UL (ref 4–11)
WBC #/AREA URNS AUTO: 1 /HPF (ref 0–5)
WBC #/AREA URNS AUTO: 2 /HPF (ref 0–5)

## 2024-01-05 PROCEDURE — 6370000000 HC RX 637 (ALT 250 FOR IP): Performed by: INTERNAL MEDICINE

## 2024-01-05 PROCEDURE — 84300 ASSAY OF URINE SODIUM: CPT

## 2024-01-05 PROCEDURE — 51798 US URINE CAPACITY MEASURE: CPT

## 2024-01-05 PROCEDURE — 80202 ASSAY OF VANCOMYCIN: CPT

## 2024-01-05 PROCEDURE — 81001 URINALYSIS AUTO W/SCOPE: CPT

## 2024-01-05 PROCEDURE — 6370000000 HC RX 637 (ALT 250 FOR IP): Performed by: NURSE PRACTITIONER

## 2024-01-05 PROCEDURE — 2580000003 HC RX 258: Performed by: PHYSICIAN ASSISTANT

## 2024-01-05 PROCEDURE — 6370000000 HC RX 637 (ALT 250 FOR IP): Performed by: PHYSICIAN ASSISTANT

## 2024-01-05 PROCEDURE — 1200000000 HC SEMI PRIVATE

## 2024-01-05 PROCEDURE — 82436 ASSAY OF URINE CHLORIDE: CPT

## 2024-01-05 PROCEDURE — 84133 ASSAY OF URINE POTASSIUM: CPT

## 2024-01-05 PROCEDURE — 99232 SBSQ HOSP IP/OBS MODERATE 35: CPT | Performed by: INTERNAL MEDICINE

## 2024-01-05 PROCEDURE — 80048 BASIC METABOLIC PNL TOTAL CA: CPT

## 2024-01-05 PROCEDURE — 85027 COMPLETE CBC AUTOMATED: CPT

## 2024-01-05 RX ORDER — HYDRALAZINE HYDROCHLORIDE 25 MG/1
25 TABLET, FILM COATED ORAL EVERY 8 HOURS SCHEDULED
Status: DISCONTINUED | OUTPATIENT
Start: 2024-01-05 | End: 2024-01-06

## 2024-01-05 RX ORDER — CARVEDILOL 25 MG/1
25 TABLET ORAL 2 TIMES DAILY WITH MEALS
Status: DISCONTINUED | OUTPATIENT
Start: 2024-01-05 | End: 2024-01-06 | Stop reason: HOSPADM

## 2024-01-05 RX ORDER — CARVEDILOL 6.25 MG/1
12.5 TABLET ORAL ONCE
Status: COMPLETED | OUTPATIENT
Start: 2024-01-05 | End: 2024-01-05

## 2024-01-05 RX ADMIN — HYDRALAZINE HYDROCHLORIDE 25 MG: 25 TABLET ORAL at 11:00

## 2024-01-05 RX ADMIN — TIZANIDINE 4 MG: 4 TABLET ORAL at 14:25

## 2024-01-05 RX ADMIN — STANDARDIZED SENNA CONCENTRATE AND DOCUSATE SODIUM 1 TABLET: 8.6; 5 TABLET ORAL at 08:48

## 2024-01-05 RX ADMIN — ATORVASTATIN CALCIUM 80 MG: 40 TABLET, FILM COATED ORAL at 08:47

## 2024-01-05 RX ADMIN — HYDRALAZINE HYDROCHLORIDE 25 MG: 25 TABLET ORAL at 21:28

## 2024-01-05 RX ADMIN — TIZANIDINE 4 MG: 4 TABLET ORAL at 06:05

## 2024-01-05 RX ADMIN — DICLOFENAC SODIUM 4 G: 10 GEL TOPICAL at 08:48

## 2024-01-05 RX ADMIN — ACETAMINOPHEN 1000 MG: 500 TABLET ORAL at 21:28

## 2024-01-05 RX ADMIN — CARVEDILOL 25 MG: 25 TABLET, FILM COATED ORAL at 17:06

## 2024-01-05 RX ADMIN — DICLOFENAC SODIUM 4 G: 10 GEL TOPICAL at 21:29

## 2024-01-05 RX ADMIN — ACETAMINOPHEN 1000 MG: 500 TABLET ORAL at 14:14

## 2024-01-05 RX ADMIN — ACETAMINOPHEN 1000 MG: 500 TABLET ORAL at 08:47

## 2024-01-05 RX ADMIN — PANTOPRAZOLE SODIUM 40 MG: 40 TABLET, DELAYED RELEASE ORAL at 06:05

## 2024-01-05 RX ADMIN — CARVEDILOL 12.5 MG: 6.25 TABLET, FILM COATED ORAL at 08:47

## 2024-01-05 RX ADMIN — PANTOPRAZOLE SODIUM 40 MG: 40 TABLET, DELAYED RELEASE ORAL at 14:14

## 2024-01-05 RX ADMIN — POTASSIUM CHLORIDE 40 MEQ: 1500 TABLET, EXTENDED RELEASE ORAL at 08:47

## 2024-01-05 RX ADMIN — SODIUM CHLORIDE, PRESERVATIVE FREE 10 ML: 5 INJECTION INTRAVENOUS at 21:30

## 2024-01-05 RX ADMIN — CARVEDILOL 12.5 MG: 6.25 TABLET, FILM COATED ORAL at 11:00

## 2024-01-05 ASSESSMENT — ENCOUNTER SYMPTOMS
SORE THROAT: 0
ABDOMINAL PAIN: 0
RHINORRHEA: 0
SHORTNESS OF BREATH: 0
SINUS PRESSURE: 0
NAUSEA: 0
DIARRHEA: 0
EYE DISCHARGE: 0
COUGH: 0
WHEEZING: 0
EYE REDNESS: 0
SINUS PAIN: 0
BACK PAIN: 0
CONSTIPATION: 0

## 2024-01-05 ASSESSMENT — PAIN DESCRIPTION - LOCATION: LOCATION: KNEE

## 2024-01-05 ASSESSMENT — PAIN DESCRIPTION - ORIENTATION: ORIENTATION: LEFT

## 2024-01-05 ASSESSMENT — PAIN SCALES - GENERAL: PAINLEVEL_OUTOF10: 7

## 2024-01-05 NOTE — PROGRESS NOTES
MD Heather Angulo MD Samir Brahmbhatt, MD                  Office: [phone]                      Fax: [phone]             [employer]                   NEPHROLOGY INPATIENT PROGRESS NOTE:     PATIENT NAME: Emilee Paulson  : 1950  MRN: [id number]         RECOMMENDATIONS:   - random Vanco level 24: 22-likely due to LAST,  - IV antibiotic: Vancomycin: trough goal <15, pharmacy team assisting.      - Avoid further Toradol, NSAIDs,  - Stopped Lasix, Aldactone, valsartan   - avoid - till outpt fup as kidney function recovering    - Hypertension uncontrolled,   - Add PO Hydralazine 25 TID  - add as needed hydralazine  - Continue Coreg,   -- Increase Coreg 12.5 mg to 25 mg BID  - Okay to keep slightly higher BP in to 140s for a short term - till outpt fup in 1-2 weeks    - K-Chlor 40 mg a day-continue  - Monitor K level,  - Monitor magnesium.      -at higher risk for decompensation, needing closer monitoring.    D/C plan from renal stand point: in 24 hrs, if BP better, can discharge with above medications until outpatient follow  -: follow up w/ us at  [facility] OFFICE  in 2 weeks after d/c. (I updated our information in discharge follow up providers' list.)       IMPRESSION:       Admitted on:  2023  9:11 PM   For:  Unable to ambulate [R26.2]  Effusion of left knee [M25.462]  Anticoagulated on Coumadin [Z79.01]  Knee pain [M25.569]       LAST  -   No h/o CKD:   - BL Scr-lowest recently less than 0.5, as of on admission,-> now slowly worsening since admission  - 1.3 when consulted.     - Etiology of LAST -presumably prerenal is most likely, possibility of COVID-19 related, in the setting of Lasix, Aldactone, valsartan, NSAIDs use   - UA reviewed- likely pre-renal       Associated problems:   - Volume status: hypovolemic  Chronic diastolic heart failure  : HTN : no need for tight control   : Na: WNL     - Azotemia: pre-renal better   - Electrolytes: K: Hypokalemia,  hesitate to contact me anytime. We will follow along with you.       Mario Owens MD,  [facility]  Office: [phone] or [employer]  Fax: [phone]        =======================================================================================   Subjective / interval history / nephrology update / medical decision making:   Patient was seen comfortably in bed,   Reported no active complaints/distress,   HTN uncontrolled   Renal labs noted  .    =======================================================================================     Chart reviewed, patient's pertinent electronic medical records , Medical history and medication reviewed as needed for my evaulation.         MEDICATIONS: reviewed by me.   Medications Prior to Admission:  No current facility-administered medications on file prior to encounter.     Current Outpatient Medications on File Prior to Encounter   Medication Sig Dispense Refill    furosemide (LASIX) 20 MG tablet Take 1 tablet by mouth daily      carvedilol (COREG) 12.5 MG tablet Take 1 tablet by mouth with breakfast and with evening meal 60 tablet 5    furosemide (LASIX) 40 MG tablet Take 1 tablet by mouth daily (Patient not taking: Reported on 12/30/2023) 90 tablet 5    spironolactone (ALDACTONE) 50 MG tablet Take 1 tablet by mouth daily 90 tablet 1    valsartan (DIOVAN) 320 MG tablet Take 1 tablet by mouth daily 90 tablet 1    omeprazole (PRILOSEC) 20 MG delayed release capsule Take 1 capsule by mouth 2 times daily; take on an empty stomach and eat a meal or snack 45 to 60 minutes hour after each dose. 60 capsule 2    acetaminophen (TYLENOL) 500 MG tablet Take 1 tablet by mouth every 6 hours as needed for Pain 120 tablet 1    magnesium oxide (MAG-OX) 400 MG tablet Take 1 tablet by mouth daily 30 tablet 1    atorvastatin (LIPITOR) 80 MG tablet Take 1 tablet by mouth daily 90 tablet 4    Caltrate 600+D Plus Minerals (CALTRATE) 600-800 MG-UNIT TABS

## 2024-01-05 NOTE — PROGRESS NOTES
[facility] FOLLOW UP    CHIEF COMPLAINT:   Chief Complaint   Patient presents with    Leg Pain     Pt arrived to ED via [facility] ems from home with c/o left sided leg pain. Per pt, she was cutting up greens for her BRIANNE celebration when suddenly she began having sharp pain that radiates from her thigh to her shin. Per pt she has never experienced pain like this before. Pt takes warfarin for afib. Hx of afib, htn, arthritis.        HISTORY OF PRESENT ILLNESS:    She reports improvement in pain since second aspiration and injection.  Denies new issues.  Wants to go home.     afebrile  Improved ROM  INR 1.32 on 1/1  Uric acid 7.2  No growth from aspirate 1/1 (70ml of bloody synovial fluid)  No crystals  1,917,100 RBC and 54,729 WBC with 91% neutrophil count      Labs:  Recent Labs     01/05/24  0530 01/03/24  0632 01/02/24  0640   WBC 3.7* 6.5 7.8   HGB 10.0* 10.4* 11.0*   HCT 30.3* 31.0* 32.5*   MCV 90.5 90.9 90.3   * 89* 97*     Lab Results   Component Value Date     01/05/2024    K 3.7 01/05/2024     01/05/2024    CO2 26 01/05/2024    BUN 12 01/05/2024    CREATININE 0.8 01/05/2024    GLUCOSE 108 (H) 01/05/2024    CALCIUM 10.0 01/05/2024    PROT 7.0 01/03/2024    LABALBU 3.3 (L) 01/03/2024    BILITOT 2.4 (H) 01/03/2024    ALKPHOS 86 01/03/2024    AST 32 01/03/2024    ALT 18 01/03/2024    LABGLOM >60 01/05/2024    GFRAA >60 07/06/2022    AGRATIO 0.9 (L) 01/03/2024    GLOB 3.8 02/19/2021     Lab Results   Component Value Date    INR 1.32 (H) 01/01/2024    INR 2.66 (H) 12/31/2023    INR 2.51 (H) 12/30/2023     Lab Results   Component Value Date    APTT 30.8 10/19/2022    APTT 31.4 02/19/2021    APTT 32.5 04/11/2020     Lab Results   Component Value Date    LABA1C 5.3 06/20/2022    LABA1C 5.2 08/12/2021    LABA1C 5.5 01/29/2020     No results found for: \"VITD25\"   No results found for: \"TYPESCNCOM\"      PHYSICAL EXAMINATION:    GENERAL:  This is a pleasant 73 y.o.  female , alert and oriented x3 and in no acute distress.  VITAL SIGNS:  Blood pressure (!) 174/125, pulse 84, temperature 98.1 °F (36.7 °C), temperature source Oral, resp. rate 16, height 1.626 m (5' 4\"), weight 88.1 kg (194 lb 4.8 oz), SpO2 98 %, not currently breastfeeding.  CARDIOVASCULAR:  Regular rate and rhythm. No audible murmers  CHEST:  Lungs are clear. No wheezes or crackles noted.  Respirations unlabored  MUSCULOSKELETAL:  Exam of the left knee -  Inspection: Skin is clean, dry, and intact. No scars or atrophy. Moderate knee effusion. No obvious erythema, warmth, or ecchymosis.  Palpation: Diffuse tenderness to palpation, compartments soft  ROM: Limited flexion of knee due to pain, able to achieve 30 degrees with assistance. Rests at 10 degrees of extension.  SILT SP/DP/T/sural/saphenous nerve distributions; EHL/FHL/TA/GS intact. Posterior tibial pulse is 2+ and regular.      Xray Result (most recent):  XR CHEST STANDARD TWO VW 01/01/2024    Narrative  EXAMINATION:  TWO XRAY VIEWS OF THE CHEST    1/1/2024 2:45 pm    COMPARISON:  11/05/2023    HISTORY:  ORDERING SYSTEM PROVIDED HISTORY: fever  TECHNOLOGIST PROVIDED HISTORY:  Reason for exam:->fever    FINDINGS:  The lungs are without acute focal process.  There is no effusion or  pneumothorax. The cardiomediastinal silhouette is stable. The osseous  structures are stable.    Impression  No acute process.    Stable cardiomegaly      ASSESSMENT:  Left knee hemarthrosis in setting of moderate osteoarthritis on chronic anticoagulation for A fib; new injury 1/3/24    PLAN:  Aspirate showing no growth to date.  In light of this, no plans on washout of the knee unless something were to grow from culture.     She is improved today. No further interventions planned    - Pain control: scheduled tylenol ; oxy stopped as it was apparently causing some confusion.   - WBAT/encouraged to work on gentle ROM  - DVT prophylaxis - Lovenox stopped - HOLD ALL AC for the next 3

## 2024-01-05 NOTE — PROGRESS NOTES
Physician Progress Note      PATIENT:               BESSY HINOJOSA  CSN #:                  [id number]  :                       1950  ADMIT DATE:       2023 9:11 PM  DISCH DATE:  RESPONDING  PROVIDER #:        Salma Branham MD          QUERY TEXT:    Pt admitted  with Left knee effusion.  Noted documentation of   \"Spontaneous left knee hemarthrosis in the setting of warfarin   anticoagulation\" on 1/3 by ordered Orthopedic consultant.  If possible, please   document in progress notes and discharge summary:    The medical record reflects the following:  Risk Factors: Coumadin  Clinical Indicators:  effusion cx: No growth. appearance: Bloody, RBC: 1.9   Million. Per Ortho c/s 1/3 \"Spontaneous left knee hemarthrosis in the setting   of warfarin anticoagulation; Knee culture remain NGTD. I recommend   de-escalation of antibiotics\". Per Ortho  \"left knee was aspirate and then   injected under sterile conditions. After a Chg prep of the joint, 50ml of   straight blood was aspirated; 1% lidocaine and 1 cc of 40 mg Depo-medrol were   injected. Lovenox stopped - HOLD ALL AC for the next 4 days\". Per Attending    \"Left knee pain and effusion: r/o septic joint;   Treatment: Warfarin held, Cefepime - current, Joint aspiration x 2 w/ cx,   Steroid injection, Ortho/Neuro c/s  Options provided:  -- Left knee hemarthrosis confirmed present on admission, septic joint ruled   out  -- Left knee hemarthrosis ruled out  -- Other - I will add my own diagnosis  -- Disagree - Not applicable / Not valid  -- Disagree - Clinically unable to determine / Unknown  -- Refer to Clinical Documentation Reviewer    PROVIDER RESPONSE TEXT:    The diagnosis of Left knee hemarthrosis was confirmed as present on admission,   septic joint ruled out after study.    Query created by: Taylor Robles on 2024 4:13 PM      QUERY TEXT:    Pt admitted  with Left knee effusion.  Noted documentation of    \"Spontaneous left knee hemarthrosis in the setting of warfarin   anticoagulation\" on 1/3 by ordered Orthopedic consultant. If possible, please   document in the progress notes and discharge summary if you are evaluating   and/or treating any of the following:    The medical record reflects the following:  Risk Factors: Coumadin  Clinical Indicators: Admit INR: 2.51- 1.32 (1/1) 1/1 effusion cx: No growth.   appearance: Bloody, RBC: 1.9 Million. Per Ortho c/s 1/3 \"Spontaneous left knee   hemarthrosis in the setting of warfarin anticoagulation; Knee culture remain   NGTD. I recommend de-escalation of antibiotics\". Per Ortho 1/4 \"left knee was   aspirate and then injected under sterile conditions. After a Chg prep of the   joint, 50ml of straight blood was aspirated; 1% lidocaine and 1 cc of 40 mg   Depo-medrol were injected. Lovenox stopped - HOLD ALL AC for the next 4 days\".   Per Attending 1/4 \"Left knee pain and   Treatment: Warfarin & then Lovenox held, PT/INR x 3, Cefepime 1/1- current,   Joint aspiration x 2 w/ cx, Steroid injection, Ortho/Neuro c/s  Options provided:  -- Left knee hemarthrosis associated with Coumadin  -- Left knee hemarthrosis unrelated to anticoagulation  -- Other - I will add my own diagnosis  -- Disagree - Not applicable / Not valid  -- Disagree - Clinically unable to determine / Unknown  -- Refer to Clinical Documentation Reviewer    PROVIDER RESPONSE TEXT:    This patient has Left knee hemarthrosis associated with Coumadin    Query created by: Taylor Robles on 1/4/2024 4:22 PM      Electronically signed by:  Salma Branham MD 1/5/2024 7:52 AM

## 2024-01-05 NOTE — PLAN OF CARE
Problem: Discharge Planning  Goal: Discharge to home or other facility with appropriate resources  Outcome: Progressing  Flowsheets (Taken 1/5/2024 6424)  Discharge to home or other facility with appropriate resources: Identify barriers to discharge with patient and caregiver     Problem: Pain  Goal: Verbalizes/displays adequate comfort level or baseline comfort level  Outcome: Progressing     Problem: Safety - Adult  Goal: Free from fall injury  Outcome: Progressing     Problem: ABCDS Injury Assessment  Goal: Absence of physical injury  Outcome: Progressing

## 2024-01-05 NOTE — PROGRESS NOTES
verbal consent from the patient and with their approval to include those in the patient's medical record.        Clinton Mcgowan MD, MPH, FACP, FIDSA  1/5/2024, 1:32 PM  [facility]   [address] ([facility] [place] [address]  Office: [phone]  Fax: [phone]  In-person Clinic days:  Tuesday & Thursday a.m.  Virtual clinic days: Monday, Wednesday & Friday a.m.

## 2024-01-05 NOTE — PLAN OF CARE

## 2024-01-05 NOTE — PROGRESS NOTES
Hospitalist Progress Note      PCP: Stefano Suarez MD    Date of Admission: 12/30/2023    Chief Complaint: Left knee    Hospital Course:   Patient seen and examined.   Low-grade fevers  Left knee pain improved following arthrocentesis and steroid injection today.  No cough, dyspnea or chest pain.    Medications:  Reviewed      Exam:    BP (!) 149/109   Pulse 77   Temp 97.7 °F (36.5 °C) (Oral)   Resp 17   Ht 1.626 m (5' 4\")   Wt 88.1 kg (194 lb 4.8 oz)   SpO2 99%   BMI 33.35 kg/m²     General appearance: No apparent distress, appears stated age and cooperative.  HEENT: Pupils equal, round, and reactive to light. Conjunctivae clear.  Neck: Supple, with full range of motion. No jugular venous distention. Trachea midline.  Respiratory:  Normal respiratory effort. Clear to auscultation, bilaterally without RALES/WHEEZES/Rhonchi.  Cardiovascular: Regular rate and rhythm with normal S1/S2 without MURMURS, rubs or gallops.  Abdomen: Soft, non-tender, non-distended with normal bowel sounds.  Musculoskeletal: No clubbing, cyanosis.  Full range of motion without deformity.  Skin: Skin color, texture, turgor normal.  No rashes or lesions.  Neurologic:  Neurovascularly intact without any focal sensory/motor deficits. Cranial nerves: II-XII intact, grossly non-focal.  Improved left knee swelling and range of motion.    Labs:   Recent Labs     01/03/24  0632 01/05/24  0530   WBC 6.5 3.7*   HGB 10.4* 10.0*   HCT 31.0* 30.3*   PLT 89* 119*     Recent Labs     01/03/24  0632 01/04/24  0514 01/05/24  0531    136 136   K 3.6 3.4* 3.7    103 104   CO2 27 26 26   BUN 18 18 12   CREATININE 1.2 1.3* 0.8   CALCIUM 9.3 9.4 10.0   PHOS 2.8  --   --      Recent Labs     01/03/24  0632   AST 32   ALT 18   BILITOT 2.4*   ALKPHOS 86     No results for input(s): \"INR\" in the last 72 hours.    No results for input(s): \"CKTOTAL\", \"TROPONINI\" in the last 72 hours.    Urinalysis:      Lab Results   Component Value

## 2024-01-06 VITALS
DIASTOLIC BLOOD PRESSURE: 90 MMHG | HEART RATE: 72 BPM | WEIGHT: 194.3 LBS | TEMPERATURE: 97.8 F | BODY MASS INDEX: 33.17 KG/M2 | HEIGHT: 64 IN | SYSTOLIC BLOOD PRESSURE: 142 MMHG | OXYGEN SATURATION: 96 % | RESPIRATION RATE: 16 BRPM

## 2024-01-06 LAB
ANION GAP SERPL CALCULATED.3IONS-SCNC: 8 MMOL/L (ref 3–16)
BACTERIA SNV CULT: NORMAL
BACTERIA SPEC ANAEROBE CULT: NORMAL
BUN SERPL-MCNC: 9 MG/DL (ref 7–20)
CALCIUM SERPL-MCNC: 10.6 MG/DL (ref 8.3–10.6)
CHLORIDE SERPL-SCNC: 104 MMOL/L (ref 99–110)
CO2 SERPL-SCNC: 26 MMOL/L (ref 21–32)
CREAT SERPL-MCNC: 0.7 MG/DL (ref 0.6–1.2)
GFR SERPLBLD CREATININE-BSD FMLA CKD-EPI: >60 ML/MIN/{1.73_M2}
GLUCOSE SERPL-MCNC: 104 MG/DL (ref 70–99)
POTASSIUM SERPL-SCNC: 3.2 MMOL/L (ref 3.5–5.1)
SODIUM SERPL-SCNC: 138 MMOL/L (ref 136–145)

## 2024-01-06 PROCEDURE — 80048 BASIC METABOLIC PNL TOTAL CA: CPT

## 2024-01-06 PROCEDURE — 6360000002 HC RX W HCPCS: Performed by: INTERNAL MEDICINE

## 2024-01-06 PROCEDURE — 6370000000 HC RX 637 (ALT 250 FOR IP): Performed by: INTERNAL MEDICINE

## 2024-01-06 PROCEDURE — 6370000000 HC RX 637 (ALT 250 FOR IP): Performed by: PHYSICIAN ASSISTANT

## 2024-01-06 PROCEDURE — 6370000000 HC RX 637 (ALT 250 FOR IP): Performed by: NURSE PRACTITIONER

## 2024-01-06 RX ORDER — ASPIRIN 81 MG/1
81 TABLET, CHEWABLE ORAL DAILY
Qty: 30 TABLET | Refills: 3 | Status: SHIPPED | OUTPATIENT
Start: 2024-01-13

## 2024-01-06 RX ORDER — AMLODIPINE BESYLATE 5 MG/1
5 TABLET ORAL DAILY
Status: DISCONTINUED | OUTPATIENT
Start: 2024-01-06 | End: 2024-01-06 | Stop reason: HOSPADM

## 2024-01-06 RX ORDER — SENNA AND DOCUSATE SODIUM 50; 8.6 MG/1; MG/1
1 TABLET, FILM COATED ORAL DAILY
Qty: 30 TABLET | Refills: 0 | COMMUNITY
Start: 2024-01-07

## 2024-01-06 RX ORDER — HYDRALAZINE HYDROCHLORIDE 25 MG/1
25 TABLET, FILM COATED ORAL ONCE
Status: COMPLETED | OUTPATIENT
Start: 2024-01-06 | End: 2024-01-06

## 2024-01-06 RX ORDER — VALSARTAN 320 MG/1
160 TABLET ORAL DAILY
Qty: 90 TABLET | Refills: 1 | Status: CANCELLED | OUTPATIENT
Start: 2024-01-06

## 2024-01-06 RX ORDER — HYDRALAZINE HYDROCHLORIDE 25 MG/1
50 TABLET, FILM COATED ORAL EVERY 8 HOURS SCHEDULED
Status: DISCONTINUED | OUTPATIENT
Start: 2024-01-06 | End: 2024-01-06 | Stop reason: HOSPADM

## 2024-01-06 RX ORDER — LIDOCAINE 4 G/G
1 PATCH TOPICAL NIGHTLY
Qty: 14 EACH | Refills: 0 | Status: SHIPPED | OUTPATIENT
Start: 2024-01-06 | End: 2024-01-20

## 2024-01-06 RX ORDER — POLYETHYLENE GLYCOL 3350 17 G/17G
17 POWDER, FOR SOLUTION ORAL DAILY
Qty: 30 PACKET | Refills: 0 | COMMUNITY
Start: 2024-01-07 | End: 2024-02-06

## 2024-01-06 RX ORDER — AMLODIPINE BESYLATE 10 MG/1
10 TABLET ORAL DAILY
Qty: 30 TABLET | Refills: 0 | Status: SHIPPED | OUTPATIENT
Start: 2024-01-07

## 2024-01-06 RX ORDER — CARVEDILOL 25 MG/1
25 TABLET ORAL 2 TIMES DAILY WITH MEALS
Qty: 60 TABLET | Refills: 3 | Status: SHIPPED | OUTPATIENT
Start: 2024-01-06

## 2024-01-06 RX ORDER — VALSARTAN 160 MG/1
160 TABLET ORAL DAILY
Status: DISCONTINUED | OUTPATIENT
Start: 2024-01-06 | End: 2024-01-06

## 2024-01-06 RX ORDER — VALSARTAN 160 MG/1
160 TABLET ORAL ONCE
Status: COMPLETED | OUTPATIENT
Start: 2024-01-06 | End: 2024-01-06

## 2024-01-06 RX ORDER — VALSARTAN 160 MG/1
320 TABLET ORAL DAILY
Status: DISCONTINUED | OUTPATIENT
Start: 2024-01-07 | End: 2024-01-06 | Stop reason: HOSPADM

## 2024-01-06 RX ORDER — POTASSIUM CHLORIDE 20 MEQ/1
40 TABLET, EXTENDED RELEASE ORAL EVERY 4 HOURS
Status: DISCONTINUED | OUTPATIENT
Start: 2024-01-06 | End: 2024-01-06 | Stop reason: HOSPADM

## 2024-01-06 RX ORDER — FERROUS SULFATE 325(65) MG
325 TABLET ORAL
Qty: 30 TABLET | Refills: 5 | Status: SHIPPED | OUTPATIENT
Start: 2024-01-06

## 2024-01-06 RX ORDER — WARFARIN SODIUM 5 MG/1
2.5-5 TABLET ORAL SEE ADMIN INSTRUCTIONS
Qty: 30 TABLET | Refills: 0
Start: 2024-01-10

## 2024-01-06 RX ORDER — HYDRALAZINE HYDROCHLORIDE 25 MG/1
25 TABLET, FILM COATED ORAL EVERY 8 HOURS SCHEDULED
Qty: 30 TABLET | Refills: 0 | Status: SHIPPED | OUTPATIENT
Start: 2024-01-06

## 2024-01-06 RX ADMIN — SENNOSIDES 8.6 MG: 8.6 TABLET, COATED ORAL at 08:27

## 2024-01-06 RX ADMIN — CARVEDILOL 25 MG: 25 TABLET, FILM COATED ORAL at 16:07

## 2024-01-06 RX ADMIN — PANTOPRAZOLE SODIUM 40 MG: 40 TABLET, DELAYED RELEASE ORAL at 06:07

## 2024-01-06 RX ADMIN — CARVEDILOL 25 MG: 25 TABLET, FILM COATED ORAL at 08:26

## 2024-01-06 RX ADMIN — VALSARTAN 160 MG: 160 TABLET, FILM COATED ORAL at 16:07

## 2024-01-06 RX ADMIN — POTASSIUM CHLORIDE 40 MEQ: 1500 TABLET, EXTENDED RELEASE ORAL at 08:26

## 2024-01-06 RX ADMIN — AMLODIPINE BESYLATE 5 MG: 5 TABLET ORAL at 12:51

## 2024-01-06 RX ADMIN — HYDRALAZINE HYDROCHLORIDE 25 MG: 25 TABLET ORAL at 14:44

## 2024-01-06 RX ADMIN — VALSARTAN 160 MG: 160 TABLET, FILM COATED ORAL at 12:51

## 2024-01-06 RX ADMIN — HYDRALAZINE HYDROCHLORIDE 25 MG: 25 TABLET ORAL at 06:07

## 2024-01-06 RX ADMIN — ACETAMINOPHEN 1000 MG: 500 TABLET ORAL at 16:07

## 2024-01-06 RX ADMIN — PANTOPRAZOLE SODIUM 40 MG: 40 TABLET, DELAYED RELEASE ORAL at 16:07

## 2024-01-06 RX ADMIN — TIZANIDINE 4 MG: 4 TABLET ORAL at 16:07

## 2024-01-06 RX ADMIN — HYDRALAZINE HYDROCHLORIDE 25 MG: 25 TABLET ORAL at 16:07

## 2024-01-06 RX ADMIN — ATORVASTATIN CALCIUM 80 MG: 40 TABLET, FILM COATED ORAL at 08:26

## 2024-01-06 RX ADMIN — TIZANIDINE 4 MG: 4 TABLET ORAL at 06:06

## 2024-01-06 RX ADMIN — ACETAMINOPHEN 500 MG: 500 TABLET ORAL at 06:10

## 2024-01-06 RX ADMIN — MORPHINE SULFATE 2 MG: 2 INJECTION, SOLUTION INTRAMUSCULAR; INTRAVENOUS at 06:10

## 2024-01-06 ASSESSMENT — PAIN SCALES - GENERAL: PAINLEVEL_OUTOF10: 8

## 2024-01-06 NOTE — DISCHARGE INSTR - COC
Continuity of Care Form    Patient Name: Emilee Paulson   :  1950  MRN:  [id number]    Admit date:  2023  Discharge date:  ***    Code Status Order: Full Code   Advance Directives:     Admitting Physician:  Bernabe Dominguez MD  PCP: Stefano Suarez MD    Discharging Nurse: ***  Discharging Hospital Unit/Room#: 4TN-4479/4479-01  Discharging Unit Phone Number: ***    Emergency Contact:   Extended Emergency Contact Information  Primary Emergency Contact: Nimisha Desouza  Home Phone: [phone]  Mobile Phone: [phone]  Relation: Brother/Sister  Secondary Emergency Contact: Terrecne Desouza  Home Phone: [phone]  Mobile Phone: [phone]  Relation: Child    Past Surgical History:  Past Surgical History:   Procedure Laterality Date    CARDIAC SURGERY      stentsx2    COLONOSCOPY      CORONARY ANGIOPLASTY WITH STENT PLACEMENT      ROTATOR CUFF REPAIR Right     TUBAL LIGATION         Immunization History:   Immunization History   Administered Date(s) Administered    COVID-19, MODERNA BLUE border, Primary or Immunocompromised, (age 12y+), IM, 100 mcg/0.5mL 2021, 2021, 2021    Influenza Vaccine, unspecified formulation 02/10/2007, 2007, 2008    Influenza Virus Vaccine 02/10/2007, 2007, 2008    Pneumococcal, PPSV23, PNEUMOVAX 23, (age 2y+), SC/IM, 0.5mL 10/01/2007    TDaP, ADACEL (age 10y-64y), BOOSTRIX (age 10y+), IM, 0.5mL 2007       Active Problems:  Patient Active Problem List   Diagnosis Code    Permanent atrial fibrillation (HCC) I48.21    Coronary artery disease due to lipid rich plaque I25.10, I25.83    Thyroid nodule E04.1    Chronic heart failure with preserved ejection fraction (HCC) I50.32    Benign essential HTN I10    Primary osteoarthritis of right knee M17.11    Primary osteoarthritis of left knee M17.12    Headache, migraine, intractable, with status migrainosus G43.911    PAF (paroxysmal atrial fibrillation) ([facility]) I48.0    Mixed hyperlipidemia  {Esignature:[id number]}    CASE MANAGEMENT/SOCIAL WORK SECTION    Inpatient Status Date: 12/31/2023    Readmission Risk Assessment Score:  Readmission Risk              Risk of Unplanned Readmission:  16           Discharging to Facility/ Agency     FACILITY:     [facility]  ADDRESS:   [address] [place] [address]   PHONE:        [phone]  FAX:              [phone]      / signature: Electronically signed by Mark Elmore Jr, RN on 1/6/24 at 3:10 PM EST    PHYSICIAN SECTION    Prognosis: Guarded    Condition at Discharge: Stable    Rehab Potential (if transferring to Rehab): Guarded    Recommended Labs or Other Treatments After Discharge: ***    Physician Certification: I certify the above information and transfer of Emilee Paulson  is necessary for the continuing treatment of the diagnosis listed and that she requires Home Care for greater 30 days.     Update Admission H&P: No change in H&P    PHYSICIAN SIGNATURE:  Electronically Signed by Salma Branham MD on 01/06/24 at 11:58 am

## 2024-01-06 NOTE — PROGRESS NOTES
Shift assessment complete, patient is alert and oriented X4, HBP, BP in 180s, on scheduled Hydralazine. Fall precautions in place.  The care plan and education has been reviewed and mutually agreed upon with the patient.

## 2024-01-06 NOTE — PROGRESS NOTES
MD Heather Angulo MD Samir Brahmbhatt, MD                  Office: [phone]                      Fax: [phone]             [employer]                   NEPHROLOGY INPATIENT PROGRESS NOTE:     PATIENT NAME: Emilee Paulson  : 1950  MRN: [id number]         RECOMMENDATIONS:   Restart Valsartan 160mg daily.  Start Amlodipine 5mg daily.  Continue Hydralazine 25mg po TID  -- Increase Coreg 12.5 mg to 25 mg BID  Replace K    - random Vanco level 24: 22-likely due to LAST,  - IV antibiotic: Vancomycin: trough goal <15, pharmacy team assisting.      - Avoid further Toradol, NSAIDs,  - Stopped Lasix, Aldactone     D/C plan from renal stand point: in 24 hrs, if BP better, can discharge with above medications until outpatient follow  -: follow up w/ us at  [facility] OFFICE  in 2 weeks after d/c. (I updated our information in discharge follow up providers' list.)       IMPRESSION:       Admitted on:  2023  9:11 PM   For:  Unable to ambulate [R26.2]  Effusion of left knee [M25.462]  Anticoagulated on Coumadin [Z79.01]  Knee pain [M25.569]       LAST  - better and now back to baseline  No h/o CKD:   - BL Scr-lowest recently less than 0.5, as of on admission,-> now slowly worsening since admission  - 1.3 when consulted.     - Etiology of LAST -presumably prerenal is most likely, possibility of COVID-19 related, in the setting of Lasix, Aldactone, valsartan, NSAIDs use   - UA reviewed- likely pre-renal       Associated problems:   - Volume status: euvolemic  Chronic diastolic heart failure  : HTN : follow as outpatient  : Na: WNL     - Azotemia: pre-renal better   - Electrolytes: K: Hypokalemia, mild.  Replace  - Acid-Base: Stable  - Anemia: Mild, likely of chronic disease, with some underlying iron deficiency anemia.  Follow Hgb      Other major problems: Management per primary and other consulting teams.         Hospital Problems             Last Modified POA    *  results for input(s): \"PH\", \"PCO2\", \"PO2\" in the last 72 hours.    Invalid input(s): \"C2UBHAXQGFBY\", \"INSPIREDO2\"    ABG:  No results found for: \"PH\", \"PCO2\", \"PO2\", \"HCO3\", \"BE\", \"THGB\", \"TCO2\", \"O2SAT\"  VBG:  No results found for: \"PHVEN\", \"FEE6DAW\", \"BEVEN\", \"K1SATRLH\"    LDH:  No results found for: \"LDH\"  Uric Acid:    Lab Results   Component Value Date/Time    LABURIC 7.2 01/01/2024 11:53 AM       PT/INR:    Lab Results   Component Value Date/Time    PROTIME 16.4 01/01/2024 08:27 AM    PROTIME 16.3 09/14/2018 10:13 AM    INR 1.32 01/01/2024 08:27 AM     Warfarin PT/INR:  No components found for: \"PTPATWAR\", \"PTINRWAR\"  PTT:    Lab Results   Component Value Date/Time    APTT 30.8 10/19/2022 10:55 AM   [APTT}  Last 3 Troponin:    Lab Results   Component Value Date/Time    TROPONINI <0.01 03/07/2023 04:44 AM    TROPONINI <0.01 03/06/2023 06:36 PM    TROPONINI <0.01 03/06/2023 12:07 PM       U/A:    Lab Results   Component Value Date/Time    COLORU DARK YELLOW 01/05/2024 05:40 AM    PROTEINU 30 01/05/2024 05:40 AM    PHUR 5.5 01/05/2024 05:40 AM    WBCUA 1 01/05/2024 05:40 AM    RBCUA 2 01/05/2024 05:40 AM    BACTERIA None Seen 01/05/2024 05:40 AM    CLARITYU Clear 01/05/2024 05:40 AM    SPECGRAV 1.015 01/05/2024 05:40 AM    LEUKOCYTESUR TRACE 01/05/2024 05:40 AM    UROBILINOGEN 1.0 01/05/2024 05:40 AM    BILIRUBINUR Negative 01/05/2024 05:40 AM    BLOODU Negative 01/05/2024 05:40 AM    GLUCOSEU Negative 01/05/2024 05:40 AM     Microalbumen/Creatinine ratio:  No components found for: \"RUCREAT\"  24 Hour Urine for Protein:  No components found for: \"RAWUPRO\", \"UHRS3\", \"OHKI66PW\", \"UTV3\"  24 Hour Urine for Creatinine Clearance:  No components found for: \"CREAT4\", \"UHRS10\", \"UTV10\"  Urine Toxicology:  No components found for: \"IAMMENTA\", \"IBARBIT\", \"IBENZO\", \"ICOCAINE\", \"IMARTHC\", \"IOPIATES\", \"IPHENCYC\"    HgBA1c:    Lab Results   Component Value Date/Time    LABA1C 5.3 06/20/2022 02:07 AM     RPR:  No results found

## 2024-01-06 NOTE — DISCHARGE SUMMARY
V2.0  Discharge Summary    Name:  Emilee Paulson /Age/Sex: 1950 (73 y.o. female)   Admit Date: 2023  Discharge Date: 24    MRN & CSN:  [id number] & [id number] Encounter Date and Time 24 3:52 PM EST    Attending:  Salma Branham MD Discharging Provider: Salma Branham MD       Hospital Course:     Brief HPI: Emilee Paulson is a 73 y.o. female with history of A-fib on Coumadin, chronic HFpEF, HTN, presented to ED for evaluation of left knee pain.        Left knee hemarthrosis: septic joint riled out.  Suspected hematoma versus inflammatory secondary to underlying osteoarthrosis  s/p arthrocentesis 24 predominantly bloody has 54,000 white cell.  s/p repeat left knee aspiration and steroid injection on 24 with 50 cc of blood aspirated.  Blood & aspirate cultures NGTD.  Elevated inflammatory markers.  Ortho and ID consulted.  Discontinued empiric IV vancomycin and cefepime.  Continue pain control and PT OT  No further interventions planned.  Hold AC and aspirin for the next 3 days.  Weightbearing as tolerated.       COVID-19 infection without hypoxia:  Supportive care. No indication for steroids at this time.     LAST: Resolved  Scr 0.5 on admission. Peak 1.3.  Hold Aldactone & Lasix.  Toradol discontinued.  Outpatient follow-up with nephrology.       Constipation: Monitor on bowel regimen.      Mild anemia: work up c/w AMBROCIO/AoCD.  Started on iron replacement.       Persistent atrial fibrillation:  On Coumadin at home.  Cardiology consulted: poor candidate for long term anticoagulation.   CXG6VO1-YVRY  score of 4.  Evaluated for Watchman device.  Resume Coumadin in the next 3 days per Ortho.       Chronic diastolic heart failure:  No acute decompensation.  Hold Aldactone and Lasix.  Continue beta-blocker.     Hypokalemia and hypomagnesemia:  Replaced.  Monitor electrolytes and replace as needed.        Uncontrolled HTN: Discharged on Coreg, amlodipine, valsartan & Hydralazine.    ALDACTONE               Where to Get Your Medications        These medications were sent to [id number] - [place], [place] - [address] - P [phone] - F [phone] [address] [facility] [place] [address]      Hours: 24-hours Phone: [phone]   amLODIPine 10 MG tablet  aspirin 81 MG chewable tablet  carvedilol 25 MG tablet  diclofenac sodium 1 % Gel  ferrous sulfate 325 (65 Fe) MG tablet  hydrALAZINE 25 MG tablet  lidocaine 4 % external patch       You can get these medications from any pharmacy    You don't need a prescription for these medications  polyethylene glycol 17 g packet  sennosides-docusate sodium 8.6-50 MG tablet       Information about where to get these medications is not yet available    Ask your nurse or doctor about these medications  warfarin 5 MG tablet        Objective Findings at Discharge:   BP (!) 142/90   Pulse 72   Temp 97.8 °F (36.6 °C) (Oral)   Resp 16   Ht 1.626 m (5' 4\")   Wt 88.1 kg (194 lb 4.8 oz)   SpO2 96%   BMI 33.35 kg/m²       Physical Exam:   General appearance: No apparent distress, appears stated age and cooperative.  HEENT: Pupils equal, round, and reactive to light. Conjunctivae clear.  Neck: Supple, with full range of motion. No jugular venous distention. Trachea midline.  Respiratory:  Normal respiratory effort. Clear to auscultation, bilaterally without RALES/WHEEZES/Rhonchi.  Cardiovascular: Regular rate and rhythm with normal S1/S2 without MURMURS, rubs or gallops.  Abdomen: Soft, non-tender, non-distended with normal bowel sounds.  Musculoskeletal: No clubbing, cyanosis.  Full range of motion without deformity.  Skin: Skin color, texture, turgor normal.  No rashes or lesions.  Neurologic:  Neurovascularly intact without any focal sensory/motor deficits. Cranial nerves: II-XII intact, grossly non-focal.  Improved left knee swelling and range of motion.          Labs and Imaging   XR CHEST (2 VW)    Result Date:

## 2024-01-06 NOTE — PLAN OF CARE
Problem: Discharge Planning  Goal: Discharge to home or other facility with appropriate resources  1/5/2024 2154 by Marion Baker RN  Outcome: Progressing  1/5/2024 1754 by Duyen Alvarenga RN  Outcome: Progressing  Flowsheets (Taken 1/5/2024 0845)  Discharge to home or other facility with appropriate resources: Identify barriers to discharge with patient and caregiver     Problem: Pain  Goal: Verbalizes/displays adequate comfort level or baseline comfort level  1/5/2024 2154 by Marion Baker RN  Outcome: Progressing  1/5/2024 1754 by Duyen Alvarenga RN  Outcome: Progressing     Problem: Safety - Adult  Goal: Free from fall injury  1/5/2024 2154 by Marion Baker RN  Outcome: Progressing  1/5/2024 1754 by Duyen Alvarenga RN  Outcome: Progressing     Problem: ABCDS Injury Assessment  Goal: Absence of physical injury  1/5/2024 2154 by Marion Baker RN  Outcome: Progressing  1/5/2024 1754 by Duyen Alvarenga RN  Outcome: Progressing     Problem: Chronic Conditions and Co-morbidities  Goal: Patient's chronic conditions and co-morbidity symptoms are monitored and maintained or improved  Outcome: Progressing  Flowsheets (Taken 1/5/2024 0845 by Duyen Alvarenga RN)  Care Plan - Patient's Chronic Conditions and Co-Morbidity Symptoms are Monitored and Maintained or Improved: Monitor and assess patient's chronic conditions and comorbid symptoms for stability, deterioration, or improvement     Problem: Skin/Tissue Integrity  Goal: Absence of new skin breakdown  Description: 1.  Monitor for areas of redness and/or skin breakdown  2.  Assess vascular access sites hourly  3.  Every 4-6 hours minimum:  Change oxygen saturation probe site  4.  Every 4-6 hours:  If on nasal continuous positive airway pressure, respiratory therapy assess nares and determine need for appliance change or resting period.  1/5/2024 2154 by Marion Baker RN  Outcome: Progressing  1/5/2024 1754 by Duyen Alvarenga RN  Outcome: Progressing

## 2024-01-06 NOTE — CARE COORDINATION
Discharge Planning Note:    [facility]    [facility].    Will continue to follow.    BRIEN Grier RN    [facility]  Phone: [phone]

## 2024-01-06 NOTE — FLOWSHEET NOTE
Discharge instructions and medications reviewed with patient. Patient voices understanding and denies further questions/ concerns at this time. Patient will call in 1 week to follow up with primary care physician, and 2 weeks for follow up with nephrology and orthopedics.

## 2024-01-08 ENCOUNTER — APPOINTMENT (OUTPATIENT)
Dept: CT IMAGING | Age: 74
End: 2024-01-08
Payer: MEDICARE

## 2024-01-08 ENCOUNTER — CARE COORDINATION (OUTPATIENT)
Dept: CASE MANAGEMENT | Age: 74
End: 2024-01-08

## 2024-01-08 ENCOUNTER — HOSPITAL ENCOUNTER (EMERGENCY)
Age: 74
Discharge: HOME | End: 2024-01-08
Payer: MEDICARE

## 2024-01-08 VITALS
WEIGHT: 183 LBS | RESPIRATION RATE: 16 BRPM | TEMPERATURE: 98 F | DIASTOLIC BLOOD PRESSURE: 80 MMHG | BODY MASS INDEX: 30.49 KG/M2 | HEART RATE: 95 BPM | SYSTOLIC BLOOD PRESSURE: 124 MMHG | OXYGEN SATURATION: 96 % | HEIGHT: 65 IN

## 2024-01-08 DIAGNOSIS — R03.0 ELEVATED BLOOD PRESSURE READING: ICD-10-CM

## 2024-01-08 DIAGNOSIS — M25.462 EFFUSION OF LEFT KNEE: Primary | ICD-10-CM

## 2024-01-08 DIAGNOSIS — M54.50 ACUTE LEFT-SIDED LOW BACK PAIN WITHOUT SCIATICA: ICD-10-CM

## 2024-01-08 DIAGNOSIS — M25.562 LEFT KNEE PAIN, UNSPECIFIED CHRONICITY: Primary | ICD-10-CM

## 2024-01-08 LAB
BACTERIA SNV CULT: NORMAL
BACTERIA SPEC ANAEROBE CULT: NORMAL
GLUCOSE BLD-MCNC: 87 MG/DL (ref 70–99)
PERFORMED ON: NORMAL

## 2024-01-08 PROCEDURE — 6370000000 HC RX 637 (ALT 250 FOR IP): Performed by: PHYSICIAN ASSISTANT

## 2024-01-08 PROCEDURE — 72131 CT LUMBAR SPINE W/O DYE: CPT

## 2024-01-08 PROCEDURE — 1111F DSCHRG MED/CURRENT MED MERGE: CPT | Performed by: INTERNAL MEDICINE

## 2024-01-08 PROCEDURE — 99284 EMERGENCY DEPT VISIT MOD MDM: CPT

## 2024-01-08 PROCEDURE — 6360000002 HC RX W HCPCS: Performed by: PHYSICIAN ASSISTANT

## 2024-01-08 PROCEDURE — 96374 THER/PROPH/DIAG INJ IV PUSH: CPT

## 2024-01-08 RX ORDER — CARVEDILOL 25 MG/1
25 TABLET ORAL 2 TIMES DAILY WITH MEALS
Status: DISCONTINUED | OUTPATIENT
Start: 2024-01-08 | End: 2024-01-08 | Stop reason: HOSPADM

## 2024-01-08 RX ORDER — MORPHINE SULFATE 4 MG/ML
6 INJECTION, SOLUTION INTRAMUSCULAR; INTRAVENOUS ONCE
Status: DISCONTINUED | OUTPATIENT
Start: 2024-01-08 | End: 2024-01-08

## 2024-01-08 RX ORDER — HYDRALAZINE HYDROCHLORIDE 25 MG/1
50 TABLET, FILM COATED ORAL ONCE
Status: COMPLETED | OUTPATIENT
Start: 2024-01-08 | End: 2024-01-08

## 2024-01-08 RX ORDER — AMLODIPINE BESYLATE 5 MG/1
10 TABLET ORAL ONCE
Status: COMPLETED | OUTPATIENT
Start: 2024-01-08 | End: 2024-01-08

## 2024-01-08 RX ORDER — MORPHINE SULFATE 4 MG/ML
4 INJECTION, SOLUTION INTRAMUSCULAR; INTRAVENOUS ONCE
Status: COMPLETED | OUTPATIENT
Start: 2024-01-08 | End: 2024-01-08

## 2024-01-08 RX ORDER — METHYLPREDNISOLONE 4 MG/1
2 TABLET ORAL DAILY
Qty: 3 TABLET | Refills: 0 | Status: SHIPPED | OUTPATIENT
Start: 2024-01-08 | End: 2024-01-14

## 2024-01-08 RX ORDER — CYCLOBENZAPRINE HCL 10 MG
10 TABLET ORAL 3 TIMES DAILY PRN
Qty: 21 TABLET | Refills: 0 | Status: SHIPPED | OUTPATIENT
Start: 2024-01-08 | End: 2024-01-18

## 2024-01-08 RX ADMIN — AMLODIPINE BESYLATE 10 MG: 5 TABLET ORAL at 16:07

## 2024-01-08 RX ADMIN — MORPHINE SULFATE 4 MG: 4 INJECTION, SOLUTION INTRAMUSCULAR; INTRAVENOUS at 16:08

## 2024-01-08 RX ADMIN — HYDRALAZINE HYDROCHLORIDE 50 MG: 25 TABLET, FILM COATED ORAL at 16:07

## 2024-01-08 RX ADMIN — CARVEDILOL 25 MG: 25 TABLET, FILM COATED ORAL at 16:08

## 2024-01-08 ASSESSMENT — ENCOUNTER SYMPTOMS
DIARRHEA: 0
ABDOMINAL PAIN: 0
NAUSEA: 0
VOMITING: 0
SHORTNESS OF BREATH: 0
COUGH: 0
RHINORRHEA: 0
WHEEZING: 0
BACK PAIN: 1

## 2024-01-08 ASSESSMENT — PAIN DESCRIPTION - DESCRIPTORS: DESCRIPTORS: ACHING

## 2024-01-08 ASSESSMENT — PAIN SCALES - GENERAL
PAINLEVEL_OUTOF10: 10
PAINLEVEL_OUTOF10: 9
PAINLEVEL_OUTOF10: 8

## 2024-01-08 ASSESSMENT — PAIN DESCRIPTION - ORIENTATION: ORIENTATION: LEFT;LOWER

## 2024-01-08 ASSESSMENT — PAIN DESCRIPTION - LOCATION: LOCATION: BACK;LEG

## 2024-01-08 ASSESSMENT — PAIN - FUNCTIONAL ASSESSMENT: PAIN_FUNCTIONAL_ASSESSMENT: 0-10

## 2024-01-08 NOTE — ED NOTES
Pt c/o blurry vision in the rt eye, but also \"a little\" blurry in the left eye as well. Pt said the blurry vision is always there.

## 2024-01-08 NOTE — CARE COORDINATION
Care Transitions Initial Follow Up Call    Call within 2 business days of discharge: Yes    Patient Current Location:  Home: [address] [place] [address]    Care Transition Nurse contacted the patient by telephone to perform post hospital discharge assessment. Verified name and  with patient as identifiers. Provided introduction to self, and explanation of the Care Transition Nurse role.     Patient: Emilee Paulson Patient : 1950   MRN: [id number]  Reason for Admission: Left knee hemarthrosis: suspected hematoma versus inflammatory secondary to underlying osteoarthrosis, arthrocentesis 24 repeat L knee aspiration and steroid injection on 24  Discharge Date: 24 RARS: Readmission Risk Score: 18      Last Discharge Facility       Date Complaint Diagnosis Description Type Department Provider    23 Leg Pain Effusion of left knee ... [facility]; Renée Rao...            Was this an external facility discharge? No Discharge Facility:     Challenges to be reviewed by the provider   Additional needs identified to be addressed with provider: No  none               Method of communication with provider: none.    Patient reports that she is having a lot of pain in her L knee and is unable to walk on it. CTN scheduled her for a PCP follow up today at 1:40.  Discussed discharge instructions and reviewed medications, 1111F completed.  CTN will continue with outreach follow up calls.      Care Transition Nurse reviewed red flags with patient who verbalized understanding. The patient was given an opportunity to ask questions and does not have any further questions or concerns at this time. Were discharge instructions available to patient? Yes. Reviewed appropriate site of care based on symptoms and resources available to patient including: PCP  Urgent care clinics  [facility]  When to call 911. The patient agrees to contact

## 2024-01-08 NOTE — ED PROVIDER NOTES
disease), Cerebral artery occlusion with cerebral infarction ([facility]), Chronic diastolic CHF (congestive heart failure) ([facility]) (05/16/2020), CVA (cerebral vascular accident) ([facility]) (01/04/2017), Hypertension, Intracranial hemorrhage ([facility]) (04/2016), Mixed hyperlipidemia (07/06/2022), Nonintractable headache, Nontraumatic subcortical hemorrhage of cerebral hemisphere ([facility]) (04/30/2016), Poor vision, Primary osteoarthritis of right knee (03/18/2022), Sudden visual loss of left eye, Thyroid nodule (02/08/2018), and TIA involving right internal carotid artery.     EMERGENCY DEPARTMENT COURSE and DIFFERENTIAL DIAGNOSIS/MDM:   Vitals:    Vitals:    01/08/24 1517 01/08/24 1645 01/08/24 1715   BP: (!) 165/138 119/79 123/83   Pulse: 100 96 87   Resp: 20 28 10   Temp: 98 °F (36.7 °C)     TempSrc: Oral     SpO2: 96% 95% 96%   Weight: 83 kg (183 lb)     Height: 1.651 m (5' 5\")         Patient was given the following medications:  Medications   carvedilol (COREG) tablet 25 mg (25 mg Oral Given 1/8/24 1608)   amLODIPine (NORVASC) tablet 10 mg (10 mg Oral Given 1/8/24 1607)   hydrALAZINE (APRESOLINE) tablet 50 mg (50 mg Oral Given 1/8/24 1607)   morphine injection 4 mg (4 mg IntraVENous Given 1/8/24 1608)             Is this patient to be included in the SEP-1 Core Measure due to severe sepsis or septic shock?   No   Exclusion criteria - the patient is NOT to be included for SEP-1 Core Measure due to:  Infection is not suspected    Chronic Conditions affecting care:    has a past medical history of Acute cerebrovascular accident (CVA) ([facility]) (01/28/2020), LAST (acute kidney injury) ([facility]) (10/20/2022), Atrial fibrillation ([facility]), Bell palsy, CAD (coronary artery disease), Cerebral artery occlusion with cerebral infarction ([facility]), Chronic diastolic CHF (congestive heart failure) ([facility]) (05/16/2020), CVA (cerebral vascular accident) ([facility]) (01/04/2017), Hypertension, Intracranial hemorrhage (HCC) (04/2016), Mixed hyperlipidemia (07/06/2022),

## 2024-01-09 ENCOUNTER — TELEPHONE (OUTPATIENT)
Dept: PRIMARY CARE CLINIC | Age: 74
End: 2024-01-09

## 2024-01-09 ENCOUNTER — TELEPHONE (OUTPATIENT)
Dept: PHARMACY | Age: 74
End: 2024-01-09

## 2024-01-09 ENCOUNTER — CARE COORDINATION (OUTPATIENT)
Dept: CASE MANAGEMENT | Age: 74
End: 2024-01-09

## 2024-01-09 NOTE — TELEPHONE ENCOUNTER
Pt in the hospital 12/30-1/6 for left knee hematoma. Was given 5 mg vit k. Was on empiric vanc and cefepime. Hold Aldactone & Lasix, asa. Hold warfarin 3 d.     Returned to ED 1/8 for knee pain. Was given morphine, hydralazine. D/c on cyclobenzaprine and medrol dose tana    Called and spoke to patient. She verified she will be resuming warfarin today as has been 3 days post d/c from hospital. Advised for patient to resume at normal weekly dose. Will not boost dose d/t hematoma. R/s pt to RTC 1/18

## 2024-01-09 NOTE — CARE COORDINATION
ED Follow Up Call    2024    Patient: Emilee Paulson Patient : 1950   MRN: [id number]  Reason for ED return visit: Pain in L knee and back  Discharge Date: 24    Patient reports that she continues to have L knee and back pain.  She returned to ED 24 and they sent her home on steroids & cyclobenzaprine.  She is scheduled to see an ortho provider 24 and CTN was unable to find another PCP appointment available this week.  CTN routed message to PCP office requesting a follow up this week, also informed them of high readmission risk. CTN will continue with outreach follow up calls.       Care Transitions ED Follow Up    Care Transitions Interventions  Do you have any ongoing symptoms?: No   Do you have all of your prescriptions and are they filled?: Yes   Were you discharged with any Home Care or Post Acute Services or do you currently have any active services?: No         Do you have any needs or concerns that I can assist you with?: No   Identified Barriers: None

## 2024-01-13 ENCOUNTER — HOSPITAL ENCOUNTER (EMERGENCY)
Age: 74
Discharge: HOME | End: 2024-01-13
Payer: MEDICARE

## 2024-01-13 ENCOUNTER — APPOINTMENT (OUTPATIENT)
Dept: CT IMAGING | Age: 74
End: 2024-01-13
Payer: MEDICARE

## 2024-01-13 VITALS
HEART RATE: 95 BPM | TEMPERATURE: 97.5 F | BODY MASS INDEX: 30.49 KG/M2 | HEIGHT: 65 IN | SYSTOLIC BLOOD PRESSURE: 160 MMHG | RESPIRATION RATE: 16 BRPM | WEIGHT: 182.98 LBS | DIASTOLIC BLOOD PRESSURE: 106 MMHG | OXYGEN SATURATION: 95 %

## 2024-01-13 DIAGNOSIS — N39.0 URINARY TRACT INFECTION WITHOUT HEMATURIA, SITE UNSPECIFIED: ICD-10-CM

## 2024-01-13 DIAGNOSIS — R10.9 RIGHT FLANK PAIN: Primary | ICD-10-CM

## 2024-01-13 LAB
ALBUMIN SERPL-MCNC: 4.3 G/DL (ref 3.4–5)
ALBUMIN/GLOB SERPL: 0.9 {RATIO} (ref 1.1–2.2)
ALP SERPL-CCNC: 94 U/L (ref 40–129)
ALT SERPL-CCNC: 22 U/L (ref 10–40)
ANION GAP SERPL CALCULATED.3IONS-SCNC: 12 MMOL/L (ref 3–16)
AST SERPL-CCNC: 49 U/L (ref 15–37)
BACTERIA URNS QL MICRO: ABNORMAL /HPF
BASOPHILS # BLD: 0.1 K/UL (ref 0–0.2)
BASOPHILS NFR BLD: 0.5 %
BILIRUB SERPL-MCNC: 1.1 MG/DL (ref 0–1)
BILIRUB UR QL STRIP.AUTO: NEGATIVE
BUN SERPL-MCNC: 6 MG/DL (ref 7–20)
CALCIUM SERPL-MCNC: 11 MG/DL (ref 8.3–10.6)
CHLORIDE SERPL-SCNC: 97 MMOL/L (ref 99–110)
CLARITY UR: ABNORMAL
CO2 SERPL-SCNC: 29 MMOL/L (ref 21–32)
COLOR UR: YELLOW
CREAT SERPL-MCNC: 0.6 MG/DL (ref 0.6–1.2)
DEPRECATED RDW RBC AUTO: 13.9 % (ref 12.4–15.4)
EOSINOPHIL # BLD: 0 K/UL (ref 0–0.6)
EOSINOPHIL NFR BLD: 0.1 %
EPI CELLS #/AREA URNS AUTO: 15 /HPF (ref 0–5)
GFR SERPLBLD CREATININE-BSD FMLA CKD-EPI: >60 ML/MIN/{1.73_M2}
GLUCOSE SERPL-MCNC: 112 MG/DL (ref 70–99)
GLUCOSE UR STRIP.AUTO-MCNC: NEGATIVE MG/DL
HCT VFR BLD AUTO: 38.2 % (ref 36–48)
HGB BLD-MCNC: 12.6 G/DL (ref 12–16)
HGB UR QL STRIP.AUTO: NEGATIVE
HYALINE CASTS #/AREA URNS AUTO: 3 /LPF (ref 0–8)
INR PPP: 1.46 (ref 0.84–1.16)
KETONES UR STRIP.AUTO-MCNC: ABNORMAL MG/DL
LEUKOCYTE ESTERASE UR QL STRIP.AUTO: ABNORMAL
LYMPHOCYTES # BLD: 1.3 K/UL (ref 1–5.1)
LYMPHOCYTES NFR BLD: 8.8 %
MCH RBC QN AUTO: 29.5 PG (ref 26–34)
MCHC RBC AUTO-ENTMCNC: 33 G/DL (ref 31–36)
MCV RBC AUTO: 89.3 FL (ref 80–100)
MONOCYTES # BLD: 1.1 K/UL (ref 0–1.3)
MONOCYTES NFR BLD: 7.4 %
NEUTROPHILS # BLD: 12.7 K/UL (ref 1.7–7.7)
NEUTROPHILS NFR BLD: 83.2 %
NITRITE UR QL STRIP.AUTO: NEGATIVE
PH UR STRIP.AUTO: 7 [PH] (ref 5–8)
PLATELET # BLD AUTO: 368 K/UL (ref 135–450)
PMV BLD AUTO: 8.4 FL (ref 5–10.5)
POTASSIUM SERPL-SCNC: 3.9 MMOL/L (ref 3.5–5.1)
PROT SERPL-MCNC: 9.3 G/DL (ref 6.4–8.2)
PROT UR STRIP.AUTO-MCNC: ABNORMAL MG/DL
PROTHROMBIN TIME: 17.7 SEC (ref 11.5–14.8)
RBC # BLD AUTO: 4.28 M/UL (ref 4–5.2)
RBC CLUMPS #/AREA URNS AUTO: 2 /HPF (ref 0–4)
SODIUM SERPL-SCNC: 138 MMOL/L (ref 136–145)
SP GR UR STRIP.AUTO: 1.01 (ref 1–1.03)
UA COMPLETE W REFLEX CULTURE PNL UR: YES
UA DIPSTICK W REFLEX MICRO PNL UR: YES
URN SPEC COLLECT METH UR: ABNORMAL
UROBILINOGEN UR STRIP-ACNC: 1 E.U./DL
WBC # BLD AUTO: 15.2 K/UL (ref 4–11)
WBC #/AREA URNS AUTO: 13 /HPF (ref 0–5)

## 2024-01-13 PROCEDURE — 87086 URINE CULTURE/COLONY COUNT: CPT

## 2024-01-13 PROCEDURE — 85025 COMPLETE CBC W/AUTO DIFF WBC: CPT

## 2024-01-13 PROCEDURE — 80053 COMPREHEN METABOLIC PANEL: CPT

## 2024-01-13 PROCEDURE — 74176 CT ABD & PELVIS W/O CONTRAST: CPT

## 2024-01-13 PROCEDURE — 36415 COLL VENOUS BLD VENIPUNCTURE: CPT

## 2024-01-13 PROCEDURE — 96372 THER/PROPH/DIAG INJ SC/IM: CPT

## 2024-01-13 PROCEDURE — 85610 PROTHROMBIN TIME: CPT

## 2024-01-13 PROCEDURE — 99284 EMERGENCY DEPT VISIT MOD MDM: CPT

## 2024-01-13 PROCEDURE — 81001 URINALYSIS AUTO W/SCOPE: CPT

## 2024-01-13 PROCEDURE — 87077 CULTURE AEROBIC IDENTIFY: CPT

## 2024-01-13 PROCEDURE — 87186 SC STD MICRODIL/AGAR DIL: CPT

## 2024-01-13 PROCEDURE — 6360000002 HC RX W HCPCS: Performed by: PHYSICIAN ASSISTANT

## 2024-01-13 RX ORDER — CEPHALEXIN 500 MG/1
500 CAPSULE ORAL 2 TIMES DAILY
Qty: 14 CAPSULE | Refills: 0 | Status: SHIPPED | OUTPATIENT
Start: 2024-01-13 | End: 2024-01-20

## 2024-01-13 RX ORDER — KETOROLAC TROMETHAMINE 30 MG/ML
30 INJECTION, SOLUTION INTRAMUSCULAR; INTRAVENOUS ONCE
Status: COMPLETED | OUTPATIENT
Start: 2024-01-13 | End: 2024-01-13

## 2024-01-13 RX ADMIN — KETOROLAC TROMETHAMINE 30 MG: 30 INJECTION, SOLUTION INTRAMUSCULAR at 11:34

## 2024-01-13 ASSESSMENT — PAIN DESCRIPTION - DESCRIPTORS
DESCRIPTORS: DISCOMFORT
DESCRIPTORS: DISCOMFORT

## 2024-01-13 ASSESSMENT — ENCOUNTER SYMPTOMS
RHINORRHEA: 0
SORE THROAT: 0
COUGH: 0
ABDOMINAL PAIN: 1
BACK PAIN: 0
SHORTNESS OF BREATH: 0
VOMITING: 1
DIARRHEA: 0
NAUSEA: 1
EYE PAIN: 0
CONSTIPATION: 0

## 2024-01-13 ASSESSMENT — PAIN DESCRIPTION - PAIN TYPE
TYPE: ACUTE PAIN
TYPE: ACUTE PAIN

## 2024-01-13 ASSESSMENT — PAIN DESCRIPTION - ORIENTATION
ORIENTATION: LEFT
ORIENTATION: RIGHT

## 2024-01-13 ASSESSMENT — PAIN DESCRIPTION - LOCATION
LOCATION: FLANK
LOCATION: FLANK

## 2024-01-13 ASSESSMENT — PAIN - FUNCTIONAL ASSESSMENT
PAIN_FUNCTIONAL_ASSESSMENT: 0-10
PAIN_FUNCTIONAL_ASSESSMENT: ACTIVITIES ARE NOT PREVENTED
PAIN_FUNCTIONAL_ASSESSMENT: ACTIVITIES ARE NOT PREVENTED
PAIN_FUNCTIONAL_ASSESSMENT: 0-10

## 2024-01-13 ASSESSMENT — PAIN SCALES - GENERAL
PAINLEVEL_OUTOF10: 7
PAINLEVEL_OUTOF10: 8

## 2024-01-13 NOTE — ED PROVIDER NOTES
today:     \"Stable solid appearing mass in the right renal midpole measuring up to 2.5 cm  is unchanged in size since 02/19/2022.  No hydronephrosis, nephrolithiasis or obstructive uropathy.   Leiomyomatous uterus.\"    Patient was given Toradol here in the emergency room did have improvement in her flank pain.  Given the elevated white count and leukocyte esterase on urinalysis we will empirically start the patient on antibiotic to cover for infection while awaiting culture results.    At this time a low suspicion for kidney stone, appendicitis, bowel obstruction, diverticulitis, hernia, gastritis/gastroenteritis, pancreatitis, cholecystitis, hepatitis, constipation, IBS, IBD, mesenteric ischemia, AAA.    Appropriate for outpatient management        I am the Primary Clinician of Record.    FINAL IMPRESSION      1. Right flank pain    2. Urinary tract infection without hematuria, site unspecified          DISPOSITION/PLAN     DISPOSITION Decision To Discharge 01/13/2024 01:55:32 PM      PATIENT REFERRED TO:  Stefano Suarez MD  [address] [facility] [id number] [phone]    Schedule an appointment as soon as possible for a visit in 3 days  [facility]  [address] [address]  [phone]    As needed, If symptoms worsen      DISCHARGE MEDICATIONS:  Discharge Medication List as of 1/13/2024  2:52 PM        START taking these medications    Details   cephALEXin (KEFLEX) 500 MG capsule Take 1 capsule by mouth 2 times daily for 7 days, Disp-14 capsule, R-0Normal             DISCONTINUED MEDICATIONS:  Discharge Medication List as of 1/13/2024  2:52 PM                 (Please note that portions of this note were completed with a voice recognition program.  Efforts were made to edit the dictations but occasionally words are mis-transcribed.)    KIM Morillo (electronically signed)            Eugene Perez PA  01/13/24 1541

## 2024-01-14 LAB
BACTERIA UR CULT: ABNORMAL
ORGANISM: ABNORMAL

## 2024-01-15 ENCOUNTER — CARE COORDINATION (OUTPATIENT)
Dept: CASE MANAGEMENT | Age: 74
End: 2024-01-15

## 2024-01-15 LAB
BACTERIA SNV CULT: NORMAL
BACTERIA SPEC ANAEROBE CULT: NORMAL
BACTERIA UR CULT: ABNORMAL
ORGANISM: ABNORMAL

## 2024-01-15 NOTE — CARE COORDINATION
Follow up outreach call attempt, unable to speak with patient.  CTN will continue with outreach call attempts.

## 2024-01-16 ENCOUNTER — OFFICE VISIT (OUTPATIENT)
Dept: ORTHOPEDIC SURGERY | Age: 74
End: 2024-01-16
Payer: MEDICARE

## 2024-01-16 ENCOUNTER — CARE COORDINATION (OUTPATIENT)
Dept: CASE MANAGEMENT | Age: 74
End: 2024-01-16

## 2024-01-16 VITALS — WEIGHT: 180 LBS | HEIGHT: 66 IN | BODY MASS INDEX: 28.93 KG/M2

## 2024-01-16 DIAGNOSIS — M47.897 OTHER OSTEOARTHRITIS OF SPINE, LUMBOSACRAL REGION: Primary | ICD-10-CM

## 2024-01-16 PROBLEM — M47.9 SPINAL OSTEOARTHRITIS: Status: ACTIVE | Noted: 2024-01-16

## 2024-01-16 PROCEDURE — 1111F DSCHRG MED/CURRENT MED MERGE: CPT | Performed by: PHYSICIAN ASSISTANT

## 2024-01-16 PROCEDURE — 1036F TOBACCO NON-USER: CPT | Performed by: PHYSICIAN ASSISTANT

## 2024-01-16 PROCEDURE — 1123F ACP DISCUSS/DSCN MKR DOCD: CPT | Performed by: PHYSICIAN ASSISTANT

## 2024-01-16 PROCEDURE — 99214 OFFICE O/P EST MOD 30 MIN: CPT | Performed by: PHYSICIAN ASSISTANT

## 2024-01-16 PROCEDURE — G8417 CALC BMI ABV UP PARAM F/U: HCPCS | Performed by: PHYSICIAN ASSISTANT

## 2024-01-16 PROCEDURE — G8399 PT W/DXA RESULTS DOCUMENT: HCPCS | Performed by: PHYSICIAN ASSISTANT

## 2024-01-16 PROCEDURE — 3017F COLORECTAL CA SCREEN DOC REV: CPT | Performed by: PHYSICIAN ASSISTANT

## 2024-01-16 PROCEDURE — G8484 FLU IMMUNIZE NO ADMIN: HCPCS | Performed by: PHYSICIAN ASSISTANT

## 2024-01-16 PROCEDURE — 1090F PRES/ABSN URINE INCON ASSESS: CPT | Performed by: PHYSICIAN ASSISTANT

## 2024-01-16 PROCEDURE — G8427 DOCREV CUR MEDS BY ELIG CLIN: HCPCS | Performed by: PHYSICIAN ASSISTANT

## 2024-01-16 RX ORDER — TRAMADOL HYDROCHLORIDE 50 MG/1
50 TABLET ORAL EVERY 6 HOURS PRN
Qty: 28 TABLET | Refills: 0 | Status: SHIPPED | OUTPATIENT
Start: 2024-01-16 | End: 2024-01-23

## 2024-01-16 NOTE — PROGRESS NOTES
History of present illness:   Ms. Emilee Paulson is a pleasant 73 y.o. female kindly referred by Stefano Suarez MD for consultation regarding her low back pain.   Her pain began years ago.  She did have a fall around 12/28/23 and since that time she has had progressively worsening low back pain.  She states she was hospitalized with significant left knee pain and swelling.  She attempted to get out of the hospital bed when she fell in a seated position.  Pain has steadily worsened since onset.   Back pain 6/10 VAS, right and left buttock pain 8/10 VAS, right and left leg pain 8/10 VAS.  Pain radiates down the posterior thighs to mid calves  Pain is describes as aches with sharp pain in lower midline spine.       She denies numbness and tingling lower extremities.    She reports weakness of her left knee from arthritis.  She cannot put much weight on the left knee and requires a walker.  She denies bowel or bladder dysfunction and saddle anesthesia.   She can sit for a maximum of 60 minutes and stand for a maximum 10 minutes.   Pain does disrupt her sleep.   Prior medications and treatment tried include Tylenol without relief.      Past medical history:  Her past medical history has been reviewed.  Past Medical History:   Diagnosis Date    Acute cerebrovascular accident (CVA) (Former[facility]) 01/28/2020    brain bleed    LAST (acute kidney injury) (Formerly [facility]) 10/20/2022    Atrial fibrillation (Formerly [facility])     Bell palsy     diagnosed 15 years ago    CAD (coronary artery disease)     Cerebral artery occlusion with cerebral infarction ([facility])     Chronic diastolic CHF (congestive heart failure) ([facility]) 05/16/2020    CVA (cerebral vascular accident) ([facility]) 01/04/2017    Hypertension     Intracranial hemorrhage ([facility]) 04/2016    Mixed hyperlipidemia 07/06/2022    Nonintractable headache     currently controlled    Nontraumatic subcortical hemorrhage of cerebral hemisphere ([facility]) 04/30/2016    Poor vision     after bells palsy in 2012    Primary

## 2024-01-16 NOTE — CARE COORDINATION
Second & final follow up outreach call attempt, no answer.  VM not available.  CTN will transition to AC for continued follow up.

## 2024-01-17 ENCOUNTER — OFFICE VISIT (OUTPATIENT)
Dept: PRIMARY CARE CLINIC | Age: 74
End: 2024-01-17
Payer: MEDICARE

## 2024-01-17 VITALS
OXYGEN SATURATION: 97 % | HEIGHT: 66 IN | SYSTOLIC BLOOD PRESSURE: 120 MMHG | WEIGHT: 174.4 LBS | TEMPERATURE: 97.3 F | DIASTOLIC BLOOD PRESSURE: 80 MMHG | HEART RATE: 80 BPM | BODY MASS INDEX: 28.03 KG/M2

## 2024-01-17 DIAGNOSIS — R53.83 OTHER FATIGUE: ICD-10-CM

## 2024-01-17 DIAGNOSIS — E83.52 HYPERCALCEMIA: ICD-10-CM

## 2024-01-17 DIAGNOSIS — N39.0 URINARY TRACT INFECTION WITHOUT HEMATURIA, SITE UNSPECIFIED: ICD-10-CM

## 2024-01-17 DIAGNOSIS — E83.52 HYPERCALCEMIA: Primary | ICD-10-CM

## 2024-01-17 DIAGNOSIS — N28.89 MASS OF RIGHT KIDNEY: ICD-10-CM

## 2024-01-17 PROBLEM — I77.810 ASCENDING AORTA DILATATION (HCC): Status: RESOLVED | Noted: 2023-10-26 | Resolved: 2024-01-17

## 2024-01-17 PROBLEM — I50.22 CHRONIC SYSTOLIC (CONGESTIVE) HEART FAILURE (HCC): Status: ACTIVE | Noted: 2024-01-17

## 2024-01-17 LAB
PTH-INTACT SERPL-MCNC: 44.1 PG/ML (ref 14–72)
TSH SERPL DL<=0.005 MIU/L-ACNC: 0.67 UIU/ML (ref 0.27–4.2)

## 2024-01-17 PROCEDURE — 99214 OFFICE O/P EST MOD 30 MIN: CPT | Performed by: INTERNAL MEDICINE

## 2024-01-17 PROCEDURE — 3079F DIAST BP 80-89 MM HG: CPT | Performed by: INTERNAL MEDICINE

## 2024-01-17 PROCEDURE — G8484 FLU IMMUNIZE NO ADMIN: HCPCS | Performed by: INTERNAL MEDICINE

## 2024-01-17 PROCEDURE — G8427 DOCREV CUR MEDS BY ELIG CLIN: HCPCS | Performed by: INTERNAL MEDICINE

## 2024-01-17 PROCEDURE — 1111F DSCHRG MED/CURRENT MED MERGE: CPT | Performed by: INTERNAL MEDICINE

## 2024-01-17 PROCEDURE — 1123F ACP DISCUSS/DSCN MKR DOCD: CPT | Performed by: INTERNAL MEDICINE

## 2024-01-17 PROCEDURE — 1036F TOBACCO NON-USER: CPT | Performed by: INTERNAL MEDICINE

## 2024-01-17 PROCEDURE — 3017F COLORECTAL CA SCREEN DOC REV: CPT | Performed by: INTERNAL MEDICINE

## 2024-01-17 PROCEDURE — 1090F PRES/ABSN URINE INCON ASSESS: CPT | Performed by: INTERNAL MEDICINE

## 2024-01-17 PROCEDURE — G8399 PT W/DXA RESULTS DOCUMENT: HCPCS | Performed by: INTERNAL MEDICINE

## 2024-01-17 PROCEDURE — 3074F SYST BP LT 130 MM HG: CPT | Performed by: INTERNAL MEDICINE

## 2024-01-17 PROCEDURE — G8417 CALC BMI ABV UP PARAM F/U: HCPCS | Performed by: INTERNAL MEDICINE

## 2024-01-17 ASSESSMENT — PATIENT HEALTH QUESTIONNAIRE - PHQ9
1. LITTLE INTEREST OR PLEASURE IN DOING THINGS: 0
SUM OF ALL RESPONSES TO PHQ9 QUESTIONS 1 & 2: 0
SUM OF ALL RESPONSES TO PHQ QUESTIONS 1-9: 0
SUM OF ALL RESPONSES TO PHQ QUESTIONS 1-9: 0
2. FEELING DOWN, DEPRESSED OR HOPELESS: 0
SUM OF ALL RESPONSES TO PHQ QUESTIONS 1-9: 0
SUM OF ALL RESPONSES TO PHQ QUESTIONS 1-9: 0

## 2024-01-17 ASSESSMENT — ENCOUNTER SYMPTOMS
CONSTIPATION: 0
WHEEZING: 0
BLOOD IN STOOL: 0
ABDOMINAL PAIN: 0
SHORTNESS OF BREATH: 0
DIARRHEA: 0
BACK PAIN: 1
GASTROINTESTINAL NEGATIVE: 1
COUGH: 0
ABDOMINAL DISTENTION: 0
CHEST TIGHTNESS: 0

## 2024-01-17 NOTE — PROGRESS NOTES
Cardiovascular: Negative.         Atrial Fib > 5 yrs on Coumadin     No known CAD    Gastrointestinal: Negative.  Negative for abdominal distention, abdominal pain, blood in stool, constipation and diarrhea.        Colonoscopy >    No FH of ca colon    Endocrine:        No FH of Diabetes    Genitourinary:  Negative for dysuria, menstrual problem, urgency and vaginal discharge.        Mammogram  5/23    Katherine Menopause . 4 lkids   Nl gestation    Musculoskeletal:  Positive for arthralgias (S/P shots both knees . Still hurting) and back pain.        DEXA 4/23  osteoporosis   Allergic/Immunologic: Negative for environmental allergies and food allergies.   Neurological:  Negative for dizziness, weakness and headaches.        3/23   MRI  Brain  Cerebral atrophy.  Severe chronic small vessel ischemic changes.  Remote  lacunar infarcts in the left cerebellum, left kosta, and basal ganglia and  thalami.  No acute brain parenchymal abnormality.      Previous hemorrhagic stroke in  2018       Psychiatric/Behavioral:  Negative for behavioral problems, dysphoric mood and sleep disturbance. The patient is not nervous/anxious.        Objective:   Physical Exam  Vitals reviewed.   Constitutional:       General: She is not in acute distress.     Appearance: She is obese. She is ill-appearing.   Cardiovascular:      Rate and Rhythm: Regular rhythm.      Heart sounds: Normal heart sounds.   Pulmonary:      Breath sounds: No wheezing or rales.   Musculoskeletal:         General: Tenderness (back) present.      Comments: Walks slowly  with cane support    Skin:     General: Skin is warm.   Neurological:      Mental Status: She is oriented to person, place, and time.   Psychiatric:         Behavior: Behavior normal.         Assessment:    Emilee was seen today for hypertension, generalized body aches and flank pain.    Diagnoses and all orders for this visit:    Hypercalcemia  on recent labs   -     Calcium, Ionized  -     PTH,

## 2024-01-18 ENCOUNTER — ANTI-COAG VISIT (OUTPATIENT)
Dept: PHARMACY | Age: 74
End: 2024-01-18

## 2024-01-18 DIAGNOSIS — I48.21 PERMANENT ATRIAL FIBRILLATION (HCC): Primary | ICD-10-CM

## 2024-01-18 LAB — INR BLD: 2.5

## 2024-01-18 NOTE — PROGRESS NOTES
INR 2.5 Patient taking 5mg on M/W/F and 2.5mg all other days.  Please call Joanna CARMONA [phone] with warfarin dosing and order for next INR check     Returned call to Joanna CARMONA and CARO. Instructed for patient to continue to take 5 mg on Mon, Wed and Fri and 2.5 mg all other days of the week. Recheck INR in 1 week, 1/25. Any questions or concerns return call to clinic       For Pharmacy Admin Tracking Only    Total # of Interventions Recommended: 0  Total # of Interventions Accepted: 0  Time Spent (min): 10

## 2024-01-19 LAB
CA-I ADJ PH7.4 SERPL-SCNC: 1.37 MMOL/L (ref 1.09–1.3)
CA-I SERPL ISE-SCNC: 1.34 MMOL/L (ref 1.09–1.3)

## 2024-01-21 ENCOUNTER — APPOINTMENT (OUTPATIENT)
Dept: GENERAL RADIOLOGY | Age: 74
DRG: 948 | End: 2024-01-21
Payer: MEDICARE

## 2024-01-21 ENCOUNTER — HOSPITAL ENCOUNTER (INPATIENT)
Age: 74
LOS: 3 days | Discharge: NURSING, REHAB OR LONG-TERM CARE | DRG: 948 | End: 2024-01-24
Attending: HOSPITALIST | Admitting: HOSPITALIST
Payer: MEDICARE

## 2024-01-21 DIAGNOSIS — I48.20 CHRONIC ATRIAL FIBRILLATION (HCC): ICD-10-CM
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
## 2024-01-21 DIAGNOSIS — D72.829 LEUKOCYTOSIS, UNSPECIFIED TYPE: Primary | ICD-10-CM

## 2024-01-21 DIAGNOSIS — N17.9 AKI (ACUTE KIDNEY INJURY) (HCC): ICD-10-CM

## 2024-01-21 PROBLEM — R53.81 DEBILITY: Status: ACTIVE | Noted: 2024-01-21

## 2024-01-21 LAB
ABO + RH BLD: NORMAL
ANION GAP SERPL CALCULATED.3IONS-SCNC: 13 MMOL/L (ref 3–16)
BACTERIA URNS QL MICRO: ABNORMAL /HPF
BASOPHILS # BLD: 0 K/UL (ref 0–0.2)
BASOPHILS NFR BLD: 0.2 %
BILIRUB UR QL STRIP.AUTO: ABNORMAL
BLD GP AB SCN SERPL QL: NORMAL
BUN SERPL-MCNC: 18 MG/DL (ref 7–20)
CALCIUM SERPL-MCNC: 10.4 MG/DL (ref 8.3–10.6)
CHLORIDE SERPL-SCNC: 90 MMOL/L (ref 99–110)
CLARITY UR: ABNORMAL
CO2 SERPL-SCNC: 27 MMOL/L (ref 21–32)
COLOR UR: ABNORMAL
CREAT SERPL-MCNC: 1.2 MG/DL (ref 0.6–1.2)
DEPRECATED RDW RBC AUTO: 13.2 % (ref 12.4–15.4)
EOSINOPHIL # BLD: 0 K/UL (ref 0–0.6)
EOSINOPHIL NFR BLD: 0 %
EPI CELLS #/AREA URNS AUTO: 10 /HPF (ref 0–5)
GFR SERPLBLD CREATININE-BSD FMLA CKD-EPI: 48 ML/MIN/{1.73_M2}
GLUCOSE SERPL-MCNC: 119 MG/DL (ref 70–99)
GLUCOSE UR STRIP.AUTO-MCNC: NEGATIVE MG/DL
HCT VFR BLD AUTO: 37.4 % (ref 36–48)
HGB BLD-MCNC: 12.5 G/DL (ref 12–16)
HGB UR QL STRIP.AUTO: NEGATIVE
HYALINE CASTS #/AREA URNS AUTO: 25 /LPF (ref 0–8)
HYALINE CASTS: PRESENT
INR PPP: 2.59 (ref 0.84–1.16)
KETONES UR STRIP.AUTO-MCNC: NEGATIVE MG/DL
LEUKOCYTE ESTERASE UR QL STRIP.AUTO: ABNORMAL
LYMPHOCYTES # BLD: 0.6 K/UL (ref 1–5.1)
LYMPHOCYTES NFR BLD: 3.3 %
MCH RBC QN AUTO: 29.3 PG (ref 26–34)
MCHC RBC AUTO-ENTMCNC: 33.4 G/DL (ref 31–36)
MCV RBC AUTO: 87.7 FL (ref 80–100)
MONOCYTES # BLD: 1.2 K/UL (ref 0–1.3)
MONOCYTES NFR BLD: 6.7 %
MUCUS: PRESENT
NEUTROPHILS # BLD: 16.1 K/UL (ref 1.7–7.7)
NEUTROPHILS NFR BLD: 89.8 %
NITRITE UR QL STRIP.AUTO: NEGATIVE
PH UR STRIP.AUTO: 5.5 [PH] (ref 5–8)
PLATELET # BLD AUTO: 191 K/UL (ref 135–450)
PMV BLD AUTO: 9.5 FL (ref 5–10.5)
POTASSIUM SERPL-SCNC: 3.7 MMOL/L (ref 3.5–5.1)
PROT UR STRIP.AUTO-MCNC: 30 MG/DL
PROTHROMBIN TIME: 27.6 SEC (ref 11.5–14.8)
RBC # BLD AUTO: 4.26 M/UL (ref 4–5.2)
RBC CLUMPS #/AREA URNS AUTO: 10 /HPF (ref 0–4)
SODIUM SERPL-SCNC: 130 MMOL/L (ref 136–145)
SP GR UR STRIP.AUTO: 1.02 (ref 1–1.03)
UA COMPLETE W REFLEX CULTURE PNL UR: ABNORMAL
UA DIPSTICK W REFLEX MICRO PNL UR: YES
URN SPEC COLLECT METH UR: ABNORMAL
UROBILINOGEN UR STRIP-ACNC: 2 E.U./DL
WBC # BLD AUTO: 18 K/UL (ref 4–11)
WBC #/AREA URNS AUTO: 7 /HPF (ref 0–5)

## 2024-01-21 PROCEDURE — 36415 COLL VENOUS BLD VENIPUNCTURE: CPT

## 2024-01-21 PROCEDURE — 2580000003 HC RX 258: Performed by: NURSE PRACTITIONER

## 2024-01-21 PROCEDURE — 85025 COMPLETE CBC W/AUTO DIFF WBC: CPT

## 2024-01-21 PROCEDURE — 93005 ELECTROCARDIOGRAM TRACING: CPT | Performed by: NURSE PRACTITIONER

## 2024-01-21 PROCEDURE — 73560 X-RAY EXAM OF KNEE 1 OR 2: CPT

## 2024-01-21 PROCEDURE — 96375 TX/PRO/DX INJ NEW DRUG ADDON: CPT

## 2024-01-21 PROCEDURE — 6360000002 HC RX W HCPCS: Performed by: NURSE PRACTITIONER

## 2024-01-21 PROCEDURE — 99285 EMERGENCY DEPT VISIT HI MDM: CPT

## 2024-01-21 PROCEDURE — 85610 PROTHROMBIN TIME: CPT

## 2024-01-21 PROCEDURE — 86901 BLOOD TYPING SEROLOGIC RH(D): CPT

## 2024-01-21 PROCEDURE — 2500000003 HC RX 250 WO HCPCS: Performed by: NURSE PRACTITIONER

## 2024-01-21 PROCEDURE — 96361 HYDRATE IV INFUSION ADD-ON: CPT

## 2024-01-21 PROCEDURE — 86850 RBC ANTIBODY SCREEN: CPT

## 2024-01-21 PROCEDURE — 96374 THER/PROPH/DIAG INJ IV PUSH: CPT

## 2024-01-21 PROCEDURE — 86900 BLOOD TYPING SEROLOGIC ABO: CPT

## 2024-01-21 PROCEDURE — 71045 X-RAY EXAM CHEST 1 VIEW: CPT

## 2024-01-21 PROCEDURE — 80048 BASIC METABOLIC PNL TOTAL CA: CPT

## 2024-01-21 PROCEDURE — 2060000000 HC ICU INTERMEDIATE R&B

## 2024-01-21 PROCEDURE — 81001 URINALYSIS AUTO W/SCOPE: CPT

## 2024-01-21 PROCEDURE — 73502 X-RAY EXAM HIP UNI 2-3 VIEWS: CPT

## 2024-01-21 RX ORDER — METOPROLOL TARTRATE 1 MG/ML
5 INJECTION, SOLUTION INTRAVENOUS
Status: DISCONTINUED | OUTPATIENT
Start: 2024-01-21 | End: 2024-01-24 | Stop reason: HOSPADM

## 2024-01-21 RX ORDER — MORPHINE SULFATE 2 MG/ML
2 INJECTION, SOLUTION INTRAMUSCULAR; INTRAVENOUS ONCE
Status: COMPLETED | OUTPATIENT
Start: 2024-01-21 | End: 2024-01-21

## 2024-01-21 RX ORDER — METOPROLOL TARTRATE 1 MG/ML
2.5 INJECTION, SOLUTION INTRAVENOUS ONCE
Status: COMPLETED | OUTPATIENT
Start: 2024-01-21 | End: 2024-01-21

## 2024-01-21 RX ORDER — DIPHENHYDRAMINE HYDROCHLORIDE 50 MG/ML
12.5 INJECTION INTRAMUSCULAR; INTRAVENOUS ONCE
Status: COMPLETED | OUTPATIENT
Start: 2024-01-21 | End: 2024-01-21

## 2024-01-21 RX ORDER — SODIUM CHLORIDE 9 MG/ML
INJECTION, SOLUTION INTRAVENOUS CONTINUOUS
Status: DISCONTINUED | OUTPATIENT
Start: 2024-01-21 | End: 2024-01-24 | Stop reason: HOSPADM

## 2024-01-21 RX ORDER — DIPHENHYDRAMINE HYDROCHLORIDE 50 MG/ML
6.25 INJECTION INTRAMUSCULAR; INTRAVENOUS ONCE
Status: DISCONTINUED | OUTPATIENT
Start: 2024-01-21 | End: 2024-01-21

## 2024-01-21 RX ADMIN — METOPROLOL TARTRATE 2.5 MG: 5 INJECTION INTRAVENOUS at 18:46

## 2024-01-21 RX ADMIN — DIPHENHYDRAMINE HYDROCHLORIDE 12.5 MG: 50 INJECTION INTRAMUSCULAR; INTRAVENOUS at 18:47

## 2024-01-21 RX ADMIN — MORPHINE SULFATE 2 MG: 2 INJECTION, SOLUTION INTRAMUSCULAR; INTRAVENOUS at 18:47

## 2024-01-21 RX ADMIN — SODIUM CHLORIDE: 9 INJECTION, SOLUTION INTRAVENOUS at 23:05

## 2024-01-21 ASSESSMENT — PAIN DESCRIPTION - LOCATION: LOCATION: BACK;KNEE

## 2024-01-21 ASSESSMENT — PAIN - FUNCTIONAL ASSESSMENT: PAIN_FUNCTIONAL_ASSESSMENT: 0-10

## 2024-01-21 ASSESSMENT — PAIN DESCRIPTION - ORIENTATION: ORIENTATION: RIGHT

## 2024-01-21 ASSESSMENT — PAIN SCALES - GENERAL
PAINLEVEL_OUTOF10: 10
PAINLEVEL_OUTOF10: 8

## 2024-01-21 NOTE — ED PROVIDER NOTES
[facility] EMERGENCY DEPARTMENT  [address] [facility] [address]  Dept: [phone]  Loc: [phone]  eMERGENCYdEPARTMENT eNCOUnter      Pt Name: Emilee Paulson  MRN: [id number]  Birthdate 1950  Date of evaluation: 1/21/2024  Provider:ADAN Bardley CNP      Independently seen and evaluated by the advanced practice provider  CHIEF COMPLAINT       Chief Complaint   Patient presents with    Fall     EMS brought pt from home after fall at home. Pt states she has low back pain and right knee pain.        CRITICAL CARE TIME       HISTORY OF PRESENT ILLNESS  (Location/Symptom, Timing/Onset, Context/Setting, Quality, Duration,Modifying Factors, Severity.)   Emilee Paulson is a 73 y.o. female who presents to the emergency department PMHx: Bell's palsy, CVA, LAST, A-fib, CAD, CVA, HF, hypertension, ICH, hyperlipidemia, thyroid nodule    Lives at home with her 14-year-old grandson  Anticoagulation therapy:  Social determinants: Comorbid conditions advanced age  CODE STATUS: Full    HPI provided by the patient    Patient presented to the emergency department via EMS with reports of right hip pain.  States that she was trying to get out of bed and essentially fell to the floor landing on her right side.  She is complaining of right-sided rib discomfort and severe right-sided hip pain.  She does not want to move the right leg.  Incident occurred just prior to arrival    She has hemiparesis on the right side due to prior stroke.  She has right-sided facial drooping due to stroke and Bell's palsy.      Nursing Notes were reviewedand agreed with or any disagreements were addressed in the HPI.    REVIEW OF SYSTEMS    (2-9 systems for level 4, 10 or more for level 5)     Review of Systems   Musculoskeletal:         As stated in hpi       Except as noted above the remainder of the review of systems was reviewed and negative.       PAST MEDICAL HISTORY        available for consultation as needed.    On clinical exam patient has right-sided facial drooping with cranial nerve VII palsy, this is consistent with her Bell's palsy that she endorses.  She is also in A-fib with RVR slightly.  She has a history of A-fib.  I think that the pain in the right hip is triggering the ventricular rapid response.  She does have pain in the right hip she does not want to move the right leg.    DDx: Hip fracture, or rib contusion    Results:  Leukocytosis of 18,000 with a left shift.  When compared to January 13 ED visit her white count was 15.2 with a left shift of 12.7-> she was discharged from that ED visit with Ceftin for treatment of UTI.  Metabolic panel today noting sodium of 130, GFR 48, creatinine is doubled to 1.2 and her BUN is now 18.  Urinalysis today indicating proteinuria trace leuk esterase: WBC count 7, when compared to January 13 she had large leuk esterase with WBC count of 13.  The CT abdomen and pelvis on January 13 did not yield any evidence of obstruction however CT abdomen and pelvis noting a 2.5 cm right renal midpole solid structure mass.  This is unchanged from 2022    Leukocytosis I do not have a source is it clearly appears that the urinalysis has improved.  However her renal function has doubled.  Urine culture from January 13 urine grows E. Coli-> appears to be sensitive to most cephalosporin as well as fluoroquinolone.   I feel that she needs to be admitted for the leukocytosis and technically LAST because her creatinine has doubled to 1.2 from a baseline of 0.6.    2255: Consultation to hospitalist attending for admission for the worsening leukocytosis, the doubling of the renal function.  IVF maintenance initiated.     BCx2 added to lab panels.  CONSULTS:  IP CONSULT TO HOSPITALIST    PROCEDURES:  Procedures    FINAL IMPRESSION      1. Leukocytosis, unspecified type    2. LAST (acute kidney injury) (HCC)    3. Fall from bed, initial encounter    4. Chronic

## 2024-01-21 NOTE — ED TRIAGE NOTES
Pt presents via EMS from home after falling at home. Pt denies LOC or syncope. Pt states she fell forward and to right side onto her right knee, and is complaining of lower back pain. Pt states she uses a walker at home. Pt able to answer triage questions appropriately, but does appear to be slightly drowsy at time of triage.

## 2024-01-22 PROBLEM — E87.1 HYPONATREMIA: Status: ACTIVE | Noted: 2024-01-22

## 2024-01-22 PROBLEM — D72.829 LEUKOCYTOSIS: Status: ACTIVE | Noted: 2024-01-22
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
## 2024-01-22 LAB
25(OH)D3 SERPL-MCNC: 8.5 NG/ML
ANION GAP SERPL CALCULATED.3IONS-SCNC: 14 MMOL/L (ref 3–16)
BASOPHILS # BLD: 0.1 K/UL (ref 0–0.2)
BASOPHILS NFR BLD: 0.4 %
BUN SERPL-MCNC: 22 MG/DL (ref 7–20)
CALCIUM SERPL-MCNC: 10.4 MG/DL (ref 8.3–10.6)
CHLORIDE SERPL-SCNC: 90 MMOL/L (ref 99–110)
CO2 SERPL-SCNC: 30 MMOL/L (ref 21–32)
CREAT SERPL-MCNC: 1.1 MG/DL (ref 0.6–1.2)
DEPRECATED RDW RBC AUTO: 13.7 % (ref 12.4–15.4)
EKG DIAGNOSIS: NORMAL
EKG Q-T INTERVAL: 316 MS
EKG QRS DURATION: 88 MS
EKG QTC CALCULATION (BAZETT): 452 MS
EKG R AXIS: -17 DEGREES
EKG T AXIS: -1 DEGREES
EKG VENTRICULAR RATE: 123 BPM
EOSINOPHIL # BLD: 0 K/UL (ref 0–0.6)
EOSINOPHIL NFR BLD: 0.1 %
GFR SERPLBLD CREATININE-BSD FMLA CKD-EPI: 53 ML/MIN/{1.73_M2}
GLUCOSE SERPL-MCNC: 99 MG/DL (ref 70–99)
HCT VFR BLD AUTO: 34.7 % (ref 36–48)
HGB BLD-MCNC: 11.6 G/DL (ref 12–16)
INR PPP: 2.96 (ref 0.84–1.16)
LYMPHOCYTES # BLD: 0.8 K/UL (ref 1–5.1)
LYMPHOCYTES NFR BLD: 5.2 %
MCH RBC QN AUTO: 29.3 PG (ref 26–34)
MCHC RBC AUTO-ENTMCNC: 33.5 G/DL (ref 31–36)
MCV RBC AUTO: 87.4 FL (ref 80–100)
MONOCYTES # BLD: 1.3 K/UL (ref 0–1.3)
MONOCYTES NFR BLD: 8.3 %
NEUTROPHILS # BLD: 13.6 K/UL (ref 1.7–7.7)
NEUTROPHILS NFR BLD: 86 %
PLATELET # BLD AUTO: 184 K/UL (ref 135–450)
PMV BLD AUTO: 11 FL (ref 5–10.5)
POTASSIUM SERPL-SCNC: 4 MMOL/L (ref 3.5–5.1)
PROTHROMBIN TIME: 30.6 SEC (ref 11.5–14.8)
RBC # BLD AUTO: 3.97 M/UL (ref 4–5.2)
SODIUM SERPL-SCNC: 134 MMOL/L (ref 136–145)
WBC # BLD AUTO: 15.8 K/UL (ref 4–11)

## 2024-01-22 PROCEDURE — 6370000000 HC RX 637 (ALT 250 FOR IP): Performed by: HOSPITALIST

## 2024-01-22 PROCEDURE — 99222 1ST HOSP IP/OBS MODERATE 55: CPT | Performed by: NURSE PRACTITIONER

## 2024-01-22 PROCEDURE — 85025 COMPLETE CBC W/AUTO DIFF WBC: CPT

## 2024-01-22 PROCEDURE — 2060000000 HC ICU INTERMEDIATE R&B

## 2024-01-22 PROCEDURE — 97530 THERAPEUTIC ACTIVITIES: CPT | Performed by: PHYSICAL THERAPIST

## 2024-01-22 PROCEDURE — 93010 ELECTROCARDIOGRAM REPORT: CPT | Performed by: INTERNAL MEDICINE

## 2024-01-22 PROCEDURE — 2580000003 HC RX 258: Performed by: HOSPITALIST

## 2024-01-22 PROCEDURE — 87040 BLOOD CULTURE FOR BACTERIA: CPT

## 2024-01-22 PROCEDURE — 97530 THERAPEUTIC ACTIVITIES: CPT

## 2024-01-22 PROCEDURE — 85610 PROTHROMBIN TIME: CPT

## 2024-01-22 PROCEDURE — 36415 COLL VENOUS BLD VENIPUNCTURE: CPT

## 2024-01-22 PROCEDURE — 97162 PT EVAL MOD COMPLEX 30 MIN: CPT | Performed by: PHYSICAL THERAPIST

## 2024-01-22 PROCEDURE — 97166 OT EVAL MOD COMPLEX 45 MIN: CPT

## 2024-01-22 PROCEDURE — 6360000002 HC RX W HCPCS: Performed by: HOSPITALIST

## 2024-01-22 PROCEDURE — 80048 BASIC METABOLIC PNL TOTAL CA: CPT

## 2024-01-22 PROCEDURE — 82306 VITAMIN D 25 HYDROXY: CPT

## 2024-01-22 RX ORDER — MAGNESIUM SULFATE IN WATER 40 MG/ML
2000 INJECTION, SOLUTION INTRAVENOUS PRN
Status: DISCONTINUED | OUTPATIENT
Start: 2024-01-22 | End: 2024-01-24 | Stop reason: HOSPADM

## 2024-01-22 RX ORDER — POTASSIUM CHLORIDE 20 MEQ/1
40 TABLET, EXTENDED RELEASE ORAL PRN
Status: DISCONTINUED | OUTPATIENT
Start: 2024-01-22 | End: 2024-01-24 | Stop reason: HOSPADM

## 2024-01-22 RX ORDER — ACETAMINOPHEN 650 MG/1
650 SUPPOSITORY RECTAL EVERY 6 HOURS PRN
Status: DISCONTINUED | OUTPATIENT
Start: 2024-01-22 | End: 2024-01-24 | Stop reason: HOSPADM

## 2024-01-22 RX ORDER — ENOXAPARIN SODIUM 100 MG/ML
40 INJECTION SUBCUTANEOUS DAILY
Status: DISCONTINUED | OUTPATIENT
Start: 2024-01-22 | End: 2024-01-22

## 2024-01-22 RX ORDER — CARVEDILOL 25 MG/1
25 TABLET ORAL 2 TIMES DAILY WITH MEALS
Status: DISCONTINUED | OUTPATIENT
Start: 2024-01-22 | End: 2024-01-24 | Stop reason: HOSPADM

## 2024-01-22 RX ORDER — MORPHINE SULFATE 2 MG/ML
2 INJECTION, SOLUTION INTRAMUSCULAR; INTRAVENOUS EVERY 4 HOURS PRN
Status: DISCONTINUED | OUTPATIENT
Start: 2024-01-22 | End: 2024-01-24 | Stop reason: HOSPADM

## 2024-01-22 RX ORDER — SODIUM CHLORIDE 0.9 % (FLUSH) 0.9 %
5-40 SYRINGE (ML) INJECTION PRN
Status: DISCONTINUED | OUTPATIENT
Start: 2024-01-22 | End: 2024-01-24 | Stop reason: HOSPADM

## 2024-01-22 RX ORDER — PANTOPRAZOLE SODIUM 40 MG/1
40 TABLET, DELAYED RELEASE ORAL
Status: DISCONTINUED | OUTPATIENT
Start: 2024-01-22 | End: 2024-01-24 | Stop reason: HOSPADM

## 2024-01-22 RX ORDER — POLYETHYLENE GLYCOL 3350 17 G/17G
17 POWDER, FOR SOLUTION ORAL DAILY
Status: DISCONTINUED | OUTPATIENT
Start: 2024-01-22 | End: 2024-01-24 | Stop reason: HOSPADM

## 2024-01-22 RX ORDER — POLYETHYLENE GLYCOL 3350 17 G/17G
17 POWDER, FOR SOLUTION ORAL DAILY PRN
Status: DISCONTINUED | OUTPATIENT
Start: 2024-01-22 | End: 2024-01-24 | Stop reason: HOSPADM

## 2024-01-22 RX ORDER — ONDANSETRON 2 MG/ML
4 INJECTION INTRAMUSCULAR; INTRAVENOUS EVERY 6 HOURS PRN
Status: DISCONTINUED | OUTPATIENT
Start: 2024-01-22 | End: 2024-01-24 | Stop reason: HOSPADM

## 2024-01-22 RX ORDER — ACETAMINOPHEN 325 MG/1
650 TABLET ORAL EVERY 6 HOURS PRN
Status: DISCONTINUED | OUTPATIENT
Start: 2024-01-22 | End: 2024-01-24 | Stop reason: HOSPADM

## 2024-01-22 RX ORDER — SODIUM CHLORIDE 9 MG/ML
INJECTION, SOLUTION INTRAVENOUS PRN
Status: DISCONTINUED | OUTPATIENT
Start: 2024-01-22 | End: 2024-01-24 | Stop reason: HOSPADM

## 2024-01-22 RX ORDER — LANOLIN ALCOHOL/MO/W.PET/CERES
400 CREAM (GRAM) TOPICAL DAILY
Status: DISCONTINUED | OUTPATIENT
Start: 2024-01-22 | End: 2024-01-24 | Stop reason: HOSPADM

## 2024-01-22 RX ORDER — FERROUS SULFATE 325(65) MG
325 TABLET ORAL
Status: DISCONTINUED | OUTPATIENT
Start: 2024-01-22 | End: 2024-01-24 | Stop reason: HOSPADM

## 2024-01-22 RX ORDER — ATORVASTATIN CALCIUM 80 MG/1
80 TABLET, FILM COATED ORAL DAILY
Status: DISCONTINUED | OUTPATIENT
Start: 2024-01-22 | End: 2024-01-24 | Stop reason: HOSPADM

## 2024-01-22 RX ORDER — SODIUM CHLORIDE 0.9 % (FLUSH) 0.9 %
5-40 SYRINGE (ML) INJECTION EVERY 12 HOURS SCHEDULED
Status: DISCONTINUED | OUTPATIENT
Start: 2024-01-22 | End: 2024-01-24 | Stop reason: HOSPADM

## 2024-01-22 RX ORDER — ASPIRIN 81 MG/1
81 TABLET, CHEWABLE ORAL DAILY
Status: DISCONTINUED | OUTPATIENT
Start: 2024-01-22 | End: 2024-01-24 | Stop reason: HOSPADM

## 2024-01-22 RX ORDER — WARFARIN SODIUM 2.5 MG/1
2.5 TABLET ORAL ONCE
Status: COMPLETED | OUTPATIENT
Start: 2024-01-22 | End: 2024-01-22

## 2024-01-22 RX ORDER — LANOLIN ALCOHOL/MO/W.PET/CERES
3 CREAM (GRAM) TOPICAL NIGHTLY
Status: DISCONTINUED | OUTPATIENT
Start: 2024-01-22 | End: 2024-01-24 | Stop reason: HOSPADM

## 2024-01-22 RX ORDER — AMLODIPINE BESYLATE 10 MG/1
10 TABLET ORAL DAILY
Status: DISCONTINUED | OUTPATIENT
Start: 2024-01-22 | End: 2024-01-24 | Stop reason: HOSPADM

## 2024-01-22 RX ORDER — POTASSIUM CHLORIDE 7.45 MG/ML
10 INJECTION INTRAVENOUS PRN
Status: DISCONTINUED | OUTPATIENT
Start: 2024-01-22 | End: 2024-01-24 | Stop reason: HOSPADM

## 2024-01-22 RX ORDER — ONDANSETRON 4 MG/1
4 TABLET, ORALLY DISINTEGRATING ORAL EVERY 8 HOURS PRN
Status: DISCONTINUED | OUTPATIENT
Start: 2024-01-22 | End: 2024-01-24 | Stop reason: HOSPADM

## 2024-01-22 RX ORDER — HYDRALAZINE HYDROCHLORIDE 25 MG/1
25 TABLET, FILM COATED ORAL EVERY 8 HOURS SCHEDULED
Status: DISCONTINUED | OUTPATIENT
Start: 2024-01-22 | End: 2024-01-24 | Stop reason: HOSPADM

## 2024-01-22 RX ADMIN — Medication 3 MG: at 21:37

## 2024-01-22 RX ADMIN — WARFARIN SODIUM 2.5 MG: 2.5 TABLET ORAL at 05:57

## 2024-01-22 RX ADMIN — SODIUM CHLORIDE: 9 INJECTION, SOLUTION INTRAVENOUS at 08:59

## 2024-01-22 RX ADMIN — ATORVASTATIN CALCIUM 80 MG: 80 TABLET, FILM COATED ORAL at 08:56

## 2024-01-22 RX ADMIN — AMLODIPINE BESYLATE 10 MG: 10 TABLET ORAL at 08:56

## 2024-01-22 RX ADMIN — PANTOPRAZOLE SODIUM 40 MG: 40 TABLET, DELAYED RELEASE ORAL at 05:56

## 2024-01-22 RX ADMIN — Medication 10 ML: at 21:37

## 2024-01-22 RX ADMIN — FERROUS SULFATE TAB 325 MG (65 MG ELEMENTAL FE) 325 MG: 325 (65 FE) TAB at 08:56

## 2024-01-22 RX ADMIN — Medication 3 MG: at 01:40

## 2024-01-22 RX ADMIN — Medication 1 TABLET: at 08:56

## 2024-01-22 RX ADMIN — SODIUM CHLORIDE: 9 INJECTION, SOLUTION INTRAVENOUS at 18:55

## 2024-01-22 RX ADMIN — MORPHINE SULFATE 2 MG: 2 INJECTION, SOLUTION INTRAMUSCULAR; INTRAVENOUS at 01:40

## 2024-01-22 RX ADMIN — CARVEDILOL 25 MG: 25 TABLET, FILM COATED ORAL at 18:40

## 2024-01-22 RX ADMIN — Medication 400 MG: at 08:56

## 2024-01-22 RX ADMIN — CARVEDILOL 25 MG: 25 TABLET, FILM COATED ORAL at 08:56

## 2024-01-22 RX ADMIN — HYDRALAZINE HYDROCHLORIDE 25 MG: 25 TABLET, FILM COATED ORAL at 05:57

## 2024-01-22 RX ADMIN — ASPIRIN 81 MG 81 MG: 81 TABLET ORAL at 08:56

## 2024-01-22 RX ADMIN — MORPHINE SULFATE 2 MG: 2 INJECTION, SOLUTION INTRAMUSCULAR; INTRAVENOUS at 21:41

## 2024-01-22 RX ADMIN — MORPHINE SULFATE 2 MG: 2 INJECTION, SOLUTION INTRAMUSCULAR; INTRAVENOUS at 07:06

## 2024-01-22 RX ADMIN — HYDRALAZINE HYDROCHLORIDE 25 MG: 25 TABLET, FILM COATED ORAL at 21:37

## 2024-01-22 ASSESSMENT — PAIN DESCRIPTION - LOCATION
LOCATION: HIP
LOCATION: LEG

## 2024-01-22 ASSESSMENT — PAIN SCALES - GENERAL
PAINLEVEL_OUTOF10: 0
PAINLEVEL_OUTOF10: 8
PAINLEVEL_OUTOF10: 6
PAINLEVEL_OUTOF10: 9
PAINLEVEL_OUTOF10: 7

## 2024-01-22 ASSESSMENT — PAIN DESCRIPTION - ORIENTATION
ORIENTATION: RIGHT
ORIENTATION: LEFT

## 2024-01-22 ASSESSMENT — PAIN DESCRIPTION - DESCRIPTORS: DESCRIPTORS: STABBING

## 2024-01-22 ASSESSMENT — PAIN SCALES - WONG BAKER: WONGBAKER_NUMERICALRESPONSE: 0

## 2024-01-22 NOTE — PROGRESS NOTES
Clinical Pharmacy Note  Warfarin Consult    Emilee Paulson is a 73 y.o. female receiving warfarin managed by pharmacy.      Warfarin Indication: Afib  Target INR range: 1.8-2.5 per Dr. Strange (Hx of  subcortical hemorrhage)  Dose prior to admission: 5mg MWF and 2.5mg all other days    Current warfarin drug-drug interactions: none that are clinically significant    Recent Labs     01/21/24  1826 01/22/24  0144 01/22/24  0624   HGB 12.5 11.6*  --    HCT 37.4 34.7*  --    INR 2.59*  --  2.96*         Assessment/Plan:  Will hold warfarin tonight.     Daily PT/INR until stable within therapeutic range.     Thank you for the consult.  Will continue to follow.     Denis Venegas RPH  1/22/2024 10:11 AM

## 2024-01-22 NOTE — PROGRESS NOTES
Medication Reconciliation    List of medications patient is currently taking is complete.     Source of information: 1. Conversation with patient at bedside                                      2. EPIC records          Denis Venegas [facility]   1/22/2024  9:52 AM

## 2024-01-22 NOTE — PROGRESS NOTES
Occupational Therapy  Facility/Department: [address] PROGRESSIVE CARE  Occupational Therapy Initial Assessment    Name: Emilee Paulson  : 1950  MRN: [id number]  Date of Service: 2024    Discharge Recommendations:  Patient would benefit from continued therapy after discharge, 3-5 sessions per week  OT Equipment Recommendations  Other: defer to [place] facility     Emilee Paulson scored a 12/24 on the AM-PAC ADL Inpatient form. Current research shows that an AM-PAC score of 17 or less is typically not associated with a discharge to the patient's home setting. Based on the patient's AM-PAC score and their current ADL deficits, it is recommended that the patient have 3-5 sessions per week of Occupational Therapy at d/c to increase the patient's independence.  Please see assessment section for further patient specific details.    If patient discharges prior to next session this note will serve as a discharge summary.  Please see below for the latest assessment towards goals.      Patient Diagnosis(es): The primary encounter diagnosis was Leukocytosis, unspecified type. Diagnoses of LAST (acute kidney injury) ([facility]), Fall from bed, initial encounter, and Chronic atrial fibrillation ([facility]) were also pertinent to this visit.  Past Medical History:  has a past medical history of Acute cerebrovascular accident (CVA) ([facility]), LAST (acute kidney injury) ([facility]), Atrial fibrillation (HCC), Bell palsy, CAD (coronary artery disease), Cerebral artery occlusion with cerebral infarction (HCC), Chronic diastolic CHF (congestive heart failure) (HCC), CVA (cerebral vascular accident) ([facility]), Hypertension, Intracranial hemorrhage (HCC), Mixed hyperlipidemia, Nonintractable headache, Nontraumatic subcortical hemorrhage of cerebral hemisphere (HCC), Poor vision, Primary osteoarthritis of right knee, Sudden visual loss of left eye, Thyroid nodule, and TIA involving right internal carotid artery.  Past Surgical History:  has a past  clothing?: A Little  How much help is needed for taking care of personal grooming?: A Little  How much help for eating meals?: A Little  AM-PAC Inpatient Daily Activity Raw Score: 12  AM-PAC Inpatient ADL T-Scale Score : 30.6  ADL Inpatient CMS 0-100% Score: 66.57  ADL Inpatient CMS G-Code Modifier : CL    Goals  Short Term Goals  Time Frame for Short Term Goals: Prior to DC:  Short Term Goal 1: Pt will complete bed mobility with supervision  Short Term Goal 2: Pt will complete ADL transfers with min A  Short Term Goal 3: Pt will complete functional mobility with CGA  Short Term Goal 4: Pt will tolerate standing > 5 min for functional task with CGA  Short Term Goal 5: Pt will complete toileting with min A  Patient Goals   Patient goals : to return home       Therapy Time   Individual Concurrent Group Co-treatment   Time In 1045         Time Out 1125         Minutes 40         Timed Code Treatment Minutes: 25 Minutes     This note to serve as OT d/c summary if pt is d/c-ed prior to next therapy session.    Yudith Ricks, OTR/L

## 2024-01-22 NOTE — CARE COORDINATION
motivation, Limited participation, Limited insight into deficits, Unrealistic expectations, Upper extremity weakness, and Lower extremity weakness    Additional Case Management Notes: SW met with pt to discuss d/c plans.  Pt stated she is from home where she is the legal guardian for her 14 year old great grandson.  Pt stated her sonTerrence is currently providing care to the child while pt is in the hospital.  SW reviewed the recommendations from PT/OT with pt.  Pt stated she will not go to a SNF and further stated this SW could \"keep talking but it aint changing the fact that I wont go, I'm going home.\"  Pt was active with [facility] for PT/OT and stated she would like to resume her HC services.   Pt was previously referred to [facility].  Pt stated [facility] has been in contact with her.  SW contacted [facility] [phone] and left a HIPAA compliant message requesting a return call. Pt was agreeable to this SW reaching out to her son.  SW contacted Terrence at [phone] and requested a return call. SW will continue to follow and will assist with d/c planning as needed.     PLAN: Undetermined.  From home with [facility] (PT/OT).  Refusing SNF.  Message left for sonTerrence.  Pt has guardianship for her great grandson.  Son is looking after the child while pt is here.     The Plan for Transition of Care is related to the following treatment goals of Debility [R53.81]  Chronic atrial fibrillation (HCC) [I48.20]  LAST (acute kidney injury) (HCC) [N17.9]  Fall from bed, initial encounter [W06.XXXA]  Leukocytosis, unspecified type [D72.829]    IF APPLICABLE: The Patient and/or patient representative Emilee and her family were provided with a choice of provider and agrees with the discharge plan. Freedom of choice list with basic dialogue that supports the patient's individualized plan of care/goals and shares the quality data associated with the providers was provided to: (P) Patient   Patient Representative Name:    Prior to Arrival Shower Chair;Cane;Walker;Other (Comment)  (RTS)   Potential Assistance Needed Skilled Nursing Facility;Home Care;SUNDAR/Passport   DME Ordered? No   Potential Assistance Purchasing Medications No   Type of Home Care Services PT;OT   Patient expects to be discharged to: House   One/Two Story Residence One story   History of falls? 1   Services At/After Discharge   Transition of Care Consult (CM Consult) N/A   Condition of Participation: Discharge Planning   The Plan for Transition of Care is related to the following treatment goals: Going home and resuming [facility]   The Patient and/or Patient Representative was provided with a Choice of Provider? Patient

## 2024-01-22 NOTE — CARE COORDINATION
01/22/24 1404   Readmission Assessment   Number of Days since last admission? 8-30 days   Previous Disposition Home with Home Health   Who is being Interviewed Patient   What was the patient's/caregiver's perception as to why they think they needed to return back to the hospital? Other (Comment)  (Fall/brought in by EMS)   Did you visit your Primary Care Physician after you left the hospital, before you returned this time? Yes   Did you see a specialist, such as Cardiac, Pulmonary, Orthopedic Physician, etc. after you left the hospital? Yes   Who advised the patient to return to the hospital? Self-referral   Does the patient report anything that got in the way of taking their medications? No   In our efforts to provide the best possible care to you and others like you, can you think of anything that we could have done to help you after you left the hospital the first time, so that you might not have needed to return so soon? Other (Comment)  (\"no\")     DAVIDSON Vargas [facility] [place]  [phone]  Electronically signed by DAVIDSON Dillard on 1/22/2024 at 2:05 PM

## 2024-01-22 NOTE — CARE COORDINATION
Discharge Planning:  SW spoke with pts son, Terrence.  Terrence stated he lives out of town but is very involved in pts care.  Terrence stated he is not currently looking after pts great grandson, Ty brother, Arden is caring for the child.   SW explained that PT/OT are recommending SNF placement and at this time, pt is refusing and wants to go home.  SW expressed concern that there is no one in the home that is able to provide continued care to this pt.   Terrence stated he will reach out to this pt and will call this SW after talking with his mother to discuss d/c planning needs.   DAVIDSON Vargas  [phone]  Electronically signed by DAVIDSON Dillard on 1/22/2024 at 2:54 PM

## 2024-01-22 NOTE — PROGRESS NOTES
4 Eyes Skin Assessment     NAME:  Emilee Paulson  YOB: 1950  MEDICAL RECORD NUMBER:  [id number]    The patient is being assessed for  Admission    I agree that at least one RN has performed a thorough Head to Toe Skin Assessment on the patient. ALL assessment sites listed below have been assessed.      Areas assessed by both nurses:    Head, Face, Ears, Shoulders, Back, Chest, Arms, Elbows, Hands, Sacrum. Buttock, Coccyx, Ischium, and Legs. Feet and Heels        Does the Patient have a Wound? No noted wound(s)       Reji Prevention initiated by RN: Yes  Wound Care Orders initiated by RN: Yes    Pressure Injury (Stage 3,4, Unstageable, DTI, NWPT, and Complex wounds) if present, place Wound referral order by RN under : No    New Ostomies, if present place, Ostomy referral order under : No     Nurse 1 eSignature: Electronically signed by Angelina Brink RN on 1/22/24 at 7:14 AM EST    **SHARE this note so that the co-signing nurse can place an eSignature**    Nurse 2 eSignature: Electronically signed by Hillary Jimenes RN on 1/23/24 at 6:16 AM EST

## 2024-01-22 NOTE — PROGRESS NOTES
Hospitalist   Progress Note    Patient Name: Emilee Paulson  PCP: Stefano Suarez MD  Date of Admission: 1/21/2024    Chief Complaint on Admission: Low back pain and right knee pain s/p fall at home  Chief diagnosis after evaluation: A fib with RVR, low back pain and right knee pain s/p fall at home    Brief Synopsis: Patient is a 73 y.o. woman with a medical history that includes hypertension; CVA ; atrial fibrillation who was admitted on 1/21/2024 for evaluation and treatment of A fib with RVR, low back pain and right knee pain s/p fall at home    Pt Seen/Examined and Chart Reviewed.     Subjective: Pt c/o right knee pain. She is sitting comfortably in a chair    Objective:  Allergies  Clonidine, Codeine, Hydrocodone-acetaminophen, Lisinopril, Tramadol, and Percocet [oxycodone-acetaminophen]    Medications    Scheduled Meds:   aspirin  81 mg Oral Daily    atorvastatin  80 mg Oral Daily    oyster shell calcium w/D  1 tablet Oral Daily    carvedilol  25 mg Oral BID with meals    ferrous sulfate  325 mg Oral Daily with breakfast    hydrALAZINE  25 mg Oral 3 times per day    magnesium oxide  400 mg Oral Daily    pantoprazole  40 mg Oral QAM AC    amLODIPine  10 mg Oral Daily    polyethylene glycol  17 g Oral Daily    sodium chloride flush  5-40 mL IntraVENous 2 times per day    melatonin  3 mg Oral Nightly    warfarin placeholder: dosing by pharmacy   Other RX Placeholder     Infusions:   sodium chloride      sodium chloride 100 mL/hr at 01/22/24 0859     PRN Meds:  sodium chloride flush, sodium chloride, potassium chloride **OR** potassium alternative oral replacement **OR** potassium chloride, magnesium sulfate, ondansetron **OR** ondansetron, polyethylene glycol, acetaminophen **OR** acetaminophen, morphine, metoprolol    Physical    VITALS:  /83   Pulse 96   Temp 98.7 °F (37.1 °C)   Resp 14   Ht 1.702 m (5' 7\")   Wt 78.5 kg (173 lb 1 oz)   SpO2 95%   BMI 27.11 kg/m²   CONSTITUTIONAL:  WD/WN 72  07/06/2022 01:04 PM    GFRAA >60 09/14/2012 12:27 AM    LABGLOM 53 01/22/2024 01:44 AM     LFT:   Lab Results   Component Value Date/Time    ALKPHOS 94 01/13/2024 11:47 AM    ALT 22 01/13/2024 11:47 AM    AST 49 01/13/2024 11:47 AM    PROT 9.3 01/13/2024 11:47 AM    PROT 8.3 09/14/2012 12:27 AM    BILITOT 1.1 01/13/2024 11:47 AM     Magnesium:    Lab Results   Component Value Date/Time    MG 2.10 01/03/2024 06:32 AM     Phosphorus:    Lab Results   Component Value Date/Time    PHOS 2.8 01/03/2024 06:32 AM     PT/INR:  No results found for: \"PTINR\"  U/A:    Lab Results   Component Value Date/Time    LEUKOCYTESUR TRACE 01/21/2024 08:30 PM    WBCUA 7 01/21/2024 08:30 PM    RBCUA 10 01/21/2024 08:30 PM    SPECGRAV 1.016 01/21/2024 08:30 PM    UROBILINOGEN 2.0 01/21/2024 08:30 PM    BILIRUBINUR SMALL 01/21/2024 08:30 PM    BLOODU Negative 01/21/2024 08:30 PM    GLUCOSEU Negative 01/21/2024 08:30 PM    PROTEINU 30 01/21/2024 08:30 PM     ABG:  No results found for: \"PHART\", \"BBH8DUA\", \"J8LJHPZY\", \"MHS8OVW\", \"BEART\", \"THGBART\", \"PO2ART\", \"UXU0IJM\"        Intake/Output Summary (Last 24 hours) at 1/22/2024 1708  Last data filed at 1/22/2024 1013  Gross per 24 hour   Intake 1075.84 ml   Output --   Net 1075.84 ml       Consults:  IP CONSULT TO HOSPITALIST      Principal Problem:    Debility  Active Problems:    A-fib (HCC)    HTN (hypertension)    Fall    Hyponatremia    Leukocytosis  Resolved Problems:    * No resolved hospital problems. *      ASSESSMENT AND PLAN:      Principal Problem:    Debility  Active Problems:    A-fib (HCC)    HTN (hypertension)    Fall    Hyponatremia    Leukocytosis  Resolved Problems:    * No resolved hospital problems. *    Continue prn metoprolol IV for A-fib  Blood pressure is now at goal.  Home blood pressure manage medication continued  PT and OT working with patient for strengthening and balance training and make recommendations.  Check vitamin D.  Gentle IV hydration for LAST.  Trend BMP.

## 2024-01-22 NOTE — PROGRESS NOTES
Clinical Pharmacy Note  Warfarin Consult    Emilee Paulson is a 73 y.o. female receiving warfarin managed by pharmacy.  Patient being bridged with ASA and warfarin.    Warfarin Indication: Afib  Target INR range: 2-3   Dose prior to admission: 5mg MWF and 2.5mg all other days    Current warfarin drug-drug interactions: none that are clinically significant    Recent Labs     01/21/24  1826   HGB 12.5   HCT 37.4   INR 2.59*       Assessment/Plan:    Warfarin 2.5 mg now (confirmed with nuse that patient did not take their warfarin dose on 01/21). Daily PT/INR until stable within therapeutic range.     Thank you for the consult.  Will continue to follow.     Naila Palma, NahomyD

## 2024-01-22 NOTE — CONSULTS
[facility] Orthopedic Surgery  Consult Note    This patient is seen in consultation at the request of Dr Miner    Reason for Consult:  right knee pain    CHIEF COMPLAINT:  right knee pain    History Obtained From:  patient, electronic medical record    HISTORY OF PRESENT ILLNESS:    The patient is a 73 y.o. female who presents with right knee pain. Hx right hemiparesis. States she fell at home on Saturday and has right  knee pain now. She is on Coumadin for Afib. Pain is described in right knee and with the intensity of moderate. Pain is described as aching, pressure. Discomfort is intermittent. Pain is worse with moving right knee or standing on it. No other complaints. She is alert and oriented.     Past Medical History:        Diagnosis Date    Acute cerebrovascular accident (CVA) ([facility]) 01/28/2020    brain bleed    LAST (acute kidney injury) ([facility]) 10/20/2022    Atrial fibrillation ([facility])     Bell palsy     diagnosed 15 years ago    CAD (coronary artery disease)     Cerebral artery occlusion with cerebral infarction ([facility])     Chronic diastolic CHF (congestive heart failure) ([facility]) 05/16/2020    CVA (cerebral vascular accident) ([facility]) 01/04/2017    Hypertension     Intracranial hemorrhage ([facility]) 04/2016    Mixed hyperlipidemia 07/06/2022    Nonintractable headache     currently controlled    Nontraumatic subcortical hemorrhage of cerebral hemisphere ([facility]) 04/30/2016    Poor vision     after bells palsy in 2012    Primary osteoarthritis of right knee 03/18/2022    Sudden visual loss of left eye     patient states \"In very corner of my eye.\"    Thyroid nodule 02/08/2018    TIA involving right internal carotid artery        Past Surgical History:        Procedure Laterality Date    CARDIAC SURGERY      stentsx2    COLONOSCOPY      CORONARY ANGIOPLASTY WITH STENT PLACEMENT      ROTATOR CUFF REPAIR Right     TUBAL LIGATION         Social History     Tobacco Use    Smoking status: Never    Smokeless tobacco: Never   Substance  Use Topics    Alcohol use: No       No family history on file.        Current Medications:   Current Facility-Administered Medications: aspirin chewable tablet 81 mg, 81 mg, Oral, Daily  atorvastatin (LIPITOR) tablet 80 mg, 80 mg, Oral, Daily  oyster shell calcium w/D 500-5 MG-MCG tablet 1 tablet, 1 tablet, Oral, Daily  carvedilol (COREG) tablet 25 mg, 25 mg, Oral, BID with meals  ferrous sulfate (IRON 325) tablet 325 mg, 325 mg, Oral, Daily with breakfast  hydrALAZINE (APRESOLINE) tablet 25 mg, 25 mg, Oral, 3 times per day  magnesium oxide (MAG-OX) tablet 400 mg, 400 mg, Oral, Daily  pantoprazole (PROTONIX) tablet 40 mg, 40 mg, Oral, QAM AC  amLODIPine (NORVASC) tablet 10 mg, 10 mg, Oral, Daily  polyethylene glycol (GLYCOLAX) packet 17 g, 17 g, Oral, Daily  sodium chloride flush 0.9 % injection 5-40 mL, 5-40 mL, IntraVENous, 2 times per day  sodium chloride flush 0.9 % injection 5-40 mL, 5-40 mL, IntraVENous, PRN  0.9 % sodium chloride infusion, , IntraVENous, PRN  potassium chloride (KLOR-CON M) extended release tablet 40 mEq, 40 mEq, Oral, PRN **OR** potassium bicarb-citric acid (EFFER-K) effervescent tablet 40 mEq, 40 mEq, Oral, PRN **OR** potassium chloride 10 mEq/100 mL IVPB (Peripheral Line), 10 mEq, IntraVENous, PRN  magnesium sulfate 2000 mg in 50 mL IVPB premix, 2,000 mg, IntraVENous, PRN  ondansetron (ZOFRAN-ODT) disintegrating tablet 4 mg, 4 mg, Oral, Q8H PRN **OR** ondansetron (ZOFRAN) injection 4 mg, 4 mg, IntraVENous, Q6H PRN  polyethylene glycol (GLYCOLAX) packet 17 g, 17 g, Oral, Daily PRN  acetaminophen (TYLENOL) tablet 650 mg, 650 mg, Oral, Q6H PRN **OR** acetaminophen (TYLENOL) suppository 650 mg, 650 mg, Rectal, Q6H PRN  melatonin tablet 3 mg, 3 mg, Oral, Nightly  morphine (PF) injection 2 mg, 2 mg, IntraVENous, Q4H PRN  warfarin placeholder: dosing by pharmacy, , Other, RX Placeholder  0.9 % sodium chloride infusion, , IntraVENous, Continuous  metoprolol (LOPRESSOR) injection 5 mg, 5 mg,

## 2024-01-22 NOTE — PROGRESS NOTES
Physical Therapy  Facility/Department: [address] PROGRESSIVE CARE  Physical Therapy Initial Assessment    Name: Emilee Paulson  : 1950  MRN: [id number]  Date of Service: 2024    Discharge Recommendations:  Subacute/Skilled Nursing Facility   PT Equipment Recommendations  Other: defer to next level of care      Emilee Paulson scored a 9/24 on the AM-PAC short mobility form. Current research shows that an AM-PAC score of 17 or less is typically not associated with a discharge to the patient's home setting. Based on the patient's AM-PAC score and their current functional mobility deficits, it is recommended that the patient have 3-5 sessions per week of Physical Therapy at d/c to increase the patient's independence.  Please see assessment section for further patient specific details.    If patient discharges prior to next session this note will serve as a discharge summary.  Please see below for the latest assessment towards goals.      Patient Diagnosis(es): The primary encounter diagnosis was Leukocytosis, unspecified type. Diagnoses of LAST (acute kidney injury) ([facility]), Fall from bed, initial encounter, and Chronic atrial fibrillation ([facility]) were also pertinent to this visit.  Past Medical History:  has a past medical history of Acute cerebrovascular accident (CVA) ([facility]), LAST (acute kidney injury) (HCC), Atrial fibrillation (HCC), Bell palsy, CAD (coronary artery disease), Cerebral artery occlusion with cerebral infarction (HCC), Chronic diastolic CHF (congestive heart failure) (HCC), CVA (cerebral vascular accident) (HCC), Hypertension, Intracranial hemorrhage (HCC), Mixed hyperlipidemia, Nonintractable headache, Nontraumatic subcortical hemorrhage of cerebral hemisphere (HCC), Poor vision, Primary osteoarthritis of right knee, Sudden visual loss of left eye, Thyroid nodule, and TIA involving right internal carotid artery.  Past Surgical History:  has a past surgical history that includes Cardiac     Cognition   Orientation  Overall Orientation Status: Within Functional Limits  Cognition  Overall Cognitive Status: Exceptions  Arousal/Alertness: Delayed responses to stimuli  Following Commands: Follows one step commands with increased time;Follows one step commands with repetition  Attention Span: Attends with cues to redirect  Memory:  (vague with answers)  Safety Judgement: Decreased awareness of need for safety  Initiation: Requires cues for some  Cognition Comment: somewhat self limiting     Objective     AROM RLE (degrees)  RLE General AROM: initially pt would not bed knee but after working with pt she was able to flex knee approx 60 degrees  AROM LLE (degrees)  LLE AROM : WFL  LLE General AROM: stiff throughout  Strength RLE  Comment: very little active movement due to pain  Strength LLE  Comment: functionally poor           Bed mobility  Supine to Sit: Moderate assistance (HOB fully elevated, significant inc time to complete d/t pain)  Sit to Supine:  (pt in chair at end of session)  Transfers  Sit to Stand: Maximum Assistance;2 Person Assistance (from EOB to john paul stedy)  Stand to Sit: Moderate Assistance;Maximum Assistance;2 Person Assistance (from stedy to recliner chair)  Bed to Chair: Dependent/Total (with john paul stedy; recommend nursing use maxi move lift)  Comment: Pt appears to give limited effort with mobilty tasks        Balance  Comments: SBA for sitting EOB; only able to stand on stedy long enough to place seat of edmund under her                  AM-PAC - Mobility    AM-PAC Basic Mobility - Inpatient   How much help is needed turning from your back to your side while in a flat bed without using bedrails?: A Lot  How much help is needed moving from lying on your back to sitting on the side of a flat bed without using bedrails?: A Lot  How much help is needed moving to and from a bed to a chair?: Total  How much help is needed standing up from a chair using your arms?: A Lot  How much help is

## 2024-01-22 NOTE — H&P
V2.0  History and Physical      Name:  Emilee Paulson /Age/Sex: 1950  (73 y.o. female)   MRN & CSN:  [id number] & [id number] Encounter Date/Time: 2024 11:36 PM EST   Location:  [address] [place] [address] PCP: Stefano Suarez MD       Hospital Day: 1    Assessment and Plan:   Emilee Paulson is a 73 y.o. female with a pmh of hypertension; CVA ; atrial fibrillation and who presents with Debility    Hospital Problems             Last Modified POA    * (Principal) Debility 2024 Yes    A-fib (HCC) 2024 Yes    HTN (hypertension) 2024 Yes    Fall 2024 Yes    Hyponatremia 2024 Yes       Plan:  As needed metoprolol IV for A-fib  Blood pressure is not within goal.  Home blood pressure manage medication continued.  Trend.  PT and OT to work with patient for strengthening and balance training and make recommendations.  Check vitamin D.  Gentle IV hydration for LAST.  Trend BMP.  Avoid nephrotoxic's.  Hyponatremia-IV hydration.  Trend BMP.  A-fib RVR-metoprolol IV.  Ordered.  Advanced care planning was discussed with patient for a total of 15 minutes.  Full code, DNR CC, DNR CCA, power of  and living will were discussed.  Patient elected to be [CCA.  Disposition:   Current Living situation: Lives at home with her great grand son  Expected Disposition: Home  Estimated D/C: 1 day    Diet Cardiac diet   DVT Prophylaxis [] Lovenox, []  Heparin, [] SCDs, [] Ambulation,  [] Eliquis, [] Xarelto, [x] Coumadin   Code Status DNR CCA   Surrogate Decision Maker/ AURELIO jero Ocampo     Personally reviewed Lab Studies and Imaging     Discussed management of the case with ED provider who recommended admission    EKG interpreted personally and results A-fib with RVR    Imaging that was interpreted personally includes a chest x-ray and results  Showed nothing acute . But  patient has severe cardiomegaly.    Drugs that require monitoring for toxicity include Coumadin and the method of monitoring was  INR        History from:     patient    History of Present Illness:     Chief Complaint: Weakness and fall  Emilee Paulson is a 73 y.o. female with pmh of  R CVA who presents with weakness and fall.  Patient reported that she was going to bathroom and then she fell.  Her great great son  called the paramedics and patient was brought to the hospital.  Patient was in the hospital on 17 January 2024 and she was treated for UTI.     Review of Systems:        Pertinent positives and negatives discussed in HPI     Objective:   No intake or output data in the 24 hours ending 01/21/24 2336   Vitals:   Vitals:    01/21/24 2215 01/21/24 2230 01/21/24 2300 01/21/24 2315   BP: (!) 115/96 (!) 124/93 (!) 111/90 110/87   Pulse: (!) 110 (!) 112 (!) 107 (!) 120   Resp: 16 13 12 12   Temp:       TempSrc:       SpO2: 93% 94% 94% 94%   Weight:       Height:           Medications Prior to Admission     Prior to Admission medications    Medication Sig Start Date End Date Taking? Authorizing Provider   traMADol (ULTRAM) 50 MG tablet Take 1 tablet by mouth every 6 hours as needed for Pain for up to 7 days. Max Daily Amount: 200 mg 1/16/24 1/23/24  Raulito Lawton PA   aspirin 81 MG chewable tablet Take 1 tablet by mouth daily 1/13/24   Salma Branham MD   carvedilol (COREG) 25 MG tablet Take 1 tablet by mouth with breakfast and with evening meal 1/6/24   Salma Branham MD   diclofenac sodium (VOLTAREN) 1 % GEL Apply 4 g topically 2 times daily Apply to left knee. 1/6/24   Salma Branham MD   ferrous sulfate (IRON 325) 325 (65 Fe) MG tablet Take 1 tablet by mouth daily (with breakfast) 1/6/24   Salma Branham MD   sennosides-docusate sodium (SENOKOT-S) 8.6-50 MG tablet Take 1 tablet by mouth daily  Patient not taking: Reported on 1/17/2024 1/7/24   Salma Branham MD   warfarin (COUMADIN) 5 MG tablet Take 0.5-1 tablets by mouth See Admin Instructions Take by mouth one whole tablet (5 mg) on Monday, Wednesday, Friday, and 1/2

## 2024-01-22 NOTE — CARE COORDINATION
[facility]  Patient is active with [facility] for nurse, PT, OT.   Will follow for discharge to home with orders to resume care.      Lenin Franco RN, BSN CTN  [facility] [phone]

## 2024-01-23 LAB
ANION GAP SERPL CALCULATED.3IONS-SCNC: 10 MMOL/L (ref 3–16)
BACTERIA SNV CULT: NORMAL
BACTERIA SPEC ANAEROBE CULT: NORMAL
BASOPHILS # BLD: 0 K/UL (ref 0–0.2)
BASOPHILS NFR BLD: 0.3 %
BUN SERPL-MCNC: 17 MG/DL (ref 7–20)
CALCIUM SERPL-MCNC: 9 MG/DL (ref 8.3–10.6)
CHLORIDE SERPL-SCNC: 96 MMOL/L (ref 99–110)
CO2 SERPL-SCNC: 27 MMOL/L (ref 21–32)
CREAT SERPL-MCNC: 0.7 MG/DL (ref 0.6–1.2)
DEPRECATED RDW RBC AUTO: 13.3 % (ref 12.4–15.4)
EOSINOPHIL # BLD: 0 K/UL (ref 0–0.6)
EOSINOPHIL NFR BLD: 0.3 %
GFR SERPLBLD CREATININE-BSD FMLA CKD-EPI: >60 ML/MIN/{1.73_M2}
GLUCOSE SERPL-MCNC: 104 MG/DL (ref 70–99)
HCT VFR BLD AUTO: 28.9 % (ref 36–48)
HGB BLD-MCNC: 9.7 G/DL (ref 12–16)
INR PPP: 3.78 (ref 0.84–1.16)
LYMPHOCYTES # BLD: 0.8 K/UL (ref 1–5.1)
LYMPHOCYTES NFR BLD: 8 %
MAGNESIUM SERPL-MCNC: 1.8 MG/DL (ref 1.8–2.4)
MCH RBC QN AUTO: 29.4 PG (ref 26–34)
MCHC RBC AUTO-ENTMCNC: 33.5 G/DL (ref 31–36)
MCV RBC AUTO: 87.7 FL (ref 80–100)
MONOCYTES # BLD: 0.9 K/UL (ref 0–1.3)
MONOCYTES NFR BLD: 8.7 %
NEUTROPHILS # BLD: 8.6 K/UL (ref 1.7–7.7)
NEUTROPHILS NFR BLD: 82.7 %
PLATELET # BLD AUTO: 153 K/UL (ref 135–450)
PMV BLD AUTO: 11.4 FL (ref 5–10.5)
POTASSIUM SERPL-SCNC: 3.5 MMOL/L (ref 3.5–5.1)
PROTHROMBIN TIME: 37 SEC (ref 11.5–14.8)
RBC # BLD AUTO: 3.3 M/UL (ref 4–5.2)
SODIUM SERPL-SCNC: 133 MMOL/L (ref 136–145)
WBC # BLD AUTO: 10.4 K/UL (ref 4–11)

## 2024-01-23 PROCEDURE — 83735 ASSAY OF MAGNESIUM: CPT

## 2024-01-23 PROCEDURE — 2580000003 HC RX 258: Performed by: HOSPITALIST

## 2024-01-23 PROCEDURE — 85610 PROTHROMBIN TIME: CPT

## 2024-01-23 PROCEDURE — 36415 COLL VENOUS BLD VENIPUNCTURE: CPT

## 2024-01-23 PROCEDURE — 6370000000 HC RX 637 (ALT 250 FOR IP): Performed by: HOSPITALIST

## 2024-01-23 PROCEDURE — 80048 BASIC METABOLIC PNL TOTAL CA: CPT

## 2024-01-23 PROCEDURE — 6360000002 HC RX W HCPCS: Performed by: HOSPITALIST

## 2024-01-23 PROCEDURE — 2060000000 HC ICU INTERMEDIATE R&B

## 2024-01-23 PROCEDURE — 85025 COMPLETE CBC W/AUTO DIFF WBC: CPT

## 2024-01-23 RX ADMIN — SODIUM CHLORIDE: 9 INJECTION, SOLUTION INTRAVENOUS at 05:16

## 2024-01-23 RX ADMIN — Medication 1 TABLET: at 08:31

## 2024-01-23 RX ADMIN — Medication 3 MG: at 22:21

## 2024-01-23 RX ADMIN — HYDRALAZINE HYDROCHLORIDE 25 MG: 25 TABLET, FILM COATED ORAL at 05:22

## 2024-01-23 RX ADMIN — PANTOPRAZOLE SODIUM 40 MG: 40 TABLET, DELAYED RELEASE ORAL at 05:22

## 2024-01-23 RX ADMIN — ATORVASTATIN CALCIUM 80 MG: 80 TABLET, FILM COATED ORAL at 08:31

## 2024-01-23 RX ADMIN — CARVEDILOL 25 MG: 25 TABLET, FILM COATED ORAL at 08:31

## 2024-01-23 RX ADMIN — HYDRALAZINE HYDROCHLORIDE 25 MG: 25 TABLET, FILM COATED ORAL at 14:56

## 2024-01-23 RX ADMIN — Medication 400 MG: at 08:31

## 2024-01-23 RX ADMIN — MORPHINE SULFATE 2 MG: 2 INJECTION, SOLUTION INTRAMUSCULAR; INTRAVENOUS at 08:31

## 2024-01-23 RX ADMIN — HYDRALAZINE HYDROCHLORIDE 25 MG: 25 TABLET, FILM COATED ORAL at 22:21

## 2024-01-23 RX ADMIN — Medication 10 ML: at 08:32

## 2024-01-23 RX ADMIN — POLYETHYLENE GLYCOL 3350 17 G: 17 POWDER, FOR SOLUTION ORAL at 08:32

## 2024-01-23 RX ADMIN — AMLODIPINE BESYLATE 10 MG: 10 TABLET ORAL at 08:31

## 2024-01-23 RX ADMIN — FERROUS SULFATE TAB 325 MG (65 MG ELEMENTAL FE) 325 MG: 325 (65 FE) TAB at 08:31

## 2024-01-23 RX ADMIN — CARVEDILOL 25 MG: 25 TABLET, FILM COATED ORAL at 17:33

## 2024-01-23 RX ADMIN — ASPIRIN 81 MG 81 MG: 81 TABLET ORAL at 08:31

## 2024-01-23 ASSESSMENT — PAIN DESCRIPTION - DESCRIPTORS: DESCRIPTORS: ACHING;DISCOMFORT;SHARP

## 2024-01-23 ASSESSMENT — PAIN DESCRIPTION - FREQUENCY: FREQUENCY: CONTINUOUS

## 2024-01-23 ASSESSMENT — PAIN DESCRIPTION - LOCATION: LOCATION: KNEE

## 2024-01-23 ASSESSMENT — PAIN DESCRIPTION - ORIENTATION: ORIENTATION: RIGHT

## 2024-01-23 ASSESSMENT — PAIN SCALES - GENERAL
PAINLEVEL_OUTOF10: 7
PAINLEVEL_OUTOF10: 2

## 2024-01-23 ASSESSMENT — PAIN - FUNCTIONAL ASSESSMENT: PAIN_FUNCTIONAL_ASSESSMENT: PREVENTS OR INTERFERES SOME ACTIVE ACTIVITIES AND ADLS

## 2024-01-23 ASSESSMENT — PAIN DESCRIPTION - ONSET: ONSET: ON-GOING

## 2024-01-23 ASSESSMENT — PAIN DESCRIPTION - PAIN TYPE: TYPE: ACUTE PAIN

## 2024-01-23 NOTE — CARE COORDINATION
[employer] authorization received via portal:      Payor approval ID:  [id number]    Our [facility]  Approval ID:  [id number]     Service - Location:  [facility]    Dates Approved:  1/23/23-1/25/23    NRD:      DAVIDSON Vargas  [phone]  Electronically signed by DAVIDSON Dillard on 1/23/2024 at 3:40 PM

## 2024-01-23 NOTE — DISCHARGE SUMMARY
V2.0  Discharge Summary    Name:  Emilee Paulson /Age/Sex: 1950 (73 y.o. female)   Admit Date: 2024  Discharge Date: 24    MRN & CSN:  [id number] & [id number] Encounter Date and Time 24 6:21 PM EST    Attending:  Rakel Silvestre MD Discharging Provider: Rakel Silvestre MD       Hospital Course:     Brief Hospital course  Ms. Paulson was admitted with uncontrolled blood pressure that improved with maintenance home oral medications and occasional IV treatment.  For mild dehydration and hyponatremia with elevated BUN to creatinine ratio, she was given IV fluids with improvement.  Due to right knee pain, orthopedics was consulted and thought pain likely due to to hemarthrosis in the setting of Coumadin and injury. Recommended ice pack and wrapping and avoiding aspiration due to risk for infection and reaccumulation.  Due to severe difficulty with ambulation, SNF was arranged on discharge.      The patient expressed appropriate understanding of, and agreement with the discharge recommendations, medications, and plan.     Consults this admission:  IP CONSULT TO HOSPITALIST    Discharge Diagnosis:   Debility        Discharge Instruction:   Follow up appointments: PCP  Primary care physician: Stefano Suarez MD within 2 weeks  Diet: cardiac diet   Activity: activity as tolerated  Disposition: Discharged to:   []Home, []C, [x]SNF, []Acute Rehab, []Hospice   Condition on discharge: Stable  Labs and Tests to be Followed up as an outpatient by PCP or Specialist: None pending    Discharge Medications:        Medication List        CONTINUE taking these medications      acetaminophen 500 MG tablet  Commonly known as: TYLENOL  Take 1 tablet by mouth every 6 hours as needed for Pain     amLODIPine 10 MG tablet  Commonly known as: NORVASC  Take 1 tablet by mouth daily     aspirin 81 MG chewable tablet  Take 1 tablet by mouth daily     atorvastatin 80 MG tablet  Commonly known as: LIPITOR  Take 1 tablet by  No radiographic evidence of acute thoracic injury. 2. Severe cardiomegaly with apparent SVC dilatation, unchanged from previous exam.       CBC:   Recent Labs     01/21/24  1826 01/22/24  0144 01/23/24  0542   WBC 18.0* 15.8* 10.4   HGB 12.5 11.6* 9.7*    184 153     BMP:    Recent Labs     01/21/24  1826 01/22/24  0144 01/23/24  0542   * 134* 133*   K 3.7 4.0 3.5   CL 90* 90* 96*   CO2 27 30 27   BUN 18 22* 17   CREATININE 1.2 1.1 0.7   GLUCOSE 119* 99 104*     Hepatic: No results for input(s): \"AST\", \"ALT\", \"ALB\", \"BILITOT\", \"ALKPHOS\" in the last 72 hours.  Lipids:   Lab Results   Component Value Date/Time    CHOL 201 02/14/2023 11:32 AM    HDL 68 02/14/2023 11:32 AM    TRIG 73 02/14/2023 11:32 AM     Hemoglobin A1C:   Lab Results   Component Value Date/Time    LABA1C 5.3 06/20/2022 02:07 AM     TSH: No results found for: \"TSH\"  Troponin: No results found for: \"TROPONINT\"  Lactic Acid: No results for input(s): \"LACTA\" in the last 72 hours.  BNP: No results for input(s): \"PROBNP\" in the last 72 hours.  UA:  Lab Results   Component Value Date/Time    NITRU Negative 01/21/2024 08:30 PM    COLORU Dark Yekllow 01/21/2024 08:30 PM    PHUR 5.5 01/21/2024 08:30 PM    WBCUA 7 01/21/2024 08:30 PM    RBCUA 10 01/21/2024 08:30 PM    MUCUS Present 01/21/2024 08:30 PM    BACTERIA None Seen 01/21/2024 08:30 PM    CLARITYU CLOUDY 01/21/2024 08:30 PM    SPECGRAV 1.016 01/21/2024 08:30 PM    LEUKOCYTESUR TRACE 01/21/2024 08:30 PM    UROBILINOGEN 2.0 01/21/2024 08:30 PM    BILIRUBINUR SMALL 01/21/2024 08:30 PM    BLOODU Negative 01/21/2024 08:30 PM    GLUCOSEU Negative 01/21/2024 08:30 PM    KETUA Negative 01/21/2024 08:30 PM     Urine Cultures:   Lab Results   Component Value Date/Time    LABURIN >100,000 CFU/ml 01/13/2024 11:39 AM     Blood Cultures:   Lab Results   Component Value Date/Time    BC  01/22/2024 01:42 AM     No Growth to date.  Any change in status will be called.     Lab Results   Component Value

## 2024-01-23 NOTE — CARE COORDINATION
[facility] Nurse  Consult Note       NAME:  Emilee Paulson  MEDICAL RECORD NUMBER:  [id number]  AGE: 73 y.o.   GENDER: female  : 1950  TODAY'S DATE:  2024    Subjective   Reason for WOCN Evaluation and Assessment: DTI to right heel, POA      Emilee Paulson is a 73 y.o. female referred by:   [] Physician  [x] Nursing  [] Other:     Wound Identification:  Wound Type: pressure  Contributing Factors: chronic pressure and decreased mobility    Wound History: pt tells me she's had wound \"awhile\"  Current Wound Care Treatment:  RANDY    Patient Goal of Care:  [x] Wound Healing  [] Odor Control  [] Palliative Care  [] Pain Control   [] Other:         PAST MEDICAL HISTORY        Diagnosis Date    Acute cerebrovascular accident (CVA) ([facility]) 2020    brain bleed    LAST (acute kidney injury) ([facility]) 10/20/2022    Atrial fibrillation ([facility])     Bell palsy     diagnosed 15 years ago    CAD (coronary artery disease)     Cerebral artery occlusion with cerebral infarction ([facility])     Chronic diastolic CHF (congestive heart failure) ([facility]) 2020    CVA (cerebral vascular accident) ([facility]) 2017    Hypertension     Intracranial hemorrhage ([facility]) 2016    Mixed hyperlipidemia 2022    Nonintractable headache     currently controlled    Nontraumatic subcortical hemorrhage of cerebral hemisphere ([facility]) 2016    Poor vision     after bells palsy in     Primary osteoarthritis of right knee 2022    Sudden visual loss of left eye     patient states \"In very corner of my eye.\"    Thyroid nodule 2018    TIA involving right internal carotid artery        PAST SURGICAL HISTORY    Past Surgical History:   Procedure Laterality Date    CARDIAC SURGERY      stentsx2    COLONOSCOPY      CORONARY ANGIOPLASTY WITH STENT PLACEMENT      ROTATOR CUFF REPAIR Right     TUBAL LIGATION         FAMILY HISTORY    No family history on file.    SOCIAL HISTORY    Social History     Tobacco Use  Knee pain M25.569    Hemarthrosis involving knee joint, left M25.062    COVID U07.1    CRP elevated R79.82    Elevated erythrocyte sedimentation rate R70.0    Class 1 obesity due to excess calories with body mass index (BMI) of 32.0 to 32.9 in adult E66.09, Z68.32    Chronic atrial fibrillation (HCC) I48.20    Spinal osteoarthritis M47.9    Chronic systolic (congestive) heart failure I50.22    Debility R53.81    Fall W19.XXXA    Hyponatremia E87.1    Leukocytosis D72.829       Measurements:    Wound 01/23/24 Heel Right (Active)   Wound Image   01/23/24 1350   Wound Etiology Deep tissue/Injury 01/23/24 1350   Dressing/Treatment Barrier film 01/23/24 1350   Offloading for Diabetic Foot Ulcers Offloading ordered 01/23/24 1350   Wound Length (cm) 1 cm 01/23/24 1350   Wound Width (cm) 2 cm 01/23/24 1350   Wound Surface Area (cm^2) 2 cm^2 01/23/24 1350   Wound Assessment Purple/maroon 01/23/24 1350   Drainage Amount None (dry) 01/23/24 1350   Odor None 01/23/24 1350   Nicole-wound Assessment Dry/flaky 01/23/24 1350   Margins Defined edges 01/23/24 1350   Number of days: 0      Pt seen. Does have DTI to right heel. C/o pain to right knee with movement. S/P fall at home. Pt has been seen by ortho.       Response to treatment:  Well tolerated by patient.     Plan   Plan of Care:   -sacral border to sacral area, peel back each shift to assess skin, change every 3 days and as needed  -turn and reposition every 2 hours  -elevate heels, apply liquid barrier film twice daily    Specialty Bed Required : No   [] Low Air Loss   [] Pressure Redistribution  [] Fluid Immersion  [] Bariatric  [] Total Pressure Relief  [x] Other: called for Isoflex    Current Diet: ADULT DIET; Regular; Low Fat/Low Chol/High Fiber/2 gm Na  Dietician consult:  No    Discharge Plan:  Placement for patient upon discharge: TBD  Patient appropriate for Outpatient Wound Care Center: No    Referrals:  [x]   [] Home Health Care  [] Supplies  []

## 2024-01-23 NOTE — DISCHARGE INSTR - COC
Continuity of Care Form    Patient Name: Emilee Paulson   :  1950  MRN:  [id number]    Admit date:  2024  Discharge date:  24    Code Status Order: Full Code   Advance Directives:     Admitting Physician:  Williams Price MD  PCP: Stefano Suarez MD    Discharging Nurse: [facility] Unit/Room[facility] G2X-5279/5106-01  Discharging Unit Phone Number: [phone]    Emergency Contact:   Extended Emergency Contact Information  Primary Emergency Contact: Nimisha Desouza  Home Phone: [phone]  Mobile Phone: [phone]  Relation: Brother/Sister  Secondary Emergency Contact: Terrence Desouza  Home Phone: [phone]  Mobile Phone: [phone]  Relation: Child    Past Surgical History:  Past Surgical History:   Procedure Laterality Date    CARDIAC SURGERY      stentsx2    COLONOSCOPY      CORONARY ANGIOPLASTY WITH STENT PLACEMENT      ROTATOR CUFF REPAIR Right     TUBAL LIGATION         Immunization History:   Immunization History   Administered Date(s) Administered    COVID-19, MODERNA BLUE border, Primary or Immunocompromised, (age 12y+), IM, 100 mcg/0.5mL 2021, 2021, 2021    Influenza Vaccine, unspecified formulation 02/10/2007, 2007, 2008    Influenza Virus Vaccine 02/10/2007, 2007, 2008    Pneumococcal, PPSV23, PNEUMOVAX 23, (age 2y+), SC/IM, 0.5mL 10/01/2007    TDaP, ADACEL (age 10y-64y), BOOSTRIX (age 10y+), IM, 0.5mL 2007       Active Problems:  Patient Active Problem List   Diagnosis Code    Permanent atrial fibrillation (HCC) I48.21    Coronary artery disease due to lipid rich plaque I25.10, I25.83    Thyroid nodule E04.1    Chronic heart failure with preserved ejection fraction (HCC) I50.32    HTN (hypertension) I10    Primary osteoarthritis of right knee M17.11    Primary osteoarthritis of left knee M17.12    Headache, migraine, intractable, with status migrainosus G43.911    A-fib (HCC) I48.91    Mixed hyperlipidemia E78.2     Assisted  Med Delivery   whole and prefers mixed with applesauce    Wound Care Documentation and Therapy:  Wound 01/23/24 Heel Right (Active)   Wound Image   01/23/24 1350   Wound Etiology Deep tissue/Injury 01/23/24 1350   Dressing/Treatment Barrier film 01/23/24 1350   Offloading for Diabetic Foot Ulcers Offloading ordered 01/23/24 1350   Wound Length (cm) 1 cm 01/23/24 1350   Wound Width (cm) 2 cm 01/23/24 1350   Wound Surface Area (cm^2) 2 cm^2 01/23/24 1350   Wound Assessment Purple/maroon 01/23/24 1350   Drainage Amount None (dry) 01/23/24 1350   Odor None 01/23/24 1350   Nicole-wound Assessment Dry/flaky 01/23/24 1350   Margins Defined edges 01/23/24 1350   Number of days: 0        Elimination:  Continence:   Bowel: Yes  Bladder: Yes and No  Urinary Catheter: None   Colostomy/Ileostomy/Ileal Conduit: No       Date of Last BM: 1/21/24    Intake/Output Summary (Last 24 hours) at 1/23/2024 1531  Last data filed at 1/23/2024 1000  Gross per 24 hour   Intake 2091.36 ml   Output 200 ml   Net 1891.36 ml     I/O last 3 completed shifts:  In: 2927.2 [P.O.:240; I.V.:2687.2]  Out: 200 [Urine:200]    Safety Concerns:     At Risk for Falls    Impairments/Disabilities:      None    Nutrition Therapy:  Current Nutrition Therapy:   - Oral Diet:  Low Fat, Low Sodium (2gm), and low cholesterol, high fiber     Routes of Feeding: Oral  Liquids: Thin Liquids  Daily Fluid Restriction: no  Last Modified Barium Swallow with Video (Video Swallowing Test): not done    Treatments at the Time of Hospital Discharge:   Respiratory Treatments:   Oxygen Therapy:  is not on home oxygen therapy.  Ventilator:    - No ventilator support    Rehab Therapies: Physical Therapy and Occupational Therapy  Weight Bearing Status/Restrictions: No weight bearing restrictions  Other Medical Equipment (for information only, NOT a DME order):  stedy x2   Other Treatments: TX to rt heel is skin prep, heel boots     Patient's personal belongings (please select

## 2024-01-23 NOTE — CARE COORDINATION
Discharge Planning:  SW met with pt to discuss d/c plans.  Pt stated she is now willing to discuss SNF placement.   SNF list provided to pt for review.     The Plan for Transition of Care is related to the following treatment goals: Strengthening     The Patient was provided with a choice of provider and agrees   with the discharge plan. [x] Yes [] No    Freedom of choice list was provided with basic dialogue that supports the patient's individualized plan of care/goals, treatment preferences and shares the quality data associated with the providers. [x] Yes [] No    Pt has requested a referral to the following faciltiies:    [facility]  [facility]  [facility].     SW spoke with Christina at [facility] [phone].  Christina will review this referral and will get back with this SW shortly.     PLAN: referral sent to [facility]. Awaiting response.  Will require a [facility] Pre cert and HENS.  DAVIDSON Vargas [facility]  [phone]  Electronically signed by DAVIDSON Dillard on 1/23/2024 at 2:52 PM    Addendum:  SW received a call from Christina with [facility] stating this facility is able to accept this pt for skilled care.   [place] will initiate the pre cert.   DAVIDSON Vargas [facility]  [phone]  Electronically signed by DAVIDSON Dillard on 1/23/2024 at 3:46 PM

## 2024-01-23 NOTE — PROGRESS NOTES
Clinical Pharmacy Note  Warfarin Consult    Emilee Paulson is a 73 y.o. female receiving warfarin managed by pharmacy.      Warfarin Indication: Afib  Target INR range: 1.8-2.5 per Dr. Strange (Hx of  subcortical hemorrhage)  Dose prior to admission: 5mg MWF and 2.5mg all other days    Current warfarin drug-drug interactions: none that are clinically significant    Recent Labs     01/21/24  1826 01/22/24  0144 01/22/24  0624 01/23/24  0542   HGB 12.5 11.6*  --  9.7*   HCT 37.4 34.7*  --  28.9*   INR 2.59*  --  2.96* 3.78*         Assessment/Plan:  Will hold warfarin tonight.     Daily PT/INR until stable within therapeutic range.     Thank you for the consult.  Will continue to follow.     John Frazier RPH  1/23/2024 1:06 PM

## 2024-01-23 NOTE — ACP (ADVANCE CARE PLANNING)
Advance Care Planning     Advance Care Planning Activator (Inpatient)  Conversation Note      Date of ACP Conversation: 1/23/2024     Conversation Conducted with: Patient with Decision Making Capacity    ACP Activator: DAVIDSON Dillard      Health Care Decision Maker:     Current Designated Health Care Decision Maker:     Primary Decision Maker: Terrence Desouza - Child - [phone]    Care Preferences    Ventilation:  \"If you were in your present state of health and suddenly became very ill and were unable to breathe on your own, what would your preference be about the use of a ventilator (breathing machine) if it were available to you?\"      Would the patient desire the use of ventilator (breathing machine)?: no    \"If your health worsens and it becomes clear that your chance of recovery is unlikely, what would your preference be about the use of a ventilator (breathing machine) if it were available to you?\"     Would the patient desire the use of ventilator (breathing machine)?: No      Resuscitation  \"CPR works best to restart the heart when there is a sudden event, like a heart attack, in someone who is otherwise healthy. Unfortunately, CPR does not typically restart the heart for people who have serious health conditions or who are very sick.\"    \"In the event your heart stopped as a result of an underlying serious health condition, would you want attempts to be made to restart your heart (answer \"yes\" for attempt to resuscitate) or would you prefer a natural death (answer \"no\" for do not attempt to resuscitate)?\" no       [] Yes   [x] No   Educated Patient / Decision Maker regarding differences between Advance Directives and portable DNR orders.    Length of ACP Conversation in minutes:  10    Conversation Outcomes:  ACP discussion completed    Follow-up plan:    [x] Schedule follow-up conversation to continue planning  [x] Referred individual to Provider for additional questions/concerns   [] Advised  patient/agent/surrogate to review completed ACP document and update if needed with changes in condition, patient preferences or care setting    [x] This note routed to one or more involved healthcare providers    Perfectserve sent to Dr. Silvestre.    DAVIDSON Vargas  [phone]  Electronically signed by DAVIDSON Dillard on 1/23/2024 at 2:53 PM

## 2024-01-23 NOTE — CARE COORDINATION
Case Management Discharge Note          Date / Time of Note: 1/23/2024 3:53 PM                  Patient Name: Emilee Paulson   YOB: 1950  Diagnosis: Debility [R53.81]  Chronic atrial fibrillation (HCC) [I48.20]  LAST (acute kidney injury) (HCC) [N17.9]  Fall from bed, initial encounter [W06.XXXA]  Leukocytosis, unspecified type [D72.829]   Date / Time: 1/21/2024  5:47 PM    Financial:  Payor: "[employer] MEDICARE / Plan: [employer] GOLD PLUS HMO / Product Type: *No Product type* /      Pharmacy:    [id number] [address] P [phone] - F [phone]  [address] [address]  Phone: [phone] Fax: [phone]    [id number] [place], [place] - [address] [phone] -  [phone]  [address] [address]  Phone: [phone] Fax: [phone]      Assistance purchasing medications?: Potential Assistance Purchasing Medications: No  Assistance provided by Case Management: None at this time    DISCHARGE Disposition: Skilled Nursing Facility (SNF)    Nursing Facility:   Discharging to Facility/ Agency   Name: [facility]  Address:  [address] [place] [address]   Phone:  [phone]      LOC at discharge: Skilled Nursing  KRIS Completed: Yes             NURSING REPORT NUMBER: [phone]                  Notification completed in HENS/PAS?:  Yes : CM has completed HENS online through secure website for SNF admission at [facility]      Transportation:  Transportation PLAN for discharge: EMS transportation   Mode of Transport: Ambulance stretcher - BLS  Reason for medical transport: Bed confined: Meets the following criteria 1) unable to get out of bed without assistance or ambulate, 2) unable to safely sit up in a wheelchair, 3) unable to maintain erect seating position in a chair for time needed for transport  Name of Transport Company: [facility]

## 2024-01-24 VITALS
HEART RATE: 60 BPM | HEIGHT: 67 IN | WEIGHT: 180.56 LBS | SYSTOLIC BLOOD PRESSURE: 139 MMHG | DIASTOLIC BLOOD PRESSURE: 95 MMHG | OXYGEN SATURATION: 98 % | TEMPERATURE: 98.7 F | BODY MASS INDEX: 28.34 KG/M2 | RESPIRATION RATE: 18 BRPM

## 2024-01-24 NOTE — PROGRESS NOTES
Patient left with [facility]. All belongings sent with patient. This RN called grandson and updated him on patient's transfer.     Electronically signed by Anisha Mckee RN on 1/24/2024 at 1:18 AM

## 2024-01-24 NOTE — PROGRESS NOTES
Patient was supposed to be transported by [facility] to [facility] at 1900. Around 20:30 transport still not here. [facility] was contacted and per their dispatcher this patient \"was not on their list\". Dispatcher put patient on list for 00:30. Nursing Supervisor made aware. [facility] made aware also. Report was given to Elaine at [facility], all questions answered. Awaiting for transport to arrive.     Electronically signed by Anisha Mckee RN on 1/23/2024 at 11:03 PM

## 2024-01-24 NOTE — PROGRESS NOTES
[facility] called to check on arrival time. Dispatcher states they are en route, should be here in about 10 minutes.     Electronically signed by Anisha Mckee RN on 1/24/2024 at 12:48 AM

## 2024-01-24 NOTE — PROGRESS NOTES
Attempted to reach patient's son to update on discharge plan. HIPAA appropriate message left.

## 2024-01-26 LAB
BACTERIA BLD CULT ORG #2: NORMAL
BACTERIA BLD CULT: NORMAL

## 2024-01-29 ENCOUNTER — CARE COORDINATION (OUTPATIENT)
Dept: PRIMARY CARE CLINIC | Age: 74
End: 2024-01-29

## 2024-01-29 NOTE — CARE COORDINATION
Per acm chart review pt was transferred to [facility] no out reach attempted

## 2024-01-31 NOTE — PROGRESS NOTES
Physician Progress Note      PATIENT:               BESSY HINOJOSA  [facility] #:                  [id number]  :                       1950  ADMIT DATE:       2024 5:47 PM  DISCH DATE:        2024 1:00 AM  RESPONDING  PROVIDER #:        Rakel Silvestre MD          QUERY TEXT:    Pt admitted with Debility. Pt noted to have hemarthrosis to right knee,   hyponatremia, hypertension and atrial fibrillation. If possible, please   document in progress notes and discharge summary the relationship, if any,   between      The medical record reflects the following:  Risk Factors: Age, Hx-  CVA, LAST, A-fib (Coumadin), CAD, HF, HTN  Clinical Indicators:  VS-99, 120, 23, 142/103 - 138/98 - 153/118 -   149/123....... Na 130.....EKG- AF RVR....Rt knee xray- Moderate to large   suprapatellar knee joint effusion......Per DS on 24-Due to right knee   pain, orthopedics was consulted and thought pain likely due to to hemarthrosis   in the setting of Coumadin and injury.  Treatment: Metoprolol IV, IVF, prn metoprolol IV, PT/OT, Ortho consult, ice to   right knee, pain meds as needed    Thank You,  Ginny Waggoner RN BSN CDS CRCR  [email or web address]  Options provided:  -- Debility possibly due to hemarthrosis  -- Debility possibly due to hyponatremia  -- Debility possibly due to hypertension  -- Debility possibly due to atrial fibrillation  -- Other - I will add my own diagnosis  -- Disagree - Not applicable / Not valid  -- Disagree - Clinically unable to determine / Unknown  -- Refer to Clinical Documentation Reviewer    PROVIDER RESPONSE TEXT:    This patient has debility possibly due to hemarthrosis.    Query created by: Ginny Waggoner on 2024 4:37 AM      Electronically signed by:  Rakel Silvestre MD 2024 10:35 PM
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
## 2024-02-22 ENCOUNTER — ANTI-COAG VISIT (OUTPATIENT)
Dept: PHARMACY | Age: 74
End: 2024-02-22

## 2024-02-22 ENCOUNTER — TELEPHONE (OUTPATIENT)
Dept: PRIMARY CARE CLINIC | Age: 74
End: 2024-02-22

## 2024-02-22 DIAGNOSIS — I48.21 PERMANENT ATRIAL FIBRILLATION (HCC): Primary | ICD-10-CM

## 2024-02-22 LAB — INR BLD: 1.1

## 2024-02-22 NOTE — PROGRESS NOTES
Lakeisha w/ [facility] , pt was in the hospital then to a SNF, pt was d/c'd on 2/10 has not had INR checked since then. Lakeisha is setting up her  HHC today, INR of 1.1, pt takes 5 mgs on M/W/F and 2.5 mgs AOD. They'll be going out on Mon and Thurs. Please w/ dosing instructions and when to check next. [phone]     Pt in hospital 1/21-1/24 for uncontrolled BP and mild dehydration. Was d/c to facility.       Returned call to Lakeisha N. Instructed for pt to take 7.5 mg tonight then return to 5 mg on Mon, Wed and Fri and 2.5 mg all other days of the week. Recheck INR on Mon 2/26      For Pharmacy Admin Tracking Only    Intervention Detail: Dose Adjustment: 1, reason: Therapy Optimization  Total # of Interventions Recommended: 1  Total # of Interventions Accepted: 1  Time Spent (min): 15

## 2024-02-26 ENCOUNTER — ANTI-COAG VISIT (OUTPATIENT)
Dept: PHARMACY | Age: 74
End: 2024-02-26

## 2024-02-26 DIAGNOSIS — I48.21 PERMANENT ATRIAL FIBRILLATION (HCC): Primary | ICD-10-CM

## 2024-02-26 LAB — INR BLD: 1.7

## 2024-02-26 NOTE — PROGRESS NOTES
Lakeisha w/ [facility] called w/ pts INR of 1.7 pt takes 5 mgs of warfarin on M/W/F was instructed to take 7.5 mgs on Thurs , took 2.5 mgs AOD. No medication or diet changes. Does have secure v/m if you need to LM. [phone]     Returned call to Lakeisha CARMONA. Instructed for patient to take 2.5 mg on Sun and Thurs and 5 mg all other days of the week. Recheck INR in 1 week, 3/4      For Pharmacy Admin Tracking Only    Intervention Detail: Dose Adjustment: 1, reason: Therapy Optimization  Total # of Interventions Recommended: 1  Total # of Interventions Accepted: 1  Time Spent (min): 10

## 2024-03-02 PROBLEM — U07.1 COVID: Status: RESOLVED | Noted: 2024-01-02 | Resolved: 2024-03-02

## 2024-03-02 PROBLEM — N17.9 AKI (ACUTE KIDNEY INJURY) (HCC): Status: RESOLVED | Noted: 2022-10-20 | Resolved: 2024-03-02

## 2024-03-02 PROBLEM — R79.82 CRP ELEVATED: Status: RESOLVED | Noted: 2024-01-02 | Resolved: 2024-03-02

## 2024-03-02 PROBLEM — I50.32 CHRONIC DIASTOLIC (CONGESTIVE) HEART FAILURE (HCC): Status: RESOLVED | Noted: 2020-02-03 | Resolved: 2024-03-02

## 2024-03-02 PROBLEM — M25.462 EFFUSION OF LEFT KNEE: Status: RESOLVED | Noted: 2023-12-31 | Resolved: 2024-03-02

## 2024-03-02 NOTE — PROGRESS NOTES
[facility]   Electrophysiology  Shaji Wiley, APRN-CNP  Attending EP: Dr. Zuniga    Date: 4/1/2024  I had the privilege of visiting Emilee Paulson in the office.     Chief Complaint:   Chief Complaint   Patient presents with    Follow-Up from Hospital     3mnth pain on left side by breast     History of Present Illness: History obtained from patient and medical record.    Emilee Paulson is 73 y.o. female with a past medical history of permanent atrial fibrillation, ischemic cardiomyopathy, CHF, hypertension, hyperlipidemia, history of falls, cerebral vascular disease, thyroid nodule, hemorrhagic stroke, and dilated ascending aorta.     Pt presented from home with spontaneous hemorrhagic knee effusion.  According to patient she was in her usual state of health until the day before yesterday when she was washing some greens and sudden had severe left knee pain with effusion.  She denies trauma. Pt found to be covid positive. Patient previous had declined Watchman however is open to discussing watchman in future.    -Interval history: Today, Emilee Paulson is being seen for routine follow up. She is doing well. She remains in atrial fibrillation. She is not interested in the watchman. She is considering having knee surgery this summer.     Denies having chest pain, palpitations, shortness of breath, orthopnea/PND, cough, or dizziness at the time of this visit.    With regard to medication therapy the patient has been compliant with prescribed regimen. They have tolerated therapy to date.     Allergies:  Allergies   Allergen Reactions    Clonidine Rash, Shortness Of Breath and Hives    Codeine Hives, Itching, Nausea Only and Rash     Other reaction(s): Other (See Comments)  Pruritis    Hydrocodone-Acetaminophen      Itchy      Lisinopril Hives and Itching    Tramadol Other (See Comments)     Unknown reaction    Percocet [Oxycodone-Acetaminophen] Itching       Home Medications:  Prior to Visit

## 2024-03-04 ENCOUNTER — ANTI-COAG VISIT (OUTPATIENT)
Dept: PHARMACY | Age: 74
End: 2024-03-04

## 2024-03-04 ENCOUNTER — TELEPHONE (OUTPATIENT)
Dept: PRIMARY CARE CLINIC | Age: 74
End: 2024-03-04

## 2024-03-04 DIAGNOSIS — N39.0 URINARY TRACT INFECTION WITHOUT HEMATURIA, SITE UNSPECIFIED: Primary | ICD-10-CM

## 2024-03-04 DIAGNOSIS — I48.21 PERMANENT ATRIAL FIBRILLATION (HCC): Primary | ICD-10-CM

## 2024-03-04 LAB — INR BLD: 2.9

## 2024-03-04 NOTE — PROGRESS NOTES
INR 2.9 Patient was to take 2.5mg warfarin on Thu/Sat and 5mg all other days.  Patient accidentally took 7.mg warfarin last night.  Please call Lakeisha CARMONA [phone] with warfarin dosing and order for next INR check.       Returned call to Lakeisha CARMONA. Advised for patient to take 2.5 mg on Tues, Thurs and Sat and 5 mg all other days of the week. Recheck INR in 1 week, 3/11      For Pharmacy Admin Tracking Only    Intervention Detail: Dose Adjustment: 1, reason: Interaction  Total # of Interventions Recommended: 1  Total # of Interventions Accepted: 1  Time Spent (min): 10

## 2024-03-04 NOTE — TELEPHONE ENCOUNTER
Lakeisha  ([facility]) called requesting an order for a urine culture. Patient is show signs of an UTI,frequency, abdomen pain.      Lakeisha [phone]

## 2024-03-04 NOTE — TELEPHONE ENCOUNTER
Nurse is headed to patient's for visit and would like to obtain urine while she's there. Please order tests ASAP

## 2024-03-07 ENCOUNTER — OFFICE VISIT (OUTPATIENT)
Dept: PRIMARY CARE CLINIC | Age: 74
End: 2024-03-07
Payer: MEDICARE

## 2024-03-07 VITALS
BODY MASS INDEX: 25.83 KG/M2 | DIASTOLIC BLOOD PRESSURE: 90 MMHG | HEIGHT: 67 IN | WEIGHT: 164.6 LBS | SYSTOLIC BLOOD PRESSURE: 130 MMHG | TEMPERATURE: 97.9 F | OXYGEN SATURATION: 99 % | HEART RATE: 67 BPM

## 2024-03-07 DIAGNOSIS — D50.0 IRON DEFICIENCY ANEMIA DUE TO CHRONIC BLOOD LOSS: ICD-10-CM

## 2024-03-07 DIAGNOSIS — K21.9 LPRD (LARYNGOPHARYNGEAL REFLUX DISEASE): Chronic | ICD-10-CM

## 2024-03-07 DIAGNOSIS — M17.0 PRIMARY OSTEOARTHRITIS OF BOTH KNEES: ICD-10-CM

## 2024-03-07 DIAGNOSIS — I48.21 PERMANENT ATRIAL FIBRILLATION (HCC): ICD-10-CM

## 2024-03-07 DIAGNOSIS — Z09 HOSPITAL DISCHARGE FOLLOW-UP: Primary | ICD-10-CM

## 2024-03-07 DIAGNOSIS — R73.9 HYPERGLYCEMIA: ICD-10-CM

## 2024-03-07 DIAGNOSIS — I10 PRIMARY HYPERTENSION: ICD-10-CM

## 2024-03-07 DIAGNOSIS — E78.2 MIXED HYPERLIPIDEMIA: ICD-10-CM

## 2024-03-07 DIAGNOSIS — E55.9 VITAMIN D DEFICIENCY: ICD-10-CM

## 2024-03-07 PROBLEM — I77.810 ASCENDING AORTA DILATATION (HCC): Status: RESOLVED | Noted: 2023-10-26 | Resolved: 2024-03-07

## 2024-03-07 LAB
ALBUMIN SERPL-MCNC: 4 G/DL (ref 3.4–5)
ALBUMIN/GLOB SERPL: 1.1 {RATIO} (ref 1.1–2.2)
ALP SERPL-CCNC: 95 U/L (ref 40–129)
ALT SERPL-CCNC: <5 U/L (ref 10–40)
AST SERPL-CCNC: 13 U/L (ref 15–37)
BASOPHILS # BLD: 0 K/UL (ref 0–0.2)
BASOPHILS NFR BLD: 0.5 %
BILIRUB SERPL-MCNC: 0.6 MG/DL (ref 0–1)
BUN SERPL-MCNC: 5 MG/DL (ref 7–20)
CALCIUM SERPL-MCNC: 10.1 MG/DL (ref 8.3–10.6)
CHLORIDE SERPL-SCNC: 95 MMOL/L (ref 99–110)
CO2 SERPL-SCNC: 30 MMOL/L (ref 21–32)
CREAT SERPL-MCNC: 0.6 MG/DL (ref 0.6–1.2)
DEPRECATED RDW RBC AUTO: 14.5 % (ref 12.4–15.4)
EOSINOPHIL NFR BLD: 1.1 %
GFR SERPLBLD CREATININE-BSD FMLA CKD-EPI: >60 ML/MIN/{1.73_M2}
GLUCOSE SERPL-MCNC: 96 MG/DL (ref 70–99)
HCT VFR BLD AUTO: 34.7 % (ref 36–48)
HGB BLD-MCNC: 11.9 G/DL (ref 12–16)
LYMPHOCYTES # BLD: 1.1 K/UL (ref 1–5.1)
MCH RBC QN AUTO: 28.6 PG (ref 26–34)
MCHC RBC AUTO-ENTMCNC: 34.2 G/DL (ref 31–36)
MCV RBC AUTO: 83.8 FL (ref 80–100)
MONOCYTES # BLD: 0.5 K/UL (ref 0–1.3)
MONOCYTES NFR BLD: 10 %
NEUTROPHILS # BLD: 3 K/UL (ref 1.7–7.7)
NEUTROPHILS NFR BLD: 65.3 %
PLATELET # BLD AUTO: 299 K/UL (ref 135–450)
PMV BLD AUTO: 8.9 FL (ref 5–10.5)
POTASSIUM SERPL-SCNC: 3.7 MMOL/L (ref 3.5–5.1)
PROT SERPL-MCNC: 7.7 G/DL (ref 6.4–8.2)
SODIUM SERPL-SCNC: 135 MMOL/L (ref 136–145)

## 2024-03-07 PROCEDURE — 3017F COLORECTAL CA SCREEN DOC REV: CPT | Performed by: INTERNAL MEDICINE

## 2024-03-07 PROCEDURE — G8427 DOCREV CUR MEDS BY ELIG CLIN: HCPCS | Performed by: INTERNAL MEDICINE

## 2024-03-07 PROCEDURE — 1123F ACP DISCUSS/DSCN MKR DOCD: CPT | Performed by: INTERNAL MEDICINE

## 2024-03-07 PROCEDURE — 1036F TOBACCO NON-USER: CPT | Performed by: INTERNAL MEDICINE

## 2024-03-07 PROCEDURE — G8399 PT W/DXA RESULTS DOCUMENT: HCPCS | Performed by: INTERNAL MEDICINE

## 2024-03-07 PROCEDURE — 1111F DSCHRG MED/CURRENT MED MERGE: CPT | Performed by: INTERNAL MEDICINE

## 2024-03-07 PROCEDURE — G8484 FLU IMMUNIZE NO ADMIN: HCPCS | Performed by: INTERNAL MEDICINE

## 2024-03-07 PROCEDURE — G8417 CALC BMI ABV UP PARAM F/U: HCPCS | Performed by: INTERNAL MEDICINE

## 2024-03-07 PROCEDURE — 3075F SYST BP GE 130 - 139MM HG: CPT | Performed by: INTERNAL MEDICINE

## 2024-03-07 PROCEDURE — 99214 OFFICE O/P EST MOD 30 MIN: CPT | Performed by: INTERNAL MEDICINE

## 2024-03-07 PROCEDURE — 3080F DIAST BP >= 90 MM HG: CPT | Performed by: INTERNAL MEDICINE

## 2024-03-07 PROCEDURE — 1090F PRES/ABSN URINE INCON ASSESS: CPT | Performed by: INTERNAL MEDICINE

## 2024-03-07 RX ORDER — ATORVASTATIN CALCIUM 80 MG/1
80 TABLET, FILM COATED ORAL DAILY
Qty: 90 TABLET | Refills: 4 | Status: SHIPPED | OUTPATIENT
Start: 2024-03-07

## 2024-03-07 RX ORDER — ERGOCALCIFEROL 1.25 MG/1
50000 CAPSULE ORAL WEEKLY
Qty: 12 CAPSULE | Refills: 4 | Status: SHIPPED | OUTPATIENT
Start: 2024-03-07

## 2024-03-07 RX ORDER — OMEPRAZOLE 20 MG/1
CAPSULE, DELAYED RELEASE ORAL
Qty: 60 CAPSULE | Refills: 2 | Status: SHIPPED | OUTPATIENT
Start: 2024-03-07 | End: 2024-03-08

## 2024-03-07 RX ORDER — WARFARIN SODIUM 5 MG/1
2.5-5 TABLET ORAL SEE ADMIN INSTRUCTIONS
Qty: 90 TABLET | Refills: 4 | Status: SHIPPED | OUTPATIENT
Start: 2024-03-07

## 2024-03-07 RX ORDER — ACETAMINOPHEN 500 MG
500 TABLET ORAL EVERY 6 HOURS PRN
Qty: 120 TABLET | Refills: 1 | Status: SHIPPED | OUTPATIENT
Start: 2024-03-07

## 2024-03-07 RX ORDER — CARVEDILOL 25 MG/1
25 TABLET ORAL 2 TIMES DAILY WITH MEALS
Qty: 60 TABLET | Refills: 3 | Status: SHIPPED | OUTPATIENT
Start: 2024-03-07 | End: 2024-03-08

## 2024-03-07 RX ORDER — FERROUS SULFATE 325(65) MG
325 TABLET ORAL
Qty: 90 TABLET | Refills: 2 | Status: SHIPPED | OUTPATIENT
Start: 2024-03-07

## 2024-03-07 RX ORDER — CIPROFLOXACIN 500 MG/1
500 TABLET, FILM COATED ORAL 2 TIMES DAILY
Qty: 12 TABLET | Refills: 0 | Status: SHIPPED | OUTPATIENT
Start: 2024-03-07 | End: 2024-03-13

## 2024-03-07 RX ORDER — CAL/D3/MAG11/ZINC/COP/MANG/BOR 600 MG-800
1 TABLET ORAL DAILY
Qty: 90 TABLET | Refills: 4 | Status: SHIPPED | OUTPATIENT
Start: 2024-03-07

## 2024-03-07 RX ORDER — HYDRALAZINE HYDROCHLORIDE 25 MG/1
25 TABLET, FILM COATED ORAL EVERY 8 HOURS SCHEDULED
Qty: 30 TABLET | Refills: 0 | Status: SHIPPED | OUTPATIENT
Start: 2024-03-07

## 2024-03-07 RX ORDER — ACETAMINOPHEN 500 MG
500 TABLET ORAL 4 TIMES DAILY PRN
Qty: 120 TABLET | Refills: 5 | Status: SHIPPED | OUTPATIENT
Start: 2024-03-07

## 2024-03-07 RX ORDER — VALSARTAN 320 MG/1
320 TABLET ORAL DAILY
Qty: 90 TABLET | Refills: 1 | Status: SHIPPED | OUTPATIENT
Start: 2024-03-07

## 2024-03-07 RX ORDER — TRAMADOL HYDROCHLORIDE 50 MG/1
50 TABLET ORAL EVERY 6 HOURS PRN
Qty: 28 TABLET | Refills: 0 | Status: SHIPPED | OUTPATIENT
Start: 2024-03-07 | End: 2024-03-14

## 2024-03-07 SDOH — ECONOMIC STABILITY: FOOD INSECURITY: WITHIN THE PAST 12 MONTHS, THE FOOD YOU BOUGHT JUST DIDN'T LAST AND YOU DIDN'T HAVE MONEY TO GET MORE.: NEVER TRUE

## 2024-03-07 SDOH — ECONOMIC STABILITY: FOOD INSECURITY: WITHIN THE PAST 12 MONTHS, YOU WORRIED THAT YOUR FOOD WOULD RUN OUT BEFORE YOU GOT MONEY TO BUY MORE.: NEVER TRUE

## 2024-03-07 SDOH — ECONOMIC STABILITY: INCOME INSECURITY: HOW HARD IS IT FOR YOU TO PAY FOR THE VERY BASICS LIKE FOOD, HOUSING, MEDICAL CARE, AND HEATING?: NOT HARD AT ALL

## 2024-03-07 ASSESSMENT — ENCOUNTER SYMPTOMS
COUGH: 0
CONSTIPATION: 0
ABDOMINAL DISTENTION: 0
ABDOMINAL PAIN: 0
CHEST TIGHTNESS: 0
SHORTNESS OF BREATH: 0
WHEEZING: 0
BACK PAIN: 1
DIARRHEA: 0
GASTROINTESTINAL NEGATIVE: 1
BLOOD IN STOOL: 0

## 2024-03-07 NOTE — PROGRESS NOTES
Subjective:      Patient ID: Emilee Paulson is a 73 y.o. female.    3/7/2024 Patient presents with:  Follow-Up from Hospital:  [facility] 1/21/2024 - 1/24/2024 (3 days)  Reason:Debility      Ms. Paulson was admitted with uncontrolled blood pressure that improved with maintenance home oral medications and occasional IV treatment.  For mild dehydration and hyponatremia with elevated BUN to creatinine ratio, she was given IV fluids with improvement.  Due to right knee pain, orthopedics was consulted and thought pain likely due to to hemarthrosis in the setting of Coumadin and injury. Recommended ice pack and wrapping and avoiding aspiration due to risk for infection and reaccumulation.  Due to severe difficulty with ambulation, SNF was arranged on discharge.                         1/17/2024 Patient presents with:  Hypertension: 6 week f/u  Generalized Body Aches: Increased pain all over body- went to [facility] 1/8/2024 for the knee pain, but is having pain all over  Flank Pain: Went to [facility] 1/13/2024    On Keflex for Ecoli UTI     CT Abdomen   1/13/24  Stable solid appearing mass in the right renal midpole measuring up to 2.5 cm  is unchanged in size since 02/19/2022.     No hydronephrosis, nephrolithiasis or obstructive uropathy.     Leiomyomatous uterus.                12/6/2023 Patient presents with:  Follow-Up from Hospital: [facility]- 11/5/2023 - 11/8/2023 (3 days)- Hypertensive emergency  Fatigue        Carvedilol bumped to 12.5 mg bid           11/1/2023 Patient presents with:  Arthritis: 3 month f/u              8/1/2023 Patient presents with:  Medicare AWV  Arthritis:   Did get \"shots\" in both knees 3 wks ago . No change in level of pain . Will see Ortho again in 3 mths!                     7/3/2023  Patient presents with:  Joint Swelling: Right knee swelling/ pain, ongoing for the last month- no fall                 5/30/2023 Patient presents with:  Follow-up:

## 2024-03-07 NOTE — TELEPHONE ENCOUNTER
Medication:   Requested Prescriptions     Pending Prescriptions Disp Refills    carvedilol (COREG) 25 MG tablet [Pharmacy Med Name: CARVEDILOL 25MG TABLETS] 180 tablet      Sig: TAKE 1 TABLET BY MOUTH WITH BREAKFAST AND 1 TABLET WITH EVENING MEAL    omeprazole (PRILOSEC) 20 MG delayed release capsule [Pharmacy Med Name: OMEPRAZOLE 20MG CAPSULES] 180 capsule      Sig: TAKE 1 CAPSULE BY MOUTH TWICE DAILY ON AN EMPTY STOMACH; EAT A MEAL OR SNACK 45 TO 60 MINUTES AFTER EACH DOSE        Last Filled:      Patient Phone Number: [phone] (home)     Last appt: 3/7/2024   Next appt: 4/4/2024    Last OARRS:       1/16/2024     1:50 PM   RX Monitoring   Periodic Controlled Substance Monitoring Possible medication side effects, risk of tolerance/dependence & alternative treatments discussed.;No signs of potential drug abuse or diversion identified.

## 2024-03-08 LAB — HBA1C MFR BLD: 5.5 %

## 2024-03-08 RX ORDER — OMEPRAZOLE 20 MG/1
CAPSULE, DELAYED RELEASE ORAL
Qty: 180 CAPSULE | Refills: 0 | Status: SHIPPED | OUTPATIENT
Start: 2024-03-08

## 2024-03-08 RX ORDER — CARVEDILOL 25 MG/1
TABLET ORAL
Qty: 180 TABLET | Refills: 1 | Status: SHIPPED | OUTPATIENT
Start: 2024-03-08

## 2024-03-11 ENCOUNTER — ANTI-COAG VISIT (OUTPATIENT)
Dept: PHARMACY | Age: 74
End: 2024-03-11

## 2024-03-11 DIAGNOSIS — I48.21 PERMANENT ATRIAL FIBRILLATION (HCC): Primary | ICD-10-CM

## 2024-03-11 LAB — INR BLD: 3

## 2024-03-11 NOTE — PROGRESS NOTES
Lakeisha w/ C called in INR results of 3.0 pt takes 2.5 mgs of warfarin on T/TH/S/S and 5 mgs on M/W/F. PCP d/c aspirin on Thursday, was prescribed Cipro that she started on Friday for UTI will complete that on Wed. [phone] verified she has a secure v/m.         Returned call to Lakeisha. Advised for patient to take  5 mg Mon, Wed and Fri and 2.5 mg all other days of the week. Recheck INR in 1 week, 3/18      For Pharmacy Admin Tracking Only    Total # of Interventions Recommended: 0  Total # of Interventions Accepted: 0  Time Spent (min): 10

## 2024-03-13 ENCOUNTER — TELEPHONE (OUTPATIENT)
Dept: PRIMARY CARE CLINIC | Age: 74
End: 2024-03-13

## 2024-03-13 DIAGNOSIS — M17.0 PRIMARY OSTEOARTHRITIS OF BOTH KNEES: ICD-10-CM
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
## 2024-03-13 NOTE — TELEPHONE ENCOUNTER
Spoke with patient and she understood and will go get x-ray done as well as take the tramadol as needed

## 2024-03-13 NOTE — TELEPHONE ENCOUNTER
Patients blood pressure is 148/115 please advise if you want patient to do anything to lower it . She is in a lot of pain in her back . Home care nurse Ginny just wanted to pass this along

## 2024-03-14 ENCOUNTER — HOSPITAL ENCOUNTER (OUTPATIENT)
Age: 74
Discharge: HOME | End: 2024-03-14
Payer: MEDICARE

## 2024-03-14 ENCOUNTER — HOSPITAL ENCOUNTER (OUTPATIENT)
Dept: GENERAL RADIOLOGY | Age: 74
Discharge: HOME | End: 2024-03-14
Payer: MEDICARE
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
## 2024-03-14 PROCEDURE — 72100 X-RAY EXAM L-S SPINE 2/3 VWS: CPT

## 2024-03-14 PROCEDURE — 72072 X-RAY EXAM THORAC SPINE 3VWS: CPT

## 2024-03-15 ENCOUNTER — OFFICE VISIT (OUTPATIENT)
Dept: ORTHOPEDIC SURGERY | Age: 74
End: 2024-03-15
Payer: MEDICARE

## 2024-03-15 VITALS — WEIGHT: 164.68 LBS | BODY MASS INDEX: 25.85 KG/M2 | HEIGHT: 67 IN

## 2024-03-15 DIAGNOSIS — M17.11 PRIMARY OSTEOARTHRITIS OF RIGHT KNEE: ICD-10-CM

## 2024-03-15 DIAGNOSIS — M17.12 PRIMARY OSTEOARTHRITIS OF LEFT KNEE: Primary | ICD-10-CM

## 2024-03-15 PROCEDURE — G8484 FLU IMMUNIZE NO ADMIN: HCPCS | Performed by: NURSE PRACTITIONER

## 2024-03-15 PROCEDURE — G8417 CALC BMI ABV UP PARAM F/U: HCPCS | Performed by: NURSE PRACTITIONER

## 2024-03-15 PROCEDURE — G8399 PT W/DXA RESULTS DOCUMENT: HCPCS | Performed by: NURSE PRACTITIONER

## 2024-03-15 PROCEDURE — 99213 OFFICE O/P EST LOW 20 MIN: CPT | Performed by: NURSE PRACTITIONER

## 2024-03-15 PROCEDURE — 3017F COLORECTAL CA SCREEN DOC REV: CPT | Performed by: NURSE PRACTITIONER

## 2024-03-15 PROCEDURE — G8427 DOCREV CUR MEDS BY ELIG CLIN: HCPCS | Performed by: NURSE PRACTITIONER

## 2024-03-15 PROCEDURE — 1123F ACP DISCUSS/DSCN MKR DOCD: CPT | Performed by: NURSE PRACTITIONER

## 2024-03-15 PROCEDURE — 1036F TOBACCO NON-USER: CPT | Performed by: NURSE PRACTITIONER

## 2024-03-15 PROCEDURE — 1090F PRES/ABSN URINE INCON ASSESS: CPT | Performed by: NURSE PRACTITIONER

## 2024-03-15 NOTE — PROGRESS NOTES
CHIEF COMPLAINT: Bilateral knee pain/osteoarthritis.    HISTORY:  Ms. Paulson 73 y.o.  female presents today for the first visit for evaluation of bilateral knee pain which started to worsen after she fell at home in Jan 2024 and landed on her right side.  Left knee pain is worse than the right.  She is complaining of achy pain.  Pain is increase with standing and walking and decrease with rest. Pain is sharp early in the morning with first few steps, dull achy pain by the end of the day. Alleviating factors: none. No radiation and no numbness and tingling sensation. No other complaint.  She was initially seen at [facility] on 1/21/2024 where she was evaluated and x-rayed and referred to orthopedics.  She has previously seen me for bilateral knee pain on 7/18/2023 and had bilateral knee cortisone injections and reports only a few hours relief.  She reports she is using a walker to ambulate due to the pain.    Past Medical History:   Diagnosis Date    Acute cerebrovascular accident (CVA) ([facility]) 01/28/2020    brain bleed    LAST (acute kidney injury) ([facility]) 10/20/2022    Atrial fibrillation ([facility])     Bell palsy     diagnosed 15 years ago    CAD (coronary artery disease)     Cerebral artery occlusion with cerebral infarction ([facility])     Chronic diastolic CHF (congestive heart failure) ([facility]) 05/16/2020    CVA (cerebral vascular accident) ([facility]) 01/04/2017    Hypertension     Intracranial hemorrhage ([facility]) 04/2016    Mixed hyperlipidemia 07/06/2022    Nonintractable headache     currently controlled    Nontraumatic subcortical hemorrhage of cerebral hemisphere ([facility]) 04/30/2016    Poor vision     after bells palsy in 2012    Primary osteoarthritis of right knee 03/18/2022    Sudden visual loss of left eye     patient states \"In very corner of my eye.\"    Thyroid nodule 02/08/2018    TIA involving right internal carotid artery        Past Surgical History:   Procedure Laterality Date    CARDIAC SURGERY

## 2024-03-21 ENCOUNTER — OFFICE VISIT (OUTPATIENT)
Dept: ORTHOPEDIC SURGERY | Age: 74
End: 2024-03-21
Payer: MEDICARE

## 2024-03-21 VITALS — WEIGHT: 164 LBS | HEIGHT: 67 IN | BODY MASS INDEX: 25.74 KG/M2

## 2024-03-21 DIAGNOSIS — M17.11 PRIMARY OSTEOARTHRITIS OF RIGHT KNEE: Primary | ICD-10-CM

## 2024-03-21 PROCEDURE — G8427 DOCREV CUR MEDS BY ELIG CLIN: HCPCS | Performed by: ORTHOPAEDIC SURGERY

## 2024-03-21 PROCEDURE — 99213 OFFICE O/P EST LOW 20 MIN: CPT | Performed by: ORTHOPAEDIC SURGERY

## 2024-03-21 PROCEDURE — G8399 PT W/DXA RESULTS DOCUMENT: HCPCS | Performed by: ORTHOPAEDIC SURGERY

## 2024-03-21 PROCEDURE — 1123F ACP DISCUSS/DSCN MKR DOCD: CPT | Performed by: ORTHOPAEDIC SURGERY

## 2024-03-21 PROCEDURE — G8417 CALC BMI ABV UP PARAM F/U: HCPCS | Performed by: ORTHOPAEDIC SURGERY

## 2024-03-21 PROCEDURE — 1036F TOBACCO NON-USER: CPT | Performed by: ORTHOPAEDIC SURGERY

## 2024-03-21 PROCEDURE — 3017F COLORECTAL CA SCREEN DOC REV: CPT | Performed by: ORTHOPAEDIC SURGERY

## 2024-03-21 PROCEDURE — 1090F PRES/ABSN URINE INCON ASSESS: CPT | Performed by: ORTHOPAEDIC SURGERY

## 2024-03-21 PROCEDURE — G8484 FLU IMMUNIZE NO ADMIN: HCPCS | Performed by: ORTHOPAEDIC SURGERY

## 2024-03-21 NOTE — PROGRESS NOTES
surgery, loss of motion, continued pain.  Despite these risks the patient would like to proceed.  All questions have been answered and no guarantees have been made.  The patient is unable to do further physical therapy due to disabling pain.  I discussed with the patient the diagnosis in detail and answered all the questions.  The patient verbalized understanding of the plan as it has been described above and is in agreement.    Plan for right total knee arthroplasty.  She will need to come off her Coumadin preoperatively.  She will require cardiac clearance.  Her recovery will be prolonged given her weakness and history of stroke.    Total time spent on today's encounter was at least 26 minutes. This time included reviewing prior notes, radiographs, and lab results when available, reviewing history obtained by medical assistant, performing history and physical exam, reviewing tests/radiographs with the patient, counseling the patient, ordering medications or tests, documentation in the electronic health record, and coordination of care.    This dictation was done with Dragon dictation and may contain mechanical errors related to translation.

## 2024-03-25 LAB — INR BLD: 2.5

## 2024-03-27 ENCOUNTER — ANTI-COAG VISIT (OUTPATIENT)
Dept: PHARMACY | Age: 74
End: 2024-03-27

## 2024-03-27 DIAGNOSIS — I48.21 PERMANENT ATRIAL FIBRILLATION (HCC): Primary | ICD-10-CM

## 2024-03-27 NOTE — PROGRESS NOTES
[facility] called in INR from Monday 3/25. States forgot to call it into us. Confirmed dose, no changes. Take 5 mg Mon, Wed and Fri and 2.5 mg all other days of the week. Recheck . Asked for call from new HHN to confirm that is the date she will be going out as patient has not been checked consistently.    Giuliana Wright, PharmD, [facility]    For Pharmacy Admin Tracking Only    Intervention Detail: Adherence Monitorin  Total # of Interventions Recommended: 1  Total # of Interventions Accepted: 1  Time Spent (min): 15

## 2024-04-01 ENCOUNTER — OFFICE VISIT (OUTPATIENT)
Dept: CARDIOLOGY CLINIC | Age: 74
End: 2024-04-01
Payer: MEDICARE

## 2024-04-01 VITALS
HEART RATE: 47 BPM | HEIGHT: 67 IN | SYSTOLIC BLOOD PRESSURE: 118 MMHG | DIASTOLIC BLOOD PRESSURE: 72 MMHG | WEIGHT: 167.6 LBS | OXYGEN SATURATION: 99 % | BODY MASS INDEX: 26.3 KG/M2

## 2024-04-01 DIAGNOSIS — Z86.73 HISTORY OF STROKE: ICD-10-CM

## 2024-04-01 DIAGNOSIS — Z09 HOSPITAL DISCHARGE FOLLOW-UP: ICD-10-CM

## 2024-04-01 DIAGNOSIS — I48.20 CHRONIC ATRIAL FIBRILLATION (HCC): Primary | ICD-10-CM

## 2024-04-01 DIAGNOSIS — I25.10 CORONARY ARTERY DISEASE DUE TO LIPID RICH PLAQUE: ICD-10-CM

## 2024-04-01 DIAGNOSIS — I10 ESSENTIAL HYPERTENSION: ICD-10-CM

## 2024-04-01 DIAGNOSIS — I50.22 CHRONIC SYSTOLIC (CONGESTIVE) HEART FAILURE (HCC): ICD-10-CM

## 2024-04-01 DIAGNOSIS — I25.83 CORONARY ARTERY DISEASE DUE TO LIPID RICH PLAQUE: ICD-10-CM

## 2024-04-01 PROCEDURE — G8417 CALC BMI ABV UP PARAM F/U: HCPCS | Performed by: NURSE PRACTITIONER

## 2024-04-01 PROCEDURE — 3078F DIAST BP <80 MM HG: CPT | Performed by: NURSE PRACTITIONER

## 2024-04-01 PROCEDURE — 3074F SYST BP LT 130 MM HG: CPT | Performed by: NURSE PRACTITIONER

## 2024-04-01 PROCEDURE — 93000 ELECTROCARDIOGRAM COMPLETE: CPT | Performed by: NURSE PRACTITIONER

## 2024-04-01 PROCEDURE — 1123F ACP DISCUSS/DSCN MKR DOCD: CPT | Performed by: NURSE PRACTITIONER

## 2024-04-01 PROCEDURE — G8399 PT W/DXA RESULTS DOCUMENT: HCPCS | Performed by: NURSE PRACTITIONER

## 2024-04-01 PROCEDURE — 1036F TOBACCO NON-USER: CPT | Performed by: NURSE PRACTITIONER

## 2024-04-01 PROCEDURE — 99214 OFFICE O/P EST MOD 30 MIN: CPT | Performed by: NURSE PRACTITIONER

## 2024-04-01 PROCEDURE — 1111F DSCHRG MED/CURRENT MED MERGE: CPT | Performed by: NURSE PRACTITIONER

## 2024-04-01 PROCEDURE — 3017F COLORECTAL CA SCREEN DOC REV: CPT | Performed by: NURSE PRACTITIONER

## 2024-04-01 PROCEDURE — G8427 DOCREV CUR MEDS BY ELIG CLIN: HCPCS | Performed by: NURSE PRACTITIONER

## 2024-04-01 PROCEDURE — 1090F PRES/ABSN URINE INCON ASSESS: CPT | Performed by: NURSE PRACTITIONER

## 2024-04-02 ENCOUNTER — TELEPHONE (OUTPATIENT)
Dept: PRIMARY CARE CLINIC | Age: 74
End: 2024-04-02

## 2024-04-02 NOTE — TELEPHONE ENCOUNTER
Andres/rep call from [facility] for refill on omeprazole (PRILOSEC) 20 MG delayed release capsule; however, advise rep that patient has a 90 days supply & it's to soon for refill

## 2024-04-04 ENCOUNTER — ANTI-COAG VISIT (OUTPATIENT)
Dept: PHARMACY | Age: 74
End: 2024-04-04

## 2024-04-04 DIAGNOSIS — I48.21 PERMANENT ATRIAL FIBRILLATION (HCC): Primary | ICD-10-CM

## 2024-04-04 LAB — INR BLD: 1.6

## 2024-04-04 NOTE — PROGRESS NOTES
INR 1.6 Patient taking 5mg warfarin on // and 2.5mg all other days.  Please call Kay CARMONA [facility] [phone] with warfarin dosing and order for next INR check.     Called MATHEW, denies changes. Take 5mg today then take 5 mg Mon, Wed and Fri and 2.5 mg all other days of the week. Recheck .    Giuliana Wright, PharmD, [facility]    For Pharmacy Admin Tracking Only    Intervention Detail: Adherence Monitorin and Dose Adjustment: 1, reason: Therapy Optimization  Total # of Interventions Recommended: 2  Total # of Interventions Accepted: 2  Time Spent (min): 15

## 2024-04-09 ENCOUNTER — OFFICE VISIT (OUTPATIENT)
Dept: PRIMARY CARE CLINIC | Age: 74
End: 2024-04-09
Payer: MEDICARE

## 2024-04-09 VITALS
BODY MASS INDEX: 26.21 KG/M2 | SYSTOLIC BLOOD PRESSURE: 130 MMHG | OXYGEN SATURATION: 100 % | HEART RATE: 76 BPM | HEIGHT: 67 IN | WEIGHT: 167 LBS | DIASTOLIC BLOOD PRESSURE: 90 MMHG | TEMPERATURE: 97 F

## 2024-04-09 DIAGNOSIS — I48.21 PERMANENT ATRIAL FIBRILLATION (HCC): ICD-10-CM

## 2024-04-09 DIAGNOSIS — I10 PRIMARY HYPERTENSION: ICD-10-CM

## 2024-04-09 PROCEDURE — G8427 DOCREV CUR MEDS BY ELIG CLIN: HCPCS | Performed by: INTERNAL MEDICINE

## 2024-04-09 PROCEDURE — 1036F TOBACCO NON-USER: CPT | Performed by: INTERNAL MEDICINE

## 2024-04-09 PROCEDURE — 1090F PRES/ABSN URINE INCON ASSESS: CPT | Performed by: INTERNAL MEDICINE

## 2024-04-09 PROCEDURE — G8417 CALC BMI ABV UP PARAM F/U: HCPCS | Performed by: INTERNAL MEDICINE

## 2024-04-09 PROCEDURE — 3075F SYST BP GE 130 - 139MM HG: CPT | Performed by: INTERNAL MEDICINE

## 2024-04-09 PROCEDURE — 1123F ACP DISCUSS/DSCN MKR DOCD: CPT | Performed by: INTERNAL MEDICINE

## 2024-04-09 PROCEDURE — 99213 OFFICE O/P EST LOW 20 MIN: CPT | Performed by: INTERNAL MEDICINE

## 2024-04-09 PROCEDURE — 3080F DIAST BP >= 90 MM HG: CPT | Performed by: INTERNAL MEDICINE

## 2024-04-09 PROCEDURE — G8399 PT W/DXA RESULTS DOCUMENT: HCPCS | Performed by: INTERNAL MEDICINE

## 2024-04-09 PROCEDURE — 3017F COLORECTAL CA SCREEN DOC REV: CPT | Performed by: INTERNAL MEDICINE

## 2024-04-09 RX ORDER — AMLODIPINE BESYLATE 10 MG/1
10 TABLET ORAL DAILY
Qty: 30 TABLET | Refills: 0 | Status: SHIPPED | OUTPATIENT
Start: 2024-04-09

## 2024-04-09 RX ORDER — VALSARTAN 320 MG/1
320 TABLET ORAL DAILY
Qty: 90 TABLET | Refills: 1 | Status: SHIPPED | OUTPATIENT
Start: 2024-04-09

## 2024-04-09 RX ORDER — HYDRALAZINE HYDROCHLORIDE 25 MG/1
25 TABLET, FILM COATED ORAL 3 TIMES DAILY
Qty: 270 TABLET | Refills: 4 | Status: SHIPPED | OUTPATIENT
Start: 2024-04-09

## 2024-04-09 RX ORDER — CARVEDILOL 25 MG/1
25 TABLET ORAL 2 TIMES DAILY WITH MEALS
Qty: 180 TABLET | Refills: 4 | Status: SHIPPED | OUTPATIENT
Start: 2024-04-09

## 2024-04-09 RX ORDER — AMLODIPINE BESYLATE 10 MG/1
10 TABLET ORAL DAILY
Qty: 90 TABLET | OUTPATIENT
Start: 2024-04-09

## 2024-04-09 ASSESSMENT — PATIENT HEALTH QUESTIONNAIRE - PHQ9
1. LITTLE INTEREST OR PLEASURE IN DOING THINGS: NOT AT ALL
SUM OF ALL RESPONSES TO PHQ QUESTIONS 1-9: 0
2. FEELING DOWN, DEPRESSED OR HOPELESS: NOT AT ALL
SUM OF ALL RESPONSES TO PHQ9 QUESTIONS 1 & 2: 0
SUM OF ALL RESPONSES TO PHQ QUESTIONS 1-9: 0

## 2024-04-09 ASSESSMENT — ENCOUNTER SYMPTOMS
BLOOD IN STOOL: 0
DIARRHEA: 0
GASTROINTESTINAL NEGATIVE: 1
BACK PAIN: 1
CHEST TIGHTNESS: 0
SHORTNESS OF BREATH: 0
ABDOMINAL PAIN: 0
CONSTIPATION: 0
COUGH: 0
ABDOMINAL DISTENTION: 0
WHEEZING: 0

## 2024-04-09 NOTE — PROGRESS NOTES
Subjective:      Patient ID: Emilee Paulson is a 73 y.o. female.    4/9/2024 Patient presents with:  Hypertension: 1 month f/u                3/7/2024 Patient presents with:  Follow-Up from Hospital:  [facility] 1/21/2024 - 1/24/2024 (3 days)  Reason:Debility      Ms. Paulson was admitted with uncontrolled blood pressure that improved with maintenance home oral medications and occasional IV treatment.  For mild dehydration and hyponatremia with elevated BUN to creatinine ratio, she was given IV fluids with improvement.  Due to right knee pain, orthopedics was consulted and thought pain likely due to to hemarthrosis in the setting of Coumadin and injury. Recommended ice pack and wrapping and avoiding aspiration due to risk for infection and reaccumulation.  Due to severe difficulty with ambulation, SNF was arranged on discharge.                         1/17/2024 Patient presents with:  Hypertension: 6 week f/u  Generalized Body Aches: Increased pain all over body- went to [facility] 1/8/2024 for the knee pain, but is having pain all over  Flank Pain: Went to [facility] 1/13/2024    On Keflex for Ecoli UTI     CT Abdomen   1/13/24  Stable solid appearing mass in the right renal midpole measuring up to 2.5 cm  is unchanged in size since 02/19/2022.     No hydronephrosis, nephrolithiasis or obstructive uropathy.     Leiomyomatous uterus.                12/6/2023 Patient presents with:  Follow-Up from Hospital: [facility]- 11/5/2023 - 11/8/2023 (3 days)- Hypertensive emergency  Fatigue        Carvedilol bumped to 12.5 mg bid           11/1/2023 Patient presents with:  Arthritis: 3 month f/u              8/1/2023 Patient presents with:  Medicare AWV  Arthritis:   Did get \"shots\" in both knees 3 wks ago . No change in level of pain . Will see Ortho again in 3 mths!                     7/3/2023  Patient presents with:  Joint Swelling: Right knee swelling/ pain, ongoing for the last

## 2024-04-10 ENCOUNTER — TELEPHONE (OUTPATIENT)
Dept: ORTHOPEDIC SURGERY | Age: 74
End: 2024-04-10

## 2024-04-10 ENCOUNTER — PREP FOR PROCEDURE (OUTPATIENT)
Dept: ORTHOPEDIC SURGERY | Age: 74
End: 2024-04-10

## 2024-04-10 PROBLEM — M17.11 OSTEOARTHRITIS OF RIGHT KNEE: Status: ACTIVE | Noted: 2024-04-10

## 2024-04-10 NOTE — TELEPHONE ENCOUNTER
Surgery and/or Procedure Scheduling     Contact Name: Khurram Emilee   Surgical/Procedure Request: KNEE REPLACEMENT  Patient Contact Number: [phone]       THE PT STATES THAT NOBODY HAS CALLED HER YET TO SET UP HER KNEE REPLACEMENT SX WITH DR. HARDY.  SHE CAN BE REACHED AT THE ABOVE PHONE #

## 2024-04-11 ENCOUNTER — ANTI-COAG VISIT (OUTPATIENT)
Dept: PHARMACY | Age: 74
End: 2024-04-11

## 2024-04-11 ENCOUNTER — TELEPHONE (OUTPATIENT)
Dept: CARDIOLOGY CLINIC | Age: 74
End: 2024-04-11

## 2024-04-11 ENCOUNTER — HOSPITAL ENCOUNTER (EMERGENCY)
Age: 74
Discharge: HOME | End: 2024-04-11
Payer: MEDICARE

## 2024-04-11 ENCOUNTER — APPOINTMENT (OUTPATIENT)
Dept: GENERAL RADIOLOGY | Age: 74
End: 2024-04-11
Payer: MEDICARE

## 2024-04-11 VITALS
HEART RATE: 82 BPM | OXYGEN SATURATION: 100 % | SYSTOLIC BLOOD PRESSURE: 137 MMHG | HEIGHT: 66 IN | RESPIRATION RATE: 19 BRPM | WEIGHT: 174.82 LBS | TEMPERATURE: 98 F | BODY MASS INDEX: 28.1 KG/M2 | DIASTOLIC BLOOD PRESSURE: 111 MMHG

## 2024-04-11 DIAGNOSIS — I48.21 PERMANENT ATRIAL FIBRILLATION (HCC): Primary | ICD-10-CM

## 2024-04-11 DIAGNOSIS — R07.9 CHEST PAIN, UNSPECIFIED TYPE: Primary | ICD-10-CM

## 2024-04-11 DIAGNOSIS — R42 LIGHTHEADEDNESS: ICD-10-CM

## 2024-04-11 LAB
ALBUMIN SERPL-MCNC: 4.1 G/DL (ref 3.4–5)
ALBUMIN/GLOB SERPL: 1.2 {RATIO} (ref 1.1–2.2)
ALP SERPL-CCNC: 69 U/L (ref 40–129)
ALT SERPL-CCNC: 6 U/L (ref 10–40)
ANION GAP SERPL CALCULATED.3IONS-SCNC: 11 MMOL/L (ref 3–16)
AST SERPL-CCNC: 13 U/L (ref 15–37)
BASOPHILS # BLD: 0 K/UL (ref 0–0.2)
BASOPHILS NFR BLD: 0.6 %
BILIRUB SERPL-MCNC: 0.6 MG/DL (ref 0–1)
BUN SERPL-MCNC: 7 MG/DL (ref 7–20)
CALCIUM SERPL-MCNC: 9.4 MG/DL (ref 8.3–10.6)
CHLORIDE SERPL-SCNC: 98 MMOL/L (ref 99–110)
CO2 SERPL-SCNC: 28 MMOL/L (ref 21–32)
CREAT SERPL-MCNC: 0.6 MG/DL (ref 0.6–1.2)
DEPRECATED RDW RBC AUTO: 15.6 % (ref 12.4–15.4)
EOSINOPHIL # BLD: 0.1 K/UL (ref 0–0.6)
EOSINOPHIL NFR BLD: 3.2 %
GFR SERPLBLD CREATININE-BSD FMLA CKD-EPI: >90 ML/MIN/{1.73_M2}
GLUCOSE SERPL-MCNC: 100 MG/DL (ref 70–99)
HCT VFR BLD AUTO: 32.9 % (ref 36–48)
HGB BLD-MCNC: 11.5 G/DL (ref 12–16)
INR BLD: 2.3
LYMPHOCYTES # BLD: 1.4 K/UL (ref 1–5.1)
LYMPHOCYTES NFR BLD: 32.2 %
MAGNESIUM SERPL-MCNC: 1.1 MG/DL (ref 1.8–2.4)
MCH RBC QN AUTO: 29.2 PG (ref 26–34)
MCHC RBC AUTO-ENTMCNC: 34.8 G/DL (ref 31–36)
MCV RBC AUTO: 83.9 FL (ref 80–100)
MONOCYTES # BLD: 0.4 K/UL (ref 0–1.3)
MONOCYTES NFR BLD: 8.9 %
NEUTROPHILS # BLD: 2.5 K/UL (ref 1.7–7.7)
NEUTROPHILS NFR BLD: 55.1 %
PLATELET # BLD AUTO: 140 K/UL (ref 135–450)
PMV BLD AUTO: 9.3 FL (ref 5–10.5)
POTASSIUM SERPL-SCNC: 3 MMOL/L (ref 3.5–5.1)
PROT SERPL-MCNC: 7.4 G/DL (ref 6.4–8.2)
RBC # BLD AUTO: 3.92 M/UL (ref 4–5.2)
SODIUM SERPL-SCNC: 137 MMOL/L (ref 136–145)
TROPONIN, HIGH SENSITIVITY: 8 NG/L (ref 0–14)
TROPONIN, HIGH SENSITIVITY: 9 NG/L (ref 0–14)
WBC # BLD AUTO: 4.4 K/UL (ref 4–11)

## 2024-04-11 PROCEDURE — 71046 X-RAY EXAM CHEST 2 VIEWS: CPT

## 2024-04-11 PROCEDURE — 83735 ASSAY OF MAGNESIUM: CPT

## 2024-04-11 PROCEDURE — 85025 COMPLETE CBC W/AUTO DIFF WBC: CPT

## 2024-04-11 PROCEDURE — 84484 ASSAY OF TROPONIN QUANT: CPT

## 2024-04-11 PROCEDURE — 80053 COMPREHEN METABOLIC PANEL: CPT

## 2024-04-11 PROCEDURE — 93005 ELECTROCARDIOGRAM TRACING: CPT | Performed by: EMERGENCY MEDICINE

## 2024-04-11 PROCEDURE — 36415 COLL VENOUS BLD VENIPUNCTURE: CPT

## 2024-04-11 PROCEDURE — 99285 EMERGENCY DEPT VISIT HI MDM: CPT

## 2024-04-11 ASSESSMENT — LIFESTYLE VARIABLES: HOW OFTEN DO YOU HAVE A DRINK CONTAINING ALCOHOL: NEVER

## 2024-04-11 ASSESSMENT — PAIN - FUNCTIONAL ASSESSMENT: PAIN_FUNCTIONAL_ASSESSMENT: 0-10

## 2024-04-11 ASSESSMENT — PAIN SCALES - GENERAL: PAINLEVEL_OUTOF10: 7

## 2024-04-11 NOTE — TELEPHONE ENCOUNTER
CARDIAC CLEARANCE     What type of procedure are you having?  Knee replacement    Which physician is performing your procedure?  Dr Byron Levine  When is your procedure scheduled for?  5/28/24  Where are you having this procedure?  [facility]  Are you taking Blood Thinners? Warfarin   If so what? (Name/dose/frequesncy)     Does the surgeon want you to stop your blood thinner?  If so for how long? 2 days prior    Phone Number and Contact Name for Physicians office: not sure    Fax number to send information: Place letter in epic

## 2024-04-11 NOTE — PROGRESS NOTES
Divya from [facility] LVM w/ pts INR results of 2.3. Did not leave any other information. [phone]     Confirmed dosing, no changes. Take 5 mg Mon, Wed and Fri and 2.5 mg all other days of the week. Recheck 4/18.    Giuliana Wright, PharmD, [facility]    For Pharmacy Admin Tracking Only  Total # of Interventions Recommended: 0  Total # of Interventions Accepted: 0  Time Spent (min): 15

## 2024-04-12 LAB
EKG DIAGNOSIS: NORMAL
EKG Q-T INTERVAL: 418 MS
EKG QRS DURATION: 102 MS
EKG QTC CALCULATION (BAZETT): 444 MS
EKG R AXIS: -17 DEGREES
EKG T AXIS: -19 DEGREES
EKG VENTRICULAR RATE: 68 BPM

## 2024-04-12 PROCEDURE — 93010 ELECTROCARDIOGRAM REPORT: CPT | Performed by: INTERNAL MEDICINE

## 2024-04-12 NOTE — ED PROVIDER NOTES
condition requiring my urgent intervention.  I personally saw the patient and independently provided 30 minutes of non-concurrent critical care out of the total shared critical care time provided, excluding separately reportable procedures.     FINAL IMPRESSION      1. Chest pain, unspecified type    2. Lightheadedness          DISPOSITION/PLAN   DISPOSITION Decision To Discharge 04/11/2024 10:31:32 PM      PATIENT REFERRED TO:  Stefano Suarez MD  [address] [facility] [address] [phone]    Call   as needed, for follow-up care      DISCHARGE MEDICATIONS:  New Prescriptions    No medications on file       DISCONTINUED MEDICATIONS:  Discontinued Medications    No medications on file            (Please note that portions of this note were completed with a voice recognition program.  Efforts were made to edit the dictations but occasionally words are mis-transcribed.)    KIM Lange (electronically signed)        Daryl Rowan PA  04/11/24 [id number]

## 2024-04-15 ENCOUNTER — CARE COORDINATION (OUTPATIENT)
Dept: PRIMARY CARE CLINIC | Age: 74
End: 2024-04-15

## 2024-04-15 ENCOUNTER — ENROLLMENT (OUTPATIENT)
Dept: CARE COORDINATION | Age: 74
End: 2024-04-15

## 2024-04-15 NOTE — CARE COORDINATION
Ambulatory Care Coordination Note  4/15/2024    Patient Current Location:  Home: [address] [place] [address] [place]  [facility] [address]    ACM contacted the patient by telephone. Verified name and  with patient as identifiers. Provided introduction to self, and explanation of the ACM role.     ACM: Dhara Paula RN    Challenges to be reviewed by the provider   Additional needs identified to be addressed with provider: Yes  Pt states she is \" getting a UTI and would like something sent to pharmacy reports slight odor and sloght discomfort with urination.  Also states she needs a new script forTramadol for knee pain             Method of communication with provider: phone.    ACM out reach callplaced to pt . Introduced self and reason for call and care coordination pt agreeable to future calls, pot reports she is little Fatigued but the pain that lead her to thr ED has basically resolved. Does reports some fatigue and thinks she may be in beginning statges of UTI. Reports slight ordor and discomfort with urination .   Request writer route message to provider rearding script for abx for uti as well as one for Tramadol for knee pain.reviewed dc instructions and symtoms that require a rfollow up. Pt vu and states she will reach out to office with new or concerning symptoms    Offered patient enrollment in the Remote Patient Monitoring (RPM) program for in-home monitoring:        Ambulatory Care Coordination Assessment    Care Coordination Protocol  Referral from Primary Care Provider: No  Week 1 - Initial Assessment     Do you have all of your prescriptions and are they filled?: Yes  Are you able to afford your medications?: Yes  How often do you have trouble taking your medications the way you have been told to take them?: I always take them as prescribed.           Current Housing: Private Residence  Are you an active caregiver in your home?: No                 Suggested Interventions and Community Resources   Zone

## 2024-04-16 DIAGNOSIS — M17.0 LOCALIZED OSTEOARTHRITIS OF BOTH KNEES: ICD-10-CM

## 2024-04-16 DIAGNOSIS — N39.0 URINARY TRACT INFECTION WITHOUT HEMATURIA, SITE UNSPECIFIED: Primary | ICD-10-CM

## 2024-04-16 RX ORDER — SULFAMETHOXAZOLE AND TRIMETHOPRIM 800; 160 MG/1; MG/1
1 TABLET ORAL 2 TIMES DAILY
Qty: 10 TABLET | Refills: 0 | Status: SHIPPED | OUTPATIENT
Start: 2024-04-16 | End: 2024-04-21

## 2024-04-16 RX ORDER — ACETAMINOPHEN 500 MG
500 TABLET ORAL EVERY 6 HOURS PRN
Qty: 120 TABLET | Refills: 1 | Status: SHIPPED | OUTPATIENT
Start: 2024-04-16

## 2024-04-18 ENCOUNTER — ANTI-COAG VISIT (OUTPATIENT)
Dept: PHARMACY | Age: 74
End: 2024-04-18

## 2024-04-18 DIAGNOSIS — I48.21 PERMANENT ATRIAL FIBRILLATION (HCC): Primary | ICD-10-CM

## 2024-04-18 LAB — INR BLD: 1.8

## 2024-04-18 NOTE — PROGRESS NOTES
INR 1.8 Patient took 5mg warfarin on M/W/F and 2.5mg all other days.  Please call Jessica CARMONA [facility] [phone] with warfarin dosing and order for next INR check.       Returned call to Jessica CARMONA. Instructed for patient to take 5 mg today, Mon, Wed and Fri and 2.5 mg all other days of the week. Recheck INR in 1 week, 4/25       For Pharmacy Admin Tracking Only    Intervention Detail: Dose Adjustment: 1, reason: Therapy Optimization  Total # of Interventions Recommended: 1  Total # of Interventions Accepted: 1  Time Spent (min): 10

## 2024-04-18 NOTE — TELEPHONE ENCOUNTER
Clearances are valid for 30 days. Hx of Perm. AF and CVA . On warfarin. INR managed by [facility] clinic     Will review closer to time for procedure.

## 2024-04-18 NOTE — TELEPHONE ENCOUNTER
Pt called to check on her clearance to see if it has been fax over for the clearance for her procedure.  Please advise.  Thank you

## 2024-04-23 ENCOUNTER — CARE COORDINATION (OUTPATIENT)
Dept: CASE MANAGEMENT | Age: 74
End: 2024-04-23

## 2024-04-23 NOTE — CARE COORDINATION
Ambulatory Care Coordination Note  2024    Patient Current Location:  Home: [address] [place] [id number]     LPN CC contacted the patient by telephone. Verified name and  with patient as identifiers. Provided introduction to self, and explanation of the LPN CC role.     Challenges to be reviewed by the provider   Additional needs identified to be addressed with provider: Yes  medications-patient says she is still having a odor and discomfort with urination. She would like something for this and a order sent for her Tramadol(knee pain)                Method of communication with provider: chart routing.    ACM: Shena Brito LPN  Patient reports she is having a odor and discomfort urinating. She has been drinking cranberry juice and water. No bowel problems. Knee pain continues. Will send message to PCP. She said she never received anything last week. Denies falls, ha, fever, sob, chills or cough. Her rt leg swells at times. She elevates it. Swelling is usually down in the morning. Doesn't wear compression hose. /90. She took BP medication. Will recheck in a couple hours.     Plan:  F/U in a week  Urine, knee pain?  Medication?      Offered patient enrollment in the Remote Patient Monitoring (RPM) program for in-home monitoring:    .    Lab Results       None            Care Coordination Interventions    Referral from Primary Care Provider: No  Suggested Interventions and Community Resources  Zone Management Tools: Not Started          Goals Addressed    None         Future Appointments   Date Time Provider Department Center   2024  5:15 PM [facility] ANTICOAGULATION CLINIC [facility]   2024 10:30 AM Byron Levine MD [facility]   2024  7:50 AM Byron Levine MD [facility]   6/10/2024  9:30 AM John Tanner PA [facility]   10/9/2024 11:40 AM Stefano Suarez MD [address] [facility] [place]     Shena Brito LPN  Care Coordinator  [phone]

## 2024-04-25 ENCOUNTER — ANTI-COAG VISIT (OUTPATIENT)
Dept: PHARMACY | Age: 74
End: 2024-04-25

## 2024-04-25 DIAGNOSIS — I48.21 PERMANENT ATRIAL FIBRILLATION (HCC): Primary | ICD-10-CM

## 2024-04-25 LAB — INR BLD: 1.6

## 2024-04-25 NOTE — PROGRESS NOTES
INR 1.6 Patient taking 5mg M/W/F and 2.5mg all other days.  Please call Agata CARMONA [facility] [phone] with warfarin dosing and order for next INR check.       Returned call to Agata CARMONA. She is unsure if patient has been taking meds, feels as though patient has likely been missing doses. Instructed for patient to take 5 mg today, Mon, Wed and Fri and 2.5 mg all other days of the week. Recheck INR in 1 week, 5/2      For Pharmacy Admin Tracking Only    Intervention Detail: Dose Adjustment: 1, reason: Therapy Optimization  Total # of Interventions Recommended: 1  Total # of Interventions Accepted: 1  Time Spent (min): 15

## 2024-05-02 ENCOUNTER — ANTI-COAG VISIT (OUTPATIENT)
Dept: PHARMACY | Age: 74
End: 2024-05-02

## 2024-05-02 DIAGNOSIS — I48.21 PERMANENT ATRIAL FIBRILLATION (HCC): Primary | ICD-10-CM

## 2024-05-02 LAB — INR BLD: 2.4

## 2024-05-02 NOTE — PROGRESS NOTES
Kay schmidt/ [facility] called w/ INR results of 2.4, she takes 5 mgs of warfarin on M/W/F and 2.5 mgs all other days, no medication or diet changes. [phone] ( has secure v/m if you need to lm).     Confirmed she took 5mg last Thurs as well. Take 2.5mg Sun, Tues, and Thurs and 5mg all other days. Recheck in 1 week, 5/9.    Giuliana Wright, PharmD, [facility]  For Pharmacy Admin Tracking Only    Intervention Detail: Dose Adjustment: 1, reason: Therapy Optimization  Total # of Interventions Recommended: 1  Total # of Interventions Accepted: 1  Time Spent (min): 15

## 2024-05-04 DIAGNOSIS — K21.9 LPRD (LARYNGOPHARYNGEAL REFLUX DISEASE): Chronic | ICD-10-CM

## 2024-05-06 RX ORDER — OMEPRAZOLE 20 MG/1
CAPSULE, DELAYED RELEASE ORAL
Qty: 180 CAPSULE | Refills: 0 | Status: SHIPPED | OUTPATIENT
Start: 2024-05-06

## 2024-05-06 NOTE — TELEPHONE ENCOUNTER
Medication:   Requested Prescriptions     Pending Prescriptions Disp Refills    omeprazole (PRILOSEC) 20 MG delayed release capsule [Pharmacy Med Name: OMEPRAZOLE 20MG CAPSULES] 180 capsule 0     Sig: TAKE ONE CAPSULE BY MOUTH TWICE DAILY ON AN EMPTY STOMACH. EAT A MEAL OR SNACK 45 TO 60 MINUTES AFTER EACH DOSE        Last Filled:      Patient Phone Number: [phone] (home)     Last appt: 4/9/2024   Next appt: 10/9/2024    Last OARRS:       1/16/2024     1:50 PM   RX Monitoring   Periodic Controlled Substance Monitoring Possible medication side effects, risk of tolerance/dependence & alternative treatments discussed.;No signs of potential drug abuse or diversion identified.

## 2024-05-07 ENCOUNTER — CARE COORDINATION (OUTPATIENT)
Dept: CARE COORDINATION | Age: 74
End: 2024-05-07

## 2024-05-07 NOTE — CARE COORDINATION
7/10  Anticipated Goal Completion Date: July 2024                Future Appointments   Date Time Provider Department Center   5/9/2024  5:15 PM F ANTICOAGULATION CLINIC [facility]   5/13/2024  8:30 AM F CT RM 2 [facility]   5/13/2024 11:15 AM Joaquin Avelar DO [facility] Cardio [facility]   5/23/2024 10:30 AM Byron Levine MD [facility]   5/28/2024  7:50 AM Byron Levine MD [facility]   6/10/2024  9:30 AM John Tanner PA [facility]   10/9/2024 11:40 AM Stefano Suarez MD [address] [facility] [place] [facility] [place]

## 2024-05-09 ENCOUNTER — ANTI-COAG VISIT (OUTPATIENT)
Dept: PHARMACY | Age: 74
End: 2024-05-09

## 2024-05-09 ENCOUNTER — OFFICE VISIT (OUTPATIENT)
Dept: PRIMARY CARE CLINIC | Age: 74
End: 2024-05-09
Payer: MEDICARE

## 2024-05-09 VITALS
SYSTOLIC BLOOD PRESSURE: 140 MMHG | HEIGHT: 66 IN | HEART RATE: 89 BPM | BODY MASS INDEX: 26.97 KG/M2 | DIASTOLIC BLOOD PRESSURE: 90 MMHG | OXYGEN SATURATION: 99 % | TEMPERATURE: 98.4 F | WEIGHT: 167.8 LBS

## 2024-05-09 DIAGNOSIS — I48.21 PERMANENT ATRIAL FIBRILLATION (HCC): Primary | ICD-10-CM

## 2024-05-09 DIAGNOSIS — R10.31 RIGHT LOWER QUADRANT ABDOMINAL PAIN: ICD-10-CM

## 2024-05-09 DIAGNOSIS — R10.31 RIGHT LOWER QUADRANT ABDOMINAL PAIN: Primary | ICD-10-CM

## 2024-05-09 DIAGNOSIS — M17.0 PRIMARY OSTEOARTHRITIS OF BOTH KNEES: ICD-10-CM

## 2024-05-09 LAB
ALBUMIN SERPL-MCNC: 4.3 G/DL (ref 3.4–5)
ALBUMIN/GLOB SERPL: 1.1 {RATIO} (ref 1.1–2.2)
ALP SERPL-CCNC: 78 U/L (ref 40–129)
ALT SERPL-CCNC: <5 U/L (ref 10–40)
ANION GAP SERPL CALCULATED.3IONS-SCNC: 12 MMOL/L (ref 3–16)
AST SERPL-CCNC: 10 U/L (ref 15–37)
BASOPHILS # BLD: 0 K/UL (ref 0–0.2)
BASOPHILS NFR BLD: 0.3 %
BILIRUB SERPL-MCNC: 0.7 MG/DL (ref 0–1)
BUN SERPL-MCNC: 7 MG/DL (ref 7–20)
CALCIUM SERPL-MCNC: 10.9 MG/DL (ref 8.3–10.6)
CHLORIDE SERPL-SCNC: 98 MMOL/L (ref 99–110)
CO2 SERPL-SCNC: 29 MMOL/L (ref 21–32)
CREAT SERPL-MCNC: 0.5 MG/DL (ref 0.6–1.2)
DEPRECATED RDW RBC AUTO: 14.7 % (ref 12.4–15.4)
EOSINOPHIL # BLD: 0.1 K/UL (ref 0–0.6)
EOSINOPHIL NFR BLD: 1.5 %
ERYTHROCYTE [SEDIMENTATION RATE] IN BLOOD BY WESTERGREN METHOD: 107 MM/HR (ref 0–30)
GFR SERPLBLD CREATININE-BSD FMLA CKD-EPI: >90 ML/MIN/{1.73_M2}
GLUCOSE SERPL-MCNC: 99 MG/DL (ref 70–99)
HCT VFR BLD AUTO: 35.9 % (ref 36–48)
HGB BLD-MCNC: 12.2 G/DL (ref 12–16)
INR BLD: 2.2
LIPASE SERPL-CCNC: 26 U/L (ref 13–60)
LYMPHOCYTES # BLD: 2 K/UL (ref 1–5.1)
LYMPHOCYTES NFR BLD: 32.8 %
MCH RBC QN AUTO: 28.7 PG (ref 26–34)
MCHC RBC AUTO-ENTMCNC: 34 G/DL (ref 31–36)
MCV RBC AUTO: 84.6 FL (ref 80–100)
MONOCYTES # BLD: 0.5 K/UL (ref 0–1.3)
MONOCYTES NFR BLD: 8.4 %
NEUTROPHILS # BLD: 3.4 K/UL (ref 1.7–7.7)
NEUTROPHILS NFR BLD: 57 %
PLATELET # BLD AUTO: 268 K/UL (ref 135–450)
PMV BLD AUTO: 9.2 FL (ref 5–10.5)
POTASSIUM SERPL-SCNC: 3.5 MMOL/L (ref 3.5–5.1)
PROT SERPL-MCNC: 8.2 G/DL (ref 6.4–8.2)
RBC # BLD AUTO: 4.25 M/UL (ref 4–5.2)
SODIUM SERPL-SCNC: 139 MMOL/L (ref 136–145)
WBC # BLD AUTO: 6 K/UL (ref 4–11)

## 2024-05-09 PROCEDURE — 1123F ACP DISCUSS/DSCN MKR DOCD: CPT | Performed by: INTERNAL MEDICINE

## 2024-05-09 PROCEDURE — 1090F PRES/ABSN URINE INCON ASSESS: CPT | Performed by: INTERNAL MEDICINE

## 2024-05-09 PROCEDURE — 3077F SYST BP >= 140 MM HG: CPT | Performed by: INTERNAL MEDICINE

## 2024-05-09 PROCEDURE — G8399 PT W/DXA RESULTS DOCUMENT: HCPCS | Performed by: INTERNAL MEDICINE

## 2024-05-09 PROCEDURE — G8427 DOCREV CUR MEDS BY ELIG CLIN: HCPCS | Performed by: INTERNAL MEDICINE

## 2024-05-09 PROCEDURE — 99214 OFFICE O/P EST MOD 30 MIN: CPT | Performed by: INTERNAL MEDICINE

## 2024-05-09 PROCEDURE — 3017F COLORECTAL CA SCREEN DOC REV: CPT | Performed by: INTERNAL MEDICINE

## 2024-05-09 PROCEDURE — G8417 CALC BMI ABV UP PARAM F/U: HCPCS | Performed by: INTERNAL MEDICINE

## 2024-05-09 PROCEDURE — 1036F TOBACCO NON-USER: CPT | Performed by: INTERNAL MEDICINE

## 2024-05-09 PROCEDURE — 3080F DIAST BP >= 90 MM HG: CPT | Performed by: INTERNAL MEDICINE

## 2024-05-09 RX ORDER — CIPROFLOXACIN 500 MG/1
500 TABLET, FILM COATED ORAL 2 TIMES DAILY
Qty: 20 TABLET | Refills: 0 | Status: SHIPPED | OUTPATIENT
Start: 2024-05-09 | End: 2024-05-19

## 2024-05-09 ASSESSMENT — ENCOUNTER SYMPTOMS
CHEST TIGHTNESS: 0
SHORTNESS OF BREATH: 0
ABDOMINAL PAIN: 1
COUGH: 0
BLOOD IN STOOL: 0
CONSTIPATION: 0
ABDOMINAL DISTENTION: 0
BACK PAIN: 1
DIARRHEA: 0
WHEEZING: 0

## 2024-05-09 NOTE — TELEPHONE ENCOUNTER
Medication:   Requested Prescriptions     Pending Prescriptions Disp Refills    traMADol (ULTRAM) 50 MG tablet [Pharmacy Med Name: TRAMADOL 50MG TABLETS] 28 tablet      Sig: TAKE 1 TABLET BY MOUTH EVERY 6 HOURS FOR UP TO 7 DAYS AS NEEDED FOR PAIN. TAKE LOWEST DOSE POSSIBLE TO MANAGE PAIN. MAX DAILY AMOUNT: 200 MG        Last Filled:      Patient Phone Number: [phone] (home)     Last appt: 5/9/2024   Next appt: 5/13/2024    Last OARRS:       1/16/2024     1:50 PM   RX Monitoring   Periodic Controlled Substance Monitoring Possible medication side effects, risk of tolerance/dependence & alternative treatments discussed.;No signs of potential drug abuse or diversion identified.

## 2024-05-09 NOTE — PROGRESS NOTES
[facility] called in INR of 2.2. Take 2.5mg Sun, Tues, and Thurs and 5mg all other days. Recheck in 1 week, 5/16.    Giuliana Wright, PharmD, [facility]    For Pharmacy Admin Tracking Only    Intervention Detail: Dose Adjustment: 1, reason: Therapy Optimization  Total # of Interventions Recommended: 1  Total # of Interventions Accepted: 1  Time Spent (min): 15

## 2024-05-09 NOTE — PROGRESS NOTES
controlled   -     amLODIPine (NORVASC) 10 MG tablet; Take 1 tablet by mouth daily  -     hydrALAZINE (APRESOLINE) 25 MG tablet; Take 1 tablet by mouth 3 times daily  -     valsartan (DIOVAN) 320 MG tablet; Take 1 tablet by mouth daily  -     carvedilol (COREG) 25 MG tablet; Take 1 tablet by mouth 2 times daily (with meals)        Vitamin D deficiency  harvinder wkly suppl   -     vitamin D (ERGOCALCIFEROL) 1.25 MG (28907 UT) CAPS capsule; Take 1 capsule by mouth once a week    Permanent atrial fibrillation (HCC)  -     warfarin (COUMADIN) 5 MG tablet; Take 0.5-1 tablets by mouth See Admin Instructions Take by mouth one whole tablet (5 mg) on Monday, Wednesday, Friday, and 1/2 tablet (2.5 mg) all other days.  -     carvedilol (COREG) 25 MG tablet; Take 1 tablet by mouth with breakfast and with evening meal    Iron deficiency anemia due to chronic blood loss  off aspirin   l    Mixed hyperlipidemia  -     atorvastatin (LIPITOR) 80 MG tablet; Take 1 tablet by mouth daily    Primary osteoarthritis of both knees      LPRD (laryngopharyngeal reflux disease)  -     omeprazole (PRILOSEC) 20 MG delayed release capsule; Take 1 capsule by mouth 2 times daily; take on an empty stomach and eat a meal or snack 45 to 60 minutes hour after each dose.            Mass of right kidney stable since 2022 .   -     MRI ABDOMEN W WO CONTRAST; Future      Flu vaccine need   must get Vaccines at Pharmacy     Need for COVID-19 vaccine    Need for pneumococcal vaccination    Need for diphtheria-tetanus-pertussis (Tdap) vaccine    Need for shingles vaccine    Need for RSV vaccination    Screen for colon cancer  -     AFL - Josue Ramirez MD, Gastroenterology (ERCP & EUS), [facility]  -     POCT Fecal Immunochemical Test (FIT); Future          Plan:   Assessment & Plan

## 2024-05-10 LAB
CHOLEST SERPL-MCNC: 177 MG/DL (ref 0–199)
HDLC SERPL-MCNC: 54 MG/DL (ref 40–60)
LDLC SERPL CALC-MCNC: 110 MG/DL
TRIGL SERPL-MCNC: 65 MG/DL (ref 0–150)
VLDLC SERPL CALC-MCNC: 13 MG/DL

## 2024-05-10 RX ORDER — TRAMADOL HYDROCHLORIDE 50 MG/1
50 TABLET ORAL EVERY 8 HOURS PRN
Qty: 50 TABLET | Refills: 0 | Status: SHIPPED | OUTPATIENT
Start: 2024-05-10 | End: 2024-06-09

## 2024-05-13 ENCOUNTER — OFFICE VISIT (OUTPATIENT)
Dept: PRIMARY CARE CLINIC | Age: 74
End: 2024-05-13
Payer: MEDICARE

## 2024-05-13 ENCOUNTER — OFFICE VISIT (OUTPATIENT)
Dept: CARDIOLOGY CLINIC | Age: 74
End: 2024-05-13
Payer: MEDICARE

## 2024-05-13 ENCOUNTER — HOSPITAL ENCOUNTER (OUTPATIENT)
Dept: CT IMAGING | Age: 74
Discharge: HOME | End: 2024-05-13
Attending: ORTHOPAEDIC SURGERY
Payer: MEDICARE

## 2024-05-13 VITALS
OXYGEN SATURATION: 97 % | HEIGHT: 65 IN | HEART RATE: 76 BPM | DIASTOLIC BLOOD PRESSURE: 80 MMHG | TEMPERATURE: 97.7 F | SYSTOLIC BLOOD PRESSURE: 130 MMHG | WEIGHT: 172.2 LBS | BODY MASS INDEX: 28.69 KG/M2

## 2024-05-13 VITALS
WEIGHT: 171 LBS | DIASTOLIC BLOOD PRESSURE: 68 MMHG | HEIGHT: 66 IN | BODY MASS INDEX: 27.48 KG/M2 | SYSTOLIC BLOOD PRESSURE: 100 MMHG | OXYGEN SATURATION: 99 % | HEART RATE: 86 BPM

## 2024-05-13 DIAGNOSIS — R70.0 ELEVATED ERYTHROCYTE SEDIMENTATION RATE: ICD-10-CM

## 2024-05-13 DIAGNOSIS — G44.89 OTHER HEADACHE SYNDROME: ICD-10-CM

## 2024-05-13 DIAGNOSIS — Z01.810 PREOP CARDIOVASCULAR EXAM: Primary | ICD-10-CM

## 2024-05-13 DIAGNOSIS — Z86.73 HISTORY OF STROKE: ICD-10-CM

## 2024-05-13 DIAGNOSIS — I48.20 CHRONIC ATRIAL FIBRILLATION (HCC): ICD-10-CM

## 2024-05-13 DIAGNOSIS — M17.11 PRIMARY OSTEOARTHRITIS OF RIGHT KNEE: ICD-10-CM

## 2024-05-13 DIAGNOSIS — M17.0 LOCALIZED OSTEOARTHRITIS OF BOTH KNEES: ICD-10-CM

## 2024-05-13 DIAGNOSIS — E83.52 HYPERCALCEMIA: ICD-10-CM

## 2024-05-13 DIAGNOSIS — I50.22 CHRONIC SYSTOLIC (CONGESTIVE) HEART FAILURE (HCC): ICD-10-CM

## 2024-05-13 DIAGNOSIS — Z09 FOLLOW-UP EXAM: Primary | ICD-10-CM

## 2024-05-13 DIAGNOSIS — I10 ESSENTIAL HYPERTENSION: ICD-10-CM

## 2024-05-13 DIAGNOSIS — I77.810 ASCENDING AORTA DILATION (HCC): ICD-10-CM

## 2024-05-13 PROCEDURE — 1123F ACP DISCUSS/DSCN MKR DOCD: CPT | Performed by: INTERNAL MEDICINE

## 2024-05-13 PROCEDURE — G8417 CALC BMI ABV UP PARAM F/U: HCPCS | Performed by: INTERNAL MEDICINE

## 2024-05-13 PROCEDURE — G8427 DOCREV CUR MEDS BY ELIG CLIN: HCPCS | Performed by: INTERNAL MEDICINE

## 2024-05-13 PROCEDURE — 1090F PRES/ABSN URINE INCON ASSESS: CPT | Performed by: INTERNAL MEDICINE

## 2024-05-13 PROCEDURE — 3075F SYST BP GE 130 - 139MM HG: CPT | Performed by: INTERNAL MEDICINE

## 2024-05-13 PROCEDURE — 99214 OFFICE O/P EST MOD 30 MIN: CPT | Performed by: INTERNAL MEDICINE

## 2024-05-13 PROCEDURE — G8399 PT W/DXA RESULTS DOCUMENT: HCPCS | Performed by: INTERNAL MEDICINE

## 2024-05-13 PROCEDURE — 3017F COLORECTAL CA SCREEN DOC REV: CPT | Performed by: INTERNAL MEDICINE

## 2024-05-13 PROCEDURE — 1036F TOBACCO NON-USER: CPT | Performed by: INTERNAL MEDICINE

## 2024-05-13 PROCEDURE — 3078F DIAST BP <80 MM HG: CPT | Performed by: INTERNAL MEDICINE

## 2024-05-13 PROCEDURE — 3079F DIAST BP 80-89 MM HG: CPT | Performed by: INTERNAL MEDICINE

## 2024-05-13 PROCEDURE — 73700 CT LOWER EXTREMITY W/O DYE: CPT

## 2024-05-13 PROCEDURE — 3074F SYST BP LT 130 MM HG: CPT | Performed by: INTERNAL MEDICINE

## 2024-05-13 RX ORDER — PREDNISONE 50 MG/1
50 TABLET ORAL DAILY
Qty: 7 TABLET | Refills: 0 | Status: SHIPPED | OUTPATIENT
Start: 2024-05-13

## 2024-05-13 ASSESSMENT — ENCOUNTER SYMPTOMS
SHORTNESS OF BREATH: 0
COUGH: 0
BLOOD IN STOOL: 0
CONSTIPATION: 0
ABDOMINAL DISTENTION: 0
BACK PAIN: 1
WHEEZING: 0
ABDOMINAL PAIN: 1

## 2024-05-13 NOTE — PROGRESS NOTES
[facility]  5/13/24  Referring: Dr. Beyer    REASON FOR CONSULT/CHIEF COMPLAINT/HPI     Reason for visit/ Chief complaint  Follow up   Preop exam for RTKR   HPI Emilee Paulson is a 73 y.o. here for a preop exam for right total knee replacement scheduled 5/28   will need to eventually have the left one done. She has a history of   CAD, hypertension (20 year history), hyperlipidemia, chf.  He has atrial fibrillation. Previous hemorrhagic stroke in  2018. Previously refused watchman    She was admitted 1/21 to 1/24 with uncontrolled blood pressure,mild dehydration and right knee pain. Treated with maintenance home medications, IV fluids, and ice to knee pain thought to be due to hemarthrosis . She was discharged to SNF    Today she states she was started on antibiotics for a UTI on Friday. Will be seeing Dr Suarez this afternoon  She presents in a wheelchair. Exercise limited by knee pain that she gets with exertion and at rest. She has no chest pain, shortness of breath palpitations or dizziness. She has no orthopnea. Unchanged occasional swelling in bilateral legs.       Patient is compliant  with medication and is tolerating them well without side effects       HISTORY/ALLERGIES/ROS     MedHx:   has a past medical history of Acute cerebrovascular accident (CVA) ([facility]), LAST (acute kidney injury) (HCC), Atrial fibrillation (HCC), Bell palsy, CAD (coronary artery disease), Cerebral artery occlusion with cerebral infarction (HCC), Chronic diastolic CHF (congestive heart failure) (HCC), CVA (cerebral vascular accident) (HCC), Hypertension, Intracranial hemorrhage (HCC), Mixed hyperlipidemia, Nonintractable headache, Nontraumatic subcortical hemorrhage of cerebral hemisphere (HCC), Poor vision, Primary osteoarthritis of right knee, Sudden visual loss of left eye, Thyroid nodule, and TIA involving right internal carotid artery.  SurgHx:  has a past surgical history that includes Cardiac surgery; Tubal ligation;

## 2024-05-13 NOTE — PROGRESS NOTES
Heights  -     POCT Fecal Immunochemical Test (FIT); Future          Plan:   Assessment & Plan

## 2024-05-14 ENCOUNTER — CARE COORDINATION (OUTPATIENT)
Dept: PRIMARY CARE CLINIC | Age: 74
End: 2024-05-14

## 2024-05-16 ENCOUNTER — ANTI-COAG VISIT (OUTPATIENT)
Dept: PHARMACY | Age: 74
End: 2024-05-16

## 2024-05-16 DIAGNOSIS — I48.21 PERMANENT ATRIAL FIBRILLATION (HCC): Primary | ICD-10-CM

## 2024-05-16 LAB — INR BLD: 1.9

## 2024-05-16 NOTE — PROGRESS NOTES
HHN called in INR of 1.9, confirmed dose, no missed, no changes.     Take 5mg today then take 2.5mg Sun, Tues, and Thurs and 5mg all other days.    Check .    Giuliana Wright, PharmD, [facility]    For Pharmacy Admin Tracking Only    Intervention Detail: Adherence Monitorin and Dose Adjustment: 1, reason: Therapy Optimization  Total # of Interventions Recommended: 2  Total # of Interventions Accepted: 2  Time Spent (min): 15

## 2024-05-20 ENCOUNTER — CARE COORDINATION (OUTPATIENT)
Dept: PRIMARY CARE CLINIC | Age: 74
End: 2024-05-20

## 2024-05-20 ENCOUNTER — HOSPITAL ENCOUNTER (OUTPATIENT)
Dept: PREADMISSION TESTING | Age: 74
Discharge: HOME | End: 2024-05-24
Payer: MEDICARE

## 2024-05-20 DIAGNOSIS — M17.11 PRIMARY OSTEOARTHRITIS OF RIGHT KNEE: ICD-10-CM

## 2024-05-20 LAB
25(OH)D3 SERPL-MCNC: 26.9 NG/ML
ABO + RH BLD: NORMAL
ALBUMIN SERPL-MCNC: 4 G/DL (ref 3.4–5)
ANION GAP SERPL CALCULATED.3IONS-SCNC: 13 MMOL/L (ref 3–16)
APTT BLD: 33.4 SEC (ref 22.1–36.4)
BASOPHILS # BLD: 0 K/UL (ref 0–0.2)
BASOPHILS NFR BLD: 0.4 %
BLD GP AB SCN SERPL QL: NORMAL
BUN SERPL-MCNC: 6 MG/DL (ref 7–20)
CALCIUM SERPL-MCNC: 9.8 MG/DL (ref 8.3–10.6)
CHLORIDE SERPL-SCNC: 99 MMOL/L (ref 99–110)
CO2 SERPL-SCNC: 27 MMOL/L (ref 21–32)
CREAT SERPL-MCNC: <0.5 MG/DL (ref 0.6–1.2)
DEPRECATED RDW RBC AUTO: 15 % (ref 12.4–15.4)
EOSINOPHIL # BLD: 0.1 K/UL (ref 0–0.6)
EOSINOPHIL NFR BLD: 2.1 %
EST. AVERAGE GLUCOSE BLD GHB EST-MCNC: 108.3 MG/DL
GFR SERPLBLD CREATININE-BSD FMLA CKD-EPI: >90 ML/MIN/{1.73_M2}
GLUCOSE SERPL-MCNC: 97 MG/DL (ref 70–99)
HBA1C MFR BLD: 5.4 %
HCT VFR BLD AUTO: 37.1 % (ref 36–48)
HGB BLD-MCNC: 12.5 G/DL (ref 12–16)
INR PPP: 2.79 (ref 0.85–1.15)
LYMPHOCYTES # BLD: 1.3 K/UL (ref 1–5.1)
LYMPHOCYTES NFR BLD: 30.8 %
MCH RBC QN AUTO: 28.5 PG (ref 26–34)
MCHC RBC AUTO-ENTMCNC: 33.9 G/DL (ref 31–36)
MCV RBC AUTO: 84.3 FL (ref 80–100)
MONOCYTES # BLD: 0.3 K/UL (ref 0–1.3)
MONOCYTES NFR BLD: 6.1 %
NEUTROPHILS # BLD: 2.6 K/UL (ref 1.7–7.7)
NEUTROPHILS NFR BLD: 60.6 %
PLATELET # BLD AUTO: 167 K/UL (ref 135–450)
PMV BLD AUTO: 10.1 FL (ref 5–10.5)
POTASSIUM SERPL-SCNC: 3.6 MMOL/L (ref 3.5–5.1)
PREALB SERPL-MCNC: 22.6 MG/DL (ref 20–40)
PROTHROMBIN TIME: 29.3 SEC (ref 11.9–14.9)
RBC # BLD AUTO: 4.4 M/UL (ref 4–5.2)
SODIUM SERPL-SCNC: 139 MMOL/L (ref 136–145)
WBC # BLD AUTO: 4.3 K/UL (ref 4–11)

## 2024-05-20 PROCEDURE — 82040 ASSAY OF SERUM ALBUMIN: CPT

## 2024-05-20 PROCEDURE — 86901 BLOOD TYPING SEROLOGIC RH(D): CPT

## 2024-05-20 PROCEDURE — 86900 BLOOD TYPING SEROLOGIC ABO: CPT

## 2024-05-20 PROCEDURE — 84134 ASSAY OF PREALBUMIN: CPT

## 2024-05-20 PROCEDURE — 86850 RBC ANTIBODY SCREEN: CPT

## 2024-05-20 PROCEDURE — 85610 PROTHROMBIN TIME: CPT

## 2024-05-20 PROCEDURE — 80048 BASIC METABOLIC PNL TOTAL CA: CPT

## 2024-05-20 PROCEDURE — 85025 COMPLETE CBC W/AUTO DIFF WBC: CPT

## 2024-05-20 PROCEDURE — 82306 VITAMIN D 25 HYDROXY: CPT

## 2024-05-20 PROCEDURE — 87641 MR-STAPH DNA AMP PROBE: CPT

## 2024-05-20 PROCEDURE — 83036 HEMOGLOBIN GLYCOSYLATED A1C: CPT

## 2024-05-20 PROCEDURE — 85730 THROMBOPLASTIN TIME PARTIAL: CPT

## 2024-05-20 NOTE — PROGRESS NOTES
Patient  attended JET class on  5/20/2024  . Patient verified surgery for Total Knee replacement. Patient  received patient information and educational JET folder including the following handouts: jet powerpoint, ERAS, incentive spirometry including purpose and how to perform, case management contact information, hand hygiene, preventing constipation, choices for home health care agency list, pre-operative showering techniques and the use of anti-septic 3 days before surgery.  Interviews completed by PT, OT, and Nurse Navigator.  Labs and Tests to be completed/completed as ordered/necessary by outpatient lab if not already completed.  Anti-septic bottle given to patient to take home. Patient  states no further questions or concerns. Patient provided with orthopedic office, after hours clinic, case management, and nurse navigator contact information.    Date Of Surgery: 5/28/2024  Surgeon: Dr Leivne  Per Patient Will see/Saw Primary care provider on 5/22/2024.     Will see/Saw Specialist Cardiology on  5/13/2024 .    HISTORY OF JOINT REPLACEMENT(S): No history of joint replacement    DC Plan: Home    HOME ASSISTANCE - WHO WILL BE STAYING WITH YOU AT HOME FOR FIRST SEVERAL DAYS? Daughter Riya    DC TRANSPORTATION: Riya SILVA INTO HOME: 1    STEPS TO BATHROOM/BEDROOM: none - one level    DME NEEDS:  Needs a rolling walker, agreeable to \[facility]\"" durable medical equipment representative and company.  She states she already has a rollator, transfer tub bench, shower chair, raised toilet seat, and standard walker.    LENGTH OF STAY HAS BEEN DISCUSSED WITH THE PATIENT, APPROPRIATE TO HIS/ HER PROCEDURE.  PATIENT HAS BEEN INFORMED THAT THEY WILL BE DISCHARGED WHEN THE PHYSICIAN DEEMS THEM MEDICALLY READY. MOST PATIENTS CAN EXPECT TO BE IN THE HOSPITAL ONE NIGHT OR 1-2 DAYS AS AN ADMISSION FOR THOSE WITH MEDICAL HEALTH ISSUES/COMPLICATIONS.    HOME CARE CHOICE(S): Prefers to use [facility] Care agency.    SNF/REHAB

## 2024-05-21 ENCOUNTER — TELEPHONE (OUTPATIENT)
Dept: ORTHOPEDIC SURGERY | Age: 74
End: 2024-05-21

## 2024-05-21 LAB — MRSA DNA SPEC QL NAA+PROBE: NORMAL

## 2024-05-21 NOTE — TELEPHONE ENCOUNTER
Vitamin D level is low at 26.9.  Instructed patient to take over-the-counter Vitamin D [id number] IUs daily.    I called the patient and left a message

## 2024-05-21 NOTE — CARE COORDINATION
Ambulatory Care Coordination Note  2024    Patient Current Location:  Home: [address]  [place] [address]     ACM contacted the patient by telephone. Verified name and  with patient as identifiers. Provided introduction to self, and explanation of the ACM role.     Challenges to be reviewed by the provider   Additional needs identified to be addressed with provider: No  none               Method of communication with provider: none.    ACM: Dhara Paula RN    Acm oit reach to check status,and offer CHF education. Doing well overall reporsts adequate oral intake and hydration, do gi/gi issues,  no swelling , weight gain, blurred vision or head ache.  Chr education complete with vu   Will continue to follow  Heart Failure Education outreach Date/Time: 2024 2:45 PM    Ambulatory Care Manager (ACM) contacted the patient by telephone to perform Ambulatory Care Coordination. Verified name and  with patient as identifiers. Provided introduction to self, and explanation of the Ambulatory Care Manager's role.     ACM reviewed that a Heart Healthy tips for the Summer packet has been mailed to the them. ACM reviewed CHF zones, daily weights, fluid restriction, the importance of low sodium diet, and healthy tips packet with the patient. Instructed patient to call their PCP if they have a weight gain of 3 lbs in 2 days or 5 lbs in a week.     Patient reminded that there is a physician on call 24 hours a day / 7 days a week should the patient have questions or concerns. The patient verbalized understanding.     Offered patient enrollment in the Remote Patient Monitoring (RPM) program for in-home monitoring: Yes, but did not enroll at this time: declined to enroll in the program because  .    Lab Results       None            Care Coordination Interventions    Referral from Primary Care Provider: No  Suggested Interventions and Community Resources  Zone Management Tools: Not Started

## 2024-05-21 NOTE — PROGRESS NOTES
Notification sent to Dr Levine and medical assistant Chen RUIZ regarding abnormal preoperative labs and pertinent medical history.

## 2024-05-22 ENCOUNTER — OFFICE VISIT (OUTPATIENT)
Dept: PRIMARY CARE CLINIC | Age: 74
End: 2024-05-22
Payer: MEDICARE

## 2024-05-22 VITALS
WEIGHT: 175 LBS | TEMPERATURE: 98.1 F | HEART RATE: 82 BPM | DIASTOLIC BLOOD PRESSURE: 90 MMHG | SYSTOLIC BLOOD PRESSURE: 140 MMHG | HEIGHT: 65 IN | OXYGEN SATURATION: 100 % | BODY MASS INDEX: 29.16 KG/M2

## 2024-05-22 DIAGNOSIS — R70.0 ELEVATED ERYTHROCYTE SEDIMENTATION RATE: Primary | ICD-10-CM

## 2024-05-22 DIAGNOSIS — R10.31 RIGHT LOWER QUADRANT ABDOMINAL PAIN: ICD-10-CM

## 2024-05-22 DIAGNOSIS — I10 PRIMARY HYPERTENSION: ICD-10-CM

## 2024-05-22 DIAGNOSIS — E83.52 HYPERCALCEMIA: ICD-10-CM

## 2024-05-22 DIAGNOSIS — G44.89 OTHER HEADACHE SYNDROME: ICD-10-CM

## 2024-05-22 DIAGNOSIS — R70.0 ELEVATED ERYTHROCYTE SEDIMENTATION RATE: ICD-10-CM

## 2024-05-22 LAB
BACTERIA URNS QL MICRO: ABNORMAL /HPF
BILIRUB UR QL STRIP.AUTO: NEGATIVE
CLARITY UR: CLEAR
COLOR UR: YELLOW
EPI CELLS #/AREA URNS AUTO: 4 /HPF (ref 0–5)
ERYTHROCYTE [SEDIMENTATION RATE] IN BLOOD BY WESTERGREN METHOD: 45 MM/HR (ref 0–30)
GLUCOSE UR STRIP.AUTO-MCNC: NEGATIVE MG/DL
HGB UR QL STRIP.AUTO: ABNORMAL
HYALINE CASTS #/AREA URNS AUTO: 1 /LPF (ref 0–8)
KETONES UR STRIP.AUTO-MCNC: NEGATIVE MG/DL
LEUKOCYTE ESTERASE UR QL STRIP.AUTO: ABNORMAL
NITRITE UR QL STRIP.AUTO: NEGATIVE
PH UR STRIP.AUTO: 6 [PH] (ref 5–8)
PROT UR STRIP.AUTO-MCNC: ABNORMAL MG/DL
RBC CLUMPS #/AREA URNS AUTO: 3 /HPF (ref 0–4)
SP GR UR STRIP.AUTO: 1.01 (ref 1–1.03)
UA DIPSTICK W REFLEX MICRO PNL UR: YES
URN SPEC COLLECT METH UR: ABNORMAL
UROBILINOGEN UR STRIP-ACNC: 1 E.U./DL
WBC #/AREA URNS AUTO: 52 /HPF (ref 0–5)

## 2024-05-22 PROCEDURE — 1036F TOBACCO NON-USER: CPT | Performed by: INTERNAL MEDICINE

## 2024-05-22 PROCEDURE — 3080F DIAST BP >= 90 MM HG: CPT | Performed by: INTERNAL MEDICINE

## 2024-05-22 PROCEDURE — 1123F ACP DISCUSS/DSCN MKR DOCD: CPT | Performed by: INTERNAL MEDICINE

## 2024-05-22 PROCEDURE — 3077F SYST BP >= 140 MM HG: CPT | Performed by: INTERNAL MEDICINE

## 2024-05-22 PROCEDURE — G8427 DOCREV CUR MEDS BY ELIG CLIN: HCPCS | Performed by: INTERNAL MEDICINE

## 2024-05-22 PROCEDURE — 3017F COLORECTAL CA SCREEN DOC REV: CPT | Performed by: INTERNAL MEDICINE

## 2024-05-22 PROCEDURE — G8399 PT W/DXA RESULTS DOCUMENT: HCPCS | Performed by: INTERNAL MEDICINE

## 2024-05-22 PROCEDURE — 1090F PRES/ABSN URINE INCON ASSESS: CPT | Performed by: INTERNAL MEDICINE

## 2024-05-22 PROCEDURE — 99214 OFFICE O/P EST MOD 30 MIN: CPT | Performed by: INTERNAL MEDICINE

## 2024-05-22 PROCEDURE — G8417 CALC BMI ABV UP PARAM F/U: HCPCS | Performed by: INTERNAL MEDICINE

## 2024-05-22 RX ORDER — AMLODIPINE BESYLATE 10 MG/1
10 TABLET ORAL DAILY
Qty: 90 TABLET | Refills: 4 | Status: SHIPPED | OUTPATIENT
Start: 2024-05-22

## 2024-05-22 RX ORDER — VALSARTAN 320 MG/1
320 TABLET ORAL DAILY
Qty: 90 TABLET | Refills: 1 | Status: SHIPPED | OUTPATIENT
Start: 2024-05-22

## 2024-05-22 RX ORDER — CARVEDILOL 25 MG/1
25 TABLET ORAL 2 TIMES DAILY WITH MEALS
Qty: 180 TABLET | Refills: 4 | Status: SHIPPED | OUTPATIENT
Start: 2024-05-22

## 2024-05-22 RX ORDER — AMLODIPINE BESYLATE 10 MG/1
10 TABLET ORAL DAILY
Qty: 30 TABLET | Refills: 0 | Status: SHIPPED | OUTPATIENT
Start: 2024-05-22 | End: 2024-05-22

## 2024-05-22 RX ORDER — HYDRALAZINE HYDROCHLORIDE 25 MG/1
25 TABLET, FILM COATED ORAL 3 TIMES DAILY
Qty: 270 TABLET | Refills: 4 | Status: SHIPPED | OUTPATIENT
Start: 2024-05-22

## 2024-05-22 RX ORDER — PREDNISONE 50 MG/1
50 TABLET ORAL DAILY
Qty: 30 TABLET | Refills: 0 | Status: SHIPPED | OUTPATIENT
Start: 2024-05-22

## 2024-05-22 ASSESSMENT — ENCOUNTER SYMPTOMS
WHEEZING: 0
DIARRHEA: 0
SHORTNESS OF BREATH: 0
BLOOD IN STOOL: 0
CONSTIPATION: 0
BACK PAIN: 1
CHEST TIGHTNESS: 0
COUGH: 0
ABDOMINAL DISTENTION: 0

## 2024-05-22 NOTE — TELEPHONE ENCOUNTER
Medication:   Requested Prescriptions     Pending Prescriptions Disp Refills    amLODIPine (NORVASC) 10 MG tablet [Pharmacy Med Name: AMLODIPINE BESYLATE 10MG TABLETS] 90 tablet      Sig: TAKE 1 TABLET BY MOUTH DAILY        Last Filled:      Patient Phone Number: [phone] (home)     Last appt: 5/13/2024   Next appt: 5/22/2024    Last OARRS:       1/16/2024     1:50 PM   RX Monitoring   Periodic Controlled Substance Monitoring Possible medication side effects, risk of tolerance/dependence & alternative treatments discussed.;No signs of potential drug abuse or diversion identified.

## 2024-05-22 NOTE — PROGRESS NOTES
Subjective:      Patient ID: Emilee Paulson is a 73 y.o. female.    5/22/2024  Patient presents with:  1 week follow up . Headache much improved ?    Prednisone was ordered 5/13/24 Monday for a very high ESR / Headache . Did not pick it up till  Friday 6/17/24 !!    Was asked to see Rheumatology / Dr Barraza but when we called her office there is no appt made !!          5/13/2024  Patient presents with:  Abdominal Pain: Follow up  better with cipro ?  Headache  has had > 5 days ??   Never mentioned it last visit ??                    5/9/2024 Patient presents with:  Abdominal Pain                    4/9/2024 Patient presents with:  Hypertension: 1 month f/u                3/7/2024 Patient presents with:  Follow-Up from Hospital:  [facility] 1/21/2024 - 1/24/2024 (3 days)  Reason:Debility      Ms. Paulson was admitted with uncontrolled blood pressure that improved with maintenance home oral medications and occasional IV treatment.  For mild dehydration and hyponatremia with elevated BUN to creatinine ratio, she was given IV fluids with improvement.  Due to right knee pain, orthopedics was consulted and thought pain likely due to to hemarthrosis in the setting of Coumadin and injury. Recommended ice pack and wrapping and avoiding aspiration due to risk for infection and reaccumulation.  Due to severe difficulty with ambulation, SNF was arranged on discharge.                         1/17/2024 Patient presents with:  Hypertension: 6 week f/u  Generalized Body Aches: Increased pain all over body- went to [facility] 1/8/2024 for the knee pain, but is having pain all over  Flank Pain: Went to [facility] 1/13/2024    On Keflex for Ecoli UTI     CT Abdomen   1/13/24  Stable solid appearing mass in the right renal midpole measuring up to 2.5 cm  is unchanged in size since 02/19/2022.     No hydronephrosis, nephrolithiasis or obstructive uropathy.     Leiomyomatous uterus.

## 2024-05-23 ENCOUNTER — TELEPHONE (OUTPATIENT)
Dept: PRIMARY CARE CLINIC | Age: 74
End: 2024-05-23

## 2024-05-23 ENCOUNTER — OFFICE VISIT (OUTPATIENT)
Dept: ORTHOPEDIC SURGERY | Age: 74
End: 2024-05-23
Payer: MEDICARE

## 2024-05-23 VITALS — BODY MASS INDEX: 29.16 KG/M2 | WEIGHT: 175 LBS | RESPIRATION RATE: 18 BRPM | HEIGHT: 65 IN

## 2024-05-23 DIAGNOSIS — M17.11 PRIMARY OSTEOARTHRITIS OF RIGHT KNEE: Primary | ICD-10-CM

## 2024-05-23 PROCEDURE — G8427 DOCREV CUR MEDS BY ELIG CLIN: HCPCS | Performed by: ORTHOPAEDIC SURGERY

## 2024-05-23 PROCEDURE — 99214 OFFICE O/P EST MOD 30 MIN: CPT | Performed by: ORTHOPAEDIC SURGERY

## 2024-05-23 PROCEDURE — G8417 CALC BMI ABV UP PARAM F/U: HCPCS | Performed by: ORTHOPAEDIC SURGERY

## 2024-05-23 PROCEDURE — 1123F ACP DISCUSS/DSCN MKR DOCD: CPT | Performed by: ORTHOPAEDIC SURGERY

## 2024-05-23 PROCEDURE — 1036F TOBACCO NON-USER: CPT | Performed by: ORTHOPAEDIC SURGERY

## 2024-05-23 PROCEDURE — 1090F PRES/ABSN URINE INCON ASSESS: CPT | Performed by: ORTHOPAEDIC SURGERY

## 2024-05-23 PROCEDURE — G8399 PT W/DXA RESULTS DOCUMENT: HCPCS | Performed by: ORTHOPAEDIC SURGERY

## 2024-05-23 PROCEDURE — 3017F COLORECTAL CA SCREEN DOC REV: CPT | Performed by: ORTHOPAEDIC SURGERY

## 2024-05-23 NOTE — TELEPHONE ENCOUNTER
Would you addend the note from yesterday to clear her to have surgery? [facility]  [phone] call to let them know when completed.

## 2024-05-23 NOTE — PROGRESS NOTES
eye.\"    Thyroid nodule 02/08/2018    TIA involving right internal carotid artery         ROS:  Constitutional: denies fever, chills, weight loss  MSK: denies pain in other joints, muscle aches  Neurological: denies numbness, tingling, weakness    Exam:  Resp 18   Ht 1.651 m (5' 5\")   Wt 79.4 kg (175 lb)   BMI 29.12 kg/m²      Appearance: sitting in exam room chair, appears to be in no acute distress, awake and alert  Resp: unlabored breathing on room air  Skin: warm, dry and intact with out erythema or significant increased temperature  Neuro: grossly intact both lower extremities. Intact sensation to light touch. Motor exam 4+ to 5/5 in all major motor groups.  BLE: Examination reveals that active knee range of motion is 10 to 115 degrees on the right and 5 to 115 degrees on the left.  There is varus deformity, positive crepitus, positive joint line tenderness, positive antalgic gait.  Neurologically, plantar flexion and dorsiflexion is intact. 5/5 strength.     Imaging:  Recent bilateral knee radiographs reviewed are significant for tricompartmental degenerative changes due to osteoarthritis with bone-on-bone arthritis of medial compartment.  There are periarticular osteophytes.    Assessment:  Bilateral knee osteoarthritis, more symptomatic on the right    Plan:  We discussed right total knee arthroplasty. The operative procedure, alternatives, and risks were discussed in detail with the patient.  The risks include but are not limited to: Infection, vessel injury, nerve injury, DVT, pulmonary embolism, implant loosening, need for revision surgery, loss of motion, continued pain.  Despite these risks the patient would like to proceed.  All questions have been answered and no guarantees have been made.  The patient is unable to do further physical therapy due to disabling pain.  I discussed with the patient the diagnosis in detail and answered all the questions.  The patient verbalized understanding of the plan

## 2024-05-23 NOTE — TELEPHONE ENCOUNTER
Left message on machine per [employer]IP[employer]  Unable to clear for surgery due to High Sed rate. Referred to Rheumatology.

## 2024-05-24 ENCOUNTER — TELEPHONE (OUTPATIENT)
Dept: PRIMARY CARE CLINIC | Age: 74
End: 2024-05-24

## 2024-05-24 ENCOUNTER — ANTI-COAG VISIT (OUTPATIENT)
Dept: PHARMACY | Age: 74
End: 2024-05-24

## 2024-05-24 ENCOUNTER — TELEPHONE (OUTPATIENT)
Dept: ORTHOPEDIC SURGERY | Age: 74
End: 2024-05-24

## 2024-05-24 DIAGNOSIS — I48.21 PERMANENT ATRIAL FIBRILLATION (HCC): Primary | ICD-10-CM

## 2024-05-24 LAB
CA-I ADJ PH7.4 SERPL-SCNC: 1.36 MMOL/L (ref 1.09–1.3)
CA-I SERPL ISE-SCNC: 1.34 MMOL/L (ref 1.09–1.3)
INR BLD: 5.3

## 2024-05-24 NOTE — TELEPHONE ENCOUNTER
Other [facility] WITH PRE ADMIN TESTING IS CALLING REQUESTING A CALL BACK. PATIENT WAS NOT CLEARED BY HER DOCTOR FOR SURGERY. [phone]

## 2024-05-24 NOTE — TELEPHONE ENCOUNTER
Patient called requesting a call back to discuss why she wasn't clear for her knee surgery      Please contact patient [phone]

## 2024-05-24 NOTE — PROGRESS NOTES
INR 5.3 Patient started holding her warfarin yesterday for an knee replacement, here @ [facility], on Tuesday, .  Please call Kay CARMONA [facility]. [phone] regarding warfarin.     Of note pt was prescribed prednisone 50 mg qd x 5 d on , contributing to supratherapeutic INR today.       Returned call to Kay CARMONA. She states that she is unsure if HH will be continuing to follow up with patient after surgery. After reviewing chart, telephone encounter states that pcp did not clear patient for surgery. Advised for pt to contact PCP for clarification. Will f/u next week to determine if had surgery or cancelled. Regardless, pt needs to hold warfarin for 2-3 days for supratherapeutic INR.     Would like patient to resume warfarin, if cleared for surgery, at hx weekly dose of 5 mg on Mon, Wed and Fri and 2.5 mg all other days of the week.      For Pharmacy Admin Tracking Only    Intervention Detail: Adherence Monitorin and Dose Adjustment: 1, reason: Therapy Optimization  Total # of Interventions Recommended: 2  Total # of Interventions Accepted: 2  Time Spent (min): 15

## 2024-05-30 ENCOUNTER — TELEPHONE (OUTPATIENT)
Dept: PHARMACY | Age: 74
End: 2024-05-30

## 2024-05-30 NOTE — TELEPHONE ENCOUNTER
Called patient, was supposed to have procedure  and held since . Never resumed warfarin after procedure, no lovenox. Wants to resume HH. States unclear if procedure will be RS.     Take 5mg then take 5mg MWF and 2.5mg AOD.     Called HH to see if they are resuming checks or if we need to bring back into clinic. They can go out Monday 6/3.     Confirmed with patient.    Giuliana Wright, PharmD, [facility]    For Pharmacy Admin Tracking Only    Intervention Detail: Adherence Monitorin and Dose Adjustment: 1, reason: Therapy Optimization  Total # of Interventions Recommended: 2  Total # of Interventions Accepted: 2  Time Spent (min): 15

## 2024-05-30 NOTE — TELEPHONE ENCOUNTER
----- Message from Opal Trudijose Arthur sent at 5/30/2024 10:33 AM EDT -----  Regarding: Please call regarding warfarin  Contact: patient  Patient called to find out what dose of warfarin she should be taking.  Patient held her warfarin for 7 days, prior to her Right knee surgery however, this surgery was cancelled.  Patient states Dr. Levine told her to hold her warfarin and she wants to know what dose she should be taking now. [phone]

## 2024-06-03 ENCOUNTER — ANTI-COAG VISIT (OUTPATIENT)
Dept: PHARMACY | Age: 74
End: 2024-06-03

## 2024-06-03 DIAGNOSIS — I48.21 PERMANENT ATRIAL FIBRILLATION (HCC): Primary | ICD-10-CM

## 2024-06-03 LAB — INR BLD: 1.3

## 2024-06-03 NOTE — PROGRESS NOTES
INR 1.3 Patient took 5mg of warfarin on Thur/Fri and 2.5mg on Sat/Sun.  Please call Agata AVTAR [facility] [phone] with warfarin dosing and order for next INR check.     Was supposed to have procedure  and held since . Never resumed warfarin after procedure, no lovenox. Wants to resume HH. States unclear if procedure will be RS.      Take 5mg then take 5mg MWF and 2.5mg AOD. RN concerned she is not taking warfarin. Patient was off warfarin for about 7 days because she did not resume post op (cancelled procedure). Unclear what patient is doing/taking as INR fluctuating drastically.     Take 5mg daily. Recheck .    Giuliana Wright, PharmD, [facility]    For Pharmacy Admin Tracking Only    Intervention Detail: Adherence Monitorin and Dose Adjustment: 1, reason: Therapy Optimization  Total # of Interventions Recommended: 2  Total # of Interventions Accepted: 2  Time Spent (min): 15

## 2024-06-07 ENCOUNTER — ANTI-COAG VISIT (OUTPATIENT)
Dept: PHARMACY | Age: 74
End: 2024-06-07

## 2024-06-07 DIAGNOSIS — I48.21 PERMANENT ATRIAL FIBRILLATION (HCC): Primary | ICD-10-CM

## 2024-06-07 LAB — INR BLD: 1.4

## 2024-06-07 NOTE — PROGRESS NOTES
[facility] called clinic to report INR. INR 1.4. Pt took 5 mg daily since Mon 6/3 per HHN. Explained have reached end of HH management and pt will need to RTC.   Spoke with pt since HHN was still in home. She states that she has still been taking normal dose of 5 mg on Mon, Wed and Fri and 2.5 mg all other days of the week, she did not take 5 mg daily. She does not believe she missed any doses.     Advised for patient to take 5 mg tonight and tomorrow then return to normal weekly dose of 5 mg on Mon, Wed and Fri and 2.5 mg all other days of the week. Rescheduled pt to RTC on Wed 6/12 as she requires a ride to clinic. She has pcp appt that day and has a ride to coordinated appt when has ride. She verified understanding.     Relayed information with HHN as well.

## 2024-06-08 ENCOUNTER — APPOINTMENT (OUTPATIENT)
Dept: CT IMAGING | Age: 74
End: 2024-06-08
Payer: MEDICARE

## 2024-06-08 ENCOUNTER — HOSPITAL ENCOUNTER (INPATIENT)
Age: 74
LOS: 3 days | Discharge: HOME | DRG: 067 | End: 2024-06-11
Attending: FAMILY MEDICINE | Admitting: FAMILY MEDICINE
Payer: MEDICARE

## 2024-06-08 ENCOUNTER — HOSPITAL ENCOUNTER (EMERGENCY)
Age: 74
Discharge: ANOTHER HOSPITAL | End: 2024-06-08
Attending: EMERGENCY MEDICINE
Payer: MEDICARE

## 2024-06-08 ENCOUNTER — APPOINTMENT (OUTPATIENT)
Dept: GENERAL RADIOLOGY | Age: 74
End: 2024-06-08
Payer: MEDICARE

## 2024-06-08 VITALS
RESPIRATION RATE: 15 BRPM | HEART RATE: 64 BPM | OXYGEN SATURATION: 100 % | SYSTOLIC BLOOD PRESSURE: 97 MMHG | TEMPERATURE: 97.3 F | DIASTOLIC BLOOD PRESSURE: 73 MMHG

## 2024-06-08 DIAGNOSIS — M31.6 GIANT CELL ARTERITIS (HCC): ICD-10-CM

## 2024-06-08 DIAGNOSIS — Z79.01 ANTICOAGULATED: ICD-10-CM

## 2024-06-08 DIAGNOSIS — I65.01 VERTEBRAL ARTERY OCCLUSION, RIGHT: ICD-10-CM

## 2024-06-08 DIAGNOSIS — I95.9 HYPOTENSION, UNSPECIFIED HYPOTENSION TYPE: ICD-10-CM

## 2024-06-08 DIAGNOSIS — R55 NEAR SYNCOPE: Primary | ICD-10-CM

## 2024-06-08 DIAGNOSIS — I65.09 VERTEBRAL ARTERY OCCLUSION, UNSPECIFIED LATERALITY: Primary | ICD-10-CM

## 2024-06-08 DIAGNOSIS — E04.1 THYROID NODULE: ICD-10-CM

## 2024-06-08 DIAGNOSIS — R51.9 NONINTRACTABLE HEADACHE, UNSPECIFIED CHRONICITY PATTERN, UNSPECIFIED HEADACHE TYPE: ICD-10-CM

## 2024-06-08 LAB
ALBUMIN SERPL-MCNC: 4 G/DL (ref 3.4–5)
ALBUMIN/GLOB SERPL: 1.1 {RATIO} (ref 1.1–2.2)
ALP SERPL-CCNC: 70 U/L (ref 40–129)
ALT SERPL-CCNC: 7 U/L (ref 10–40)
ANION GAP SERPL CALCULATED.3IONS-SCNC: 12 MMOL/L (ref 3–16)
AST SERPL-CCNC: 18 U/L (ref 15–37)
BACTERIA URNS QL MICRO: NORMAL /HPF
BASOPHILS # BLD: 0 K/UL (ref 0–0.2)
BASOPHILS NFR BLD: 0.6 %
BILIRUB SERPL-MCNC: 0.9 MG/DL (ref 0–1)
BILIRUB UR QL STRIP.AUTO: NEGATIVE
BUN SERPL-MCNC: 15 MG/DL (ref 7–20)
CALCIUM SERPL-MCNC: 10.1 MG/DL (ref 8.3–10.6)
CHLORIDE SERPL-SCNC: 100 MMOL/L (ref 99–110)
CLARITY UR: CLEAR
CO2 SERPL-SCNC: 21 MMOL/L (ref 21–32)
COLOR UR: YELLOW
CREAT SERPL-MCNC: 1 MG/DL (ref 0.6–1.2)
DEPRECATED RDW RBC AUTO: 16.1 % (ref 12.4–15.4)
EKG DIAGNOSIS: NORMAL
EKG Q-T INTERVAL: 412 MS
EKG QRS DURATION: 78 MS
EKG QTC CALCULATION (BAZETT): 414 MS
EKG R AXIS: -17 DEGREES
EKG T AXIS: -13 DEGREES
EKG VENTRICULAR RATE: 61 BPM
EOSINOPHIL # BLD: 0 K/UL (ref 0–0.6)
EOSINOPHIL NFR BLD: 0.6 %
EPI CELLS #/AREA URNS AUTO: 1 /HPF (ref 0–5)
GFR SERPLBLD CREATININE-BSD FMLA CKD-EPI: 60 ML/MIN/{1.73_M2}
GLUCOSE SERPL-MCNC: 104 MG/DL (ref 70–99)
GLUCOSE UR STRIP.AUTO-MCNC: NEGATIVE MG/DL
HCT VFR BLD AUTO: 32.1 % (ref 36–48)
HGB BLD-MCNC: 10.8 G/DL (ref 12–16)
HGB UR QL STRIP.AUTO: NEGATIVE
HYALINE CASTS #/AREA URNS AUTO: 5 /LPF (ref 0–8)
INR PPP: 1.55 (ref 0.85–1.15)
KETONES UR STRIP.AUTO-MCNC: NEGATIVE MG/DL
LEUKOCYTE ESTERASE UR QL STRIP.AUTO: ABNORMAL
LYMPHOCYTES # BLD: 1.2 K/UL (ref 1–5.1)
LYMPHOCYTES NFR BLD: 22.5 %
MCH RBC QN AUTO: 29.6 PG (ref 26–34)
MCHC RBC AUTO-ENTMCNC: 33.7 G/DL (ref 31–36)
MCV RBC AUTO: 88 FL (ref 80–100)
MONOCYTES # BLD: 0.5 K/UL (ref 0–1.3)
MONOCYTES NFR BLD: 10.1 %
NEUTROPHILS # BLD: 3.4 K/UL (ref 1.7–7.7)
NEUTROPHILS NFR BLD: 66.2 %
NITRITE UR QL STRIP.AUTO: NEGATIVE
PH UR STRIP.AUTO: 7 [PH] (ref 5–8)
PLATELET # BLD AUTO: 115 K/UL (ref 135–450)
PMV BLD AUTO: 9.6 FL (ref 5–10.5)
POTASSIUM SERPL-SCNC: 4.9 MMOL/L (ref 3.5–5.1)
PROT SERPL-MCNC: 7.5 G/DL (ref 6.4–8.2)
PROT UR STRIP.AUTO-MCNC: NEGATIVE MG/DL
PROTHROMBIN TIME: 18.7 SEC (ref 11.9–14.9)
RBC # BLD AUTO: 3.65 M/UL (ref 4–5.2)
RBC CLUMPS #/AREA URNS AUTO: 0 /HPF (ref 0–4)
SODIUM SERPL-SCNC: 133 MMOL/L (ref 136–145)
SP GR UR STRIP.AUTO: 1.02 (ref 1–1.03)
TROPONIN, HIGH SENSITIVITY: 29 NG/L (ref 0–14)
TROPONIN, HIGH SENSITIVITY: 34 NG/L (ref 0–14)
UA COMPLETE W REFLEX CULTURE PNL UR: ABNORMAL
UA DIPSTICK W REFLEX MICRO PNL UR: YES
URN SPEC COLLECT METH UR: ABNORMAL
UROBILINOGEN UR STRIP-ACNC: 1 E.U./DL
WBC # BLD AUTO: 5.2 K/UL (ref 4–11)
WBC #/AREA URNS AUTO: 1 /HPF (ref 0–5)

## 2024-06-08 PROCEDURE — 6360000002 HC RX W HCPCS: Performed by: EMERGENCY MEDICINE

## 2024-06-08 PROCEDURE — 99285 EMERGENCY DEPT VISIT HI MDM: CPT

## 2024-06-08 PROCEDURE — 85610 PROTHROMBIN TIME: CPT

## 2024-06-08 PROCEDURE — 81001 URINALYSIS AUTO W/SCOPE: CPT

## 2024-06-08 PROCEDURE — 71045 X-RAY EXAM CHEST 1 VIEW: CPT

## 2024-06-08 PROCEDURE — 70498 CT ANGIOGRAPHY NECK: CPT

## 2024-06-08 PROCEDURE — 80053 COMPREHEN METABOLIC PANEL: CPT

## 2024-06-08 PROCEDURE — 70450 CT HEAD/BRAIN W/O DYE: CPT

## 2024-06-08 PROCEDURE — 96361 HYDRATE IV INFUSION ADD-ON: CPT

## 2024-06-08 PROCEDURE — 6360000004 HC RX CONTRAST MEDICATION: Performed by: EMERGENCY MEDICINE

## 2024-06-08 PROCEDURE — 2580000003 HC RX 258: Performed by: EMERGENCY MEDICINE

## 2024-06-08 PROCEDURE — 84484 ASSAY OF TROPONIN QUANT: CPT

## 2024-06-08 PROCEDURE — 96375 TX/PRO/DX INJ NEW DRUG ADDON: CPT

## 2024-06-08 PROCEDURE — 85025 COMPLETE CBC W/AUTO DIFF WBC: CPT

## 2024-06-08 PROCEDURE — 36415 COLL VENOUS BLD VENIPUNCTURE: CPT

## 2024-06-08 PROCEDURE — 6370000000 HC RX 637 (ALT 250 FOR IP): Performed by: EMERGENCY MEDICINE

## 2024-06-08 PROCEDURE — 2060000000 HC ICU INTERMEDIATE R&B

## 2024-06-08 PROCEDURE — 96374 THER/PROPH/DIAG INJ IV PUSH: CPT

## 2024-06-08 RX ORDER — 0.9 % SODIUM CHLORIDE 0.9 %
1000 INTRAVENOUS SOLUTION INTRAVENOUS ONCE
Status: COMPLETED | OUTPATIENT
Start: 2024-06-08 | End: 2024-06-08

## 2024-06-08 RX ORDER — ACETAMINOPHEN 500 MG
1000 TABLET ORAL ONCE
Status: COMPLETED | OUTPATIENT
Start: 2024-06-08 | End: 2024-06-08

## 2024-06-08 RX ORDER — MAGNESIUM SULFATE IN WATER 40 MG/ML
2000 INJECTION, SOLUTION INTRAVENOUS PRN
Status: DISCONTINUED | OUTPATIENT
Start: 2024-06-08 | End: 2024-06-11 | Stop reason: HOSPADM

## 2024-06-08 RX ORDER — ONDANSETRON 2 MG/ML
4 INJECTION INTRAMUSCULAR; INTRAVENOUS ONCE
Status: COMPLETED | OUTPATIENT
Start: 2024-06-08 | End: 2024-06-08

## 2024-06-08 RX ORDER — POTASSIUM CHLORIDE 20 MEQ/1
40 TABLET, EXTENDED RELEASE ORAL PRN
Status: DISCONTINUED | OUTPATIENT
Start: 2024-06-08 | End: 2024-06-11 | Stop reason: HOSPADM

## 2024-06-08 RX ORDER — ASPIRIN 81 MG/1
81 TABLET, CHEWABLE ORAL DAILY
Status: DISCONTINUED | OUTPATIENT
Start: 2024-06-09 | End: 2024-06-11 | Stop reason: HOSPADM

## 2024-06-08 RX ORDER — POLYETHYLENE GLYCOL 3350 17 G/17G
17 POWDER, FOR SOLUTION ORAL DAILY PRN
Status: DISCONTINUED | OUTPATIENT
Start: 2024-06-08 | End: 2024-06-11 | Stop reason: HOSPADM

## 2024-06-08 RX ORDER — ATORVASTATIN CALCIUM 80 MG/1
80 TABLET, FILM COATED ORAL NIGHTLY
Status: DISCONTINUED | OUTPATIENT
Start: 2024-06-08 | End: 2024-06-11 | Stop reason: HOSPADM

## 2024-06-08 RX ORDER — ACETAMINOPHEN 650 MG/1
650 SUPPOSITORY RECTAL EVERY 6 HOURS PRN
Status: DISCONTINUED | OUTPATIENT
Start: 2024-06-08 | End: 2024-06-11 | Stop reason: HOSPADM

## 2024-06-08 RX ORDER — ASPIRIN 300 MG/1
300 SUPPOSITORY RECTAL DAILY
Status: DISCONTINUED | OUTPATIENT
Start: 2024-06-09 | End: 2024-06-11 | Stop reason: HOSPADM

## 2024-06-08 RX ORDER — ENOXAPARIN SODIUM 100 MG/ML
40 INJECTION SUBCUTANEOUS DAILY
Status: DISCONTINUED | OUTPATIENT
Start: 2024-06-09 | End: 2024-06-11 | Stop reason: HOSPADM

## 2024-06-08 RX ORDER — FUROSEMIDE 20 MG/1
20 TABLET ORAL DAILY
COMMUNITY
Start: 2024-03-26 | End: 2024-06-12 | Stop reason: SDUPTHER

## 2024-06-08 RX ORDER — ACETAMINOPHEN 325 MG/1
650 TABLET ORAL EVERY 6 HOURS PRN
Status: DISCONTINUED | OUTPATIENT
Start: 2024-06-08 | End: 2024-06-11 | Stop reason: HOSPADM

## 2024-06-08 RX ORDER — POTASSIUM CHLORIDE 7.45 MG/ML
10 INJECTION INTRAVENOUS PRN
Status: DISCONTINUED | OUTPATIENT
Start: 2024-06-08 | End: 2024-06-11 | Stop reason: HOSPADM

## 2024-06-08 RX ORDER — MORPHINE SULFATE 4 MG/ML
4 INJECTION, SOLUTION INTRAMUSCULAR; INTRAVENOUS ONCE
Status: COMPLETED | OUTPATIENT
Start: 2024-06-08 | End: 2024-06-08

## 2024-06-08 RX ORDER — PROCHLORPERAZINE EDISYLATE 5 MG/ML
10 INJECTION INTRAMUSCULAR; INTRAVENOUS ONCE
Status: COMPLETED | OUTPATIENT
Start: 2024-06-08 | End: 2024-06-08

## 2024-06-08 RX ADMIN — ACETAMINOPHEN 1000 MG: 500 TABLET ORAL at 14:57

## 2024-06-08 RX ADMIN — SODIUM CHLORIDE 1000 ML: 9 INJECTION, SOLUTION INTRAVENOUS at 21:25

## 2024-06-08 RX ADMIN — SODIUM CHLORIDE 1000 ML: 9 INJECTION, SOLUTION INTRAVENOUS at 14:53

## 2024-06-08 RX ADMIN — IOPAMIDOL 75 ML: 755 INJECTION, SOLUTION INTRAVENOUS at 16:45

## 2024-06-08 RX ADMIN — ONDANSETRON 4 MG: 2 INJECTION INTRAMUSCULAR; INTRAVENOUS at 14:53

## 2024-06-08 RX ADMIN — MORPHINE SULFATE 4 MG: 4 INJECTION, SOLUTION INTRAMUSCULAR; INTRAVENOUS at 21:26

## 2024-06-08 RX ADMIN — PROCHLORPERAZINE EDISYLATE 10 MG: 5 INJECTION INTRAMUSCULAR; INTRAVENOUS at 14:53

## 2024-06-08 ASSESSMENT — PAIN DESCRIPTION - FREQUENCY: FREQUENCY: INTERMITTENT

## 2024-06-08 ASSESSMENT — PAIN DESCRIPTION - LOCATION: LOCATION: KNEE

## 2024-06-08 ASSESSMENT — PAIN - FUNCTIONAL ASSESSMENT: PAIN_FUNCTIONAL_ASSESSMENT: PREVENTS OR INTERFERES SOME ACTIVE ACTIVITIES AND ADLS

## 2024-06-08 ASSESSMENT — PAIN SCALES - GENERAL
PAINLEVEL_OUTOF10: 2
PAINLEVEL_OUTOF10: 7

## 2024-06-08 ASSESSMENT — PAIN DESCRIPTION - ORIENTATION: ORIENTATION: MID

## 2024-06-08 ASSESSMENT — PAIN DESCRIPTION - ONSET: ONSET: ON-GOING

## 2024-06-08 ASSESSMENT — PAIN SCALES - WONG BAKER: WONGBAKER_NUMERICALRESPONSE: NO HURT

## 2024-06-08 ASSESSMENT — PAIN DESCRIPTION - DESCRIPTORS: DESCRIPTORS: ACHING

## 2024-06-08 NOTE — ED PROVIDER NOTES
EMERGENCY DEPARTMENT PROVIDER NOTE    Patient Identification  Pt Name: Emilee Paulson  MRN: [id number]  Birthdate 1950  Date of evaluation: 6/8/2024  Provider: Chay Skelton DO  PCP: Stefano Suarez MD    Chief Complaint  Dehydration (Patient in by [facility] from home for HA and dizziness x3 days. )      HPI  (History provided by patient)  This is a 73 y.o. female with pertinent past medical history of migraine headaches, chronic atrial fibrillation, hypertension, CVA, intracranial hemorrhage, currently anticoagulated on warfarin who was brought in by EMS transportation for headache and dizziness.  Patient reports feeling lightheaded over the past 3 days, states that so she is about to pass out.  Had 2 episodes of loss of consciousness back-to-back while at home this morning.  Patient also initially reported headache ongoing for the past 3 days, however on further discussion states has been occurring off and on for the past 2 weeks as she states her knee surgery was canceled due to having headaches.  Feels like a band around both sides of her head.  Patient states that she has had bleeding in her brain before which has felt similarly.  Nothing clearly makes the symptoms any better or worse.  She denies any fevers, chills, chest pain, shortness of breath or focal extremity weakness/numbness.      I have reviewed the following nursing documentation:  Allergies: Clonidine, Codeine, Hydrocodone-acetaminophen, Lisinopril, Tramadol, and Percocet [oxycodone-acetaminophen]    Past medical history:   Past Medical History:   Diagnosis Date    Acute cerebrovascular accident (CVA) ([facility]) 01/28/2020    brain bleed    LAST (acute kidney injury) ([facility]) 10/20/2022    Atrial fibrillation ([facility])     Bell palsy     diagnosed 15 years ago    CAD (coronary artery disease)     Cerebral artery occlusion with cerebral infarction ([facility])     Chronic diastolic CHF (congestive heart failure) ([facility]) 05/16/2020    CVA (cerebral

## 2024-06-09 ENCOUNTER — APPOINTMENT (OUTPATIENT)
Dept: MRI IMAGING | Age: 74
DRG: 067 | End: 2024-06-09
Attending: FAMILY MEDICINE
Payer: MEDICARE

## 2024-06-09 LAB
ALBUMIN SERPL-MCNC: 3.8 G/DL (ref 3.4–5)
ANION GAP SERPL CALCULATED.3IONS-SCNC: 7 MMOL/L (ref 3–16)
BUN SERPL-MCNC: 16 MG/DL (ref 7–20)
CALCIUM SERPL-MCNC: 10 MG/DL (ref 8.3–10.6)
CHLORIDE SERPL-SCNC: 100 MMOL/L (ref 99–110)
CHOLEST SERPL-MCNC: 173 MG/DL (ref 0–199)
CO2 SERPL-SCNC: 27 MMOL/L (ref 21–32)
CREAT SERPL-MCNC: 1.3 MG/DL (ref 0.6–1.2)
DEPRECATED RDW RBC AUTO: 16 % (ref 12.4–15.4)
ERYTHROCYTE [SEDIMENTATION RATE] IN BLOOD BY WESTERGREN METHOD: 28 MM/HR (ref 0–30)
EST. AVERAGE GLUCOSE BLD GHB EST-MCNC: 105.4 MG/DL
GFR SERPLBLD CREATININE-BSD FMLA CKD-EPI: 44 ML/MIN/{1.73_M2}
GLUCOSE SERPL-MCNC: 100 MG/DL (ref 70–99)
HBA1C MFR BLD: 5.3 %
HCT VFR BLD AUTO: 35.4 % (ref 36–48)
HDLC SERPL-MCNC: 51 MG/DL (ref 40–60)
HGB BLD-MCNC: 12.1 G/DL (ref 12–16)
LDLC SERPL CALC-MCNC: 104 MG/DL
MAGNESIUM SERPL-MCNC: 2 MG/DL (ref 1.8–2.4)
MCH RBC QN AUTO: 30 PG (ref 26–34)
MCHC RBC AUTO-ENTMCNC: 34 G/DL (ref 31–36)
MCV RBC AUTO: 88.1 FL (ref 80–100)
PHOSPHATE SERPL-MCNC: 3.5 MG/DL (ref 2.5–4.9)
PLATELET # BLD AUTO: 119 K/UL (ref 135–450)
PMV BLD AUTO: 9.5 FL (ref 5–10.5)
POTASSIUM SERPL-SCNC: 4.7 MMOL/L (ref 3.5–5.1)
RBC # BLD AUTO: 4.02 M/UL (ref 4–5.2)
SODIUM SERPL-SCNC: 134 MMOL/L (ref 136–145)
TRIGL SERPL-MCNC: 91 MG/DL (ref 0–150)
VLDLC SERPL CALC-MCNC: 18 MG/DL
WBC # BLD AUTO: 4.9 K/UL (ref 4–11)

## 2024-06-09 PROCEDURE — 85027 COMPLETE CBC AUTOMATED: CPT

## 2024-06-09 PROCEDURE — 80061 LIPID PANEL: CPT

## 2024-06-09 PROCEDURE — 36415 COLL VENOUS BLD VENIPUNCTURE: CPT

## 2024-06-09 PROCEDURE — 85652 RBC SED RATE AUTOMATED: CPT

## 2024-06-09 PROCEDURE — 92610 EVALUATE SWALLOWING FUNCTION: CPT

## 2024-06-09 PROCEDURE — 2060000000 HC ICU INTERMEDIATE R&B

## 2024-06-09 PROCEDURE — 80069 RENAL FUNCTION PANEL: CPT

## 2024-06-09 PROCEDURE — 6360000002 HC RX W HCPCS: Performed by: FAMILY MEDICINE

## 2024-06-09 PROCEDURE — 6370000000 HC RX 637 (ALT 250 FOR IP)

## 2024-06-09 PROCEDURE — 70551 MRI BRAIN STEM W/O DYE: CPT

## 2024-06-09 PROCEDURE — 6370000000 HC RX 637 (ALT 250 FOR IP): Performed by: FAMILY MEDICINE

## 2024-06-09 PROCEDURE — 83735 ASSAY OF MAGNESIUM: CPT

## 2024-06-09 PROCEDURE — 83036 HEMOGLOBIN GLYCOSYLATED A1C: CPT

## 2024-06-09 RX ORDER — PREDNISONE 50 MG/1
50 TABLET ORAL DAILY
Status: DISCONTINUED | OUTPATIENT
Start: 2024-06-09 | End: 2024-06-09

## 2024-06-09 RX ORDER — SPIRONOLACTONE 25 MG/1
25 TABLET ORAL DAILY
COMMUNITY
End: 2024-06-12 | Stop reason: SDUPTHER

## 2024-06-09 RX ORDER — PANTOPRAZOLE SODIUM 40 MG/1
40 TABLET, DELAYED RELEASE ORAL
Status: DISCONTINUED | OUTPATIENT
Start: 2024-06-09 | End: 2024-06-11 | Stop reason: HOSPADM

## 2024-06-09 RX ADMIN — ENOXAPARIN SODIUM 40 MG: 100 INJECTION SUBCUTANEOUS at 08:25

## 2024-06-09 RX ADMIN — ATORVASTATIN CALCIUM 80 MG: 80 TABLET, FILM COATED ORAL at 21:31

## 2024-06-09 RX ADMIN — ALUMINUM HYDROXIDE, MAGNESIUM HYDROXIDE, AND SIMETHICONE: 1200; 120; 1200 SUSPENSION ORAL at 15:05

## 2024-06-09 RX ADMIN — ATORVASTATIN CALCIUM 80 MG: 80 TABLET, FILM COATED ORAL at 00:20

## 2024-06-09 RX ADMIN — PREDNISONE 50 MG: 50 TABLET ORAL at 08:24

## 2024-06-09 RX ADMIN — ASPIRIN 81 MG: 81 TABLET, CHEWABLE ORAL at 08:25

## 2024-06-09 RX ADMIN — PANTOPRAZOLE SODIUM 40 MG: 40 TABLET, DELAYED RELEASE ORAL at 06:45

## 2024-06-09 RX ADMIN — ACETAMINOPHEN 650 MG: 325 TABLET ORAL at 15:05

## 2024-06-09 ASSESSMENT — PAIN - FUNCTIONAL ASSESSMENT: PAIN_FUNCTIONAL_ASSESSMENT: ACTIVITIES ARE NOT PREVENTED

## 2024-06-09 ASSESSMENT — PAIN DESCRIPTION - LOCATION: LOCATION: HEAD

## 2024-06-09 ASSESSMENT — PAIN DESCRIPTION - DESCRIPTORS: DESCRIPTORS: ACHING

## 2024-06-09 ASSESSMENT — PAIN DESCRIPTION - PAIN TYPE: TYPE: ACUTE PAIN

## 2024-06-09 ASSESSMENT — PAIN SCALES - GENERAL
PAINLEVEL_OUTOF10: 0
PAINLEVEL_OUTOF10: 3
PAINLEVEL_OUTOF10: 7

## 2024-06-09 ASSESSMENT — PAIN DESCRIPTION - ORIENTATION: ORIENTATION: RIGHT;MID

## 2024-06-09 ASSESSMENT — PAIN SCALES - WONG BAKER: WONGBAKER_NUMERICALRESPONSE: NO HURT

## 2024-06-09 NOTE — PROGRESS NOTES
Admitted 5 tower. VSS. [facility] 1. Sinus arrhythmia on tele. Awaits orders. Pt uses a walker at home. Bed alarm set. Call light in reach. Will continue to monitor.

## 2024-06-09 NOTE — CONSULTS
[place] [id number]                              CONSULTATION      PATIENT NAME: BESSY HINOJOSA             : 1950  MED REC NO: [id number]                      ROOM: [id number]  ACCOUNT NO: [id number]                       ADMIT DATE: 2024  PROVIDER: Rock Stroud MD    NEUROLOGY CONSULTATION    CONSULT DATE:  2024    REFERRING PHYSICIAN:  JUNE CHAVES      ADDITIONAL REQUESTING PHYSICIAN:  Dr. Jose Allen.    IDENTIFYING INFORMATION:  The patient is a 73-year-old right-handed woman who was admitted to the emergency room on .    REASON FOR CONSULTATION:  Evaluation of headache, vision loss, presyncope, and possible syncope.    HISTORY:  She describes recent difficulty with headache.  Headache seems to begin in the left temporal region and this radiates to the vertex and into the right temporal region and behind the right ear.  Headache has been present for approximately 3 weeks.  During this time, she also notes tenderness in the right temporal region for greater than 3 weeks.  She also notes a fairly sudden change in vision, approximately 3 weeks ago.  She complains of difficulty with her temporal visual field in the right eye.  This part of the vision seems blurry compared to the left side.  This seems to involve the right eye much greater than the left.  She does not have complaints of ocular pain.  She does not have any pain or cramping when talking or chewing.  She denies significant neck pain or stiffness.  She has had no recent fever.  Weight is stable or slightly increased.    Apparently, there was concern as an outpatient regarding arteritis and she was started on prednisone 50 mg daily recently.  She has had elevated sedimentation rates recently.    Currently, she denies any new weakness, numbness, or clumsiness affecting her upper extremities.  She complains of tingling in the 4th through 5th 
Clinical Pharmacy Consult Note  Medication History     Admit Date: 6/8/2024    Pharmacy consulted to verify home medication list by Dr. Dalton .    List of of current medications patient is taking is complete. Home Medication list in EPIC updated to reflect changes noted below.    Source of information: patient and surescripts     Patient's home pharmacy: [facility]      Changes made to medication list:   Medications removed:  Tylenol (upset stomach)   Medications added:   Spironolactone   Medication doses adjusted:   Warfarin 5 mg on M,W,F, Sat and 2.5 mg all other days changed to 5 mg on M,W,F and 2.5 mg all other days     Current Outpatient Medications   Medication Instructions    amLODIPine (NORVASC) 10 mg, Oral, DAILY    atorvastatin (LIPITOR) 80 mg, Oral, DAILY    Caltrate 600+D Plus Minerals (CALTRATE) 600-800 MG-UNIT TABS tablet 1 tablet, Oral, DAILY    carvedilol (COREG) 25 mg, Oral, 2 TIMES DAILY WITH MEALS    diclofenac sodium (VOLTAREN) 4 g, Topical, 4 TIMES DAILY    ferrous sulfate (IRON 325) 325 mg, Oral, DAILY WITH BREAKFAST    furosemide (LASIX) 20 mg, Oral, DAILY    hydrALAZINE (APRESOLINE) 25 mg, Oral, 3 TIMES DAILY    magnesium oxide (MAG-OX) 400 mg, Oral, DAILY    omeprazole (PRILOSEC) 20 MG delayed release capsule TAKE ONE CAPSULE BY MOUTH TWICE DAILY ON AN EMPTY STOMACH. EAT A MEAL OR SNACK 45 TO 60 MINUTES AFTER EACH DOSE    predniSONE (DELTASONE) 50 mg, Oral, DAILY    spironolactone (ALDACTONE) 25 mg, Oral, DAILY    traMADol (ULTRAM) 50 mg, Oral, EVERY 8 HOURS PRN    valsartan (DIOVAN) 320 mg, Oral, DAILY    vitamin D (ERGOCALCIFEROL) 50,000 Units, Oral, WEEKLY    warfarin (COUMADIN) 2.5-5 mg, Oral, SEE ADMIN INSTRUCTIONS, Take by mouth one whole tablet (5 mg) on Monday, Wednesday, Friday, and 1/2 tablet (2.5 mg) all other days.       Please call with any questions.    Renata Briceño, PharmD  PGY1 Resident   Ext. [phone]  6/9/2024 11:00 AM    
Dictated # [id number]    History of right Bell's palsy 2012  History of right thalamic ICH 2016 (?due to hypertensive bleed)  History of atrial fibrillation on chronic anticoagulation therapy  Seen in hospital by Neurology twice in Feb 2018 and also in June 2022 for headache, some of which were associated with left vision loss; inflammatory markers at those times were benign    Now admitted with recent bilateral headache, right temporal tenderness, decreased vision right eye  Increased ESR as OP and was started on prednisone  Also with possible syncope X 2    Recent  --> 45 --> 28  CTA with new occlusion right VA from Nov 2023  MRI brain with chronic changes but no evidence of acute/recent CVA    Assess:  Possible temporal arteritis  Right VA occlusion may be asymptomatic -- MRI brain with no acute ischemia    Plan:  Increase prednisone to 60 mg daily  Surgery consult for temporal artery biopsy  On chronic anticoagulation at baseline for atrial fibrillation  Due to recent possible syncope with check/monitor orthostatic vital signs    I called her son Terrence and reached his voicemail --> I left a message about the indications and recommendation for temporal artery biopsy to screen for temporal arteritis            
chronic small vessel ischemic disease and/or age related degenerative change. Similar findings present on prior exam.   3. Multiple chronic hemorrhagic lacunar infarcts in the basal ganglia and bilateral thalami.         Electronically signed by Erick Hair MD             Assessment/Plan:  This is a 73 y.o. female with Hx of CVA, LAST, A-Fib on Warfarin, RA, CAD, CHF, HTN, ICH, HLD, TIA, PCI x2. Vascular surgery consulted for temporal artery biopsy.     - Continue to hold Warfarin in setting of possible intervention   - Obtain temporal artery ultrasound  - Possible temporal artery biopsy tomorrow; pre-op and consent today  - NPO at midnight      Dario Aranda DO  General Surgery PGY-1  06/09/24  7:12 PM  [phone]

## 2024-06-09 NOTE — PLAN OF CARE
Problem: Discharge Planning  Goal: Discharge to home or other facility with appropriate resources  6/9/2024 0810 by Joanne Weiss RN  Outcome: Progressing   Plan of care reviewed with pt. All questions answered at this time.     Problem: Pain  Goal: Verbalizes/displays adequate comfort level or baseline comfort level  6/9/2024 0810 by Joanne Weiss RN  Outcome: Progressing   Will assess pain on 0-10 scale for appropriate pain management.

## 2024-06-09 NOTE — PROGRESS NOTES
4 Eyes Skin Assessment     NAME:  Emilee Paulson  YOB: 1950  MEDICAL RECORD NUMBER:  [id number]    The patient is being assessed for  Admission    I agree that at least one RN has performed a thorough Head to Toe Skin Assessment on the patient. ALL assessment sites listed below have been assessed.      Areas assessed by both nurses:    Head, Face, Ears, Shoulders, Back, Chest, Arms, Elbows, Hands, Sacrum. Buttock, Coccyx, Ischium, Legs. Feet and Heels, and Under Medical Devices         Does the Patient have a Wound? No noted wound(s)       Reji Prevention initiated by RN: Yes  Wound Care Orders initiated by RN: No    Pressure Injury (Stage 3,4, Unstageable, DTI, NWPT, and Complex wounds) if present, place Wound referral order by RN under : No    New Ostomies, if present place, Ostomy referral order under : No     Nurse 1 eSignature: Electronically signed by Gabrielle Brown RN on 6/9/24 at 12:19 AM EDT    **SHARE this note so that the co-signing nurse can place an eSignature**    Nurse 2 eSignature: {Esignature:[id number]}

## 2024-06-09 NOTE — PROGRESS NOTES
Danelle wants to know if you can give her a call back in your free time, her spouse recently passed away.   Also would like to know if you can refill her valtrex 500mg, [facility]   Pt off unit to MRI for testing.

## 2024-06-09 NOTE — PROGRESS NOTES
Patient admitted  with C/o  of headache and dizziness, she is alert oriented *, reported of chronic pain on hr knees due to osteoarthritis, swallowing evaluation done passed, on regular diet as tolerated stand by assist to the bathroom, denies any need at this time.

## 2024-06-09 NOTE — H&P
DAILY  traMADol (ULTRAM) 50 MG tablet, Take 1 tablet by mouth every 8 hours as needed for Pain for up to 30 days. Max Daily Amount: 150 mg  omeprazole (PRILOSEC) 20 MG delayed release capsule, TAKE ONE CAPSULE BY MOUTH TWICE DAILY ON AN EMPTY STOMACH. EAT A MEAL OR SNACK 45 TO 60 MINUTES AFTER EACH DOSE  diclofenac sodium (VOLTAREN) 1 % GEL, Apply 4 g topically 4 times daily  acetaminophen (TYLENOL) 500 MG tablet, Take 1 tablet by mouth every 6 hours as needed for Pain  vitamin D (ERGOCALCIFEROL) 1.25 MG (72610 UT) CAPS capsule, Take 1 capsule by mouth once a week (Patient taking differently: Take 1 capsule by mouth once a week Saturday)  warfarin (COUMADIN) 5 MG tablet, Take 0.5-1 tablets by mouth See Admin Instructions Take by mouth one whole tablet (5 mg) on Monday, Wednesday, Friday, and 1/2 tablet (2.5 mg) all other days. (Patient taking differently: Take 0.5-1 tablets by mouth See Admin Instructions Take by mouth one whole tablet (5 mg) on Monday, Wednesday, Friday,Saturday and 1/2 tablet (2.5 mg) all other days.)  Caltrate 600+D Plus Minerals (CALTRATE) 600-800 MG-UNIT TABS tablet, Take 1 tablet by mouth daily (Patient taking differently: Take 1 tablet by mouth at bedtime)  atorvastatin (LIPITOR) 80 MG tablet, Take 1 tablet by mouth daily  ferrous sulfate (IRON 325) 325 (65 Fe) MG tablet, Take 1 tablet by mouth daily (with breakfast)  magnesium oxide (MAG-OX) 400 MG tablet, Take 1 tablet by mouth daily    Allergies:  Clonidine, Codeine, Hydrocodone-acetaminophen, Lisinopril, Tramadol, and Percocet [oxycodone-acetaminophen]    SocHx:  Social History     Socioeconomic History    Marital status:      Spouse name: Jack    Number of children: 5    Years of education: 12   Tobacco Use    Smoking status: Never    Smokeless tobacco: Never   Vaping Use    Vaping Use: Never used   Substance and Sexual Activity    Alcohol use: No    Drug use: No    Sexual activity: Not Currently     Social Determinants of Health  06/08/24  1455   COLORU Yellow   PHUR 7.0   WBCUA 1   RBCUA 0   BACTERIA None Seen   CLARITYU Clear   LEUKOCYTESUR TRACE*   UROBILINOGEN 1.0   BILIRUBINUR Negative   BLOODU Negative   GLUCOSEU Negative       Radiology:  MRI brain without contrast    (Results Pending)       CT head  IMPRESSION:  Diffuse mild atrophy and moderate chronic microischemic disease in the deep white matter which is unchanged with no acute abnormality seen.    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  No acute intracranial findings.  3 cm left thyroid nodule. Recommend nonemergent thyroid ultrasound      ECHO 7/24/23  Summary  Normal left ventricle size, wall thickness, and systolic function with an estimated ejection fraction of 60%.  No regional wall motion abnormalities are seen.  Severe left atrial enlargement.  The ascending aorta is mild-to-moderately dilated at 4.8 cm.  Aortic valve appears sclerotic but opens adequately.  No evidence of aortic valve regurgitation.  Mildly thickened mitral valve without evidence of stenosis.  There is mild-to-moderate mitral regurgitation. Indeterminate diastolic function.  A bubble study was performed and fails to show evidence of shunting.   Assessment & Plan   Ms. Paulson is a 73-year-old female past medical history of A-fib on warfarin, HTN, CVA, intracranial hemorrhage (2012), bells palsy (2012 with persistent right facial weakness), bone on bone arthritis  and migraines who came to hospital due to passing out and headache for the last 3 weeks.    Persistent headache  Blurry Vision  Had headache and vision changes. She was seen a few weeks ago by  her PCP ESR was elevated started prednisone and recommended rheum visit. Pt did not follow up. Concern for giant cell arteritis. Can calso be due to hre vertebral artery occlusion  - ESR   - continue prednisone 50 daily  - neuro consulted  - if high concern by primary team possible biopsy    Dizziness  -

## 2024-06-09 NOTE — DISCHARGE INSTRUCTIONS
-Please go to a [facility] outpatient laboratory to have the following blood test: BMP.  Please have this test done four days prior to your follow up appointment with your primary care doctor.  -Please start ASA 81 mg daily 48 hours after your procedure. You can restart ASA 81 mg on 06/13/2024.   -Please do not restart Warfarin until 48 hours after your procedure. You may restart your Warfarin on 06/13/2024. Please follow up in the Warfarin clinic, tell them you are on a new medication, Bactrim. They may need to adjust the dose of your Warfarin to accommodate this new medication.   -Please Continue the prednisone taper. You will slowly decrease the dosage of the prednisone over the course 52 weeks. The first four weeks of treatment have been provided for you. You will follow up with the Rheumatologist, Dr. Lu Barraza. Please call her office and schedule an appointment with her.  -Please take the antibiotic Bactrim daily while you are on the 52 week Prednisone taper.   -Please call and schedule an appointment with Rheumatologist, Dr. Lu Barraza for follow up on biopsy results, and management of steroid treatment for the suspected diagnosis of Giant Cell Arteritis.  -Please call and schedule an appointment with your primary care doctor for a hospital follow up. He will discuss possibly doing imaging on on the thyroid nodule found on imagine.     Incidental Thyroid Nodule found on imaging  6/8/24 CT head and neck w/con: Right vertebral artery is essentially completely occluded which is new. No acute intracranial findings. 3 cm left thyroid nodule.  Recommend nonemergent thyroid ultrasound (Reference: J Am Kesha Radiol. 2015 Feb;12(2): 143-50). Please see above for more details.  - Follow up with PCP for evaluation and management

## 2024-06-09 NOTE — PROGRESS NOTES
Speech Language Pathology  Facility/Department:[facility] 5T ORTHO/NEURO  Dysphagia Evaluation and Discharge  Name: Emilee Paulson  : 1950  MRN: [id number]                                                       Patient Diagnosis(es):   Patient Active Problem List    Diagnosis Date Noted    Hypertensive emergency 2023    Long term (current) use of anticoagulants 2023    Multiple falls 10/20/2022    History of stroke 10/20/2022    Mixed hyperlipidemia 2022    Headache, migraine, intractable, with status migrainosus 2022    Vertebral artery occlusion, unspecified laterality 2024    Osteoarthritis of right knee 04/10/2024    Hyponatremia 2024    Leukocytosis 2024    Debility 2024    Chronic systolic (congestive) heart failure 2024    Spinal osteoarthritis 2024    Elevated erythrocyte sedimentation rate 2024    Class 1 obesity due to excess calories with body mass index (BMI) of 32.0 to 32.9 in adult 2024    Chronic atrial fibrillation (HCC) 2024    Hemarthrosis involving knee joint, left 2024    Knee pain 2023    SOB (shortness of breath) 2023    Primary osteoarthritis of right knee 2022    Primary osteoarthritis of left knee 2022    Essential hypertension 10/01/2020    Thyroid nodule 2018    Coronary artery disease due to lipid rich plaque 10/26/2015    Permanent atrial fibrillation ([facility]) 2015       Past Medical History:   Diagnosis Date    Acute cerebrovascular accident (CVA) ([facility]) 2020    brain bleed    LAST (acute kidney injury) ([facility]) 10/20/2022    Atrial fibrillation ([facility])     Bell palsy     diagnosed 15 years ago    CAD (coronary artery disease)     Cerebral artery occlusion with cerebral infarction ([facility])     Chronic diastolic CHF (congestive heart failure) ([facility]) 2020    CVA (cerebral vascular accident) ([facility]) 2017    Hypertension     Intracranial hemorrhage ([facility]) 2016     Mixed hyperlipidemia 07/06/2022    Nonintractable headache     currently controlled    Nontraumatic subcortical hemorrhage of cerebral hemisphere (HCC) 04/30/2016    Poor vision     after bells palsy in 2012    Primary osteoarthritis of right knee 03/18/2022    Sudden visual loss of left eye     patient states \"In very corner of my eye.\"    Thyroid nodule 02/08/2018    TIA involving right internal carotid artery      Past Surgical History:   Procedure Laterality Date    CARDIAC SURGERY      stentsx2    COLONOSCOPY      CORONARY ANGIOPLASTY WITH STENT PLACEMENT      ROTATOR CUFF REPAIR Right     TUBAL LIGATION         Reason for Referral:  Emilee Paulson  was referred for a Speech Therapy evaluation to assess swallow function and/or communication.    History of Present Illness  Per MD notes:This is a 73 y.o. female with pertinent past medical history of migraine headaches, chronic atrial fibrillation, hypertension, CVA, intracranial hemorrhage, currently anticoagulated on warfarin who was brought in by EMS transportation for headache and dizziness.  Patient reports feeling lightheaded over the past 3 days, states that so she is about to pass out.  Had 2 episodes of loss of consciousness back-to-back while at home this morning.  Patient also initially reported headache ongoing for the past 3 days, however on further discussion states has been occurring off and on for the past 2 weeks as she states her knee surgery was canceled due to having headaches.  Feels like a band around both sides of her head.  Patient states that she has had bleeding in her brain before which has felt similarly.  Nothing clearly makes the symptoms any better or worse.  She denies any fevers, chills, chest pain, shortness of breath or focal extremity weakness/numbness.     Imaging  MRI brain without contrast    (Results Pending)       Date of onset: 6/8/24    Current Diet:  ADULT DIET; Regular      Treatment Diagnosis:

## 2024-06-09 NOTE — PLAN OF CARE
Problem: Discharge Planning  Goal: Discharge to home or other facility with appropriate resources  Outcome: Progressing       Problem: Pain  Goal: Verbalizes/displays adequate comfort level or baseline comfort level  Outcome: Progressing     Problem: Safety - Adult  Goal: Free from fall injury  Outcome: Progressing     Problem: ABCDS Injury Assessment  Goal: Absence of physical injury  Outcome: Progressing

## 2024-06-09 NOTE — PROGRESS NOTES
Internal Medicine Progress Note    Date: 6/9/2024   Patient: Emilee Paulson   Hospital Day: 1      CC: No chief complaint on file.       Interval Hx   Pt seen and examined at bedside. Reports HA improved since yesterday, but now unable to see out of her rt peripheral visual field. Notes persistent eye drooping. Reports lip droop since episode of Bell's Palsy in the past.    Saw PCP a few weeks ago, got steroids. Has not been taking wafarin.   ASA and statin ordered.      Cr 1.0 > 1.3    HPI:   \"Ms. 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     She states that around 3 weeks ago so she started having a headache which was more along the whole right side of her head which was associated with dizziness as well as right vision fogginess. She does take pain medicine due to her concurrent rheumatoid arthritis which she stated did not help with the pain. She states that this headache is different hen when she gets migraines and has not had a migraine in a while.     Today she had her grand kids at her house. She has to walk with a walker because of her arthritis and she sat on it. A bit later she passed out twice without realizing. She had no nausea or vomiting occurring before the episode as well no palpitations or diaphoresis noted.      Of note she states that she had Bell's palsy in 2012 as well as brain bleed in 2012 and has residual right sided facial weakness since.  Per chart review she has not been compliant with her PCP who reccommended she see rheumatologist state due to her elevated ESR with vision changes and headache. Was prescribed 50 prednisone daily.     As well she is on warfarin for a-fib and has been subtherapeutic.     In the ED at [facility], her

## 2024-06-09 NOTE — PLAN OF CARE
Problem: SLP Adult - Impaired Swallowing  Goal: By Discharge: Advance to least restrictive diet without signs or symptoms of aspiration for planned discharge setting.  See evaluation for individualized goals.  Outcome: Adequate for Discharge

## 2024-06-10 ENCOUNTER — APPOINTMENT (OUTPATIENT)
Dept: VASCULAR LAB | Age: 74
DRG: 067 | End: 2024-06-10
Attending: FAMILY MEDICINE
Payer: MEDICARE

## 2024-06-10 LAB
ABO + RH BLD: NORMAL
ALBUMIN SERPL-MCNC: 4 G/DL (ref 3.4–5)
ANION GAP SERPL CALCULATED.3IONS-SCNC: 8 MMOL/L (ref 3–16)
BLD GP AB SCN SERPL QL: NORMAL
BUN SERPL-MCNC: 15 MG/DL (ref 7–20)
CALCIUM SERPL-MCNC: 9.9 MG/DL (ref 8.3–10.6)
CHLORIDE SERPL-SCNC: 99 MMOL/L (ref 99–110)
CO2 SERPL-SCNC: 29 MMOL/L (ref 21–32)
CREAT SERPL-MCNC: 0.8 MG/DL (ref 0.6–1.2)
DEPRECATED RDW RBC AUTO: 16.2 % (ref 12.4–15.4)
ECHO BSA: 2.22 M2
GFR SERPLBLD CREATININE-BSD FMLA CKD-EPI: 78 ML/MIN/{1.73_M2}
GLUCOSE SERPL-MCNC: 103 MG/DL (ref 70–99)
HCT VFR BLD AUTO: 38.1 % (ref 36–48)
HGB BLD-MCNC: 12.6 G/DL (ref 12–16)
INR PPP: 2.16 (ref 0.85–1.15)
MAGNESIUM SERPL-MCNC: 1.7 MG/DL (ref 1.8–2.4)
MCH RBC QN AUTO: 28.8 PG (ref 26–34)
MCHC RBC AUTO-ENTMCNC: 33 G/DL (ref 31–36)
MCV RBC AUTO: 87.1 FL (ref 80–100)
PHOSPHATE SERPL-MCNC: 2.2 MG/DL (ref 2.5–4.9)
PLATELET # BLD AUTO: 122 K/UL (ref 135–450)
PMV BLD AUTO: 10 FL (ref 5–10.5)
POTASSIUM SERPL-SCNC: 4.8 MMOL/L (ref 3.5–5.1)
PROTHROMBIN TIME: 24.1 SEC (ref 11.9–14.9)
RBC # BLD AUTO: 4.38 M/UL (ref 4–5.2)
SODIUM SERPL-SCNC: 136 MMOL/L (ref 136–145)
WBC # BLD AUTO: 9.9 K/UL (ref 4–11)

## 2024-06-10 PROCEDURE — 97530 THERAPEUTIC ACTIVITIES: CPT

## 2024-06-10 PROCEDURE — 85027 COMPLETE CBC AUTOMATED: CPT

## 2024-06-10 PROCEDURE — 80069 RENAL FUNCTION PANEL: CPT

## 2024-06-10 PROCEDURE — 97161 PT EVAL LOW COMPLEX 20 MIN: CPT

## 2024-06-10 PROCEDURE — 2060000000 HC ICU INTERMEDIATE R&B

## 2024-06-10 PROCEDURE — 86901 BLOOD TYPING SEROLOGIC RH(D): CPT

## 2024-06-10 PROCEDURE — 83735 ASSAY OF MAGNESIUM: CPT

## 2024-06-10 PROCEDURE — 36415 COLL VENOUS BLD VENIPUNCTURE: CPT

## 2024-06-10 PROCEDURE — 86900 BLOOD TYPING SEROLOGIC ABO: CPT

## 2024-06-10 PROCEDURE — 93882 EXTRACRANIAL UNI/LTD STUDY: CPT | Performed by: SURGERY

## 2024-06-10 PROCEDURE — 99232 SBSQ HOSP IP/OBS MODERATE 35: CPT

## 2024-06-10 PROCEDURE — 6370000000 HC RX 637 (ALT 250 FOR IP): Performed by: PSYCHIATRY & NEUROLOGY

## 2024-06-10 PROCEDURE — 97116 GAIT TRAINING THERAPY: CPT

## 2024-06-10 PROCEDURE — 97166 OT EVAL MOD COMPLEX 45 MIN: CPT

## 2024-06-10 PROCEDURE — 85610 PROTHROMBIN TIME: CPT

## 2024-06-10 PROCEDURE — 86850 RBC ANTIBODY SCREEN: CPT

## 2024-06-10 PROCEDURE — 93882 EXTRACRANIAL UNI/LTD STUDY: CPT

## 2024-06-10 PROCEDURE — 6370000000 HC RX 637 (ALT 250 FOR IP): Performed by: FAMILY MEDICINE

## 2024-06-10 RX ORDER — ENOXAPARIN SODIUM 100 MG/ML
40 INJECTION SUBCUTANEOUS ONCE
Status: DISCONTINUED | OUTPATIENT
Start: 2024-06-10 | End: 2024-06-11

## 2024-06-10 RX ORDER — SODIUM CHLORIDE, SODIUM GLUCONATE, SODIUM ACETATE, POTASSIUM CHLORIDE AND MAGNESIUM CHLORIDE 526; 502; 368; 37; 30 MG/100ML; MG/100ML; MG/100ML; MG/100ML; MG/100ML
100 INJECTION, SOLUTION INTRAVENOUS CONTINUOUS
Status: DISCONTINUED | OUTPATIENT
Start: 2024-06-11 | End: 2024-06-11

## 2024-06-10 RX ORDER — ASPIRIN 81 MG/1
81 TABLET, CHEWABLE ORAL ONCE
Status: DISCONTINUED | OUTPATIENT
Start: 2024-06-10 | End: 2024-06-11

## 2024-06-10 RX ADMIN — PREDNISONE 60 MG: 10 TABLET ORAL at 10:05

## 2024-06-10 RX ADMIN — ATORVASTATIN CALCIUM 80 MG: 80 TABLET, FILM COATED ORAL at 21:31

## 2024-06-10 ASSESSMENT — PAIN SCALES - GENERAL: PAINLEVEL_OUTOF10: 0

## 2024-06-10 NOTE — PROGRESS NOTES
NEUROLOGY PROGRESS NOTE       Patient Name: Emilee Paulson YOB: 1950   Sex: Female Age: 73 yrs     CC / Reason for Consult: Evaluation of headache, vision loss, presyncope, and possible syncope    Changes over last 24 hours:   Reports she still as blurred R peripheral vision in her R eye, fields seem intact but she does cheat  Mild headache today  Surgery saw, planning for temporal artery ultrasound    ROS: +blurred vision, +headache    ASSESSMENT & RECOMMENDATIONS   Assessment:  72 yo patient who presented with persistent HA as well as some R vision loss with elevated ESR. There was concern regarding possible arteritis and she was started on prednisone as an outpatient and was referred for rheumatologic evaluation (which has not been completed). CTA revealed occlusion of R vertebral artery but MRI without acute stroke, previous small hemorrhagic infarcts.   On the day of admission, she remembers being somewhat lightheaded and she sat down on her Rollator.  She may have had loss of consciousness briefly on 2 occasions. Is on multiple hypertensive medications.     She has not had any improvement of her headache or vision since starting prednisone 50 mg daily, approximately a week and a half ago.    Plan:  - Surgery planning for temporal artery ultrasound today, possible biopsy in AM  - [facility] currently on hold  - On prednisone 60 mg daily  - Q4 hour neuro checks  - Formal visual field testing/ophthalmologic evaluation as outpatient  - Should get established with Neurology, if not already, for headaches as she does have a history of migraines as well  - Syncope work up per primary team      ADAN Eaton - CNP   Neurology  6/10/2024 1:15 PM  [facility]: [facility] Neurology    HISTORY   Interval History: as above    [facility] Past Medical History:   Diagnosis Date    Acute cerebrovascular accident (CVA) ([facility]) 01/28/2020    brain bleed    LAST (acute kidney injury) ([facility]) 10/20/2022    Atrial  Extensive nonspecific white matter signal abnormality compatible with chronic small vessel ischemic disease and/or age related degenerative change. Similar findings present on prior exam.  3. Multiple chronic hemorrhagic lacunar infarcts in the basal ganglia and bilateral thalami.    PHYSICAL EXAMINATION     PHYSICAL EXAM:  Vitals:    06/09/24 2329 06/10/24 0400 06/10/24 0930 06/10/24 1200   BP: 111/74 115/72 128/74 (!) 130/96   Pulse: 78 71 82 69   Resp: 16 16 17 17   Temp: 98.1 °F (36.7 °C) 98 °F (36.7 °C) 98.4 °F (36.9 °C) 97.9 °F (36.6 °C)   TempSrc: Oral  Oral Oral   SpO2: 97% 96% 96% 99%   Weight:       Height:             General: Alert, no distress, well-nourished  Neurologic  Mental status: Alert, oriented x 4. Pleasant and conversant. Follows commands readily. No dysarthria or aphasia    Cranial nerves:   CN2: Visual fields full w/o extinction on confrontational testing, though she does cheat. Reports R sided blurred vision on R eye  CN 3,4,6: Pupils equal and reactive to light, extraocular muscles intact  CN5: Facial sensation symmetric   CN7: Face symmetric  CN8: Hearing symmetric to spoken voice  CN9: Palate elevated symmetrically  CN11: Traps full strength on shoulder shrug  CN12: Tongue midline with protrusion    Motor Exam:  5/5 strength throughout    Sensory: light touch intact and symmetric in all 4 extremities.  No sensory extinction on bilateral simultaneous stimulation  Cerebellar/coordination: finger nose finger normal without ataxia  Tone: normal in all 4 extremities  Gait: held for patient safety    OTHER SYSTEMS:  Cardiovascular: Warm, appears well perfused   Respiratory: Easy, non-labored respiratory pattern   Abdominal: Abdomen is without distention   Extremities: Upper and lower extremities are atraumatic in appearance without deformity. No swelling or erythema

## 2024-06-10 NOTE — PLAN OF CARE
Problem: Pain  Goal: Verbalizes/displays adequate comfort level or baseline comfort level  6/10/2024 0808 by Portia Rodney, RN  Outcome: Progressing   Pt endorses pain - medication administered per MAR  Problem: Safety - Adult  Goal: Free from fall injury  6/10/2024 0808 by Portia Rodney, RN  Outcome: Progressing  Standard safety measures in place - call light within reach

## 2024-06-10 NOTE — PROGRESS NOTES
VSS. Pt was seen by general surgery before bed and told to stay NPO after MN. No order placed but pt is on agreement and has been NPO since midnight. Notes also say to hold warfarin until   vascular rounds in AM. Voids WNL, up x1 walker. NIH 1. Bed alarm set. Call light in reach. Will continue to monitor.

## 2024-06-10 NOTE — PROGRESS NOTES
Internal Medicine Progress Note    Date: 6/10/2024   Patient: Emilee Paulson   Hospital Day: 2      CC: No chief complaint on file.       Interval Hx   Pt seen and examined at bedside. Still no rt peripheral vision and says her eye droop feels slightly worse.    Cr 1.0 > 1.3    HPI:   \"Ms. 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     She states that around 3 weeks ago so she started having a headache which was more along the whole right side of her head which was associated with dizziness as well as right vision fogginess. She does take pain medicine due to her concurrent rheumatoid arthritis which she stated did not help with the pain. She states that this headache is different hen when she gets migraines and has not had a migraine in a while.     Today she had her grand kids at her house. She has to walk with a walker because of her arthritis and she sat on it. A bit later she passed out twice without realizing. She had no nausea or vomiting occurring before the episode as well no palpitations or diaphoresis noted.      Of note she states that she had Bell's palsy in 2012 as well as brain bleed in 2012 and has residual right sided facial weakness since.  Per chart review she has not been compliant with her PCP who reccommended she see rheumatologist state due to her elevated ESR with vision changes and headache. Was prescribed 50 prednisone daily.     As well she is on warfarin for a-fib and has been subtherapeutic.     In the ED at [facility], her vitals were /81 with HR 95 RR:15 saturating 100% on room air.  Labs sodium 133 with troponin of 34 and WBC 5.2 with Hgb of 10.8 PT 18.7 INR 1.55     Per notes EKG: Atrial fibrillation incomplete RBBB normal Qtc,

## 2024-06-10 NOTE — PROGRESS NOTES
Patient is A&Ox4 VSS this shift. Patient has endorsed no pain this shift. Patient is tolerating PO diet. Tolerating ambulation standby assist with GB/walker. Voiding adequately. Patient updated on plan of care. Fall and safety precautions in place, call light within reach.

## 2024-06-10 NOTE — PROGRESS NOTES
Physical Therapy  Facility/Department: [facility] ORTHO/NEURO  Physical Therapy Initial Assessment / Treatment    Name: Emilee Paulson  : 1950  MRN: [id number]  Date of Service: 6/10/2024    Discharge Recommendations:  24 hour supervision or assist, Home with Home health PT   PT Equipment Recommendations  Equipment Needed: No      Patient Diagnosis(es): The encounter diagnosis was Vertebral artery occlusion, unspecified laterality.  Past Medical History:  has a past medical history of Acute cerebrovascular accident (CVA) (HCC), LAST (acute kidney injury) (HCC), Atrial fibrillation (HCC), Bell palsy, CAD (coronary artery disease), Cerebral artery occlusion with cerebral infarction (HCC), Chronic diastolic CHF (congestive heart failure) (HCC), CVA (cerebral vascular accident) (HCC), Hypertension, Intracranial hemorrhage (HCC), Mixed hyperlipidemia, Nonintractable headache, Nontraumatic subcortical hemorrhage of cerebral hemisphere (HCC), Poor vision, Primary osteoarthritis of right knee, Sudden visual loss of left eye, Thyroid nodule, and TIA involving right internal carotid artery.  Past Surgical History:  has a past surgical history that includes Cardiac surgery; Tubal ligation; Coronary angioplasty with stent; Colonoscopy; and Rotator cuff repair (Right).    Assessment   Body Structures, Functions, Activity Limitations Requiring Skilled Therapeutic Intervention: Decreased functional mobility   Assessment: Pt is 73 y.o. female admit with vertebral artery occlusion.  Pt is from home with 15 y.o. great grandson who is able to assist her at d/c.  Today, pt requires CGA to SBA for safety with functional mobility.  Anticipate good improvement with further practice.  Pt denies concerns with returning home, states she has adequate assist.  Rec 24hr assist initially and home PT.  Will follow  Treatment Diagnosis: impaired gait and transfers  Therapy Prognosis: Good  Decision Making: Low Complexity  Requires PT

## 2024-06-10 NOTE — PROGRESS NOTES
Occupational Therapy  Facility/Department: [facility] ORTHO/NEURO  Occupational Therapy Initial Assessment and Treatment    Name: Emilee Paulson  : 1950  MRN: [id number]  Date of Service: 6/10/2024    Discharge Recommendations:  Home with assist PRN, Home with Home health OT  OT Equipment Recommendations  Equipment Needed: No       Patient Diagnosis(es): The encounter diagnosis was Vertebral artery occlusion, unspecified laterality.  Past Medical History:  has a past medical history of Acute cerebrovascular accident (CVA) (HCC), LAST (acute kidney injury) (HCC), Atrial fibrillation (HCC), Bell palsy, CAD (coronary artery disease), Cerebral artery occlusion with cerebral infarction (HCC), Chronic diastolic CHF (congestive heart failure) (HCC), CVA (cerebral vascular accident) (HCC), Hypertension, Intracranial hemorrhage (HCC), Mixed hyperlipidemia, Nonintractable headache, Nontraumatic subcortical hemorrhage of cerebral hemisphere (HCC), Poor vision, Primary osteoarthritis of right knee, Sudden visual loss of left eye, Thyroid nodule, and TIA involving right internal carotid artery.  Past Surgical History:  has a past surgical history that includes Cardiac surgery; Tubal ligation; Coronary angioplasty with stent; Colonoscopy; and Rotator cuff repair (Right).    Treatment Diagnosis: Impaired functional mobility, Impaired ADL status      Assessment   Performance deficits / Impairments: Decreased functional mobility ;Decreased ADL status;Decreased vision/visual deficit;Decreased safe awareness  Assessment: Pt from home with great grandson and previously independent with ADLs and IADLs at baseline. Pt completed transfers with CGA -> SBA this date, needing occasional VCs for walker management throughout session. Pt declined need for ADLs as she had already taken shower this AM with RN staff and completed oral care. Pt would benefit from OT for additional education on transfer training, low vision due to not having R

## 2024-06-10 NOTE — CARE COORDINATION
Case Management Assessment  Initial Evaluation    Date/Time of Evaluation: 6/10/2024 2:50 PM  Assessment Completed by: Torri Denis RN    If patient is discharged prior to next notation, then this note serves as note for discharge by case management.    Patient Name: Emilee Paulson                   YOB: 1950  Diagnosis: Vertebral artery occlusion, unspecified laterality [I65.09]                   Date / Time: 6/8/2024 10:21 PM    Patient Admission Status: Inpatient   Readmission Risk (Low < 19, Mod (19-27), High > 27): Readmission Risk Score: 18    Current PCP: Stefano Suarez MD  PCP verified by CM? Yes    Chart Reviewed: Yes      History Provided by: Patient  Patient Orientation: Alert and Oriented    Patient Cognition: Alert    Hospitalization in the last 30 days (Readmission):  No    If yes, Readmission Assessment in CM Navigator will be completed.    Advance Directives:      Code Status: Full Code   Patient's Primary Decision Maker is: Named in Scanned ACP Document    Primary Decision Maker: Terrence Desouza - Child - [phone]    Discharge Planning:    Patient lives with: Children Type of Home: House  Primary Care Giver: Self  Patient Support Systems include: Family Members   Current Financial resources: Medicare  Current community resources: ECF/Home Care  Current services prior to admission: Durable Medical Equipment            Current DME: Walker            Type of Home Care services:  Nursing Services    ADLS  Prior functional level: Independent in ADLs/IADLs  Current functional level: Independent in ADLs/IADLs    PT AM-PAC: 18 /24  OT AM-PAC: 17 /24    Family can provide assistance at DC: Yes  Would you like Case Management to discuss the discharge plan with any other family members/significant others, and if so, who? Yes (son Terrence Desouza)  Plans to Return to Present Housing: Yes  Other Identified Issues/Barriers to RETURNING to current housing: none  Potential Assistance needed at

## 2024-06-10 NOTE — PERIOP NOTE
PRE-OP NOTE  Department of Surgery      Chief Complaint or Reason for Surgery: temporal artery biopsy    Procedure: R temporal artery biopsy  Expected time: 6/11 add-on    Plan  1. Diet: NPO at midnight  2. IVF: Plasmalyte at 100 cc/hr  3. Antibiotics: Ancef 2 grams OCTOR  4. Labs to be drawn: CBC with auto-differential, renal panel, magnesium, type and screen, INR  5. Anesthesia: To see patient  6. Consent: Will obtain and place in chart  7. Pulmonary: CXR: Reviewed CXR from 06/08  8. Cardiac: EKG: Reviewed EKG from 06/08  9. Pregnancy test: N/A  10. DVT prophylaxis: Continue Lovenox      Yas Alex DO  06/10/24  7:32 AM

## 2024-06-11 VITALS
WEIGHT: 170.2 LBS | RESPIRATION RATE: 16 BRPM | OXYGEN SATURATION: 99 % | HEIGHT: 67 IN | DIASTOLIC BLOOD PRESSURE: 94 MMHG | SYSTOLIC BLOOD PRESSURE: 135 MMHG | TEMPERATURE: 97.7 F | BODY MASS INDEX: 26.71 KG/M2 | HEART RATE: 69 BPM

## 2024-06-11 LAB
ALBUMIN SERPL-MCNC: 3.9 G/DL (ref 3.4–5)
ANION GAP SERPL CALCULATED.3IONS-SCNC: 9 MMOL/L (ref 3–16)
BUN SERPL-MCNC: 14 MG/DL (ref 7–20)
CALCIUM SERPL-MCNC: 9.6 MG/DL (ref 8.3–10.6)
CHLORIDE SERPL-SCNC: 98 MMOL/L (ref 99–110)
CO2 SERPL-SCNC: 29 MMOL/L (ref 21–32)
CREAT SERPL-MCNC: 0.7 MG/DL (ref 0.6–1.2)
DEPRECATED RDW RBC AUTO: 16 % (ref 12.4–15.4)
GFR SERPLBLD CREATININE-BSD FMLA CKD-EPI: >90 ML/MIN/{1.73_M2}
GLUCOSE SERPL-MCNC: 101 MG/DL (ref 70–99)
HCT VFR BLD AUTO: 35.6 % (ref 36–48)
HGB BLD-MCNC: 12 G/DL (ref 12–16)
INR PPP: 1.83 (ref 0.85–1.15)
MAGNESIUM SERPL-MCNC: 1.6 MG/DL (ref 1.8–2.4)
MCH RBC QN AUTO: 29.7 PG (ref 26–34)
MCHC RBC AUTO-ENTMCNC: 33.8 G/DL (ref 31–36)
MCV RBC AUTO: 87.9 FL (ref 80–100)
PHOSPHATE SERPL-MCNC: 1.9 MG/DL (ref 2.5–4.9)
PLATELET # BLD AUTO: 128 K/UL (ref 135–450)
PMV BLD AUTO: 9.4 FL (ref 5–10.5)
POTASSIUM SERPL-SCNC: 4.5 MMOL/L (ref 3.5–5.1)
PROTHROMBIN TIME: 21.2 SEC (ref 11.9–14.9)
RBC # BLD AUTO: 4.05 M/UL (ref 4–5.2)
SODIUM SERPL-SCNC: 136 MMOL/L (ref 136–145)
WBC # BLD AUTO: 11 K/UL (ref 4–11)

## 2024-06-11 PROCEDURE — 6370000000 HC RX 637 (ALT 250 FOR IP)

## 2024-06-11 PROCEDURE — 7100000001 HC PACU RECOVERY - ADDTL 15 MIN: Performed by: SURGERY

## 2024-06-11 PROCEDURE — 2500000003 HC RX 250 WO HCPCS: Performed by: SURGERY

## 2024-06-11 PROCEDURE — 83735 ASSAY OF MAGNESIUM: CPT

## 2024-06-11 PROCEDURE — 7100000000 HC PACU RECOVERY - FIRST 15 MIN: Performed by: SURGERY

## 2024-06-11 PROCEDURE — 2709999900 HC NON-CHARGEABLE SUPPLY: Performed by: SURGERY

## 2024-06-11 PROCEDURE — 88305 TISSUE EXAM BY PATHOLOGIST: CPT

## 2024-06-11 PROCEDURE — 85027 COMPLETE CBC AUTOMATED: CPT

## 2024-06-11 PROCEDURE — 6360000002 HC RX W HCPCS: Performed by: SURGERY

## 2024-06-11 PROCEDURE — 2580000003 HC RX 258

## 2024-06-11 PROCEDURE — 3600000014 HC SURGERY LEVEL 4 ADDTL 15MIN: Performed by: SURGERY

## 2024-06-11 PROCEDURE — 99231 SBSQ HOSP IP/OBS SF/LOW 25: CPT

## 2024-06-11 PROCEDURE — 80069 RENAL FUNCTION PANEL: CPT

## 2024-06-11 PROCEDURE — 36415 COLL VENOUS BLD VENIPUNCTURE: CPT

## 2024-06-11 PROCEDURE — 03BS0ZX EXCISION OF RIGHT TEMPORAL ARTERY, OPEN APPROACH, DIAGNOSTIC: ICD-10-PCS | Performed by: SURGERY

## 2024-06-11 PROCEDURE — 6370000000 HC RX 637 (ALT 250 FOR IP): Performed by: PSYCHIATRY & NEUROLOGY

## 2024-06-11 PROCEDURE — 3600000004 HC SURGERY LEVEL 4 BASE: Performed by: SURGERY

## 2024-06-11 PROCEDURE — 2580000003 HC RX 258: Performed by: SURGERY

## 2024-06-11 PROCEDURE — 6360000002 HC RX W HCPCS

## 2024-06-11 PROCEDURE — 85610 PROTHROMBIN TIME: CPT

## 2024-06-11 PROCEDURE — A4217 STERILE WATER/SALINE, 500 ML: HCPCS | Performed by: SURGERY

## 2024-06-11 RX ORDER — ASPIRIN 81 MG/1
81 TABLET, CHEWABLE ORAL DAILY
Status: CANCELLED | OUTPATIENT
Start: 2024-06-12

## 2024-06-11 RX ORDER — ENOXAPARIN SODIUM 100 MG/ML
40 INJECTION SUBCUTANEOUS DAILY
Status: CANCELLED | OUTPATIENT
Start: 2024-06-11

## 2024-06-11 RX ORDER — PREDNISONE 10 MG/1
TABLET ORAL
Qty: 118 TABLET | Refills: 0 | Status: SHIPPED | OUTPATIENT
Start: 2024-06-12 | End: 2024-07-08

## 2024-06-11 RX ORDER — LIDOCAINE HYDROCHLORIDE 10 MG/ML
INJECTION, SOLUTION EPIDURAL; INFILTRATION; INTRACAUDAL; PERINEURAL PRN
Status: DISCONTINUED | OUTPATIENT
Start: 2024-06-11 | End: 2024-06-11 | Stop reason: ALTCHOICE

## 2024-06-11 RX ORDER — TRAMADOL HYDROCHLORIDE 50 MG/1
25 TABLET ORAL ONCE
Status: COMPLETED | OUTPATIENT
Start: 2024-06-11 | End: 2024-06-11

## 2024-06-11 RX ORDER — MAGNESIUM HYDROXIDE 1200 MG/15ML
LIQUID ORAL CONTINUOUS PRN
Status: DISCONTINUED | OUTPATIENT
Start: 2024-06-11 | End: 2024-06-11 | Stop reason: HOSPADM

## 2024-06-11 RX ORDER — MIDAZOLAM HYDROCHLORIDE 1 MG/ML
0.5 INJECTION INTRAMUSCULAR; INTRAVENOUS ONCE
Status: COMPLETED | OUTPATIENT
Start: 2024-06-11 | End: 2024-06-11

## 2024-06-11 RX ORDER — TRAMADOL HYDROCHLORIDE 50 MG/1
25 TABLET ORAL EVERY 6 HOURS PRN
Status: DISCONTINUED | OUTPATIENT
Start: 2024-06-11 | End: 2024-06-11 | Stop reason: HOSPADM

## 2024-06-11 RX ORDER — ASPIRIN 81 MG/1
81 TABLET, CHEWABLE ORAL DAILY
Qty: 30 TABLET | Refills: 1 | Status: SHIPPED | OUTPATIENT
Start: 2024-06-11

## 2024-06-11 RX ORDER — MAGNESIUM SULFATE IN WATER 40 MG/ML
4000 INJECTION, SOLUTION INTRAVENOUS ONCE
Status: DISCONTINUED | OUTPATIENT
Start: 2024-06-11 | End: 2024-06-11 | Stop reason: HOSPADM

## 2024-06-11 RX ORDER — FENTANYL CITRATE-0.9 % NACL/PF 20 MCG/2ML
50 SYRINGE (ML) INTRAVENOUS ONCE
Status: COMPLETED | OUTPATIENT
Start: 2024-06-11 | End: 2024-06-11

## 2024-06-11 RX ORDER — SULFAMETHOXAZOLE AND TRIMETHOPRIM 800; 160 MG/1; MG/1
1 TABLET ORAL DAILY
Qty: 30 TABLET | Refills: 0 | Status: SHIPPED | OUTPATIENT
Start: 2024-06-11 | End: 2024-07-11

## 2024-06-11 RX ADMIN — PANTOPRAZOLE SODIUM 40 MG: 40 TABLET, DELAYED RELEASE ORAL at 12:44

## 2024-06-11 RX ADMIN — SODIUM CHLORIDE, SODIUM GLUCONATE, SODIUM ACETATE, POTASSIUM CHLORIDE AND MAGNESIUM CHLORIDE 100 ML/HR: 526; 502; 368; 37; 30 INJECTION, SOLUTION INTRAVENOUS at 00:30

## 2024-06-11 RX ADMIN — DIBASIC SODIUM PHOSPHATE, MONOBASIC POTASSIUM PHOSPHATE AND MONOBASIC SODIUM PHOSPHATE 2 TABLET: 852; 155; 130 TABLET ORAL at 12:44

## 2024-06-11 RX ADMIN — MIDAZOLAM HYDROCHLORIDE 0.5 MG: 2 INJECTION, SOLUTION INTRAMUSCULAR; INTRAVENOUS at 07:45

## 2024-06-11 RX ADMIN — TRAMADOL HYDROCHLORIDE 25 MG: 50 TABLET ORAL at 15:13

## 2024-06-11 RX ADMIN — Medication 50 MCG: at 07:45

## 2024-06-11 RX ADMIN — TRAMADOL HYDROCHLORIDE 25 MG: 50 TABLET, COATED ORAL at 09:50

## 2024-06-11 RX ADMIN — PREDNISONE 60 MG: 10 TABLET ORAL at 12:55

## 2024-06-11 RX ADMIN — WATER 2000 MG: 1 INJECTION INTRAMUSCULAR; INTRAVENOUS; SUBCUTANEOUS at 07:45

## 2024-06-11 ASSESSMENT — PAIN DESCRIPTION - LOCATION
LOCATION: HEAD
LOCATION: OTHER (COMMENT)

## 2024-06-11 ASSESSMENT — PAIN DESCRIPTION - DESCRIPTORS
DESCRIPTORS: ACHING;THROBBING
DESCRIPTORS: THROBBING

## 2024-06-11 ASSESSMENT — PAIN DESCRIPTION - ORIENTATION
ORIENTATION: RIGHT
ORIENTATION: RIGHT

## 2024-06-11 ASSESSMENT — PAIN SCALES - GENERAL
PAINLEVEL_OUTOF10: 4
PAINLEVEL_OUTOF10: 0
PAINLEVEL_OUTOF10: 4
PAINLEVEL_OUTOF10: 0
PAINLEVEL_OUTOF10: 6

## 2024-06-11 ASSESSMENT — PAIN - FUNCTIONAL ASSESSMENT
PAIN_FUNCTIONAL_ASSESSMENT: ACTIVITIES ARE NOT PREVENTED
PAIN_FUNCTIONAL_ASSESSMENT: 0-10

## 2024-06-11 ASSESSMENT — PAIN DESCRIPTION - ONSET: ONSET: ON-GOING

## 2024-06-11 ASSESSMENT — PAIN DESCRIPTION - FREQUENCY: FREQUENCY: INTERMITTENT

## 2024-06-11 NOTE — CARE COORDINATION
of pick-up or delivery - cash, check, or card accepted)     Able to afford home medications/ co-pay costs: Yes    ADLS:  Current PT AM-PAC Score: 18 /24  Current OT AM-PAC Score: 17 /24    DISCHARGE Disposition: Home with Home Health Care: [facility]     LOC at discharge: Not Applicable  KRIS Completed: Not Indicated    Notification completed in HENS/PAS?:  Not Applicable    IMM Completed:   Yes, Case management has presented and reviewed IMM letter #2 to the patient and/or family/ POA. Patient and/or family/POA verbalized understanding of their medicare rights and appeal process if needed. Patient and/or family/POA verbalized understanding of DC within 4 hours of signing IMM letter #2. Patient and/or family/POA has signed and placed today's date (6/11/24) and time (0930) on IMM letter #2 on the the appropriate lines. Patient and/or family/POA, provided copy of letter and they are aware that original copy of IMM letter #2 is available prior to discharge from the paper chart on the unit.  Electronic documentation has been entered into epic for IMM letter #2 and original paper copy has been added to the paper chart at the nurses station.         Transportation:  Transportation PLAN for discharge:  Lyft     Mode of Transport: Lyft  Reason for medical transport: Not Applicable  Name of Transport Company: Not Applicable  Time of Transport: 1830    Transport form completed: Not Indicated    Home Care:  Home Care ordered at discharge: Yes  Home Care Agency: [facility]  Phone: [phone]  Fax: [phone]  Orders faxed: Yes    Durable Medical Equipment:  DME Provider: n/a  Equipment obtained during hospitalization: has needed DME      Additional CM Notes:   Patient is discharging home with family.  Patient is active with [facility], spoke with intake and they will pull orders.  Patient is requesting a Lyft home, family will be at home to help her into her home.      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 Yes    Care Transitions patient: Yes    Torri Denis RN  [facility]  Case Management Department  Ph: [phone]  Fax: [phone]

## 2024-06-11 NOTE — PROGRESS NOTES
Physical Therapy and Occupational Therapy  No Treatment    Pt off floor for Right temporal artery biopsy.  Will follow up later today as time and schedule allow.  If not, will follow up per PT/OT plans of care.  RN aware.    Inna Vargas, PT #[id number]   Michelle Ann  MS, OTR/L #[id number]

## 2024-06-11 NOTE — PROGRESS NOTES
Patient is A&Ox4. VSS this shift. Patient has endorsed pain and managed per MAR and non-pharm measures. Patient is tolerating PO diet. Tolerating ambulation standby assist  assist with GB and walker. Voiding adequately. Patient updated on plan of care. Fall and safety precautions in place, call light within reach.

## 2024-06-11 NOTE — PLAN OF CARE
Problem: Safety - Adult  Goal: Free from fall injury  6/11/2024 0052 by Gabrielle Tobar, RN  Outcome: Progressing  6/11/2024 0052 by Gabrielle Tobar, RN  Outcome: Progressing  Flowsheets (Taken 6/10/2024 2000)  Free From Fall Injury: Instruct family/caregiver on patient safety   All fall precautions in place. Bed locked and in lowest position with alarm on. Overbed table and personal belongings within reach. Call light in reach and patient instructed to use call light for assistance. Non skid socks on.    Problem: Skin/Tissue Integrity  Goal: Absence of new skin breakdown  Description: 1.  Monitor for areas of redness and/or skin breakdown  2.  Assess vascular access sites hourly  3.  Every 4-6 hours minimum:  Change oxygen saturation probe site  4.  Every 4-6 hours:  If on nasal continuous positive airway pressure, respiratory therapy assess nares and determine need for appliance change or resting period.  Outcome: Progressing   Skin assessed this shift. Nicole care completed as necessary. Patient alternating positions to reduce pressure amd prevent skin injury q2 and as needed.

## 2024-06-11 NOTE — BRIEF OP NOTE
Brief Postoperative Note      Patient: Emilee Paulson  YOB: 1950  MRN: [id number]    Date of Procedure: 6/11/2024    Pre-Op Diagnosis Codes:     * Giant cell arteritis (HCC) [M31.6]    Post-Op Diagnosis: Same       Procedure(s):  Right temporal artery biopsy    Surgeon(s):  Luz Hopkins MD    Assistant:  Resident: Daniel Guzmán DO    Anesthesia: Monitor Anesthesia Care    Estimated Blood Loss (mL): less than 50     Complications: None    Specimens:   ID Type Source Tests Collected by Time Destination   A : RIGHT TEMPORAL ARTERY Tissue Tissue SURGICAL PATHOLOGY Luz Hopkins MD 6/11/2024 0815        Implants:  * No implants in log *      Drains: * No LDAs found *    Findings:  Infection Present At Time Of Surgery (PATOS) (choose all levels that have infection present):  No infection present  Other Findings: Uncomplicated Right temporal artery dissection. Proximal and distal portions ligated with 4-0 silk. Multiple clips used for branches. 2cm length sent for pathology. Hemostatic prior to closure. Skin closed incorporating facial, subcue, and dermal layers.    Electronically signed by Daniel Guzmán DO on 6/11/2024 at 8:25 AM

## 2024-06-11 NOTE — PROGRESS NOTES
Vascular Surgery   Daily Progress Note  Patient: Emilee Paulson      CC: temporal artery biopsy    SUBJECTIVE:   No acute events overnight. No pain. Ready for operating room today.    ROS:   A 14 point review of systems was conducted, significant findings as noted above. All other systems negative.    OBJECTIVE:    PHYSICAL EXAM:    Vitals:    06/10/24 1523 06/10/24 2000 06/11/24 0000 06/11/24 0400   BP: 116/80 (!) 143/81 135/79 126/73   Pulse: 73 76 70 77   Resp: 17 16 16 16   Temp: 98.3 °F (36.8 °C) 98.1 °F (36.7 °C) 98 °F (36.7 °C) 98.4 °F (36.9 °C)   TempSrc: Oral Oral Oral Oral   SpO2: 99% 98% 99% 98%   Weight:       Height:           General appearance: alert, no acute distress  Head: Atraumatic, normocephalic  Eyes: No scleral icterus  Neck: trachea midline, no JVD  Chest/Lungs: normal effort, no adventitious breathing, no accessory muscle use, on RA  Cardiovascular: RRR  Abdomen: soft, non-distended  Skin: warm and dry, no rashes  Extremities: no edema, no cyanosis  Neuro: A&Ox3    LABS:   Recent Labs     06/10/24  0645 06/11/24  0554   WBC 9.9 11.0   HGB 12.6 12.0   HCT 38.1 35.6*   MCV 87.1 87.9   * 128*        Recent Labs     06/09/24  0346 06/10/24  0645   * 136   K 4.7 4.8    99   CO2 27 29   PHOS 3.5 2.2*   BUN 16 15   CREATININE 1.3* 0.8        Recent Labs     06/08/24  1456   AST 18   ALT 7*   BILITOT 0.9   ALKPHOS 70      No results for input(s): \"LIPASE\", \"AMYLASE\" in the last 72 hours.     Recent Labs     06/08/24  1455 06/10/24  0741   INR 1.55* 2.16*      No results for input(s): \"CKTOTAL\", \"CKMB\", \"CKMBINDEX\", \"TROPONINI\" in the last 72 hours.      ASSESSMENT & PLAN:   This is a 73 y.o. female with Hx of CVA, LAST, A-Fib on Warfarin, RA, CAD, CHF, HTN, ICH, HLD, TIA, PCI x2. Vascular surgery consulted for temporal artery biopsy.      - Continue to hold Warfarin in setting of possible intervention   - OR today for temporal artery biopsy  - NPO    Garrett Moran, DO  PGY-1,

## 2024-06-11 NOTE — PROGRESS NOTES
Diagnosis Date    Acute cerebrovascular accident (CVA) ([facility]) 01/28/2020    brain bleed    LAST (acute kidney injury) ([facility]) 10/20/2022    Atrial fibrillation ([facility])     Bell palsy     diagnosed 15 years ago    CAD (coronary artery disease)     Cerebral artery occlusion with cerebral infarction ([facility])     Chronic diastolic CHF (congestive heart failure) ([facility]) 05/16/2020    CVA (cerebral vascular accident) ([facility]) 01/04/2017    Hypertension     Intracranial hemorrhage ([facility]) 04/2016    Mixed hyperlipidemia 07/06/2022    Nonintractable headache     currently controlled    Nontraumatic subcortical hemorrhage of cerebral hemisphere ([facility]) 04/30/2016    Poor vision     after bells palsy in 2012    Primary osteoarthritis of right knee 03/18/2022    Sudden visual loss of left eye     patient states \"In very corner of my eye.\"    Thyroid nodule 02/08/2018    TIA involving right internal carotid artery       Allergies Allergies   Allergen Reactions    Clonidine Rash, Shortness Of Breath and Hives    Codeine Hives, Itching, Nausea Only and Rash     Other reaction(s): Other (See Comments)  Pruritis    Hydrocodone-Acetaminophen      Itchy      Lisinopril Hives and Itching    Percocet [Oxycodone-Acetaminophen] Itching      Diet ADULT DIET; Regular; 4 carb choices (60 gm/meal); Low Fat/Low Chol/High Fiber/2 gm Na; No Added Salt (3-4 gm)   Isolation No active isolations     CURRENT SCHEDULED MEDICATIONS   Inpatient Medications     phosphorus, 500 mg, Oral, BID    magnesium sulfate, 4,000 mg, IntraVENous, Once    pantoprazole, 40 mg, Oral, QAM AC    predniSONE, 60 mg, Oral, Daily    [Held by provider] enoxaparin, 40 mg, SubCUTAneous, Daily    atorvastatin, 80 mg, Oral, Nightly    [Held by provider] aspirin, 81 mg, Oral, Daily **OR** [Held by provider] aspirin, 300 mg, Rectal, Daily   Infusions        Antibiotics   Recent Abx Admin                     ceFAZolin (ANCEF) 2,000 mg in sterile water 20 mL IV syringe (mg) 2,000 mg Given 06/11/24  extremities are atraumatic in appearance without deformity. No swelling or erythema

## 2024-06-11 NOTE — PROGRESS NOTES
PACU Transfer Note    Vitals:    06/11/24 0930   BP: (!) 140/86   Pulse: 51   Resp: 13   Temp: 97.2 °F (36.2 °C)   SpO2: 100%       No intake/output data recorded.    Pain assessment:  present - adequately treated  Pain Level: 4    Report given to Receiving unit RN.    6/11/2024 9:47 AM

## 2024-06-11 NOTE — PLAN OF CARE
Problem: Pain  Goal: Verbalizes/displays adequate comfort level or baseline comfort level  Outcome: Progressing   Pt endorses pain- medicated per MAR    Problem: Safety - Adult  Goal: Free from fall injury  6/11/2024 0800 by Portia Rodney, RN  Outcome: Progressing  Standard safety measures in place - call light within reach

## 2024-06-11 NOTE — PROGRESS NOTES
Reviewed discharge instructions with patient. Pt verbalized understanding. IV removed without complications.Education and care plan complete. Pt discharged to home. Pt denies needs. Pt discharged via wheelchair to [facility] without complications.

## 2024-06-11 NOTE — PROGRESS NOTES
Internal Medicine Progress Note    Date: 6/11/2024   Patient: Emilee Paulson   Hospital Day: 3      CC: No chief complaint on file.       Interval Hx   Pt had successful temporal artery biopsy today.    Cr 1.0 > 1.3 > 0.7    HPI:   \"Ms. 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     She states that around 3 weeks ago so she started having a headache which was more along the whole right side of her head which was associated with dizziness as well as right vision fogginess. She does take pain medicine due to her concurrent rheumatoid arthritis which she stated did not help with the pain. She states that this headache is different hen when she gets migraines and has not had a migraine in a while.     Today she had her grand kids at her house. She has to walk with a walker because of her arthritis and she sat on it. A bit later she passed out twice without realizing. She had no nausea or vomiting occurring before the episode as well no palpitations or diaphoresis noted.      Of note she states that she had Bell's palsy in 2012 as well as brain bleed in 2012 and has residual right sided facial weakness since.  Per chart review she has not been compliant with her PCP who reccommended she see rheumatologist state due to her elevated ESR with vision changes and headache. Was prescribed 50 prednisone daily.     As well she is on warfarin for a-fib and has been subtherapeutic.     In the ED at [facility], her vitals were /81 with HR 95 RR:15 saturating 100% on room air.  Labs sodium 133 with troponin of 34 and WBC 5.2 with Hgb of 10.8 PT 18.7 INR 1.55     Per notes EKG: Atrial fibrillation incomplete RBBB normal Qtc, no specific ST elevations or depressions, impression    Skin:     General: Skin is warm and dry.   Neurological:      Mental Status: She is alert.            Labs:  CBC:   Recent Labs     06/09/24  0346 06/10/24  0645 06/11/24  0554   WBC 4.9 9.9 11.0   HGB 12.1 12.6 12.0   HCT 35.4* 38.1 35.6*   * 122* 128*         BMP:   Recent Labs     06/09/24  0346 06/10/24  0645 06/11/24  0554   * 136 136   K 4.7 4.8 4.5    99 98*   CO2 27 29 29   BUN 16 15 14   CREATININE 1.3* 0.8 0.7   GLUCOSE 100* 103* 101*   PHOS 3.5 2.2* 1.9*       Magnesium:   Recent Labs     06/09/24  0346 06/10/24  0645 06/11/24  0554   MG 2.00 1.70* 1.60*       LFT's:   Recent Labs     06/08/24  1456   AST 18   ALT 7*   BILITOT 0.9   ALKPHOS 70           U/A:   Recent Labs     06/08/24  1455   COLORU Yellow   PHUR 7.0   WBCUA 1   RBCUA 0   BACTERIA None Seen   CLARITYU Clear   LEUKOCYTESUR TRACE*   UROBILINOGEN 1.0   BILIRUBINUR Negative   BLOODU Negative   GLUCOSEU Negative         Radiology:  Vascular duplex extracranial limited   Final Result      MRI brain without contrast   Final Result      1. Occlusion of the right vertebral artery without evidence of intracranial infarct.   2. Extensive nonspecific white matter signal abnormality compatible with chronic small vessel ischemic disease and/or age related degenerative change. Similar findings present on prior exam.   3. Multiple chronic hemorrhagic lacunar infarcts in the basal ganglia and bilateral thalami.         Electronically signed by Erick Hair MD            Assessment & Plan   Ms. Paulson is a 73-year-old female past medical history of A-fib on warfarin, HTN, CVA, intracranial hemorrhage (2012), bells palsy (2012 with persistent right facial weakness), bone on bone arthritis  and migraines who came to hospital due to passing out and headache for the last 3 weeks.     Persistent headache  Blurry Vision  Had headache and vision changes. She was seen a few weeks ago by  her PCP ESR was elevated started prednisone and

## 2024-06-11 NOTE — PROGRESS NOTES
Department of Surgery:  Post-op Note    CC: Temporal artery biopsy    Procedure(s) Performed:     Subjective:   Pain is controlled, denies nausea or vomiting. Voiding. Reports HA that is global and feels similar to prior. Good relief with PRNs.     Objective:  Anesthesia type: General  Exam:  Vitals:    06/11/24 0930 06/11/24 0950 06/11/24 1015 06/11/24 1231   BP: (!) 140/86  132/85 (!) 150/106   Pulse: 51  71 71   Resp: 13 14     Temp: 97.2 °F (36.2 °C)  98 °F (36.7 °C)    TempSrc: Temporal  Oral    SpO2: 100%  95% 96%   Weight:       Height:           General appearance: alert, no acute distress, grooming appropriate  Head: Incision c/d/I with dermabond. Appropriately tender  Chest/Lungs: No adventitious breath sounds. No increased work of breathing.   Cardiovascular: RRR  Abdomen: non-distended  Skin: no erythema or rashes, no cyanosis  Extremities: no edema, no cyanosis  Neuro: A&Ox3, no focal deficits, sensation intact    Assessment and Plan  This is a 73 y.o. year old female s/p temporal artery biopsy POD #0    Pain management: [facility]  Cardiovascular: monitor vitals as scheduled  Respiratory: extubated, encourage hourly incentive spirometry and deep breathing  Wound: local care   Ambulation: OOB to chair, encourage ambulation      Please re-contact vascular surgery for further questions      Dario Aranda DO  PGY1, General Surgery  06/11/24   1:01 PM   [employer]  [phone]

## 2024-06-12 ENCOUNTER — TELEPHONE (OUTPATIENT)
Dept: PHARMACY | Age: 74
End: 2024-06-12

## 2024-06-12 ENCOUNTER — OFFICE VISIT (OUTPATIENT)
Dept: PRIMARY CARE CLINIC | Age: 74
End: 2024-06-12
Payer: MEDICARE

## 2024-06-12 VITALS
DIASTOLIC BLOOD PRESSURE: 100 MMHG | BODY MASS INDEX: 27.06 KG/M2 | HEIGHT: 67 IN | HEART RATE: 80 BPM | TEMPERATURE: 98.2 F | WEIGHT: 172.4 LBS | SYSTOLIC BLOOD PRESSURE: 154 MMHG | OXYGEN SATURATION: 100 %

## 2024-06-12 DIAGNOSIS — G44.89 OTHER HEADACHE SYNDROME: ICD-10-CM

## 2024-06-12 DIAGNOSIS — Z09 HOSPITAL DISCHARGE FOLLOW-UP: Primary | ICD-10-CM

## 2024-06-12 DIAGNOSIS — I10 PRIMARY HYPERTENSION: ICD-10-CM

## 2024-06-12 DIAGNOSIS — E04.1 NODULE OF LEFT LOBE OF THYROID GLAND: ICD-10-CM

## 2024-06-12 PROCEDURE — 1036F TOBACCO NON-USER: CPT | Performed by: INTERNAL MEDICINE

## 2024-06-12 PROCEDURE — 99214 OFFICE O/P EST MOD 30 MIN: CPT | Performed by: INTERNAL MEDICINE

## 2024-06-12 PROCEDURE — 1111F DSCHRG MED/CURRENT MED MERGE: CPT | Performed by: INTERNAL MEDICINE

## 2024-06-12 PROCEDURE — 3017F COLORECTAL CA SCREEN DOC REV: CPT | Performed by: INTERNAL MEDICINE

## 2024-06-12 PROCEDURE — G8399 PT W/DXA RESULTS DOCUMENT: HCPCS | Performed by: INTERNAL MEDICINE

## 2024-06-12 PROCEDURE — 1123F ACP DISCUSS/DSCN MKR DOCD: CPT | Performed by: INTERNAL MEDICINE

## 2024-06-12 PROCEDURE — 3080F DIAST BP >= 90 MM HG: CPT | Performed by: INTERNAL MEDICINE

## 2024-06-12 PROCEDURE — 1090F PRES/ABSN URINE INCON ASSESS: CPT | Performed by: INTERNAL MEDICINE

## 2024-06-12 PROCEDURE — 3077F SYST BP >= 140 MM HG: CPT | Performed by: INTERNAL MEDICINE

## 2024-06-12 PROCEDURE — G8417 CALC BMI ABV UP PARAM F/U: HCPCS | Performed by: INTERNAL MEDICINE

## 2024-06-12 PROCEDURE — G8427 DOCREV CUR MEDS BY ELIG CLIN: HCPCS | Performed by: INTERNAL MEDICINE

## 2024-06-12 RX ORDER — FUROSEMIDE 20 MG/1
20 TABLET ORAL DAILY
Qty: 90 TABLET | Refills: 1 | Status: SHIPPED | OUTPATIENT
Start: 2024-06-12

## 2024-06-12 RX ORDER — AMLODIPINE BESYLATE 10 MG/1
10 TABLET ORAL DAILY
Qty: 90 TABLET | Refills: 4 | Status: SHIPPED | OUTPATIENT
Start: 2024-06-12

## 2024-06-12 RX ORDER — VALSARTAN 320 MG/1
320 TABLET ORAL DAILY
Qty: 90 TABLET | Refills: 1 | Status: SHIPPED | OUTPATIENT
Start: 2024-06-12

## 2024-06-12 RX ORDER — HYDRALAZINE HYDROCHLORIDE 25 MG/1
25 TABLET, FILM COATED ORAL 3 TIMES DAILY
Qty: 270 TABLET | Refills: 4 | Status: SHIPPED | OUTPATIENT
Start: 2024-06-12

## 2024-06-12 RX ORDER — SPIRONOLACTONE 25 MG/1
25 TABLET ORAL DAILY
Qty: 90 TABLET | Refills: 3 | Status: SHIPPED | OUTPATIENT
Start: 2024-06-12

## 2024-06-12 RX ORDER — CARVEDILOL 25 MG/1
25 TABLET ORAL 2 TIMES DAILY WITH MEALS
Qty: 180 TABLET | Refills: 4 | Status: SHIPPED | OUTPATIENT
Start: 2024-06-12

## 2024-06-12 ASSESSMENT — ENCOUNTER SYMPTOMS
CHEST TIGHTNESS: 0
DIARRHEA: 0
SHORTNESS OF BREATH: 0
COUGH: 0
BLOOD IN STOOL: 0
ABDOMINAL DISTENTION: 0
WHEEZING: 0
ABDOMINAL PAIN: 1
BACK PAIN: 1
CONSTIPATION: 0

## 2024-06-12 NOTE — TELEPHONE ENCOUNTER
Patient called to say she had an 11:15a appt with clinic today but, had not started her warfarin yet and she wanted to reschedule.  States her surgeon told her not to restart until 48 hrs after her surgery, that was done yesterday.  Restart date would be 6/13.  Explained to patient that she did not have an appt with clinic today however, does have an appt with Dr. Suarez @ 12:00noon today.  Patient rescheduled for 6/19 and will call back if she does not restart her warfarin tomorrow. (6/13)

## 2024-06-12 NOTE — DISCHARGE SUMMARY
Extraocular Movements: Extraocular movements intact.      Conjunctiva/sclera: Conjunctivae normal.      Pupils: Pupils are equal, round, and reactive to light.   Cardiovascular:      Rate and Rhythm: Normal rate.      Pulses: Normal pulses.   Pulmonary:      Effort: Pulmonary effort is normal.   Abdominal:      General: There is no distension.      Palpations: Abdomen is soft.      Tenderness: There is no abdominal tenderness.   Musculoskeletal:         General: No swelling.   Skin:     General: Skin is warm and dry.   Neurological:      Mental Status: She is alert.      Comments: Rt sided lip droop secondary to hx of [place]. Rt sided eyelid droop, different from baseline          Consults: Neurology, vascular surgery  Significant Diagnostic Studies:  MRI brain noncon  Treatments: high dose steriods  Disposition: home  Discharged Condition: Stable  Follow Up: Primary Care Physician in one week    DISCHARGE MEDICATION:     Medication List        START taking these medications      aspirin 81 MG chewable tablet  Take 1 tablet by mouth daily     sulfamethoxazole-trimethoprim 800-160 MG per tablet  Commonly known as: BACTRIM DS;SEPTRA DS  Take 1 tablet by mouth daily            CHANGE how you take these medications      Caltrate 600+D Plus Minerals 600-800 MG-UNIT Tabs tablet  Take 1 tablet by mouth daily  What changed: when to take this     predniSONE 10 MG tablet  Commonly known as: DELTASONE  Take 6 tablets by mouth daily for 5 days, THEN 5 tablets daily for 7 days, THEN 4 tablets daily for 7 days, THEN 3.5 tablets daily for 7 days.  Start taking on: June 12, 2024  What changed:   medication strength  See the new instructions.            CONTINUE taking these medications      amLODIPine 10 MG tablet  Commonly known as: NORVASC  TAKE 1 TABLET BY MOUTH DAILY     atorvastatin 80 MG tablet  Commonly known as: LIPITOR  Take 1 tablet by mouth daily     carvedilol 25 MG tablet  Commonly known as: COREG  Take 1 tablet  minutes    Signed:  Lisa Kunz DO, MD, PGY-1  6/12/2024

## 2024-06-12 NOTE — PROGRESS NOTES
DELTASONE  Take 6 tablets by mouth daily for 5 days, THEN 5 tablets daily for 7 days, THEN 4 tablets daily for 7 days, THEN 3.5 tablets daily for 7 days.  Start taking on: June 12, 2024     spironolactone 25 MG tablet  Commonly known as: ALDACTONE     sulfamethoxazole-trimethoprim 800-160 MG per tablet  Commonly known as: BACTRIM DS;SEPTRA DS  Take 1 tablet by mouth daily     valsartan 320 MG tablet  Commonly known as: DIOVAN  Take 1 tablet by mouth daily     warfarin 5 MG tablet  Commonly known as: COUMADIN  Take as directed by the anticoagulation clinic. If you are unsure how to take this medication, talk to your nurse or doctor.  Original instructions: Take 0.5-1 tablets by mouth See Admin Instructions Take by mouth one whole tablet (5 mg) on Monday, Wednesday, Friday, and 1/2 tablet (2.5 mg) all other days.                Medications marked \"taking\" at this time  Outpatient Medications Marked as Taking for the 6/12/24 encounter (Office Visit) with Stefano Suarez MD   Medication Sig Dispense Refill    sulfamethoxazole-trimethoprim (BACTRIM DS;SEPTRA DS) 800-160 MG per tablet Take 1 tablet by mouth daily 30 tablet 0    predniSONE (DELTASONE) 10 MG tablet Take 6 tablets by mouth daily for 5 days, THEN 5 tablets daily for 7 days, THEN 4 tablets daily for 7 days, THEN 3.5 tablets daily for 7 days. 118 tablet 0    aspirin 81 MG chewable tablet Take 1 tablet by mouth daily 30 tablet 1    spironolactone (ALDACTONE) 25 MG tablet Take 1 tablet by mouth daily      furosemide (LASIX) 20 MG tablet Take 1 tablet by mouth daily      carvedilol (COREG) 25 MG tablet Take 1 tablet by mouth 2 times daily (with meals) 180 tablet 4    hydrALAZINE (APRESOLINE) 25 MG tablet Take 1 tablet by mouth 3 times daily 270 tablet 4    valsartan (DIOVAN) 320 MG tablet Take 1 tablet by mouth daily 90 tablet 1    amLODIPine (NORVASC) 10 MG tablet TAKE 1 TABLET BY MOUTH DAILY 90 tablet 4    omeprazole (PRILOSEC) 20 MG delayed release capsule

## 2024-06-12 NOTE — TELEPHONE ENCOUNTER
Pt in the hospital 6/8-6/11 for vertebral artery occlusion. came to hospital due to passing out and headache for the last 3 weeks.  Per chart review she has not been compliant with her PCP who reccommended she see rheumatologist state due to her elevated ESR with vision changes and headache. Was prescribed 50 prednisone daily.  Prednisone 50 daily increased to 60, pt sent home on long taper. Neurology consulted during admission for additional workup/recommendations.Temporal artery biopsy taken, follow up results with rheumatology and PCP. Holding anticoagulation pending neuro recs. Pt placed on statin and ASA  Recommend nonemergent thyroid ultrasound for thyroid nodule found.     D/c on bactrim 1 tab qd x 30 d, prednisone 10 mg taper. Take 6 tablets qd x 5 d, THEN 5 tablets qd for 7 d, THEN 4 tablets daily for 7 d, THEN 3.5 tablets qd x 7 d.    INR during admit 1.55 --> 2.16 --> 1.83 at d/c.       Per pt was told to resume warfarin after 48 hours following temporal artery biopsy on 6/11.   Pt will restart warfarin tomorrow 6/13

## 2024-06-13 NOTE — PROGRESS NOTES
Physician Progress Note      PATIENT:               BESSY HINOJOSA  [facility] #:                  [id number]  :                       1950  ADMIT DATE:       2024 10:21 PM  DISCH DATE:        2024 8:02 PM  RESPONDING  PROVIDER #:        Lisa Kunz          QUERY TEXT:    Patient noted to have atrial fibrillation and is maintained on warfarin. If   possible, please document in progress notes and discharge summary if you are   evaluating and/or treating any of the following:    The medical record reflects the following:  Risk Factors: a-fib, CVA  Clinical Indicators: Per IM note 24, \"A-Fib- warfarin pharmacy to dose \"  Treatment: IM note 24, \"on hold for possible intervention, if don't   proceed with any procedure asap, then restart\"  Options provided:  -- Secondary hypercoagulable state in a patient with atrial fibrillation  -- Other - I will add my own diagnosis  -- Disagree - Not applicable / Not valid  -- Disagree - Clinically unable to determine / Unknown  -- Refer to Clinical Documentation Reviewer    PROVIDER RESPONSE TEXT:    This patient has secondary hypercoagulable state in a patient with atrial   fibrillation.    Query created by: Maryam Wayne on 2024 1:53 PM      Electronically signed by:  Lisa Kunz 2024 7:04 AM

## 2024-06-14 NOTE — OP NOTE
Operative Note      Patient: Emilee Paulson  YOB: 1950  MRN: [id number]    Date of Procedure: 6/11/2024    Pre-Op Diagnosis Codes:     * Giant cell arteritis (HCC) [M31.6]    Post-Op Diagnosis: Same       Procedure(s):  Right temporal artery biopsy    Surgeon(s):  Luz Hopkins MD    Assistant:   Resident: Daniel Guzmán DO    Anesthesia: Monitor Anesthesia Care    Estimated Blood Loss (mL): Minimal    Complications: None    Specimens:   ID Type Source Tests Collected by Time Destination   A : RIGHT TEMPORAL ARTERY Tissue Tissue SURGICAL PATHOLOGY Luz Hopkins MD 6/11/2024 0815        Implants:  * No implants in log *      Drains: * No LDAs found *    Findings:  Infection Present At Time Of Surgery (PATOS) (choose all levels that have infection present):  No infection present  Other Findings: Uncomplicated Right temporal artery dissection. Proximal and distal portions ligated with 4-0 silk. Multiple clips used for branches. 2cm length sent for pathology. Hemostatic prior to closure. Skin closed incorporating facial, subcue, and dermal layers.     Detailed Description of Procedure:   The patient was taken to the operating room, placed in a supine position. The temporal artery was palpated and marked with skin marker and then the patient was prepped and draped in the usual sterile fashion using betadine solution.     7ml of lidocaine local anesthetic was injected into the dermal plain and a good wheel was appreciated. A 3cm incision was made following the hair line and at an oblique angle to the path of the temporal artery. This was carried down to the fascial plain using both sharp dissection and electrocautery. The temporal fascia was bluntly dissected with hemostats and sharp dissection with iris scissors until the temporal artery was identified. The artery was isolated with a 4-0 silk tie and mobilized until a 2-3cm segment was freed from connective tissue. The proximal and distal ends were  ligated with 4-0 silk tie and the artery was cut at the proximal end. Branches of the temporal artery were then clipped with surgical clips until hemostasis was achieved. Then the distal end of the specimen was cut and the resulting 2cm specimen was sent for pathology. Hemostasis was ensured and the fascia and dermal layers were closed. Skin glue was applied. The patient tolerated the procedure well without any intra operative complications.     The patient was awakened in the operating room.  She was taken to recovery in stable condition. All counts were correct x2. Dr. Hopkins remained scrubbed for all critical portions of the procedure.     Electronically signed by Daniel Guzmán DO on 6/14/2024 at 1:59 PM

## 2024-06-24 DIAGNOSIS — E55.9 VITAMIN D DEFICIENCY: ICD-10-CM

## 2024-06-24 DIAGNOSIS — I10 PRIMARY HYPERTENSION: ICD-10-CM

## 2024-06-25 DIAGNOSIS — E78.2 MIXED HYPERLIPIDEMIA: ICD-10-CM

## 2024-06-25 DIAGNOSIS — I10 PRIMARY HYPERTENSION: ICD-10-CM

## 2024-06-25 RX ORDER — CARVEDILOL 25 MG/1
25 TABLET ORAL 2 TIMES DAILY WITH MEALS
Qty: 180 TABLET | Refills: 0 | Status: SHIPPED | OUTPATIENT
Start: 2024-06-25

## 2024-06-25 RX ORDER — HYDRALAZINE HYDROCHLORIDE 25 MG/1
25 TABLET, FILM COATED ORAL 3 TIMES DAILY
Qty: 270 TABLET | Refills: 0 | Status: SHIPPED | OUTPATIENT
Start: 2024-06-25

## 2024-06-25 RX ORDER — ERGOCALCIFEROL 1.25 MG/1
50000 CAPSULE ORAL WEEKLY
Qty: 12 CAPSULE | Refills: 4 | Status: SHIPPED | OUTPATIENT
Start: 2024-06-25

## 2024-06-25 RX ORDER — VALSARTAN 320 MG/1
320 TABLET ORAL DAILY
Qty: 90 TABLET | Refills: 1 | Status: SHIPPED | OUTPATIENT
Start: 2024-06-25

## 2024-06-25 RX ORDER — SPIRONOLACTONE 50 MG/1
50 TABLET, FILM COATED ORAL DAILY
Qty: 90 TABLET | Refills: 0 | Status: SHIPPED | OUTPATIENT
Start: 2024-06-25

## 2024-06-25 RX ORDER — AMLODIPINE BESYLATE 10 MG/1
10 TABLET ORAL DAILY
Qty: 90 TABLET | Refills: 1 | Status: SHIPPED | OUTPATIENT
Start: 2024-06-25

## 2024-06-26 ENCOUNTER — OFFICE VISIT (OUTPATIENT)
Dept: PRIMARY CARE CLINIC | Age: 74
End: 2024-06-26
Payer: MEDICARE

## 2024-06-26 VITALS
WEIGHT: 170 LBS | TEMPERATURE: 97.5 F | HEART RATE: 83 BPM | SYSTOLIC BLOOD PRESSURE: 115 MMHG | DIASTOLIC BLOOD PRESSURE: 90 MMHG | HEIGHT: 67 IN | BODY MASS INDEX: 26.68 KG/M2 | OXYGEN SATURATION: 100 %

## 2024-06-26 DIAGNOSIS — I48.21 PERMANENT ATRIAL FIBRILLATION (HCC): ICD-10-CM

## 2024-06-26 DIAGNOSIS — Z09 HOSPITAL DISCHARGE FOLLOW-UP: Primary | ICD-10-CM

## 2024-06-26 DIAGNOSIS — K21.9 LPRD (LARYNGOPHARYNGEAL REFLUX DISEASE): Chronic | ICD-10-CM

## 2024-06-26 DIAGNOSIS — Z29.11 NEED FOR RSV VACCINATION: ICD-10-CM

## 2024-06-26 DIAGNOSIS — Z23 NEED FOR PNEUMOCOCCAL VACCINATION: ICD-10-CM

## 2024-06-26 DIAGNOSIS — Z23 NEED FOR COVID-19 VACCINE: ICD-10-CM

## 2024-06-26 DIAGNOSIS — Z23 NEED FOR DIPHTHERIA-TETANUS-PERTUSSIS (TDAP) VACCINE: ICD-10-CM

## 2024-06-26 DIAGNOSIS — Z23 NEED FOR SHINGLES VACCINE: ICD-10-CM

## 2024-06-26 DIAGNOSIS — R26.9 GAIT ABNORMALITY: ICD-10-CM

## 2024-06-26 LAB
ALBUMIN SERPL-MCNC: 4.4 G/DL (ref 3.4–5)
ALBUMIN/GLOB SERPL: 1.3 {RATIO} (ref 1.1–2.2)
ALP SERPL-CCNC: 78 U/L (ref 40–129)
ALT SERPL-CCNC: 17 U/L (ref 10–40)
ANION GAP SERPL CALCULATED.3IONS-SCNC: 16 MMOL/L (ref 3–16)
AST SERPL-CCNC: 21 U/L (ref 15–37)
BASOPHILS # BLD: 0.1 K/UL (ref 0–0.2)
BASOPHILS NFR BLD: 0.7 %
BILIRUB SERPL-MCNC: 0.8 MG/DL (ref 0–1)
BUN SERPL-MCNC: 20 MG/DL (ref 7–20)
CALCIUM SERPL-MCNC: 10.6 MG/DL (ref 8.3–10.6)
CHLORIDE SERPL-SCNC: 95 MMOL/L (ref 99–110)
CO2 SERPL-SCNC: 26 MMOL/L (ref 21–32)
CREAT SERPL-MCNC: 1 MG/DL (ref 0.6–1.2)
DEPRECATED RDW RBC AUTO: 16.3 % (ref 12.4–15.4)
EOSINOPHIL # BLD: 0 K/UL (ref 0–0.6)
EOSINOPHIL NFR BLD: 0.5 %
GFR SERPLBLD CREATININE-BSD FMLA CKD-EPI: 60 ML/MIN/{1.73_M2}
GLUCOSE SERPL-MCNC: 96 MG/DL (ref 70–99)
HCT VFR BLD AUTO: 45.3 % (ref 36–48)
HGB BLD-MCNC: 15.2 G/DL (ref 12–16)
LYMPHOCYTES # BLD: 2 K/UL (ref 1–5.1)
LYMPHOCYTES NFR BLD: 25.9 %
MCH RBC QN AUTO: 30 PG (ref 26–34)
MCHC RBC AUTO-ENTMCNC: 33.6 G/DL (ref 31–36)
MCV RBC AUTO: 89.2 FL (ref 80–100)
MONOCYTES # BLD: 0.8 K/UL (ref 0–1.3)
MONOCYTES NFR BLD: 10 %
NEUTROPHILS # BLD: 4.9 K/UL (ref 1.7–7.7)
NEUTROPHILS NFR BLD: 62.9 %
PLATELET # BLD AUTO: 214 K/UL (ref 135–450)
PMV BLD AUTO: 9.6 FL (ref 5–10.5)
POTASSIUM SERPL-SCNC: 5 MMOL/L (ref 3.5–5.1)
PROT SERPL-MCNC: 7.8 G/DL (ref 6.4–8.2)
RBC # BLD AUTO: 5.08 M/UL (ref 4–5.2)
SODIUM SERPL-SCNC: 137 MMOL/L (ref 136–145)
WBC # BLD AUTO: 7.7 K/UL (ref 4–11)

## 2024-06-26 PROCEDURE — 1111F DSCHRG MED/CURRENT MED MERGE: CPT | Performed by: INTERNAL MEDICINE

## 2024-06-26 PROCEDURE — 3074F SYST BP LT 130 MM HG: CPT | Performed by: INTERNAL MEDICINE

## 2024-06-26 PROCEDURE — 1123F ACP DISCUSS/DSCN MKR DOCD: CPT | Performed by: INTERNAL MEDICINE

## 2024-06-26 PROCEDURE — 3017F COLORECTAL CA SCREEN DOC REV: CPT | Performed by: INTERNAL MEDICINE

## 2024-06-26 PROCEDURE — G8399 PT W/DXA RESULTS DOCUMENT: HCPCS | Performed by: INTERNAL MEDICINE

## 2024-06-26 PROCEDURE — 1090F PRES/ABSN URINE INCON ASSESS: CPT | Performed by: INTERNAL MEDICINE

## 2024-06-26 PROCEDURE — 99214 OFFICE O/P EST MOD 30 MIN: CPT | Performed by: INTERNAL MEDICINE

## 2024-06-26 PROCEDURE — G8427 DOCREV CUR MEDS BY ELIG CLIN: HCPCS | Performed by: INTERNAL MEDICINE

## 2024-06-26 PROCEDURE — 1036F TOBACCO NON-USER: CPT | Performed by: INTERNAL MEDICINE

## 2024-06-26 PROCEDURE — G8417 CALC BMI ABV UP PARAM F/U: HCPCS | Performed by: INTERNAL MEDICINE

## 2024-06-26 PROCEDURE — 3080F DIAST BP >= 90 MM HG: CPT | Performed by: INTERNAL MEDICINE

## 2024-06-26 RX ORDER — ATORVASTATIN CALCIUM 80 MG/1
80 TABLET, FILM COATED ORAL DAILY
Qty: 90 TABLET | Refills: 4 | Status: SHIPPED | OUTPATIENT
Start: 2024-06-26

## 2024-06-26 RX ORDER — FUROSEMIDE 20 MG/1
20 TABLET ORAL DAILY
Qty: 90 TABLET | Refills: 1 | Status: SHIPPED | OUTPATIENT
Start: 2024-06-26

## 2024-06-26 RX ORDER — OMEPRAZOLE 40 MG/1
40 CAPSULE, DELAYED RELEASE ORAL DAILY
Qty: 90 CAPSULE | Refills: 1 | Status: SHIPPED | OUTPATIENT
Start: 2024-06-26

## 2024-06-26 RX ORDER — PREDNISONE 10 MG/1
TABLET ORAL
Qty: 118 TABLET | Refills: 0 | OUTPATIENT
Start: 2024-06-26 | End: 2024-07-21

## 2024-06-26 ASSESSMENT — ENCOUNTER SYMPTOMS
SHORTNESS OF BREATH: 0
BACK PAIN: 1
CHEST TIGHTNESS: 0
COUGH: 0
WHEEZING: 0

## 2024-06-26 NOTE — PROGRESS NOTES
BACTRIM DS;SEPTRA DS  Take 1 tablet by mouth daily     valsartan 320 MG tablet  Commonly known as: DIOVAN  Take 1 tablet by mouth daily     vitamin D 1.25 MG (43586 UT) Caps capsule  Commonly known as: ERGOCALCIFEROL  Take 1 capsule by mouth once a week Saturday     warfarin 5 MG tablet  Commonly known as: COUMADIN  Take as directed by the anticoagulation clinic. If you are unsure how to take this medication, talk to your nurse or doctor.  Original instructions: Take 0.5-1 tablets by mouth See Admin Instructions Take by mouth one whole tablet (5 mg) on Monday, Wednesday, Friday, and 1/2 tablet (2.5 mg) all other days.               Where to Get Your Medications        These medications were sent to [facility] - [place], [place] - [address] - P [phone] - F [phone] [address], [facility] [address]      Phone: [phone]   omeprazole 40 MG delayed release capsule       You can get these medications from any pharmacy    Bring a paper prescription for each of these medications  Guy Demarco           Medications marked \"taking\" at this time  Outpatient Medications Marked as Taking for the 6/26/24 encounter (Office Visit) with Stefano Suarez MD   Medication Sig Dispense Refill    atorvastatin (LIPITOR) 80 MG tablet Take 1 tablet by mouth daily 90 tablet 4    furosemide (LASIX) 20 MG tablet Take 1 tablet by mouth daily 90 tablet 1    omeprazole (PRILOSEC) 40 MG delayed release capsule Take 1 capsule by mouth Daily TAKE ONE CAPSULE BY MOUTH TWICE DAILY ON AN EMPTY STOMACH. EAT A MEAL OR SNACK 45 TO 60 MINUTES AFTER EACH DOSE 90 capsule 1    Misc. Devices (ROLLER WALKER) MISC 1 each by Does not apply route daily 1 each 0    vitamin D (ERGOCALCIFEROL) 1.25 MG (46566 UT) CAPS capsule Take 1 capsule by mouth once a week Saturday 12 capsule 4    carvedilol (COREG) 25 MG tablet Take 1 tablet by mouth 2 times daily (with meals) 180 tablet 0    hydrALAZINE (APRESOLINE) 25

## 2024-07-01 ENCOUNTER — TELEPHONE (OUTPATIENT)
Dept: CARDIOLOGY CLINIC | Age: 74
End: 2024-07-01

## 2024-07-01 ENCOUNTER — TELEPHONE (OUTPATIENT)
Dept: PRIMARY CARE CLINIC | Age: 74
End: 2024-07-01

## 2024-07-01 NOTE — TELEPHONE ENCOUNTER
Called pt to confirm which pharmacy she is using. DKW just sent Rx for atorvastatin and furosemide 6/26/24 to [facility] [address] [place], [place] [address]j[facility]. RALPH for pt to CB.

## 2024-07-01 NOTE — TELEPHONE ENCOUNTER
Medication Refill    Medication needing refilled:  omeprazole (PRILOSEC) 40 MG delayed release capsule     Dosage of the medication:  40 mg     How are you taking this medication (QD, BID, TID, QID, PRN):  Take 1 capsule by mouth Daily TAKE ONE CAPSULE BY MOUTH TWICE DAILY ON AN EMPTY STOMACH. EAT A MEAL OR SNACK 45 TO 60 MINUTES AFTER EACH DOSE     30 or 90 day supply called in:  90 capsule       Which Pharmacy are we sending the medication to?:    [facility] - [facility] [address] - P [phone] - F [phone]

## 2024-07-01 NOTE — TELEPHONE ENCOUNTER
Medication Refill    Medication needing refilled:  atorvastatin (LIPITOR) 80 MG tablet /Take 1 tablet by mouth daily - Oral     furosemide (LASIX) 20 MG tablet /Take 1 tablet by mouth daily - Oral     30 or 90 day supply called in:  90 tablet   When will you run out of your medication:    Which Pharmacy are we sending the medication to?:    [facility] - [facility] [address] - P [phone] - F [phone]

## 2024-07-05 DIAGNOSIS — K21.9 LPRD (LARYNGOPHARYNGEAL REFLUX DISEASE): Chronic | ICD-10-CM

## 2024-07-05 NOTE — TELEPHONE ENCOUNTER
[facility] called the need clarification on   omeprazole (PRILOSEC) 40 MG delayed release capsule     [facility] - [place], [place] - [address] - P [phone] - F [phone] [[id number]]

## 2024-07-05 NOTE — TELEPHONE ENCOUNTER
Got a fax from [facility] pharmacy to refill atorvastatin. Called pt for the messaghe below. No answer, no vm.

## 2024-07-09 ENCOUNTER — ANTI-COAG VISIT (OUTPATIENT)
Dept: PHARMACY | Age: 74
End: 2024-07-09
Payer: MEDICARE

## 2024-07-09 DIAGNOSIS — I48.21 PERMANENT ATRIAL FIBRILLATION (HCC): Primary | ICD-10-CM

## 2024-07-09 LAB
INTERNATIONAL NORMALIZATION RATIO, POC: 1.1
PROTHROMBIN TIME, POC: NORMAL

## 2024-07-09 PROCEDURE — 99212 OFFICE O/P EST SF 10 MIN: CPT

## 2024-07-09 PROCEDURE — 85610 PROTHROMBIN TIME: CPT

## 2024-07-09 NOTE — PROGRESS NOTES
Ms. Emilee Paulson is a 73 y.o. y/o female with history of A fib   She presents today for anticoagulation monitoring and adjustment.    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 Went of memory---> confirmed---> switched Mon and Tues on accident this week --> states that she has been taking warfarin 2 mg tablets daily since d/c from facility 6/21    []   [x]       S/S bleeding/bruising/swelling/SOB -did bruise shoulder after fall, followed up with doctor and monitoring bump ---> denies   []   [x]       Blood in urine or stool - denies   []   [x]       Procedures scheduled in the future at this time - Is being assessed for watchman, will keep notified---> had cortisone shot in knee last fri --> none upcoming  [x]   []       Missed Dose-  believes she missed dose yesterday      []   [x]       Extra Dose - Denies    [x]   []       Change in medications She continues with Tylenol 1000mg but only as needed --> inc tylenol to 3x/day --> less tylenol, once a day --> naproxen prn but doctor advised against and gave voltaren and tylenol -->  took prednisone 40 mg x 6 d on 7/3. No tylenol currently ---> inc lasix --> has been taking more tylenol acutely --> no changes, still taking tylenol --> has been taking more tylenol this week --> prednisone taper from hospital stay (finished last week per pt but if took as script stated would be finishing in next few days) and bactrim (finished yesterday per pt, but if followed script instructions should have 10 d left). No tylenol   []   [x]       Change in health/diet/appetite-- normally has high vitamin K diet of canned spinach weekly and collards or broccoli about every other week. Will have some lower vitamin K veggies about 2 times a week such as green beans.--> states that she has been eating

## 2024-07-10 RX ORDER — OMEPRAZOLE 40 MG/1
40 CAPSULE, DELAYED RELEASE ORAL DAILY
Qty: 90 CAPSULE | Refills: 1 | Status: SHIPPED | OUTPATIENT
Start: 2024-07-10

## 2024-07-16 ENCOUNTER — ANTI-COAG VISIT (OUTPATIENT)
Dept: PHARMACY | Age: 74
End: 2024-07-16
Payer: MEDICARE

## 2024-07-16 ENCOUNTER — APPOINTMENT (OUTPATIENT)
Dept: PHARMACY | Age: 74
End: 2024-07-16
Payer: MEDICARE

## 2024-07-16 DIAGNOSIS — I48.21 PERMANENT ATRIAL FIBRILLATION (HCC): Primary | ICD-10-CM

## 2024-07-16 LAB
INTERNATIONAL NORMALIZATION RATIO, POC: 1.4
PROTHROMBIN TIME, POC: NORMAL

## 2024-07-16 PROCEDURE — 85610 PROTHROMBIN TIME: CPT

## 2024-07-16 PROCEDURE — 99212 OFFICE O/P EST SF 10 MIN: CPT

## 2024-07-17 ENCOUNTER — TELEPHONE (OUTPATIENT)
Dept: PRIMARY CARE CLINIC | Age: 74
End: 2024-07-17

## 2024-07-17 ENCOUNTER — CARE COORDINATION (OUTPATIENT)
Dept: PRIMARY CARE CLINIC | Age: 74
End: 2024-07-17

## 2024-07-17 NOTE — TELEPHONE ENCOUNTER
Patient called in for refill on pt was informed dr. Suarez didn't write this prescription  Pt his having back , right knee pain    traMADol (ULTRAM) tablet 25 mg   Last visit 6/26/24   [id number] - [facility], [place] - [address] - P [phone] - F [phone] [[id number]]   Please call pt

## 2024-07-18 NOTE — CARE COORDINATION
Ambulatory Care Coordination Note     7/18/2024 9:11 AM     patient outreach attempt by this ACM today to perform care management follow up . ACM was unable to reach the patient by telephone today; left voice message requesting a return phone call to this ACM.     Patient closed (unable to reach patient) from the High Risk Care Management program on 7/18/2024.  Patient  Care management goals have been completed. No further Ambulatory Care Manager follow up scheduled.

## 2024-07-21 NOTE — PROGRESS NOTES
4 Eyes Admission Assessment     I agree as the admission nurse that 2 RN's have performed a thorough Head to Toe Skin Assessment on the patient. ALL assessment sites listed below have been assessed on admission. Areas assessed by both nurses:  [x]   Head, Face, and Ears   [x]   Shoulders, Back, and Chest  [x]   Arms, Elbows, and Hands   [x]   Coccyx, Sacrum, and Ischium  [x]   Legs, Feet, and Heels        Does the Patient have Skin Breakdown?   No         Reji Prevention initiated:  No   Wound Care Orders initiated:  No      [facility] nurse consulted for Pressure Injury (Stage 3,4, Unstageable, DTI, NWPT, and Complex wounds) or Reji score 18 or lower:  No      Nurse 1 eSignature: Electronically signed by Karin Atkinson RN on 6/20/22 at 3:19 AM EDT    **SHARE this note so that the co-signing nurse is able to place an eSignature**    Nurse 2 eSignature: Electronically signed by Lauren Cuello RN on 6/20/22 at 3:34 AM EDT Yes

## 2024-07-23 ENCOUNTER — ANTI-COAG VISIT (OUTPATIENT)
Dept: PHARMACY | Age: 74
End: 2024-07-23
Payer: MEDICARE

## 2024-07-23 DIAGNOSIS — I48.21 PERMANENT ATRIAL FIBRILLATION (HCC): Primary | ICD-10-CM

## 2024-07-23 LAB
INTERNATIONAL NORMALIZATION RATIO, POC: 1.4
PROTHROMBIN TIME, POC: NORMAL

## 2024-07-23 PROCEDURE — 85610 PROTHROMBIN TIME: CPT

## 2024-07-23 PROCEDURE — 99212 OFFICE O/P EST SF 10 MIN: CPT

## 2024-07-23 NOTE — PROGRESS NOTES
correctly remember recent history. She states that she is getting concerned for her memory.      Clinical Outcomes:  Yes     No  []   [x]       Major bleeding event brain bleed 2021  []   [x]       Thromboembolic event  Duration of warfarin Therapy: indefinitely  INR Range:  1.8-2.5 -> lower INR goal dt h/o subcortical hemorrhage (2016)  RF at [facility], ok with cost. ---> free    She may be slower to reach steady state with warfarin dosing so consider checking INR about 10 days after dose adjustment.    Presents to clinic after being managed in  for past several months then admitted to hospital twice. Pt was d/c with warfarin 2 mg tablets from hospital. Saw pcp - resumed hx warfarin. But per pt has been taking 2mg daily since d/c from facility. She believes she has ~5 tablets of warfarin 2 mg left--> pt picked up an converted to 5 mg tablet only.     INR is 1.4 again today after boosted dose last week, denies missed doses, states did the boost and followed AVS. Also stopped boost and will not be drinking. Unclear why INR remains so subtherapeutic. Pt had 30 mg in past week, will increase to 35 mg and continue to monitor closely. Will check tablets at home and use AVS.   Increase dose to 5mg daily   Recheck INR in 1 week,   Patient consent signed 23    Referring cardiologist is Dr. Florence  INR (no units)   Date Value   2024 1.83 (H)   06/10/2024 2.16 (H)   2024 1.55 (H)   2024 1.40     INR,(POC) (no units)   Date Value   2024 1.4   2024 1.4   2024 1.1     For Pharmacy Admin Tracking Only    Intervention Detail: Adherence Monitorin and Dose Adjustment: 1, reason: Therapy Optimization  Total # of Interventions Recommended: 2  Total # of Interventions Accepted: 2  Time Spent (min): 15

## 2024-07-24 ENCOUNTER — OFFICE VISIT (OUTPATIENT)
Dept: PRIMARY CARE CLINIC | Age: 74
End: 2024-07-24
Payer: MEDICARE

## 2024-07-24 DIAGNOSIS — E55.9 VITAMIN D DEFICIENCY: ICD-10-CM

## 2024-07-24 DIAGNOSIS — I25.10 CORONARY ARTERY DISEASE DUE TO LIPID RICH PLAQUE: ICD-10-CM

## 2024-07-24 DIAGNOSIS — I25.83 CORONARY ARTERY DISEASE DUE TO LIPID RICH PLAQUE: ICD-10-CM

## 2024-07-24 DIAGNOSIS — J02.9 SORE THROAT: ICD-10-CM

## 2024-07-24 DIAGNOSIS — R60.0 BILATERAL LEG EDEMA: Primary | ICD-10-CM

## 2024-07-24 DIAGNOSIS — R60.0 BILATERAL LEG EDEMA: ICD-10-CM

## 2024-07-24 DIAGNOSIS — M17.0 LOCALIZED OSTEOARTHRITIS OF BOTH KNEES: ICD-10-CM

## 2024-07-24 DIAGNOSIS — E78.2 MIXED HYPERLIPIDEMIA: ICD-10-CM

## 2024-07-24 DIAGNOSIS — R06.02 SHORTNESS OF BREATH: ICD-10-CM

## 2024-07-24 DIAGNOSIS — I10 ESSENTIAL HYPERTENSION: ICD-10-CM

## 2024-07-24 LAB
25(OH)D3 SERPL-MCNC: 21.8 NG/ML
ALBUMIN SERPL-MCNC: 4.2 G/DL (ref 3.4–5)
ALBUMIN/GLOB SERPL: 1.4 {RATIO} (ref 1.1–2.2)
ALP SERPL-CCNC: 79 U/L (ref 40–129)
ALT SERPL-CCNC: 6 U/L (ref 10–40)
ANION GAP SERPL CALCULATED.3IONS-SCNC: 9 MMOL/L (ref 3–16)
AST SERPL-CCNC: 14 U/L (ref 15–37)
BASOPHILS # BLD: 0 K/UL (ref 0–0.2)
BASOPHILS NFR BLD: 0.5 %
BILIRUB SERPL-MCNC: 0.6 MG/DL (ref 0–1)
BUN SERPL-MCNC: 6 MG/DL (ref 7–20)
CALCIUM SERPL-MCNC: 10.2 MG/DL (ref 8.3–10.6)
CHLORIDE SERPL-SCNC: 101 MMOL/L (ref 99–110)
CO2 SERPL-SCNC: 30 MMOL/L (ref 21–32)
CREAT SERPL-MCNC: 0.6 MG/DL (ref 0.6–1.2)
DEPRECATED RDW RBC AUTO: 14.6 % (ref 12.4–15.4)
EOSINOPHIL # BLD: 0 K/UL (ref 0–0.6)
EOSINOPHIL NFR BLD: 0.7 %
GFR SERPLBLD CREATININE-BSD FMLA CKD-EPI: >90 ML/MIN/{1.73_M2}
GLUCOSE SERPL-MCNC: 106 MG/DL (ref 70–99)
HCT VFR BLD AUTO: 37.7 % (ref 36–48)
HGB BLD-MCNC: 13 G/DL (ref 12–16)
LYMPHOCYTES # BLD: 1.4 K/UL (ref 1–5.1)
LYMPHOCYTES NFR BLD: 30.1 %
MCH RBC QN AUTO: 30.6 PG (ref 26–34)
MCHC RBC AUTO-ENTMCNC: 34.4 G/DL (ref 31–36)
MCV RBC AUTO: 89 FL (ref 80–100)
MONOCYTES # BLD: 0.4 K/UL (ref 0–1.3)
MONOCYTES NFR BLD: 9.4 %
NEUTROPHILS # BLD: 2.8 K/UL (ref 1.7–7.7)
NEUTROPHILS NFR BLD: 59.3 %
PLATELET # BLD AUTO: 182 K/UL (ref 135–450)
PMV BLD AUTO: 9.6 FL (ref 5–10.5)
POTASSIUM SERPL-SCNC: 3.6 MMOL/L (ref 3.5–5.1)
PREALB SERPL-MCNC: 24.8 MG/DL (ref 20–40)
PROT SERPL-MCNC: 7.1 G/DL (ref 6.4–8.2)
RBC # BLD AUTO: 4.24 M/UL (ref 4–5.2)
SODIUM SERPL-SCNC: 140 MMOL/L (ref 136–145)
TSH SERPL DL<=0.005 MIU/L-ACNC: 0.51 UIU/ML (ref 0.27–4.2)
WBC # BLD AUTO: 4.7 K/UL (ref 4–11)

## 2024-07-24 PROCEDURE — 1123F ACP DISCUSS/DSCN MKR DOCD: CPT | Performed by: INTERNAL MEDICINE

## 2024-07-24 PROCEDURE — 1036F TOBACCO NON-USER: CPT | Performed by: INTERNAL MEDICINE

## 2024-07-24 PROCEDURE — G8427 DOCREV CUR MEDS BY ELIG CLIN: HCPCS | Performed by: INTERNAL MEDICINE

## 2024-07-24 PROCEDURE — 1090F PRES/ABSN URINE INCON ASSESS: CPT | Performed by: INTERNAL MEDICINE

## 2024-07-24 PROCEDURE — 3017F COLORECTAL CA SCREEN DOC REV: CPT | Performed by: INTERNAL MEDICINE

## 2024-07-24 PROCEDURE — 3079F DIAST BP 80-89 MM HG: CPT | Performed by: INTERNAL MEDICINE

## 2024-07-24 PROCEDURE — 3074F SYST BP LT 130 MM HG: CPT | Performed by: INTERNAL MEDICINE

## 2024-07-24 PROCEDURE — G8417 CALC BMI ABV UP PARAM F/U: HCPCS | Performed by: INTERNAL MEDICINE

## 2024-07-24 PROCEDURE — 99214 OFFICE O/P EST MOD 30 MIN: CPT | Performed by: INTERNAL MEDICINE

## 2024-07-24 PROCEDURE — G8399 PT W/DXA RESULTS DOCUMENT: HCPCS | Performed by: INTERNAL MEDICINE

## 2024-07-24 RX ORDER — EZETIMIBE 10 MG/1
10 TABLET ORAL DAILY
Qty: 90 TABLET | Refills: 1 | Status: SHIPPED | OUTPATIENT
Start: 2024-07-24

## 2024-07-24 RX ORDER — CEFUROXIME AXETIL 250 MG/1
250 TABLET ORAL 2 TIMES DAILY
Qty: 14 TABLET | Refills: 0 | Status: SHIPPED | OUTPATIENT
Start: 2024-07-24 | End: 2024-07-31

## 2024-07-24 NOTE — PROGRESS NOTES
mitral valve without evidence of stenosis. There is mild-to-moderate mitral regurgitation. Indeterminate diastolic function.        Gastrointestinal:  Negative for abdominal pain, anorexia, change in bowel habit, nausea and vomiting.        Colonoscopy >    No FH of ca colon    Endocrine:        No FH of Diabetes       3 cm left thyroid nodule.  Recommend nonemergent thyroid ultrasound      Genitourinary:  Negative for menstrual problem, urgency and vaginal discharge.        Mammogram  5/23    Katherine Menopause . 4 lkids   Nl gestation    Musculoskeletal:  Positive for back pain and gait problem. Negative for arthralgias, myalgias and neck pain.        DEXA 4/23  osteoporosis   Skin:  Negative for rash.   Allergic/Immunologic: Negative for environmental allergies and food allergies.   Neurological:  Negative for dizziness, vertigo, weakness and headaches.        3/23   MRI  Brain  Cerebral atrophy.  Severe chronic small vessel ischemic changes.  Remote  lacunar infarcts in the left cerebellum, left kosta, and basal ganglia and  thalami.  No acute brain parenchymal abnormality.      Previous hemorrhagic stroke in  2018       Psychiatric/Behavioral:  Negative for behavioral problems, dysphoric mood and sleep disturbance. The patient is not nervous/anxious.           Objective   Physical Exam  Constitutional:       General: She is not in acute distress.     Appearance: Normal appearance. She is not ill-appearing, toxic-appearing or diaphoretic.   HENT:      Head: Normocephalic and atraumatic.      Right Ear: Tympanic membrane and ear canal normal.      Left Ear: Tympanic membrane and ear canal normal.      Nose: Nose normal.      Mouth/Throat:      Pharynx: Posterior oropharyngeal erythema present.      Comments: Redness of the left posterior oropharynx and redness of the left submandibular gland  Eyes:      Extraocular Movements: Extraocular movements intact.      Pupils: Pupils are equal, round, and reactive to light.

## 2024-07-25 VITALS
BODY MASS INDEX: 29.41 KG/M2 | OXYGEN SATURATION: 97 % | WEIGHT: 187.4 LBS | HEART RATE: 77 BPM | TEMPERATURE: 98.2 F | DIASTOLIC BLOOD PRESSURE: 85 MMHG | HEIGHT: 67 IN | SYSTOLIC BLOOD PRESSURE: 122 MMHG

## 2024-07-25 ASSESSMENT — ENCOUNTER SYMPTOMS
NAUSEA: 0
VOMITING: 0
CHANGE IN BOWEL HABIT: 0
ABDOMINAL PAIN: 0
COUGH: 0
SHORTNESS OF BREATH: 1
SWOLLEN GLANDS: 1
ORTHOPNEA: 0
BLURRED VISION: 0
WHEEZING: 0
VISUAL CHANGE: 0
SORE THROAT: 1
CHEST TIGHTNESS: 0
BACK PAIN: 1

## 2024-07-25 NOTE — RESULT ENCOUNTER NOTE
Normal kidney function and potassium level.  Normal liver function test.  Adequate protein level checked due to leg edema.  Normal thyroid function.  No anemia.

## 2024-07-29 ENCOUNTER — TELEPHONE (OUTPATIENT)
Dept: PRIMARY CARE CLINIC | Age: 74
End: 2024-07-29

## 2024-07-29 RX ORDER — ACETAMINOPHEN 500 MG
500 TABLET ORAL EVERY 6 HOURS PRN
Qty: 120 TABLET | Refills: 1 | Status: SHIPPED | OUTPATIENT
Start: 2024-07-29

## 2024-07-29 NOTE — TELEPHONE ENCOUNTER
Nurse from [facility] spoke to Emilee this AM. She said her legs are still swollen despite increasing Spironolactone to BID. Her B/p 121/82 with physical therapy this AM. She would also like a refill of Tramadol for her pain sent to [facility]. Please call Emilee to advise [phone]

## 2024-07-30 ENCOUNTER — ANTI-COAG VISIT (OUTPATIENT)
Dept: PHARMACY | Age: 74
End: 2024-07-30
Payer: MEDICARE

## 2024-07-30 ENCOUNTER — HOSPITAL ENCOUNTER (OUTPATIENT)
Age: 74
Discharge: HOME | End: 2024-08-01
Attending: INTERNAL MEDICINE
Payer: MEDICARE

## 2024-07-30 VITALS
HEIGHT: 67 IN | SYSTOLIC BLOOD PRESSURE: 163 MMHG | DIASTOLIC BLOOD PRESSURE: 116 MMHG | BODY MASS INDEX: 29.35 KG/M2 | WEIGHT: 187 LBS

## 2024-07-30 DIAGNOSIS — I48.21 PERMANENT ATRIAL FIBRILLATION (HCC): Primary | ICD-10-CM

## 2024-07-30 DIAGNOSIS — R06.02 SHORTNESS OF BREATH: ICD-10-CM

## 2024-07-30 LAB
ECHO AO ASC DIAM: 4.5 CM
ECHO AO ASCENDING AORTA INDEX: 2.28 CM/M2
ECHO AO ROOT DIAM: 3.4 CM
ECHO AO ROOT INDEX: 1.73 CM/M2
ECHO BSA: 2 M2
ECHO EST RA PRESSURE: 15 MMHG
ECHO IVC PROX: 2.2 CM
ECHO LA AREA 2C: 24.8 CM2
ECHO LA AREA 4C: 24.5 CM2
ECHO LA DIAMETER INDEX: 3.15 CM/M2
ECHO LA DIAMETER: 6.2 CM
ECHO LA MAJOR AXIS: 6.4 CM
ECHO LA MINOR AXIS: 5.9 CM
ECHO LA TO AORTIC ROOT RATIO: 1.82
ECHO LA VOL BP: 82 ML (ref 22–52)
ECHO LA VOL MOD A2C: 83 ML (ref 22–52)
ECHO LA VOL MOD A4C: 75 ML (ref 22–52)
ECHO LA VOL/BSA BIPLANE: 42 ML/M2 (ref 16–34)
ECHO LA VOLUME INDEX MOD A2C: 42 ML/M2 (ref 16–34)
ECHO LA VOLUME INDEX MOD A4C: 38 ML/M2 (ref 16–34)
ECHO LV E' LATERAL VELOCITY: 13 CM/S
ECHO LV E' SEPTAL VELOCITY: 9 CM/S
ECHO LV EDV A2C: 54 ML
ECHO LV EDV A4C: 54 ML
ECHO LV EDV INDEX A4C: 27 ML/M2
ECHO LV EDV NDEX A2C: 27 ML/M2
ECHO LV EJECTION FRACTION A2C: 64 %
ECHO LV EJECTION FRACTION A4C: 62 %
ECHO LV EJECTION FRACTION BIPLANE: 64 % (ref 55–100)
ECHO LV ESV A2C: 20 ML
ECHO LV ESV A4C: 21 ML
ECHO LV ESV INDEX A2C: 10 ML/M2
ECHO LV ESV INDEX A4C: 11 ML/M2
ECHO LV FRACTIONAL SHORTENING: 42 % (ref 28–44)
ECHO LV INTERNAL DIMENSION DIASTOLE INDEX: 2.18 CM/M2
ECHO LV INTERNAL DIMENSION DIASTOLIC: 4.3 CM (ref 3.9–5.3)
ECHO LV INTERNAL DIMENSION SYSTOLIC INDEX: 1.27 CM/M2
ECHO LV INTERNAL DIMENSION SYSTOLIC: 2.5 CM
ECHO LV IVSD: 1.1 CM (ref 0.6–0.9)
ECHO LV MASS 2D: 162.9 G (ref 67–162)
ECHO LV MASS INDEX 2D: 82.7 G/M2 (ref 43–95)
ECHO LV POSTERIOR WALL DIASTOLIC: 1.1 CM (ref 0.6–0.9)
ECHO LV RELATIVE WALL THICKNESS RATIO: 0.51
ECHO LVOT AREA: 3.1 CM2
ECHO LVOT DIAM: 2 CM
ECHO LVOT MEAN GRADIENT: 2 MMHG
ECHO LVOT PEAK GRADIENT VALSALVA: 6 MMHG
ECHO LVOT PEAK GRADIENT: 5 MMHG
ECHO LVOT PEAK VELOCITY: 1.2 M/S
ECHO LVOT STROKE VOLUME INDEX: 31.2 ML/M2
ECHO LVOT SV: 61.5 ML
ECHO LVOT VTI: 19.6 CM
ECHO MV A VELOCITY: 0.64 M/S
ECHO MV AREA VTI: 1.9 CM2
ECHO MV E DECELERATION TIME (DT): 153 MS
ECHO MV E VELOCITY: 1.18 M/S
ECHO MV E/A RATIO: 1.84
ECHO MV E/E' LATERAL: 9.08
ECHO MV E/E' RATIO (AVERAGED): 11.09
ECHO MV E/E' SEPTAL: 13.11
ECHO MV LVOT VTI INDEX: 1.62
ECHO MV MAX VELOCITY: 1.4 M/S
ECHO MV MEAN GRADIENT: 2 MMHG
ECHO MV MEAN VELOCITY: 0.6 M/S
ECHO MV PEAK GRADIENT: 8 MMHG
ECHO MV REGURGITANT PEAK GRADIENT: 96 MMHG
ECHO MV REGURGITANT PEAK VELOCITY: 4.9 M/S
ECHO MV VTI: 31.8 CM
ECHO PULMONARY ARTERY END DIASTOLIC PRESSURE: 11 MMHG
ECHO PV MAX VELOCITY: 1 M/S
ECHO PV PEAK GRADIENT: 4 MMHG
ECHO PV REGURGITANT MAX VELOCITY: 1.7 M/S
ECHO RA AREA 4C: 18.8 CM2
ECHO RA END SYSTOLIC VOLUME APICAL 4 CHAMBER INDEX BSA: 22 ML/M2
ECHO RA VOLUME: 43 ML
ECHO RIGHT VENTRICULAR SYSTOLIC PRESSURE (RVSP): 57 MMHG
ECHO RV BASAL DIMENSION: 2.5 CM
ECHO RV FREE WALL PEAK S': 9 CM/S
ECHO RV LONGITUDINAL DIMENSION: 6.8 CM
ECHO RV MID DIMENSION: 2.3 CM
ECHO RV TAPSE: 1.7 CM (ref 1.7–?)
ECHO TV REGURGITANT MAX VELOCITY: 3.24 M/S
ECHO TV REGURGITANT PEAK GRADIENT: 42 MMHG
INTERNATIONAL NORMALIZATION RATIO, POC: 2.3
PROTHROMBIN TIME, POC: NORMAL

## 2024-07-30 PROCEDURE — 93306 TTE W/DOPPLER COMPLETE: CPT

## 2024-07-30 PROCEDURE — 93306 TTE W/DOPPLER COMPLETE: CPT | Performed by: INTERNAL MEDICINE

## 2024-07-30 PROCEDURE — 99211 OFF/OP EST MAY X REQ PHY/QHP: CPT

## 2024-07-30 PROCEDURE — 85610 PROTHROMBIN TIME: CPT

## 2024-07-30 NOTE — TELEPHONE ENCOUNTER
Pt was advised and states Tylenol makes her stomach hurt to bad. She would like to know if she can have Tramadol instead

## 2024-07-30 NOTE — PROGRESS NOTES
Ms. Emilee Paulson is a 73 y.o. y/o female with history of A fib   She presents today for anticoagulation monitoring and adjustment.    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 Went of memory---> confirmed---> switched Mon and Tues on accident this week --> states that she has been taking warfarin 2 mg tablets daily since d/c from facility 6/21---> follows AVS  ---> taking 5mg daily   []   [x]       S/S bleeding/bruising/swelling/SOB -did bruise shoulder after fall, followed up with doctor and monitoring bump ---> denies   []   [x]       Blood in urine or stool - denies   []   [x]       Procedures scheduled in the future at this time - Is being assessed for watchman, will keep notified---> had cortisone shot in knee last fri --> none upcoming  []   [x]       Missed Dose-  believes she missed dose yesterday---> strongly denies missed doses or late doses this time---> denies   []   [x]       Extra Dose - Denies    [x]   []       Change in medications She continues with Tylenol 1000mg but only as needed --> inc tylenol to 3x/day --> less tylenol, once a day --> naproxen prn but doctor advised against and gave voltaren and tylenol -->  took prednisone 40 mg x 6 d on 7/3. No tylenol currently ---> inc lasix --> has been taking more tylenol acutely --> no changes, still taking tylenol --> has been taking more tylenol this week --> prednisone taper from hospital stay (finished last week per pt but if took as script stated would be finishing in next few days) and bactrim (finished yesterday per pt, but if followed script instructions should have 10 d left). No tylenol---> denies changes, no abx---> got zetia and ceftin for 7 days 7/24, did not call, voltaren gel     []   [x]       Change in health/diet/appetite-- normally has high

## 2024-07-31 ENCOUNTER — HOSPITAL ENCOUNTER (OUTPATIENT)
Dept: VASCULAR LAB | Age: 74
Discharge: HOME | End: 2024-08-02
Attending: INTERNAL MEDICINE
Payer: MEDICARE

## 2024-07-31 DIAGNOSIS — R60.0 BILATERAL LEG EDEMA: ICD-10-CM

## 2024-07-31 PROCEDURE — 93970 EXTREMITY STUDY: CPT

## 2024-08-02 DIAGNOSIS — I77.810 AORTIC ROOT DILATATION (HCC): Primary | ICD-10-CM

## 2024-08-02 DIAGNOSIS — I34.0 NONRHEUMATIC MITRAL VALVE REGURGITATION: ICD-10-CM

## 2024-08-02 DIAGNOSIS — I34.0 NONRHEUMATIC MITRAL VALVE REGURGITATION: Primary | ICD-10-CM

## 2024-08-02 DIAGNOSIS — I48.20 CHRONIC ATRIAL FIBRILLATION (HCC): ICD-10-CM

## 2024-08-02 NOTE — RESULT ENCOUNTER NOTE
Asked patient to make an appointment with her cardiologist to follow-up on her mitral valve leakage and aortic root dilatation.    Emmanuel Roman MD   [address] [place] [phone] (Fax)

## 2024-08-06 ENCOUNTER — ANTI-COAG VISIT (OUTPATIENT)
Dept: PHARMACY | Age: 74
End: 2024-08-06
Payer: MEDICARE

## 2024-08-06 DIAGNOSIS — I48.21 PERMANENT ATRIAL FIBRILLATION (HCC): Primary | ICD-10-CM

## 2024-08-06 LAB
INTERNATIONAL NORMALIZATION RATIO, POC: 3.1
PROTHROMBIN TIME, POC: NORMAL

## 2024-08-06 PROCEDURE — 99211 OFF/OP EST MAY X REQ PHY/QHP: CPT

## 2024-08-06 PROCEDURE — 85610 PROTHROMBIN TIME: CPT

## 2024-08-06 NOTE — PROGRESS NOTES
Ms. Emilee Paulson is a 73 y.o. y/o female with history of A fib   She presents today for anticoagulation monitoring and adjustment.    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 Went of memory---> confirmed---> switched Mon and Tues on accident this week --> states that she has been taking warfarin 2 mg tablets daily since d/c from facility 6/21---> follows AVS  ---> taking 5mg daily   []   [x]       S/S bleeding/bruising/swelling/SOB -did bruise shoulder after fall, followed up with doctor and monitoring bump ---> denies   []   [x]       Blood in urine or stool - denies   []   [x]       Procedures scheduled in the future at this time - Is being assessed for watchman, will keep notified---> had cortisone shot in knee last fri --> none upcoming  []   [x]       Missed Dose-  believes she missed dose yesterday---> strongly denies missed doses or late doses this time---> denies   []   [x]       Extra Dose - Denies    [x]   []       Change in medications She continues with Tylenol 1000mg but only as needed --> inc tylenol to 3x/day --> less tylenol, once a day --> naproxen prn but doctor advised against and gave voltaren and tylenol -->  took prednisone 40 mg x 6 d on 7/3. No tylenol currently ---> inc lasix --> has been taking more tylenol acutely --> no changes, still taking tylenol --> has been taking more tylenol this week --> prednisone taper from hospital stay (finished last week per pt but if took as script stated would be finishing in next few days) and bactrim (finished yesterday per pt, but if followed script instructions should have 10 d left). No tylenol---> denies changes, no abx---> got zetia and ceftin for 7 days 7/24, did not call, voltaren gel---> tylenol arthritis recently       []   [x]       Change in

## 2024-08-07 ENCOUNTER — OFFICE VISIT (OUTPATIENT)
Dept: PRIMARY CARE CLINIC | Age: 74
End: 2024-08-07
Payer: MEDICARE

## 2024-08-07 VITALS
HEART RATE: 77 BPM | OXYGEN SATURATION: 98 % | HEIGHT: 67 IN | SYSTOLIC BLOOD PRESSURE: 140 MMHG | DIASTOLIC BLOOD PRESSURE: 92 MMHG | WEIGHT: 192 LBS | TEMPERATURE: 99 F | BODY MASS INDEX: 30.13 KG/M2

## 2024-08-07 DIAGNOSIS — I10 PRIMARY HYPERTENSION: ICD-10-CM

## 2024-08-07 DIAGNOSIS — I73.9 INTERMITTENT CLAUDICATION (HCC): Primary | ICD-10-CM

## 2024-08-07 DIAGNOSIS — I27.20 PULMONARY HYPERTENSION (HCC): ICD-10-CM

## 2024-08-07 DIAGNOSIS — I77.810 AORTIC ROOT DILATATION (HCC): ICD-10-CM

## 2024-08-07 PROCEDURE — 1090F PRES/ABSN URINE INCON ASSESS: CPT | Performed by: INTERNAL MEDICINE

## 2024-08-07 PROCEDURE — 1036F TOBACCO NON-USER: CPT | Performed by: INTERNAL MEDICINE

## 2024-08-07 PROCEDURE — 3017F COLORECTAL CA SCREEN DOC REV: CPT | Performed by: INTERNAL MEDICINE

## 2024-08-07 PROCEDURE — 1123F ACP DISCUSS/DSCN MKR DOCD: CPT | Performed by: INTERNAL MEDICINE

## 2024-08-07 PROCEDURE — G8427 DOCREV CUR MEDS BY ELIG CLIN: HCPCS | Performed by: INTERNAL MEDICINE

## 2024-08-07 PROCEDURE — 3077F SYST BP >= 140 MM HG: CPT | Performed by: INTERNAL MEDICINE

## 2024-08-07 PROCEDURE — G8417 CALC BMI ABV UP PARAM F/U: HCPCS | Performed by: INTERNAL MEDICINE

## 2024-08-07 PROCEDURE — 3080F DIAST BP >= 90 MM HG: CPT | Performed by: INTERNAL MEDICINE

## 2024-08-07 PROCEDURE — 99214 OFFICE O/P EST MOD 30 MIN: CPT | Performed by: INTERNAL MEDICINE

## 2024-08-07 PROCEDURE — G8399 PT W/DXA RESULTS DOCUMENT: HCPCS | Performed by: INTERNAL MEDICINE

## 2024-08-07 RX ORDER — HYDRALAZINE HYDROCHLORIDE 25 MG/1
50 TABLET, FILM COATED ORAL 3 TIMES DAILY
Qty: 270 TABLET | Refills: 2
Start: 2024-08-07

## 2024-08-07 RX ORDER — AMLODIPINE BESYLATE 10 MG/1
5 TABLET ORAL DAILY
Qty: 90 TABLET | Refills: 1 | Status: SHIPPED | OUTPATIENT
Start: 2024-08-07

## 2024-08-07 NOTE — PROGRESS NOTES
Emilee Paulson (:  1950) is a 73 y.o. female,Established patient, here for evaluation of the following chief complaint(s):  Edema (Bilateral Legs and feet)      Assessment & Plan   ASSESSMENT/PLAN:  1. Intermittent claudication (HCC) patient states leg swelling not better and leg pain persists with walking with decreased pulses in the feet so we will get arterial Dopplers of the lower extremities to evaluate circulation.  -     Vascular duplex lower extremity arteries bilateral; Future  -     Ambulatory Referral to Care Management  2. Primary hypertension   Patient is well-perfused and not clammy not having shortness of breath or chest pain.  Concerned that amlodipine although needed for blood pressure is contributing to edema will reduce amlodipine to 5 mg daily and adjust hydralazine from 25 mg - 50 mg 3 times a day, since reducing the dose of amlodipine.  BP Readings from Last 3 Encounters:   24 113/88   24 (!) 74/60   24 (!) 140/92        -     hydrALAZINE (APRESOLINE) 25 MG tablet; Take 2 tablets by mouth 3 times daily, Disp-270 tablet, R-2NO PRINT  -     amLODIPine (NORVASC) 10 MG tablet; Take 0.5 tablets by mouth daily, Disp-90 tablet, R-1Normal  -     Ambulatory Referral to Care Management  3. Aortic root dilatation (HCC) patient referred to cardiology to further evaluate.  -     Joaquin Duran DO, Cardiology, [facility]  -     Ambulatory Referral to Care Management  4. Pulmonary hypertension (HCC) patient referred to cardiology to further evaluate  -     Joaquin Duran DO, Cardiology, [facility]  -     Ambulatory Referral to Care Management            Return in about 4 weeks (around 2024).         Subjective   SUBJECTIVE/OBJECTIVE:  Leg Pain   Incident onset: Leg pain with walking gradually increasing for the past month. Incident location: Patient has chronic atrial fibrillation and chronic systolic heart failure and aortic root dilatation and

## 2024-08-08 ENCOUNTER — HOSPITAL ENCOUNTER (EMERGENCY)
Age: 74
Discharge: HOME | End: 2024-08-08
Attending: EMERGENCY MEDICINE
Payer: MEDICARE

## 2024-08-08 ENCOUNTER — APPOINTMENT (OUTPATIENT)
Dept: GENERAL RADIOLOGY | Age: 74
End: 2024-08-08
Payer: MEDICARE

## 2024-08-08 ENCOUNTER — OFFICE VISIT (OUTPATIENT)
Dept: CARDIOLOGY CLINIC | Age: 74
End: 2024-08-08
Payer: MEDICARE

## 2024-08-08 VITALS
HEART RATE: 64 BPM | BODY MASS INDEX: 29.91 KG/M2 | DIASTOLIC BLOOD PRESSURE: 88 MMHG | TEMPERATURE: 98 F | WEIGHT: 191 LBS | RESPIRATION RATE: 16 BRPM | SYSTOLIC BLOOD PRESSURE: 113 MMHG | OXYGEN SATURATION: 98 %

## 2024-08-08 VITALS
HEIGHT: 67 IN | BODY MASS INDEX: 30.13 KG/M2 | WEIGHT: 192 LBS | OXYGEN SATURATION: 96 % | DIASTOLIC BLOOD PRESSURE: 60 MMHG | HEART RATE: 74 BPM | SYSTOLIC BLOOD PRESSURE: 74 MMHG

## 2024-08-08 DIAGNOSIS — I95.9 HYPOTENSION, UNSPECIFIED HYPOTENSION TYPE: Primary | ICD-10-CM

## 2024-08-08 DIAGNOSIS — I48.20 CHRONIC ATRIAL FIBRILLATION (HCC): ICD-10-CM

## 2024-08-08 DIAGNOSIS — I50.32 CHRONIC HEART FAILURE WITH PRESERVED EJECTION FRACTION (HCC): ICD-10-CM

## 2024-08-08 DIAGNOSIS — R06.02 SHORTNESS OF BREATH: ICD-10-CM

## 2024-08-08 DIAGNOSIS — E78.2 MIXED HYPERLIPIDEMIA: ICD-10-CM

## 2024-08-08 DIAGNOSIS — I10 ESSENTIAL HYPERTENSION: Primary | ICD-10-CM

## 2024-08-08 LAB
ALBUMIN SERPL-MCNC: 4.1 G/DL (ref 3.4–5)
ALBUMIN/GLOB SERPL: 1.4 {RATIO} (ref 1.1–2.2)
ALP SERPL-CCNC: 73 U/L (ref 40–129)
ALT SERPL-CCNC: 7 U/L (ref 10–40)
ANION GAP SERPL CALCULATED.3IONS-SCNC: 10 MMOL/L (ref 3–16)
AST SERPL-CCNC: 16 U/L (ref 15–37)
BACTERIA URNS QL MICRO: ABNORMAL /HPF
BASOPHILS # BLD: 0 K/UL (ref 0–0.2)
BASOPHILS NFR BLD: 1 %
BILIRUB SERPL-MCNC: 0.7 MG/DL (ref 0–1)
BILIRUB UR QL STRIP.AUTO: NEGATIVE
BUN SERPL-MCNC: 13 MG/DL (ref 7–20)
CALCIUM SERPL-MCNC: 9.5 MG/DL (ref 8.3–10.6)
CHLORIDE SERPL-SCNC: 101 MMOL/L (ref 99–110)
CLARITY UR: CLEAR
CO2 SERPL-SCNC: 26 MMOL/L (ref 21–32)
COLOR UR: YELLOW
CREAT SERPL-MCNC: 0.7 MG/DL (ref 0.6–1.2)
DEPRECATED RDW RBC AUTO: 14.5 % (ref 12.4–15.4)
EKG DIAGNOSIS: NORMAL
EKG Q-T INTERVAL: 412 MS
EKG QRS DURATION: 86 MS
EKG QTC CALCULATION (BAZETT): 444 MS
EKG R AXIS: -22 DEGREES
EKG T AXIS: -10 DEGREES
EKG VENTRICULAR RATE: 70 BPM
EOSINOPHIL # BLD: 0.1 K/UL (ref 0–0.6)
EOSINOPHIL NFR BLD: 2.5 %
EPI CELLS #/AREA URNS AUTO: 2 /HPF (ref 0–5)
GFR SERPLBLD CREATININE-BSD FMLA CKD-EPI: >90 ML/MIN/{1.73_M2}
GLUCOSE SERPL-MCNC: 99 MG/DL (ref 70–99)
GLUCOSE UR STRIP.AUTO-MCNC: NEGATIVE MG/DL
HCT VFR BLD AUTO: 36.6 % (ref 36–48)
HGB BLD-MCNC: 12.4 G/DL (ref 12–16)
HGB UR QL STRIP.AUTO: NEGATIVE
HYALINE CASTS #/AREA URNS AUTO: 20 /LPF (ref 0–8)
HYALINE CASTS: PRESENT
INR PPP: 2.83 (ref 0.85–1.15)
KETONES UR STRIP.AUTO-MCNC: NEGATIVE MG/DL
LEUKOCYTE ESTERASE UR QL STRIP.AUTO: ABNORMAL
LYMPHOCYTES # BLD: 1.2 K/UL (ref 1–5.1)
LYMPHOCYTES NFR BLD: 28.8 %
MCH RBC QN AUTO: 30.2 PG (ref 26–34)
MCHC RBC AUTO-ENTMCNC: 33.7 G/DL (ref 31–36)
MCV RBC AUTO: 89.6 FL (ref 80–100)
MONOCYTES # BLD: 0.5 K/UL (ref 0–1.3)
MONOCYTES NFR BLD: 10.6 %
NEUTROPHILS # BLD: 2.5 K/UL (ref 1.7–7.7)
NEUTROPHILS NFR BLD: 57.1 %
NITRITE UR QL STRIP.AUTO: NEGATIVE
PH UR STRIP.AUTO: 5.5 [PH] (ref 5–8)
PLATELET # BLD AUTO: 139 K/UL (ref 135–450)
PMV BLD AUTO: 9.5 FL (ref 5–10.5)
POTASSIUM SERPL-SCNC: 3.7 MMOL/L (ref 3.5–5.1)
PROT SERPL-MCNC: 7 G/DL (ref 6.4–8.2)
PROT UR STRIP.AUTO-MCNC: NEGATIVE MG/DL
PROTHROMBIN TIME: 29.6 SEC (ref 11.9–14.9)
RBC # BLD AUTO: 4.09 M/UL (ref 4–5.2)
RBC CLUMPS #/AREA URNS AUTO: 1 /HPF (ref 0–4)
SODIUM SERPL-SCNC: 137 MMOL/L (ref 136–145)
SP GR UR STRIP.AUTO: 1.01 (ref 1–1.03)
TROPONIN, HIGH SENSITIVITY: 6 NG/L (ref 0–14)
TROPONIN, HIGH SENSITIVITY: 9 NG/L (ref 0–14)
UA COMPLETE W REFLEX CULTURE PNL UR: ABNORMAL
UA DIPSTICK W REFLEX MICRO PNL UR: YES
URN SPEC COLLECT METH UR: ABNORMAL
UROBILINOGEN UR STRIP-ACNC: 1 E.U./DL
WBC # BLD AUTO: 4.3 K/UL (ref 4–11)
WBC #/AREA URNS AUTO: 1 /HPF (ref 0–5)

## 2024-08-08 PROCEDURE — 3074F SYST BP LT 130 MM HG: CPT | Performed by: INTERNAL MEDICINE

## 2024-08-08 PROCEDURE — 99214 OFFICE O/P EST MOD 30 MIN: CPT | Performed by: INTERNAL MEDICINE

## 2024-08-08 PROCEDURE — 36415 COLL VENOUS BLD VENIPUNCTURE: CPT

## 2024-08-08 PROCEDURE — 1090F PRES/ABSN URINE INCON ASSESS: CPT | Performed by: INTERNAL MEDICINE

## 2024-08-08 PROCEDURE — G8399 PT W/DXA RESULTS DOCUMENT: HCPCS | Performed by: INTERNAL MEDICINE

## 2024-08-08 PROCEDURE — 93005 ELECTROCARDIOGRAM TRACING: CPT | Performed by: EMERGENCY MEDICINE

## 2024-08-08 PROCEDURE — 1123F ACP DISCUSS/DSCN MKR DOCD: CPT | Performed by: INTERNAL MEDICINE

## 2024-08-08 PROCEDURE — 71045 X-RAY EXAM CHEST 1 VIEW: CPT

## 2024-08-08 PROCEDURE — 3078F DIAST BP <80 MM HG: CPT | Performed by: INTERNAL MEDICINE

## 2024-08-08 PROCEDURE — 80053 COMPREHEN METABOLIC PANEL: CPT

## 2024-08-08 PROCEDURE — 2580000003 HC RX 258: Performed by: EMERGENCY MEDICINE

## 2024-08-08 PROCEDURE — 1036F TOBACCO NON-USER: CPT | Performed by: INTERNAL MEDICINE

## 2024-08-08 PROCEDURE — G8427 DOCREV CUR MEDS BY ELIG CLIN: HCPCS | Performed by: INTERNAL MEDICINE

## 2024-08-08 PROCEDURE — 85610 PROTHROMBIN TIME: CPT

## 2024-08-08 PROCEDURE — 96360 HYDRATION IV INFUSION INIT: CPT

## 2024-08-08 PROCEDURE — 96361 HYDRATE IV INFUSION ADD-ON: CPT

## 2024-08-08 PROCEDURE — 93010 ELECTROCARDIOGRAM REPORT: CPT | Performed by: INTERNAL MEDICINE

## 2024-08-08 PROCEDURE — 3017F COLORECTAL CA SCREEN DOC REV: CPT | Performed by: INTERNAL MEDICINE

## 2024-08-08 PROCEDURE — 81001 URINALYSIS AUTO W/SCOPE: CPT

## 2024-08-08 PROCEDURE — 93000 ELECTROCARDIOGRAM COMPLETE: CPT | Performed by: INTERNAL MEDICINE

## 2024-08-08 PROCEDURE — 85025 COMPLETE CBC W/AUTO DIFF WBC: CPT

## 2024-08-08 PROCEDURE — G8417 CALC BMI ABV UP PARAM F/U: HCPCS | Performed by: INTERNAL MEDICINE

## 2024-08-08 PROCEDURE — 99285 EMERGENCY DEPT VISIT HI MDM: CPT

## 2024-08-08 PROCEDURE — 84484 ASSAY OF TROPONIN QUANT: CPT

## 2024-08-08 RX ORDER — 0.9 % SODIUM CHLORIDE 0.9 %
500 INTRAVENOUS SOLUTION INTRAVENOUS ONCE
Status: COMPLETED | OUTPATIENT
Start: 2024-08-08 | End: 2024-08-08

## 2024-08-08 RX ADMIN — SODIUM CHLORIDE 500 ML: 9 INJECTION, SOLUTION INTRAVENOUS at 16:17

## 2024-08-08 ASSESSMENT — PAIN - FUNCTIONAL ASSESSMENT: PAIN_FUNCTIONAL_ASSESSMENT: 0-10

## 2024-08-08 ASSESSMENT — PAIN SCALES - GENERAL: PAINLEVEL_OUTOF10: 7

## 2024-08-08 ASSESSMENT — ENCOUNTER SYMPTOMS
SHORTNESS OF BREATH: 1
CHEST TIGHTNESS: 0
COUGH: 0
ABDOMINAL PAIN: 0
BLOOD IN STOOL: 0
PHOTOPHOBIA: 0

## 2024-08-08 NOTE — PROGRESS NOTES
Pharmacy Home Medication Reconciliation Note    A medication reconciliation has been completed for Emilee Paulson 1950    Pharmacy: [facility] [address] [place] [address]  Information provided by: patient    The patient's home medication list is as follows:  No current facility-administered medications on file prior to encounter.     Current Outpatient Medications on File Prior to Encounter   Medication Sig Dispense Refill    hydrALAZINE (APRESOLINE) 25 MG tablet Take 2 tablets by mouth 3 times daily 270 tablet 2    amLODIPine (NORVASC) 10 MG tablet Take 0.5 tablets by mouth daily 90 tablet 1    acetaminophen (TYLENOL) 500 MG tablet Take 1 tablet by mouth every 6 hours as needed for Pain 120 tablet 1    diclofenac sodium (VOLTAREN) 1 % GEL Apply 4 g topically 4 times daily (Patient taking differently: Apply 4 g topically 4 times daily Right knee arthritis pain.) 100 g 4    ezetimibe (ZETIA) 10 MG tablet Take 1 tablet by mouth daily (Patient not taking: Reported on 8/8/2024) 90 tablet 1    omeprazole (PRILOSEC) 40 MG delayed release capsule Take 1 capsule by mouth Daily 90 capsule 1    atorvastatin (LIPITOR) 80 MG tablet Take 1 tablet by mouth daily (Patient taking differently: Take 1 tablet by mouth nightly) 90 tablet 4    furosemide (LASIX) 20 MG tablet Take 1 tablet by mouth daily 90 tablet 1    Misc. Devices (ROLLER WALKER) MISC 1 each by Does not apply route daily 1 each 0    vitamin D (ERGOCALCIFEROL) 1.25 MG (31135 UT) CAPS capsule Take 1 capsule by mouth once a week Saturday (Patient taking differently: Take 1 capsule by mouth once a week Saturdays.) 12 capsule 4    carvedilol (COREG) 25 MG tablet Take 1 tablet by mouth 2 times daily (with meals) 180 tablet 0    spironolactone (ALDACTONE) 50 MG tablet Take 1 tablet by mouth daily 90 tablet 0    valsartan (DIOVAN) 320 MG tablet Take 1 tablet by mouth daily 90 tablet 1    aspirin 81 MG chewable tablet Take 1 tablet by mouth daily 30 tablet

## 2024-08-08 NOTE — ED PROVIDER NOTES
[facility] Emergency Department      Pt Name: Emilee Paulson  MRN: [id number]  Birthdate 1950  Date of evaluation: 8/8/2024  Provider: PAZ CHURCHILL MD  CHIEF COMPLAINT  Chief Complaint   Patient presents with    Atrial Fibrillation     Pt reports fluttering sensation of a fib onset 1330.  Brought in by heart institute for reported BP in the 70s.  Pt reports fatigue & SOB with increased phlegm production for several days; leg swelling for approx one month.  Hx of afib; takes Coumadin.        HPI  Emilee Paulson is a 73 y.o. female who presents because of fluttering in chest, fatigue.  She has a history of atrial fibrillation and is anticoagulated.  She denies any chest pain.  She noticed palpitations about 1:30 PM.  She was seen at the cardiology office and sent here because of low blood pressure.  She denies any syncope.  She has noticed some leg swelling for about a month.  Denies any leg pain.  She occasionally has cough with some increased phlegm production occasion.  Denies any fever.  She has had multiple health issues over the past several months including evaluations for stroke.    REVIEW OF SYSTEMS:  No fever, no new focal weakness, no change in urination Pertinent positives and negatives as per the HPI.  All other pertinent review of systems reviewed and negative.  Nursing notes reviewed.    PAST MEDICAL HISTORY  Past Medical History:   Diagnosis Date    Acute cerebrovascular accident (CVA) (Formerly [facility]) 01/28/2020    brain bleed    LAST (acute kidney injury) (Formerly [facility]) 10/20/2022    Atrial fibrillation (Formerly [facility])     Bell palsy     diagnosed 15 years ago    CAD (coronary artery disease)     Cerebral artery occlusion with cerebral infarction ([facility])     Chronic diastolic CHF (congestive heart failure) (Formerly [facility]) 05/16/2020    CVA (cerebral vascular accident) (Formerly [facility]) 01/04/2017    Hypertension     Intracranial hemorrhage ([facility]) 04/2016    Mixed hyperlipidemia 07/06/2022    Nonintractable headache     currently controlled

## 2024-08-08 NOTE — PROGRESS NOTES
dilated. - Right atrium: The atrium is dilated. - Atrial septum: Agitated saline contrast study at baseline or with   provocation, shows no right-to-left atrial level shunt. - Tricuspid valve: There is moderate regurgitation. - Pulmonary arteries: Systolic pressure was within the normal range.   - Inferior vena cava: The IVC is normal-sized. - Ascending aorta: The vessel is dilated and 4.3cm diameter.     7/30/24   Left Ventricle: Normal left ventricular systolic function with a visually estimated EF of 60 - 65%. EF by 2D Simpsons Biplane is 64%. Left ventricle is mildly dilated. Mildly increased wall thickness. Abnormal arrhythmia noted. Normal wall motion. Grade I diastolic dysfunction with normal LAP.    Aortic Valve: Trileaflet valve.    Mitral Valve: Appears to be mild leaflet prolapse noted of the anterior leaflet. Moderate regurgitation.    Tricuspid Valve: Mild to moderate regurgitation. The estimated RVSP is 57 mmHg.    Left Atrium: Left atrium is severely dilated.    Aorta: Normal sized aortic root. Dilated ascending aorta. Ao ascending diameter is 4.5 cm.    Pericardium: Trivial pericardial effusion present.    Image quality is adequate.     7/24/23  Myoview -Myocardial perfusion is equivocal. -There is a very small anterior apical, fixed, mild-moderate intensity fixed defect c/w breast attenuation vs scar.-This appears similar to 2020 scan.  -LV function is normal with EF=   LHC 9/23/15:   LVEDP - 12. LVgram - severe LVH with EF 70%, enlarged AO root consistent with htn  Cors: LM - nl, LAD - 20% prox, 30%mid, patent stent, distal irreg, LCX - 20% mid,     Holter: 1/30/18  Afib, rate not controlled, bradycardia at nighttime     EKG 9/8/2020   Atrial fibrillaiton HR 87    ASSESSMENT AND PLAN   Hypotension  - associated with edema  - new problem  Plan  - refer to ER for evaluations      2. Permanent Non-valvular Atrial Fibrillation  - [id number] score: 7, on Warfarin   - HASBLED 6  - Hx of hemorrhagic

## 2024-08-10 ASSESSMENT — ENCOUNTER SYMPTOMS
BLURRED VISION: 0
WHEEZING: 0
NAUSEA: 0
SHORTNESS OF BREATH: 0
VOMITING: 0
SORE THROAT: 0
BACK PAIN: 1
ORTHOPNEA: 0
COUGH: 0
CHEST TIGHTNESS: 0
ABDOMINAL PAIN: 0

## 2024-08-16 ENCOUNTER — HOSPITAL ENCOUNTER (OUTPATIENT)
Dept: VASCULAR LAB | Age: 74
End: 2024-08-16
Attending: INTERNAL MEDICINE
Payer: MEDICARE

## 2024-08-16 DIAGNOSIS — I73.9 INTERMITTENT CLAUDICATION (HCC): ICD-10-CM

## 2024-08-16 LAB
VAS LEFT ABI: 0.7
VAS LEFT ANKLE BP: 112 MMHG
VAS LEFT ARM BP: 156 MMHG
VAS LEFT ATA DIST PSV: 31.5 CM/S
VAS LEFT ATA PROX PSV: 133 CM/S
VAS LEFT CFA DIST PSV: 73.6 CM/S
VAS LEFT CFA PROX PSV: 91.1 CM/S
VAS LEFT DORSALIS PEDIS BP: 100 MMHG
VAS LEFT PERONEAL DIST PSV: 91.1 CM/S
VAS LEFT PERONEAL PROX PSV: 50.6 CM/S
VAS LEFT PFA PROX PSV: 106 CM/S
VAS LEFT POP A DIST PSV: 50.2 CM/S
VAS LEFT POP A PROX PSV: 54.9 CM/S
VAS LEFT POP A PROX VEL RATIO: 0.92
VAS LEFT PTA BP: 112 MMHG
VAS LEFT PTA DIST PSV: 21.4 CM/S
VAS LEFT PTA PROX PSV: 30.2 CM/S
VAS LEFT SFA DIST PSV: 59.8 CM/S
VAS LEFT SFA DIST VEL RATIO: 1.03
VAS LEFT SFA MID PSV: 58.1 CM/S
VAS LEFT SFA MID VEL RATIO: 0.75
VAS LEFT SFA PROX PSV: 77.8 CM/S
VAS LEFT SFA PROX VEL RATIO: 0.85
VAS RIGHT ABI: 1.15
VAS RIGHT ANKLE BP: 184 MMHG
VAS RIGHT ARM BP: 160 MMHG
VAS RIGHT ATA DIST PSV: 22 CM/S
VAS RIGHT ATA PROX PSV: 49.2 CM/S
VAS RIGHT CFA DIST PSV: 60.6 CM/S
VAS RIGHT CFA PROX PSV: 84.7 CM/S
VAS RIGHT DORSALIS PEDIS BP: 162 MMHG
VAS RIGHT PERONEAL DIST PSV: 24.1 CM/S
VAS RIGHT PERONEAL PROX PSV: 57.8 CM/S
VAS RIGHT PFA PROX PSV: 41.3 CM/S
VAS RIGHT POP A DIST PSV: 92.2 CM/S
VAS RIGHT POP A PROX PSV: 57.1 CM/S
VAS RIGHT POP A PROX VEL RATIO: 0.84
VAS RIGHT PTA BP: 184 MMHG
VAS RIGHT PTA DIST PSV: 80.3 CM/S
VAS RIGHT PTA PROX PSV: 73.3 CM/S
VAS RIGHT SFA DIST PSV: 67.6 CM/S
VAS RIGHT SFA DIST VEL RATIO: 1.05
VAS RIGHT SFA MID PSV: 64.5 CM/S
VAS RIGHT SFA MID VEL RATIO: 0.8
VAS RIGHT SFA PROX PSV: 82.1 CM/S
VAS RIGHT SFA PROX VEL RATIO: 1

## 2024-08-16 PROCEDURE — 93925 LOWER EXTREMITY STUDY: CPT | Performed by: SURGERY

## 2024-08-16 PROCEDURE — 93925 LOWER EXTREMITY STUDY: CPT

## 2024-08-19 ENCOUNTER — TELEPHONE (OUTPATIENT)
Dept: PHARMACY | Age: 74
End: 2024-08-19

## 2024-08-19 NOTE — TELEPHONE ENCOUNTER
Pt called, states has conflicting apt tomorrow morning and asked to be RS for 8/21.     Giuliana Wright, PharmD, [facility]    For Pharmacy Admin Tracking Only  Total # of Interventions Recommended: 0  Total # of Interventions Accepted: 0  Time Spent (min): 5

## 2024-08-20 ENCOUNTER — APPOINTMENT (OUTPATIENT)
Dept: PHARMACY | Age: 74
End: 2024-08-20
Payer: MEDICARE

## 2024-08-21 ENCOUNTER — ANTI-COAG VISIT (OUTPATIENT)
Dept: PHARMACY | Age: 74
End: 2024-08-21
Payer: MEDICARE

## 2024-08-21 DIAGNOSIS — I48.21 PERMANENT ATRIAL FIBRILLATION (HCC): Primary | ICD-10-CM

## 2024-08-21 LAB
INTERNATIONAL NORMALIZATION RATIO, POC: 3.2
PROTHROMBIN TIME, POC: 0

## 2024-08-21 PROCEDURE — 85610 PROTHROMBIN TIME: CPT

## 2024-08-21 PROCEDURE — 99212 OFFICE O/P EST SF 10 MIN: CPT

## 2024-08-21 NOTE — PROGRESS NOTES
Ms. Emilee Paulson is a 73 y.o. y/o female with history of A fib   She presents today for anticoagulation monitoring and adjustment.    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 Went of memory---> confirmed---> switched Mon and Tues on accident this week --> states that she has been taking warfarin 2 mg tablets daily since d/c from facility 6/21---> follows AVS  ---> taking 5mg daily ---> Confirmed   []   [x]       S/S bleeding/bruising/swelling/SOB -did bruise shoulder after fall, followed up with doctor and monitoring bump ---> denies   []   [x]       Blood in urine or stool - denies   []   [x]       Procedures scheduled in the future at this time - Is being assessed for watchman, will keep notified---> had cortisone shot in knee last fri --> thyroid bx on Monday 8/19, did not hold, goes back 9/3 to make a plan, likely surgery   []   [x]       Missed Dose-  believes she missed dose yesterday---> strongly denies missed doses or late doses this time---> denies   []   [x]       Extra Dose - Denies    [x]   []       Change in medications She continues with Tylenol 1000mg but only as needed --> inc tylenol to 3x/day --> less tylenol, once a day --> naproxen prn but doctor advised against and gave voltaren and tylenol -->  took prednisone 40 mg x 6 d on 7/3. No tylenol currently ---> inc lasix --> has been taking more tylenol acutely --> no changes, still taking tylenol --> has been taking more tylenol this week --> prednisone taper from hospital stay (finished last week per pt but if took as script stated would be finishing in next few days) and bactrim (finished yesterday per pt, but if followed script instructions should have 10 d left). No tylenol---> denies changes, no abx---> got zetia and ceftin for 7 days 7/24, did not

## 2024-08-22 PROBLEM — I73.9 PAD (PERIPHERAL ARTERY DISEASE) (HCC): Status: ACTIVE | Noted: 2024-08-22

## 2024-08-22 NOTE — RESULT ENCOUNTER NOTE
Make a follow-up appointment to discuss treatment of the mild decreased circulation in the left leg.  Continue taking the Zetia 10 mg daily and the atorvastatin 80 mg nightly that helps the circulation.

## 2024-09-02 DIAGNOSIS — I10 PRIMARY HYPERTENSION: ICD-10-CM

## 2024-09-03 RX ORDER — SPIRONOLACTONE 50 MG/1
50 TABLET, FILM COATED ORAL DAILY
Qty: 90 TABLET | Refills: 3 | Status: SHIPPED | OUTPATIENT
Start: 2024-09-03 | End: 2024-09-06 | Stop reason: SDUPTHER

## 2024-09-03 RX ORDER — CARVEDILOL 25 MG/1
25 TABLET ORAL 2 TIMES DAILY WITH MEALS
Qty: 180 TABLET | Refills: 3 | Status: SHIPPED | OUTPATIENT
Start: 2024-09-03 | End: 2024-09-06 | Stop reason: SDUPTHER

## 2024-09-03 NOTE — TELEPHONE ENCOUNTER
Medication:   Requested Prescriptions     Pending Prescriptions Disp Refills    spironolactone (ALDACTONE) 50 MG tablet [Pharmacy Med Name: Spironolactone Oral Tablet 50 MG] 90 tablet 3     Sig: TAKE 1 TABLET EVERY DAY    carvedilol (COREG) 25 MG tablet [Pharmacy Med Name: Carvedilol Oral Tablet 25 MG] 180 tablet 3     Sig: TAKE 1 TABLET TWICE DAILY WITH MEALS        Last Filled:  [unfilled]    Patient Phone Number: [phone] (home)     Last appt: 8/7/2024   Next appt: 9/6/2024    Last OARRS:       1/16/2024     1:50 PM   RX Monitoring   Periodic Controlled Substance Monitoring Possible medication side effects, risk of tolerance/dependence & alternative treatments discussed.;No signs of potential drug abuse or diversion identified.

## 2024-09-04 ENCOUNTER — ANTI-COAG VISIT (OUTPATIENT)
Dept: PHARMACY | Age: 74
End: 2024-09-04
Payer: MEDICARE

## 2024-09-04 DIAGNOSIS — I48.21 PERMANENT ATRIAL FIBRILLATION (HCC): Primary | ICD-10-CM

## 2024-09-04 LAB
INTERNATIONAL NORMALIZATION RATIO, POC: 2.2
PROTHROMBIN TIME, POC: 0

## 2024-09-04 PROCEDURE — 85610 PROTHROMBIN TIME: CPT | Performed by: PHYSICIAN ASSISTANT

## 2024-09-04 PROCEDURE — 99211 OFF/OP EST MAY X REQ PHY/QHP: CPT | Performed by: PHYSICIAN ASSISTANT

## 2024-09-04 NOTE — PROGRESS NOTES
1.4     For Pharmacy Admin Tracking Only    Total # of Interventions Recommended: 0  Total # of Interventions Accepted: 0  Time Spent (min): 15

## 2024-09-06 ENCOUNTER — OFFICE VISIT (OUTPATIENT)
Dept: PRIMARY CARE CLINIC | Age: 74
End: 2024-09-06
Payer: MEDICARE

## 2024-09-06 ENCOUNTER — CARE COORDINATION (OUTPATIENT)
Dept: PRIMARY CARE CLINIC | Age: 74
End: 2024-09-06

## 2024-09-06 VITALS
TEMPERATURE: 97.4 F | WEIGHT: 197 LBS | OXYGEN SATURATION: 99 % | DIASTOLIC BLOOD PRESSURE: 80 MMHG | BODY MASS INDEX: 30.92 KG/M2 | SYSTOLIC BLOOD PRESSURE: 130 MMHG | HEART RATE: 79 BPM | HEIGHT: 67 IN

## 2024-09-06 DIAGNOSIS — R60.0 BILATERAL LEG EDEMA: ICD-10-CM

## 2024-09-06 DIAGNOSIS — E04.2 MULTINODULAR GOITER: ICD-10-CM

## 2024-09-06 DIAGNOSIS — I10 PRIMARY HYPERTENSION: Primary | ICD-10-CM

## 2024-09-06 PROCEDURE — 3017F COLORECTAL CA SCREEN DOC REV: CPT | Performed by: INTERNAL MEDICINE

## 2024-09-06 PROCEDURE — 3075F SYST BP GE 130 - 139MM HG: CPT | Performed by: INTERNAL MEDICINE

## 2024-09-06 PROCEDURE — 99214 OFFICE O/P EST MOD 30 MIN: CPT | Performed by: INTERNAL MEDICINE

## 2024-09-06 PROCEDURE — 1123F ACP DISCUSS/DSCN MKR DOCD: CPT | Performed by: INTERNAL MEDICINE

## 2024-09-06 PROCEDURE — G8427 DOCREV CUR MEDS BY ELIG CLIN: HCPCS | Performed by: INTERNAL MEDICINE

## 2024-09-06 PROCEDURE — 1036F TOBACCO NON-USER: CPT | Performed by: INTERNAL MEDICINE

## 2024-09-06 PROCEDURE — G8417 CALC BMI ABV UP PARAM F/U: HCPCS | Performed by: INTERNAL MEDICINE

## 2024-09-06 PROCEDURE — 3079F DIAST BP 80-89 MM HG: CPT | Performed by: INTERNAL MEDICINE

## 2024-09-06 PROCEDURE — 1090F PRES/ABSN URINE INCON ASSESS: CPT | Performed by: INTERNAL MEDICINE

## 2024-09-06 PROCEDURE — G8399 PT W/DXA RESULTS DOCUMENT: HCPCS | Performed by: INTERNAL MEDICINE

## 2024-09-06 RX ORDER — CARVEDILOL 25 MG/1
25 TABLET ORAL 2 TIMES DAILY WITH MEALS
Qty: 180 TABLET | Refills: 3 | Status: SHIPPED | OUTPATIENT
Start: 2024-09-06

## 2024-09-06 RX ORDER — AMLODIPINE BESYLATE 5 MG/1
5 TABLET ORAL DAILY
Qty: 90 TABLET | Refills: 4 | Status: SHIPPED | OUTPATIENT
Start: 2024-09-06

## 2024-09-06 RX ORDER — FUROSEMIDE 20 MG
20 TABLET ORAL DAILY
Qty: 90 TABLET | Refills: 1 | Status: SHIPPED | OUTPATIENT
Start: 2024-09-06

## 2024-09-06 RX ORDER — VALSARTAN 320 MG/1
320 TABLET ORAL DAILY
Qty: 90 TABLET | Refills: 1 | Status: SHIPPED | OUTPATIENT
Start: 2024-09-06

## 2024-09-06 RX ORDER — FUROSEMIDE 20 MG
20 TABLET ORAL DAILY
Qty: 90 TABLET | Refills: 1 | Status: SHIPPED | OUTPATIENT
Start: 2024-09-06 | End: 2024-09-06 | Stop reason: SDUPTHER

## 2024-09-06 RX ORDER — HYDRALAZINE HYDROCHLORIDE 50 MG/1
50 TABLET, FILM COATED ORAL 3 TIMES DAILY
Qty: 270 TABLET | Refills: 4 | Status: SHIPPED | OUTPATIENT
Start: 2024-09-06

## 2024-09-06 RX ORDER — SPIRONOLACTONE 50 MG/1
50 TABLET, FILM COATED ORAL DAILY
Qty: 90 TABLET | Refills: 3 | Status: SHIPPED | OUTPATIENT
Start: 2024-09-06

## 2024-09-06 SDOH — ECONOMIC STABILITY: FOOD INSECURITY: WITHIN THE PAST 12 MONTHS, YOU WORRIED THAT YOUR FOOD WOULD RUN OUT BEFORE YOU GOT MONEY TO BUY MORE.: NEVER TRUE

## 2024-09-06 SDOH — ECONOMIC STABILITY: FOOD INSECURITY: WITHIN THE PAST 12 MONTHS, THE FOOD YOU BOUGHT JUST DIDN'T LAST AND YOU DIDN'T HAVE MONEY TO GET MORE.: NEVER TRUE

## 2024-09-06 SDOH — ECONOMIC STABILITY: INCOME INSECURITY: HOW HARD IS IT FOR YOU TO PAY FOR THE VERY BASICS LIKE FOOD, HOUSING, MEDICAL CARE, AND HEATING?: NOT HARD AT ALL

## 2024-09-06 ASSESSMENT — PATIENT HEALTH QUESTIONNAIRE - PHQ9
1. LITTLE INTEREST OR PLEASURE IN DOING THINGS: NOT AT ALL
SUM OF ALL RESPONSES TO PHQ QUESTIONS 1-9: 0
SUM OF ALL RESPONSES TO PHQ9 QUESTIONS 1 & 2: 0
2. FEELING DOWN, DEPRESSED OR HOPELESS: NOT AT ALL
SUM OF ALL RESPONSES TO PHQ QUESTIONS 1-9: 0

## 2024-09-06 ASSESSMENT — ENCOUNTER SYMPTOMS
SHORTNESS OF BREATH: 0
COUGH: 0
CHEST TIGHTNESS: 0
WHEEZING: 0
BACK PAIN: 1

## 2024-09-06 NOTE — CARE COORDINATION
Received referral for care coordination services, pt recently discharged from care coordination, chart reviewed, no new care coordination needs noted,no further calls to be made

## 2024-09-06 NOTE — PROGRESS NOTES
Subjective:      Patient ID: Emilee Paulson is a 73 y.o. female.    9/6/2024  Patient presents with:  1 Month Follow-Up: Edema in legs & feet        ENT  9/3/24  at      h/o L>R multinodular goiter with substernal extension,     Most recently, patient found to have right vertebral artery occlusion on CTA neck from 6/17/24 which re-demonstrated L>R thyroid goiter, patient with mild compressive symptoms.     Repeat FNA left thyroid nodule on 8/20/24 non-diagnostic    H/o notable for: obesity, HTN, Afib on AC (warfarin), stroke (2018), right vertebral artery occlusion (follows  NSGY Gopi Briceño MD)    PLAN:    - discussed treatment options including observation vs surgery   - Endocrinology referral for hyperthyroidism per patient preference  - discussed phlegm could be related to reflux,   - in event of surgery, tentative plan to referral to thoracis to discuss possible sternotomy given higher clavicles and sternal head, body habitus, which may make substernal delivery challenging  - will need to hold warfarin leading up to surgery, possible bridge (per discussion with Dr. Briceño, ok to hold from NSGY standpoint)    Follow up in 6 months with repeat US in clinic. Patient desires to avoid surgery at this time                        6/26/2024   Patient presents with:  Follow-Up from Hospital: 06/17/2024 9:30 AM EDT - 06/21/2024 12:55 PM- [facility]- Stroke symptoms      Emilee Paulson is a 73 y.o. female with a medical history significant for Atrial Fibrillation (on warfarin), prior right thalamic intracranial hemorrhage, right vertebral artery stenosis, Bell's palsy (2012), hypertension, osteoarthritis, heart failure with preserved ejection fraction, suspected giant cell arteritis, and coronary artery disease who presented with acute right-sided hemiparesis, hemisensory loss, and fall.    These symptoms were in the context of holding warfarin for temporal artery biopsy on 6/11 for her suspected GCA. Workup for

## 2024-09-25 ENCOUNTER — ANTI-COAG VISIT (OUTPATIENT)
Dept: PHARMACY | Age: 74
End: 2024-09-25
Payer: MEDICARE

## 2024-09-25 DIAGNOSIS — I48.21 PERMANENT ATRIAL FIBRILLATION (HCC): Primary | ICD-10-CM

## 2024-09-25 LAB
INTERNATIONAL NORMALIZATION RATIO, POC: 2.9
PROTHROMBIN TIME, POC: 0

## 2024-09-25 PROCEDURE — 85610 PROTHROMBIN TIME: CPT

## 2024-09-25 PROCEDURE — 99211 OFF/OP EST MAY X REQ PHY/QHP: CPT

## 2024-10-14 ENCOUNTER — TELEPHONE (OUTPATIENT)
Dept: PHARMACY | Age: 74
End: 2024-10-14

## 2024-10-14 NOTE — TELEPHONE ENCOUNTER
----- Message from Opal FROST sent at 10/14/2024 11:29 AM EDT -----  Regarding: new antibiotic  Contact: patient  Please call patient back regarding new antibiotic.  She started on Keflex 500mg, once daily x 7 days for a UTI. [phone]

## 2024-10-14 NOTE — TELEPHONE ENCOUNTER
Called patient, explained no interaction with Keflex, also increased water pill.     Giuliana Wright, PharmD, [facility]    For Pharmacy Admin Tracking Only  Total # of Interventions Recommended: 0  Total # of Interventions Accepted: 0  Time Spent (min): 15

## 2024-10-16 ENCOUNTER — ANTI-COAG VISIT (OUTPATIENT)
Dept: PHARMACY | Age: 74
End: 2024-10-16
Payer: MEDICARE

## 2024-10-16 DIAGNOSIS — I48.21 PERMANENT ATRIAL FIBRILLATION (HCC): Primary | ICD-10-CM

## 2024-10-16 LAB
INTERNATIONAL NORMALIZATION RATIO, POC: 2.5
PROTHROMBIN TIME, POC: 0

## 2024-10-16 PROCEDURE — 85610 PROTHROMBIN TIME: CPT | Performed by: PHYSICIAN ASSISTANT

## 2024-10-16 PROCEDURE — 99211 OFF/OP EST MAY X REQ PHY/QHP: CPT | Performed by: PHYSICIAN ASSISTANT

## 2024-10-16 NOTE — PROGRESS NOTES
Ms. Emilee Paulson is a 73 y.o. y/o female with history of A fib   She presents today for anticoagulation monitoring and adjustment.    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 Went off memory--->states that she has been taking warfarin 2 mg tablets daily since d/c from facility 6/21---> follows AVS  ---> taking 5mg daily ---> Confirmed   []   [x]       S/S bleeding/bruising/swelling/SOB -did bruise shoulder after fall, followed up with doctor and monitoring bump ---> denies   []   [x]       Blood in urine or stool - denies   [x]   []       Procedures scheduled in the future at this time - Is being assessed for watchman, will keep notified---> had cortisone shot in knee last fri --> thyroid bx on Monday 8/19, did not hold, goes back 9/3 to make a plan, likely surgery  9/3/24 MD appt: Endocrinology referral for hyperthyroidism per patient preference  - in event of surgery, tentative plan to referral to thoracis to discuss possible sternotomy given higher clavicles and sternal head, body habitus, which may make substernal delivery challenging  - will need to hold warfarin leading up to surgery, possible bridge (per discussion with Dr. Briceño, ok to hold from NSGY standpoint)    -None  []   [x]       Missed Dose- denies   []   [x]       Extra Dose - Denies    [x]   []       Change in medications She continues with Tylenol 1000mg but only as needed --> less tylenol, once a day --> naproxen prn but doctor advised against and gave voltaren and tylenol -->  took prednisone 40 mg x 6 d on 7/3. No tylenol currently ---> inc lasix --> has been taking more tylenol acutely --> no changes, still taking tylenol --> has been taking more tylenol this week --> prednisone taper from hospital stay (finished last week per pt but if took as script

## 2024-10-25 ENCOUNTER — OFFICE VISIT (OUTPATIENT)
Dept: CARDIOLOGY CLINIC | Age: 74
End: 2024-10-25
Payer: MEDICARE

## 2024-10-25 VITALS
BODY MASS INDEX: 30.51 KG/M2 | WEIGHT: 194.4 LBS | HEIGHT: 67 IN | SYSTOLIC BLOOD PRESSURE: 160 MMHG | DIASTOLIC BLOOD PRESSURE: 102 MMHG | OXYGEN SATURATION: 98 % | HEART RATE: 75 BPM

## 2024-10-25 DIAGNOSIS — I50.22 CHRONIC SYSTOLIC (CONGESTIVE) HEART FAILURE (HCC): Primary | ICD-10-CM

## 2024-10-25 DIAGNOSIS — I48.21 PERMANENT ATRIAL FIBRILLATION (HCC): ICD-10-CM

## 2024-10-25 DIAGNOSIS — I10 ESSENTIAL HYPERTENSION: ICD-10-CM

## 2024-10-25 PROCEDURE — G8427 DOCREV CUR MEDS BY ELIG CLIN: HCPCS | Performed by: NURSE PRACTITIONER

## 2024-10-25 PROCEDURE — G8417 CALC BMI ABV UP PARAM F/U: HCPCS | Performed by: NURSE PRACTITIONER

## 2024-10-25 PROCEDURE — 3017F COLORECTAL CA SCREEN DOC REV: CPT | Performed by: NURSE PRACTITIONER

## 2024-10-25 PROCEDURE — 1090F PRES/ABSN URINE INCON ASSESS: CPT | Performed by: NURSE PRACTITIONER

## 2024-10-25 PROCEDURE — 3077F SYST BP >= 140 MM HG: CPT | Performed by: NURSE PRACTITIONER

## 2024-10-25 PROCEDURE — 1036F TOBACCO NON-USER: CPT | Performed by: NURSE PRACTITIONER

## 2024-10-25 PROCEDURE — 1123F ACP DISCUSS/DSCN MKR DOCD: CPT | Performed by: NURSE PRACTITIONER

## 2024-10-25 PROCEDURE — G8399 PT W/DXA RESULTS DOCUMENT: HCPCS | Performed by: NURSE PRACTITIONER

## 2024-10-25 PROCEDURE — 99214 OFFICE O/P EST MOD 30 MIN: CPT | Performed by: NURSE PRACTITIONER

## 2024-10-25 PROCEDURE — G8484 FLU IMMUNIZE NO ADMIN: HCPCS | Performed by: NURSE PRACTITIONER

## 2024-10-25 PROCEDURE — 3079F DIAST BP 80-89 MM HG: CPT | Performed by: NURSE PRACTITIONER

## 2024-10-25 RX ORDER — LIDOCAINE 50 MG/G
1 PATCH TOPICAL DAILY
COMMUNITY
End: 2024-10-25 | Stop reason: ALTCHOICE

## 2024-10-25 RX ORDER — ONDANSETRON 4 MG/1
4 TABLET, FILM COATED ORAL EVERY 8 HOURS PRN
COMMUNITY
End: 2024-10-25 | Stop reason: ALTCHOICE

## 2024-10-25 RX ORDER — PANTOPRAZOLE SODIUM 40 MG/1
40 FOR SUSPENSION ORAL
COMMUNITY
End: 2024-10-25 | Stop reason: ALTCHOICE

## 2024-10-25 RX ORDER — TRAMADOL HYDROCHLORIDE 50 MG/1
50 TABLET ORAL EVERY 6 HOURS PRN
COMMUNITY
End: 2024-10-25 | Stop reason: RX

## 2024-10-25 RX ORDER — PREDNISONE 10 MG/1
10 TABLET ORAL DAILY
COMMUNITY
End: 2024-10-25 | Stop reason: ALTCHOICE

## 2024-10-25 RX ORDER — SULFAMETHOXAZOLE AND TRIMETHOPRIM 800; 160 MG/1; MG/1
1 TABLET ORAL ONCE
COMMUNITY
End: 2024-10-25 | Stop reason: ALTCHOICE

## 2024-10-25 RX ORDER — SPIRONOLACTONE 25 MG/1
25 TABLET ORAL 2 TIMES DAILY
COMMUNITY
End: 2024-11-08 | Stop reason: SDUPTHER

## 2024-10-25 RX ORDER — FAMOTIDINE 20 MG/1
20 TABLET, FILM COATED ORAL 2 TIMES DAILY
COMMUNITY
End: 2024-10-25 | Stop reason: ALTCHOICE

## 2024-10-25 NOTE — PATIENT INSTRUCTIONS
Begin jardiance 10 mg daily and recheck labs in one week    Weigh yourself daily    Appt in 2 weeks

## 2024-10-25 NOTE — PROGRESS NOTES
[facility]     Outpatient Follow Up Note    CHIEF COMPLAINT / HPI: Hospital Follow Up secondary to edema    Hospital record has been reviewed  Hospital Course progressed as follows per discharge summary:     Emilee Paulson is a 73 y.o. female with history of hypertension, Bell's palsy, intracranial hemorrhage, right vertebral artery stenosis, A-fib (on warfarin), and heart failure with preserved ejection fraction   ~ presented to the emergency department with bilateral lower extremity edema.     Cell lines within normal limits on her CBC  no evidence of acute acid-base or electrolyte derangements on her BMP.   Urinalysis showing leukocyte esterase with pyuria and bacteriuria, concerning for acute UTI in the setting of her endorsed dysuria.   Troponin and BNP both within normal limits.   INR 2.2, indicative of therapeutic warfarin use.   no acute abnormalities on chest x-ray.   EKG without any signs of acute ischemia or arrhythmia.     Low concern for ACS given the patient's lack of chest pain or abnormalities on her EKG or labs. Low concern for PE/DVT given the patient's therapeutic anticoagulation on warfarin and inconsistency with this patient's current presentation. Considered acute cellulitis as well, but the patient is otherwise well-appearing and afebrile with no evidence of acute leukocytosis on her labs and no other manifestations of an acute infection.     Cannot effectively rule out a heart failure exacerbation, though this is slightly less likely given the patient's normal BNP.   A transthoracic echo was performed, which showed no evidence of acute systolic dysfunction or pericardial effusion. Given the patient's reassuring workup, I believe the patient is medically fit for discharge with close outpatient follow-up.   She will likely need an increase to her home Lasix regimen from her PCP. This is discussed with the patient. All questions were answered. Patient given a dose of her home

## 2024-11-08 ENCOUNTER — OFFICE VISIT (OUTPATIENT)
Dept: CARDIOLOGY CLINIC | Age: 74
End: 2024-11-08

## 2024-11-08 VITALS
HEART RATE: 80 BPM | DIASTOLIC BLOOD PRESSURE: 82 MMHG | OXYGEN SATURATION: 97 % | SYSTOLIC BLOOD PRESSURE: 132 MMHG | WEIGHT: 195 LBS | HEIGHT: 67 IN | BODY MASS INDEX: 30.61 KG/M2

## 2024-11-08 DIAGNOSIS — I10 PRIMARY HYPERTENSION: Primary | ICD-10-CM

## 2024-11-08 DIAGNOSIS — I25.83 CORONARY ARTERY DISEASE DUE TO LIPID RICH PLAQUE: ICD-10-CM

## 2024-11-08 DIAGNOSIS — I50.22 CHRONIC SYSTOLIC (CONGESTIVE) HEART FAILURE (HCC): ICD-10-CM

## 2024-11-08 DIAGNOSIS — E78.2 MIXED HYPERLIPIDEMIA: ICD-10-CM

## 2024-11-08 DIAGNOSIS — I25.10 CORONARY ARTERY DISEASE DUE TO LIPID RICH PLAQUE: ICD-10-CM

## 2024-11-08 DIAGNOSIS — I48.21 PERMANENT ATRIAL FIBRILLATION (HCC): ICD-10-CM

## 2024-11-08 RX ORDER — SPIRONOLACTONE 25 MG/1
25 TABLET ORAL 2 TIMES DAILY
Qty: 60 TABLET | Refills: 2 | Status: SHIPPED | OUTPATIENT
Start: 2024-11-08

## 2024-11-08 NOTE — PATIENT INSTRUCTIONS
Stop jardiance since it didn't help    Resume spironolactone 25 mg twice a day    Labs to check your kidney function with your next INR on 11/13    Keep appt with Dr. Avelar 12/3

## 2024-11-08 NOTE — PROGRESS NOTES
[facility]     Outpatient Follow Up Note    Emilee Paulson is 73 y.o. female who presents today with a history of .      CHIEF COMPLAINT / HPI:  Follow Up secondary to starting Jardiance     Subjective:   She can't walk very far before feeling SOB: living room to kitchen. Its not gotten any better taking Jardiance    She denies significant chest pain but has heaviness. She doesn't know what causes it. The patient denies orthopnea/PND. The patient does not have swelling. The patients weight is unchanged . The patient is experiencing palpitations from her AF everyday of late   Her BP yesterday was 130/95    These symptoms show no change since the last OV.   With regard to medication therapy the patient has been compliant with prescribed regimen. They have tolerated therapy to date.     Past Medical History:   Diagnosis Date    Acute cerebrovascular accident (CVA) ([facility]) 01/28/2020    brain bleed    LAST (acute kidney injury) ([facility]) 10/20/2022    Atrial fibrillation ([facility])     Bell palsy     diagnosed 15 years ago    CAD (coronary artery disease)     Cerebral artery occlusion with cerebral infarction ([facility])     Chronic diastolic CHF (congestive heart failure) ([facility]) 05/16/2020    CVA (cerebral vascular accident) ([facility]) 01/04/2017    Hypertension     Intracranial hemorrhage ([facility]) 04/2016    Mixed hyperlipidemia 07/06/2022    Nonintractable headache     currently controlled    Nontraumatic subcortical hemorrhage of cerebral hemisphere ([facility]) 04/30/2016    Poor vision     after bells palsy in 2012    Primary osteoarthritis of right knee 03/18/2022    Sudden visual loss of left eye     patient states \"In very corner of my eye.\"    Thyroid nodule 02/08/2018    TIA involving right internal carotid artery      Social History:    Social History     Tobacco Use   Smoking Status Never   Smokeless Tobacco Never     Current Medications:  Current Outpatient Medications   Medication Sig Dispense Refill    empagliflozin

## 2024-11-13 ENCOUNTER — ANTI-COAG VISIT (OUTPATIENT)
Dept: PHARMACY | Age: 74
End: 2024-11-13
Payer: MEDICARE

## 2024-11-13 ENCOUNTER — HOSPITAL ENCOUNTER (OUTPATIENT)
Age: 74
Discharge: HOME | End: 2024-11-13
Payer: MEDICARE

## 2024-11-13 DIAGNOSIS — I48.21 PERMANENT ATRIAL FIBRILLATION (HCC): Primary | ICD-10-CM

## 2024-11-13 DIAGNOSIS — I50.22 CHRONIC SYSTOLIC (CONGESTIVE) HEART FAILURE (HCC): ICD-10-CM

## 2024-11-13 LAB
ANION GAP SERPL CALCULATED.3IONS-SCNC: 12 MMOL/L (ref 3–16)
BUN SERPL-MCNC: 12 MG/DL (ref 7–20)
CALCIUM SERPL-MCNC: 10.6 MG/DL (ref 8.3–10.6)
CHLORIDE SERPL-SCNC: 102 MMOL/L (ref 99–110)
CO2 SERPL-SCNC: 28 MMOL/L (ref 21–32)
CREAT SERPL-MCNC: 0.7 MG/DL (ref 0.6–1.2)
GFR SERPLBLD CREATININE-BSD FMLA CKD-EPI: >90 ML/MIN/{1.73_M2}
GLUCOSE SERPL-MCNC: 101 MG/DL (ref 70–99)
INTERNATIONAL NORMALIZATION RATIO, POC: 2.4
POTASSIUM SERPL-SCNC: 3.5 MMOL/L (ref 3.5–5.1)
PROTHROMBIN TIME, POC: 0
SODIUM SERPL-SCNC: 142 MMOL/L (ref 136–145)

## 2024-11-13 PROCEDURE — 36415 COLL VENOUS BLD VENIPUNCTURE: CPT

## 2024-11-13 PROCEDURE — 80048 BASIC METABOLIC PNL TOTAL CA: CPT

## 2024-11-13 PROCEDURE — 85610 PROTHROMBIN TIME: CPT | Performed by: PHYSICIAN ASSISTANT

## 2024-11-13 PROCEDURE — 99211 OFF/OP EST MAY X REQ PHY/QHP: CPT | Performed by: PHYSICIAN ASSISTANT

## 2024-11-13 NOTE — PROGRESS NOTES
Ms. Emilee Paulsno is a 73 y.o. y/o female with history of A fib   She presents today for anticoagulation monitoring and adjustment.    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 Went off memory--->states that she has been taking warfarin 2 mg tablets daily since d/c from facility 6/21---> follows AVS  ---> taking 5mg daily ---> Confirmed   []   [x]       S/S bleeding/bruising/swelling/SOB -did bruise shoulder after fall, followed up with doctor and monitoring bump ---> denies   []   [x]       Blood in urine or stool - denies   [x]   []       Procedures scheduled in the future at this time - Is being assessed for watchman, will keep notified---> had cortisone shot in knee last fri --> thyroid bx on Monday 8/19, did not hold, goes back 9/3 to make a plan, likely surgery  9/3/24 MD appt: Endocrinology referral for hyperthyroidism per patient preference  - in event of surgery, tentative plan to referral to thoracis to discuss possible sternotomy given higher clavicles and sternal head, body habitus, which may make substernal delivery challenging  - will need to hold warfarin leading up to surgery, possible bridge (per discussion with Dr. Briceño, ok to hold from NSGY standpoint)    -None  []   [x]       Missed Dose- denies   []   [x]       Extra Dose - Denies    []   [x]       Change in medications She continues with Tylenol 1000mg but only as needed --> less tylenol, once a day --> naproxen prn but doctor advised against and gave voltaren and tylenol -->  took prednisone 40 mg x 6 d on 7/3. No tylenol currently ---> inc lasix --> has been taking more tylenol acutely --> no changes, still taking tylenol --> has been taking more tylenol this week --> prednisone taper from hospital stay (finished last week per pt but if took as script

## 2024-11-15 ENCOUNTER — TELEPHONE (OUTPATIENT)
Dept: CARDIOLOGY CLINIC | Age: 74
End: 2024-11-15

## 2024-11-25 ENCOUNTER — TELEPHONE (OUTPATIENT)
Dept: CARDIOLOGY CLINIC | Age: 74
End: 2024-11-25

## 2024-11-25 NOTE — TELEPHONE ENCOUNTER
Barbara called to inform the office that she does not feel comfortable due to her high bp. Barbara is needing conformation on how the Pt bp runs.  Please call Barbara to discuss this further.  Thank you     170/115

## 2024-11-25 NOTE — TELEPHONE ENCOUNTER
I called and spoke to Barbara.  She is getting PT for low back and knee pain and was referred by her PCP.  States that Emilee did report that she has taken her medications this morning.  Barbara did mention that she has seemed a little confused about her medications.  States that her recheck was 168/105 after a few minutes.  States that she denied Barbara did not feel comfortable with proceeding with PT at that time.  States that Emilee will go back later today to see if there is improvement in her BP and if there is then she will go ahead and do the PT.  If BP is still elevated then she will defer PT until blood pressure is better controlled.      I called and spoke to Emilee.  She says that she was not at PT this morning.  She is supposed to go at 11 this morning but will probably cancel because she is not feeling well.  Reports shortness of breath, leg swelling.  She is unable to check her BP at home.  Weight on Saturday was 185, Sunday was 186 and this morning it was 188 pounds.  States that she feels like she is taking too much Lasix because it makes her pee a lot.  She did take Lasix 20 mg this morning.  She is taking Spironolactone 25 mg twice a day, Amlodipine every morning and Carvedilol 25 mg twice a day.  States that she was up on her feet a lot yesterday with going to Religion and standing in the kitchen cooking.  Denies a headache today.  She was a little dizzy this morning when she first got up but it has since resolved.  Denies blurred or double vision.

## 2024-11-25 NOTE — TELEPHONE ENCOUNTER
Please tell her Dr Avelar would like her to take an extra lasix in the afternoon for the next two days, then go back to regularly scheduled dose, thanks

## 2024-11-27 NOTE — TELEPHONE ENCOUNTER
Spoke with the patient and advised her of the message below per DKW. Patient voiced understanding. Call complete

## 2024-12-02 ASSESSMENT — ENCOUNTER SYMPTOMS
BLOOD IN STOOL: 0
CHEST TIGHTNESS: 0
ABDOMINAL PAIN: 0
PHOTOPHOBIA: 0
SHORTNESS OF BREATH: 1
COUGH: 0

## 2024-12-02 NOTE — PROGRESS NOTES
[facility]  12/3/24  Referring: Dr. Beyre    REASON FOR CONSULT/CHIEF COMPLAINT/HPI     Reason for visit/ Chief complaint  Follow up   CAD, AF, CHF   HPI Emilee Paulson is a 73 y.o. here follow up for management of   CAD, hypertension (20 year history), hyperlipidemia, chf.  He has atrial fibrillation. Previous hemorrhagic stroke in  2018. Previously refused watchman.    6/17/24-presented with acute right-sided hemiparesis, hemisensory loss, and fall .( Subtherapeutic INR due to warfarin being held for temporal artery biopsy) ct of head showed no bleed, CTA no LVO, right sided vertebral artery age indeterminate segmental occlusion , MRI of head no acute infarction,TTE EF 65-70 no shunt no thrombus severe LA dilation  11/25 instructed to take extra lasix in afternoon for two days due to  Weight gain and sob  No longer has home health, found them helpful when she had them. Set up medications and walked her.    Today she states her shortness of breath is worse over the past 4 months.  She is too tired to do any thing due to shortness of breath when she walks, even a little activity. Just walking from kitchen to living room ( 8 steps)  and she is short of breath. Sometimes associated with wheezing, no cough. Worse in the afternoon and evening. Wakes up short of breath, coughing, a little better when she sits up.  Heart races every day. This occurs with rest and exertion, no associated dizziness or syncope  Walks with a rollator          Patient is compliant  with medication and is tolerating them well without side effects       HISTORY/ALLERGIES/ROS     MedHx:   has a past medical history of Acute cerebrovascular accident (CVA) ([facility]), LAST (acute kidney injury) ([facility]), Atrial fibrillation (HCC), Bell palsy, CAD (coronary artery disease), Cerebral artery occlusion with cerebral infarction ([facility]), Chronic diastolic CHF (congestive heart failure) ([facility]), CVA (cerebral vascular accident) ([facility]), Hypertension,

## 2024-12-03 ENCOUNTER — HOSPITAL ENCOUNTER (OUTPATIENT)
Age: 74
Discharge: HOME | End: 2024-12-03
Payer: MEDICARE

## 2024-12-03 ENCOUNTER — HOSPITAL ENCOUNTER (OUTPATIENT)
Dept: GENERAL RADIOLOGY | Age: 74
Discharge: HOME | End: 2024-12-03
Payer: MEDICARE

## 2024-12-03 ENCOUNTER — OFFICE VISIT (OUTPATIENT)
Dept: CARDIOLOGY CLINIC | Age: 74
End: 2024-12-03
Payer: MEDICARE

## 2024-12-03 VITALS
HEART RATE: 64 BPM | HEIGHT: 67 IN | SYSTOLIC BLOOD PRESSURE: 110 MMHG | DIASTOLIC BLOOD PRESSURE: 78 MMHG | OXYGEN SATURATION: 99 % | WEIGHT: 206 LBS | BODY MASS INDEX: 32.33 KG/M2

## 2024-12-03 DIAGNOSIS — R06.02 SHORTNESS OF BREATH: ICD-10-CM

## 2024-12-03 DIAGNOSIS — I50.32 CHRONIC HEART FAILURE WITH PRESERVED EJECTION FRACTION (HCC): ICD-10-CM

## 2024-12-03 DIAGNOSIS — I48.21 PERMANENT ATRIAL FIBRILLATION (HCC): Primary | ICD-10-CM

## 2024-12-03 DIAGNOSIS — I10 PRIMARY HYPERTENSION: ICD-10-CM

## 2024-12-03 DIAGNOSIS — E78.2 MIXED HYPERLIPIDEMIA: ICD-10-CM

## 2024-12-03 DIAGNOSIS — I77.810 ASCENDING AORTA DILATATION (HCC): ICD-10-CM

## 2024-12-03 DIAGNOSIS — Z98.61 CAD S/P PERCUTANEOUS CORONARY ANGIOPLASTY: ICD-10-CM

## 2024-12-03 DIAGNOSIS — I10 ESSENTIAL HYPERTENSION: ICD-10-CM

## 2024-12-03 DIAGNOSIS — R60.0 BILATERAL LEG EDEMA: ICD-10-CM

## 2024-12-03 DIAGNOSIS — I25.10 CAD S/P PERCUTANEOUS CORONARY ANGIOPLASTY: ICD-10-CM

## 2024-12-03 PROCEDURE — 1159F MED LIST DOCD IN RCRD: CPT | Performed by: INTERNAL MEDICINE

## 2024-12-03 PROCEDURE — G8417 CALC BMI ABV UP PARAM F/U: HCPCS | Performed by: INTERNAL MEDICINE

## 2024-12-03 PROCEDURE — 3017F COLORECTAL CA SCREEN DOC REV: CPT | Performed by: INTERNAL MEDICINE

## 2024-12-03 PROCEDURE — G8484 FLU IMMUNIZE NO ADMIN: HCPCS | Performed by: INTERNAL MEDICINE

## 2024-12-03 PROCEDURE — 3074F SYST BP LT 130 MM HG: CPT | Performed by: INTERNAL MEDICINE

## 2024-12-03 PROCEDURE — 1123F ACP DISCUSS/DSCN MKR DOCD: CPT | Performed by: INTERNAL MEDICINE

## 2024-12-03 PROCEDURE — G8399 PT W/DXA RESULTS DOCUMENT: HCPCS | Performed by: INTERNAL MEDICINE

## 2024-12-03 PROCEDURE — 1036F TOBACCO NON-USER: CPT | Performed by: INTERNAL MEDICINE

## 2024-12-03 PROCEDURE — 1090F PRES/ABSN URINE INCON ASSESS: CPT | Performed by: INTERNAL MEDICINE

## 2024-12-03 PROCEDURE — 99214 OFFICE O/P EST MOD 30 MIN: CPT | Performed by: INTERNAL MEDICINE

## 2024-12-03 PROCEDURE — 3078F DIAST BP <80 MM HG: CPT | Performed by: INTERNAL MEDICINE

## 2024-12-03 PROCEDURE — G8427 DOCREV CUR MEDS BY ELIG CLIN: HCPCS | Performed by: INTERNAL MEDICINE

## 2024-12-03 PROCEDURE — 71046 X-RAY EXAM CHEST 2 VIEWS: CPT

## 2024-12-03 RX ORDER — FUROSEMIDE 20 MG/1
40 TABLET ORAL DAILY
Qty: 180 TABLET | Refills: 1 | Status: SHIPPED | OUTPATIENT
Start: 2024-12-03

## 2024-12-03 NOTE — PATIENT INSTRUCTIONS
Increase lasix to 2 pills in am for total of 40 mg daily  Daily weights  Follow up next week  Referral to home health made

## 2024-12-06 ENCOUNTER — TELEPHONE (OUTPATIENT)
Dept: CARDIOLOGY CLINIC | Age: 74
End: 2024-12-06

## 2024-12-06 NOTE — TELEPHONE ENCOUNTER
----- Message from LAKSHMI STEVENSON RN sent at 12/6/2024 12:39 PM EST -----  Chest xray is ok, please ask her what her weight is, thanks

## 2024-12-11 ENCOUNTER — OFFICE VISIT (OUTPATIENT)
Dept: CARDIOLOGY CLINIC | Age: 74
End: 2024-12-11
Payer: MEDICARE

## 2024-12-11 ENCOUNTER — ANTI-COAG VISIT (OUTPATIENT)
Dept: PHARMACY | Age: 74
End: 2024-12-11
Payer: MEDICARE

## 2024-12-11 ENCOUNTER — HOSPITAL ENCOUNTER (OUTPATIENT)
Age: 74
Discharge: HOME | End: 2024-12-11
Payer: MEDICARE

## 2024-12-11 VITALS
HEIGHT: 67 IN | BODY MASS INDEX: 32.33 KG/M2 | SYSTOLIC BLOOD PRESSURE: 122 MMHG | OXYGEN SATURATION: 99 % | DIASTOLIC BLOOD PRESSURE: 72 MMHG | HEART RATE: 62 BPM | WEIGHT: 206 LBS

## 2024-12-11 DIAGNOSIS — I10 PRIMARY HYPERTENSION: ICD-10-CM

## 2024-12-11 DIAGNOSIS — I50.32 CHRONIC HEART FAILURE WITH PRESERVED EJECTION FRACTION (HCC): ICD-10-CM

## 2024-12-11 DIAGNOSIS — E78.2 MIXED HYPERLIPIDEMIA: ICD-10-CM

## 2024-12-11 DIAGNOSIS — I48.21 PERMANENT ATRIAL FIBRILLATION (HCC): ICD-10-CM

## 2024-12-11 DIAGNOSIS — I50.32 CHRONIC HEART FAILURE WITH PRESERVED EJECTION FRACTION (HCC): Primary | ICD-10-CM

## 2024-12-11 DIAGNOSIS — I25.10 CORONARY ARTERY DISEASE DUE TO LIPID RICH PLAQUE: ICD-10-CM

## 2024-12-11 DIAGNOSIS — I25.83 CORONARY ARTERY DISEASE DUE TO LIPID RICH PLAQUE: ICD-10-CM

## 2024-12-11 DIAGNOSIS — I48.21 PERMANENT ATRIAL FIBRILLATION (HCC): Primary | ICD-10-CM

## 2024-12-11 LAB
ANION GAP SERPL CALCULATED.3IONS-SCNC: 10 MMOL/L (ref 3–16)
BUN SERPL-MCNC: 14 MG/DL (ref 7–20)
CALCIUM SERPL-MCNC: 10.4 MG/DL (ref 8.3–10.6)
CHLORIDE SERPL-SCNC: 101 MMOL/L (ref 99–110)
CO2 SERPL-SCNC: 29 MMOL/L (ref 21–32)
CREAT SERPL-MCNC: 0.7 MG/DL (ref 0.6–1.2)
GFR SERPLBLD CREATININE-BSD FMLA CKD-EPI: >90 ML/MIN/{1.73_M2}
GLUCOSE SERPL-MCNC: 97 MG/DL (ref 70–99)
INTERNATIONAL NORMALIZATION RATIO, POC: 1.5
NT-PROBNP SERPL-MCNC: 801 PG/ML (ref 0–124)
POTASSIUM SERPL-SCNC: 3.9 MMOL/L (ref 3.5–5.1)
PROTHROMBIN TIME, POC: 0
SODIUM SERPL-SCNC: 140 MMOL/L (ref 136–145)

## 2024-12-11 PROCEDURE — 99214 OFFICE O/P EST MOD 30 MIN: CPT | Performed by: NURSE PRACTITIONER

## 2024-12-11 PROCEDURE — 3074F SYST BP LT 130 MM HG: CPT | Performed by: NURSE PRACTITIONER

## 2024-12-11 PROCEDURE — 1036F TOBACCO NON-USER: CPT | Performed by: NURSE PRACTITIONER

## 2024-12-11 PROCEDURE — G8399 PT W/DXA RESULTS DOCUMENT: HCPCS | Performed by: NURSE PRACTITIONER

## 2024-12-11 PROCEDURE — 1159F MED LIST DOCD IN RCRD: CPT | Performed by: NURSE PRACTITIONER

## 2024-12-11 PROCEDURE — 1123F ACP DISCUSS/DSCN MKR DOCD: CPT | Performed by: NURSE PRACTITIONER

## 2024-12-11 PROCEDURE — 85610 PROTHROMBIN TIME: CPT | Performed by: PHYSICIAN ASSISTANT

## 2024-12-11 PROCEDURE — 99212 OFFICE O/P EST SF 10 MIN: CPT | Performed by: PHYSICIAN ASSISTANT

## 2024-12-11 PROCEDURE — 36415 COLL VENOUS BLD VENIPUNCTURE: CPT

## 2024-12-11 PROCEDURE — 1090F PRES/ABSN URINE INCON ASSESS: CPT | Performed by: NURSE PRACTITIONER

## 2024-12-11 PROCEDURE — G8484 FLU IMMUNIZE NO ADMIN: HCPCS | Performed by: NURSE PRACTITIONER

## 2024-12-11 PROCEDURE — 3017F COLORECTAL CA SCREEN DOC REV: CPT | Performed by: NURSE PRACTITIONER

## 2024-12-11 PROCEDURE — G8427 DOCREV CUR MEDS BY ELIG CLIN: HCPCS | Performed by: NURSE PRACTITIONER

## 2024-12-11 PROCEDURE — 80048 BASIC METABOLIC PNL TOTAL CA: CPT

## 2024-12-11 PROCEDURE — G8417 CALC BMI ABV UP PARAM F/U: HCPCS | Performed by: NURSE PRACTITIONER

## 2024-12-11 PROCEDURE — 3078F DIAST BP <80 MM HG: CPT | Performed by: NURSE PRACTITIONER

## 2024-12-11 PROCEDURE — 83880 ASSAY OF NATRIURETIC PEPTIDE: CPT

## 2024-12-11 RX ORDER — TORSEMIDE 100 MG/1
50 TABLET ORAL DAILY
Qty: 30 TABLET | Refills: 3 | Status: SHIPPED | OUTPATIENT
Start: 2024-12-11

## 2024-12-11 NOTE — PROGRESS NOTES
[facility]     Outpatient Follow Up Note    Emilee Paulson is 73 y.o. 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 She started weighing herself yesterday and was at 189#    She has chest tightness that comes / goes. It comes on usually when she's eating. It started about a month ago. She has no associated symptoms.   She has palpitations from her AF. Feels like her heart is going to run away. It makes her SOB    She continues to be SOB. She needs to rest after 10 ft of walking distances from her kitchen to living room. She sleeps with 3-4 pillows / orthopnea. The flatter she lays, the tighter her chest. She doesn't sleep / denies PND.    These symptoms show no change since the last OV one week ago.   With regard to medication therapy the patient has been compliant with prescribed regimen. They have tolerated therapy to date.     Past Medical History:   Diagnosis Date    Acute cerebrovascular accident (CVA) ([facility]) 01/28/2020    brain bleed    LAST (acute kidney injury) ([facility]) 10/20/2022    Atrial fibrillation ([facility])     Bell palsy     diagnosed 15 years ago    CAD (coronary artery disease)     Cerebral artery occlusion with cerebral infarction ([facility])     Chronic diastolic CHF (congestive heart failure) ([facility]) 05/16/2020    CVA (cerebral vascular accident) ([facility]) 01/04/2017    Hypertension     Intracranial hemorrhage ([facility]) 04/2016    Mixed hyperlipidemia 07/06/2022    Nonintractable headache     currently controlled    Nontraumatic subcortical hemorrhage of cerebral hemisphere ([facility]) 04/30/2016    Poor vision     after bells palsy in 2012    Primary osteoarthritis of right knee 03/18/2022

## 2024-12-11 NOTE — PROGRESS NOTES
10/16/2024 2.5   09/25/2024 2.9   09/04/2024 2.2     For Pharmacy Admin Tracking Only    Intervention Detail: Dose Adjustment: 1, reason: Therapy Optimization  Total # of Interventions Recommended: 1  Total # of Interventions Accepted: 1  Time Spent (min): 15

## 2024-12-11 NOTE — PATIENT INSTRUCTIONS
Stop furosemide / lasix and begin torsemide / demadex : 100 mg tablet and take 1/2 tablet daily    Labs today    Appt on 12/9

## 2024-12-16 ENCOUNTER — TELEPHONE (OUTPATIENT)
Dept: CARDIOLOGY CLINIC | Age: 74
End: 2024-12-16

## 2024-12-16 NOTE — TELEPHONE ENCOUNTER
----- Message from ADAN Salcedo CNP sent at 12/15/2024 10:42 AM EST -----  Level elevated ; lasix changed to demadex at OV 12/11    Recheck a day or two before her next appt

## 2024-12-17 NOTE — TELEPHONE ENCOUNTER
I spoke with pt. I relayed message per NPTS.Pt had questions as to why we are making all these changes. I explained her condition and why we do what we do. She verbalized understanding.

## 2024-12-20 ENCOUNTER — HOSPITAL ENCOUNTER (OUTPATIENT)
Age: 74
Discharge: HOME | End: 2024-12-20
Payer: MEDICARE

## 2024-12-20 ENCOUNTER — OFFICE VISIT (OUTPATIENT)
Dept: CARDIOLOGY CLINIC | Age: 74
End: 2024-12-20
Payer: MEDICARE

## 2024-12-20 ENCOUNTER — ANTI-COAG VISIT (OUTPATIENT)
Dept: PHARMACY | Age: 74
End: 2024-12-20
Payer: MEDICARE

## 2024-12-20 ENCOUNTER — TELEPHONE (OUTPATIENT)
Dept: CARDIOLOGY CLINIC | Age: 74
End: 2024-12-20

## 2024-12-20 VITALS
HEART RATE: 99 BPM | DIASTOLIC BLOOD PRESSURE: 84 MMHG | SYSTOLIC BLOOD PRESSURE: 152 MMHG | OXYGEN SATURATION: 98 % | WEIGHT: 206 LBS | HEIGHT: 67 IN | BODY MASS INDEX: 32.33 KG/M2

## 2024-12-20 DIAGNOSIS — I25.10 CORONARY ARTERY DISEASE DUE TO LIPID RICH PLAQUE: ICD-10-CM

## 2024-12-20 DIAGNOSIS — I50.32 CHRONIC HEART FAILURE WITH PRESERVED EJECTION FRACTION (HCC): ICD-10-CM

## 2024-12-20 DIAGNOSIS — I10 PRIMARY HYPERTENSION: ICD-10-CM

## 2024-12-20 DIAGNOSIS — I50.32 CHRONIC HEART FAILURE WITH PRESERVED EJECTION FRACTION (HCC): Primary | ICD-10-CM

## 2024-12-20 DIAGNOSIS — I25.83 CORONARY ARTERY DISEASE DUE TO LIPID RICH PLAQUE: ICD-10-CM

## 2024-12-20 DIAGNOSIS — I48.21 PERMANENT ATRIAL FIBRILLATION (HCC): Primary | ICD-10-CM

## 2024-12-20 DIAGNOSIS — I48.21 PERMANENT ATRIAL FIBRILLATION (HCC): ICD-10-CM

## 2024-12-20 DIAGNOSIS — E78.2 MIXED HYPERLIPIDEMIA: ICD-10-CM

## 2024-12-20 LAB
ANION GAP SERPL CALCULATED.3IONS-SCNC: 13 MMOL/L (ref 3–16)
BUN SERPL-MCNC: 13 MG/DL (ref 7–20)
CALCIUM SERPL-MCNC: 10.4 MG/DL (ref 8.3–10.6)
CHLORIDE SERPL-SCNC: 103 MMOL/L (ref 99–110)
CO2 SERPL-SCNC: 25 MMOL/L (ref 21–32)
CREAT SERPL-MCNC: 0.6 MG/DL (ref 0.6–1.2)
GFR SERPLBLD CREATININE-BSD FMLA CKD-EPI: >90 ML/MIN/{1.73_M2}
GLUCOSE SERPL-MCNC: 98 MG/DL (ref 70–99)
INTERNATIONAL NORMALIZATION RATIO, POC: 3.3
NT-PROBNP SERPL-MCNC: 588 PG/ML (ref 0–124)
POTASSIUM SERPL-SCNC: 3.9 MMOL/L (ref 3.5–5.1)
PROTHROMBIN TIME, POC: 0
SODIUM SERPL-SCNC: 141 MMOL/L (ref 136–145)

## 2024-12-20 PROCEDURE — 99211 OFF/OP EST MAY X REQ PHY/QHP: CPT | Performed by: PHYSICIAN ASSISTANT

## 2024-12-20 PROCEDURE — G8417 CALC BMI ABV UP PARAM F/U: HCPCS | Performed by: NURSE PRACTITIONER

## 2024-12-20 PROCEDURE — 1159F MED LIST DOCD IN RCRD: CPT | Performed by: NURSE PRACTITIONER

## 2024-12-20 PROCEDURE — 99214 OFFICE O/P EST MOD 30 MIN: CPT | Performed by: NURSE PRACTITIONER

## 2024-12-20 PROCEDURE — 3017F COLORECTAL CA SCREEN DOC REV: CPT | Performed by: NURSE PRACTITIONER

## 2024-12-20 PROCEDURE — 1123F ACP DISCUSS/DSCN MKR DOCD: CPT | Performed by: NURSE PRACTITIONER

## 2024-12-20 PROCEDURE — 1090F PRES/ABSN URINE INCON ASSESS: CPT | Performed by: NURSE PRACTITIONER

## 2024-12-20 PROCEDURE — G8427 DOCREV CUR MEDS BY ELIG CLIN: HCPCS | Performed by: NURSE PRACTITIONER

## 2024-12-20 PROCEDURE — 83880 ASSAY OF NATRIURETIC PEPTIDE: CPT

## 2024-12-20 PROCEDURE — 85610 PROTHROMBIN TIME: CPT | Performed by: PHYSICIAN ASSISTANT

## 2024-12-20 PROCEDURE — 36415 COLL VENOUS BLD VENIPUNCTURE: CPT

## 2024-12-20 PROCEDURE — G8484 FLU IMMUNIZE NO ADMIN: HCPCS | Performed by: NURSE PRACTITIONER

## 2024-12-20 PROCEDURE — 80048 BASIC METABOLIC PNL TOTAL CA: CPT

## 2024-12-20 PROCEDURE — 3079F DIAST BP 80-89 MM HG: CPT | Performed by: NURSE PRACTITIONER

## 2024-12-20 PROCEDURE — 3077F SYST BP >= 140 MM HG: CPT | Performed by: NURSE PRACTITIONER

## 2024-12-20 PROCEDURE — 1036F TOBACCO NON-USER: CPT | Performed by: NURSE PRACTITIONER

## 2024-12-20 PROCEDURE — G8399 PT W/DXA RESULTS DOCUMENT: HCPCS | Performed by: NURSE PRACTITIONER

## 2024-12-20 NOTE — PROGRESS NOTES
severely dilated.    Aorta: Normal sized aortic root. Dilated ascending aorta. Ao ascending diameter is 4.5 cm.    Pericardium: Trivial pericardial effusion present.    Image quality is adequate.    Assessment:      Diagnosis Orders     1. Chronic systolic (congestive) heart failure (HCC)  ~no significant change in symptoms with torsemide  ~martin LE firm ; no crackles to auscultation   ~proBNP 801  ~mild-mod regurg with elevated Rt heart pressure 57 mmHg  ~remains on MRA / ARB  ~no improvement with SGLT2 Basic Metabolic Panel     2. Primary hypertension   ~elevated today  ~valsartan / hydralazine / amlodipine / spironolactone / carvedilol   ~hx dilated AoR : 4.5 cm on echo July '24               3. Coronary artery disease due to lipid rich plaque   ~stable : denies angina  ~s/p PTCA LAD '00  ~ASA / BB / statin        4. Mixed hyperlipidemia   ~LDL not at goal at 104  ~atorvastatin 80 mg daily / zetia 10 mg daiy   ~last A1c 5.3% : DM managed by PCP       5. Permanent atrial fibrillation (HCC)   ~AP controlled   ~possibly contributing to her SOB / volume overload in addition to HTN  ~c/o palpitations : remains on BB  ~warfarin / BB. INR 1.5  ~hx hemorrhagic stroke '18; hx Rt vertebral artery occl  ~Watchman's recommended: declined         I had the opportunity to review the clinical symptoms and presentation of Emiele Paulson.   Plan:     BNP/BMP today as planned with torsemide  F/U in 8 weeks ; I will discuss status with Dr. Avelar     Overall the patient is stable from CV standpoint    I have addresed the patient's cardiac risk factors and adjusted pharmacologic treatment as needed. In addition, I have reinforced the need for patient directed risk factor modification.    Further evaluation will be based upon the patient's clinical course and testing results.    All questions and concerns were addressed to the patient (son was on speaker phone as his request then hung up on her). Alternatives to my treatment were

## 2024-12-20 NOTE — PROGRESS NOTES
stated would be finishing in next few days) and bactrim (finished yesterday per pt, but if followed script instructions should have 10 d left). No tylenol---> denies changes, no abx---> got zetia and ceftin for 7 days 7/24, did not call, voltaren gel---> tylenol arthritis recently---> denies changes, adjusted lasix a few days --> was on keflex recently  --> d/c jardiance. Resumed spironolactone --> switched lasix to torsemide. Started tizanidine for muscle cramps     [x]   []       Change in health/diet/appetite-- normally has high vitamin K diet of canned spinach weekly and collards or broccoli about every other week. Will have some lower vitamin K veggies about 2 times a week such as green beans.--> states that she has been eating liver twice a week, likely why INR has dropped. Asked patient to decrease to once a week --> 1/2 cup of greens yesterday, stopped eating liver---> per pt loves liver and had it in the last week---> no greens, no NVD -->  drank about 3 boosts in past week---> no greens, no boost/ensure---> still no boost/ensure, no greens, no NVD ---> denies vit k, no NVD --> has had spinach and plans to continue to have---> had some spinach recently --> no vit k in a week   []   [x]       Change in alcohol use denies  []   [x]       Change in activity  []   [x]       Hospital admission-Admitted in hospital 6/8-6/11 for vertebral artery occlusion due to passing out and HA x last 3 weeks. Per chart review she has not been compliant with her PCP who reccommended she see rheumatologist state due to her elevated ESR with vision changes & headache. Was prescribed 50 prednisone qd then inc to 60, pt sent home on long taper.Temporal artery biopsy taken, f/u results with rheumatology & PCP. Holding anticoagulation pending neuro recs. Pt placed on statin and ASA  D/c on bactrim 1 tab qd x 30 d, prednisone 10 mg taper. Take 6 tab qd x 5 d, 5 tab qd for 7 d, 4 tab daily for 7 d, 3.5 tab qd x 7 d.  INR during admit

## 2024-12-20 NOTE — TELEPHONE ENCOUNTER
----- Message from ADAN Salcedo CNP sent at 12/20/2024  3:19 PM EST -----  BNP improving . Continue torsemide

## 2024-12-23 ENCOUNTER — TELEPHONE (OUTPATIENT)
Dept: CARDIOLOGY CLINIC | Age: 74
End: 2024-12-23

## 2025-01-08 ENCOUNTER — TELEPHONE (OUTPATIENT)
Dept: PHARMACY | Age: 75
End: 2025-01-08

## 2025-01-08 RX ORDER — WARFARIN SODIUM 5 MG/1
TABLET ORAL
Qty: 84 TABLET | Refills: 3 | Status: SHIPPED | OUTPATIENT
Start: 2025-01-08

## 2025-01-08 NOTE — TELEPHONE ENCOUNTER
Pt called to r/s missed appt Mon 1/6. States that she is still not able to get out of her driveway and she is also out of warfarin.     E-scribed warfarin to [facility] on [facility]. Asked for someone to  medication for her so she can resume warfarin asap.     R/s pt to RTC 1/14.

## 2025-01-14 ENCOUNTER — ANTI-COAG VISIT (OUTPATIENT)
Dept: PHARMACY | Age: 75
End: 2025-01-14
Payer: MEDICARE

## 2025-01-14 DIAGNOSIS — I48.21 PERMANENT ATRIAL FIBRILLATION (HCC): Primary | ICD-10-CM

## 2025-01-14 LAB
INTERNATIONAL NORMALIZATION RATIO, POC: 1.4
PROTHROMBIN TIME, POC: 0

## 2025-01-14 PROCEDURE — 99211 OFF/OP EST MAY X REQ PHY/QHP: CPT

## 2025-01-14 PROCEDURE — 85610 PROTHROMBIN TIME: CPT

## 2025-01-14 NOTE — PROGRESS NOTES
Ms. Emilee Paulson is a 74 y.o. y/o female with history of A fib   She presents today for anticoagulation monitoring and adjustment.    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 Went off memory--->states that she has been taking warfarin 2 mg tablets daily since d/c from facility 6/21---> follows AVS  ---> taking 5mg daily ---> Confirmed   []   [x]       S/S bleeding/bruising/swelling/SOB -did bruise shoulder after fall, followed up with doctor and monitoring bump ---> denies   []   [x]       Blood in urine or stool - denies   [x]   []       Procedures scheduled in the future at this time - Is being assessed for watchman, will keep notified---> had cortisone shot in knee last fri --> thyroid bx on Monday 8/19, did not hold, goes back 9/3 to make a plan, likely surgery  9/3/24 MD appt: Endocrinology referral for hyperthyroidism per patient preference  - in event of surgery, tentative plan to referral to thoracis to discuss possible sternotomy given higher clavicles and sternal head, body habitus, which may make substernal delivery challenging  - will need to hold warfarin leading up to surgery, possible bridge (per discussion with Dr. Briceño, ok to hold from NSGY standpoint)    -None  []   [x]       Missed Dose- denies ---> ran out of warfarin on 1/6, started taking it again on 1/10   []   [x]       Extra Dose - Denies    [x]   []       Change in medications She continues with Tylenol 1000mg but only as needed --> less tylenol, once a day --> naproxen prn but doctor advised against and gave voltaren and tylenol -->  took prednisone 40 mg x 6 d on 7/3. No tylenol currently ---> inc lasix --> has been taking more tylenol acutely --> no changes, still taking tylenol --> has been taking more tylenol this week --> prednisone taper

## 2025-01-17 DIAGNOSIS — E78.2 MIXED HYPERLIPIDEMIA: ICD-10-CM

## 2025-01-17 RX ORDER — EZETIMIBE 10 MG/1
10 TABLET ORAL DAILY
Qty: 90 TABLET | Refills: 1 | Status: SHIPPED | OUTPATIENT
Start: 2025-01-17

## 2025-01-17 NOTE — TELEPHONE ENCOUNTER
Medication:   Requested Prescriptions     Pending Prescriptions Disp Refills    ezetimibe (ZETIA) 10 MG tablet [Pharmacy Med Name: EZETIMIBE 10MG TABLETS] 90 tablet 1     Sig: TAKE 1 TABLET BY MOUTH DAILY        Last Filled:      Patient Phone Number: [phone] (home)     Last appt: 9/6/2024   Next appt: Visit date not found    Last OARRS:       1/16/2024     1:50 PM   RX Monitoring   Periodic Controlled Substance Monitoring Possible medication side effects, risk of tolerance/dependence & alternative treatments discussed.;No signs of potential drug abuse or diversion identified.

## 2025-01-29 ENCOUNTER — TELEPHONE (OUTPATIENT)
Dept: PHARMACY | Age: 75
End: 2025-01-29

## 2025-01-31 ENCOUNTER — APPOINTMENT (OUTPATIENT)
Dept: PHARMACY | Age: 75
End: 2025-01-31
Payer: MEDICARE

## 2025-01-31 NOTE — TELEPHONE ENCOUNTER
Patient did not show for her 1/31 appt.    Unable to reach patient to reschedule.  Mailbox is full and I could not leave a message.

## 2025-02-05 LAB
INR BLD: 3.4
PROTIME: NORMAL

## 2025-02-07 ENCOUNTER — APPOINTMENT (OUTPATIENT)
Dept: PHARMACY | Age: 75
End: 2025-02-07
Payer: COMMERCIAL

## 2025-02-10 ENCOUNTER — ANTI-COAG VISIT (OUTPATIENT)
Dept: PHARMACY | Age: 75
End: 2025-02-10
Payer: COMMERCIAL

## 2025-02-10 DIAGNOSIS — I48.21 PERMANENT ATRIAL FIBRILLATION (HCC): Primary | ICD-10-CM

## 2025-02-10 LAB
INTERNATIONAL NORMALIZATION RATIO, POC: 1.9
PROTHROMBIN TIME, POC: 0

## 2025-02-10 PROCEDURE — 85610 PROTHROMBIN TIME: CPT | Performed by: PHYSICIAN ASSISTANT

## 2025-02-10 PROCEDURE — 99212 OFFICE O/P EST SF 10 MIN: CPT | Performed by: PHYSICIAN ASSISTANT

## 2025-02-10 NOTE — PROGRESS NOTES
Ms. Emilee Paulson is a 74 y.o. y/o female with history of A fib   She presents today for anticoagulation monitoring and adjustment.    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 Went off memory--->states that she has been taking warfarin 2 mg tablets daily since d/c from facility 6/21---> follows AVS  ---> taking 5mg daily ---> Confirmed --> was d/c from facility with warfarin 2.5 mg tablets x 5 tablets, has taken 2.5 mg daily since d/c   []   [x]       S/S bleeding/bruising/swelling/SOB -did bruise shoulder after fall, followed up with doctor and monitoring bump ---> denies   []   [x]       Blood in urine or stool - denies   [x]   []       Procedures scheduled in the future at this time - Is being assessed for watchman, will keep notified---> had cortisone shot in knee last fri --> thyroid bx on Monday 8/19, did not hold, goes back 9/3 to make a plan, likely surgery  9/3/24 MD appt: Endocrinology referral for hyperthyroidism per patient preference  - in event of surgery, tentative plan to referral to thoracis to discuss possible sternotomy given higher clavicles and sternal head, body habitus, which may make substernal delivery challenging  - will need to hold warfarin leading up to surgery, possible bridge (per discussion with Dr. Briceño, ok to hold from NSGY standpoint)    -None  []   [x]       Missed Dose- denies  []   [x]       Extra Dose - Denies    [x]   []       Change in medications She continues with Tylenol 1000mg but only as needed --> naproxen prn but doctor advised against and gave voltaren and tylenol --> tylenol arthritis recently---> denies changes, adjusted lasix a few days --> d/c jardiance. Resumed spironolactone --> switched lasix to torsemide. Started tizanidine for muscle cramps --> on nitrofurantoin for

## 2025-02-11 ENCOUNTER — OFFICE VISIT (OUTPATIENT)
Dept: CARDIOLOGY CLINIC | Age: 75
End: 2025-02-11

## 2025-02-11 ENCOUNTER — HOSPITAL ENCOUNTER (OUTPATIENT)
Age: 75
Discharge: HOME | End: 2025-02-11
Payer: COMMERCIAL

## 2025-02-11 VITALS
WEIGHT: 209.9 LBS | DIASTOLIC BLOOD PRESSURE: 70 MMHG | OXYGEN SATURATION: 98 % | HEART RATE: 84 BPM | HEIGHT: 67 IN | SYSTOLIC BLOOD PRESSURE: 118 MMHG | BODY MASS INDEX: 32.94 KG/M2

## 2025-02-11 DIAGNOSIS — I25.83 CORONARY ARTERY DISEASE DUE TO LIPID RICH PLAQUE: ICD-10-CM

## 2025-02-11 DIAGNOSIS — I48.21 PERMANENT ATRIAL FIBRILLATION (HCC): ICD-10-CM

## 2025-02-11 DIAGNOSIS — I50.32 CHRONIC HEART FAILURE WITH PRESERVED EJECTION FRACTION (HCC): ICD-10-CM

## 2025-02-11 DIAGNOSIS — E78.2 MIXED HYPERLIPIDEMIA: ICD-10-CM

## 2025-02-11 DIAGNOSIS — I50.32 CHRONIC HEART FAILURE WITH PRESERVED EJECTION FRACTION (HCC): Primary | ICD-10-CM

## 2025-02-11 DIAGNOSIS — I10 PRIMARY HYPERTENSION: ICD-10-CM

## 2025-02-11 DIAGNOSIS — I25.10 CORONARY ARTERY DISEASE DUE TO LIPID RICH PLAQUE: ICD-10-CM

## 2025-02-11 LAB
ANION GAP SERPL CALCULATED.3IONS-SCNC: 7 MMOL/L (ref 3–16)
BUN SERPL-MCNC: 8 MG/DL (ref 7–20)
CALCIUM SERPL-MCNC: 10.5 MG/DL (ref 8.3–10.6)
CHLORIDE SERPL-SCNC: 100 MMOL/L (ref 99–110)
CO2 SERPL-SCNC: 30 MMOL/L (ref 21–32)
CREAT SERPL-MCNC: 0.7 MG/DL (ref 0.6–1.2)
GFR SERPLBLD CREATININE-BSD FMLA CKD-EPI: >90 ML/MIN/{1.73_M2}
GLUCOSE SERPL-MCNC: 98 MG/DL (ref 70–99)
NT-PROBNP SERPL-MCNC: 900 PG/ML (ref 0–124)
POTASSIUM SERPL-SCNC: 4.4 MMOL/L (ref 3.5–5.1)
SODIUM SERPL-SCNC: 137 MMOL/L (ref 136–145)

## 2025-02-11 PROCEDURE — 36415 COLL VENOUS BLD VENIPUNCTURE: CPT

## 2025-02-11 PROCEDURE — 80048 BASIC METABOLIC PNL TOTAL CA: CPT

## 2025-02-11 PROCEDURE — 83880 ASSAY OF NATRIURETIC PEPTIDE: CPT

## 2025-02-11 NOTE — PATIENT INSTRUCTIONS
Stop Amlodipine  Blood work today-I will jessica for further instructions  Scale given  Call with a 3 lb weight gain in one day or 5 lbs in a week  Low sodium diet -3,000 mg a day  Fluid restriction of 64 ounces of fluid a day

## 2025-02-13 NOTE — PROGRESS NOTES
Marcy  
error  
error  
jaja  
Chest tightness and shortness of breath.  Her symptoms of chest tightness and shortness of breath may be attributed to fluid retention. A comprehensive blood work will be conducted today to assess her fluid levels.        Lower extremity edema.  She presents with 2+ pitting edema in her lower legs, likely exacerbated by amlodipine. She has been advised to discontinue amlodipine. Blood work will be conducted today to assess fluid levels. A scale will be provided for daily weight monitoring. She has been instructed to adhere to a low sodium diet and limit her fluid intake to 64 ounces per day. If her fluid levels are significantly elevated, initiation of Entresto may be considered today.    Follow-up  The patient will follow up in 2 weeks.  Stop Amlodipine  Blood work today-I will jessica for further instructions  Scale given  Call with a 3 lb weight gain in one day or 5 lbs in a week  Low sodium diet -3,000 mg a day  Fluid restriction of 64 ounces of fluid a day          Overall the patient is stable from CV standpoint.      I have addressed the patient's cardiac risk factors which include diabetes mellitus, dyslipidemia, and hypertension, and adjusted pharmacologic treatment as needed. In addition, I have reinforced the need for patient directed risk factor modification.  Further evaluation will be based upon the patient's clinical course and testing results.  All questions and concerns were addressed to the patient. Alternatives to my treatment were discussed.    The patient verbalizes understanding not to stop medications without discussing with us.    The patient is not currently smoking. The risks related to smoking were reviewed with the patient. Recommend maintaining a smoke-free lifestyle. Products available for smoking cessation were discussed.    Patient is on a beta blocker   Patient is on an ARB  Patient is on a statin  Patient is not on Jardiance    Antiplatelet  therapy has been recommended / prescribed for

## 2025-02-18 ENCOUNTER — ANTI-COAG VISIT (OUTPATIENT)
Dept: PHARMACY | Age: 75
End: 2025-02-18
Payer: COMMERCIAL

## 2025-02-18 DIAGNOSIS — I48.21 PERMANENT ATRIAL FIBRILLATION (HCC): Primary | ICD-10-CM

## 2025-02-18 LAB
INTERNATIONAL NORMALIZATION RATIO, POC: 1.8
PROTHROMBIN TIME, POC: 0

## 2025-02-18 PROCEDURE — 85610 PROTHROMBIN TIME: CPT | Performed by: PHYSICIAN ASSISTANT

## 2025-02-18 PROCEDURE — 99212 OFFICE O/P EST SF 10 MIN: CPT | Performed by: PHYSICIAN ASSISTANT

## 2025-02-25 ENCOUNTER — OFFICE VISIT (OUTPATIENT)
Dept: CARDIOLOGY CLINIC | Age: 75
End: 2025-02-25

## 2025-02-25 ENCOUNTER — ANTI-COAG VISIT (OUTPATIENT)
Dept: PHARMACY | Age: 75
End: 2025-02-25
Payer: COMMERCIAL

## 2025-02-25 VITALS
DIASTOLIC BLOOD PRESSURE: 102 MMHG | BODY MASS INDEX: 32.72 KG/M2 | SYSTOLIC BLOOD PRESSURE: 158 MMHG | HEART RATE: 63 BPM | HEIGHT: 67 IN | WEIGHT: 208.5 LBS

## 2025-02-25 DIAGNOSIS — I48.21 PERMANENT ATRIAL FIBRILLATION (HCC): Primary | ICD-10-CM

## 2025-02-25 DIAGNOSIS — I25.10 CORONARY ARTERY DISEASE DUE TO LIPID RICH PLAQUE: ICD-10-CM

## 2025-02-25 DIAGNOSIS — I25.83 CORONARY ARTERY DISEASE DUE TO LIPID RICH PLAQUE: ICD-10-CM

## 2025-02-25 DIAGNOSIS — I10 PRIMARY HYPERTENSION: ICD-10-CM

## 2025-02-25 DIAGNOSIS — E78.2 MIXED HYPERLIPIDEMIA: ICD-10-CM

## 2025-02-25 DIAGNOSIS — I50.32 CHRONIC HEART FAILURE WITH PRESERVED EJECTION FRACTION (HCC): Primary | ICD-10-CM

## 2025-02-25 DIAGNOSIS — I48.21 PERMANENT ATRIAL FIBRILLATION (HCC): ICD-10-CM

## 2025-02-25 LAB
INTERNATIONAL NORMALIZATION RATIO, POC: 1.9
PROTHROMBIN TIME, POC: 0

## 2025-02-25 PROCEDURE — 85610 PROTHROMBIN TIME: CPT | Performed by: PHYSICIAN ASSISTANT

## 2025-02-25 PROCEDURE — 99212 OFFICE O/P EST SF 10 MIN: CPT | Performed by: PHYSICIAN ASSISTANT

## 2025-02-25 RX ORDER — SACUBITRIL AND VALSARTAN 24; 26 MG/1; MG/1
1 TABLET, FILM COATED ORAL 2 TIMES DAILY
Qty: 60 TABLET | Refills: 3 | Status: SHIPPED | OUTPATIENT
Start: 2025-02-25

## 2025-02-25 NOTE — PROGRESS NOTES
[facility]     Outpatient Follow Up Note    CHIEF COMPLAINT / HPI: Hospital Follow Up secondary to HFpEF     Hospital record has been reviewed  Hospital Course progressed as follows per discharge summary:   Shortness of breath on exertion [R06.02] 01/31/2025   Blurry vision, right eye [H53.8] 01/31/2025   (HFpEF) heart failure with preserved ejection fraction (CMS-HCC) [I50.30]   Atrial fibrillation (CMS-HCC) [I48.91] 02/03/2015   Chest pain [R07.9] 02/01/2015   Coronary atherosclerosis [I25.10] 06/01/2012   Essential hypertension, benign [I10] 03/24/2008     Resolved Hospital Problems   No resolved problems to display.     Operations/Procedures Performed (include dates)     Lines/Drains/Airways:  Patient Lines/Drains/Airways Status     Active Line / PIV Line     Name Placement date Placement time Site Days   Peripheral IV 02/03/25 Anterior;Left Forearm 02/03/25 1406 Forearm 1         Emilee Paulson is 74 y.o. female who presents today for a routine follow up after a recent hospitalization related to the above mentioned issues.        History of Present Illness    History of Present Illness  The patient presents for evaluation of heart failure, hypertension, coronary artery disease, hyperlipidemia, atrial fibrillation, chest tightness, and lower extremity edema.    He was last seen in the hospital for shortness of breath, heart failure, atrial fibrillation, chest pain, coronary artery disease, and hypertension. He has been experiencing persistent difficulty in expectorating phlegm from the posterior region of his throat. She reports significant leg swelling, which has improved following the discontinuation of amlodipine. She maintains a daily weight log and adheres to a fluid restriction regimen. She  is scheduled to see Dr. Shi on Wednesday, 26th. He has not attempted any pharmacological interventions such as Mucinex. Her current medication regimen includes aspirin, atorvastatin, Caltrate,

## 2025-02-25 NOTE — PROGRESS NOTES
Ms. Emilee Paulson is a 74 y.o. y/o female with history of A fib   She presents today for anticoagulation monitoring and adjustment.    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 Went off memory--->states that she has been taking warfarin 2 mg tablets daily since d/c from facility 6/21---> follows AVS  ---> taking 5mg daily ---> Confirmed --> was d/c from facility with warfarin 2.5 mg tablets x 5 tablets, has taken 2.5 mg daily since d/c --> verified dose   []   [x]       S/S bleeding/bruising/swelling/SOB -did bruise shoulder after fall, followed up with doctor and monitoring bump ---> denies   []   [x]       Blood in urine or stool - denies   []   [x]       Procedures scheduled in the future at this time - Is being assessed for watchman, will keep notified---> had cortisone shot in knee last fri --> thyroid bx on Monday 8/19, did not hold, goes back 9/3 to make a plan, likely surgery  9/3/24 MD appt: Endocrinology referral for hyperthyroidism per patient preference  - in event of surgery, tentative plan to referral to thoracis to discuss possible sternotomy given higher clavicles and sternal head, body habitus, which may make substernal delivery challenging  - will need to hold warfarin leading up to surgery, possible bridge (per discussion with Dr. Briceño, ok to hold from NSGY standpoint)    -None upcoming   []   [x]       Missed Dose- denies  []   [x]       Extra Dose - Denies    []   [x]       Change in medications She continues with Tylenol 1000mg but only as needed --> tylenol arthritis recently---> no changes    [x]   []       Change in health/diet/appetite-- normally has high vitamin K diet of canned spinach weekly and collards or broccoli about every other week. Will have some lower vitamin K veggies about 2 times a

## 2025-02-26 ENCOUNTER — TELEPHONE (OUTPATIENT)
Dept: CARDIOLOGY CLINIC | Age: 75
End: 2025-02-26

## 2025-02-26 NOTE — PROGRESS NOTES
[facility] [place]  3/10/25  Referring: Dr. Beyer    REASON FOR CONSULT/CHIEF COMPLAINT/HPI     Reason for visit/ Chief complaint  Follow up   CAD, AF, CHF   HPI Emilee Paulson is a 74 y.o. here follow up for management of   CAD, hypertension (20 year history), hyperlipidemia, chf, atrial fibrillation. Previous hemorrhagic stroke in  2018. Previously refused watchman.6/17/24 right sided hemiparesis, sensory loss and fall when subtherapeutic INR due to warfarin being held for temporal artery biopsy    On 2/25 she was taken off of valsartan and started entresto, she was unable to afford entresto    Today her weight is down 6 pounds. She is overall feeling ok, Does have a productive cough of clear phlegm, but no fever or chills. Complains of her heart racing, nausea, lower blood pressure. No chest pain, change in exertional shortness of breath, syncope    Walks with a rollator    Patient is compliant  with medication and is tolerating them well without side effects       HISTORY/ALLERGIES/ROS     MedHx:   has a past medical history of Acute cerebrovascular accident (CVA) (HCC), LAST (acute kidney injury), Atrial fibrillation (HCC), Bell palsy, CAD (coronary artery disease), Cerebral artery occlusion with cerebral infarction (HCC), Chronic diastolic CHF (congestive heart failure) (HCC), CVA (cerebral vascular accident) (HCC), Hypertension, Intracranial hemorrhage (HCC), Mixed hyperlipidemia, Nonintractable headache, Nontraumatic subcortical hemorrhage of cerebral hemisphere (HCC), Poor vision, Primary osteoarthritis of right knee, Sudden visual loss of left eye, Thyroid nodule, and TIA involving right internal carotid artery.  SurgHx:  has a past surgical history that includes Cardiac surgery; Tubal ligation; Coronary angioplasty with stent; Colonoscopy; Rotator cuff repair (Right); and Temporal Artery Biopsy (Right, 6/11/2024).   SocHx:   reports that she has never smoked. She has never used smokeless tobacco.

## 2025-02-27 NOTE — TELEPHONE ENCOUNTER
I spoke with pt and asked if she has filled out financial assistance through the  . She stated that she had not. I also asked if she spends much on her other med and she said that she does not. I told her of the 3% requirement. From what she told me , she would not qualify . I advised that I would sed a list of foundations that may be able to help.

## 2025-03-05 ENCOUNTER — ANCILLARY PROCEDURE (OUTPATIENT)
Dept: CARDIOLOGY CLINIC | Age: 75
End: 2025-03-05

## 2025-03-05 ENCOUNTER — OFFICE VISIT (OUTPATIENT)
Dept: CARDIOLOGY CLINIC | Age: 75
End: 2025-03-05
Payer: COMMERCIAL

## 2025-03-05 VITALS
DIASTOLIC BLOOD PRESSURE: 62 MMHG | BODY MASS INDEX: 31.77 KG/M2 | SYSTOLIC BLOOD PRESSURE: 94 MMHG | WEIGHT: 202.4 LBS | HEIGHT: 67 IN

## 2025-03-05 DIAGNOSIS — R00.2 PALPITATIONS: ICD-10-CM

## 2025-03-05 DIAGNOSIS — I25.83 CORONARY ARTERY DISEASE DUE TO LIPID RICH PLAQUE: ICD-10-CM

## 2025-03-05 DIAGNOSIS — I48.21 PERMANENT ATRIAL FIBRILLATION (HCC): Primary | ICD-10-CM

## 2025-03-05 DIAGNOSIS — I77.810 ASCENDING AORTA DILATATION: ICD-10-CM

## 2025-03-05 DIAGNOSIS — E78.2 MIXED HYPERLIPIDEMIA: ICD-10-CM

## 2025-03-05 DIAGNOSIS — I25.10 CORONARY ARTERY DISEASE DUE TO LIPID RICH PLAQUE: ICD-10-CM

## 2025-03-05 LAB — ECHO BSA: 2.08 M2

## 2025-03-05 PROCEDURE — 99214 OFFICE O/P EST MOD 30 MIN: CPT | Performed by: INTERNAL MEDICINE

## 2025-03-05 PROCEDURE — 3074F SYST BP LT 130 MM HG: CPT | Performed by: INTERNAL MEDICINE

## 2025-03-05 PROCEDURE — 1123F ACP DISCUSS/DSCN MKR DOCD: CPT | Performed by: INTERNAL MEDICINE

## 2025-03-05 PROCEDURE — 1159F MED LIST DOCD IN RCRD: CPT | Performed by: INTERNAL MEDICINE

## 2025-03-05 PROCEDURE — 3078F DIAST BP <80 MM HG: CPT | Performed by: INTERNAL MEDICINE

## 2025-03-05 PROCEDURE — 93000 ELECTROCARDIOGRAM COMPLETE: CPT | Performed by: INTERNAL MEDICINE

## 2025-03-07 ENCOUNTER — TELEPHONE (OUTPATIENT)
Dept: PHARMACY | Age: 75
End: 2025-03-07

## 2025-03-17 ENCOUNTER — ANTI-COAG VISIT (OUTPATIENT)
Dept: PHARMACY | Age: 75
End: 2025-03-17
Payer: COMMERCIAL

## 2025-03-17 DIAGNOSIS — I48.21 PERMANENT ATRIAL FIBRILLATION (HCC): Primary | ICD-10-CM

## 2025-03-17 LAB
INTERNATIONAL NORMALIZATION RATIO, POC: 4
PROTHROMBIN TIME, POC: 0

## 2025-03-17 PROCEDURE — 85610 PROTHROMBIN TIME: CPT

## 2025-03-17 PROCEDURE — 99212 OFFICE O/P EST SF 10 MIN: CPT

## 2025-03-20 ENCOUNTER — TELEPHONE (OUTPATIENT)
Dept: PHARMACY | Age: 75
End: 2025-03-20

## 2025-03-24 ENCOUNTER — APPOINTMENT (OUTPATIENT)
Dept: PHARMACY | Age: 75
End: 2025-03-24
Payer: COMMERCIAL

## 2025-03-28 DIAGNOSIS — I48.91 ON COUMADIN FOR ATRIAL FIBRILLATION (HCC): Primary | ICD-10-CM

## 2025-03-28 DIAGNOSIS — Z79.01 ON COUMADIN FOR ATRIAL FIBRILLATION (HCC): Primary | ICD-10-CM

## 2025-03-31 LAB — ECHO BSA: 2.08 M2

## 2025-04-01 ENCOUNTER — RESULTS FOLLOW-UP (OUTPATIENT)
Dept: CARDIOLOGY CLINIC | Age: 75
End: 2025-04-01

## 2025-04-01 ENCOUNTER — ANTI-COAG VISIT (OUTPATIENT)
Dept: PHARMACY | Age: 75
End: 2025-04-01
Payer: COMMERCIAL

## 2025-04-01 DIAGNOSIS — I48.21 PERMANENT ATRIAL FIBRILLATION (HCC): Primary | ICD-10-CM

## 2025-04-01 DIAGNOSIS — I25.83 CORONARY ARTERY DISEASE DUE TO LIPID RICH PLAQUE: Primary | ICD-10-CM

## 2025-04-01 DIAGNOSIS — I25.10 CORONARY ARTERY DISEASE DUE TO LIPID RICH PLAQUE: Primary | ICD-10-CM

## 2025-04-01 LAB
INTERNATIONAL NORMALIZATION RATIO, POC: 2.5
PROTHROMBIN TIME, POC: 0

## 2025-04-01 PROCEDURE — 99211 OFF/OP EST MAY X REQ PHY/QHP: CPT | Performed by: PHYSICIAN ASSISTANT

## 2025-04-01 PROCEDURE — 85610 PROTHROMBIN TIME: CPT | Performed by: PHYSICIAN ASSISTANT

## 2025-04-01 NOTE — TELEPHONE ENCOUNTER
----- Message from LAKSHMI STEVENSON RN sent at 4/1/2025 11:00 AM EDT -----  Please let her know that her monitor was reviewed. She did have some fast beats, recommend she get scheduled for a myoview ( take coreg day of) and follow up with Dr Diehl for possible ablation. Maybe she can do the stress test the same day she sees brittany, and get a renal and mag

## 2025-04-01 NOTE — PROGRESS NOTES
ok with cost. ---> free    She may be slower to reach steady state with warfarin dosing so consider checking INR about 10 days after dose adjustment.       INR is 2.5 today  Continue weekly dose of 2.5mg on Sundays and 5mg all other days.   Encouraged patient to remain consistent with vit k intake   Recheck INR in 3 weeks, 4/22     Patient consent signed 8/21/24    Referring cardiologist is Shaji Wiley CNP  INR (no units)   Date Value   02/05/2025 3.40   08/08/2024 2.83 (H)   06/11/2024 1.83 (H)   06/10/2024 2.16 (H)     INR,(POC) (no units)   Date Value   03/17/2025 4.0   02/25/2025 1.9   02/18/2025 1.8   02/10/2025 1.9     For Pharmacy Admin Tracking Only    Total # of Interventions Recommended: 0  Total # of Interventions Accepted: 0  Time Spent (min): 15

## 2025-04-01 NOTE — RESULT ENCOUNTER NOTE
Please let her know that her monitor was reviewed. She did have some fast beats, recommend she get scheduled for a myoview ( take coreg day of) and follow up with Dr Diehl for possible ablation. Maybe she can do the stress test the same day she sees brittany, and get a renal and mag

## 2025-04-02 NOTE — TELEPHONE ENCOUNTER
I spoke with pt and relayed monitor results per DKW. Pt verbalized understanding.    Patient is going to get the blood work done 4/22/25 when she goes to the coumadin clinic.  Patient was given the number for central scheduling to schedule the Myoview.

## 2025-04-18 ENCOUNTER — TELEPHONE (OUTPATIENT)
Dept: CARDIOLOGY CLINIC | Age: 75
End: 2025-04-18

## 2025-04-18 NOTE — TELEPHONE ENCOUNTER
Trip called to inform the office that the  Nuclear stress test with myocardial perfusion was deny.  Case #:  [id number]. For a Peer to Peer call: [phone].  The reason for deny is not medically necessary. Please advise.

## 2025-04-22 ENCOUNTER — APPOINTMENT (OUTPATIENT)
Dept: PHARMACY | Age: 75
End: 2025-04-22
Payer: COMMERCIAL

## 2025-04-22 ENCOUNTER — TELEPHONE (OUTPATIENT)
Dept: CARDIOLOGY CLINIC | Age: 75
End: 2025-04-22

## 2025-04-22 ENCOUNTER — HOSPITAL ENCOUNTER (OUTPATIENT)
Age: 75
Discharge: HOME | End: 2025-04-24
Attending: INTERNAL MEDICINE
Payer: COMMERCIAL

## 2025-04-22 ENCOUNTER — ANTI-COAG VISIT (OUTPATIENT)
Dept: PHARMACY | Age: 75
End: 2025-04-22
Payer: COMMERCIAL

## 2025-04-22 VITALS — BODY MASS INDEX: 29.82 KG/M2 | HEIGHT: 67 IN | WEIGHT: 190 LBS

## 2025-04-22 VITALS — HEART RATE: 86 BPM | SYSTOLIC BLOOD PRESSURE: 148 MMHG | DIASTOLIC BLOOD PRESSURE: 101 MMHG

## 2025-04-22 DIAGNOSIS — I25.83 CORONARY ARTERY DISEASE DUE TO LIPID RICH PLAQUE: ICD-10-CM

## 2025-04-22 DIAGNOSIS — I48.21 PERMANENT ATRIAL FIBRILLATION (HCC): Primary | ICD-10-CM

## 2025-04-22 DIAGNOSIS — I25.10 CORONARY ARTERY DISEASE DUE TO LIPID RICH PLAQUE: ICD-10-CM

## 2025-04-22 LAB
ECHO BSA: 2.02 M2
INTERNATIONAL NORMALIZATION RATIO, POC: 2.1
NUC STRESS EJECTION FRACTION: 66 %
NUC STRESS LV EDV: 108 ML (ref 56–104)
NUC STRESS LV ESV: 37 ML (ref 19–49)
NUC STRESS LV MASS: 143 G
PROTHROMBIN TIME, POC: 0
STRESS BASELINE DIAS BP: 101 MMHG
STRESS BASELINE HR: 86 BPM
STRESS BASELINE SYS BP: 148 MMHG
STRESS ESTIMATED WORKLOAD: 1 METS
STRESS EXERCISE DUR MIN: 4 MIN
STRESS EXERCISE DUR SEC: 0 SEC
STRESS O2 SAT PEAK: 96 %
STRESS O2 SAT REST: 98 %
STRESS PEAK DIAS BP: 104 MMHG
STRESS PEAK SYS BP: 144 MMHG
STRESS PERCENT HR ACHIEVED: 61 %
STRESS POST PEAK HR: 90 BPM
STRESS RATE PRESSURE PRODUCT: ABNORMAL BPM*MMHG
STRESS TARGET HR: 147 BPM
TID: 0.98

## 2025-04-22 PROCEDURE — 93017 CV STRESS TEST TRACING ONLY: CPT

## 2025-04-22 PROCEDURE — A9502 TC99M TETROFOSMIN: HCPCS | Performed by: INTERNAL MEDICINE

## 2025-04-22 PROCEDURE — 78452 HT MUSCLE IMAGE SPECT MULT: CPT

## 2025-04-22 PROCEDURE — 85610 PROTHROMBIN TIME: CPT | Performed by: PHYSICIAN ASSISTANT

## 2025-04-22 PROCEDURE — 3430000000 HC RX DIAGNOSTIC RADIOPHARMACEUTICAL: Performed by: INTERNAL MEDICINE

## 2025-04-22 PROCEDURE — 99211 OFF/OP EST MAY X REQ PHY/QHP: CPT | Performed by: PHYSICIAN ASSISTANT

## 2025-04-22 PROCEDURE — 6360000002 HC RX W HCPCS: Performed by: INTERNAL MEDICINE

## 2025-04-22 PROCEDURE — 93016 CV STRESS TEST SUPVJ ONLY: CPT | Performed by: INTERNAL MEDICINE

## 2025-04-22 PROCEDURE — 78452 HT MUSCLE IMAGE SPECT MULT: CPT | Performed by: INTERNAL MEDICINE

## 2025-04-22 PROCEDURE — 93018 CV STRESS TEST I&R ONLY: CPT | Performed by: INTERNAL MEDICINE

## 2025-04-22 RX ORDER — AMINOPHYLLINE 25 MG/ML
100 INJECTION, SOLUTION INTRAVENOUS ONCE
Status: COMPLETED | OUTPATIENT
Start: 2025-04-22 | End: 2025-04-22

## 2025-04-22 RX ORDER — REGADENOSON 0.08 MG/ML
0.4 INJECTION, SOLUTION INTRAVENOUS
Status: COMPLETED | OUTPATIENT
Start: 2025-04-22 | End: 2025-04-22

## 2025-04-22 RX ORDER — SPIRONOLACTONE 25 MG/1
25 TABLET ORAL 2 TIMES DAILY
Qty: 60 TABLET | Refills: 4 | Status: SHIPPED | OUTPATIENT
Start: 2025-04-22

## 2025-04-22 RX ADMIN — TETROFOSMIN 32.1 MILLICURIE: 1.38 INJECTION, POWDER, LYOPHILIZED, FOR SOLUTION INTRAVENOUS at 09:18

## 2025-04-22 RX ADMIN — TETROFOSMIN 10.7 MILLICURIE: 1.38 INJECTION, POWDER, LYOPHILIZED, FOR SOLUTION INTRAVENOUS at 08:00

## 2025-04-22 RX ADMIN — AMINOPHYLLINE 100 MG: 25 INJECTION, SOLUTION INTRAVENOUS at 09:27

## 2025-04-22 RX ADMIN — REGADENOSON 0.4 MG: 0.08 INJECTION, SOLUTION INTRAVENOUS at 09:18

## 2025-04-22 NOTE — TELEPHONE ENCOUNTER
Called home care physician to clarify orders-- today weight is 209  140/80)  No potassium at this time  Take 4 20 mg lasix today and tomorrow, then torsemide 50 mg daily  Take coreg 6.25 bid      Left message on voicemail as she requested [phone]

## 2025-04-22 NOTE — TELEPHONE ENCOUNTER
Received refill request for spironolactone (ALDACTONE) 25 MG tablet  from [facility] pharmacy.     Last OV: 3/5/2025 DKW    Next OV: 4/25/2025 NPJH    Last Labs: 2/11/2025 BMP    Last Filled: 11/8/2024 NPTS

## 2025-04-22 NOTE — PROGRESS NOTES
Ms. Emilee Paulson is a 74 y.o. y/o female with history of A fib   She presents today for anticoagulation monitoring and adjustment.    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)    Patient Reported Findings:  Yes     No   [x]   []       Patient verifies current dosing regimen as listed - follows AVS  ---> taking 5mg daily ---> Confirmed --> was d/c from facility with warfarin 2.5 mg tablets x 5 tablets, has taken 2.5 mg daily since d/c --> verified dose   []   [x]       S/S bleeding/bruising/swelling/SOB -did bruise shoulder after fall, followed up with doctor and monitoring bump ---> denies   []   [x]       Blood in urine or stool - denies   []   [x]       Procedures scheduled in the future at this time - Is being assessed for watchman, will keep notified---> had cortisone shot in knee last fri --> thyroid bx on Monday 8/19, did not hold, goes back 9/3 to make a plan, likely surgery  9/3/24 MD appt: Endocrinology referral for hyperthyroidism per patient preference  - in event of surgery, tentative plan to referral to thoracis to discuss possible sternotomy given higher clavicles and sternal head, body habitus, which may make substernal delivery challenging  - will need to hold warfarin leading up to surgery, possible bridge (per discussion with Dr. Briceño, ok to hold from NSGY standpoint)    -None upcoming   [x]   []       Missed Dose- might have missed 1/2 tablet on Sunday   []   [x]       Extra Dose - Denies    []   [x]       Change in medications She continues with Tylenol 1000mg but only as needed --> tylenol arthritis recently---> no changes    []   [x]       Change in health/diet/appetite-- normally has high vitamin K diet of canned spinach weekly and collards or broccoli about every other week. Will have some lower vitamin K veggies about 2 times a week such as green beans.--> states that she has been eating liver twice a week, likely why INR has dropped. Asked patient to decrease to once a week

## 2025-04-22 NOTE — PROGRESS NOTES
auscultation, no crackles or wheezing.  CARDIOVASCULAR:  Regular rate and rhythm with no murmurs, gallops, rubs, or abnormal heart sounds, normal PMI. The apical impulses not displaced. Heart tones are crisp and normal. Cervical veins are not engorged. JVP less than 8 cm H2O. The carotid upstroke is normal in amplitude and contour without delay or bruit   ABDOMEN:  Normal bowel sounds, non-distended and non-tender to palpation  EXT:  no calf tenderness. Pulses are present bilaterally.  There is swelling in the feet and ankles, with the left being more swollen than the right. The lower legs are tight and sore. There is 2+ pitting edema in the lower legs.  Extremities: Bilateral leg swelling noted.    Vital Signs  Blood pressure is 158/102.             DATA:    Lab Results   Component Value Date    ALT 7 (L) 08/08/2024    AST 16 08/08/2024    ALKPHOS 73 08/08/2024    BILITOT 0.7 08/08/2024     Lab Results   Component Value Date    CREATININE 0.7 02/11/2025    BUN 8 02/11/2025     02/11/2025    K 4.4 02/11/2025     02/11/2025    CO2 30 02/11/2025     Lab Results   Component Value Date    TSH 0.68 05/27/2020     Lab Results   Component Value Date    WBC 4.3 08/08/2024    HGB 12.4 08/08/2024    HCT 36.6 08/08/2024    MCV 89.6 08/08/2024     08/08/2024     No components found for: \"CHLPL\"  Lab Results   Component Value Date    TRIG 91 06/09/2024    TRIG 65 05/09/2024    TRIG 73 02/14/2023     Lab Results   Component Value Date    HDL 51 06/09/2024    HDL 54 05/09/2024    HDL 68 (H) 02/14/2023     No components found for: \"LDLCALC\"  No components found for: \"LABVLDL\"    Radiology Review:  Pertinent images / reports were reviewed as a part of this visit and reveals the following:    TTE 6/19/24-- Left ventricle: The cavity size is normal. Wall thickness was increased in   a pattern of mild LVH. Systolic function is vigorous. The estimated   ejection fraction is 65-70%. Wall motion is normal; there are no

## 2025-04-25 ENCOUNTER — RESULTS FOLLOW-UP (OUTPATIENT)
Age: 75
End: 2025-04-25

## 2025-04-25 ENCOUNTER — OFFICE VISIT (OUTPATIENT)
Dept: CARDIOLOGY CLINIC | Age: 75
End: 2025-04-25
Payer: COMMERCIAL

## 2025-04-25 ENCOUNTER — HOSPITAL ENCOUNTER (OUTPATIENT)
Age: 75
Discharge: HOME | End: 2025-04-25
Payer: COMMERCIAL

## 2025-04-25 VITALS
SYSTOLIC BLOOD PRESSURE: 100 MMHG | DIASTOLIC BLOOD PRESSURE: 64 MMHG | OXYGEN SATURATION: 97 % | WEIGHT: 203.6 LBS | HEIGHT: 67 IN | HEART RATE: 78 BPM | BODY MASS INDEX: 31.96 KG/M2

## 2025-04-25 DIAGNOSIS — I25.83 CORONARY ARTERY DISEASE DUE TO LIPID RICH PLAQUE: ICD-10-CM

## 2025-04-25 DIAGNOSIS — R06.02 SHORTNESS OF BREATH: Primary | ICD-10-CM

## 2025-04-25 DIAGNOSIS — I25.10 CORONARY ARTERY DISEASE DUE TO LIPID RICH PLAQUE: ICD-10-CM

## 2025-04-25 DIAGNOSIS — E78.2 MIXED HYPERLIPIDEMIA: ICD-10-CM

## 2025-04-25 DIAGNOSIS — I50.32 CHRONIC HEART FAILURE WITH PRESERVED EJECTION FRACTION (HCC): ICD-10-CM

## 2025-04-25 DIAGNOSIS — I48.21 PERMANENT ATRIAL FIBRILLATION (HCC): ICD-10-CM

## 2025-04-25 DIAGNOSIS — I10 PRIMARY HYPERTENSION: ICD-10-CM

## 2025-04-25 LAB
ALBUMIN SERPL-MCNC: 4.2 G/DL (ref 3.4–5)
ANION GAP SERPL CALCULATED.3IONS-SCNC: 11 MMOL/L (ref 3–16)
BUN SERPL-MCNC: 7 MG/DL (ref 7–20)
CALCIUM SERPL-MCNC: 10.2 MG/DL (ref 8.3–10.6)
CHLORIDE SERPL-SCNC: 100 MMOL/L (ref 99–110)
CO2 SERPL-SCNC: 27 MMOL/L (ref 21–32)
CREAT SERPL-MCNC: 0.7 MG/DL (ref 0.6–1.2)
GFR SERPLBLD CREATININE-BSD FMLA CKD-EPI: >90 ML/MIN/{1.73_M2}
GLUCOSE SERPL-MCNC: 104 MG/DL (ref 70–99)
MAGNESIUM SERPL-MCNC: 1.66 MG/DL (ref 1.8–2.4)
PHOSPHATE SERPL-MCNC: 2.9 MG/DL (ref 2.5–4.9)
POTASSIUM SERPL-SCNC: 3.3 MMOL/L (ref 3.5–5.1)
SODIUM SERPL-SCNC: 138 MMOL/L (ref 136–145)

## 2025-04-25 PROCEDURE — 99214 OFFICE O/P EST MOD 30 MIN: CPT

## 2025-04-25 PROCEDURE — 36415 COLL VENOUS BLD VENIPUNCTURE: CPT

## 2025-04-25 PROCEDURE — 80069 RENAL FUNCTION PANEL: CPT

## 2025-04-25 PROCEDURE — 83735 ASSAY OF MAGNESIUM: CPT

## 2025-04-25 PROCEDURE — 1160F RVW MEDS BY RX/DR IN RCRD: CPT

## 2025-04-25 PROCEDURE — 1123F ACP DISCUSS/DSCN MKR DOCD: CPT

## 2025-04-25 PROCEDURE — 3078F DIAST BP <80 MM HG: CPT

## 2025-04-25 PROCEDURE — 1159F MED LIST DOCD IN RCRD: CPT

## 2025-04-25 PROCEDURE — 3074F SYST BP LT 130 MM HG: CPT

## 2025-04-25 RX ORDER — VALSARTAN 320 MG/1
160 TABLET ORAL DAILY
Qty: 15 TABLET | Refills: 0 | Status: SHIPPED | OUTPATIENT
Start: 2025-04-25 | End: 2025-04-25

## 2025-04-25 RX ORDER — POTASSIUM CHLORIDE 1500 MG/1
20 TABLET, EXTENDED RELEASE ORAL DAILY
Qty: 90 TABLET | Refills: 3 | Status: SHIPPED | OUTPATIENT
Start: 2025-04-25

## 2025-04-25 RX ORDER — VALSARTAN 320 MG/1
320 TABLET ORAL DAILY
Qty: 30 TABLET | Refills: 0 | Status: SHIPPED | OUTPATIENT
Start: 2025-04-25 | End: 2025-04-25 | Stop reason: ALTCHOICE

## 2025-04-25 RX ORDER — VALSARTAN 320 MG/1
320 TABLET ORAL DAILY
Qty: 30 TABLET | Refills: 0 | Status: SHIPPED | OUTPATIENT
Start: 2025-04-25

## 2025-04-25 NOTE — PATIENT INSTRUCTIONS
Increase Torsemide 40  mg in the morning for 3 days and then decrease back to 20 mg a day -call me Tuesday and let me know how you are feeling at [phone]  Start Jardiance at 10 mg daily if you can afford it  Make sure you are valsartan from home  Repeat echocardiogram to investigate the valves   Blood work today

## 2025-04-25 NOTE — RESULT ENCOUNTER NOTE
Recommend 400 mg magnesium daily( if she is taking 400 mg double it to 800 mg, 20 meq kcl daily repeat labs in one week, Recommend continue valsartan, do not start entresto    Called patient, no answer  Called first to notify-- left message with above and recommendations to call me back

## 2025-05-12 ENCOUNTER — TELEPHONE (OUTPATIENT)
Dept: PHARMACY | Age: 75
End: 2025-05-12

## 2025-05-13 ENCOUNTER — APPOINTMENT (OUTPATIENT)
Dept: PHARMACY | Age: 75
End: 2025-05-13
Payer: MEDICARE

## 2025-05-16 NOTE — TELEPHONE ENCOUNTER
Patient did not show for her 5/15 appt.  Tried to reach patient to r/s x3.  One ring then goes directly to busy signal.

## 2025-05-19 ENCOUNTER — OFFICE VISIT (OUTPATIENT)
Dept: CARDIOLOGY CLINIC | Age: 75
End: 2025-05-19
Payer: MEDICARE

## 2025-05-19 ENCOUNTER — ANTI-COAG VISIT (OUTPATIENT)
Dept: PHARMACY | Age: 75
End: 2025-05-19
Payer: MEDICARE

## 2025-05-19 VITALS
HEART RATE: 72 BPM | BODY MASS INDEX: 31.44 KG/M2 | HEIGHT: 67 IN | SYSTOLIC BLOOD PRESSURE: 106 MMHG | WEIGHT: 200.3 LBS | OXYGEN SATURATION: 97 % | DIASTOLIC BLOOD PRESSURE: 86 MMHG

## 2025-05-19 DIAGNOSIS — I50.32 CHRONIC HEART FAILURE WITH PRESERVED EJECTION FRACTION (HCC): ICD-10-CM

## 2025-05-19 DIAGNOSIS — R06.02 SHORTNESS OF BREATH: Primary | ICD-10-CM

## 2025-05-19 DIAGNOSIS — I48.21 PERMANENT ATRIAL FIBRILLATION (HCC): Primary | ICD-10-CM

## 2025-05-19 DIAGNOSIS — I25.10 CORONARY ARTERY DISEASE DUE TO LIPID RICH PLAQUE: ICD-10-CM

## 2025-05-19 DIAGNOSIS — E78.2 MIXED HYPERLIPIDEMIA: ICD-10-CM

## 2025-05-19 DIAGNOSIS — I10 PRIMARY HYPERTENSION: ICD-10-CM

## 2025-05-19 DIAGNOSIS — I48.21 PERMANENT ATRIAL FIBRILLATION (HCC): ICD-10-CM

## 2025-05-19 DIAGNOSIS — I25.83 CORONARY ARTERY DISEASE DUE TO LIPID RICH PLAQUE: ICD-10-CM

## 2025-05-19 LAB
INTERNATIONAL NORMALIZATION RATIO, POC: 2.7
PROTHROMBIN TIME, POC: 0

## 2025-05-19 PROCEDURE — 85610 PROTHROMBIN TIME: CPT

## 2025-05-19 PROCEDURE — 1159F MED LIST DOCD IN RCRD: CPT

## 2025-05-19 PROCEDURE — 1160F RVW MEDS BY RX/DR IN RCRD: CPT

## 2025-05-19 PROCEDURE — 1123F ACP DISCUSS/DSCN MKR DOCD: CPT

## 2025-05-19 PROCEDURE — 3079F DIAST BP 80-89 MM HG: CPT

## 2025-05-19 PROCEDURE — 3074F SYST BP LT 130 MM HG: CPT

## 2025-05-19 PROCEDURE — 99211 OFF/OP EST MAY X REQ PHY/QHP: CPT

## 2025-05-19 NOTE — PROGRESS NOTES
Ms. Emilee Paulson is a 74 y.o. y/o female with history of A fib   She presents today for anticoagulation monitoring and adjustment.    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)    Patient Reported Findings:  Yes     No   [x]   []       Patient verifies current dosing regimen as listed - follows AVS  ---> taking 5mg daily ---> Confirmed --> was d/c from facility with warfarin 2.5 mg tablets x 5 tablets, has taken 2.5 mg daily since d/c --> verified dose   []   [x]       S/S bleeding/bruising/swelling/SOB -did bruise shoulder after fall, followed up with doctor and monitoring bump ---> denies   []   [x]       Blood in urine or stool - denies   []   [x]       Procedures scheduled in the future at this time - Is being assessed for watchman, will keep notified---> had cortisone shot in knee last fri --> thyroid bx on Monday 8/19, did not hold, goes back 9/3 to make a plan, likely surgery  9/3/24 MD appt: Endocrinology referral for hyperthyroidism per patient preference  - in event of surgery, tentative plan to referral to thoracis to discuss possible sternotomy given higher clavicles and sternal head, body habitus, which may make substernal delivery challenging  - will need to hold warfarin leading up to surgery, possible bridge (per discussion with Dr. Briceño, ok to hold from NSGY standpoint)    -None upcoming   []   [x]       Missed Dose- might have missed 1/2 tablet on Sunday ---> denies   []   [x]       Extra Dose - Denies    []   [x]       Change in medications She continues with Tylenol 1000mg but only as needed --> tylenol arthritis recently---> torsemide, mag, jardiance was too expensive     []   [x]       Change in health/diet/appetite-- normally has high vitamin K diet of canned spinach weekly and collards or broccoli about every other week. Will have some lower vitamin K veggies about 2 times a week such as green beans.--> states that she has been eating liver twice a week, likely why INR has

## 2025-05-19 NOTE — PROGRESS NOTES
[facility]     Outpatient Follow Up Note    CHIEF COMPLAINT / HPI: Hospital Follow Up secondary to Acute exacerbation of chronic heart failure   Atrial fibrillation, Coronary atherosclerosis Essential hypertension, benign [I10] 03/24/2008     Call initiated 2 business days of discharge: 5/16/25 by [facility] Heat Failure Coordinator Ila Heredia RN, BSN   Admission date: 5/13/25  Discharge date: 5/15/25        [id number]  7 day  [id number]  14 day    Hospital record has been reviewed  Hospital Course progressed as follows per discharge summary:   Reason for Admission     he patient is a 74 y.o. female with a history of HFpEF (EF 55-60%, 2/2025), SAH (2016) HTN, CAD (s/p PTCA LAD, 2000), atrial fibrillation on AC with warfarin, DJD, nontoxic uninodular goiter, and OA, presenting to the hospital with shortness of breath.     Hospital Course By Problem     #Dyspnea   #Rhinovirus   Pt presents with 2-3 months dyspnea (worse with exertion, sometimes at rest), with development of cough a few days ago. Acute component likely related to rhinovirus infection. CTPA with clear lungs and no PE. Trace edema in legs, and weight pretty stable from 12/2024. Possibly component of deconditioning w/ acute worsening with viral illness. Had recent stress test 4/2025 with low risk of cardiac events.   -initially received IV lasix, transitioned to home torsemide Bid   -can continue duonebs although no hx COPD/asthma. Will also prescribe for DC  -Add cough medication     #HFpEF   TTE in Feb with normal EF, mild-mod AR. Euvolemic on exam, with weight close to baseline.   -holding jardiance and home torsemide inpatient. Resumed on DC  -continue aldactone / Mag  Cont farxiga 10 mg daily     #CAD - continue asa, statin     #HTN - continue coreg, hydral, valsartan    #Afib - continue warfarin and coreg     Risk of osteoporosis   Oscal   Ergocalciferol     Osteoarthritis/ Chronic Pain  Voltaren gel   Lidoderm patch     Heart Failure

## 2025-05-19 NOTE — PATIENT INSTRUCTIONS
Continue current medication regime  Make sure you  your inhalers as instructed by your doctor  If your weight goes above 199 lbs on home scale you make take an extra dose of Torsemide 20 mg in the morning   Keep your appointment with

## 2025-05-29 ENCOUNTER — ANTI-COAG VISIT (OUTPATIENT)
Dept: PHARMACY | Age: 75
End: 2025-05-29
Payer: MEDICARE

## 2025-05-29 DIAGNOSIS — I48.21 PERMANENT ATRIAL FIBRILLATION (HCC): Primary | ICD-10-CM

## 2025-05-29 LAB
INTERNATIONAL NORMALIZATION RATIO, POC: 2.6
PROTHROMBIN TIME, POC: 0

## 2025-05-29 PROCEDURE — 85610 PROTHROMBIN TIME: CPT

## 2025-05-29 PROCEDURE — 99211 OFF/OP EST MAY X REQ PHY/QHP: CPT

## 2025-05-29 NOTE — PROGRESS NOTES
Ms. Emilee Paulson is a 74 y.o. y/o female with history of A fib   She presents today for anticoagulation monitoring and adjustment.    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)    Patient Reported Findings:  Yes     No   [x]   []       Patient verifies current dosing regimen as listed - follows AVS  ---> taking 5mg daily ---> Confirmed --> was d/c from facility with warfarin 2.5 mg tablets x 5 tablets, has taken 2.5 mg daily since d/c --> verified dose   []   [x]       S/S bleeding/bruising/swelling/SOB -did bruise shoulder after fall, followed up with doctor and monitoring bump ---> denies   []   [x]       Blood in urine or stool - denies   []   [x]       Procedures scheduled in the future at this time - Is being assessed for watchman, will keep notified---> had cortisone shot in knee last fri --> thyroid bx on Monday 8/19, did not hold, goes back 9/3 to make a plan, likely surgery  9/3/24 MD appt: Endocrinology referral for hyperthyroidism per patient preference  - in event of surgery, tentative plan to referral to thoracis to discuss possible sternotomy given higher clavicles and sternal head, body habitus, which may make substernal delivery challenging  - will need to hold warfarin leading up to surgery, possible bridge (per discussion with Dr. Briceño, ok to hold from NSGY standpoint)    -None upcoming   []   [x]       Missed Dose- might have missed 1/2 tablet on Sunday ---> denies   []   [x]       Extra Dose - Denies    []   [x]       Change in medications She continues with Tylenol 1000mg but only as needed --> tylenol arthritis recently---> torsemide, mag, jardiance was too expensive   ---> denies   []   [x]       Change in health/diet/appetite-- normally has high vitamin K diet of canned spinach weekly and collards or broccoli about every other week. Will have some lower vitamin K veggies about 2 times a week such as green beans.--> states that she has been eating liver twice a week, likely why

## 2025-06-05 NOTE — PROGRESS NOTES
[facility] [place]  6/5/25  Referring: Dr. Beyer    REASON FOR CONSULT/CHIEF COMPLAINT/HPI     Reason for visit/ Chief complaint  Follow up   CAD, AF, CHF   HPI Emilee Paulson is a 74 y.o. here follow up for management of   CAD, hypertension (20 year history), hyperlipidemia, chf, atrial fibrillation. Previous hemorrhagic stroke in  2018. Previously refused watchman.6/17/24 right sided hemiparesis, sensory loss and fall when subtherapeutic INR due to warfarin being held for temporal artery biopsy    On 2/25 she was taken off of valsartan and started entresto, she was unable to afford entresto    03/10/25 OV*** Today her weight is down 6 pounds. She is overall feeling ok, Does have a productive cough of clear phlegm, but no fever or chills. Complains of her heart racing, nausea, lower blood pressure. No chest pain, change in exertional shortness of breath, syncope    Walks with a rollator    Patient reported to the ED on 3/22/25 for severe back pain, headache, tearing right sided neck pain and worsening shortness of breath. She was hypotension and bradycardiac on assessment. She received 2L fluid bolus with improvement. Underwent an EKG, TTE, CTA chest/abd/pelvis, CT head, CT Pulm and CXR with no acute findings. Incidental CT finding of left atrial thrombus in the appendage, anticoagulated on warfarin and cannot afford a DOAC. Discharged with follow up to PCP advised.     Follow up with Dorita Delatorre CNP on 4/25/25. Patient reported progressive worsening of dyspnea, to the extent that walking even 10 steps results in significant breathlessness. Repeat echo ordered and 3 day dose increase of torsemide.     Patient admitted to  5/14/25-05/16/25 for worsening shortness of breath, CHF exacerbation and rhinovirus. She had a bedside TTE and treated with IV lasix and inhalers.     Hospital follow up completed with Dorita Delatorre CNP on 5/20/25. Reports improved symptoms.     Today patient is here for 3 month follow 
improvement  - unable to afford Entresto- may be a consideration when it goes generic this summer  - 4/25/25 > K 3.3, Mag 1.66  Plan:  - Continue spironolactone 25 BID, demadex 20 BID, valsartan 320  - Continue K and Mag supp  - renal and mag level      5. CAD with history of PCI   - No ischemia by 9/22/15 SPECT MPI  - PCI to LAD in 2000, stent patent by 9/2015 [facility], nonobstructive disease  -7/24/23-myoview- no ischemia  Plan:  - continue coreg 25 BID, asa, statin, zetia      6. Mixed Hyperlipidemia  - 2/14/23 tc 201 hdl 68 ldl 118 tri 73  - 6/9/24  tc 173 hdl 51 ldl 104 tri 91  - stable  Plan  - continue Atorvastatin 80 mg and Zetia      7 . Dilated ascending aorta  - 7/24/23- 4.8 by echo  - 11/5/23- CTA chest-No acute abnormality in the thoracic aorta.  No acute abnormality in the chest. Stable ectasia of the mid ascending thoracic aorta up to 45 mm, unchanged from 2022.  -6/2024 echo-Ascending aorta: The vessel is dilated and 4.5cm diameter.   -3/21/25 CTA chest > Ectasia of the ascending thoracic aorta measuring up to 4 cm in diameter. Ectatic aortic arch measuring up to 3.3 cm in diameter.  Plan  - continue to monitor blood pressure at home    - no indication for surgical repair per acc guidelines      Follow Up: 6 weeks      Dr. Rudy Avelar DO  Cardiologist Saumya Daley   Scribe Attestation:  I, Rakel Zamorano, am scribing for and in the presence of Joaquin Garcia, Rakel Zamorano 06/09/25 9:20 AM             Physician Attestation  The scribe for and in the presence of me (Joaquin Avelar DO).  The scribe Rakel Zamorano RN  may have prepopulated components of this note with my historical  intellectual property under my direct supervision.  Any additions to this intellectual property were performed in my presence and at my direction.  Furthermore, the content and accuracy of this note have been reviewed by me (Joaquin Avelar DO).  6/9/2025 9:20 AM

## 2025-06-08 NOTE — PATIENT INSTRUCTIONS
Please see Dr Diehl to discuss ablation  Please see Dr Kyle to discuss inhalers  Renal magnesium  Follow up in 6 weeks

## 2025-06-09 ENCOUNTER — TELEPHONE (OUTPATIENT)
Dept: CARDIOLOGY CLINIC | Age: 75
End: 2025-06-09

## 2025-06-09 ENCOUNTER — ANTI-COAG VISIT (OUTPATIENT)
Dept: PHARMACY | Age: 75
End: 2025-06-09
Payer: MEDICARE

## 2025-06-09 ENCOUNTER — HOSPITAL ENCOUNTER (OUTPATIENT)
Age: 75
Discharge: HOME | End: 2025-06-09
Payer: MEDICARE

## 2025-06-09 ENCOUNTER — OFFICE VISIT (OUTPATIENT)
Dept: CARDIOLOGY CLINIC | Age: 75
End: 2025-06-09
Payer: MEDICARE

## 2025-06-09 VITALS
BODY MASS INDEX: 31.23 KG/M2 | WEIGHT: 199 LBS | DIASTOLIC BLOOD PRESSURE: 64 MMHG | SYSTOLIC BLOOD PRESSURE: 114 MMHG | HEART RATE: 68 BPM | OXYGEN SATURATION: 97 % | HEIGHT: 67 IN

## 2025-06-09 DIAGNOSIS — I50.32 CHRONIC HEART FAILURE WITH PRESERVED EJECTION FRACTION (HCC): ICD-10-CM

## 2025-06-09 DIAGNOSIS — E78.2 MIXED HYPERLIPIDEMIA: ICD-10-CM

## 2025-06-09 DIAGNOSIS — I48.21 PERMANENT ATRIAL FIBRILLATION (HCC): Primary | ICD-10-CM

## 2025-06-09 DIAGNOSIS — R06.02 SHORTNESS OF BREATH: ICD-10-CM

## 2025-06-09 DIAGNOSIS — R06.02 SOB (SHORTNESS OF BREATH): ICD-10-CM

## 2025-06-09 DIAGNOSIS — I25.10 CAD S/P PERCUTANEOUS CORONARY ANGIOPLASTY: ICD-10-CM

## 2025-06-09 DIAGNOSIS — I77.810 ASCENDING AORTA DILATATION: ICD-10-CM

## 2025-06-09 DIAGNOSIS — I10 PRIMARY HYPERTENSION: Primary | ICD-10-CM

## 2025-06-09 DIAGNOSIS — I48.21 PERMANENT ATRIAL FIBRILLATION (HCC): ICD-10-CM

## 2025-06-09 DIAGNOSIS — Z98.61 CAD S/P PERCUTANEOUS CORONARY ANGIOPLASTY: ICD-10-CM

## 2025-06-09 LAB
ALBUMIN SERPL-MCNC: 4.6 G/DL (ref 3.4–5)
ANION GAP SERPL CALCULATED.3IONS-SCNC: 13 MMOL/L (ref 3–16)
BUN SERPL-MCNC: 27 MG/DL (ref 7–20)
CALCIUM SERPL-MCNC: 10.4 MG/DL (ref 8.3–10.6)
CHLORIDE SERPL-SCNC: 95 MMOL/L (ref 99–110)
CO2 SERPL-SCNC: 28 MMOL/L (ref 21–32)
CREAT SERPL-MCNC: 1.6 MG/DL (ref 0.6–1.2)
GFR SERPLBLD CREATININE-BSD FMLA CKD-EPI: 34 ML/MIN/{1.73_M2}
GLUCOSE SERPL-MCNC: 103 MG/DL (ref 70–99)
INTERNATIONAL NORMALIZATION RATIO, POC: 1.3
MAGNESIUM SERPL-MCNC: 1.71 MG/DL (ref 1.8–2.4)
PHOSPHATE SERPL-MCNC: 4.2 MG/DL (ref 2.5–4.9)
POTASSIUM SERPL-SCNC: 4.3 MMOL/L (ref 3.5–5.1)
PROTHROMBIN TIME, POC: 0
SODIUM SERPL-SCNC: 136 MMOL/L (ref 136–145)

## 2025-06-09 PROCEDURE — 36415 COLL VENOUS BLD VENIPUNCTURE: CPT

## 2025-06-09 PROCEDURE — 3078F DIAST BP <80 MM HG: CPT | Performed by: INTERNAL MEDICINE

## 2025-06-09 PROCEDURE — 83735 ASSAY OF MAGNESIUM: CPT

## 2025-06-09 PROCEDURE — 3074F SYST BP LT 130 MM HG: CPT | Performed by: INTERNAL MEDICINE

## 2025-06-09 PROCEDURE — 80069 RENAL FUNCTION PANEL: CPT

## 2025-06-09 PROCEDURE — 1123F ACP DISCUSS/DSCN MKR DOCD: CPT | Performed by: INTERNAL MEDICINE

## 2025-06-09 PROCEDURE — 85610 PROTHROMBIN TIME: CPT | Performed by: PHYSICIAN ASSISTANT

## 2025-06-09 PROCEDURE — 93000 ELECTROCARDIOGRAM COMPLETE: CPT | Performed by: INTERNAL MEDICINE

## 2025-06-09 PROCEDURE — 99214 OFFICE O/P EST MOD 30 MIN: CPT | Performed by: INTERNAL MEDICINE

## 2025-06-09 PROCEDURE — 99211 OFF/OP EST MAY X REQ PHY/QHP: CPT | Performed by: PHYSICIAN ASSISTANT

## 2025-06-09 PROCEDURE — 1159F MED LIST DOCD IN RCRD: CPT | Performed by: INTERNAL MEDICINE

## 2025-06-09 NOTE — PROGRESS NOTES
Ms. Emilee Paulson is a 74 y.o. y/o female with history of A fib   She presents today for anticoagulation monitoring and adjustment.    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)    Patient Reported Findings:  Yes     No   [x]   []       Patient verifies current dosing regimen as listed - follows AVS  ---> taking 5mg daily ---> Confirmed --> was d/c from facility with warfarin 2.5 mg tablets x 5 tablets, has taken 2.5 mg daily since d/c --> verified dose   []   [x]       S/S bleeding/bruising/swelling/SOB -did bruise shoulder after fall, followed up with doctor and monitoring bump ---> denies   []   [x]       Blood in urine or stool - denies   []   [x]       Procedures scheduled in the future at this time - Is being assessed for watchman, will keep notified---> had cortisone shot in knee last fri --> thyroid bx on Monday 8/19, did not hold, goes back 9/3 to make a plan, likely surgery  9/3/24 MD appt: Endocrinology referral for hyperthyroidism per patient preference  - in event of surgery, tentative plan to referral to thoracis to discuss possible sternotomy given higher clavicles and sternal head, body habitus, which may make substernal delivery challenging  - will need to hold warfarin leading up to surgery, possible bridge (per discussion with Dr. Briceño, ok to hold from NSGY standpoint)    -None upcoming   []   [x]       Missed Dose- might have missed 1/2 tablet on Sunday ---> denies   []   [x]       Extra Dose - Denies    []   [x]       Change in medications She continues with Tylenol 1000mg but only as needed --> tylenol arthritis recently---> torsemide, mag, jardiance was too expensive   ---> denies   [x]   []       Change in health/diet/appetite-- normally has high vitamin K diet of canned spinach weekly and collards or broccoli about every other week. Will have some lower vitamin K veggies about 2 times a week such as green beans.--> states that she has been eating liver twice a week, likely why

## 2025-06-11 ENCOUNTER — RESULTS FOLLOW-UP (OUTPATIENT)
Dept: CARDIOLOGY CLINIC | Age: 75
End: 2025-06-11

## 2025-06-11 DIAGNOSIS — I50.22 CHRONIC SYSTOLIC (CONGESTIVE) HEART FAILURE (HCC): Primary | ICD-10-CM

## 2025-06-11 NOTE — TELEPHONE ENCOUNTER
----- Message from LAKSHMI STEVENSON RN sent at 6/11/2025  4:05 PM EDT -----  Please call her and tell her  her kidney fx is not as good as it has been. She should stop valsartan and torsemide.  Repeat renal panel on Friday

## 2025-06-11 NOTE — RESULT ENCOUNTER NOTE
Please call her and tell her  her kidney fx is not as good as it has been. She should stop valsartan and torsemide.  Repeat renal panel on Friday

## 2025-06-20 ENCOUNTER — TELEPHONE (OUTPATIENT)
Dept: PHARMACY | Age: 75
End: 2025-06-20

## 2025-06-20 NOTE — TELEPHONE ENCOUNTER
Called and spoke with patient who rescheduled for 6/23.  Patient states she was just discharged from UC.

## 2025-06-26 ENCOUNTER — TELEPHONE (OUTPATIENT)
Dept: CARDIOLOGY CLINIC | Age: 75
End: 2025-06-26

## 2025-06-26 ENCOUNTER — ANTI-COAG VISIT (OUTPATIENT)
Dept: PHARMACY | Age: 75
End: 2025-06-26

## 2025-06-26 DIAGNOSIS — I48.21 PERMANENT ATRIAL FIBRILLATION (HCC): Primary | ICD-10-CM

## 2025-06-26 LAB
INR BLD: 2
PROTIME: NORMAL

## 2025-06-26 NOTE — TELEPHONE ENCOUNTER
I spoke with Concha Horn taking:  Spironolactone 25 mg BID  Wafarin  Carvedilol 25 mg BID  Hydralizine 50 TID  Diclofenec Gel  Asa 81 QD    Weights are erratic:  192 today  195 yesterday    201.6 6/24

## 2025-06-26 NOTE — TELEPHONE ENCOUNTER
Concha called to report the Pt elevating bp:    140/102    66      06/26   10:0am  140/110      ?      06/26     9:00am    Please advise.  Thank you

## 2025-06-26 NOTE — PROGRESS NOTES
Luisana CARMONA from [facility] ([phone]) called to see if they could get INR results for patient in home. They started seeing pt in home . Provided verbal order to check INR. INR today 2. Pt took 2.5 mg on Sun and 5 mg all other days of the week.     Pt in ED - for a fib and hypertensive urgency. INR 2.2. was given hydralazine and coreg.     Instructed for patient to continue to take 2.5 mg on Sun and 5 mg all other days of the week. Recheck INR in 1 week, 7/3      For Pharmacy Admin Tracking Only    Intervention Detail: Adherence Monitorin  Total # of Interventions Recommended: 1  Total # of Interventions Accepted: 1  Time Spent (min): 15

## 2025-07-03 ENCOUNTER — ANTI-COAG VISIT (OUTPATIENT)
Dept: PHARMACY | Age: 75
End: 2025-07-03

## 2025-07-03 DIAGNOSIS — I48.21 PERMANENT ATRIAL FIBRILLATION (HCC): Primary | ICD-10-CM

## 2025-07-03 LAB
INR BLD: 2.3
PROTIME: NORMAL

## 2025-07-03 NOTE — PROGRESS NOTES
Luisana CARMONA from [facility] ([phone]) called in results for patient. INR 2.3. Pt took 2.5 mg on Sun and 5 mg all other days of the week. Asked for return call to her with dosing instructions.     Returned call to Luisana CARMONA and LVM. Instructed for patient to continue to take 2.5 mg on Sun and 5 mg all other days of the week. Recheck INR in 1 week, 7/10. Any questions or concerns return call to clinic       For Pharmacy Admin Tracking Only    Total # of Interventions Recommended: 0  Total # of Interventions Accepted: 0  Time Spent (min): 15

## 2025-07-10 ENCOUNTER — ANTI-COAG VISIT (OUTPATIENT)
Dept: PHARMACY | Age: 75
End: 2025-07-10

## 2025-07-10 DIAGNOSIS — I48.21 PERMANENT ATRIAL FIBRILLATION (HCC): Primary | ICD-10-CM

## 2025-07-10 LAB
INR BLD: 3.3
PROTIME: NORMAL

## 2025-07-10 NOTE — PROGRESS NOTES
PT 39.4 / INR 3.3  Please call Cady CARMONA [facility] [phone] with warfarin dosing and order for next INR check. No dosing information provided.  LVM for [facility] to call back with this information.     Called MATHEW and LVM.  Giuliana Wright, PharmD, [facility]

## 2025-07-10 NOTE — PROGRESS NOTES
Take 2.5mg today then continue weekly dose of 2.5mg on Sundays and 5mg all other days. Recheck 1 week, .     Giuliana Wright, PharmD, [facility]  For Pharmacy Admin Tracking Only    Intervention Detail: Adherence Monitorin and Dose Adjustment: 1, reason: Therapy Optimization  Total # of Interventions Recommended: 2  Total # of Interventions Accepted: 2  Time Spent (min): 15

## 2025-07-17 ENCOUNTER — ANTI-COAG VISIT (OUTPATIENT)
Dept: PHARMACY | Age: 75
End: 2025-07-17

## 2025-07-17 DIAGNOSIS — I48.21 PERMANENT ATRIAL FIBRILLATION (HCC): Primary | ICD-10-CM

## 2025-07-17 LAB
INTERNATIONAL NORMALIZATION RATIO, POC: 2.5
PROTHROMBIN TIME, POC: 0

## 2025-07-17 NOTE — PROGRESS NOTES
INR 2.5 Patient taking 2.5mg warfarin on Fri/Sun and 5mg all other days.  Please call Cindi CARMONA [facility] [phone] with warfarin dosing and order for next INR check.       Returned call to Cindi CARMONA. Instructed for patient to take 2.5 mg on Thurs and Sun and 5 mg all other days of the week. Recheck INR in 2 weeks 7/31 as  is not visiting patient next week       For Pharmacy Admin Tracking Only    Intervention Detail: Dose Adjustment: 1, reason: Therapy Optimization  Total # of Interventions Recommended: 1  Total # of Interventions Accepted: 1  Time Spent (min): 10

## 2025-07-24 ENCOUNTER — TELEPHONE (OUTPATIENT)
Dept: CARDIOLOGY CLINIC | Age: 75
End: 2025-07-24

## 2025-07-24 NOTE — TELEPHONE ENCOUNTER
Samira called check on if the office has re: the fax of the Home Care Order that was fax in on 07/17.  If office has re: it.  Please fill out and fax back to Samira: [phone].  Thank you

## 2025-07-31 ENCOUNTER — ANTI-COAG VISIT (OUTPATIENT)
Dept: PHARMACY | Age: 75
End: 2025-07-31

## 2025-07-31 DIAGNOSIS — I48.21 PERMANENT ATRIAL FIBRILLATION (HCC): Primary | ICD-10-CM

## 2025-07-31 LAB
INR BLD: 2.3
PROTIME: NORMAL

## 2025-07-31 NOTE — PROGRESS NOTES
PT 27.8 / INR 2.3 Patient taking 2.5mg warfarin on Sunday and 5mg all other days.  Please call Kimberli CARMONA [phone] (secure ) with warfarin dosing and order for next INR check.     Called MATHEW, CARO. Need to confirm patient took 2.5mg Thurs and Sun. If that is correct then continue to take weekly dose of 2.5mg on Thurs and Sundays and 5mg all other days. Recheck in 1 week, 8/7.    Giuliana Wright, PharmD, [facility]  For Pharmacy Admin Tracking Only    Intervention Detail: Dose Adjustment: 1, reason: Therapy Optimization  Total # of Interventions Recommended: 1  Total # of Interventions Accepted: 1  Time Spent (min): 15

## 2025-08-01 NOTE — PROGRESS NOTES
Kimberli Preston from [facility] called back to say patient took 2.5mg warfarin on Thursday and Sunday.  You can reach her @ [phone].  [facility] will not see patient again until 8/12.     Called to confirm, updated note, RS for 8/12.  Giuliana Wright, PharmD, [facility]  For Pharmacy Admin Tracking Only    Intervention Detail: Dose Adjustment: 1, reason: Therapy Optimization  Total # of Interventions Recommended: 1  Total # of Interventions Accepted: 1  Time Spent (min): 15

## 2025-08-12 ENCOUNTER — ANTI-COAG VISIT (OUTPATIENT)
Dept: PHARMACY | Age: 75
End: 2025-08-12

## 2025-08-12 DIAGNOSIS — I48.21 PERMANENT ATRIAL FIBRILLATION (HCC): Primary | ICD-10-CM

## 2025-08-12 LAB
INR BLD: 2.6
PROTIME: NORMAL

## 2025-08-12 ASSESSMENT — ENCOUNTER SYMPTOMS
COUGH: 0
CHEST TIGHTNESS: 0
ABDOMINAL PAIN: 0
SHORTNESS OF BREATH: 1
PHOTOPHOBIA: 0
BLOOD IN STOOL: 0

## 2025-08-14 ENCOUNTER — OFFICE VISIT (OUTPATIENT)
Dept: CARDIOLOGY CLINIC | Age: 75
End: 2025-08-14
Payer: MEDICARE

## 2025-08-14 VITALS — SYSTOLIC BLOOD PRESSURE: 118 MMHG | HEART RATE: 83 BPM | DIASTOLIC BLOOD PRESSURE: 66 MMHG | OXYGEN SATURATION: 98 %

## 2025-08-14 DIAGNOSIS — I25.83 CORONARY ARTERY DISEASE DUE TO LIPID RICH PLAQUE: ICD-10-CM

## 2025-08-14 DIAGNOSIS — I34.0 NONRHEUMATIC MITRAL VALVE REGURGITATION: ICD-10-CM

## 2025-08-14 DIAGNOSIS — I48.20 CHRONIC ATRIAL FIBRILLATION (HCC): ICD-10-CM

## 2025-08-14 DIAGNOSIS — I77.810 AORTIC ROOT DILATATION: ICD-10-CM

## 2025-08-14 DIAGNOSIS — I48.21 PERMANENT ATRIAL FIBRILLATION (HCC): Primary | ICD-10-CM

## 2025-08-14 DIAGNOSIS — I25.10 CORONARY ARTERY DISEASE DUE TO LIPID RICH PLAQUE: ICD-10-CM

## 2025-08-14 DIAGNOSIS — R06.02 SOB (SHORTNESS OF BREATH): ICD-10-CM

## 2025-08-14 PROCEDURE — 3078F DIAST BP <80 MM HG: CPT | Performed by: INTERNAL MEDICINE

## 2025-08-14 PROCEDURE — 1123F ACP DISCUSS/DSCN MKR DOCD: CPT | Performed by: INTERNAL MEDICINE

## 2025-08-14 PROCEDURE — 99214 OFFICE O/P EST MOD 30 MIN: CPT | Performed by: INTERNAL MEDICINE

## 2025-08-14 PROCEDURE — 1159F MED LIST DOCD IN RCRD: CPT | Performed by: INTERNAL MEDICINE

## 2025-08-14 PROCEDURE — 3074F SYST BP LT 130 MM HG: CPT | Performed by: INTERNAL MEDICINE

## 2025-08-26 ENCOUNTER — ANTI-COAG VISIT (OUTPATIENT)
Dept: PHARMACY | Age: 75
End: 2025-08-26

## 2025-08-26 DIAGNOSIS — I48.21 PERMANENT ATRIAL FIBRILLATION (HCC): Primary | ICD-10-CM

## 2025-08-26 LAB
INR BLD: 2.9
PROTIME: NORMAL

## (undated) DEVICE — GEL US 20GM NONIRRITATING OVERWRAPPED FILE PCH TRNSMIT

## (undated) DEVICE — TRAP FLUID

## (undated) DEVICE — SUTURE PERMAHAND SZ 4-0 L12X30IN NONABSORBABLE BLK SILK A303H

## (undated) DEVICE — CLIP SM RED INTERN HMOCLP TITAN LIGATING

## (undated) DEVICE — GLOVE SURG SZ 6 L11.2IN FNGR THK9.8MIL STRW LTX POLYMER

## (undated) DEVICE — APPLICATOR MEDICATED 10.5 CC SOLUTION HI LT ORNG CHLORAPREP

## (undated) DEVICE — GENERAL: Brand: MEDLINE INDUSTRIES, INC.

## (undated) DEVICE — BLADE,CARBON-STEEL,15,STRL,DISPOSABLE,TB: Brand: MEDLINE

## (undated) DEVICE — NEPTUNE E-SEP SMOKE EVACUATION PENCIL, COATED, 70MM BLADE, PUSH BUTTON SWITCH: Brand: NEPTUNE E-SEP

## (undated) DEVICE — GARMENT,MEDLINE,DVT,INT,CALF,MED, GEN2: Brand: MEDLINE

## (undated) DEVICE — SOLUTION IV 1000ML 0.9% SOD CHL

## (undated) DEVICE — ELECTRODE NDL L2.8IN COAT LO PWR SET EDGE

## (undated) DEVICE — TOWEL SURG W16XL26IN WHT NONFENESTRATED ST 4 PER PK

## (undated) DEVICE — SUTURE MONOCRYL SZ 5-0 L18IN ABSRB UD L13MM P-3 3/8 CIR PRIM Y493G

## (undated) DEVICE — TOWEL,STOP FLAG GOLD N-W: Brand: MEDLINE

## (undated) DEVICE — GLOVE SURG SZ 65 L12IN FNGR THK79MIL GRN LTX FREE

## (undated) DEVICE — ADHESIVE SKIN CLOSURE WND 8.661X1.5 IN 22 CM LIQUIBAND SECUR